# Patient Record
Sex: MALE | Race: WHITE | NOT HISPANIC OR LATINO | URBAN - METROPOLITAN AREA
[De-identification: names, ages, dates, MRNs, and addresses within clinical notes are randomized per-mention and may not be internally consistent; named-entity substitution may affect disease eponyms.]

---

## 2018-07-17 ENCOUNTER — INPATIENT (INPATIENT)
Facility: HOSPITAL | Age: 58
LOS: 7 days | Discharge: NURSING, REHAB OR LONG-TERM CARE | End: 2018-07-25
Attending: HOSPITALIST | Admitting: HOSPITALIST
Payer: MEDICARE

## 2018-07-17 VITALS — HEART RATE: 101 BPM | OXYGEN SATURATION: 98 %

## 2018-07-17 LAB
BASE EXCESS BLDV CALC-SCNC: 0.4 MMOL/L — SIGNIFICANT CHANGE UP (ref -2–2)
CA-I SERPL-SCNC: 1.19 MMOL/L — SIGNIFICANT CHANGE UP (ref 1.12–1.3)
GAS PNL BLDV: 134 MMOL/L — LOW (ref 136–145)
GAS PNL BLDV: SIGNIFICANT CHANGE UP
HCO3 BLDV-SCNC: 28 MMOL/L — SIGNIFICANT CHANGE UP (ref 22–29)
HCT VFR BLDA CALC: 48.2 % — HIGH (ref 34–44)
HGB BLD CALC-MCNC: 15.7 G/DL — SIGNIFICANT CHANGE UP (ref 14–18)
LACTATE BLDV-MCNC: 0.9 MMOL/L — SIGNIFICANT CHANGE UP (ref 0.5–1.6)
PCO2 BLDV: 58 MMHG — HIGH (ref 41–51)
PH BLDV: 7.3 — SIGNIFICANT CHANGE UP (ref 7.26–7.43)
PO2 BLDV: 56 MMHG — HIGH (ref 20–40)
POTASSIUM BLDV-SCNC: 3.8 MMOL/L — SIGNIFICANT CHANGE UP (ref 3.3–5.6)
SAO2 % BLDV: 85 % — SIGNIFICANT CHANGE UP

## 2018-07-17 NOTE — ED PROVIDER NOTE - PHYSICAL EXAMINATION
PHYSICAL EXAM:    GENERAL: Alert, appears older than stated age. on cpap. speaking in 1-2 word sentences. +improving with bipap.   SKIN: Warm, pink and dry.   EYE: Normal lids/conjunctiva,  ENT: Normal hearing, patent oropharynx   NECK: +supple. No meningismus, or JVD, +Trachea midline.  Pulm: Bilateral BS, increased respiratory effort. +wheezing on R, on L decreased BS. bedside sono done by me reveals b-lines throughout with pleural effusion on L. +good heart squeeze.   CV: RRR, no M/R/G, 2+ pulses  Abd: soft, non-tender, non-distended  Mskel: no erythema, cyanosis. + b/l 2+ LE edema.   Neuro: AAOx3.

## 2018-07-17 NOTE — ED PROVIDER NOTE - PROGRESS NOTE DETAILS
57 y/o M with PMH of CHF, HTN, DLD, GI disorders and has had respiratory failure in the past, BIB EMS from nursing home facility for evaluation of acute respiratory failure. Pt was found to be hypoxic, hypertensive and tachycardic. Pt improved with EMS with BiPAP.  On exam: Pt BIB EMS, on CPAP, (+) acute respiratory distress, speaking in 1 to 2 word sentences. NCAT. PERRLA 3mm b/l, no nystagmus, EOMI. HEENT normal, no pooling of secretions. +Full passive ROM in neck. S1S2, no MRG. Lungs: (+) wheezing and rales.  Abdomen soft, NT/ND, no rebound or guarding, no CVAT. (+) 2+ pitting edema, +symmetric pulses b/l. CN2-12 grossly intact. No rash. Bedside US displays B lines in all lung fields. Pt’s status is improving with BiPAP. pt states on eliquis for unknown reason. cannot perform thoracentesis for 24 hrs on eliquis. disucssed with Dr. childers ICU fellow, agrees that despite DNR/DNI, will admit to ICU, hold eliquis for 24 hrs, and then perform thoracentesis. stable on bipap.

## 2018-07-17 NOTE — ED PROVIDER NOTE - MEDICAL DECISION MAKING DETAILS
I personally evaluated the patient. I reviewed the Resident’s or Physician Assistant’s note (as assigned above), and agree with the findings and plan except as documented in my note. Patient endorsed to the ICU, unable to perform tap because patient states that he is on elliquis.

## 2018-07-17 NOTE — ED PROVIDER NOTE - OBJECTIVE STATEMENT
59 y/o M with PMH with bipolar d/o, suicide attempts, CHF, HLD, HTN, DNR/DNI on eliquis from [facility] for acute respiratory failure x 1 hr PTA. placed on CPAP en route with improvement in dyspnea. +hypoxia off CPAP. no CP. no fever.

## 2018-07-17 NOTE — ED PROVIDER NOTE - CARE PLAN
Principal Discharge DX:	Pleural effusion  Secondary Diagnosis:	CHF exacerbation  Secondary Diagnosis:	Shortness of breath

## 2018-07-18 ENCOUNTER — RESULT REVIEW (OUTPATIENT)
Age: 58
End: 2018-07-18

## 2018-07-18 LAB
ALBUMIN SERPL ELPH-MCNC: 4 G/DL — SIGNIFICANT CHANGE UP (ref 3.5–5.2)
ALBUMIN SERPL ELPH-MCNC: 4.3 G/DL — SIGNIFICANT CHANGE UP (ref 3.5–5.2)
ALP SERPL-CCNC: 186 U/L — HIGH (ref 30–115)
ALP SERPL-CCNC: 195 U/L — HIGH (ref 30–115)
ALT FLD-CCNC: 31 U/L — SIGNIFICANT CHANGE UP (ref 0–41)
ALT FLD-CCNC: 31 U/L — SIGNIFICANT CHANGE UP (ref 0–41)
ANION GAP SERPL CALC-SCNC: 15 MMOL/L — HIGH (ref 7–14)
ANION GAP SERPL CALC-SCNC: 19 MMOL/L — HIGH (ref 7–14)
APTT BLD: 35.1 SEC — SIGNIFICANT CHANGE UP (ref 27–39.2)
APTT BLD: 36.7 SEC — SIGNIFICANT CHANGE UP (ref 27–39.2)
AST SERPL-CCNC: 21 U/L — SIGNIFICANT CHANGE UP (ref 0–41)
AST SERPL-CCNC: 21 U/L — SIGNIFICANT CHANGE UP (ref 0–41)
B PERT IGG+IGM PNL SER: ABNORMAL
BASOPHILS # BLD AUTO: 0.02 K/UL — SIGNIFICANT CHANGE UP (ref 0–0.2)
BASOPHILS NFR BLD AUTO: 0.2 % — SIGNIFICANT CHANGE UP (ref 0–1)
BILIRUB DIRECT SERPL-MCNC: <0.2 MG/DL — SIGNIFICANT CHANGE UP (ref 0–0.2)
BILIRUB INDIRECT FLD-MCNC: >0.4 MG/DL — SIGNIFICANT CHANGE UP (ref 0.2–1.2)
BILIRUB SERPL-MCNC: 0.4 MG/DL — SIGNIFICANT CHANGE UP (ref 0.2–1.2)
BILIRUB SERPL-MCNC: 0.6 MG/DL — SIGNIFICANT CHANGE UP (ref 0.2–1.2)
BUN SERPL-MCNC: 14 MG/DL — SIGNIFICANT CHANGE UP (ref 10–20)
BUN SERPL-MCNC: 16 MG/DL — SIGNIFICANT CHANGE UP (ref 10–20)
CALCIUM SERPL-MCNC: 8.9 MG/DL — SIGNIFICANT CHANGE UP (ref 8.5–10.1)
CALCIUM SERPL-MCNC: 8.9 MG/DL — SIGNIFICANT CHANGE UP (ref 8.5–10.1)
CHLORIDE SERPL-SCNC: 97 MMOL/L — LOW (ref 98–110)
CHLORIDE SERPL-SCNC: 99 MMOL/L — SIGNIFICANT CHANGE UP (ref 98–110)
CK MB CFR SERPL CALC: 2.6 NG/ML — SIGNIFICANT CHANGE UP (ref 0.6–6.3)
CK SERPL-CCNC: 112 U/L — SIGNIFICANT CHANGE UP (ref 0–225)
CK SERPL-CCNC: 98 U/L — SIGNIFICANT CHANGE UP (ref 0–225)
CO2 SERPL-SCNC: 22 MMOL/L — SIGNIFICANT CHANGE UP (ref 17–32)
CO2 SERPL-SCNC: 24 MMOL/L — SIGNIFICANT CHANGE UP (ref 17–32)
COLOR FLD: SIGNIFICANT CHANGE UP
CREAT SERPL-MCNC: 0.9 MG/DL — SIGNIFICANT CHANGE UP (ref 0.7–1.5)
CREAT SERPL-MCNC: 1 MG/DL — SIGNIFICANT CHANGE UP (ref 0.7–1.5)
EOSINOPHIL # BLD AUTO: 0.02 K/UL — SIGNIFICANT CHANGE UP (ref 0–0.7)
EOSINOPHIL # FLD: 2 % — SIGNIFICANT CHANGE UP
EOSINOPHIL NFR BLD AUTO: 0.2 % — SIGNIFICANT CHANGE UP (ref 0–8)
FLUID INTAKE SUBSTANCE CLASS: SIGNIFICANT CHANGE UP
FLUID SEGMENTED GRANULOCYTES: 45 % — SIGNIFICANT CHANGE UP
GLUCOSE SERPL-MCNC: 139 MG/DL — HIGH (ref 70–99)
GLUCOSE SERPL-MCNC: 141 MG/DL — HIGH (ref 70–99)
HCT VFR BLD CALC: 43.1 % — SIGNIFICANT CHANGE UP (ref 42–52)
HCT VFR BLD CALC: 43.9 % — SIGNIFICANT CHANGE UP (ref 42–52)
HGB BLD-MCNC: 14.4 G/DL — SIGNIFICANT CHANGE UP (ref 14–18)
HGB BLD-MCNC: 14.5 G/DL — SIGNIFICANT CHANGE UP (ref 14–18)
IMM GRANULOCYTES NFR BLD AUTO: 0.7 % — HIGH (ref 0.1–0.3)
INR BLD: 1.26 RATIO — SIGNIFICANT CHANGE UP (ref 0.65–1.3)
INR BLD: 1.38 RATIO — HIGH (ref 0.65–1.3)
LACTATE SERPL-SCNC: 1 MMOL/L — SIGNIFICANT CHANGE UP (ref 0.5–2.2)
LDH SERPL L TO P-CCNC: 235 U/L — SIGNIFICANT CHANGE UP (ref 50–242)
LYMPHOCYTES # BLD AUTO: 0.7 K/UL — LOW (ref 1.2–3.4)
LYMPHOCYTES # BLD AUTO: 6.6 % — LOW (ref 20.5–51.1)
LYMPHOCYTES # FLD: 48 — SIGNIFICANT CHANGE UP
MAGNESIUM SERPL-MCNC: 2.1 MG/DL — SIGNIFICANT CHANGE UP (ref 1.8–2.4)
MCHC RBC-ENTMCNC: 26.7 PG — LOW (ref 27–31)
MCHC RBC-ENTMCNC: 27.1 PG — SIGNIFICANT CHANGE UP (ref 27–31)
MCHC RBC-ENTMCNC: 33 G/DL — SIGNIFICANT CHANGE UP (ref 32–37)
MCHC RBC-ENTMCNC: 33.4 G/DL — SIGNIFICANT CHANGE UP (ref 32–37)
MCV RBC AUTO: 80.8 FL — SIGNIFICANT CHANGE UP (ref 80–94)
MCV RBC AUTO: 81 FL — SIGNIFICANT CHANGE UP (ref 80–94)
MONOCYTES # BLD AUTO: 0.15 K/UL — SIGNIFICANT CHANGE UP (ref 0.1–0.6)
MONOCYTES NFR BLD AUTO: 1.4 % — LOW (ref 1.7–9.3)
MONOS+MACROS # FLD: 5 % — SIGNIFICANT CHANGE UP
NEUTROPHILS # BLD AUTO: 9.68 K/UL — HIGH (ref 1.4–6.5)
NEUTROPHILS NFR BLD AUTO: 90.9 % — HIGH (ref 42.2–75.2)
NRBC # BLD: 0 /100 WBCS — SIGNIFICANT CHANGE UP (ref 0–0)
NT-PROBNP SERPL-SCNC: 127 PG/ML — SIGNIFICANT CHANGE UP (ref 0–300)
PLATELET # BLD AUTO: 320 K/UL — SIGNIFICANT CHANGE UP (ref 130–400)
PLATELET # BLD AUTO: 418 K/UL — HIGH (ref 130–400)
POTASSIUM SERPL-MCNC: 4.3 MMOL/L — SIGNIFICANT CHANGE UP (ref 3.5–5)
POTASSIUM SERPL-MCNC: 4.4 MMOL/L — SIGNIFICANT CHANGE UP (ref 3.5–5)
POTASSIUM SERPL-SCNC: 4.3 MMOL/L — SIGNIFICANT CHANGE UP (ref 3.5–5)
POTASSIUM SERPL-SCNC: 4.4 MMOL/L — SIGNIFICANT CHANGE UP (ref 3.5–5)
PROT SERPL-MCNC: 6.6 G/DL — SIGNIFICANT CHANGE UP (ref 6–8)
PROT SERPL-MCNC: 7 G/DL — SIGNIFICANT CHANGE UP (ref 6–8)
PROTHROM AB SERPL-ACNC: 13.6 SEC — HIGH (ref 9.95–12.87)
PROTHROM AB SERPL-ACNC: 14.9 SEC — HIGH (ref 9.95–12.87)
RBC # BLD: 5.32 M/UL — SIGNIFICANT CHANGE UP (ref 4.7–6.1)
RBC # BLD: 5.43 M/UL — SIGNIFICANT CHANGE UP (ref 4.7–6.1)
RBC # FLD: 14.6 % — HIGH (ref 11.5–14.5)
RBC # FLD: 14.7 % — HIGH (ref 11.5–14.5)
RCV VOL RI: HIGH /UL (ref 0–5)
SODIUM SERPL-SCNC: 138 MMOL/L — SIGNIFICANT CHANGE UP (ref 135–146)
SODIUM SERPL-SCNC: 138 MMOL/L — SIGNIFICANT CHANGE UP (ref 135–146)
TOTAL NUCLEATED CELL COUNT, BODY FLUID: 1030 /UL — HIGH (ref 0–5)
TROPONIN T SERPL-MCNC: <0.01 NG/ML — SIGNIFICANT CHANGE UP
TROPONIN T SERPL-MCNC: <0.01 NG/ML — SIGNIFICANT CHANGE UP
TUBE TYPE: SIGNIFICANT CHANGE UP
WBC # BLD: 10.64 K/UL — SIGNIFICANT CHANGE UP (ref 4.8–10.8)
WBC # BLD: 15.41 K/UL — HIGH (ref 4.8–10.8)
WBC # FLD AUTO: 10.64 K/UL — SIGNIFICANT CHANGE UP (ref 4.8–10.8)
WBC # FLD AUTO: 15.41 K/UL — HIGH (ref 4.8–10.8)

## 2018-07-18 RX ORDER — VANCOMYCIN HCL 1 G
1500 VIAL (EA) INTRAVENOUS EVERY 12 HOURS
Qty: 0 | Refills: 0 | Status: DISCONTINUED | OUTPATIENT
Start: 2018-07-19 | End: 2018-07-19

## 2018-07-18 RX ORDER — MEROPENEM 1 G/30ML
1000 INJECTION INTRAVENOUS ONCE
Qty: 0 | Refills: 0 | Status: COMPLETED | OUTPATIENT
Start: 2018-07-18 | End: 2018-07-18

## 2018-07-18 RX ORDER — PROPRANOLOL HCL 160 MG
20 CAPSULE, EXTENDED RELEASE 24HR ORAL
Qty: 0 | Refills: 0 | Status: DISCONTINUED | OUTPATIENT
Start: 2018-07-18 | End: 2018-07-25

## 2018-07-18 RX ORDER — MEROPENEM 1 G/30ML
1000 INJECTION INTRAVENOUS EVERY 8 HOURS
Qty: 0 | Refills: 0 | Status: DISCONTINUED | OUTPATIENT
Start: 2018-07-18 | End: 2018-07-24

## 2018-07-18 RX ORDER — ACETAMINOPHEN 500 MG
650 TABLET ORAL ONCE
Qty: 0 | Refills: 0 | Status: DISCONTINUED | OUTPATIENT
Start: 2018-07-18 | End: 2018-07-18

## 2018-07-18 RX ORDER — ISOSORBIDE MONONITRATE 60 MG/1
20 TABLET, EXTENDED RELEASE ORAL DAILY
Qty: 0 | Refills: 0 | Status: DISCONTINUED | OUTPATIENT
Start: 2018-07-18 | End: 2018-07-18

## 2018-07-18 RX ORDER — SENNA PLUS 8.6 MG/1
1 TABLET ORAL EVERY 12 HOURS
Qty: 0 | Refills: 0 | Status: DISCONTINUED | OUTPATIENT
Start: 2018-07-18 | End: 2018-07-25

## 2018-07-18 RX ORDER — DULOXETINE HYDROCHLORIDE 30 MG/1
60 CAPSULE, DELAYED RELEASE ORAL DAILY
Qty: 0 | Refills: 0 | Status: DISCONTINUED | OUTPATIENT
Start: 2018-07-18 | End: 2018-07-25

## 2018-07-18 RX ORDER — LATANOPROST 0.05 MG/ML
1 SOLUTION/ DROPS OPHTHALMIC; TOPICAL AT BEDTIME
Qty: 0 | Refills: 0 | Status: DISCONTINUED | OUTPATIENT
Start: 2018-07-18 | End: 2018-07-25

## 2018-07-18 RX ORDER — TAMSULOSIN HYDROCHLORIDE 0.4 MG/1
0.4 CAPSULE ORAL DAILY
Qty: 0 | Refills: 0 | Status: DISCONTINUED | OUTPATIENT
Start: 2018-07-18 | End: 2018-07-25

## 2018-07-18 RX ORDER — TRAZODONE HCL 50 MG
50 TABLET ORAL AT BEDTIME
Qty: 0 | Refills: 0 | Status: DISCONTINUED | OUTPATIENT
Start: 2018-07-18 | End: 2018-07-25

## 2018-07-18 RX ORDER — MORPHINE SULFATE 50 MG/1
2 CAPSULE, EXTENDED RELEASE ORAL ONCE
Qty: 0 | Refills: 0 | Status: DISCONTINUED | OUTPATIENT
Start: 2018-07-18 | End: 2018-07-18

## 2018-07-18 RX ORDER — VANCOMYCIN HCL 1 G
1500 VIAL (EA) INTRAVENOUS ONCE
Qty: 0 | Refills: 0 | Status: COMPLETED | OUTPATIENT
Start: 2018-07-18 | End: 2018-07-18

## 2018-07-18 RX ORDER — VANCOMYCIN HCL 1 G
VIAL (EA) INTRAVENOUS
Qty: 0 | Refills: 0 | Status: DISCONTINUED | OUTPATIENT
Start: 2018-07-18 | End: 2018-07-19

## 2018-07-18 RX ORDER — MEROPENEM 1 G/30ML
INJECTION INTRAVENOUS
Qty: 0 | Refills: 0 | Status: DISCONTINUED | OUTPATIENT
Start: 2018-07-18 | End: 2018-07-24

## 2018-07-18 RX ORDER — ZOLPIDEM TARTRATE 10 MG/1
5 TABLET ORAL AT BEDTIME
Qty: 0 | Refills: 0 | Status: DISCONTINUED | OUTPATIENT
Start: 2018-07-18 | End: 2018-07-22

## 2018-07-18 RX ORDER — FUROSEMIDE 40 MG
40 TABLET ORAL DAILY
Qty: 0 | Refills: 0 | Status: DISCONTINUED | OUTPATIENT
Start: 2018-07-18 | End: 2018-07-25

## 2018-07-18 RX ORDER — ZOLPIDEM TARTRATE 10 MG/1
5 TABLET ORAL AT BEDTIME
Qty: 0 | Refills: 0 | Status: DISCONTINUED | OUTPATIENT
Start: 2018-07-18 | End: 2018-07-24

## 2018-07-18 RX ORDER — ISOSORBIDE MONONITRATE 60 MG/1
30 TABLET, EXTENDED RELEASE ORAL DAILY
Qty: 0 | Refills: 0 | Status: DISCONTINUED | OUTPATIENT
Start: 2018-07-18 | End: 2018-07-25

## 2018-07-18 RX ADMIN — MEROPENEM 200 MILLIGRAM(S): 1 INJECTION INTRAVENOUS at 22:03

## 2018-07-18 RX ADMIN — MORPHINE SULFATE 2 MILLIGRAM(S): 50 CAPSULE, EXTENDED RELEASE ORAL at 14:30

## 2018-07-18 RX ADMIN — Medication 50 MILLIGRAM(S): at 22:03

## 2018-07-18 RX ADMIN — TAMSULOSIN HYDROCHLORIDE 0.4 MILLIGRAM(S): 0.4 CAPSULE ORAL at 11:30

## 2018-07-18 RX ADMIN — MEROPENEM 200 MILLIGRAM(S): 1 INJECTION INTRAVENOUS at 16:56

## 2018-07-18 RX ADMIN — Medication 1 APPLICATION(S): at 18:26

## 2018-07-18 RX ADMIN — MORPHINE SULFATE 2 MILLIGRAM(S): 50 CAPSULE, EXTENDED RELEASE ORAL at 14:43

## 2018-07-18 RX ADMIN — Medication 20 MILLIGRAM(S): at 17:01

## 2018-07-18 RX ADMIN — ISOSORBIDE MONONITRATE 30 MILLIGRAM(S): 60 TABLET, EXTENDED RELEASE ORAL at 12:14

## 2018-07-18 RX ADMIN — DULOXETINE HYDROCHLORIDE 60 MILLIGRAM(S): 30 CAPSULE, DELAYED RELEASE ORAL at 11:30

## 2018-07-18 RX ADMIN — LATANOPROST 1 DROP(S): 0.05 SOLUTION/ DROPS OPHTHALMIC; TOPICAL at 22:03

## 2018-07-18 RX ADMIN — Medication 300 MILLIGRAM(S): at 16:56

## 2018-07-18 RX ADMIN — Medication 40 MILLIGRAM(S): at 03:28

## 2018-07-18 RX ADMIN — ZOLPIDEM TARTRATE 5 MILLIGRAM(S): 10 TABLET ORAL at 22:03

## 2018-07-18 RX ADMIN — SENNA PLUS 1 TABLET(S): 8.6 TABLET ORAL at 11:50

## 2018-07-18 NOTE — CONSULT NOTE ADULT - SUBJECTIVE AND OBJECTIVE BOX
Patient is a 58y old  Male who presents with a chief complaint of sob (18 Jul 2018 02:25)      HPI:  : 57 y/o M with PMH of ?? CHF, HTN, DLD,  and has had respiratory failure in the past, BIB EMS from nursing home facility for evaluation of sob. AS per patient, he has been feeling weak and having difficulty breathing for few days along with cough but no sputum. he also c/o chest pressure occasionally with sob but now denies any pain. he denies any fever, chills, n/v, headache. he also c/o abd pain which is diffuse and chronic along with distension and also mentions that he had last bm 2days ago and is passing gas with difficulty. In ed, he was found to have lt pleural effusion but was not able to tap as patient is on eliquis. he is poor historian. (18 Jul 2018 02:25)      PAST MEDICAL & SURGICAL HISTORY:  CHF (congestive heart failure)  Insomnia  BPH (benign prostatic hyperplasia)  Depression  Bipolar 1 disorder  HTN (hypertension)      SOCIAL HX:   Smoking                         ETOH                            Other    FAMILY HISTORY:  No pertinent family history in first degree relatives      Review of system: noncontributory, otherwise stated in my note    Allergies    No Known Allergies    Intolerances          PHYSICAL EXAM    ICU Vital Signs Last 24 Hrs  T(C): 35.9 (18 Jul 2018 03:45), Max: 37 (17 Jul 2018 23:42)  T(F): 96.6 (18 Jul 2018 03:45), Max: 98.6 (17 Jul 2018 23:42)  HR: 102 (18 Jul 2018 06:00) (100 - 104)  BP: 132/84 (18 Jul 2018 06:00) (132/84 - 150/97)  BP(mean): 103 (18 Jul 2018 06:00) (103 - 103)  RR: 18 (18 Jul 2018 06:00) (18 - 24)  SpO2: 97% (18 Jul 2018 06:00) (95% - 100%)            General:  HEENT:  PERRLA            Lymph Nodes: no cervical LN  Lungs: decreased breath sounds and crackles   Cardiovascular: Regular  Abdomen: Soft, Positive BS  Extremities: No clubbing  Skin: intact  Neurological: moves all ext      07-17-18 @ 07:01  -  07-18-18 @ 07:00  --------------------------------------------------------  IN:  Total IN: 0 mL    OUT:    Voided: 1450 mL  Total OUT: 1450 mL    Total NET: -1450 mL          LABS:                          14.5   10.64 )-----------( 320      ( 18 Jul 2018 04:30 )             43.9                                               07-18    138  |  97<L>  |  16  ----------------------------<  139<H>  4.3   |  22  |  0.9    Ca    8.9      18 Jul 2018 04:30  Mg     2.1     07-18    TPro  7.0  /  Alb  4.3  /  TBili  0.6  /  DBili  <0.2  /  AST  21  /  ALT  31  /  AlkPhos  195<H>  07-18      PT/INR - ( 18 Jul 2018 04:30 )   PT: 13.60 sec;   INR: 1.26 ratio         PTT - ( 18 Jul 2018 04:30 )  PTT:36.7 sec                                           CARDIAC MARKERS ( 18 Jul 2018 04:30 )  x     / <0.01 ng/mL / 98 U/L / x     / x      CARDIAC MARKERS ( 17 Jul 2018 23:51 )  x     / <0.01 ng/mL / 112 U/L / x     / 2.6 ng/mL                                            LIVER FUNCTIONS - ( 18 Jul 2018 04:30 )  Alb: 4.3 g/dL / Pro: 7.0 g/dL / ALK PHOS: 195 U/L / ALT: 31 U/L / AST: 21 U/L / GGT: x                                                                                                                                   Blood Gas Venous - Lactate: 0.9 mmoL/L (07-17-18 @ 23:54)  Lactate, Blood: 1.0 mmol/L (07-17-18 @ 23:51)      Serum Pro-Brain Natriuretic Peptide: 127 pg/mL (07-17-18 @ 23:51)    X-Rays                                                                                     ECHO      MEDICATIONS  (STANDING):  clotrimazole 1% Cream 1 Application(s) Topical <User Schedule>  diltiazem    Tablet 60 milliGRAM(s) Oral every 8 hours  DULoxetine 60 milliGRAM(s) Oral daily  furosemide   Injectable 40 milliGRAM(s) IV Push daily  isosorbide    mononitrate Tablet (ISMO) 20 milliGRAM(s) Oral daily  latanoprost 0.005% Ophthalmic Solution 1 Drop(s) Both EYES at bedtime  propranolol 20 milliGRAM(s) Oral two times a day  tamsulosin 0.4 milliGRAM(s) Oral daily  traZODone 50 milliGRAM(s) Oral at bedtime    MEDICATIONS  (PRN):  senna 1 Tablet(s) Oral every 12 hours PRN Constipation  zolpidem 5 milliGRAM(s) Oral at bedtime PRN Insomnia  zolpidem 5 milliGRAM(s) Oral at bedtime PRN Insomnia Patient is a 58y old  Male who presents with a chief complaint of sob (18 Jul 2018 02:25)      HPI:  57 y/o M with PMH of ?? CHF, HTN, DLD,  and has had respiratory failure in the past, BIB EMS from nursing home facility for evaluation of sob. AS per patient, he has been feeling weak and having difficulty breathing for few days along with cough but no sputum. he also c/o chest pressure occasionally with sob but now denies any pain. he denies any fever, chills, n/v, headache. he also c/o abd pain which is diffuse and chronic along with distension and also mentions that he had last bm 2days ago and is passing gas with difficulty. In ed, he was found to have lt pleural effusion but was not able to tap as patient is on eliquis. he is poor historian. (18 Jul 2018 02:25), called to evaluate for large pleural effusion, patient on BIPAP      PAST MEDICAL & SURGICAL HISTORY:  CHF (congestive heart failure)  Insomnia  BPH (benign prostatic hyperplasia)  Depression  Bipolar 1 disorder  HTN (hypertension)      SOCIAL HX:   Smoking denies    FAMILY HISTORY:  No pertinent family history in first degree relatives      Review of system: noncontributory, otherwise stated in my note    Allergies    No Known Allergies    Intolerances          PHYSICAL EXAM    ICU Vital Signs Last 24 Hrs  T(C): 35.9 (18 Jul 2018 03:45), Max: 37 (17 Jul 2018 23:42)  T(F): 96.6 (18 Jul 2018 03:45), Max: 98.6 (17 Jul 2018 23:42)  HR: 102 (18 Jul 2018 06:00) (100 - 104)  BP: 132/84 (18 Jul 2018 06:00) (132/84 - 150/97)  BP(mean): 103 (18 Jul 2018 06:00) (103 - 103)  RR: 18 (18 Jul 2018 06:00) (18 - 24)  SpO2: 97% (18 Jul 2018 06:00) (95% - 100%)            General:  HEENT:  PERRLA            Lymph Nodes: no cervical LN  Lungs: decreased breath sounds and crackles   Cardiovascular: Regular  Abdomen: Soft, Positive BS  Extremities: No clubbing  Skin: intact  Neurological: moves all ext      07-17-18 @ 07:01  -  07-18-18 @ 07:00  --------------------------------------------------------  IN:  Total IN: 0 mL    OUT:    Voided: 1450 mL  Total OUT: 1450 mL    Total NET: -1450 mL          LABS:                          14.5   10.64 )-----------( 320      ( 18 Jul 2018 04:30 )             43.9                                               07-18    138  |  97<L>  |  16  ----------------------------<  139<H>  4.3   |  22  |  0.9    Ca    8.9      18 Jul 2018 04:30  Mg     2.1     07-18    TPro  7.0  /  Alb  4.3  /  TBili  0.6  /  DBili  <0.2  /  AST  21  /  ALT  31  /  AlkPhos  195<H>  07-18      PT/INR - ( 18 Jul 2018 04:30 )   PT: 13.60 sec;   INR: 1.26 ratio         PTT - ( 18 Jul 2018 04:30 )  PTT:36.7 sec                                           CARDIAC MARKERS ( 18 Jul 2018 04:30 )  x     / <0.01 ng/mL / 98 U/L / x     / x      CARDIAC MARKERS ( 17 Jul 2018 23:51 )  x     / <0.01 ng/mL / 112 U/L / x     / 2.6 ng/mL                                            LIVER FUNCTIONS - ( 18 Jul 2018 04:30 )  Alb: 4.3 g/dL / Pro: 7.0 g/dL / ALK PHOS: 195 U/L / ALT: 31 U/L / AST: 21 U/L / GGT: x                                                                                                                                   Blood Gas Venous - Lactate: 0.9 mmoL/L (07-17-18 @ 23:54)  Lactate, Blood: 1.0 mmol/L (07-17-18 @ 23:51)      Serum Pro-Brain Natriuretic Peptide: 127 pg/mL (07-17-18 @ 23:51)    X-Rays reviewed      MEDICATIONS  (STANDING):  clotrimazole 1% Cream 1 Application(s) Topical <User Schedule>  diltiazem    Tablet 60 milliGRAM(s) Oral every 8 hours  DULoxetine 60 milliGRAM(s) Oral daily  furosemide   Injectable 40 milliGRAM(s) IV Push daily  isosorbide    mononitrate Tablet (ISMO) 20 milliGRAM(s) Oral daily  latanoprost 0.005% Ophthalmic Solution 1 Drop(s) Both EYES at bedtime  propranolol 20 milliGRAM(s) Oral two times a day  tamsulosin 0.4 milliGRAM(s) Oral daily  traZODone 50 milliGRAM(s) Oral at bedtime    MEDICATIONS  (PRN):  senna 1 Tablet(s) Oral every 12 hours PRN Constipation  zolpidem 5 milliGRAM(s) Oral at bedtime PRN Insomnia  zolpidem 5 milliGRAM(s) Oral at bedtime PRN Insomnia

## 2018-07-18 NOTE — CONSULT NOTE ADULT - ASSESSMENT
IMPRESSION: AHRF   CHF Exacerbation   large left pleural effusion   afib on eliquis       PLAN:    CNS: no depressatnts    HEENT:  Oral care    PULMONARY:  HOB @ 45 degrees, BIPAP 4 Hours on/off and at night, will place a pig tail catheter today    CARDIOVASCULAR: I<O iv lasix twice a day, cardiac enzymes, 2decho    GI: GI prophylaxis                                          Feeding     RENAL:  F/u  lytes.  Correct as needed. accurate I/O    INFECTIOUS DISEASE: Pan cultures,     HEMATOLOGICAL:  DVT prophylaxis.    ENDOCRINE:  Follow up FS.  Insulin protocol if needed.    MUSCULOSKELETAL:    CODE STATUS: DNR/DNI     DISPOSITION: Pt requires continued monitoring in the MICU IMPRESSION:      SOB/ large left pleural effusion no old CXR r/o malignancy, multiple co morbidities  afib on eliquis       PLAN:    CNS: no depressants    HEENT:  Oral care    PULMONARY:  HOB @ 45 degrees, BIPAP 4 Hours on/off and at night, will place a pig tail catheter today    CARDIOVASCULAR: I<O iv lasix twice a day, cardiac enzymes, 2decho    GI: GI prophylaxis                                          Feeding IF OFF BIPAP    RENAL:  F/u  lytes.  Correct as needed. accurate I/O    INFECTIOUS DISEASE: Pan cultures,     HEMATOLOGICAL:  DVT prophylaxis.    ENDOCRINE:  Follow up FS.  Insulin protocol if needed.    MUSCULOSKELETAL:    CODE STATUS: DNR/DNI     PALLIATIVE CARE EVAL

## 2018-07-18 NOTE — H&P ADULT - PMH
Bipolar 1 disorder    BPH (benign prostatic hyperplasia)    CHF (congestive heart failure)    Depression    HTN (hypertension)    Insomnia

## 2018-07-18 NOTE — PROGRESS NOTE ADULT - SUBJECTIVE AND OBJECTIVE BOX
AMRIK MADRID 58y Male  MRN#: [id number]   CODE STATUS: DNI DNR      SUBJECTIVE  Patient is a 58y old Male who presents with a chief complaint of sob (18 Jul 2018 02:25)  Currently admitted to medicine with the primary diagnosis of Pleural effusion.  Patient had pleural tap today with insertion of pigtail catheter. Reports his dyspnea and chest pain improved following procedure.        OBJECTIVE  PAST MEDICAL & SURGICAL HISTORY  Unprovoked DVT  BPH (benign prostatic hyperplasia)  Bipolar disorder with previous suicidal attempt ( liyhium overdose)  HTN    ALLERGIES:  No Known Allergies    MEDICATIONS:  STANDING MEDICATIONS  clotrimazole 1% Cream 1 Application(s) Topical <User Schedule>  diltiazem    Tablet 60 milliGRAM(s) Oral every 8 hours  DULoxetine 60 milliGRAM(s) Oral daily  furosemide   Injectable 40 milliGRAM(s) IV Push daily  isosorbide   mononitrate ER Tablet (IMDUR) 30 milliGRAM(s) Oral daily  latanoprost 0.005% Ophthalmic Solution 1 Drop(s) Both EYES at bedtime  meropenem  IVPB      meropenem  IVPB 1000 milliGRAM(s) IV Intermittent once  meropenem  IVPB 1000 milliGRAM(s) IV Intermittent every 8 hours  propranolol 20 milliGRAM(s) Oral two times a day  tamsulosin 0.4 milliGRAM(s) Oral daily  traZODone 50 milliGRAM(s) Oral at bedtime  vancomycin  IVPB      vancomycin  IVPB 1500 milliGRAM(s) IV Intermittent once    PRN MEDICATIONS  senna 1 Tablet(s) Oral every 12 hours PRN  zolpidem 5 milliGRAM(s) Oral at bedtime PRN  zolpidem 5 milliGRAM(s) Oral at bedtime PRN      VITAL SIGNS: Last 24 Hours  T(C): 36.7 (18 Jul 2018 12:00), Max: 37 (17 Jul 2018 23:42)  T(F): 98 (18 Jul 2018 12:00), Max: 98.6 (17 Jul 2018 23:42)  HR: 128 (18 Jul 2018 15:00) (100 - 130)  BP: 140/95 (18 Jul 2018 15:00) (132/84 - 168/99)  BP(mean): 118 (18 Jul 2018 15:00) (103 - 139)  RR: 27 (18 Jul 2018 15:00) (18 - 29)  SpO2: 96% (18 Jul 2018 15:00) (93% - 100%)    LABS:                        14.5   10.64 )-----------( 320      ( 18 Jul 2018 04:30 )             43.9     07-18    138  |  97<L>  |  16  ----------------------------<  139<H>  4.3   |  22  |  0.9    Ca    8.9      18 Jul 2018 04:30  Mg     2.1     07-18    TPro  7.0  /  Alb  4.3  /  TBili  0.6  /  DBili  <0.2  /  AST  21  /  ALT  31  /  AlkPhos  195<H>  07-18    PT/INR - ( 18 Jul 2018 04:30 )   PT: 13.60 sec;   INR: 1.26 ratio         PTT - ( 18 Jul 2018 04:30 )  PTT:36.7 sec      Creatine Kinase, Serum: 98 U/L (07-18-18 @ 04:30)  Troponin T, Serum: <0.01 ng/mL (07-18-18 @ 04:30)  Lactate, Blood: 1.0 mmol/L (07-17-18 @ 23:51)  Creatine Kinase, Serum: 112 U/L (07-17-18 @ 23:51)  Troponin T, Serum: <0.01 ng/mL (07-17-18 @ 23:51)      CARDIAC MARKERS ( 18 Jul 2018 04:30 )  x     / <0.01 ng/mL / 98 U/L / x     / x      CARDIAC MARKERS ( 17 Jul 2018 23:51 )  x     / <0.01 ng/mL / 112 U/L / x     / 2.6 ng/mL      RADIOLOGY:  CXR (prior to tap) : significant left pleural effusion with left lung collapse  TTE: Probably normal global left ventricular systolic function; normal diastolic filling.    PHYSICAL EXAM: prior to tap    GENERAL: NAD, well-developed, AAOx3  HEENT:  Atraumatic, Normocephalic. EOMI, PERRLA, conjunctiva and sclera clear, No JVD  PULMONARY: No air entry in left lung  CARDIOVASCULAR: Regular rate and rhythm; No murmurs, rubs, or gallops  GASTROINTESTINAL: Soft, Mild diffuse tender, Distended; Bowel sounds present  MUSCULOSKELETAL:  2+ Peripheral Pulses, No clubbing, cyanosis, or edema  NEUROLOGY: non-focal        ADMISSION SUMMARY  Patient is a 58y old Male with history of DVT ( on eliquis) and bipolar disorder living in a nursing facility who presents with a chief complaint of Dyspnea (18 Jul 2018 02:25)  Currently admitted to medicine with the primary diagnosis of Pleural effusion. Patient had diagnostic tap done today followed by insertion of pigtail catheter.   Patient became acutely tachypneic with normal spO2 preceding procedure.      ASSESSMENT & PLAN    1. PLEURAL EFFUSION  Left unilateral  Hematic appearance  pH 7.24  Drained with pigtail ( 600cc)  No sign of CHF/ need to r/o infectious vs malignant  Follow up pleural studies  Started broad spectrum antibiotics ( vancomycin and meropenem)    2. Abdominal discomfort  US bd no ascites  Kub shows no obstruction  r/o contipation induced  trial of stool softeners    3. History of DVT  Hold Eliquis    4. HTN, bipolar  Continue home meds

## 2018-07-18 NOTE — PROCEDURE NOTE - NSPROCDETAILS_GEN_ALL_CORE
3 litres/dressing applied/secured in place/sterile dressing applied/ultrasound assessment of fluid (location)/supine position/ultrasound assessment of fluid (size)/Seldinger technique

## 2018-07-18 NOTE — H&P ADULT - NSHPLABSRESULTS_GEN_ALL_CORE
14.4   15.41 )-----------( 418      ( 17 Jul 2018 23:51 )             43.1       07-17    138  |  99  |  14  ----------------------------<  141<H>  4.4   |  24  |  1.0    Ca    8.9      17 Jul 2018 23:51    TPro  6.6  /  Alb  4.0  /  TBili  0.4  /  DBili  x   /  AST  21  /  ALT  31  /  AlkPhos  186<H>  07-17                  PT/INR - ( 17 Jul 2018 23:51 )   PT: 14.90 sec;   INR: 1.38 ratio         PTT - ( 17 Jul 2018 23:51 )  PTT:35.1 sec    Lactate Trend  07-17 @ 23:51 Lactate:1.0       CARDIAC MARKERS ( 17 Jul 2018 23:51 )  x     / <0.01 ng/mL / 112 U/L / x     / 2.6 ng/mL        CAPILLARY BLOOD GLUCOSE      cxr- lt pleural effusion    ekg- nsr

## 2018-07-18 NOTE — H&P ADULT - NSHPPHYSICALEXAM_GEN_ALL_CORE
T(C): 37 (07-17-18 @ 23:49), Max: 37 (07-17-18 @ 23:42)  HR: 104 (07-17-18 @ 23:49) (100 - 104)  BP: 148/84 (07-17-18 @ 23:49) (145/84 - 150/97)  RR: 24 (07-17-18 @ 23:49) (24 - 24)  SpO2: 95% (07-17-18 @ 23:49) (95% - 98%)    PHYSICAL EXAM:  GENERAL: NAD, on bipap  HEAD:  Atraumatic, Normocephalic  EYES:  conjunctiva and sclera clear  NECK: Supple,  CHEST/LUNG: dec bs on lt side with exp wheezes  HEART: Regular rate and rhythm;   ABDOMEN: Soft, Nontender, distended; Bowel sounds present  EXTREMITIES:  2+ Peripheral Pulses, 2+ edema  PSYCH: AAOx3  NEUROLOGY: non-focal  SKIN: No rashes or lesions

## 2018-07-18 NOTE — H&P ADULT - HISTORY OF PRESENT ILLNESS
: 59 y/o M with PMH of ?? CHF, HTN, DLD,  and has had respiratory failure in the past, BIB EMS from nursing home facility for evaluation of sob. AS per patient, he has been feeling weak and having difficulty breathing for few days along with cough but no sputum. he also c/o chest pressure occasionally with sob but now denies any pain. he denies any fever, chills, n/v, headache. he also c/o abd pain which is diffuse and chronic along with distension and also mentions that he had last bm 2days ago and is passing gas with difficulty. In ed, he was found to have lt pleural effusion but was not able to tap as patient is on eliquis. he is poor historian.

## 2018-07-18 NOTE — H&P ADULT - ASSESSMENT
58 y m with pmh of htn ,cad, bph, insomnia, bipolar disorder, depression, ?? chf and afib on eliquis  p/w sob    1) acute respiratory failure likely 2/2 lt pleural effusion could be 2/2 chf exacerbation but bnp is nl and has b lines on bed side echo by er and b/l pitting edema  admit to icu  c/w bipap for now  will need thoracentesis in morning , hold eliquis for procedure  check bcx, ucx - no abx for now   check echo   monitor strict i&o- maintain neg balance  start iv lasix 40mg q24  repeat ce x1  repeat cxr in am    2) abd distension and pain - check us abd to r/o ascites  pt is passing gas and kub shows no obstruction  start stool softeners    3) htn- c/w home meds    4) depression -c/w duloxetine    5) ??afib- hold eliquis for now for thoracentesis    6) bph- c/w flomax    7) insomnia- c/w trazodone and ambien    8) dvtppx on eliquis  diet- npo for now  dnr/dni

## 2018-07-19 LAB
ALBUMIN FLD-MCNC: 3.3 G/DL — SIGNIFICANT CHANGE UP
ANION GAP SERPL CALC-SCNC: 13 MMOL/L — SIGNIFICANT CHANGE UP (ref 7–14)
BASOPHILS # BLD AUTO: 0.04 K/UL — SIGNIFICANT CHANGE UP (ref 0–0.2)
BASOPHILS NFR BLD AUTO: 0.3 % — SIGNIFICANT CHANGE UP (ref 0–1)
BUN SERPL-MCNC: 18 MG/DL — SIGNIFICANT CHANGE UP (ref 10–20)
CALCIUM SERPL-MCNC: 8.3 MG/DL — LOW (ref 8.5–10.1)
CHLORIDE SERPL-SCNC: 100 MMOL/L — SIGNIFICANT CHANGE UP (ref 98–110)
CO2 SERPL-SCNC: 25 MMOL/L — SIGNIFICANT CHANGE UP (ref 17–32)
CREAT SERPL-MCNC: 0.9 MG/DL — SIGNIFICANT CHANGE UP (ref 0.7–1.5)
EOSINOPHIL # BLD AUTO: 0.04 K/UL — SIGNIFICANT CHANGE UP (ref 0–0.7)
EOSINOPHIL NFR BLD AUTO: 0.3 % — SIGNIFICANT CHANGE UP (ref 0–8)
GLUCOSE FLD-MCNC: 108 MG/DL — SIGNIFICANT CHANGE UP
GLUCOSE SERPL-MCNC: 126 MG/DL — HIGH (ref 70–99)
GRAM STN FLD: SIGNIFICANT CHANGE UP
HCT VFR BLD CALC: 39.9 % — LOW (ref 42–52)
HGB BLD-MCNC: 13 G/DL — LOW (ref 14–18)
IMM GRANULOCYTES NFR BLD AUTO: 1 % — HIGH (ref 0.1–0.3)
LDH SERPL L TO P-CCNC: 444 U/L — SIGNIFICANT CHANGE UP
LYMPHOCYTES # BLD AUTO: 1.1 K/UL — LOW (ref 1.2–3.4)
LYMPHOCYTES # BLD AUTO: 8.9 % — LOW (ref 20.5–51.1)
MCHC RBC-ENTMCNC: 26.6 PG — LOW (ref 27–31)
MCHC RBC-ENTMCNC: 32.6 G/DL — SIGNIFICANT CHANGE UP (ref 32–37)
MCV RBC AUTO: 81.8 FL — SIGNIFICANT CHANGE UP (ref 80–94)
MONOCYTES # BLD AUTO: 0.94 K/UL — HIGH (ref 0.1–0.6)
MONOCYTES NFR BLD AUTO: 7.6 % — SIGNIFICANT CHANGE UP (ref 1.7–9.3)
NEUTROPHILS # BLD AUTO: 10.1 K/UL — HIGH (ref 1.4–6.5)
NEUTROPHILS NFR BLD AUTO: 81.9 % — HIGH (ref 42.2–75.2)
NRBC # BLD: 0 /100 WBCS — SIGNIFICANT CHANGE UP (ref 0–0)
PLATELET # BLD AUTO: 318 K/UL — SIGNIFICANT CHANGE UP (ref 130–400)
POTASSIUM SERPL-MCNC: 4 MMOL/L — SIGNIFICANT CHANGE UP (ref 3.5–5)
POTASSIUM SERPL-SCNC: 4 MMOL/L — SIGNIFICANT CHANGE UP (ref 3.5–5)
PROT FLD-MCNC: 4.8 G/DL — SIGNIFICANT CHANGE UP
RBC # BLD: 4.88 M/UL — SIGNIFICANT CHANGE UP (ref 4.7–6.1)
RBC # FLD: 14.6 % — HIGH (ref 11.5–14.5)
SODIUM SERPL-SCNC: 138 MMOL/L — SIGNIFICANT CHANGE UP (ref 135–146)
SPECIMEN SOURCE: SIGNIFICANT CHANGE UP
WBC # BLD: 12.34 K/UL — HIGH (ref 4.8–10.8)
WBC # FLD AUTO: 12.34 K/UL — HIGH (ref 4.8–10.8)

## 2018-07-19 PROCEDURE — 93970 EXTREMITY STUDY: CPT | Mod: 26

## 2018-07-19 RX ORDER — HEPARIN SODIUM 5000 [USP'U]/ML
5000 INJECTION INTRAVENOUS; SUBCUTANEOUS EVERY 8 HOURS
Qty: 0 | Refills: 0 | Status: DISCONTINUED | OUTPATIENT
Start: 2018-07-19 | End: 2018-07-19

## 2018-07-19 RX ORDER — LACTULOSE 10 G/15ML
20 SOLUTION ORAL ONCE
Qty: 0 | Refills: 0 | Status: COMPLETED | OUTPATIENT
Start: 2018-07-19 | End: 2018-07-19

## 2018-07-19 RX ORDER — APIXABAN 2.5 MG/1
5 TABLET, FILM COATED ORAL EVERY 12 HOURS
Qty: 0 | Refills: 0 | Status: DISCONTINUED | OUTPATIENT
Start: 2018-07-19 | End: 2018-07-20

## 2018-07-19 RX ORDER — DOCUSATE SODIUM 100 MG
100 CAPSULE ORAL
Qty: 0 | Refills: 0 | Status: DISCONTINUED | OUTPATIENT
Start: 2018-07-19 | End: 2018-07-25

## 2018-07-19 RX ADMIN — TAMSULOSIN HYDROCHLORIDE 0.4 MILLIGRAM(S): 0.4 CAPSULE ORAL at 12:15

## 2018-07-19 RX ADMIN — SENNA PLUS 1 TABLET(S): 8.6 TABLET ORAL at 12:14

## 2018-07-19 RX ADMIN — MEROPENEM 200 MILLIGRAM(S): 1 INJECTION INTRAVENOUS at 14:50

## 2018-07-19 RX ADMIN — Medication 300 MILLIGRAM(S): at 05:33

## 2018-07-19 RX ADMIN — ISOSORBIDE MONONITRATE 30 MILLIGRAM(S): 60 TABLET, EXTENDED RELEASE ORAL at 12:15

## 2018-07-19 RX ADMIN — LATANOPROST 1 DROP(S): 0.05 SOLUTION/ DROPS OPHTHALMIC; TOPICAL at 21:04

## 2018-07-19 RX ADMIN — MEROPENEM 200 MILLIGRAM(S): 1 INJECTION INTRAVENOUS at 05:33

## 2018-07-19 RX ADMIN — LACTULOSE 20 GRAM(S): 10 SOLUTION ORAL at 12:14

## 2018-07-19 RX ADMIN — Medication 20 MILLIGRAM(S): at 05:35

## 2018-07-19 RX ADMIN — DULOXETINE HYDROCHLORIDE 60 MILLIGRAM(S): 30 CAPSULE, DELAYED RELEASE ORAL at 12:15

## 2018-07-19 RX ADMIN — Medication 40 MILLIGRAM(S): at 05:35

## 2018-07-19 RX ADMIN — Medication 1 APPLICATION(S): at 18:02

## 2018-07-19 RX ADMIN — Medication 50 MILLIGRAM(S): at 21:03

## 2018-07-19 RX ADMIN — HEPARIN SODIUM 5000 UNIT(S): 5000 INJECTION INTRAVENOUS; SUBCUTANEOUS at 21:04

## 2018-07-19 RX ADMIN — MEROPENEM 200 MILLIGRAM(S): 1 INJECTION INTRAVENOUS at 21:05

## 2018-07-19 RX ADMIN — Medication 20 MILLIGRAM(S): at 18:02

## 2018-07-19 RX ADMIN — HEPARIN SODIUM 5000 UNIT(S): 5000 INJECTION INTRAVENOUS; SUBCUTANEOUS at 14:50

## 2018-07-19 RX ADMIN — ZOLPIDEM TARTRATE 5 MILLIGRAM(S): 10 TABLET ORAL at 22:14

## 2018-07-19 NOTE — PROGRESS NOTE ADULT - SUBJECTIVE AND OBJECTIVE BOX
AMRIK MADRID 58y Male  MRN#: [id number]   CODE STATUS: DNI DNR      SUBJECTIVE  Patient is a 58y old Male who presents with a chief complaint of sob (18 Jul 2018 02:25)  Currently admitted to medicine with the primary diagnosis of Pleural effusion.  Patient had pleural tap b yesterday with insertion of pigtail catheter. No complications.Reports his dyspnea and chest pain improved following procedure.        OBJECTIVE  PAST MEDICAL & SURGICAL HISTORY  Unprovoked DVT  BPH (benign prostatic hyperplasia)  Bipolar disorder with previous suicidal attempt ( liyhium overdose)  HTN  ALLERGIES:  No Known Allergies    MEDICATIONS:  STANDING MEDICATIONS  clotrimazole 1% Cream 1 Application(s) Topical <User Schedule>  diltiazem    Tablet 60 milliGRAM(s) Oral every 8 hours  DULoxetine 60 milliGRAM(s) Oral daily  furosemide   Injectable 40 milliGRAM(s) IV Push daily  heparin  Injectable 5000 Unit(s) SubCutaneous every 8 hours  isosorbide   mononitrate ER Tablet (IMDUR) 30 milliGRAM(s) Oral daily  latanoprost 0.005% Ophthalmic Solution 1 Drop(s) Both EYES at bedtime  meropenem  IVPB      meropenem  IVPB 1000 milliGRAM(s) IV Intermittent every 8 hours  propranolol 20 milliGRAM(s) Oral two times a day  tamsulosin 0.4 milliGRAM(s) Oral daily  traZODone 50 milliGRAM(s) Oral at bedtime  vancomycin  IVPB      vancomycin  IVPB 1500 milliGRAM(s) IV Intermittent every 12 hours    PRN MEDICATIONS  senna 1 Tablet(s) Oral every 12 hours PRN  zolpidem 5 milliGRAM(s) Oral at bedtime PRN  zolpidem 5 milliGRAM(s) Oral at bedtime PRN      VITAL SIGNS: Last 24 Hours  T(C): 36.1 (19 Jul 2018 08:06), Max: 36.7 (18 Jul 2018 12:00)  T(F): 97 (19 Jul 2018 08:06), Max: 98 (18 Jul 2018 12:00)  HR: 84 (19 Jul 2018 10:01) (76 - 130)  BP: 110/66 (19 Jul 2018 10:01) (96/59 - 168/99)  BP(mean): 76 (19 Jul 2018 10:01) (71 - 147)  RR: 19 (19 Jul 2018 10:01) (14 - 29)  SpO2: 92% (19 Jul 2018 10:01) (90% - 98%)    LABS:                        13.0   12.34 )-----------( 318      ( 19 Jul 2018 04:08 )             39.9     07-19    138  |  100  |  18  ----------------------------<  126<H>  4.0   |  25  |  0.9    Ca    8.3<L>      19 Jul 2018 04:08  Mg     2.1     07-18    TPro  7.0  /  Alb  4.3  /  TBili  0.6  /  DBili  <0.2  /  AST  21  /  ALT  31  /  AlkPhos  195<H>  07-18    PT/INR - ( 18 Jul 2018 04:30 )   PT: 13.60 sec;   INR: 1.26 ratio         PTT - ( 18 Jul 2018 04:30 )  PTT:36.7 sec          Culture - Body Fluid with Gram Stain (collected 18 Jul 2018 13:58)  Source: .Body Fluid Pleural Fluid  Gram Stain (19 Jul 2018 03:04):    polymorphonuclear leukocytes seen per low power field    No organisms seen per oil power field    by cytocentrifuge      CARDIAC MARKERS ( 18 Jul 2018 04:30 )  x     / <0.01 ng/mL / 98 U/L / x     / x      CARDIAC MARKERS ( 17 Jul 2018 23:51 )  x     / <0.01 ng/mL / 112 U/L / x     / 2.6 ng/mL      RADIOLOGY:  Today worsening right pleural opacity    PHYSICAL EXAM:    GENERAL: NAD, well-developed, AAOx3  HEENT:  Atraumatic, conjunctiva and sclera clear, No JVD  PULMONARY: Clear to auscultation bilaterally; No wheeze.  CARDIOVASCULAR: Regular rate and rhythm; No murmurs, rubs, or gallops  GASTROINTESTINAL: Soft, Nontender, Nondistended; Bowel sounds present  MUSCULOSKELETAL:  2+ Peripheral Pulses, No clubbing, cyanosis, or edema  NEUROLOGY: non-focal  SKIN: No rashes or lesions      ADMISSION SUMMARY  Patient is a 58y old Male with history of DVT (on eliquis) and bipolar disorder who presents from nursing facility with a chief complaint of sob (18 Jul 2018 02:25)  Currently admitted to medicine with the primary diagnosis of Pleural effusion (L>R).  Patient had insertion of pigtail catheter yesterday to left side with improvement of symptoms and no complication. Drained 3 L of serosanguinous fluid since insertion.    ASSESSMENT & PLAN    1. PLEURAL EFFUSION  Left effusion drained with pigtail (3L)  serosanguinous  pH 7.24  No sign of CHF on TTE/ need to r/o infectious vs malignant  Pleural studies: lymphocytic predominence/ follow up rest of studies  Started broad spectrum antibiotics ( vancomycin and meropenem-D2): follow up cultures and check vancomycin trough level before third dose today  Right pleural effusion worsening: follow up CXR tomorrow      2. History of DVT  Eliquis held  Started on prophylactic heparin  Follow up venous US of lower extremities    4. HTN, bipolar  Continue home meds AMRIK MADRID 58y Male  MRN#: [id number]   CODE STATUS: DNI DNR      SUBJECTIVE  Patient is a 58y old Male who presents with a chief complaint of sob (18 Jul 2018 02:25)  Currently admitted to medicine with the primary diagnosis of Pleural effusion.  Patient had pleural tap b yesterday with insertion of pigtail catheter. No complications.Reports his dyspnea and chest pain improved following procedure.        OBJECTIVE  PAST MEDICAL & SURGICAL HISTORY  Unprovoked DVT  BPH (benign prostatic hyperplasia)  Bipolar disorder with previous suicidal attempt ( liyhium overdose)  HTN  ALLERGIES:  No Known Allergies    MEDICATIONS:  STANDING MEDICATIONS  clotrimazole 1% Cream 1 Application(s) Topical <User Schedule>  diltiazem    Tablet 60 milliGRAM(s) Oral every 8 hours  DULoxetine 60 milliGRAM(s) Oral daily  furosemide   Injectable 40 milliGRAM(s) IV Push daily  heparin  Injectable 5000 Unit(s) SubCutaneous every 8 hours  isosorbide   mononitrate ER Tablet (IMDUR) 30 milliGRAM(s) Oral daily  latanoprost 0.005% Ophthalmic Solution 1 Drop(s) Both EYES at bedtime  meropenem  IVPB      meropenem  IVPB 1000 milliGRAM(s) IV Intermittent every 8 hours  propranolol 20 milliGRAM(s) Oral two times a day  tamsulosin 0.4 milliGRAM(s) Oral daily  traZODone 50 milliGRAM(s) Oral at bedtime  vancomycin  IVPB      vancomycin  IVPB 1500 milliGRAM(s) IV Intermittent every 12 hours    PRN MEDICATIONS  senna 1 Tablet(s) Oral every 12 hours PRN  zolpidem 5 milliGRAM(s) Oral at bedtime PRN  zolpidem 5 milliGRAM(s) Oral at bedtime PRN      VITAL SIGNS: Last 24 Hours  T(C): 36.1 (19 Jul 2018 08:06), Max: 36.7 (18 Jul 2018 12:00)  T(F): 97 (19 Jul 2018 08:06), Max: 98 (18 Jul 2018 12:00)  HR: 84 (19 Jul 2018 10:01) (76 - 130)  BP: 110/66 (19 Jul 2018 10:01) (96/59 - 168/99)  BP(mean): 76 (19 Jul 2018 10:01) (71 - 147)  RR: 19 (19 Jul 2018 10:01) (14 - 29)  SpO2: 92% (19 Jul 2018 10:01) (90% - 98%)    LABS:                        13.0   12.34 )-----------( 318      ( 19 Jul 2018 04:08 )             39.9     07-19    138  |  100  |  18  ----------------------------<  126<H>  4.0   |  25  |  0.9    Ca    8.3<L>      19 Jul 2018 04:08  Mg     2.1     07-18    TPro  7.0  /  Alb  4.3  /  TBili  0.6  /  DBili  <0.2  /  AST  21  /  ALT  31  /  AlkPhos  195<H>  07-18    PT/INR - ( 18 Jul 2018 04:30 )   PT: 13.60 sec;   INR: 1.26 ratio         PTT - ( 18 Jul 2018 04:30 )  PTT:36.7 sec          Culture - Body Fluid with Gram Stain (collected 18 Jul 2018 13:58)  Source: .Body Fluid Pleural Fluid  Gram Stain (19 Jul 2018 03:04):    polymorphonuclear leukocytes seen per low power field    No organisms seen per oil power field    by cytocentrifuge      CARDIAC MARKERS ( 18 Jul 2018 04:30 )  x     / <0.01 ng/mL / 98 U/L / x     / x      CARDIAC MARKERS ( 17 Jul 2018 23:51 )  x     / <0.01 ng/mL / 112 U/L / x     / 2.6 ng/mL      RADIOLOGY:  Today worsening right pleural opacity    PHYSICAL EXAM:    GENERAL: NAD, well-developed, AAOx3  HEENT:  Atraumatic, conjunctiva and sclera clear, No JVD  PULMONARY: Clear to auscultation bilaterally; No wheeze.  CARDIOVASCULAR: Regular rate and rhythm; No murmurs, rubs, or gallops  GASTROINTESTINAL: Soft, Nontender, Nondistended; Bowel sounds present  MUSCULOSKELETAL:  2+ Peripheral Pulses, No clubbing, cyanosis, or edema  NEUROLOGY: non-focal  SKIN: No rashes or lesions      ADMISSION SUMMARY  Patient is a 58y old Male with history of DVT (on eliquis) and bipolar disorder who presents from nursing facility with a chief complaint of sob (18 Jul 2018 02:25)  Currently admitted to medicine with the primary diagnosis of Pleural effusion (L>R).  Patient had insertion of pigtail catheter yesterday to left side with improvement of symptoms and no complication. Drained 3 L of serosanguinous fluid since insertion.    ASSESSMENT & PLAN    1. PLEURAL EFFUSION  Left effusion drained with pigtail (3L)  serosanguinous  pH 7.24  No sign of CHF on TTE/ need to r/o infectious vs malignant  Pleural studies: lymphocytic predominence/ follow up rest of studies  Started broad spectrum antibiotics: dc vancomycin and continue meropenem-D2  Right pleural effusion worsening: follow up CXR tomorrow      2. History of DVT  Eliquis held  Started on prophylactic heparin  Follow up venous US of lower extremities    4. HTN, bipolar  Continue home meds

## 2018-07-19 NOTE — PROGRESS NOTE ADULT - ASSESSMENT
IMPRESSION:      SOB/ large left pleural effusion no old CXR r/o malignancy, multiple co morbidities  afib on eliquis       PLAN:    CNS: no depressants    HEENT:  Oral care    PULMONARY:  HOB @ 45 degrees, BIPAP 4 Hours on/off and at night,     CARDIOVASCULAR: I<O iv lasix twice a day, cardiac enzymes, 2decho    GI: GI prophylaxis                                          Feeding IF OFF BIPAP    RENAL:  F/u  lytes.  Correct as needed. accurate I/O    INFECTIOUS DISEASE: Pan cultures, vanco ezekiel vanco trough , cultures     HEMATOLOGICAL:  DVT prophylaxis. sq heparin, repeat duplex     ENDOCRINE:  Follow up FS.  Insulin protocol if needed.    MUSCULOSKELETAL:    CODE STATUS: DNR/DNI     PALLIATIVE CARE EVAL    d/g to floor IMPRESSION:     SOB/ large left pleural effusion no old CXR r/o malignancy, multiple co morbidities  afib on eliquis       PLAN:    CNS: no depressants    HEENT:  Oral care    PULMONARY:  HOB @ 45 degrees, BIPAP 4 Hours on/off and at night,     CARDIOVASCULAR: I<O iv lasix twice a day, cardiac enzymes, 2decho    GI: GI prophylaxis                                          Feeding IF OFF BIPAP    RENAL:  F/u  lytes.  Correct as needed. accurate I/O    INFECTIOUS DISEASE: Pan cultures, vanco ezekiel vanco trough , cultures     HEMATOLOGICAL:  DVT prophylaxis. sq heparin, repeat duplex     ENDOCRINE:  Follow up FS.  Insulin protocol if needed.    MUSCULOSKELETAL:    CODE STATUS: DNR/DNI     PALLIATIVE CARE EVAL    d/g to floor

## 2018-07-19 NOTE — CHART NOTE - NSCHARTNOTEFT_GEN_A_CORE
ADMISSION SUMMARY  Patient is a 58y old Male with history of DVT (on eliquis) and bipolar disorder (previous suicidal attempt with lithium overdose) (DNI DNR) who presents from nursing facility with a chief complaint of sob (18 Jul 2018 02:25)  Currently admitted to medicine with the primary diagnosis of Pleural effusion (L>R).  Patient had insertion of pigtail catheter yesterday to left side with improvement of symptoms and no complication. Drained 3 L of serosanguinous fluid since insertion.      1. PLEURAL EFFUSION  Left effusion drained with pigtail (3L)  serosanguinous  pH 7.24  No sign of CHF on TTE/ need to r/o infectious vs malignant  Pleural studies: lymphocytic predominance/ follow up rest of studies  Started broad spectrum antibiotics ( vancomycin and meropenem-D2): follow up cultures and check vancomycin trough level before third dose today  Right pleural effusion worsening: follow up CXR tomorrow      2. History of DVT  Eliquis held  Started on prophylactic heparin  Follow up venous US of lower extremities    4. HTN, bipolar  Continue home meds ADMISSION SUMMARY  Patient is a 58y old Male with history of DVT (on eliquis) and bipolar disorder (previous suicidal attempt with lithium overdose) (DNI DNR) who presents from nursing facility with a chief complaint of sob (18 Jul 2018 02:25)  Currently admitted to medicine with the primary diagnosis of Pleural effusion (L>R).  Patient had insertion of pigtail catheter yesterday to left side with improvement of symptoms and no complication. Drained 3 L of serosanguinous fluid since insertion.      1. PLEURAL EFFUSION  Left effusion drained with pigtail (3L)  serosanguinous  pH 7.24  No sign of CHF on TTE/ need to r/o infectious vs malignant  Pleural studies: lymphocytic predominance,gram stain is negative/ follow up rest of studies  Started broad spectrum antibiotics ( d/c vancomycin and continue meropenem-D2)  Right pleural effusion worsening: follow up CXR tomorrow      2. History of DVT  Eliquis held  Started on prophylactic heparin  Follow up venous US of lower extremities    4. HTN, bipolar  Continue home meds ADMISSION SUMMARY  Patient is a 58y old Male with history of DVT (on eliquis) and bipolar disorder (previous suicidal attempt with lithium overdose) (DNI DNR) who presents from nursing facility with a chief complaint of sob (18 Jul 2018 02:25)  Currently admitted to medicine with the primary diagnosis of Pleural effusion (L>R).  Patient had insertion of pigtail catheter yesterday to left side with improvement of symptoms and no complication. Drained 3 L of serosanguinous fluid since insertion.      1. PLEURAL EFFUSION  Left effusion drained with pigtail (3L)  serosanguinous  pH 7.24  No sign of CHF on TTE/ need to r/o infectious vs malignant  Pleural studies: lymphocytic predominance,gram stain is negative/ follow up rest of studies  Started broad spectrum antibiotics ( d/c vancomycin and continue meropenem-D2)  Right pleural effusion worsening: follow up CXR tomorrow      2. History of DVT  venous US of lower extremities from 7/19 reveals b/l DVT  restarted his home dose eliquis (5mg BID)  may consider increasing dose considering new dvt findings    4. HTN, bipolar  Continue home meds ADMISSION SUMMARY  Patient is a 58y old Male with history of DVT (on eliquis) and bipolar disorder (previous suicidal attempt with lithium overdose) (DNI DNR) who presents from nursing facility with a chief complaint of sob (18 Jul 2018 02:25)  Currently admitted to medicine with the primary diagnosis of Pleural effusion (L>R).  Patient had insertion of pigtail catheter yesterday to left side with improvement of symptoms and no complication. Drained 3 L of serosanguinous fluid since insertion.      1. PLEURAL EFFUSION  Left effusion drained with pigtail (3L)  serosanguinous  pH 7.24  No sign of CHF on TTE/ need to r/o infectious vs malignant  Pleural studies: lymphocytic predominance,gram stain is negative/ follow up rest of studies  Started broad spectrum antibiotics ( d/c vancomycin and continue meropenem-D2)  Right pleural effusion worsening: follow up CXR tomorrow      2. B/L DVT  venous US of lower extremities from 7/19 reveals b/l DVT  restarted his home dose eliquis (5mg BID)  may consider increasing dose considering new dvt findings    4. HTN, bipolar  Continue home meds

## 2018-07-19 NOTE — PROGRESS NOTE ADULT - SUBJECTIVE AND OBJECTIVE BOX
Over Night Events: s/p pigtail   serosanguinos drainage   3 litres         ROS:  See HPI    PHYSICAL EXAM    ICU Vital Signs Last 24 Hrs  T(C): 36.1 (19 Jul 2018 08:06), Max: 36.7 (18 Jul 2018 12:00)  T(F): 97 (19 Jul 2018 08:06), Max: 98 (18 Jul 2018 12:00)  HR: 76 (19 Jul 2018 08:06) (76 - 130)  BP: 112/69 (19 Jul 2018 08:06) (96/59 - 168/99)  BP(mean): 84 (19 Jul 2018 08:06) (71 - 147)  RR: 14 (19 Jul 2018 08:06) (14 - 29)  SpO2: 98% (19 Jul 2018 08:06) (90% - 98%)        07-18-18 @ 07:01  -  07-19-18 @ 07:00  --------------------------------------------------------  IN:    IV PiggyBack: 550 mL    Oral Fluid: 850 mL  Total IN: 1400 mL    OUT:    Chest Tube: 3000 mL    Voided: 2050 mL  Total OUT: 5050 mL    Total NET: -3650 mL      07-19-18 @ 07:01  -  07-19-18 @ 10:00  --------------------------------------------------------  IN:  Total IN: 0 mL    OUT:    Voided: 1250 mL  Total OUT: 1250 mL    Total NET: -1250 mL          General:  HEENT:  PERRLA         Lymph Nodes: NO cervical LN, supple, no JVD   Lungs: Bilateral BS, no wheeze or rhonci  Cardiovascular: Regular   Abdomen: Soft, Positive BS, non tender   Extremities: No clubbing, no edema   Skin: INTACT, warm, no edema   Neurological: MOVES ALL EXT, alert oriented       LABS:                          13.0   12.34 )-----------( 318      ( 19 Jul 2018 04:08 )             39.9                                               07-19    138  |  100  |  18  ----------------------------<  126<H>  4.0   |  25  |  0.9    Ca    8.3<L>      19 Jul 2018 04:08  Mg     2.1     07-18    TPro  7.0  /  Alb  4.3  /  TBili  0.6  /  DBili  <0.2  /  AST  21  /  ALT  31  /  AlkPhos  195<H>  07-18      PT/INR - ( 18 Jul 2018 04:30 )   PT: 13.60 sec;   INR: 1.26 ratio         PTT - ( 18 Jul 2018 04:30 )  PTT:36.7 sec                                           CARDIAC MARKERS ( 18 Jul 2018 04:30 )  x     / <0.01 ng/mL / 98 U/L / x     / x      CARDIAC MARKERS ( 17 Jul 2018 23:51 )  x     / <0.01 ng/mL / 112 U/L / x     / 2.6 ng/mL                                            LIVER FUNCTIONS - ( 18 Jul 2018 04:30 )  Alb: 4.3 g/dL / Pro: 7.0 g/dL / ALK PHOS: 195 U/L / ALT: 31 U/L / AST: 21 U/L / GGT: x                                                  Culture - Body Fluid with Gram Stain (collected 18 Jul 2018 13:58)  Source: .Body Fluid Pleural Fluid  Gram Stain (19 Jul 2018 03:04):    polymorphonuclear leukocytes seen per low power field    No organisms seen per oil power field    by cytocentrifuge                                                                                             MEDICATIONS  (STANDING):  clotrimazole 1% Cream 1 Application(s) Topical <User Schedule>  diltiazem    Tablet 60 milliGRAM(s) Oral every 8 hours  DULoxetine 60 milliGRAM(s) Oral daily  furosemide   Injectable 40 milliGRAM(s) IV Push daily  isosorbide   mononitrate ER Tablet (IMDUR) 30 milliGRAM(s) Oral daily  latanoprost 0.005% Ophthalmic Solution 1 Drop(s) Both EYES at bedtime  meropenem  IVPB      meropenem  IVPB 1000 milliGRAM(s) IV Intermittent every 8 hours  propranolol 20 milliGRAM(s) Oral two times a day  tamsulosin 0.4 milliGRAM(s) Oral daily  traZODone 50 milliGRAM(s) Oral at bedtime  vancomycin  IVPB      vancomycin  IVPB 1500 milliGRAM(s) IV Intermittent every 12 hours    MEDICATIONS  (PRN):  senna 1 Tablet(s) Oral every 12 hours PRN Constipation  zolpidem 5 milliGRAM(s) Oral at bedtime PRN Insomnia  zolpidem 5 milliGRAM(s) Oral at bedtime PRN Insomnia      Xrays:   pigtail in place, rt side effusion                                                                                   ECHO Over Night Events: s/p pigtail     serosanguinous drainage   3 l out feels better        ROS:  See HPI    PHYSICAL EXAM    ICU Vital Signs Last 24 Hrs  T(C): 36.1 (19 Jul 2018 08:06), Max: 36.7 (18 Jul 2018 12:00)  T(F): 97 (19 Jul 2018 08:06), Max: 98 (18 Jul 2018 12:00)  HR: 76 (19 Jul 2018 08:06) (76 - 130)  BP: 112/69 (19 Jul 2018 08:06) (96/59 - 168/99)  BP(mean): 84 (19 Jul 2018 08:06) (71 - 147)  RR: 14 (19 Jul 2018 08:06) (14 - 29)  SpO2: 98% (19 Jul 2018 08:06) (90% - 98%)        07-18-18 @ 07:01  -  07-19-18 @ 07:00  --------------------------------------------------------  IN:    IV PiggyBack: 550 mL    Oral Fluid: 850 mL  Total IN: 1400 mL    OUT:    Chest Tube: 3000 mL    Voided: 2050 mL  Total OUT: 5050 mL    Total NET: -3650 mL      07-19-18 @ 07:01  -  07-19-18 @ 10:00  --------------------------------------------------------  IN:  Total IN: 0 mL    OUT:    Voided: 1250 mL  Total OUT: 1250 mL    Total NET: -1250 mL          General:  HEENT:  PERRLA         Lymph Nodes: NO cervical LN, supple, no JVD   Lungs: Bilateral BS, no wheeze or rhonci  Cardiovascular: Regular   Abdomen: Soft, Positive BS, non tender   Extremities: No clubbing, no edema   Skin: INTACT, warm, no edema   Neurological: MOVES ALL EXT, alert oriented       LABS:                          13.0   12.34 )-----------( 318      ( 19 Jul 2018 04:08 )             39.9                                               07-19    138  |  100  |  18  ----------------------------<  126<H>  4.0   |  25  |  0.9    Ca    8.3<L>      19 Jul 2018 04:08  Mg     2.1     07-18    TPro  7.0  /  Alb  4.3  /  TBili  0.6  /  DBili  <0.2  /  AST  21  /  ALT  31  /  AlkPhos  195<H>  07-18      PT/INR - ( 18 Jul 2018 04:30 )   PT: 13.60 sec;   INR: 1.26 ratio         PTT - ( 18 Jul 2018 04:30 )  PTT:36.7 sec                                           CARDIAC MARKERS ( 18 Jul 2018 04:30 )  x     / <0.01 ng/mL / 98 U/L / x     / x      CARDIAC MARKERS ( 17 Jul 2018 23:51 )  x     / <0.01 ng/mL / 112 U/L / x     / 2.6 ng/mL                                            LIVER FUNCTIONS - ( 18 Jul 2018 04:30 )  Alb: 4.3 g/dL / Pro: 7.0 g/dL / ALK PHOS: 195 U/L / ALT: 31 U/L / AST: 21 U/L / GGT: x                                                  Culture - Body Fluid with Gram Stain (collected 18 Jul 2018 13:58)  Source: .Body Fluid Pleural Fluid  Gram Stain (19 Jul 2018 03:04):    polymorphonuclear leukocytes seen per low power field    No organisms seen per oil power field    by cytocentrifuge                                                                                             MEDICATIONS  (STANDING):  clotrimazole 1% Cream 1 Application(s) Topical <User Schedule>  diltiazem    Tablet 60 milliGRAM(s) Oral every 8 hours  DULoxetine 60 milliGRAM(s) Oral daily  furosemide   Injectable 40 milliGRAM(s) IV Push daily  isosorbide   mononitrate ER Tablet (IMDUR) 30 milliGRAM(s) Oral daily  latanoprost 0.005% Ophthalmic Solution 1 Drop(s) Both EYES at bedtime  meropenem  IVPB      meropenem  IVPB 1000 milliGRAM(s) IV Intermittent every 8 hours  propranolol 20 milliGRAM(s) Oral two times a day  tamsulosin 0.4 milliGRAM(s) Oral daily  traZODone 50 milliGRAM(s) Oral at bedtime  vancomycin  IVPB      vancomycin  IVPB 1500 milliGRAM(s) IV Intermittent every 12 hours    MEDICATIONS  (PRN):  senna 1 Tablet(s) Oral every 12 hours PRN Constipation  zolpidem 5 milliGRAM(s) Oral at bedtime PRN Insomnia  zolpidem 5 milliGRAM(s) Oral at bedtime PRN Insomnia      Xrays:   pigtail in place, rt side effusion

## 2018-07-20 LAB
-  AMIKACIN: SIGNIFICANT CHANGE UP
-  AMOXICILLIN/CLAVULANIC ACID: SIGNIFICANT CHANGE UP
-  AMPICILLIN/SULBACTAM: SIGNIFICANT CHANGE UP
-  AMPICILLIN: SIGNIFICANT CHANGE UP
-  AZTREONAM: SIGNIFICANT CHANGE UP
-  CEFAZOLIN: SIGNIFICANT CHANGE UP
-  CEFEPIME: SIGNIFICANT CHANGE UP
-  CEFOXITIN: SIGNIFICANT CHANGE UP
-  CEFTRIAXONE: SIGNIFICANT CHANGE UP
-  CIPROFLOXACIN: SIGNIFICANT CHANGE UP
-  ERTAPENEM: SIGNIFICANT CHANGE UP
-  GENTAMICIN: SIGNIFICANT CHANGE UP
-  IMIPENEM: SIGNIFICANT CHANGE UP
-  LEVOFLOXACIN: SIGNIFICANT CHANGE UP
-  MEROPENEM: SIGNIFICANT CHANGE UP
-  NITROFURANTOIN: SIGNIFICANT CHANGE UP
-  PIPERACILLIN/TAZOBACTAM: SIGNIFICANT CHANGE UP
-  TIGECYCLINE: SIGNIFICANT CHANGE UP
-  TOBRAMYCIN: SIGNIFICANT CHANGE UP
-  TRIMETHOPRIM/SULFAMETHOXAZOLE: SIGNIFICANT CHANGE UP
BASOPHILS # BLD AUTO: 0.08 K/UL — SIGNIFICANT CHANGE UP (ref 0–0.2)
BASOPHILS NFR BLD AUTO: 0.9 % — SIGNIFICANT CHANGE UP (ref 0–1)
CULTURE RESULTS: SIGNIFICANT CHANGE UP
EOSINOPHIL # BLD AUTO: 0.44 K/UL — SIGNIFICANT CHANGE UP (ref 0–0.7)
EOSINOPHIL NFR BLD AUTO: 4.8 % — SIGNIFICANT CHANGE UP (ref 0–8)
HCT VFR BLD CALC: 43.2 % — SIGNIFICANT CHANGE UP (ref 42–52)
HGB BLD-MCNC: 13.9 G/DL — LOW (ref 14–18)
IMM GRANULOCYTES NFR BLD AUTO: 0.9 % — HIGH (ref 0.1–0.3)
LYMPHOCYTES # BLD AUTO: 1.23 K/UL — SIGNIFICANT CHANGE UP (ref 1.2–3.4)
LYMPHOCYTES # BLD AUTO: 13.3 % — LOW (ref 20.5–51.1)
MCHC RBC-ENTMCNC: 26.6 PG — LOW (ref 27–31)
MCHC RBC-ENTMCNC: 32.2 G/DL — SIGNIFICANT CHANGE UP (ref 32–37)
MCV RBC AUTO: 82.6 FL — SIGNIFICANT CHANGE UP (ref 80–94)
METHOD TYPE: SIGNIFICANT CHANGE UP
MONOCYTES # BLD AUTO: 0.9 K/UL — HIGH (ref 0.1–0.6)
MONOCYTES NFR BLD AUTO: 9.7 % — HIGH (ref 1.7–9.3)
NEUTROPHILS # BLD AUTO: 6.53 K/UL — HIGH (ref 1.4–6.5)
NEUTROPHILS NFR BLD AUTO: 70.4 % — SIGNIFICANT CHANGE UP (ref 42.2–75.2)
NON-GYNECOLOGICAL CYTOLOGY STUDY: SIGNIFICANT CHANGE UP
NRBC # BLD: 0 /100 WBCS — SIGNIFICANT CHANGE UP (ref 0–0)
ORGANISM # SPEC MICROSCOPIC CNT: SIGNIFICANT CHANGE UP
ORGANISM # SPEC MICROSCOPIC CNT: SIGNIFICANT CHANGE UP
PLATELET # BLD AUTO: 342 K/UL — SIGNIFICANT CHANGE UP (ref 130–400)
RBC # BLD: 5.23 M/UL — SIGNIFICANT CHANGE UP (ref 4.7–6.1)
RBC # FLD: 14.8 % — HIGH (ref 11.5–14.5)
SPECIMEN SOURCE: SIGNIFICANT CHANGE UP
WBC # BLD: 9.26 K/UL — SIGNIFICANT CHANGE UP (ref 4.8–10.8)
WBC # FLD AUTO: 9.26 K/UL — SIGNIFICANT CHANGE UP (ref 4.8–10.8)

## 2018-07-20 RX ORDER — ENOXAPARIN SODIUM 100 MG/ML
100 INJECTION SUBCUTANEOUS EVERY 12 HOURS
Qty: 0 | Refills: 0 | Status: DISCONTINUED | OUTPATIENT
Start: 2018-07-20 | End: 2018-07-24

## 2018-07-20 RX ORDER — SODIUM CHLORIDE 9 MG/ML
1000 INJECTION INTRAMUSCULAR; INTRAVENOUS; SUBCUTANEOUS
Qty: 0 | Refills: 0 | Status: DISCONTINUED | OUTPATIENT
Start: 2018-07-20 | End: 2018-07-22

## 2018-07-20 RX ADMIN — Medication 1 APPLICATION(S): at 18:06

## 2018-07-20 RX ADMIN — Medication 100 MILLIGRAM(S): at 05:41

## 2018-07-20 RX ADMIN — APIXABAN 5 MILLIGRAM(S): 2.5 TABLET, FILM COATED ORAL at 05:41

## 2018-07-20 RX ADMIN — SODIUM CHLORIDE 60 MILLILITER(S): 9 INJECTION INTRAMUSCULAR; INTRAVENOUS; SUBCUTANEOUS at 22:27

## 2018-07-20 RX ADMIN — ISOSORBIDE MONONITRATE 30 MILLIGRAM(S): 60 TABLET, EXTENDED RELEASE ORAL at 11:33

## 2018-07-20 RX ADMIN — ENOXAPARIN SODIUM 100 MILLIGRAM(S): 100 INJECTION SUBCUTANEOUS at 18:09

## 2018-07-20 RX ADMIN — MEROPENEM 200 MILLIGRAM(S): 1 INJECTION INTRAVENOUS at 05:40

## 2018-07-20 RX ADMIN — ZOLPIDEM TARTRATE 5 MILLIGRAM(S): 10 TABLET ORAL at 22:32

## 2018-07-20 RX ADMIN — MEROPENEM 200 MILLIGRAM(S): 1 INJECTION INTRAVENOUS at 22:27

## 2018-07-20 RX ADMIN — Medication 50 MILLIGRAM(S): at 22:27

## 2018-07-20 RX ADMIN — TAMSULOSIN HYDROCHLORIDE 0.4 MILLIGRAM(S): 0.4 CAPSULE ORAL at 11:33

## 2018-07-20 RX ADMIN — LATANOPROST 1 DROP(S): 0.05 SOLUTION/ DROPS OPHTHALMIC; TOPICAL at 22:27

## 2018-07-20 RX ADMIN — DULOXETINE HYDROCHLORIDE 60 MILLIGRAM(S): 30 CAPSULE, DELAYED RELEASE ORAL at 11:33

## 2018-07-20 RX ADMIN — Medication 20 MILLIGRAM(S): at 18:06

## 2018-07-20 RX ADMIN — Medication 40 MILLIGRAM(S): at 05:41

## 2018-07-20 RX ADMIN — Medication 20 MILLIGRAM(S): at 05:41

## 2018-07-20 RX ADMIN — Medication 100 MILLIGRAM(S): at 18:06

## 2018-07-20 RX ADMIN — MEROPENEM 200 MILLIGRAM(S): 1 INJECTION INTRAVENOUS at 13:42

## 2018-07-20 NOTE — PROGRESS NOTE ADULT - ASSESSMENT
57 y/o M with PMH of heart failure of unclear type, unknown type of liver disease with possible varices?, HTN, DLD, chronic DVT's on AC, here with L sided bloody pleural effusion s/p CTube placement    daily cxr, monitor output  f/u fluid cytology  empiric abx, although I doubt pneumonia  obtain outpatient endoscopy record from [facility] about 5 years ago  CMP to screen liver  daily coags while on therapeutic AC  change NOAC to lovenox in case DVT's are cancer related  check D dimer  monitor hgb, maintain active T+S and large bore IV access  pan CT scan with IV contrast- no B symptoms, has had age appropriate Ca screening, <10 pack year former smoker  check hep b/c  high risk 59 y/o M with PMH of heart failure of unclear type, unknown type of liver disease with possible varices? (no history of hemodynamically significant bleeds), HTN, DLD, chronic DVT's on AC (tolerating this at home), here with L sided bloody pleural effusion s/p CTube placement    daily cxr, monitor output  f/u fluid cytology  empiric abx, although I doubt pneumonia  obtain outpatient endoscopy record from [facility] about 5 years ago  CMP to screen liver  daily coags while on therapeutic AC  change NOAC to lovenox in case DVT's are cancer related  check D dimer  monitor hgb, maintain active T+S and large bore IV access  pan CT scan with IV contrast- no B symptoms, has had age appropriate Ca screening, <10 pack year former smoker  check hep b/c  high risk

## 2018-07-20 NOTE — DIETITIAN INITIAL EVALUATION ADULT. - NS FNS WEIGHT CHANGE REASON
pt. states he has gained some weight during his stay in rehab d/t less physical activity and carbohydrate heavy meals, unsure of quantity/unintentional

## 2018-07-20 NOTE — DIETITIAN INITIAL EVALUATION ADULT. - FACTORS AFF FOOD INTAKE
reports ongoing indigestion for many years, states it flares up periodically with cramps, abd pain, diarrhea, but doesn't affect his appetite too much, last BM 7/19/other (specify)

## 2018-07-20 NOTE — PROGRESS NOTE ADULT - ASSESSMENT
IMPRESSION:     SOB/ large left pleural effusion no old CXR r/o malignancy, multiple co morbidities  afib on eliquis   b/l DVT   UTI       PLAN:    CNS: no depressants    HEENT:  Oral care    PULMONARY:  HOB @ 45 degrees, BIPAP 4 Hours on/off and at night, pig tail to water seal    CARDIOVASCULAR: I<O iv lasix twice a day, cardiac enzymes, 2decho    GI: GI prophylaxis                                          Feeding IF OFF BIPAP    RENAL:  F/u  lytes.  Correct as needed. accurate I/O    INFECTIOUS DISEASE: Pan cultures, c/w iv abx f/u sensitivity of ecoli and deescalate accordingly     HEMATOLOGICAL:  DVT prophylaxis. on eliquis, still positive for DVT, IR EVAL FOR GF FILTER     ENDOCRINE:  Follow up FS.  Insulin protocol if needed.    MUSCULOSKELETAL:    CODE STATUS: DNR/DNI     PALLIATIVE CARE EVAL IMPRESSION:     SOB/ large left pleural effusion no old CXR r/o malignancy, multiple co morbidities  afib on eliquis   b/l DVT   UTI         PLAN:    CNS: no depressants    HEENT:  Oral care    PULMONARY:  HOB @ 45 degrees, BIPAP 4 Hours on/off and at night, pig tail to water seal, f/u cytology results     CARDIOVASCULAR: I<O iv lasix twice a day, cardiac enzymes, 2decho    GI: GI prophylaxis                                          Feeding IF OFF BIPAP    RENAL:  F/u  lytes.  Correct as needed. accurate I/O    INFECTIOUS DISEASE: Pan cultures, c/w iv abx f/u sensitivity of ecoli and deescalate accordingly     HEMATOLOGICAL:  DVT prophylaxis. on eliquis, still positive for DVT, IR/vascular EVAL FOR GF FILTER , switch to heparin and coumadin for now and monitor ptt    ENDOCRINE:  Follow up FS.  Insulin protocol if needed.    MUSCULOSKELETAL:    CODE STATUS: DNR/DNI     PALLIATIVE CARE EVAL IMPRESSION:     SOB/ large left pleural effusion no old CXR r/o malignancy, multiple co morbidities  afib/dvt on eliquis   b/l DVT   UTI         PLAN:    CNS: no depressants    HEENT:  Oral care    PULMONARY:  HOB @ 45 degrees, BIPAP 4 Hours on/off and at night, pig tail to water seal, f/u cytology results     CARDIOVASCULAR: I<O iv lasix twice a day, cardiac enzymes, 2decho    GI: GI prophylaxis                                          Feeding IF OFF BIPAP    RENAL:  F/u  lytes.  Correct as needed. accurate I/O    INFECTIOUS DISEASE: Pan cultures, c/w iv abx f/u sensitivity of ecoli and deescalate accordingly     HEMATOLOGICAL:  DVT prophylaxis. on eliquis, still positive for DVT, IR/vascular EVAL FOR GF FILTER , switch to heparin and coumadin for now and monitor ptt      ENDOCRINE:  Follow up FS.  Insulin protocol if needed.    MUSCULOSKELETAL:    CODE STATUS: DNR/DNI     PALLIATIVE CARE EVAL

## 2018-07-20 NOTE — DIETITIAN INITIAL EVALUATION ADULT. - OTHER INFO
Initial assessment for consult for BMI <18 (inappropriate as pt. is BMI 30.9). P/w: SOB. Pleural effusion drained. On broad spectrum abx. DVT: venous US of lower extremities. Bipolar: on home meds.

## 2018-07-20 NOTE — PROGRESS NOTE ADULT - SUBJECTIVE AND OBJECTIVE BOX
AMRIK MADRID  58y  Male      Patient is a 58y old  Male who presents with a chief complaint of sob (18 Jul 2018 02:25)      INTERVAL HPI/OVERNIGHT EVENTS: much less sob after CT placement/ draining      REVIEW OF SYSTEMS:  as above  All other review of systems negative    T(C): 36.1 (07-20-18 @ 12:13), Max: 36.5 (07-19-18 @ 20:00)  HR: 81 (07-20-18 @ 12:13) (74 - 83)  BP: 104/72 (07-20-18 @ 12:13) (103/58 - 117/68)  RR: 18 (07-20-18 @ 12:13) (16 - 22)  SpO2: 94% (07-20-18 @ 08:00) (93% - 97%)  Wt(kg): --Vital Signs Last 24 Hrs  T(C): 36.1 (20 Jul 2018 12:13), Max: 36.5 (19 Jul 2018 20:00)  T(F): 96.9 (20 Jul 2018 12:13), Max: 97.7 (19 Jul 2018 20:00)  HR: 81 (20 Jul 2018 12:13) (74 - 83)  BP: 104/72 (20 Jul 2018 12:13) (103/58 - 117/68)  BP(mean): 78 (20 Jul 2018 03:30) (78 - 81)  RR: 18 (20 Jul 2018 12:13) (16 - 22)  SpO2: 94% (20 Jul 2018 08:00) (93% - 97%)      07-19-18 @ 07:01  -  07-20-18 @ 07:00  --------------------------------------------------------  IN: 670 mL / OUT: 1650 mL / NET: -980 mL        PHYSICAL EXAM:  GENERAL: NAD  PSYCH: no agitation, baseline mentation  HEENT: lazy eye  NERVOUS SYSTEM:  Alert & Oriented X3, no new focal deficits  PULMONARY: L chest tube, straw color output, L sided basilar crackles, otherwise cta, comfortable appearing  CARDIOVASCULAR: Regular rate and rhythm; No murmurs, rubs, or gallops  GI: Soft, Nontender, Nondistended; Bowel sounds present, rotund ? fluid shift? no hepatomegaly  EXTREMITIES:  2+ Peripheral Pulses, No clubbing, cyanosis, or edema    Consultant(s) Notes Reviewed:  [x ] YES  [ ] NO    Discussed with Consultants/Other Providers [ x] YES     LABS                          13.9   9.26  )-----------( 342      ( 20 Jul 2018 07:42 )             43.2     07-19    138  |  100  |  18  ----------------------------<  126<H>  4.0   |  25  |  0.9    Ca    8.3<L>      19 Jul 2018 04:08            Lactate Trend  07-17 @ 23:51 Lactate:1.0         CAPILLARY BLOOD GLUCOSE            RADIOLOGY & ADDITIONAL TESTS:    Imaging Personally Reviewed:  [ ] YES  [ ] NO    HEALTH ISSUES - PROBLEM Dx:

## 2018-07-20 NOTE — PROGRESS NOTE ADULT - ASSESSMENT
1. PLEURAL EFFUSION  Left effusion drained with pigtail (3L)  serosanguinous  pH 7.24  No sign of CHF on TTE/ need to r/o infectious vs malignant  Pleural studies: lymphocytic predominence/ follow up rest of studies  Started broad spectrum antibiotics: dc vancomycin and continue meropenem-D2  Right pleural effusion worsening  Will order CT Chest, A/P with IV contrast  D-dimer to possibly r/o new DVTs  f/u MRSA nasal swab    2. AFib with unverifiable hx of DVT  Duplex: Multiple B/L LE DVTs  Eliquis held  Started on Lovenox    3. HTN, bipolar  Continue home meds

## 2018-07-20 NOTE — PROGRESS NOTE ADULT - SUBJECTIVE AND OBJECTIVE BOX
SUBJECTIVE:    Patient is a 58y old Male who presents with a chief complaint of sob (18 Jul 2018 02:25)    Currently admitted to medicine with the primary diagnosis of Pleural effusion     Today is hospital day 2d. This morning he is resting comfortably in bed and reports no new issues or overnight events.   He reports improvement to his symptoms. Constipation improved. Sleeping well. CT pig tail draining serosanguinous fluid    PAST MEDICAL & SURGICAL HISTORY  CHF (congestive heart failure)  Insomnia  BPH (benign prostatic hyperplasia)  Depression  Bipolar 1 disorder  HTN (hypertension)    SOCIAL HISTORY:  Negative for smoking/alcohol/drug use.     ALLERGIES:  No Known Allergies    MEDICATIONS:  STANDING MEDICATIONS  apixaban 5 milliGRAM(s) Oral every 12 hours  clotrimazole 1% Cream 1 Application(s) Topical <User Schedule>  diltiazem    Tablet 60 milliGRAM(s) Oral every 8 hours  docusate sodium 100 milliGRAM(s) Oral two times a day  DULoxetine 60 milliGRAM(s) Oral daily  furosemide   Injectable 40 milliGRAM(s) IV Push daily  isosorbide   mononitrate ER Tablet (IMDUR) 30 milliGRAM(s) Oral daily  latanoprost 0.005% Ophthalmic Solution 1 Drop(s) Both EYES at bedtime  meropenem  IVPB      meropenem  IVPB 1000 milliGRAM(s) IV Intermittent every 8 hours  propranolol 20 milliGRAM(s) Oral two times a day  tamsulosin 0.4 milliGRAM(s) Oral daily  traZODone 50 milliGRAM(s) Oral at bedtime    PRN MEDICATIONS  senna 1 Tablet(s) Oral every 12 hours PRN  zolpidem 5 milliGRAM(s) Oral at bedtime PRN  zolpidem 5 milliGRAM(s) Oral at bedtime PRN    VITALS:   T(F): 96.9  HR: 81  BP: 104/72  RR: 18  SpO2: 94%    LABS:                        13.9   9.26  )-----------( 342      ( 20 Jul 2018 07:42 )             43.2     07-19    138  |  100  |  18  ----------------------------<  126<H>  4.0   |  25  |  0.9    Ca    8.3<L>      19 Jul 2018 04:08      Culture - Body Fluid with Gram Stain (collected 18 Jul 2018 13:58)  Source: .Body Fluid Pleural Fluid  Gram Stain (19 Jul 2018 03:04):    polymorphonuclear leukocytes seen per low power field    No organisms seen per oil power field    by cytocentrifuge  Preliminary Report (19 Jul 2018 21:23):    No growth to date.    Culture - Urine (collected 18 Jul 2018 11:00)  Source: .Urine Clean Catch (Midstream)  Preliminary Report (19 Jul 2018 17:27):    >100,000 CFU/ml Escherichia coli    Culture - Blood (collected 18 Jul 2018 04:30)  Source: .Blood Blood  Preliminary Report (19 Jul 2018 11:01):    No growth to date.    RADIOLOGY:    < from: Xray Chest 1 View- PORTABLE-Routine (07.20.18 @ 08:09) >  Impression:      Prominent pulmonary vasculature, increased from prior. Left lung   opacities, increased from prior.  Right lung opacities and small right pleural effusion, unchanged from   prior.    < end of copied text >    PHYSICAL EXAM:  GEN: No acute distress  LUNGS: Decreased lung sounds on L, pigtail draining serosanguinous fluid   HEART: Regular  ABD: Soft, non-tender, non-distended.  EXT: NC/NC/NE/2+PP  NEURO: AAOx3

## 2018-07-20 NOTE — DIETITIAN INITIAL EVALUATION ADULT. - ENERGY NEEDS
calorie 1618-2025kcal (20-25kcal/kg IBW) for obesity  protein 81-97g (1-1.2g/kg IBW) for obesity  fluid 1ml/kcal or per LIP

## 2018-07-20 NOTE — PROGRESS NOTE ADULT - SUBJECTIVE AND OBJECTIVE BOX
Over Night Events:  feels better than before   pig tail catheter still draining   s/p venous duplex b/l DVT     ROS:  See HPI    PHYSICAL EXAM    ICU Vital Signs Last 24 Hrs  T(C): 35.2 (20 Jul 2018 06:12), Max: 36.5 (19 Jul 2018 20:00)  T(F): 95.3 (20 Jul 2018 06:12), Max: 97.7 (19 Jul 2018 20:00)  HR: 80 (20 Jul 2018 06:12) (74 - 96)  BP: 103/58 (20 Jul 2018 06:12) (99/68 - 117/68)  BP(mean): 78 (20 Jul 2018 03:30) (77 - 91)  RR: 17 (20 Jul 2018 06:12) (16 - 22)  SpO2: 94% (20 Jul 2018 08:00) (93% - 97%)        07-19-18 @ 07:01  -  07-20-18 @ 07:00  --------------------------------------------------------  IN:    IV PiggyBack: 550 mL    Oral Fluid: 120 mL  Total IN: 670 mL    OUT:    Chest Tube: 400 mL    Voided: 1250 mL  Total OUT: 1650 mL    Total NET: -980 mL          General:  HEENT:  PERRLA         Lymph Nodes: NO cervical LN, supple, no JVD   Lungs:dcreased bresath sounds at bases   Cardiovascular: Regular   Abdomen: Soft, Positive BS, non tender   Extremities: No clubbing, no edema   Skin: INTACT, warm, no edema   Neurological: MOVES ALL EXT, alert oriented       LABS:                          13.9   9.26  )-----------( 342      ( 20 Jul 2018 07:42 )             43.2                                               07-19    138  |  100  |  18  ----------------------------<  126<H>  4.0   |  25  |  0.9    Ca    8.3<L>      19 Jul 2018 04:08                                                                                                                                      Culture - Body Fluid with Gram Stain (collected 18 Jul 2018 13:58)  Source: .Body Fluid Pleural Fluid  Gram Stain (19 Jul 2018 03:04):    polymorphonuclear leukocytes seen per low power field    No organisms seen per oil power field    by cytocentrifuge  Preliminary Report (19 Jul 2018 21:23):    No growth to date.    Culture - Urine (collected 18 Jul 2018 11:00)  Source: .Urine Clean Catch (Midstream)  Preliminary Report (19 Jul 2018 17:27):    >100,000 CFU/ml Escherichia coli    Culture - Blood (collected 18 Jul 2018 04:30)  Source: .Blood Blood  Preliminary Report (19 Jul 2018 11:01):    No growth to date.            < from: [facility] Duplex Lower Ext Vein Scan, Bilat (07.19.18 @ 14:25) >  Deep venous thrombosis left common femoral, femoral, popliteal and   gastrocnemius veins.    Deep venous thrombosis right femoral vein.      < end of copied text >                                                                                       MEDICATIONS  (STANDING):  apixaban 5 milliGRAM(s) Oral every 12 hours  clotrimazole 1% Cream 1 Application(s) Topical <User Schedule>  diltiazem    Tablet 60 milliGRAM(s) Oral every 8 hours  docusate sodium 100 milliGRAM(s) Oral two times a day  DULoxetine 60 milliGRAM(s) Oral daily  furosemide   Injectable 40 milliGRAM(s) IV Push daily  isosorbide   mononitrate ER Tablet (IMDUR) 30 milliGRAM(s) Oral daily  latanoprost 0.005% Ophthalmic Solution 1 Drop(s) Both EYES at bedtime  meropenem  IVPB      meropenem  IVPB 1000 milliGRAM(s) IV Intermittent every 8 hours  propranolol 20 milliGRAM(s) Oral two times a day  tamsulosin 0.4 milliGRAM(s) Oral daily  traZODone 50 milliGRAM(s) Oral at bedtime    MEDICATIONS  (PRN):  senna 1 Tablet(s) Oral every 12 hours PRN Constipation  zolpidem 5 milliGRAM(s) Oral at bedtime PRN Insomnia  zolpidem 5 milliGRAM(s) Oral at bedtime PRN Insomnia      Xrays:           < from: Xray Chest 1 View- PORTABLE-Routine (07.20.18 @ 08:09) >  rominent pulmonary vasculature, increased from prior. Left lung   opacities, increased from prior.  Right lung opacities and small right pleural effusion, unchanged from   prio    < end of copied text >                                                                         ECHO Over Night Events:  feels better than before   pig tail catheter still draining   s/p venous duplex b/l DVT   pt on eliquis for AFIB   ROS:  See HPI    PHYSICAL EXAM    ICU Vital Signs Last 24 Hrs  T(C): 35.2 (20 Jul 2018 06:12), Max: 36.5 (19 Jul 2018 20:00)  T(F): 95.3 (20 Jul 2018 06:12), Max: 97.7 (19 Jul 2018 20:00)  HR: 80 (20 Jul 2018 06:12) (74 - 96)  BP: 103/58 (20 Jul 2018 06:12) (99/68 - 117/68)  BP(mean): 78 (20 Jul 2018 03:30) (77 - 91)  RR: 17 (20 Jul 2018 06:12) (16 - 22)  SpO2: 94% (20 Jul 2018 08:00) (93% - 97%)        07-19-18 @ 07:01  -  07-20-18 @ 07:00  --------------------------------------------------------  IN:    IV PiggyBack: 550 mL    Oral Fluid: 120 mL  Total IN: 670 mL    OUT:    Chest Tube: 400 mL    Voided: 1250 mL  Total OUT: 1650 mL    Total NET: -980 mL          General:  HEENT:  PERRLA         Lymph Nodes: NO cervical LN, supple, no JVD   Lungs:dcreased bresath sounds at bases   Cardiovascular: Regular   Abdomen: Soft, Positive BS, non tender   Extremities: No clubbing, no edema   Skin: INTACT, warm, no edema   Neurological: MOVES ALL EXT, alert oriented       LABS:                          13.9   9.26  )-----------( 342      ( 20 Jul 2018 07:42 )             43.2                                               07-19    138  |  100  |  18  ----------------------------<  126<H>  4.0   |  25  |  0.9    Ca    8.3<L>      19 Jul 2018 04:08                                                                                                                                      Culture - Body Fluid with Gram Stain (collected 18 Jul 2018 13:58)  Source: .Body Fluid Pleural Fluid  Gram Stain (19 Jul 2018 03:04):    polymorphonuclear leukocytes seen per low power field    No organisms seen per oil power field    by cytocentrifuge  Preliminary Report (19 Jul 2018 21:23):    No growth to date.    Culture - Urine (collected 18 Jul 2018 11:00)  Source: .Urine Clean Catch (Midstream)  Preliminary Report (19 Jul 2018 17:27):    >100,000 CFU/ml Escherichia coli    Culture - Blood (collected 18 Jul 2018 04:30)  Source: .Blood Blood  Preliminary Report (19 Jul 2018 11:01):    No growth to date.            < from: [facility] Duplex Lower Ext Vein Scan, Bilat (07.19.18 @ 14:25) >  Deep venous thrombosis left common femoral, femoral, popliteal and   gastrocnemius veins.    Deep venous thrombosis right femoral vein.      < end of copied text >                                                                                       MEDICATIONS  (STANDING):  apixaban 5 milliGRAM(s) Oral every 12 hours  clotrimazole 1% Cream 1 Application(s) Topical <User Schedule>  diltiazem    Tablet 60 milliGRAM(s) Oral every 8 hours  docusate sodium 100 milliGRAM(s) Oral two times a day  DULoxetine 60 milliGRAM(s) Oral daily  furosemide   Injectable 40 milliGRAM(s) IV Push daily  isosorbide   mononitrate ER Tablet (IMDUR) 30 milliGRAM(s) Oral daily  latanoprost 0.005% Ophthalmic Solution 1 Drop(s) Both EYES at bedtime  meropenem  IVPB      meropenem  IVPB 1000 milliGRAM(s) IV Intermittent every 8 hours  propranolol 20 milliGRAM(s) Oral two times a day  tamsulosin 0.4 milliGRAM(s) Oral daily  traZODone 50 milliGRAM(s) Oral at bedtime    MEDICATIONS  (PRN):  senna 1 Tablet(s) Oral every 12 hours PRN Constipation  zolpidem 5 milliGRAM(s) Oral at bedtime PRN Insomnia  zolpidem 5 milliGRAM(s) Oral at bedtime PRN Insomnia      Xrays:           < from: Xray Chest 1 View- PORTABLE-Routine (07.20.18 @ 08:09) >  rominent pulmonary vasculature, increased from prior. Left lung   opacities, increased from prior.  Right lung opacities and small right pleural effusion, unchanged from   prio    < end of copied text >                                                                         ECHO Over Night Events:  feels better than before   pig tail catheter still draining   s/p venous duplex b/l DVT   pt on eliquis for AFIB and h/o DVT   ROS:  See HPI    PHYSICAL EXAM    ICU Vital Signs Last 24 Hrs  T(C): 35.2 (20 Jul 2018 06:12), Max: 36.5 (19 Jul 2018 20:00)  T(F): 95.3 (20 Jul 2018 06:12), Max: 97.7 (19 Jul 2018 20:00)  HR: 80 (20 Jul 2018 06:12) (74 - 96)  BP: 103/58 (20 Jul 2018 06:12) (99/68 - 117/68)  BP(mean): 78 (20 Jul 2018 03:30) (77 - 91)  RR: 17 (20 Jul 2018 06:12) (16 - 22)  SpO2: 94% (20 Jul 2018 08:00) (93% - 97%)        07-19-18 @ 07:01  -  07-20-18 @ 07:00  --------------------------------------------------------  IN:    IV PiggyBack: 550 mL    Oral Fluid: 120 mL  Total IN: 670 mL    OUT:    Chest Tube: 400 mL    Voided: 1250 mL  Total OUT: 1650 mL    Total NET: -980 mL          General:  HEENT:  PERRLA         Lymph Nodes: NO cervical LN, supple, no JVD   Lungs:dcreased bresath sounds at bases   Cardiovascular: Regular   Abdomen: Soft, Positive BS, non tender   Extremities: No clubbing, no edema   Skin: INTACT, warm, no edema   Neurological: MOVES ALL EXT, alert oriented       LABS:                          13.9   9.26  )-----------( 342      ( 20 Jul 2018 07:42 )             43.2                                               07-19    138  |  100  |  18  ----------------------------<  126<H>  4.0   |  25  |  0.9    Ca    8.3<L>      19 Jul 2018 04:08                                                                                                                                      Culture - Body Fluid with Gram Stain (collected 18 Jul 2018 13:58)  Source: .Body Fluid Pleural Fluid  Gram Stain (19 Jul 2018 03:04):    polymorphonuclear leukocytes seen per low power field    No organisms seen per oil power field    by cytocentrifuge  Preliminary Report (19 Jul 2018 21:23):    No growth to date.    Culture - Urine (collected 18 Jul 2018 11:00)  Source: .Urine Clean Catch (Midstream)  Preliminary Report (19 Jul 2018 17:27):    >100,000 CFU/ml Escherichia coli    Culture - Blood (collected 18 Jul 2018 04:30)  Source: .Blood Blood  Preliminary Report (19 Jul 2018 11:01):    No growth to date.            < from: [facility] Duplex Lower Ext Vein Scan, Bilat (07.19.18 @ 14:25) >  Deep venous thrombosis left common femoral, femoral, popliteal and   gastrocnemius veins.    Deep venous thrombosis right femoral vein.      < end of copied text >                                                                                       MEDICATIONS  (STANDING):  apixaban 5 milliGRAM(s) Oral every 12 hours  clotrimazole 1% Cream 1 Application(s) Topical <User Schedule>  diltiazem    Tablet 60 milliGRAM(s) Oral every 8 hours  docusate sodium 100 milliGRAM(s) Oral two times a day  DULoxetine 60 milliGRAM(s) Oral daily  furosemide   Injectable 40 milliGRAM(s) IV Push daily  isosorbide   mononitrate ER Tablet (IMDUR) 30 milliGRAM(s) Oral daily  latanoprost 0.005% Ophthalmic Solution 1 Drop(s) Both EYES at bedtime  meropenem  IVPB      meropenem  IVPB 1000 milliGRAM(s) IV Intermittent every 8 hours  propranolol 20 milliGRAM(s) Oral two times a day  tamsulosin 0.4 milliGRAM(s) Oral daily  traZODone 50 milliGRAM(s) Oral at bedtime    MEDICATIONS  (PRN):  senna 1 Tablet(s) Oral every 12 hours PRN Constipation  zolpidem 5 milliGRAM(s) Oral at bedtime PRN Insomnia  zolpidem 5 milliGRAM(s) Oral at bedtime PRN Insomnia      Xrays:           < from: Xray Chest 1 View- PORTABLE-Routine (07.20.18 @ 08:09) >  rominent pulmonary vasculature, increased from prior. Left lung   opacities, increased from prior.  Right lung opacities and small right pleural effusion, unchanged from   prio    < end of copied text >                                                                         ECHO Over Night Events:  feels better than before   pig tail catheter still draining   s/p venous duplex b/l DVT   pt on eliquis for AFIB and h/o DVT   ROS:  See HPI    PHYSICAL EXAM    ICU Vital Signs Last 24 Hrs  T(C): 35.2 (20 Jul 2018 06:12), Max: 36.5 (19 Jul 2018 20:00)  T(F): 95.3 (20 Jul 2018 06:12), Max: 97.7 (19 Jul 2018 20:00)  HR: 80 (20 Jul 2018 06:12) (74 - 96)  BP: 103/58 (20 Jul 2018 06:12) (99/68 - 117/68)  BP(mean): 78 (20 Jul 2018 03:30) (77 - 91)  RR: 17 (20 Jul 2018 06:12) (16 - 22)  SpO2: 94% (20 Jul 2018 08:00) (93% - 97%)        07-19-18 @ 07:01  -  07-20-18 @ 07:00  --------------------------------------------------------  IN:    IV PiggyBack: 550 mL    Oral Fluid: 120 mL  Total IN: 670 mL    OUT:    Chest Tube: 400 mL    Voided: 1250 mL  Total OUT: 1650 mL    Total NET: -980 mL          General:  HEENT:  PERRLA         Lymph Nodes: NO cervical LN, supple, no JVD   Lungs:dcreased bs sounds at bases   Cardiovascular: Regular   Abdomen: Soft, Positive BS, non tender   Extremities: No clubbing, no edema   Skin: INTACT, warm, no edema   Neurological: MOVES ALL EXT, alert oriented       LABS:                          13.9   9.26  )-----------( 342      ( 20 Jul 2018 07:42 )             43.2                                               07-19    138  |  100  |  18  ----------------------------<  126<H>  4.0   |  25  |  0.9    Ca    8.3<L>      19 Jul 2018 04:08                                                                                                                                      Culture - Body Fluid with Gram Stain (collected 18 Jul 2018 13:58)  Source: .Body Fluid Pleural Fluid  Gram Stain (19 Jul 2018 03:04):    polymorphonuclear leukocytes seen per low power field    No organisms seen per oil power field    by cytocentrifuge  Preliminary Report (19 Jul 2018 21:23):    No growth to date.    Culture - Urine (collected 18 Jul 2018 11:00)  Source: .Urine Clean Catch (Midstream)  Preliminary Report (19 Jul 2018 17:27):    >100,000 CFU/ml Escherichia coli    Culture - Blood (collected 18 Jul 2018 04:30)  Source: .Blood Blood  Preliminary Report (19 Jul 2018 11:01):    No growth to date.            < from: [facility] Duplex Lower Ext Vein Scan, Bilat (07.19.18 @ 14:25) >  Deep venous thrombosis left common femoral, femoral, popliteal and   gastrocnemius veins.    Deep venous thrombosis right femoral vein.      < end of copied text >                                                                                       MEDICATIONS  (STANDING):  apixaban 5 milliGRAM(s) Oral every 12 hours  clotrimazole 1% Cream 1 Application(s) Topical <User Schedule>  diltiazem    Tablet 60 milliGRAM(s) Oral every 8 hours  docusate sodium 100 milliGRAM(s) Oral two times a day  DULoxetine 60 milliGRAM(s) Oral daily  furosemide   Injectable 40 milliGRAM(s) IV Push daily  isosorbide   mononitrate ER Tablet (IMDUR) 30 milliGRAM(s) Oral daily  latanoprost 0.005% Ophthalmic Solution 1 Drop(s) Both EYES at bedtime  meropenem  IVPB      meropenem  IVPB 1000 milliGRAM(s) IV Intermittent every 8 hours  propranolol 20 milliGRAM(s) Oral two times a day  tamsulosin 0.4 milliGRAM(s) Oral daily  traZODone 50 milliGRAM(s) Oral at bedtime    MEDICATIONS  (PRN):  senna 1 Tablet(s) Oral every 12 hours PRN Constipation  zolpidem 5 milliGRAM(s) Oral at bedtime PRN Insomnia  zolpidem 5 milliGRAM(s) Oral at bedtime PRN Insomnia      Xrays:           < from: Xray Chest 1 View- PORTABLE-Routine (07.20.18 @ 08:09) >  rominent pulmonary vasculature, increased from prior. Left lung   opacities, increased from prior.  Right lung opacities and small right pleural effusion, unchanged from   prio    < end of copied text >                                                                         ECHO

## 2018-07-21 LAB
ALBUMIN SERPL ELPH-MCNC: 3.4 G/DL — LOW (ref 3.5–5.2)
ALP SERPL-CCNC: 174 U/L — HIGH (ref 30–115)
ALT FLD-CCNC: 48 U/L — HIGH (ref 0–41)
ANION GAP SERPL CALC-SCNC: 16 MMOL/L — HIGH (ref 7–14)
APTT BLD: 44.4 SEC — HIGH (ref 27–39.2)
AST SERPL-CCNC: 25 U/L — SIGNIFICANT CHANGE UP (ref 0–41)
BASOPHILS # BLD AUTO: 0.04 K/UL — SIGNIFICANT CHANGE UP (ref 0–0.2)
BASOPHILS NFR BLD AUTO: 0.5 % — SIGNIFICANT CHANGE UP (ref 0–1)
BILIRUB SERPL-MCNC: 0.5 MG/DL — SIGNIFICANT CHANGE UP (ref 0.2–1.2)
BLD GP AB SCN SERPL QL: SIGNIFICANT CHANGE UP
BUN SERPL-MCNC: 15 MG/DL — SIGNIFICANT CHANGE UP (ref 10–20)
CALCIUM SERPL-MCNC: 8.4 MG/DL — LOW (ref 8.5–10.1)
CHLORIDE SERPL-SCNC: 95 MMOL/L — LOW (ref 98–110)
CO2 SERPL-SCNC: 29 MMOL/L — SIGNIFICANT CHANGE UP (ref 17–32)
CREAT SERPL-MCNC: 0.9 MG/DL — SIGNIFICANT CHANGE UP (ref 0.7–1.5)
D DIMER BLD IA.RAPID-MCNC: 187 NG/ML DDU — SIGNIFICANT CHANGE UP (ref 0–230)
EOSINOPHIL # BLD AUTO: 0.33 K/UL — SIGNIFICANT CHANGE UP (ref 0–0.7)
EOSINOPHIL NFR BLD AUTO: 4.2 % — SIGNIFICANT CHANGE UP (ref 0–8)
GLUCOSE SERPL-MCNC: 117 MG/DL — HIGH (ref 70–99)
HAV IGM SER-ACNC: SIGNIFICANT CHANGE UP
HBV CORE IGM SER-ACNC: SIGNIFICANT CHANGE UP
HBV SURFACE AG SER-ACNC: SIGNIFICANT CHANGE UP
HCT VFR BLD CALC: 45.3 % — SIGNIFICANT CHANGE UP (ref 42–52)
HCV AB S/CO SERPL IA: 0.11 S/CO — SIGNIFICANT CHANGE UP
HCV AB SERPL-IMP: SIGNIFICANT CHANGE UP
HGB BLD-MCNC: 14.6 G/DL — SIGNIFICANT CHANGE UP (ref 14–18)
IMM GRANULOCYTES NFR BLD AUTO: 0.8 % — HIGH (ref 0.1–0.3)
INR BLD: 1.28 RATIO — SIGNIFICANT CHANGE UP (ref 0.65–1.3)
LYMPHOCYTES # BLD AUTO: 0.95 K/UL — LOW (ref 1.2–3.4)
LYMPHOCYTES # BLD AUTO: 12.1 % — LOW (ref 20.5–51.1)
MCHC RBC-ENTMCNC: 26.6 PG — LOW (ref 27–31)
MCHC RBC-ENTMCNC: 32.2 G/DL — SIGNIFICANT CHANGE UP (ref 32–37)
MCV RBC AUTO: 82.5 FL — SIGNIFICANT CHANGE UP (ref 80–94)
MONOCYTES # BLD AUTO: 0.61 K/UL — HIGH (ref 0.1–0.6)
MONOCYTES NFR BLD AUTO: 7.8 % — SIGNIFICANT CHANGE UP (ref 1.7–9.3)
NEUTROPHILS # BLD AUTO: 5.88 K/UL — SIGNIFICANT CHANGE UP (ref 1.4–6.5)
NEUTROPHILS NFR BLD AUTO: 74.6 % — SIGNIFICANT CHANGE UP (ref 42.2–75.2)
NRBC # BLD: 0 /100 WBCS — SIGNIFICANT CHANGE UP (ref 0–0)
PLATELET # BLD AUTO: 345 K/UL — SIGNIFICANT CHANGE UP (ref 130–400)
POTASSIUM SERPL-MCNC: 3.7 MMOL/L — SIGNIFICANT CHANGE UP (ref 3.5–5)
POTASSIUM SERPL-SCNC: 3.7 MMOL/L — SIGNIFICANT CHANGE UP (ref 3.5–5)
PROT SERPL-MCNC: 6 G/DL — SIGNIFICANT CHANGE UP (ref 6–8)
PROTHROM AB SERPL-ACNC: 13.8 SEC — HIGH (ref 9.95–12.87)
RBC # BLD: 5.49 M/UL — SIGNIFICANT CHANGE UP (ref 4.7–6.1)
RBC # FLD: 14.7 % — HIGH (ref 11.5–14.5)
SODIUM SERPL-SCNC: 140 MMOL/L — SIGNIFICANT CHANGE UP (ref 135–146)
TYPE + AB SCN PNL BLD: SIGNIFICANT CHANGE UP
WBC # BLD: 7.87 K/UL — SIGNIFICANT CHANGE UP (ref 4.8–10.8)
WBC # FLD AUTO: 7.87 K/UL — SIGNIFICANT CHANGE UP (ref 4.8–10.8)

## 2018-07-21 RX ORDER — IOHEXOL 300 MG/ML
30 INJECTION, SOLUTION INTRAVENOUS ONCE
Qty: 0 | Refills: 0 | Status: COMPLETED | OUTPATIENT
Start: 2018-07-21 | End: 2018-07-21

## 2018-07-21 RX ADMIN — Medication 100 MILLIGRAM(S): at 17:07

## 2018-07-21 RX ADMIN — MEROPENEM 200 MILLIGRAM(S): 1 INJECTION INTRAVENOUS at 05:27

## 2018-07-21 RX ADMIN — ISOSORBIDE MONONITRATE 30 MILLIGRAM(S): 60 TABLET, EXTENDED RELEASE ORAL at 15:11

## 2018-07-21 RX ADMIN — TAMSULOSIN HYDROCHLORIDE 0.4 MILLIGRAM(S): 0.4 CAPSULE ORAL at 15:10

## 2018-07-21 RX ADMIN — Medication 50 MILLIGRAM(S): at 21:09

## 2018-07-21 RX ADMIN — ZOLPIDEM TARTRATE 5 MILLIGRAM(S): 10 TABLET ORAL at 22:32

## 2018-07-21 RX ADMIN — ENOXAPARIN SODIUM 100 MILLIGRAM(S): 100 INJECTION SUBCUTANEOUS at 05:28

## 2018-07-21 RX ADMIN — Medication 20 MILLIGRAM(S): at 17:08

## 2018-07-21 RX ADMIN — IOHEXOL 30 MILLILITER(S): 300 INJECTION, SOLUTION INTRAVENOUS at 10:06

## 2018-07-21 RX ADMIN — MEROPENEM 200 MILLIGRAM(S): 1 INJECTION INTRAVENOUS at 21:10

## 2018-07-21 RX ADMIN — Medication 20 MILLIGRAM(S): at 05:27

## 2018-07-21 RX ADMIN — Medication 40 MILLIGRAM(S): at 05:29

## 2018-07-21 RX ADMIN — ENOXAPARIN SODIUM 100 MILLIGRAM(S): 100 INJECTION SUBCUTANEOUS at 18:43

## 2018-07-21 RX ADMIN — SODIUM CHLORIDE 60 MILLILITER(S): 9 INJECTION INTRAMUSCULAR; INTRAVENOUS; SUBCUTANEOUS at 22:35

## 2018-07-21 RX ADMIN — LATANOPROST 1 DROP(S): 0.05 SOLUTION/ DROPS OPHTHALMIC; TOPICAL at 21:11

## 2018-07-21 RX ADMIN — DULOXETINE HYDROCHLORIDE 60 MILLIGRAM(S): 30 CAPSULE, DELAYED RELEASE ORAL at 15:10

## 2018-07-21 RX ADMIN — Medication 100 MILLIGRAM(S): at 05:29

## 2018-07-21 RX ADMIN — MEROPENEM 200 MILLIGRAM(S): 1 INJECTION INTRAVENOUS at 15:14

## 2018-07-21 RX ADMIN — Medication 1 APPLICATION(S): at 18:02

## 2018-07-21 NOTE — PROGRESS NOTE ADULT - ASSESSMENT
59 y/o M with PMH of heart failure of unclear type, unknown type of liver disease with possible varices? (no history of hemodynamically significant bleeds), HTN, DLD, chronic DVT's on AC (tolerating this at home), here with L sided bloody pleural effusion s/p CTube placement, cystitis, R basilar infiltrate    daily cxr, monitor output  f/u fluid cytology  empiric abx- ucx showing mdr ecoli; reasonable to c/w merrem for now as this will penetrate lung as well as urine  obtain outpatient endoscopy record from [facility] about 5 years ago  CMP to screen liver  daily coags while on therapeutic AC  low d dimer suggestive that dvt's identified are completely chronic in nature and there has not been a treatment failure- therefore will resume home noac (depending on whether or not patient will need further invasive diagnostic testing- will first need to discuss with pulm- see below)  monitor hgb, maintain active T+S and large bore IV access  pan CT scan with IV contrast- no B symptoms, has had age appropriate Ca screening, <10 pack year former smoker  scans did not demonstrate worrisome lymphadenopathy however RLL consolidation is of concern as a potential origin for this bloody effusion/ possible cancer  will d/w pulm regarding potentially obtaining diagnostic bronchoscopy  check hep b/c  high risk

## 2018-07-22 RX ADMIN — Medication 50 MILLIGRAM(S): at 21:29

## 2018-07-22 RX ADMIN — ENOXAPARIN SODIUM 100 MILLIGRAM(S): 100 INJECTION SUBCUTANEOUS at 17:11

## 2018-07-22 RX ADMIN — DULOXETINE HYDROCHLORIDE 60 MILLIGRAM(S): 30 CAPSULE, DELAYED RELEASE ORAL at 11:12

## 2018-07-22 RX ADMIN — Medication 20 MILLIGRAM(S): at 06:34

## 2018-07-22 RX ADMIN — Medication 100 MILLIGRAM(S): at 17:11

## 2018-07-22 RX ADMIN — ISOSORBIDE MONONITRATE 30 MILLIGRAM(S): 60 TABLET, EXTENDED RELEASE ORAL at 11:12

## 2018-07-22 RX ADMIN — Medication 1 APPLICATION(S): at 17:12

## 2018-07-22 RX ADMIN — MEROPENEM 200 MILLIGRAM(S): 1 INJECTION INTRAVENOUS at 13:12

## 2018-07-22 RX ADMIN — MEROPENEM 200 MILLIGRAM(S): 1 INJECTION INTRAVENOUS at 21:29

## 2018-07-22 RX ADMIN — Medication 20 MILLIGRAM(S): at 17:11

## 2018-07-22 RX ADMIN — LATANOPROST 1 DROP(S): 0.05 SOLUTION/ DROPS OPHTHALMIC; TOPICAL at 21:30

## 2018-07-22 RX ADMIN — MEROPENEM 200 MILLIGRAM(S): 1 INJECTION INTRAVENOUS at 05:16

## 2018-07-22 RX ADMIN — Medication 40 MILLIGRAM(S): at 05:16

## 2018-07-22 RX ADMIN — ZOLPIDEM TARTRATE 5 MILLIGRAM(S): 10 TABLET ORAL at 22:39

## 2018-07-22 RX ADMIN — ENOXAPARIN SODIUM 100 MILLIGRAM(S): 100 INJECTION SUBCUTANEOUS at 05:16

## 2018-07-22 RX ADMIN — Medication 100 MILLIGRAM(S): at 06:34

## 2018-07-22 RX ADMIN — TAMSULOSIN HYDROCHLORIDE 0.4 MILLIGRAM(S): 0.4 CAPSULE ORAL at 11:12

## 2018-07-22 NOTE — PROGRESS NOTE ADULT - SUBJECTIVE AND OBJECTIVE BOX
AMRIK MADRID  58y  Male      Patient is a 58y old  Male who presents with a chief complaint of sob (18 Jul 2018 02:25)      INTERVAL HPI/OVERNIGHT EVENTS: breathing comfortably, no complaints      REVIEW OF SYSTEMS:  as above  All other review of systems negative    T(C): 36.9 (07-22-18 @ 13:04), Max: 36.9 (07-21-18 @ 22:12)  HR: 85 (07-22-18 @ 13:04) (76 - 85)  BP: 115/65 (07-22-18 @ 13:04) (109/58 - 129/70)  RR: 18 (07-22-18 @ 13:04) (18 - 18)  SpO2: --  Wt(kg): --Vital Signs Last 24 Hrs  T(C): 36.9 (22 Jul 2018 13:04), Max: 36.9 (21 Jul 2018 22:12)  T(F): 98.5 (22 Jul 2018 13:04), Max: 98.5 (21 Jul 2018 22:12)  HR: 85 (22 Jul 2018 13:04) (76 - 85)  BP: 115/65 (22 Jul 2018 13:04) (109/58 - 129/70)  BP(mean): --  RR: 18 (22 Jul 2018 13:04) (18 - 18)  SpO2: --      07-21-18 @ 07:01  -  07-22-18 @ 07:00  --------------------------------------------------------  IN: 1240 mL / OUT: 2020 mL / NET: -780 mL    07-22-18 @ 07:01  -  07-22-18 @ 17:47  --------------------------------------------------------  IN: 240 mL / OUT: 500 mL / NET: -260 mL        PHYSICAL EXAM:  GENERAL: NAD  PSYCH: no agitation, baseline mentation  HEENT lazy eye  NERVOUS SYSTEM:  Alert & Oriented X3, no new focal deficits  PULMONARY: L chest tube with serosanguinous drainage, not on suction currently, L basilar crackles,   CARDIOVASCULAR: Regular rate and rhythm; No murmurs, rubs, or gallops  GI: Soft, Nontender, Nondistended; Bowel sounds present, rotund  EXTREMITIES:  2+ Peripheral Pulses, No clubbing, cyanosis, or edema    Consultant(s) Notes Reviewed:  [x ] YES  [ ] NO    Discussed with Consultants/Other Providers [ x] YES     LABS                          14.6   7.87  )-----------( 345      ( 21 Jul 2018 08:52 )             45.3     07-21    140  |  95<L>  |  15  ----------------------------<  117<H>  3.7   |  29  |  0.9    Ca    8.4<L>      21 Jul 2018 08:52    TPro  6.0  /  Alb  3.4<L>  /  TBili  0.5  /  DBili  x   /  AST  25  /  ALT  48<H>  /  AlkPhos  174<H>  07-21        PT/INR - ( 21 Jul 2018 08:52 )   PT: 13.80 sec;   INR: 1.28 ratio         PTT - ( 21 Jul 2018 08:52 )  PTT:44.4 sec  Lactate Trend  07-17 @ 23:51 Lactate:1.0         CAPILLARY BLOOD GLUCOSE            RADIOLOGY & ADDITIONAL TESTS:    Imaging Personally Reviewed:  [ ] YES  [ ] NO    HEALTH ISSUES - PROBLEM Dx:

## 2018-07-22 NOTE — PROGRESS NOTE ADULT - ASSESSMENT
57 y/o M with PMH of heart failure of unclear type, unknown type of liver disease with possible varices? (no history of hemodynamically significant bleeds), HTN, DLD, chronic DVT's on AC (tolerating this at home), here with L sided bloody pleural effusion s/p CTube placement, cystitis, R basilar infiltrate    daily cxr, monitor output  f/u fluid cytology  empiric abx- ucx showing mdr ecoli; reasonable to c/w merrem for now as this will penetrate lung as well as urine  obtain outpatient endoscopy record from [facility] about 5 years ago  c/w therapeutic AC (changed to lovenox previously for worry of acute component of dvt)  low d dimer suggestive that dvt's identified are completely chronic in nature and there has not been a treatment failure- therefore will resume home noac (depending on whether or not patient will need further invasive diagnostic testing- will first need to discuss with pulm- see below)  monitor hgb, maintain active T+S and large bore IV access  pan CT scan with IV contrast- no B symptoms, has had age appropriate Ca screening, <10 pack year former smoker  scans did not demonstrate worrisome lymphadenopathy however RLL consolidation is of concern as a potential origin for this bloody effusion/ possible cancer  will d/w pulm regarding potentially obtaining diagnostic bronchoscopy  check hep b/c  high risk

## 2018-07-22 NOTE — PROGRESS NOTE ADULT - ASSESSMENT
59 y/o M with PMH of heart failure of unclear type, unknown type of liver disease with possible varices? (no history of hemodynamically significant bleeds), HTN, DLD, chronic DVT's on AC (tolerating this at home), here with L sided bloody pleural effusion s/p CTube placement, cystitis, R basilar infiltrate    1. PLEURAL EFFUSION  Left effusion drained with pigtail (3L)  serosanguinous  No sign of CHF on TTE/ need to r/o infectious vs malignant  Pleural studies: pH 7.24, lymphocytic predominence - f/u cytology  On meropenem   CT Chest & A/P - negative for LAD or masses suspicious for malignancy  D-Dimer was low suggesting that DVT is old and was not AC failure  f/u MRSA nasal swab  Obtain endoscopy records from [facility] about 5 years ago  Hepatitis panel - negative  Monitor CBC, keep active T&S    2. AFib with unverifiable hx of DVT - Low D-dimer suggests chronic nature  Duplex: Multiple B/L LE DVTs  Eliquis held  Started on Lovenox    3. HTN, bipolar  Continue home meds

## 2018-07-22 NOTE — PROGRESS NOTE ADULT - SUBJECTIVE AND OBJECTIVE BOX
SUBJECTIVE:    Patient is a 58y old Male who presents with a chief complaint of sob (18 Jul 2018 02:25)    Currently admitted to medicine with the primary diagnosis of Pleural effusion     Today is hospital day 4d. This morning he is resting comfortably in bed and reports no new issues or overnight events.   Reports improved symptoms with CT in place.    PAST MEDICAL & SURGICAL HISTORY  CHF (congestive heart failure)  Insomnia  BPH (benign prostatic hyperplasia)  Depression  Bipolar 1 disorder  HTN (hypertension)    SOCIAL HISTORY:  Negative for smoking/alcohol/drug use.     ALLERGIES:  No Known Allergies    MEDICATIONS:  STANDING MEDICATIONS  clotrimazole 1% Cream 1 Application(s) Topical <User Schedule>  diltiazem    Tablet 60 milliGRAM(s) Oral every 8 hours  docusate sodium 100 milliGRAM(s) Oral two times a day  DULoxetine 60 milliGRAM(s) Oral daily  enoxaparin Injectable 100 milliGRAM(s) SubCutaneous every 12 hours  furosemide   Injectable 40 milliGRAM(s) IV Push daily  isosorbide   mononitrate ER Tablet (IMDUR) 30 milliGRAM(s) Oral daily  latanoprost 0.005% Ophthalmic Solution 1 Drop(s) Both EYES at bedtime  meropenem  IVPB      meropenem  IVPB 1000 milliGRAM(s) IV Intermittent every 8 hours  propranolol 20 milliGRAM(s) Oral two times a day  sodium chloride 0.9%. 1000 milliLiter(s) IV Continuous <Continuous>  tamsulosin 0.4 milliGRAM(s) Oral daily  traZODone 50 milliGRAM(s) Oral at bedtime    PRN MEDICATIONS  senna 1 Tablet(s) Oral every 12 hours PRN  zolpidem 5 milliGRAM(s) Oral at bedtime PRN  zolpidem 5 milliGRAM(s) Oral at bedtime PRN    VITALS:   T(F): 97.7  HR: 76  BP: 129/70  RR: 18  SpO2: --    LABS:                        14.6   7.87  )-----------( 345      ( 21 Jul 2018 08:52 )             45.3     07-21    140  |  95<L>  |  15  ----------------------------<  117<H>  3.7   |  29  |  0.9    Ca    8.4<L>      21 Jul 2018 08:52    TPro  6.0  /  Alb  3.4<L>  /  TBili  0.5  /  DBili  x   /  AST  25  /  ALT  48<H>  /  AlkPhos  174<H>  07-21    PT/INR - ( 21 Jul 2018 08:52 )   PT: 13.80 sec;   INR: 1.28 ratio         PTT - ( 21 Jul 2018 08:52 )  PTT:44.4 sec      RADIOLOGY:    < from: CT Chest w/ IV Cont (07.21.18 @ 13:29) >  IMPRESSION:     Chest:    Moderate right and small left pleural effusions, with a pigtail catheter   in the left lung base. Correlate with cytology of the pleural effusion.    Consolidation in the right lower lobe. Differential includes atelectasis   and pneumonia.     Thickening of interlobular septa and peribronchial edema/thickening at   bilateral marimar, findings suggestive of fluid overload/pulmonary venous   congestion.    No mediastinal or hilar lymphadenopathy.    Abdomen and pelvis:  No acute intra-abdominal pathology. No suspicion for malignancy in the   abdomen/pelvis.    Prostatomegaly.    < end of copied text >      PHYSICAL EXAM:  GEN: No acute distress  LUNGS: Decreased lung sounds on L, pigtail draining serosanguinous fluid   HEART: Regular  ABD: Soft, non-tender, non-distended.  EXT: NC/NC/NE/2+PP  NEURO: AAOX3

## 2018-07-23 LAB
ALBUMIN SERPL ELPH-MCNC: 3.6 G/DL — SIGNIFICANT CHANGE UP (ref 3.5–5.2)
ALP SERPL-CCNC: 174 U/L — HIGH (ref 30–115)
ALT FLD-CCNC: 43 U/L — HIGH (ref 0–41)
ANION GAP SERPL CALC-SCNC: 11 MMOL/L — SIGNIFICANT CHANGE UP (ref 7–14)
AST SERPL-CCNC: 18 U/L — SIGNIFICANT CHANGE UP (ref 0–41)
BILIRUB SERPL-MCNC: 0.6 MG/DL — SIGNIFICANT CHANGE UP (ref 0.2–1.2)
BUN SERPL-MCNC: 13 MG/DL — SIGNIFICANT CHANGE UP (ref 10–20)
CALCIUM SERPL-MCNC: 9 MG/DL — SIGNIFICANT CHANGE UP (ref 8.5–10.1)
CHLORIDE SERPL-SCNC: 95 MMOL/L — LOW (ref 98–110)
CO2 SERPL-SCNC: 30 MMOL/L — SIGNIFICANT CHANGE UP (ref 17–32)
CREAT SERPL-MCNC: 0.9 MG/DL — SIGNIFICANT CHANGE UP (ref 0.7–1.5)
CULTURE RESULTS: SIGNIFICANT CHANGE UP
CULTURE RESULTS: SIGNIFICANT CHANGE UP
GLUCOSE SERPL-MCNC: 111 MG/DL — HIGH (ref 70–99)
HCT VFR BLD CALC: 44.5 % — SIGNIFICANT CHANGE UP (ref 42–52)
HGB BLD-MCNC: 14.6 G/DL — SIGNIFICANT CHANGE UP (ref 14–18)
MAGNESIUM SERPL-MCNC: 2.1 MG/DL — SIGNIFICANT CHANGE UP (ref 1.8–2.4)
MCHC RBC-ENTMCNC: 26.9 PG — LOW (ref 27–31)
MCHC RBC-ENTMCNC: 32.8 G/DL — SIGNIFICANT CHANGE UP (ref 32–37)
MCV RBC AUTO: 82.1 FL — SIGNIFICANT CHANGE UP (ref 80–94)
NRBC # BLD: 0 /100 WBCS — SIGNIFICANT CHANGE UP (ref 0–0)
PLATELET # BLD AUTO: 341 K/UL — SIGNIFICANT CHANGE UP (ref 130–400)
POTASSIUM SERPL-MCNC: 3.8 MMOL/L — SIGNIFICANT CHANGE UP (ref 3.5–5)
POTASSIUM SERPL-SCNC: 3.8 MMOL/L — SIGNIFICANT CHANGE UP (ref 3.5–5)
PROT SERPL-MCNC: 5.9 G/DL — LOW (ref 6–8)
RBC # BLD: 5.42 M/UL — SIGNIFICANT CHANGE UP (ref 4.7–6.1)
RBC # FLD: 14.6 % — HIGH (ref 11.5–14.5)
SODIUM SERPL-SCNC: 136 MMOL/L — SIGNIFICANT CHANGE UP (ref 135–146)
SPECIMEN SOURCE: SIGNIFICANT CHANGE UP
SPECIMEN SOURCE: SIGNIFICANT CHANGE UP
WBC # BLD: 7.28 K/UL — SIGNIFICANT CHANGE UP (ref 4.8–10.8)
WBC # FLD AUTO: 7.28 K/UL — SIGNIFICANT CHANGE UP (ref 4.8–10.8)

## 2018-07-23 RX ADMIN — Medication 100 MILLIGRAM(S): at 05:30

## 2018-07-23 RX ADMIN — Medication 100 MILLIGRAM(S): at 17:53

## 2018-07-23 RX ADMIN — TAMSULOSIN HYDROCHLORIDE 0.4 MILLIGRAM(S): 0.4 CAPSULE ORAL at 13:22

## 2018-07-23 RX ADMIN — LATANOPROST 1 DROP(S): 0.05 SOLUTION/ DROPS OPHTHALMIC; TOPICAL at 21:43

## 2018-07-23 RX ADMIN — ISOSORBIDE MONONITRATE 30 MILLIGRAM(S): 60 TABLET, EXTENDED RELEASE ORAL at 13:22

## 2018-07-23 RX ADMIN — MEROPENEM 200 MILLIGRAM(S): 1 INJECTION INTRAVENOUS at 21:42

## 2018-07-23 RX ADMIN — Medication 50 MILLIGRAM(S): at 21:42

## 2018-07-23 RX ADMIN — ENOXAPARIN SODIUM 100 MILLIGRAM(S): 100 INJECTION SUBCUTANEOUS at 05:30

## 2018-07-23 RX ADMIN — Medication 40 MILLIGRAM(S): at 05:30

## 2018-07-23 RX ADMIN — ZOLPIDEM TARTRATE 5 MILLIGRAM(S): 10 TABLET ORAL at 22:50

## 2018-07-23 RX ADMIN — ENOXAPARIN SODIUM 100 MILLIGRAM(S): 100 INJECTION SUBCUTANEOUS at 17:53

## 2018-07-23 RX ADMIN — Medication 20 MILLIGRAM(S): at 05:30

## 2018-07-23 RX ADMIN — DULOXETINE HYDROCHLORIDE 60 MILLIGRAM(S): 30 CAPSULE, DELAYED RELEASE ORAL at 13:22

## 2018-07-23 RX ADMIN — Medication 1 APPLICATION(S): at 17:54

## 2018-07-23 RX ADMIN — MEROPENEM 200 MILLIGRAM(S): 1 INJECTION INTRAVENOUS at 13:22

## 2018-07-23 RX ADMIN — Medication 20 MILLIGRAM(S): at 17:53

## 2018-07-23 RX ADMIN — MEROPENEM 200 MILLIGRAM(S): 1 INJECTION INTRAVENOUS at 05:30

## 2018-07-23 NOTE — PROGRESS NOTE ADULT - SUBJECTIVE AND OBJECTIVE BOX
OVERNIGHT EVENTS: doing well on RA, no drainage from pigtail, on RA    Vital Signs Last 24 Hrs  T(C): 36.9 (23 Jul 2018 12:27), Max: 36.9 (23 Jul 2018 12:27)  T(F): 98.4 (23 Jul 2018 12:27), Max: 98.4 (23 Jul 2018 12:27)  HR: 77 (23 Jul 2018 12:27) (77 - 86)  BP: 132/82 (23 Jul 2018 12:27) (132/82 - 135/62)  BP(mean): --  RR: 18 (23 Jul 2018 12:27) (18 - 18)  SpO2: 94% (23 Jul 2018 08:22) (93% - 94%)    PHYSICAL EXAMINATION:    GENERAL: The patient is awake and alert in no apparent distress.     HEENT: Head is normocephalic and atraumatic. Extraocular muscles are intact. Mucous membranes are moist.    NECK: Supple.    LUNGS: DEC BS R SIDE    HEART: Regular rate and rhythm without murmur.    ABDOMEN: Soft, nontender, and nondistended.      EXTREMITIES: Without any cyanosis, clubbing, rash, lesions or edema.    NEUROLOGIC: Grossly intact.    SKIN: No ulceration or induration present.      LABS:                        14.6   7.28  )-----------( 341      ( 23 Jul 2018 09:39 )             44.5     07-23    136  |  95<L>  |  13  ----------------------------<  111<H>  3.8   |  30  |  0.9    Ca    9.0      23 Jul 2018 09:39  Mg     2.1     07-23    TPro  5.9<L>  /  Alb  3.6  /  TBili  0.6  /  DBili  x   /  AST  18  /  ALT  43<H>  /  AlkPhos  174<H>  07-23 07-22-18 @ 07:01  -  07-23-18 @ 07:00  --------------------------------------------------------  IN: 240 mL / OUT: 1400 mL / NET: -1160 mL    07-23-18 @ 07:01  -  07-23-18 @ 16:36  --------------------------------------------------------  IN: 8 mL / OUT: 1000 mL / NET: -992 mL        MICROBIOLOGY:      MEDICATIONS  (STANDING):  clotrimazole 1% Cream 1 Application(s) Topical <User Schedule>  diltiazem    Tablet 60 milliGRAM(s) Oral every 8 hours  docusate sodium 100 milliGRAM(s) Oral two times a day  DULoxetine 60 milliGRAM(s) Oral daily  enoxaparin Injectable 100 milliGRAM(s) SubCutaneous every 12 hours  furosemide   Injectable 40 milliGRAM(s) IV Push daily  isosorbide   mononitrate ER Tablet (IMDUR) 30 milliGRAM(s) Oral daily  latanoprost 0.005% Ophthalmic Solution 1 Drop(s) Both EYES at bedtime  meropenem  IVPB      meropenem  IVPB 1000 milliGRAM(s) IV Intermittent every 8 hours  propranolol 20 milliGRAM(s) Oral two times a day  tamsulosin 0.4 milliGRAM(s) Oral daily  traZODone 50 milliGRAM(s) Oral at bedtime    MEDICATIONS  (PRN):  senna 1 Tablet(s) Oral every 12 hours PRN Constipation  zolpidem 5 milliGRAM(s) Oral at bedtime PRN Insomnia  zolpidem 5 milliGRAM(s) Oral at bedtime PRN Insomnia      RADIOLOGY & ADDITIONAL STUDIES:

## 2018-07-23 NOTE — PROGRESS NOTE ADULT - SUBJECTIVE AND OBJECTIVE BOX
24H events:    Today is hospital day 5d. No acute events over night. Minimal CT drainage over night. Asked pulm to remove pigtail.     PAST MEDICAL & SURGICAL HISTORY  CHF (congestive heart failure)  Insomnia  BPH (benign prostatic hyperplasia)  Depression  Bipolar 1 disorder  HTN (hypertension)    SOCIAL HISTORY:  Negative for smoking/alcohol/drug use.     ALLERGIES:  No Known Allergies    MEDICATIONS:  STANDING MEDICATIONS  clotrimazole 1% Cream 1 Application(s) Topical <User Schedule>  diltiazem    Tablet 60 milliGRAM(s) Oral every 8 hours  docusate sodium 100 milliGRAM(s) Oral two times a day  DULoxetine 60 milliGRAM(s) Oral daily  enoxaparin Injectable 100 milliGRAM(s) SubCutaneous every 12 hours  furosemide   Injectable 40 milliGRAM(s) IV Push daily  isosorbide   mononitrate ER Tablet (IMDUR) 30 milliGRAM(s) Oral daily  latanoprost 0.005% Ophthalmic Solution 1 Drop(s) Both EYES at bedtime  meropenem  IVPB      meropenem  IVPB 1000 milliGRAM(s) IV Intermittent every 8 hours  propranolol 20 milliGRAM(s) Oral two times a day  tamsulosin 0.4 milliGRAM(s) Oral daily  traZODone 50 milliGRAM(s) Oral at bedtime    PRN MEDICATIONS  senna 1 Tablet(s) Oral every 12 hours PRN  zolpidem 5 milliGRAM(s) Oral at bedtime PRN  zolpidem 5 milliGRAM(s) Oral at bedtime PRN    VITALS:   T(F): 98.4  HR: 77  BP: 132/82  RR: 18  SpO2: 94%    LABS:                        14.6   7.28  )-----------( 341      ( 23 Jul 2018 09:39 )             44.5     07-23    136  |  95<L>  |  13  ----------------------------<  111<H>  3.8   |  30  |  0.9    Ca    9.0      23 Jul 2018 09:39  Mg     2.1     07-23    TPro  5.9<L>  /  Alb  3.6  /  TBili  0.6  /  DBili  x   /  AST  18  /  ALT  43<H>  /  AlkPhos  174<H>  07-23                  RADIOLOGY:    PHYSICAL EXAM:  GENERAL: NAD, resting comfortably in bed  PSYCH: no agitation, baseline mentation  HEENT lazy eye  NERVOUS SYSTEM:  Alert & Oriented X3, no new focal deficits  PULMONARY: L chest tube with serosanguinous drainage, not on suction currently, L basilar crackles,   CARDIOVASCULAR: Regular rate and rhythm; No murmurs, rubs, or gallops  GI: Soft, Nontender, Nondistended; Bowel sounds present, rotund  EXTREMITIES:  2+ Peripheral Pulses, No clubbing, cyanosis, or edema    Assessment and Plan:   · Assessment		  59 y/o M with PMH of heart failure of unclear type, unknown type of liver disease with possible varices? (no history of hemodynamically significant bleeds), HTN, DLD, chronic DVT's on AC (tolerating this at home), here with L sided bloody pleural effusion s/p CTube placement, cystitis, R basilar infiltrate      f/u fluid cytology  empiric abx- ucx showing mdr ecoli; reasonable to c/w merrem for now as this will penetrate lung as well as urine  obtain outpatient endoscopy record from [facility] about 5 years ago  - Cytology showed few atypical cells, inconclusive  - Will discuss with pulm regarding brochoscopy   c/w therapeutic AC (changed to lovenox previously for worry of acute component of dvt)  low d dimer suggestive that dvt's identified are completely chronic in nature and there has not been a treatment failure- therefore will resume home noac (depending on whether or not patient will need further invasive diagnostic testing- will first need to discuss with pulm- see below)  monitor hgb, maintain active T+S and large bore IV access  pan CT scan with IV contrast- no B symptoms, has had age appropriate Ca screening, <10 pack year former smoker  scans did not demonstrate worrisome lymphadenopathy however RLL consolidation is of concern as a potential origin for this bloody effusion/ possible cancer  D[place] CT was negative  Pigtail had minimal output overnight  Will ask pulm to pull it. Post CT xray today and tomorrow.

## 2018-07-23 NOTE — PROGRESS NOTE ADULT - ASSESSMENT
IMPRESSION:     SOB/ large left pleural effusion no old CXR, exudative, atypical celled  afib/dvt on eliquis   b/l DVT   UTI         PLAN:    DC PIGTAIL  PUL STANDPOINT OP F/UP  IF RECURRENCE PLEURX CATHETER

## 2018-07-23 NOTE — PROGRESS NOTE ADULT - SUBJECTIVE AND OBJECTIVE BOX
REDDYAMRIK LOYA  58y  Male      Patient is a 58y old  Male who presents with a chief complaint of sob (18 Jul 2018 02:25)      INTERVAL HPI/OVERNIGHT EVENTS: s/p ctube removal. breathing well. has not been at all physically active yet      REVIEW OF SYSTEMS:  as above  All other review of systems negative    T(C): 36.9 (07-23-18 @ 12:27), Max: 36.9 (07-23-18 @ 12:27)  HR: 77 (07-23-18 @ 12:27) (77 - 86)  BP: 132/82 (07-23-18 @ 12:27) (132/82 - 135/62)  RR: 18 (07-23-18 @ 12:27) (18 - 18)  SpO2: 94% (07-23-18 @ 08:22) (93% - 94%)  Wt(kg): --Vital Signs Last 24 Hrs  T(C): 36.9 (23 Jul 2018 12:27), Max: 36.9 (23 Jul 2018 12:27)  T(F): 98.4 (23 Jul 2018 12:27), Max: 98.4 (23 Jul 2018 12:27)  HR: 77 (23 Jul 2018 12:27) (77 - 86)  BP: 132/82 (23 Jul 2018 12:27) (132/82 - 135/62)  BP(mean): --  RR: 18 (23 Jul 2018 12:27) (18 - 18)  SpO2: 94% (23 Jul 2018 08:22) (93% - 94%)      07-22-18 @ 07:01  -  07-23-18 @ 07:00  --------------------------------------------------------  IN: 240 mL / OUT: 1400 mL / NET: -1160 mL    07-23-18 @ 07:01  -  07-23-18 @ 19:14  --------------------------------------------------------  IN: 8 mL / OUT: 1000 mL / NET: -992 mL        PHYSICAL EXAM:  GENERAL: NAD  PSYCH: no agitation, baseline mentation  HEENT lazy eye  NERVOUS SYSTEM:  Alert & Oriented X3, no new focal deficits  PULMONARY: subtle left basilar crackles, no chest tube  CARDIOVASCULAR: Regular rate and rhythm; No murmurs, rubs, or gallops  GI: Soft, Nontender, Nondistended; Bowel sounds present, rotund  EXTREMITIES:  2+ Peripheral Pulses, No clubbing, cyanosis, or edema    Consultant(s) Notes Reviewed:  [x ] YES  [ ] NO    Discussed with Consultants/Other Providers [ x] YES     LABS                          14.6   7.28  )-----------( 341      ( 23 Jul 2018 09:39 )             44.5     07-23    136  |  95<L>  |  13  ----------------------------<  111<H>  3.8   |  30  |  0.9    Ca    9.0      23 Jul 2018 09:39  Mg     2.1     07-23    TPro  5.9<L>  /  Alb  3.6  /  TBili  0.6  /  DBili  x   /  AST  18  /  ALT  43<H>  /  AlkPhos  174<H>  07-23          Lactate Trend        CAPILLARY BLOOD GLUCOSE            RADIOLOGY & ADDITIONAL TESTS:    Imaging Personally Reviewed:  [ ] YES  [ ] NO    HEALTH ISSUES - PROBLEM Dx:

## 2018-07-23 NOTE — PROGRESS NOTE ADULT - ASSESSMENT
57 y/o M with PMH of heart failure of unclear type, unknown type of liver disease with possible varices? (no history of hemodynamically significant bleeds), HTN, DLD, chronic DVT's on AC (tolerating this at home), here with L sided bloody pleural effusion s/p CTube placement, cystitis, R basilar infiltrate    daily cxr, monitor output  f/u fluid cytology- atypical cells, will need outpatient workup/follow up  empiric abx- ucx showing mdr ecoli; transition to PO abx tomorrow and continue for 4 days  obtain outpatient endoscopy record from [facility] about 5 years ago  can change back to NOAC starting tomorrow  low d dimer suggestive that dvt's identified are completely chronic in nature   pan CT scan with IV contrast- no B symptoms, has had age appropriate Ca screening, <10 pack year former smoker  scans did not demonstrate worrisome lymphadenopathy however RLL consolidation is of concern as a potential origin for this bloody effusion/ possible cancer  will d/w pulm regarding potentially obtaining diagnostic bronchoscopy at some point, reevaluate RLL  PT for gait assessment  Dispo planning

## 2018-07-24 ENCOUNTER — TRANSCRIPTION ENCOUNTER (OUTPATIENT)
Age: 58
End: 2018-07-24

## 2018-07-24 RX ORDER — APIXABAN 2.5 MG/1
1 TABLET, FILM COATED ORAL
Qty: 0 | Refills: 0 | COMMUNITY
Start: 2018-07-24

## 2018-07-24 RX ORDER — APIXABAN 2.5 MG/1
1 TABLET, FILM COATED ORAL
Qty: 0 | Refills: 0 | COMMUNITY

## 2018-07-24 RX ORDER — CEFPODOXIME PROXETIL 100 MG
200 TABLET ORAL EVERY 12 HOURS
Qty: 0 | Refills: 0 | Status: DISCONTINUED | OUTPATIENT
Start: 2018-07-24 | End: 2018-07-25

## 2018-07-24 RX ORDER — APIXABAN 2.5 MG/1
5 TABLET, FILM COATED ORAL EVERY 12 HOURS
Qty: 0 | Refills: 0 | Status: DISCONTINUED | OUTPATIENT
Start: 2018-07-24 | End: 2018-07-25

## 2018-07-24 RX ADMIN — MEROPENEM 200 MILLIGRAM(S): 1 INJECTION INTRAVENOUS at 13:56

## 2018-07-24 RX ADMIN — Medication 50 MILLIGRAM(S): at 22:03

## 2018-07-24 RX ADMIN — TAMSULOSIN HYDROCHLORIDE 0.4 MILLIGRAM(S): 0.4 CAPSULE ORAL at 13:55

## 2018-07-24 RX ADMIN — ZOLPIDEM TARTRATE 5 MILLIGRAM(S): 10 TABLET ORAL at 22:08

## 2018-07-24 RX ADMIN — DULOXETINE HYDROCHLORIDE 60 MILLIGRAM(S): 30 CAPSULE, DELAYED RELEASE ORAL at 13:55

## 2018-07-24 RX ADMIN — ISOSORBIDE MONONITRATE 30 MILLIGRAM(S): 60 TABLET, EXTENDED RELEASE ORAL at 13:56

## 2018-07-24 RX ADMIN — APIXABAN 5 MILLIGRAM(S): 2.5 TABLET, FILM COATED ORAL at 18:09

## 2018-07-24 RX ADMIN — Medication 1 APPLICATION(S): at 18:10

## 2018-07-24 RX ADMIN — Medication 20 MILLIGRAM(S): at 18:09

## 2018-07-24 RX ADMIN — Medication 100 MILLIGRAM(S): at 18:09

## 2018-07-24 RX ADMIN — MEROPENEM 200 MILLIGRAM(S): 1 INJECTION INTRAVENOUS at 05:38

## 2018-07-24 RX ADMIN — ENOXAPARIN SODIUM 100 MILLIGRAM(S): 100 INJECTION SUBCUTANEOUS at 05:39

## 2018-07-24 RX ADMIN — Medication 20 MILLIGRAM(S): at 05:39

## 2018-07-24 RX ADMIN — Medication 40 MILLIGRAM(S): at 05:39

## 2018-07-24 RX ADMIN — Medication 100 MILLIGRAM(S): at 05:39

## 2018-07-24 RX ADMIN — LATANOPROST 1 DROP(S): 0.05 SOLUTION/ DROPS OPHTHALMIC; TOPICAL at 22:03

## 2018-07-24 RX ADMIN — Medication 200 MILLIGRAM(S): at 18:14

## 2018-07-24 NOTE — DISCHARGE NOTE ADULT - OTHER SIGNIFICANT FINDINGS
< from: CT Chest w/ IV Cont (07.21.18 @ 13:29) >  Chest:    Moderate right and small left pleural effusions, with a pigtail catheter   in the left lung base. Correlate with cytology of the pleural effusion.    Consolidation in the right lower lobe. Differential includes atelectasis   and pneumonia.     Thickening of interlobular septa and peribronchial edema/thickening at   bilateral marimar, findings suggestive of fluid overload/pulmonary venous   congestion.    No mediastinal or hilar lymphadenopathy.    Abdomen and pelvis:    No acute intra-abdominal pathology. No suspicion for malignancy in the   abdomen/pelvis.    Prostatomegaly.    < end of copied text >

## 2018-07-24 NOTE — DISCHARGE NOTE ADULT - HOSPITAL COURSE
57 y/o M with PMH of HTN, dyslipidemia, bipolar disease, history of respiratory failure in the past, brought in to the hospital by EMS from nursing home facility for evaluation of shortness of breath. AS per patient, he has been feeling weak and having difficulty breathing for few days along with cough but no sputum. He denied any fever, chills, nausea or vomiting in admission. A chest xray was done which showed plural effusion and diffuse bilateral infiltrates. Cardiac echo showed no signs of CHF. A pigtail was placed which drained 3 litters of serosanguinous fluid. The analysis of the fluid showed pH=7.24, lymphocytic infiltrates, and few atypical cells but no definitive cytological finding of malignancy. The pigtail was removed after 3 days and post removal chest xray showed no reaccumulation. During this hospitalization the patient was covered with vancomycin and subsequently with meropenem. The CT scan of abdomen and chest did not show any evidence of malignancy. Pulmonology will follow you as an outpatient to further work up the lung infiltrates and pleural effusion.

## 2018-07-24 NOTE — PROGRESS NOTE ADULT - SUBJECTIVE AND OBJECTIVE BOX
REDDYAMRIK LOYA  58y  Male      Patient is a 58y old  Male who presents with a chief complaint of Shortness of Breath (24 Jul 2018 13:54)      INTERVAL HPI/OVERNIGHT EVENTS: no sob after ctube has been out      REVIEW OF SYSTEMS:  as above  All other review of systems negative    T(C): 36.1 (07-24-18 @ 05:38), Max: 36.6 (07-23-18 @ 20:07)  HR: 87 (07-24-18 @ 05:38) (80 - 87)  BP: 139/84 (07-24-18 @ 05:38) (119/78 - 139/84)  RR: 18 (07-24-18 @ 05:38) (18 - 18)  SpO2: 95% (07-24-18 @ 08:38) (95% - 95%)  Wt(kg): --Vital Signs Last 24 Hrs  T(C): 36.1 (24 Jul 2018 05:38), Max: 36.6 (23 Jul 2018 20:07)  T(F): 97 (24 Jul 2018 05:38), Max: 97.8 (23 Jul 2018 20:07)  HR: 87 (24 Jul 2018 05:38) (80 - 87)  BP: 139/84 (24 Jul 2018 05:38) (119/78 - 139/84)  BP(mean): --  RR: 18 (24 Jul 2018 05:38) (18 - 18)  SpO2: 95% (24 Jul 2018 08:38) (95% - 95%)      07-23-18 @ 07:01  -  07-24-18 @ 07:00  --------------------------------------------------------  IN: 8 mL / OUT: 1200 mL / NET: -1192 mL    07-24-18 @ 07:01  -  07-24-18 @ 18:19  --------------------------------------------------------  IN: 0 mL / OUT: 700 mL / NET: -700 mL        PHYSICAL EXAM:  GENERAL: NAD, weak  PSYCH: no agitation, baseline mentation  HEENT: lazy eye  NERVOUS SYSTEM:  Alert & Oriented X3, no new focal deficits  PULMONARY: subtle LLL crackles/ not substantial, bibasilar atelectatic changes  CARDIOVASCULAR: Regular rate and rhythm; No murmurs, rubs, or gallops  GI: Soft, Nontender, Nondistended; Bowel sounds present  EXTREMITIES:  2+ Peripheral Pulses, No clubbing, cyanosis, or edema    Consultant(s) Notes Reviewed:  [x ] YES  [ ] NO    Discussed with Consultants/Other Providers [ x] YES     LABS                          14.6   7.28  )-----------( 341      ( 23 Jul 2018 09:39 )             44.5     07-23    136  |  95<L>  |  13  ----------------------------<  111<H>  3.8   |  30  |  0.9    Ca    9.0      23 Jul 2018 09:39  Mg     2.1     07-23    TPro  5.9<L>  /  Alb  3.6  /  TBili  0.6  /  DBili  x   /  AST  18  /  ALT  43<H>  /  AlkPhos  174<H>  07-23          Lactate Trend        CAPILLARY BLOOD GLUCOSE            RADIOLOGY & ADDITIONAL TESTS:    Imaging Personally Reviewed:  [ ] YES  [ ] NO    HEALTH ISSUES - PROBLEM Dx:

## 2018-07-24 NOTE — DISCHARGE NOTE ADULT - CARE PROVIDER_API CALL
Jesus Manuel Mendenhall), Critical Care Medicine; Internal Medicine; Pulmonary Disease; Sleep Medicine  [address] [place] [address]  Phone: [phone]  Fax: [phone]

## 2018-07-24 NOTE — PROGRESS NOTE ADULT - SUBJECTIVE AND OBJECTIVE BOX
24H events:    Patient is a 58y old Male who presents with a chief complaint of Shortness of Breath (24 Jul 2018 13:54)    Primary diagnosis of Pleural effusion.     Today is hospital day 6d.     Chest tube was removed today. CXR showed no reaccumulation of fluid.     PAST MEDICAL & SURGICAL HISTORY  CHF (congestive heart failure)  Insomnia  BPH (benign prostatic hyperplasia)  Depression  Bipolar 1 disorder  HTN (hypertension)    SOCIAL HISTORY:  Negative for smoking/alcohol/drug use.     ALLERGIES:  No Known Allergies    MEDICATIONS:  STANDING MEDICATIONS  apixaban 5 milliGRAM(s) Oral every 12 hours  clotrimazole 1% Cream 1 Application(s) Topical <User Schedule>  diltiazem    Tablet 60 milliGRAM(s) Oral every 8 hours  docusate sodium 100 milliGRAM(s) Oral two times a day  DULoxetine 60 milliGRAM(s) Oral daily  furosemide   Injectable 40 milliGRAM(s) IV Push daily  isosorbide   mononitrate ER Tablet (IMDUR) 30 milliGRAM(s) Oral daily  latanoprost 0.005% Ophthalmic Solution 1 Drop(s) Both EYES at bedtime  meropenem  IVPB      meropenem  IVPB 1000 milliGRAM(s) IV Intermittent every 8 hours  propranolol 20 milliGRAM(s) Oral two times a day  tamsulosin 0.4 milliGRAM(s) Oral daily  traZODone 50 milliGRAM(s) Oral at bedtime    PRN MEDICATIONS  senna 1 Tablet(s) Oral every 12 hours PRN  zolpidem 5 milliGRAM(s) Oral at bedtime PRN  zolpidem 5 milliGRAM(s) Oral at bedtime PRN    VITALS:   T(F): 97  HR: 87  BP: 139/84  RR: 18  SpO2: 95%    LABS:                        14.6   7.28  )-----------( 341      ( 23 Jul 2018 09:39 )             44.5     07-23    136  |  95<L>  |  13  ----------------------------<  111<H>  3.8   |  30  |  0.9    Ca    9.0      23 Jul 2018 09:39  Mg     2.1     07-23    TPro  5.9<L>  /  Alb  3.6  /  TBili  0.6  /  DBili  x   /  AST  18  /  ALT  43<H>  /  AlkPhos  174<H>  07-23                  RADIOLOGY:    PHYSICAL EXAM:  GENERAL: NAD, resting comfortably in bed  PSYCH: no agitation, baseline mentation  HEENT lazy eye  NERVOUS SYSTEM:  Alert & Oriented X3, no new focal deficits  PULMONARY: L chest tube with serosanguinous drainage, not on suction currently, L basilar crackles,   CARDIOVASCULAR: Regular rate and rhythm; No murmurs, rubs, or gallops  GI: Soft, Nontender, Nondistended; Bowel sounds present, rotund  EXTREMITIES:  2+ Peripheral Pulses, No clubbing, cyanosis, or edema    Assessment and Plan:   · Assessment		  59 y/o M with PMH of heart failure of unclear type, unknown type of liver disease with possible varices? (no history of hemodynamically significant bleeds), HTN, DLD, chronic DVT's on AC (tolerating this at home), here with L sided bloody     # pleural effusion s/p CTube placement, cystitis, R basilar infiltrate  -CT remved. Post CXR was OK.   - Cytology showed few atypical cells, inconclusive  - Pulm recommended outpatient follow up    # UTI   - Meropenem transitioned to cefpodoxime      # History of DVT  - Apixaban restarted     Disp  Going home tomorrow

## 2018-07-24 NOTE — PROGRESS NOTE ADULT - SUBJECTIVE AND OBJECTIVE BOX
OVERNIGHT EVENTS: feels well, s/p dc pigtail    Vital Signs Last 24 Hrs  T(C): 36.1 (24 Jul 2018 05:38), Max: 36.9 (23 Jul 2018 12:27)  T(F): 97 (24 Jul 2018 05:38), Max: 98.4 (23 Jul 2018 12:27)  HR: 87 (24 Jul 2018 05:38) (77 - 87)  BP: 139/84 (24 Jul 2018 05:38) (119/78 - 139/84)  BP(mean): --  RR: 18 (24 Jul 2018 05:38) (18 - 18)  SpO2: 94% RA    PHYSICAL EXAMINATION:    GENERAL: The patient is awake and alert in no apparent distress.     HEENT: Head is normocephalic and atraumatic. Extraocular muscles are intact. Mucous membranes are moist.    NECK: Supple.    LUNGS: dec bs both bases    HEART: Regular rate and rhythm without murmur.    ABDOMEN: Soft, nontender, and nondistended.      EXTREMITIES: Without any cyanosis, clubbing, rash, lesions or edema.    NEUROLOGIC: Grossly intact.    SKIN: No ulceration or induration present.      LABS:                        14.6   7.28  )-----------( 341      ( 23 Jul 2018 09:39 )             44.5     07-23    136  |  95<L>  |  13  ----------------------------<  111<H>  3.8   |  30  |  0.9    Ca    9.0      23 Jul 2018 09:39  Mg     2.1     07-23    TPro  5.9<L>  /  Alb  3.6  /  TBili  0.6  /  DBili  x   /  AST  18  /  ALT  43<H>  /  AlkPhos  174<H>  07-23 07-23-18 @ 07:01  -  07-24-18 @ 07:00  --------------------------------------------------------  IN: 8 mL / OUT: 1200 mL / NET: -1192 mL        MICROBIOLOGY:      MEDICATIONS  (STANDING):  clotrimazole 1% Cream 1 Application(s) Topical <User Schedule>  diltiazem    Tablet 60 milliGRAM(s) Oral every 8 hours  docusate sodium 100 milliGRAM(s) Oral two times a day  DULoxetine 60 milliGRAM(s) Oral daily  enoxaparin Injectable 100 milliGRAM(s) SubCutaneous every 12 hours  furosemide   Injectable 40 milliGRAM(s) IV Push daily  isosorbide   mononitrate ER Tablet (IMDUR) 30 milliGRAM(s) Oral daily  latanoprost 0.005% Ophthalmic Solution 1 Drop(s) Both EYES at bedtime  meropenem  IVPB      meropenem  IVPB 1000 milliGRAM(s) IV Intermittent every 8 hours  propranolol 20 milliGRAM(s) Oral two times a day  tamsulosin 0.4 milliGRAM(s) Oral daily  traZODone 50 milliGRAM(s) Oral at bedtime    MEDICATIONS  (PRN):  senna 1 Tablet(s) Oral every 12 hours PRN Constipation  zolpidem 5 milliGRAM(s) Oral at bedtime PRN Insomnia  zolpidem 5 milliGRAM(s) Oral at bedtime PRN Insomnia      RADIOLOGY & ADDITIONAL STUDIES:

## 2018-07-24 NOTE — PROGRESS NOTE ADULT - ASSESSMENT
59 y/o M with PMH of heart failure of unclear type, unknown type of liver disease with possible varices? (no history of hemodynamically significant bleeds), HTN, DLD, chronic DVT's on AC (tolerating this at home), here with L sided bloody pleural effusion s/p CTube placement, cystitis, R basilar infiltrate    f/u fluid cytology- atypical cells, will need outpatient workup/follow up  repeat CXR in 1-2 weeks, if pleural effusion recurs will need another thoracentesis with cytology sent  f/u with pulmonary as outpatient within 2-4 weeks  pan CT scan with IV contrast- no B symptoms, has had age appropriate Ca screening, <10 pack year former smoker  scans did not demonstrate worrisome lymphadenopathy however RLL consolidation is of concern as a potential origin for this bloody effusion/ possible cancer. if subsequent thoracenteses do not yield answer, next step would be diagnostic bronchoscopy    complete abx as PO for cystitis at this point  change back to NOAC   low d dimer suggestive that dvt's identified are completely chronic in nature   PT for gait assessment  Expect patient will be stable for discharge to snf for tomorrow

## 2018-07-24 NOTE — DISCHARGE NOTE ADULT - CARE PLAN
Principal Discharge DX:	Pleural effusion  Goal:	Outpatient management, diagnosis, symptomatic management and disease progression  Assessment and plan of treatment:	Please follow up with you pulmonologist outpatient regarding the lung mass and the fluid around the lungs. Please come back to the ED if the SOB recurs or worsens.

## 2018-07-24 NOTE — PROGRESS NOTE ADULT - ASSESSMENT
IMPRESSION:     SOB/ large left pleural effusion no old CXR, exudative, atypical cells  afib/dvt on eliquis   b/l DVT   UTI         PLAN:    repeat CXR  PUL STANDPOINT OP F/UP  IF RECURRENCE PLEURX CATHETER  SPOKE WITH TEAM

## 2018-07-24 NOTE — DISCHARGE NOTE ADULT - MEDICATION SUMMARY - MEDICATIONS TO TAKE
I will START or STAY ON the medications listed below when I get home from the hospital:    ibuprofen 400 mg oral tablet  -- 1 tab(s) by mouth every 8 hours, As Needed  -- Indication: For Pain    tamsulosin 0.4 mg oral capsule  -- 1 cap(s) by mouth once a day  -- Indication: For Urinary retention    isosorbide mononitrate 20 mg oral tablet  -- 1 tab(s) by mouth once a day  -- Indication: For Heart medication    Cardizem 60 mg oral tablet  -- 1 tab(s) by mouth every 8 hours  -- Indication: For Heart medication    propranolol 20 mg oral tablet  -- 1 tab(s) by mouth 2 times a day  -- Indication: For Heart medication    Eliquis 5 mg oral tablet  -- 1 tab(s) by mouth 2 times a day  -- Indication: For DVT    DULoxetine 60 mg oral delayed release capsule  -- 1 cap(s) by mouth once a day  -- Indication: For Mood    traZODone 50 mg oral tablet  -- 1 tab(s) by mouth once a day (at bedtime)  -- Indication: For Mood    Ambien 10 mg oral tablet  -- 1 tab(s) by mouth once a day (at bedtime)  -- Indication: For Sleeping aid    clotrimazole 1% topical cream  -- Apply on skin to affected area once a day  -- Indication: For Rash    latanoprost 0.005% ophthalmic solution  -- 1 drop(s) to each affected eye once a day (in the evening)  -- Indication: For BPH (benign prostatic hyperplasia)    Multiple Vitamins oral capsule  -- 1 cap(s) by mouth once a day  -- Indication: For Suppliment

## 2018-07-24 NOTE — DISCHARGE NOTE ADULT - PLAN OF CARE
Outpatient management, diagnosis, symptomatic management and disease progression Please follow up with you pulmonologist outpatient regarding the lung mass and the fluid around the lungs. Please come back to the ED if the SOB recurs or worsens.

## 2018-07-24 NOTE — DISCHARGE NOTE ADULT - PATIENT PORTAL LINK FT
You can access the [facility] Patient Portal, offered by [facility], by registering with the following website: [email or web address]

## 2018-07-25 VITALS
RESPIRATION RATE: 18 BRPM | DIASTOLIC BLOOD PRESSURE: 73 MMHG | SYSTOLIC BLOOD PRESSURE: 118 MMHG | TEMPERATURE: 97 F | HEART RATE: 88 BPM

## 2018-07-25 RX ORDER — TRAZODONE HCL 50 MG
1 TABLET ORAL
Qty: 0 | Refills: 0 | COMMUNITY
Start: 2018-07-25

## 2018-07-25 RX ORDER — APIXABAN 2.5 MG/1
1 TABLET, FILM COATED ORAL
Qty: 0 | Refills: 0 | COMMUNITY
Start: 2018-07-25

## 2018-07-25 RX ORDER — CEFPODOXIME PROXETIL 100 MG
1 TABLET ORAL
Qty: 0 | Refills: 0 | COMMUNITY
Start: 2018-07-25

## 2018-07-25 RX ORDER — IBUPROFEN 200 MG
1 TABLET ORAL
Qty: 0 | Refills: 0 | COMMUNITY

## 2018-07-25 RX ORDER — TRAZODONE HCL 50 MG
1 TABLET ORAL
Qty: 0 | Refills: 0 | COMMUNITY

## 2018-07-25 RX ADMIN — APIXABAN 5 MILLIGRAM(S): 2.5 TABLET, FILM COATED ORAL at 17:47

## 2018-07-25 RX ADMIN — Medication 20 MILLIGRAM(S): at 17:47

## 2018-07-25 RX ADMIN — APIXABAN 5 MILLIGRAM(S): 2.5 TABLET, FILM COATED ORAL at 05:58

## 2018-07-25 RX ADMIN — Medication 200 MILLIGRAM(S): at 17:47

## 2018-07-25 RX ADMIN — TAMSULOSIN HYDROCHLORIDE 0.4 MILLIGRAM(S): 0.4 CAPSULE ORAL at 12:40

## 2018-07-25 RX ADMIN — Medication 100 MILLIGRAM(S): at 05:58

## 2018-07-25 RX ADMIN — Medication 200 MILLIGRAM(S): at 05:58

## 2018-07-25 RX ADMIN — Medication 40 MILLIGRAM(S): at 05:59

## 2018-07-25 RX ADMIN — ISOSORBIDE MONONITRATE 30 MILLIGRAM(S): 60 TABLET, EXTENDED RELEASE ORAL at 12:40

## 2018-07-25 RX ADMIN — Medication 20 MILLIGRAM(S): at 05:58

## 2018-07-25 RX ADMIN — Medication 100 MILLIGRAM(S): at 17:47

## 2018-07-25 RX ADMIN — DULOXETINE HYDROCHLORIDE 60 MILLIGRAM(S): 30 CAPSULE, DELAYED RELEASE ORAL at 12:40

## 2018-07-25 NOTE — PROGRESS NOTE ADULT - ASSESSMENT
59 y/o M with PMH of heart failure of unclear type, unknown type of liver disease with possible varices? (no history of hemodynamically significant bleeds), HTN, DLD, chronic DVT's on AC (tolerating this at home), here with L sided bloody pleural effusion s/p CTube placement, cystitis, R basilar infiltrate    f/u fluid cytology- atypical cells, will need outpatient workup/follow up  repeat CXR in 1-2 weeks, if pleural effusion recurs will need another thoracentesis with cytology sent  f/u with pulmonary as outpatient within 2-4 weeks  pan CT scan with IV contrast- no B symptoms, has had age appropriate Ca screening, <10 pack year former smoker  scans did not demonstrate worrisome lymphadenopathy however RLL consolidation is of concern as a potential origin for this bloody effusion/ possible cancer. if subsequent thoracenteses do not yield answer, next step would be diagnostic bronchoscopy    complete abx as PO for cystitis at this point  change back to NOAC   low d dimer suggestive that dvt's identified are completely chronic in nature   PT for gait assessment    D/C Planning.

## 2018-07-25 NOTE — PROGRESS NOTE ADULT - SUBJECTIVE AND OBJECTIVE BOX
OVERNIGHT EVENTS: feels better, s/p repeat cxr    Vital Signs Last 24 Hrs  T(C): 36.2 (25 Jul 2018 06:05), Max: 37.1 (24 Jul 2018 20:36)  T(F): 97.1 (25 Jul 2018 06:05), Max: 98.8 (24 Jul 2018 20:36)  HR: 100 (25 Jul 2018 06:05) (83 - 100)  BP: 125/71 (25 Jul 2018 06:05) (110/57 - 129/79)  BP(mean): --  RR: 18 (25 Jul 2018 06:05) (18 - 18)  SpO2: 94%    PHYSICAL EXAMINATION:    GENERAL: The patient is awake and alert in no apparent distress.     HEENT: Head is normocephalic and atraumatic. Extraocular muscles are intact. Mucous membranes are moist.    NECK: Supple.    LUNGS: dec bs both bases    HEART: Regular rate and rhythm without murmur.    ABDOMEN: Soft, nontender, and nondistended.      EXTREMITIES: Without any cyanosis, clubbing, rash, lesions or edema.    NEUROLOGIC: Grossly intact.    SKIN: No ulceration or induration present.      LABS:                                07-24-18 @ 07:01  -  07-25-18 @ 07:00  --------------------------------------------------------  IN: 0 mL / OUT: 1400 mL / NET: -1400 mL        MICROBIOLOGY:      MEDICATIONS  (STANDING):  apixaban 5 milliGRAM(s) Oral every 12 hours  cefpodoxime 200 milliGRAM(s) Oral every 12 hours  clotrimazole 1% Cream 1 Application(s) Topical <User Schedule>  diltiazem    Tablet 60 milliGRAM(s) Oral every 8 hours  docusate sodium 100 milliGRAM(s) Oral two times a day  DULoxetine 60 milliGRAM(s) Oral daily  furosemide   Injectable 40 milliGRAM(s) IV Push daily  isosorbide   mononitrate ER Tablet (IMDUR) 30 milliGRAM(s) Oral daily  latanoprost 0.005% Ophthalmic Solution 1 Drop(s) Both EYES at bedtime  propranolol 20 milliGRAM(s) Oral two times a day  tamsulosin 0.4 milliGRAM(s) Oral daily  traZODone 50 milliGRAM(s) Oral at bedtime    MEDICATIONS  (PRN):  senna 1 Tablet(s) Oral every 12 hours PRN Constipation  zolpidem 5 milliGRAM(s) Oral at bedtime PRN Insomnia  zolpidem 5 milliGRAM(s) Oral at bedtime PRN Insomnia      RADIOLOGY & ADDITIONAL STUDIES:

## 2018-07-25 NOTE — PROGRESS NOTE ADULT - SUBJECTIVE AND OBJECTIVE BOX
REDDYAMRIK LOYA  58y  Male      Patient is a 58y old  Male who presents with a chief complaint of Shortness of Breath (24 Jul 2018 13:54)      INTERVAL HPI/OVERNIGHT EVENTS:      ******************************* REVIEW OF SYSTEMS:**********************************************      All other review of systems negative    *********************** VITALS ******************************************    T(F): 97.4 (07-25-18 @ 15:13)  HR: 88 (07-25-18 @ 15:13) (83 - 100)  BP: 118/73 (07-25-18 @ 15:13) (110/57 - 125/71)  RR: 18 (07-25-18 @ 15:13) (18 - 18)  SpO2: --    07-24-18 @ 07:01  -  07-25-18 @ 07:00  --------------------------------------------------------  IN: 0 mL / OUT: 1400 mL / NET: -1400 mL            07-24-18 @ 07:01  -  07-25-18 @ 07:00  --------------------------------------------------------  IN: 0 mL / OUT: 1400 mL / NET: -1400 mL        ******************************** PHYSICAL EXAM:**************************************************  GENERAL: NAD    PSYCH: no agitation, baseline mentation  HEENT:     NERVOUS SYSTEM:  Alert & Oriented X3, MS  5/5 B/L  UE and LE ; Sensory intact    PULMONARY: Decreased GABRIELA, Crackles left basilar area.    CARDIOVASCULAR: S1S2 RRR    GI: Soft, NT, ND; BS present.    EXTREMITIES:  2+ Peripheral Pulses, No clubbing, cyanosis, or edema    LYMPH: No lymphadenopathy noted    SKIN: No rashes or lesions    ******************************************************************************************    Consultant(s) Notes Reviewed:  [x ] YES  [ ] NO    Discussed with Consultants/Other Providers [ x] YES     **************************** LABS *******************************************************                  Lactate Trend        CAPILLARY BLOOD GLUCOSE              **************************Active Medications *******************************************  No Known Allergies      apixaban 5 milliGRAM(s) Oral every 12 hours  cefpodoxime 200 milliGRAM(s) Oral every 12 hours  clotrimazole 1% Cream 1 Application(s) Topical <User Schedule>  diltiazem    Tablet 60 milliGRAM(s) Oral every 8 hours  docusate sodium 100 milliGRAM(s) Oral two times a day  DULoxetine 60 milliGRAM(s) Oral daily  furosemide   Injectable 40 milliGRAM(s) IV Push daily  isosorbide   mononitrate ER Tablet (IMDUR) 30 milliGRAM(s) Oral daily  latanoprost 0.005% Ophthalmic Solution 1 Drop(s) Both EYES at bedtime  propranolol 20 milliGRAM(s) Oral two times a day  senna 1 Tablet(s) Oral every 12 hours PRN  tamsulosin 0.4 milliGRAM(s) Oral daily  traZODone 50 milliGRAM(s) Oral at bedtime  zolpidem 5 milliGRAM(s) Oral at bedtime PRN  zolpidem 5 milliGRAM(s) Oral at bedtime PRN      ***************************************************  RADIOLOGY & ADDITIONAL TESTS:    Imaging Personally Reviewed:  [ ] YES  [ ] NO    HEALTH ISSUES - PROBLEM Dx:

## 2018-07-25 NOTE — PROGRESS NOTE ADULT - ATTENDING COMMENTS
Patient seen and examined independently of resident. Case discussed with housestaff, nursing and patient

## 2018-07-25 NOTE — PROGRESS NOTE ADULT - PROVIDER SPECIALTY LIST ADULT
Critical Care
Hospitalist
Internal Medicine
Pulmonology
Hospitalist
Hospitalist

## 2018-07-25 NOTE — PROGRESS NOTE ADULT - ASSESSMENT
IMPRESSION:     SOB/ large left pleural effusion no old CXR, exudative, atypical cells  afib/dvt on eliquis   b/l DVT   UTI         PLAN:    - LASIX PO  - DC PLANNING, IF RECURRENCE PLEURX CATHETER

## 2018-08-02 DIAGNOSIS — J90 PLEURAL EFFUSION, NOT ELSEWHERE CLASSIFIED: ICD-10-CM

## 2018-08-02 DIAGNOSIS — N40.0 BENIGN PROSTATIC HYPERPLASIA WITHOUT LOWER URINARY TRACT SYMPTOMS: ICD-10-CM

## 2018-08-02 DIAGNOSIS — I82.532 CHRONIC EMBOLISM AND THROMBOSIS OF LEFT POPLITEAL VEIN: ICD-10-CM

## 2018-08-02 DIAGNOSIS — F31.9 BIPOLAR DISORDER, UNSPECIFIED: ICD-10-CM

## 2018-08-02 DIAGNOSIS — I50.9 HEART FAILURE, UNSPECIFIED: ICD-10-CM

## 2018-08-02 DIAGNOSIS — N39.0 URINARY TRACT INFECTION, SITE NOT SPECIFIED: ICD-10-CM

## 2018-08-02 DIAGNOSIS — Z79.01 LONG TERM (CURRENT) USE OF ANTICOAGULANTS: ICD-10-CM

## 2018-08-02 DIAGNOSIS — B96.20 UNSPECIFIED ESCHERICHIA COLI [E. COLI] AS THE CAUSE OF DISEASES CLASSIFIED ELSEWHERE: ICD-10-CM

## 2018-08-02 DIAGNOSIS — K59.00 CONSTIPATION, UNSPECIFIED: ICD-10-CM

## 2018-08-02 DIAGNOSIS — Z87.891 PERSONAL HISTORY OF NICOTINE DEPENDENCE: ICD-10-CM

## 2018-08-02 DIAGNOSIS — Z16.24 RESISTANCE TO MULTIPLE ANTIBIOTICS: ICD-10-CM

## 2018-08-02 DIAGNOSIS — G47.00 INSOMNIA, UNSPECIFIED: ICD-10-CM

## 2018-08-02 DIAGNOSIS — J96.01 ACUTE RESPIRATORY FAILURE WITH HYPOXIA: ICD-10-CM

## 2018-08-02 DIAGNOSIS — F32.9 MAJOR DEPRESSIVE DISORDER, SINGLE EPISODE, UNSPECIFIED: ICD-10-CM

## 2018-08-02 DIAGNOSIS — I48.91 UNSPECIFIED ATRIAL FIBRILLATION: ICD-10-CM

## 2018-08-02 DIAGNOSIS — E78.5 HYPERLIPIDEMIA, UNSPECIFIED: ICD-10-CM

## 2018-08-02 DIAGNOSIS — K76.9 LIVER DISEASE, UNSPECIFIED: ICD-10-CM

## 2018-08-02 DIAGNOSIS — R91.8 OTHER NONSPECIFIC ABNORMAL FINDING OF LUNG FIELD: ICD-10-CM

## 2018-08-02 DIAGNOSIS — I11.0 HYPERTENSIVE HEART DISEASE WITH HEART FAILURE: ICD-10-CM

## 2018-08-02 DIAGNOSIS — Z66 DO NOT RESUSCITATE: ICD-10-CM

## 2018-08-02 DIAGNOSIS — I82.513 CHRONIC EMBOLISM AND THROMBOSIS OF FEMORAL VEIN, BILATERAL: ICD-10-CM

## 2018-09-21 ENCOUNTER — INPATIENT (INPATIENT)
Facility: HOSPITAL | Age: 58
LOS: 37 days | Discharge: NURSING, REHAB OR LONG-TERM CARE | End: 2018-10-29
Attending: THORACIC SURGERY (CARDIOTHORACIC VASCULAR SURGERY) | Admitting: THORACIC SURGERY (CARDIOTHORACIC VASCULAR SURGERY)
Payer: MEDICARE

## 2018-09-21 VITALS
WEIGHT: 229.94 LBS | DIASTOLIC BLOOD PRESSURE: 79 MMHG | OXYGEN SATURATION: 100 % | HEART RATE: 114 BPM | SYSTOLIC BLOOD PRESSURE: 122 MMHG | RESPIRATION RATE: 22 BRPM

## 2018-09-21 DIAGNOSIS — Z98.890 OTHER SPECIFIED POSTPROCEDURAL STATES: Chronic | ICD-10-CM

## 2018-09-21 LAB
ALBUMIN SERPL ELPH-MCNC: 3.8 G/DL — SIGNIFICANT CHANGE UP (ref 3.5–5.2)
ALP SERPL-CCNC: 287 U/L — HIGH (ref 30–115)
ALT FLD-CCNC: 34 U/L — SIGNIFICANT CHANGE UP (ref 0–41)
ANION GAP SERPL CALC-SCNC: 17 MMOL/L — HIGH (ref 7–14)
APPEARANCE UR: ABNORMAL
APTT BLD: 36.3 SEC — SIGNIFICANT CHANGE UP (ref 27–39.2)
AST SERPL-CCNC: 16 U/L — SIGNIFICANT CHANGE UP (ref 0–41)
BACTERIA # UR AUTO: ABNORMAL /HPF
BASE EXCESS BLDV CALC-SCNC: 7.9 MMOL/L — HIGH (ref -2–2)
BASOPHILS # BLD AUTO: 0.05 K/UL — SIGNIFICANT CHANGE UP (ref 0–0.2)
BASOPHILS NFR BLD AUTO: 0.4 % — SIGNIFICANT CHANGE UP (ref 0–1)
BILIRUB SERPL-MCNC: 0.3 MG/DL — SIGNIFICANT CHANGE UP (ref 0.2–1.2)
BILIRUB UR-MCNC: NEGATIVE — SIGNIFICANT CHANGE UP
BUN SERPL-MCNC: 15 MG/DL — SIGNIFICANT CHANGE UP (ref 10–20)
CA-I SERPL-SCNC: 1.18 MMOL/L — SIGNIFICANT CHANGE UP (ref 1.12–1.3)
CALCIUM SERPL-MCNC: 8.9 MG/DL — SIGNIFICANT CHANGE UP (ref 8.5–10.1)
CHLORIDE SERPL-SCNC: 91 MMOL/L — LOW (ref 98–110)
CO2 SERPL-SCNC: 28 MMOL/L — SIGNIFICANT CHANGE UP (ref 17–32)
COLOR SPEC: YELLOW — SIGNIFICANT CHANGE UP
CREAT SERPL-MCNC: 1 MG/DL — SIGNIFICANT CHANGE UP (ref 0.7–1.5)
DIFF PNL FLD: ABNORMAL
EOSINOPHIL # BLD AUTO: 0.21 K/UL — SIGNIFICANT CHANGE UP (ref 0–0.7)
EOSINOPHIL NFR BLD AUTO: 1.7 % — SIGNIFICANT CHANGE UP (ref 0–8)
GAS PNL BLDA: SIGNIFICANT CHANGE UP
GAS PNL BLDV: 135 MMOL/L — LOW (ref 136–145)
GAS PNL BLDV: SIGNIFICANT CHANGE UP
GLUCOSE SERPL-MCNC: 113 MG/DL — HIGH (ref 70–99)
GLUCOSE UR QL: NEGATIVE MG/DL — SIGNIFICANT CHANGE UP
HCO3 BLDV-SCNC: 35 MMOL/L — HIGH (ref 22–29)
HCT VFR BLD CALC: 37.8 % — LOW (ref 42–52)
HCT VFR BLDA CALC: 38.4 % — SIGNIFICANT CHANGE UP (ref 34–44)
HGB BLD CALC-MCNC: 12.5 G/DL — LOW (ref 14–18)
HGB BLD-MCNC: 12.1 G/DL — LOW (ref 14–18)
IMM GRANULOCYTES NFR BLD AUTO: 1 % — HIGH (ref 0.1–0.3)
INR BLD: 1.43 RATIO — HIGH (ref 0.65–1.3)
KETONES UR-MCNC: NEGATIVE — SIGNIFICANT CHANGE UP
LACTATE BLDV-MCNC: 0.7 MMOL/L — SIGNIFICANT CHANGE UP (ref 0.5–1.6)
LEUKOCYTE ESTERASE UR-ACNC: NEGATIVE — SIGNIFICANT CHANGE UP
LYMPHOCYTES # BLD AUTO: 0.94 K/UL — LOW (ref 1.2–3.4)
LYMPHOCYTES # BLD AUTO: 7.7 % — LOW (ref 20.5–51.1)
MAGNESIUM SERPL-MCNC: 2 MG/DL — SIGNIFICANT CHANGE UP (ref 1.8–2.4)
MCHC RBC-ENTMCNC: 26.5 PG — LOW (ref 27–31)
MCHC RBC-ENTMCNC: 32 G/DL — SIGNIFICANT CHANGE UP (ref 32–37)
MCV RBC AUTO: 82.7 FL — SIGNIFICANT CHANGE UP (ref 80–94)
MONOCYTES # BLD AUTO: 0.99 K/UL — HIGH (ref 0.1–0.6)
MONOCYTES NFR BLD AUTO: 8.1 % — SIGNIFICANT CHANGE UP (ref 1.7–9.3)
NEUTROPHILS # BLD AUTO: 9.96 K/UL — HIGH (ref 1.4–6.5)
NEUTROPHILS NFR BLD AUTO: 81.1 % — HIGH (ref 42.2–75.2)
NITRITE UR-MCNC: NEGATIVE — SIGNIFICANT CHANGE UP
NRBC # BLD: 0 /100 WBCS — SIGNIFICANT CHANGE UP (ref 0–0)
PCO2 BLDV: 63 MMHG — HIGH (ref 41–51)
PH BLDV: 7.36 — SIGNIFICANT CHANGE UP (ref 7.26–7.43)
PH UR: 6.5 — SIGNIFICANT CHANGE UP (ref 5–8)
PLATELET # BLD AUTO: 454 K/UL — HIGH (ref 130–400)
PO2 BLDV: 30 MMHG — SIGNIFICANT CHANGE UP (ref 20–40)
POTASSIUM BLDV-SCNC: 4.2 MMOL/L — SIGNIFICANT CHANGE UP (ref 3.3–5.6)
POTASSIUM SERPL-MCNC: 4.6 MMOL/L — SIGNIFICANT CHANGE UP (ref 3.5–5)
POTASSIUM SERPL-SCNC: 4.6 MMOL/L — SIGNIFICANT CHANGE UP (ref 3.5–5)
PROT SERPL-MCNC: 6.2 G/DL — SIGNIFICANT CHANGE UP (ref 6–8)
PROT UR-MCNC: 100 MG/DL
PROTHROM AB SERPL-ACNC: 15.4 SEC — HIGH (ref 9.95–12.87)
RBC # BLD: 4.57 M/UL — LOW (ref 4.7–6.1)
RBC # FLD: 15 % — HIGH (ref 11.5–14.5)
RBC CASTS # UR COMP ASSIST: ABNORMAL /HPF
SAO2 % BLDV: 50 % — SIGNIFICANT CHANGE UP
SODIUM SERPL-SCNC: 136 MMOL/L — SIGNIFICANT CHANGE UP (ref 135–146)
SP GR SPEC: 1.02 — SIGNIFICANT CHANGE UP (ref 1.01–1.03)
TROPONIN T SERPL-MCNC: <0.01 NG/ML — SIGNIFICANT CHANGE UP
UROBILINOGEN FLD QL: 0.2 MG/DL — SIGNIFICANT CHANGE UP (ref 0.2–0.2)
WBC # BLD: 12.27 K/UL — HIGH (ref 4.8–10.8)
WBC # FLD AUTO: 12.27 K/UL — HIGH (ref 4.8–10.8)

## 2018-09-21 RX ORDER — PROPRANOLOL HCL 160 MG
1 CAPSULE, EXTENDED RELEASE 24HR ORAL
Qty: 0 | Refills: 0 | COMMUNITY

## 2018-09-21 RX ORDER — ZOLPIDEM TARTRATE 10 MG/1
1 TABLET ORAL
Qty: 0 | Refills: 0 | COMMUNITY

## 2018-09-21 RX ORDER — ACETAMINOPHEN 500 MG
650 TABLET ORAL EVERY 6 HOURS
Qty: 0 | Refills: 0 | Status: DISCONTINUED | OUTPATIENT
Start: 2018-09-21 | End: 2018-10-10

## 2018-09-21 RX ORDER — LATANOPROST 0.05 MG/ML
1 SOLUTION/ DROPS OPHTHALMIC; TOPICAL
Qty: 0 | Refills: 0 | COMMUNITY

## 2018-09-21 RX ORDER — TAMSULOSIN HYDROCHLORIDE 0.4 MG/1
1 CAPSULE ORAL
Qty: 0 | Refills: 0 | COMMUNITY

## 2018-09-21 RX ORDER — DULOXETINE HYDROCHLORIDE 30 MG/1
1 CAPSULE, DELAYED RELEASE ORAL
Qty: 0 | Refills: 0 | COMMUNITY

## 2018-09-21 RX ORDER — DULOXETINE HYDROCHLORIDE 30 MG/1
60 CAPSULE, DELAYED RELEASE ORAL DAILY
Qty: 0 | Refills: 0 | Status: DISCONTINUED | OUTPATIENT
Start: 2018-09-21 | End: 2018-10-10

## 2018-09-21 RX ORDER — LATANOPROST 0.05 MG/ML
1 SOLUTION/ DROPS OPHTHALMIC; TOPICAL AT BEDTIME
Qty: 0 | Refills: 0 | Status: DISCONTINUED | OUTPATIENT
Start: 2018-09-21 | End: 2018-10-10

## 2018-09-21 RX ORDER — FUROSEMIDE 40 MG
40 TABLET ORAL DAILY
Qty: 0 | Refills: 0 | Status: DISCONTINUED | OUTPATIENT
Start: 2018-09-21 | End: 2018-10-10

## 2018-09-21 RX ORDER — TAMSULOSIN HYDROCHLORIDE 0.4 MG/1
0.4 CAPSULE ORAL AT BEDTIME
Qty: 0 | Refills: 0 | Status: DISCONTINUED | OUTPATIENT
Start: 2018-09-21 | End: 2018-10-10

## 2018-09-21 RX ORDER — ZOLPIDEM TARTRATE 10 MG/1
5 TABLET ORAL AT BEDTIME
Qty: 0 | Refills: 0 | Status: DISCONTINUED | OUTPATIENT
Start: 2018-09-21 | End: 2018-09-26

## 2018-09-21 RX ORDER — TRAZODONE HCL 50 MG
50 TABLET ORAL AT BEDTIME
Qty: 0 | Refills: 0 | Status: DISCONTINUED | OUTPATIENT
Start: 2018-09-21 | End: 2018-10-10

## 2018-09-21 RX ORDER — ISOSORBIDE MONONITRATE 60 MG/1
1 TABLET, EXTENDED RELEASE ORAL
Qty: 0 | Refills: 0 | COMMUNITY

## 2018-09-21 RX ORDER — DILTIAZEM HCL 120 MG
1 CAPSULE, EXT RELEASE 24 HR ORAL
Qty: 0 | Refills: 0 | COMMUNITY

## 2018-09-21 RX ORDER — ZOLPIDEM TARTRATE 10 MG/1
5 TABLET ORAL AT BEDTIME
Qty: 0 | Refills: 0 | Status: DISCONTINUED | OUTPATIENT
Start: 2018-09-21 | End: 2018-09-28

## 2018-09-21 RX ADMIN — Medication 40 MILLIGRAM(S): at 20:25

## 2018-09-21 RX ADMIN — LATANOPROST 1 DROP(S): 0.05 SOLUTION/ DROPS OPHTHALMIC; TOPICAL at 23:02

## 2018-09-21 NOTE — H&P ADULT - RS GEN PE MLT RESP DETAILS PC
airway patent/diminished breath sounds, L/good air movement/clear to auscultation bilaterally/diminished breath sounds, R

## 2018-09-21 NOTE — ED PROVIDER NOTE - ATTENDING CONTRIBUTION TO CARE
57yo M BIBEMS from [facility]/rehab with PMH of HTN, BPH, DVT, diverticulitis, recurrent pleural effusions s/p thoracentesis, patient sent in from facility for sob, no n/v/d, no loc, no fever. Patient states it feels like usual buildup. Started a few days ago and worsened gradually    CONSTITUTIONAL: Well-developed; well-nourished; + on ORB by ems, + respiratory distress with RR at 40.  Sitting up and providing appropriate history and physical examination  SKIN: skin exam is warm and dry, no acute rash.  HEAD: Normocephalic; atraumatic.  EYES: PERRL, 3 mm bilateral, no nystagmus, EOM intact; conjunctiva and sclera clear.  ENT: No nasal discharge; airway clear.  NECK: Supple; non tender. + full passive ROM in all directions. No JVD  CARD: S1, S2 normal; no murmurs, gallops, or rubs. Regular rate and rhythm. + Symmetric Strong Pulses  RESP: No wheezes, + right greater than left bibasilar rales + bilateral expiratory rhonchi, + diminished air movement   ABD: soft; non-distended; non-tender. No Rebound, No Guarding, No signs of peritonitis, No CVA tenderness. No pulsatile abdominal mass. + Strong and Symmetric Pulses  EXT: Normal ROM. No clubbing, cyanosis, + 2 edema bilateral LE. Dp and Pt Pulses intact. Cap refill less than 3 seconds  NEURO:  Alert, oriented, grossly unremarkable. No Focal deficits. GCS 15. NIH 0  PSYCH: Cooperative, appropriate.       Patient immediately started on bipap upon arrival. Patient symptoms improved, noted to have a large effusion, given lasix.

## 2018-09-21 NOTE — H&P ADULT - ASSESSMENT
59 yo M with HTN, DLD, depression, DVT on eliquis, and recurrent pleural effusions s/p pigtail insertion back in july, s/p thoracentesis 3 days PTP in [facility] presenting from [facility] for worsening SOB x3 days     - Bilateral Pleural Effusions  recurrent, exudative in nature  s/p thoracentesis x2   ru underlying malignancy vs infection vs volume overload  no clinical evidence of PNA. will hold on abxs for now  last 2d echo: Normal EF  will need denver catheter insertion given recurrence  lasix 40 iv daily  hold eliquis  c/w Bipap for now. pt looks comfortable and pulling good volumes       - Borderline BP:  Hold BB meds for now  keep Is=Os     - H/o DVt  fu venous duplex  hold eliquis for procedure     - BPH  c/w flomax     - depression  c/w home meds     - DNR/ DNI 59 yo M with HTN, DLD, depression, DVT on eliquis, and recurrent pleural effusions s/p pigtail insertion back in july, s/p thoracentesis 3 days PTP in [facility] presenting from [facility] for worsening SOB x3 days     - AHRF 2/2 Bilateral Pleural Effusions  recurrent, exudative in nature  s/p thoracentesis x2   ru underlying malignancy vs infection vs volume overload  no clinical evidence of PNA. will hold on abxs for now  last 2d echo: Normal EF  will need denver catheter insertion given recurrence  lasix 40 iv daily  hold eliquis  c/w Bipap for now. pt looks comfortable and pulling good volumes       - Borderline BP:  Hold BB meds for now  keep Is=Os     - H/o DVt  fu venous duplex  hold eliquis for procedure     - BPH  c/w flomax     - depression  c/w home meds     - DNR/ DNI 57 yo M with HTN, DLD, depression, DVT on eliquis, and recurrent pleural effusions s/p pigtail insertion back in july, s/p thoracentesis 3 days PTP in [facility] presenting from [facility] for worsening SOB x3 days     - AHRF 2/2 Bilateral Pleural Effusions  recurrent, exudative in nature  s/p thoracentesis x2   ru underlying malignancy vs infection vs volume overload  no clinical evidence of PNA. will hold on abxs for now  last 2d echo: Normal EF  will need denver catheter insertion given recurrence  lasix 40 iv daily  hold eliquis  c/w Bipap for now. pt looks comfortable and pulling good volumes       - Borderline BP:  Hold BB meds for now  keep Is=Os     - Abdominal distention/ constipation  persistent  colace/ senna  CT a/p on prior admission negative for any pathology     - H/o DVt  fu venous duplex  hold eliquis for procedure     - BPH  c/w flomax     - depression  c/w home meds     - DNR/ DNI

## 2018-09-21 NOTE — ED PROVIDER NOTE - NS ED ROS FT
Constitutional:  No fevers or chills.  Eyes:  No visual changes, eye pain, or discharge.  ENT:  No sore throat.  Neck:  No neck pain.  Cardiac:  No CP or edema.  Resp:  +SOB. No hemoptysis.   GI:  No nausea, vomiting, diarrhea. Mild abdominal pain.  :  No dysuria, frequency, or hematuria.  MSK:  No myalgias or joint pain/swelling.  Neuro:  No headache, dizziness, or weakness.  Skin:  No skin rash. Constitutional:  No fevers or chills.  Eyes:  No visual changes, eye pain, or discharge.  ENT:  No sore throat.  Neck:  No neck pain.  Cardiac:  No CP or edema.  Resp:  +SOB. No hemoptysis.   GI:  No nausea, vomiting, diarrhea. Mild diffuse abdominal pain.  :  No dysuria, frequency, or hematuria.  MSK:  No myalgias or joint pain/swelling.  Neuro:  No headache, dizziness, or weakness.  Skin:  No skin rash.

## 2018-09-21 NOTE — ED PROVIDER NOTE - PHYSICAL EXAMINATION
PHYSICAL EXAM: I have reviewed current vital signs.  GENERAL: NAD.  HEAD:  Normocephalic, atraumatic.  EYES: Conjunctiva and sclera clear, left lazy eye.  ENT: MMM, no erythema/exudates.  NECK: Supple, no JVD.  CHEST/LUNG: Mild decreased lung sounds in bilateral bases. No wheezes, rales, or rhonchi.  HEART: Regular rate and rhythm, normal S1 and S2; no murmurs, rubs, or gallops.  ABDOMEN: Distended tense abdomen, no peritoneal signs, no TTP; bowel sounds present.  EXTREMITIES:  2+ peripheral pulses; trace edema bilaterally.  NEUROLOGY: AAO x 3. Motor 5/5. Sensory intact. No focal neurological deficits.   SKIN: No rashes or lesions. PHYSICAL EXAM: I have reviewed current vital signs.  GENERAL: +Resp distress with tachypnea 30s-40s, unable to lay flat.  HEAD:  Normocephalic, atraumatic.  EYES: Conjunctiva and sclera clear, left amblyopia.  ENT: MMM, no erythema/exudates.  NECK: Supple, no JVD.  CHEST/LUNG: Mild decreased lung sounds in bilateral bases with rales, expiratory rhonchi bilaterally. No wheezes. Able to speak in full sentences.  HEART: Tachycardic, regular rhythm, normal S1 and S2; no murmurs, rubs, or gallops.  ABDOMEN: Distended abdomen, no peritoneal signs, no TTP; bowel sounds present.  EXTREMITIES:  2+ peripheral pulses; trace edema bilaterally.  NEUROLOGY: AAO x 3. Motor 5/5. No focal neurological deficits.   SKIN: No rashes or lesions. PHYSICAL EXAM: I have reviewed current vital signs.  GENERAL: +Resp distress with tachypnea 30s-40s, unable to ly flat.  HEAD:  Normocephalic, atraumatic.  EYES: Conjunctiva and sclera clear, left amblyopia.  ENT: MMM, no erythema/exudates.  NECK: Supple, no JVD.  CHEST/LUNG: Mild decreased lung sounds in bilateral bases with rales, expiratory rhonchi bilaterally. No wheezes. Able to speak in full sentences.  HEART: Tachycardic, regular rhythm, normal S1 and S2; no murmurs, rubs, or gallops.  ABDOMEN: Distended abdomen, no peritoneal signs, no TTP; bowel sounds present.  EXTREMITIES:  2+ peripheral pulses; trace edema bilaterally.  NEUROLOGY: AAO x 3. Motor 5/5. No focal neurological deficits.   SKIN: No rashes or lesions.

## 2018-09-21 NOTE — ED PROVIDER NOTE - PROGRESS NOTE DETAILS
Patient was placed on BIPAP shortly after his arrival in the ED. Feeling much better, O2 sat %. Airway cart placed at bedside for precautions. approved for icu by vish

## 2018-09-21 NOTE — H&P ADULT - HISTORY OF PRESENT ILLNESS
59 yo M with HTN, DLD, depression, DVT on eliquis, and recurrent pleural effusions s/p multiple thoracentesis presenting from [facility] for worsening SOB since discharge from [facility]. Pt denies any fever/ chills. He reports intermittent non productive cough. No chest pain.  Pt also c/o persistent diffuse abdominal pain along with distension, constipation and flatus.   Pt denies any other symptoms  To note, pt is very poor historian and can't confirm his medications    Pt was admitted for same complaint back in july and was found to have large L pleural effusion s/p pigtail insertion for 3 days with resolution of effusion. pleural studies at that time were consistent with exudative fluid, + atypical cell but no malignant cells.  Pt was admitted last week to [facility] for SOB and had a L thoracentesis 3 days PTP

## 2018-09-21 NOTE — H&P ADULT - NSHPLABSRESULTS_GEN_ALL_CORE
12.1   12.27 )-----------( 454      ( 21 Sep 2018 19:16 )             37.8       09-21    136  |  91<L>  |  15  ----------------------------<  113<H>  4.6   |  28  |  1.0    Ca    8.9      21 Sep 2018 19:16  Mg     2.0     09-21    TPro  6.2  /  Alb  3.8  /  TBili  0.3  /  DBili  x   /  AST  16  /  ALT  34  /  AlkPhos  287<H>  09-21      PT/INR - ( 21 Sep 2018 19:16 )   PT: 15.40 sec;   INR: 1.43 ratio         PTT - ( 21 Sep 2018 19:16 )  PTT:36.3 sec      CARDIAC MARKERS ( 21 Sep 2018 19:16 )  x     / <0.01 ng/mL / x     / x     / x          CXR: bilateral pleural effusions         fu official report      Transthoracic Echocardiogram (07.18.18  normal global left ventricular systolic function. 12.1   12.27 )-----------( 454      ( 21 Sep 2018 19:16 )             37.8       09-21    136  |  91<L>  |  15  ----------------------------<  113<H>  4.6   |  28  |  1.0    Ca    8.9      21 Sep 2018 19:16  Mg     2.0     09-21    TPro  6.2  /  Alb  3.8  /  TBili  0.3  /  DBili  x   /  AST  16  /  ALT  34  /  AlkPhos  287<H>  09-21      PT/INR - ( 21 Sep 2018 19:16 )   PT: 15.40 sec;   INR: 1.43 ratio         PTT - ( 21 Sep 2018 19:16 )  PTT:36.3 sec      CARDIAC MARKERS ( 21 Sep 2018 19:16 )  x     / <0.01 ng/mL / x     / x     / x          Blood Gas Profile - Arterial (09.21.18 @ 20:58)    pH, Arterial: 7.38    pCO2, Arterial: 59 mmHg    pO2, Arterial: 25 mmHg    HCO3, Arterial: 35 mmoL/L    Base Excess, Arterial: 7.1 mmoL/L        CXR: bilateral pleural effusions         fu official report    CT Chest w/ IV Cont (07.21.18 @ 13:29) >  -Moderate right and small left pleural effusions, with a pigtail catheter   in the left lung base. Correlate with cytology of the pleural effusion.  -Consolidation in the right lower lobe. Differential includes atelectasis   and pneumonia.   -Thickening of interlobular septa and peribronchial edema/thickening at   bilateral marimar, findings suggestive of fluid overload/pulmonary venous   congestion.  -No mediastinal or hilar lymphadenopathy.        Transthoracic Echocardiogram (07.18.18  normal global left ventricular systolic function.

## 2018-09-21 NOTE — ED PROVIDER NOTE - OBJECTIVE STATEMENT
57yo M BIBEMS from [facility]/rehab with PMH of HTN, BPH, DVT, diverticulitis, recurrent pleural effusions s/p thoracentesis, and MDD p/w increased SOB that started today. Patient states he is normally on a couple L of O2 at home. Prior hx of DVT in April 2018, currently on Eliquis. His last thoracentesis was on Tuesday this week. Denies any f/c, CP, back pain, injuries/trauma, or bloody stool. Endorses intermittent swelling in legs, orthopnea, and mild epigastric abdominal pain. Denies history of intubations in the past. 57[facility] with PMH of HTN, BPH, DVT, diverticulitis, recurrent pleural effusions s/p thoracentesis, and MDD p/w increased SOB that started today. Patient states he is normally on a couple L of O2 at home. Prior hx of DVT in April 2018, currently on Eliquis. His last thoracentesis was on Tuesday this week. Denies any f/c, CP, back pain, injuries/trauma, or bloody stool. Endorses intermittent swelling in legs, orthopnea, and abdominal distention for quite some time. No prior hx of paracentesis. Denies history of intubations in the past. 59yo M BIBEMS from [facility]/rehab with PMH of HTN, BPH, DVT, diverticulitis, recurrent pleural effusions s/p thoracentesis, and MDD p/w increased SOB that gradually worsened over the last few days. Patient states he is normally on a couple L of O2 at home. Prior hx of DVT in April 2018, currently on Eliquis. His last thoracentesis was on Tuesday this week. Denies any f/c, CP, back pain, injuries/trauma, or bloody stool. Endorses intermittent swelling in legs, orthopnea, and abdominal distention for quite some time. No prior hx of paracentesis. Denies history of intubations in the past. 59yo M BIBEMS from [facility]/rehab with PMH of HTN, BPH, DVT, diverticulitis, recurrent pleural effusions (unknown exact reason why) s/p thoracentesis, and MDD p/w increased SOB that gradually worsened over the last few days. Patient states he is normally on a couple L of O2 at home. Prior hx of DVT in April 2018, currently on Eliquis. His last thoracentesis was on Tuesday this week. Denies any f/c, CP, back pain, injuries/trauma, or bloody stool. Endorses intermittent swelling in legs, orthopnea, and abdominal distention for quite some time. No prior hx of paracentesis. Denies history of intubations in the past.

## 2018-09-21 NOTE — H&P ADULT - PMH
Bipolar 1 disorder    BPH (benign prostatic hyperplasia)    CHF (congestive heart failure)    Depression    HTN (hypertension)    Insomnia    Pleural effusion

## 2018-09-21 NOTE — ED ADULT NURSE NOTE - NSIMPLEMENTINTERV_GEN_ALL_ED
Implemented All Fall with Harm Risk Interventions:  [place] to call system. Call bell, personal items and telephone within reach. Instruct patient to call for assistance. Room bathroom lighting operational. Non-slip footwear when patient is off stretcher. Physically safe environment: no spills, clutter or unnecessary equipment. Stretcher in lowest position, wheels locked, appropriate side rails in place. Provide visual cue, wrist band, yellow gown, etc. Monitor gait and stability. Monitor for mental status changes and reorient to person, place, and time. Review medications for side effects contributing to fall risk. Reinforce activity limits and safety measures with patient and family. Provide visual clues: red socks.

## 2018-09-21 NOTE — ED PROVIDER NOTE - MEDICAL DECISION MAKING DETAILS
I personally evaluated the patient. I reviewed the Resident’s or Physician Assistant’s note (as assigned above), and agree with the findings and plan except as documented in my note. Patient admitted to the icu, not a good candidate for thoracentesis at the moment due to anticoagulation therapy ( last dose earlier today). Patient feels much better post bipap

## 2018-09-21 NOTE — H&P ADULT - NSHPSOCIALHISTORY_GEN_ALL_CORE
former heavy smoker  no alcohol or drug abuse    pt poor historian, couldn't obtain detailed family and social history

## 2018-09-22 PROBLEM — F32.9 MAJOR DEPRESSIVE DISORDER, SINGLE EPISODE, UNSPECIFIED: Chronic | Status: ACTIVE | Noted: 2018-07-18

## 2018-09-22 PROBLEM — N40.0 BENIGN PROSTATIC HYPERPLASIA WITHOUT LOWER URINARY TRACT SYMPTOMS: Chronic | Status: ACTIVE | Noted: 2018-07-18

## 2018-09-22 PROBLEM — I10 ESSENTIAL (PRIMARY) HYPERTENSION: Chronic | Status: ACTIVE | Noted: 2018-07-18

## 2018-09-22 PROBLEM — I50.9 HEART FAILURE, UNSPECIFIED: Chronic | Status: ACTIVE | Noted: 2018-07-18

## 2018-09-22 PROBLEM — G47.00 INSOMNIA, UNSPECIFIED: Chronic | Status: ACTIVE | Noted: 2018-07-18

## 2018-09-22 PROBLEM — F31.9 BIPOLAR DISORDER, UNSPECIFIED: Chronic | Status: ACTIVE | Noted: 2018-07-18

## 2018-09-22 LAB
ANION GAP SERPL CALC-SCNC: 13 MMOL/L — SIGNIFICANT CHANGE UP (ref 7–14)
APTT BLD: 37.2 SEC — SIGNIFICANT CHANGE UP (ref 27–39.2)
BASOPHILS # BLD AUTO: 0.05 K/UL — SIGNIFICANT CHANGE UP (ref 0–0.2)
BASOPHILS NFR BLD AUTO: 0.5 % — SIGNIFICANT CHANGE UP (ref 0–1)
BUN SERPL-MCNC: 15 MG/DL — SIGNIFICANT CHANGE UP (ref 10–20)
CALCIUM SERPL-MCNC: 8.8 MG/DL — SIGNIFICANT CHANGE UP (ref 8.5–10.1)
CHLORIDE SERPL-SCNC: 95 MMOL/L — LOW (ref 98–110)
CO2 SERPL-SCNC: 29 MMOL/L — SIGNIFICANT CHANGE UP (ref 17–32)
CREAT SERPL-MCNC: 0.8 MG/DL — SIGNIFICANT CHANGE UP (ref 0.7–1.5)
EOSINOPHIL # BLD AUTO: 0.24 K/UL — SIGNIFICANT CHANGE UP (ref 0–0.7)
EOSINOPHIL NFR BLD AUTO: 2.5 % — SIGNIFICANT CHANGE UP (ref 0–8)
GLUCOSE SERPL-MCNC: 111 MG/DL — HIGH (ref 70–99)
HCT VFR BLD CALC: 36.4 % — LOW (ref 42–52)
HGB BLD-MCNC: 11.6 G/DL — LOW (ref 14–18)
IMM GRANULOCYTES NFR BLD AUTO: 0.7 % — HIGH (ref 0.1–0.3)
INR BLD: 1.42 RATIO — HIGH (ref 0.65–1.3)
LYMPHOCYTES # BLD AUTO: 1.08 K/UL — LOW (ref 1.2–3.4)
LYMPHOCYTES # BLD AUTO: 11.4 % — LOW (ref 20.5–51.1)
MCHC RBC-ENTMCNC: 26.6 PG — LOW (ref 27–31)
MCHC RBC-ENTMCNC: 31.9 G/DL — LOW (ref 32–37)
MCV RBC AUTO: 83.5 FL — SIGNIFICANT CHANGE UP (ref 80–94)
MONOCYTES # BLD AUTO: 0.76 K/UL — HIGH (ref 0.1–0.6)
MONOCYTES NFR BLD AUTO: 8 % — SIGNIFICANT CHANGE UP (ref 1.7–9.3)
NEUTROPHILS # BLD AUTO: 7.29 K/UL — HIGH (ref 1.4–6.5)
NEUTROPHILS NFR BLD AUTO: 76.9 % — HIGH (ref 42.2–75.2)
NT-PROBNP SERPL-SCNC: 189 PG/ML — SIGNIFICANT CHANGE UP (ref 0–300)
PLATELET # BLD AUTO: 398 K/UL — SIGNIFICANT CHANGE UP (ref 130–400)
POTASSIUM SERPL-MCNC: 4.9 MMOL/L — SIGNIFICANT CHANGE UP (ref 3.5–5)
POTASSIUM SERPL-SCNC: 4.9 MMOL/L — SIGNIFICANT CHANGE UP (ref 3.5–5)
PROTHROM AB SERPL-ACNC: 15.3 SEC — HIGH (ref 9.95–12.87)
RBC # BLD: 4.36 M/UL — LOW (ref 4.7–6.1)
RBC # FLD: 15.1 % — HIGH (ref 11.5–14.5)
SODIUM SERPL-SCNC: 137 MMOL/L — SIGNIFICANT CHANGE UP (ref 135–146)
WBC # BLD: 9.49 K/UL — SIGNIFICANT CHANGE UP (ref 4.8–10.8)
WBC # FLD AUTO: 9.49 K/UL — SIGNIFICANT CHANGE UP (ref 4.8–10.8)

## 2018-09-22 PROCEDURE — 93970 EXTREMITY STUDY: CPT | Mod: 26

## 2018-09-22 RX ORDER — MORPHINE SULFATE 50 MG/1
2 CAPSULE, EXTENDED RELEASE ORAL ONCE
Qty: 0 | Refills: 0 | Status: DISCONTINUED | OUTPATIENT
Start: 2018-09-22 | End: 2018-09-22

## 2018-09-22 RX ORDER — SENNA PLUS 8.6 MG/1
2 TABLET ORAL AT BEDTIME
Qty: 0 | Refills: 0 | Status: DISCONTINUED | OUTPATIENT
Start: 2018-09-22 | End: 2018-10-10

## 2018-09-22 RX ORDER — MORPHINE SULFATE 50 MG/1
2 CAPSULE, EXTENDED RELEASE ORAL EVERY 4 HOURS
Qty: 0 | Refills: 0 | Status: DISCONTINUED | OUTPATIENT
Start: 2018-09-22 | End: 2018-09-29

## 2018-09-22 RX ORDER — POTASSIUM CHLORIDE 20 MEQ
20 PACKET (EA) ORAL ONCE
Qty: 0 | Refills: 0 | Status: DISCONTINUED | OUTPATIENT
Start: 2018-09-22 | End: 2018-09-22

## 2018-09-22 RX ORDER — DOCUSATE SODIUM 100 MG
100 CAPSULE ORAL THREE TIMES A DAY
Qty: 0 | Refills: 0 | Status: DISCONTINUED | OUTPATIENT
Start: 2018-09-22 | End: 2018-10-10

## 2018-09-22 RX ORDER — MORPHINE SULFATE 50 MG/1
2 CAPSULE, EXTENDED RELEASE ORAL EVERY 6 HOURS
Qty: 0 | Refills: 0 | Status: DISCONTINUED | OUTPATIENT
Start: 2018-09-22 | End: 2018-09-22

## 2018-09-22 RX ADMIN — Medication 100 MILLIGRAM(S): at 14:42

## 2018-09-22 RX ADMIN — MORPHINE SULFATE 2 MILLIGRAM(S): 50 CAPSULE, EXTENDED RELEASE ORAL at 16:25

## 2018-09-22 RX ADMIN — MORPHINE SULFATE 2 MILLIGRAM(S): 50 CAPSULE, EXTENDED RELEASE ORAL at 09:01

## 2018-09-22 RX ADMIN — Medication 650 MILLIGRAM(S): at 10:20

## 2018-09-22 RX ADMIN — MORPHINE SULFATE 2 MILLIGRAM(S): 50 CAPSULE, EXTENDED RELEASE ORAL at 05:28

## 2018-09-22 RX ADMIN — LATANOPROST 1 DROP(S): 0.05 SOLUTION/ DROPS OPHTHALMIC; TOPICAL at 20:48

## 2018-09-22 RX ADMIN — DULOXETINE HYDROCHLORIDE 60 MILLIGRAM(S): 30 CAPSULE, DELAYED RELEASE ORAL at 12:26

## 2018-09-22 RX ADMIN — MORPHINE SULFATE 2 MILLIGRAM(S): 50 CAPSULE, EXTENDED RELEASE ORAL at 14:20

## 2018-09-22 RX ADMIN — Medication 50 MILLIGRAM(S): at 20:47

## 2018-09-22 RX ADMIN — ZOLPIDEM TARTRATE 5 MILLIGRAM(S): 10 TABLET ORAL at 20:46

## 2018-09-22 RX ADMIN — TAMSULOSIN HYDROCHLORIDE 0.4 MILLIGRAM(S): 0.4 CAPSULE ORAL at 20:47

## 2018-09-22 RX ADMIN — MORPHINE SULFATE 2 MILLIGRAM(S): 50 CAPSULE, EXTENDED RELEASE ORAL at 20:21

## 2018-09-22 RX ADMIN — Medication 100 MILLIGRAM(S): at 20:46

## 2018-09-22 RX ADMIN — MORPHINE SULFATE 2 MILLIGRAM(S): 50 CAPSULE, EXTENDED RELEASE ORAL at 13:03

## 2018-09-22 RX ADMIN — MORPHINE SULFATE 2 MILLIGRAM(S): 50 CAPSULE, EXTENDED RELEASE ORAL at 09:16

## 2018-09-22 RX ADMIN — MORPHINE SULFATE 2 MILLIGRAM(S): 50 CAPSULE, EXTENDED RELEASE ORAL at 14:00

## 2018-09-22 RX ADMIN — MORPHINE SULFATE 2 MILLIGRAM(S): 50 CAPSULE, EXTENDED RELEASE ORAL at 01:27

## 2018-09-22 RX ADMIN — ZOLPIDEM TARTRATE 5 MILLIGRAM(S): 10 TABLET ORAL at 14:20

## 2018-09-22 RX ADMIN — MORPHINE SULFATE 2 MILLIGRAM(S): 50 CAPSULE, EXTENDED RELEASE ORAL at 20:49

## 2018-09-22 RX ADMIN — Medication 650 MILLIGRAM(S): at 09:43

## 2018-09-22 RX ADMIN — SENNA PLUS 2 TABLET(S): 8.6 TABLET ORAL at 20:46

## 2018-09-22 RX ADMIN — MORPHINE SULFATE 2 MILLIGRAM(S): 50 CAPSULE, EXTENDED RELEASE ORAL at 17:00

## 2018-09-22 RX ADMIN — MORPHINE SULFATE 2 MILLIGRAM(S): 50 CAPSULE, EXTENDED RELEASE ORAL at 06:30

## 2018-09-22 NOTE — CONSULT NOTE ADULT - ASSESSMENT
57 yo M with HTN, DLD, depression, DVT on eliquis, and recurrent pleural effusions s/p pigtail insertion back in july, s/p thoracentesis 3 days PTP in [facility] presenting from [facility] for worsening SOB x3 days    # AHRF 2/2 Bilateral Pleural Effusions  recurrent, exudative in nature  rule out underlying malignancy vs infection vs volume overload  no clinical evidence of PNA. will hold on abxs for now  last 2d echo: Normal EF  lasix 40 iv daily  hold eliquis  -Plan for thoracentesis today.     # Borderline BP:  Hold BB meds for now  keep Is=Os    # Abdominal distention/ constipation  persistent  colace/ senna  CT a/p on prior admission negative for any pathology    # H/o DVt  fu venous duplex  hold eliquis for procedure    # BPH  c/w flomax    # depression  c/w home meds    # DNR/ DNI 59 yo M with HTN, DLD, depression, DVT on eliquis, and recurrent pleural effusions s/p pigtail insertion back in july, s/p thoracentesis 3 days PTP in [facility] presenting from [facility] for worsening SOB x3 days    # AHRF 2/2 Bilateral Pleural Effusions  recurrent, exudative in nature  rule out underlying malignancy vs infection vs volume overload  no clinical evidence of PNA. will hold on abxs for now  last 2d echo: Normal EF  lasix 40 iv daily  hold eliquis  -Plan for thoracentesis tomorrow as patient was on Eliquis.  Pt not acutely in respiratory distress.     # Borderline BP:  Hold BB meds for now  keep Is=Os    # Abdominal distention/ constipation  persistent  colace/ senna  CT a/p on prior admission negative for any pathology    # H/o DVt  fu venous duplex  hold eliquis for procedure    # BPH  c/w flomax    # depression  c/w home meds    # DNR/ DNI

## 2018-09-22 NOTE — PROGRESS NOTE ADULT - SUBJECTIVE AND OBJECTIVE BOX
SUBJECTIVE:     Reason for Admission: worsening SOB	  History of Present Illness: 	  59 yo M with HTN, DLD, depression, DVT on eliquis, and recurrent pleural effusions s/p multiple thoracentesis presenting from [facility] for worsening SOB since discharge from [facility]. Pt denies any fever/ chills. He reports intermittent non productive cough. No chest pain.  Pt also c/o persistent diffuse abdominal pain along with distension, constipation and flatus.   Pt denies any other symptoms  To note, pt is very poor historian and can't confirm his medications    Pt was admitted for same complaint back in july and was found to have large L pleural effusion s/p pigtail insertion for 3 days with resolution of effusion. pleural studies at that time were consistent with exudative fluid, + atypical cell but no malignant cells.  Pt was admitted last week to [facility] for SOB and had a L thoracentesis 3 days PTP    Today is hospital day 1d. Overnight      PAST MEDICAL & SURGICAL HISTORY  Pleural effusion  CHF (congestive heart failure)  Insomnia  BPH (benign prostatic hyperplasia)  Depression  Bipolar 1 disorder  HTN (hypertension)  History of thoracentesis    SOCIAL HISTORY:  Negative for smoking/alcohol/drug use.     ALLERGIES:  No Known Allergies    MEDICATIONS:  STANDING MEDICATIONS  DULoxetine 60 milliGRAM(s) Oral daily  furosemide   Injectable 40 milliGRAM(s) IV Push daily  latanoprost 0.005% Ophthalmic Solution 1 Drop(s) Both EYES at bedtime  tamsulosin 0.4 milliGRAM(s) Oral at bedtime  traZODone 50 milliGRAM(s) Oral at bedtime    PRN MEDICATIONS  acetaminophen   Tablet .. 650 milliGRAM(s) Oral every 6 hours PRN  morphine  - Injectable 2 milliGRAM(s) IV Push every 6 hours PRN  zolpidem 5 milliGRAM(s) Oral at bedtime PRN  zolpidem 5 milliGRAM(s) Oral at bedtime PRN    VITALS:   T(F): 96.1  HR: 100  BP: 106/78  RR: 26  SpO2: 98%    LABS:                        12.1   12.27 )-----------( 454      ( 21 Sep 2018 19:16 )             37.8     09-21    136  |  91<L>  |  15  ----------------------------<  113<H>  4.6   |  28  |  1.0    Ca    8.9      21 Sep 2018 19:16  Mg     2.0         TPro  6.2  /  Alb  3.8  /  TBili  0.3  /  DBili  x   /  AST  16  /  ALT  34  /  AlkPhos  287<H>      PT/INR - ( 21 Sep 2018 19:16 )   PT: 15.40 sec;   INR: 1.43 ratio         PTT - ( 21 Sep 2018 19:16 )  PTT:36.3 sec  Urinalysis Basic - ( 21 Sep 2018 19:15 )    Color: Yellow / Appearance: Cloudy / S.020 / pH: x  Gluc: x / Ketone: Negative  / Bili: Negative / Urobili: 0.2 mg/dL   Blood: x / Protein: 100 mg/dL / Nitrite: Negative   Leuk Esterase: Negative / RBC: 10-25 /HPF / WBC x   Sq Epi: x / Non Sq Epi: x / Bacteria: Moderate /HPF      ABG - ( 21 Sep 2018 20:58 )  pH, Arterial: 7.38  pH, Blood: x     /  pCO2: 59    /  pO2: 25    / HCO3: 35    / Base Excess: 7.1   /  SaO2: 41                Troponin T, Serum: <0.01 ng/mL (18 @ 19:16)      CARDIAC MARKERS ( 21 Sep 2018 19:16 )  x     / <0.01 ng/mL / x     / x     / x          RADIOLOGY:    PHYSICAL EXAM:  GENERAL: NAD, speaks in full sentences, no signs of respiratory distress  HEAD:  Atraumatic, Normocephalic  EYES: EOMI, PERRLA, conjunctiva and sclera clear  NECK: Supple, No JVD  CHEST/LUNG: diminished breath sounds, L; diminished breath sounds, R. No accessory muscles used  HEART: Regular rate and rhythm; No murmurs;   ABDOMEN: Soft, Nontender, Nondistended; Bowel sounds present; No guarding  EXTREMITIES:  2+ Peripheral Pulses, No cyanosis or edema  PSYCH: AAOx3  NEUROLOGY: non-focal  SKIN: No rashes or lesions    Intravenous access:   NG tube:   Velásquez Catheter: SUBJECTIVE:     Reason for Admission: worsening SOB	  History of Present Illness: 	  57 yo M with HTN, DLD, depression, DVT on eliquis, and recurrent pleural effusions s/p multiple thoracentesis presenting from [facility] for worsening SOB since discharge from [facility]. Pt denies any fever/ chills. He reports intermittent non productive cough. No chest pain.  Pt also c/o persistent diffuse abdominal pain along with distension, constipation and flatus.   Pt denies any other symptoms  To note, pt is very poor historian and can't confirm his medications    Pt was admitted for same complaint back in july and was found to have large L pleural effusion s/p pigtail insertion for 3 days with resolution of effusion. pleural studies at that time were consistent with exudative fluid, + atypical cell but no malignant cells.  Pt was admitted last week to [facility] for SOB and had a L thoracentesis 3 days PTP    Today is hospital day 1d. Report SOB, abd distension. Denies CP. Came with [facility] from rehab.      PAST MEDICAL & SURGICAL HISTORY  Pleural effusion  CHF (congestive heart failure)  Insomnia  BPH (benign prostatic hyperplasia)  Depression  Bipolar 1 disorder  HTN (hypertension)  History of thoracentesis    SOCIAL HISTORY:  Negative for smoking/alcohol/drug use.     ALLERGIES:  No Known Allergies    MEDICATIONS:  STANDING MEDICATIONS  DULoxetine 60 milliGRAM(s) Oral daily  furosemide   Injectable 40 milliGRAM(s) IV Push daily  latanoprost 0.005% Ophthalmic Solution 1 Drop(s) Both EYES at bedtime  tamsulosin 0.4 milliGRAM(s) Oral at bedtime  traZODone 50 milliGRAM(s) Oral at bedtime    PRN MEDICATIONS  acetaminophen   Tablet .. 650 milliGRAM(s) Oral every 6 hours PRN  morphine  - Injectable 2 milliGRAM(s) IV Push every 6 hours PRN  zolpidem 5 milliGRAM(s) Oral at bedtime PRN  zolpidem 5 milliGRAM(s) Oral at bedtime PRN    VITALS:   T(F): 96.1  HR: 100  BP: 106/78  RR: 26  SpO2: 98%    LABS:                        12.1   12.27 )-----------( 454      ( 21 Sep 2018 19:16 )             37.8     09-21    136  |  91<L>  |  15  ----------------------------<  113<H>  4.6   |  28  |  1.0    Ca    8.9      21 Sep 2018 19:16  Mg     2.0         TPro  6.2  /  Alb  3.8  /  TBili  0.3  /  DBili  x   /  AST  16  /  ALT  34  /  AlkPhos  287<H>      PT/INR - ( 21 Sep 2018 19:16 )   PT: 15.40 sec;   INR: 1.43 ratio         PTT - ( 21 Sep 2018 19:16 )  PTT:36.3 sec  Urinalysis Basic - ( 21 Sep 2018 19:15 )    Color: Yellow / Appearance: Cloudy / S.020 / pH: x  Gluc: x / Ketone: Negative  / Bili: Negative / Urobili: 0.2 mg/dL   Blood: x / Protein: 100 mg/dL / Nitrite: Negative   Leuk Esterase: Negative / RBC: 10-25 /HPF / WBC x   Sq Epi: x / Non Sq Epi: x / Bacteria: Moderate /HPF      ABG - ( 21 Sep 2018 20:58 )  pH, Arterial: 7.38  pH, Blood: x     /  pCO2: 59    /  pO2: 25    / HCO3: 35    / Base Excess: 7.1   /  SaO2: 41                Troponin T, Serum: <0.01 ng/mL (18 @ 19:16)      CARDIAC MARKERS ( 21 Sep 2018 19:16 )  x     / <0.01 ng/mL / x     / x     / x          RADIOLOGY:    PHYSICAL EXAM:  GENERAL: NAD, speaks in full sentences, no signs of respiratory distress  HEAD:  Atraumatic, Normocephalic  EYES: EOMI, PERRLA, conjunctiva and sclera clear  NECK: Supple, No JVD  CHEST/LUNG: diminished breath sounds, L; diminished breath sounds, R. No accessory muscles used  HEART: Regular rate and rhythm; No murmurs;   ABDOMEN: Soft, Nontender, Nondistended; Bowel sounds present; No guarding  EXTREMITIES:  2+ Peripheral Pulses, No cyanosis or edema  PSYCH: AAOx3  NEUROLOGY: non-focal  SKIN: No rashes or lesions    Intravenous access:   NG tube:   Hamilton Catheter:

## 2018-09-22 NOTE — PROGRESS NOTE ADULT - ASSESSMENT
59 yo M with HTN, DLD, depression, DVT on eliquis, and recurrent pleural effusions s/p pigtail insertion back in july, s/p thoracentesis 3 days PTP in [facility] presenting from [facility] for worsening SOB x3 days     - AHRF 2/2 Bilateral Pleural Effusions  recurrent, exudative in nature  s/p thoracentesis x2   ru underlying malignancy vs infection vs volume overload  no clinical evidence of PNA. will hold on abxs for now  last 2d echo: Normal EF  will need denver catheter insertion given recurrence  lasix 40 iv daily  hold eliquis  c/w Bipap for now. pt looks comfortable and pulling good volumes       - Borderline BP:  Hold BB meds for now  keep Is=Os     - Abdominal distention/ constipation  persistent  colace/ senna  CT a/p on prior admission negative for any pathology     - H/o DVt  fu venous duplex  hold eliquis for procedure     - BPH  c/w flomax     - depression  c/w home meds     - DNR/ DNI 57 yo M with HTN, DLD, depression, DVT on eliquis, and recurrent pleural effusions s/p pigtail insertion back in july, s/p thoracentesis 3 days PTP in [facility] presenting from [facility] for worsening SOB x3 days    # AHRF 2/2 Bilateral Pleural Effusions  recurrent, exudative in nature  rule out underlying malignancy vs infection vs volume overload  no clinical evidence of PNA. will hold on abxs for now  last 2d echo: Normal EF  lasix 40 iv daily  hold eliquis  -Plan for thoracentesis tomorrow as patient was on Eliquis.  Pt not acutely in respiratory distress.     # Borderline BP:  Hold BB meds for now  keep Is=Os    # Abdominal distention/ constipation  persistent  colace/ senna  CT a/p on prior admission negative for any pathology    # H/o DVt  fu venous duplex  hold eliquis for procedure    # BPH  c/w flomax    # depression  c/w home meds    # DNR/ DNI

## 2018-09-22 NOTE — CONSULT NOTE ADULT - SUBJECTIVE AND OBJECTIVE BOX
Patient is a 58y old  Male who presents with a chief complaint of worsening SOB (22 Sep 2018 03:22)      HPI:  59 yo M with HTN, DLD, depression, DVT on eliquis, and recurrent pleural effusions s/p multiple thoracentesis presenting from [facility] for worsening SOB since discharge from [facility]. Pt denies any fever/ chills. He reports intermittent non productive cough. No chest pain.  Pt also c/o persistent diffuse abdominal pain along with distension, constipation and flatus.   Pt denies any other symptoms  To note, pt is very poor historian and can't confirm his medications    Pt was admitted for same complaint back in july and was found to have large L pleural effusion s/p pigtail insertion for 3 days with resolution of effusion. pleural studies at that time were consistent with exudative fluid, + atypical cell but no malignant cells.  Pt was admitted last week to [facility] for SOB and had a L thoracentesis 3 days PTP (21 Sep 2018 21:27)      PAST MEDICAL & SURGICAL HISTORY:  Pleural effusion  CHF (congestive heart failure)  Insomnia  BPH (benign prostatic hyperplasia)  Depression  Bipolar 1 disorder  HTN (hypertension)  History of thoracentesis      SOCIAL HX:   Smoking                         ETOH                            Other    FAMILY HISTORY:  No pertinent family history in first degree relatives      REVIEW OF SYSTEMS    · General	negative  · Skin/Breast	negative  · Ophthalmologic	negative  · ENMT	negative  · Negative Respiratory and Thorax Symptoms	no wheezing; no pleuritic chest pain  · Respiratory and Thorax Symptoms	dyspnea  cough  · Cardiovascular Symptoms	peripheral edema  · Gastrointestinal Symptoms	constipation; flatulence; abdominal pain  · Genitourinary	negative  · Musculoskeletal	negative  · Neurological	negative  · Psychiatric	negative  · Hematology/Lymphatics	negative  · Endocrine	negative  · Allergic/Immunologic	negative      Allergies    No Known Allergies    Intolerances          PHYSICAL EXAM    ICU Vital Signs Last 24 Hrs  T(C): 35.6 (22 Sep 2018 03:13), Max: 36.6 (21 Sep 2018 19:15)  T(F): 96.1 (22 Sep 2018 03:13), Max: 97.8 (21 Sep 2018 19:15)  HR: 94 (22 Sep 2018 06:00) (91 - 114)  BP: 111/62 (22 Sep 2018 06:00) (104/56 - 122/79)  BP(mean): 97 (22 Sep 2018 03:13) (97 - 97)  ABP: --  ABP(mean): --  RR: 20 (22 Sep 2018 06:00) (20 - 26)  SpO2: 99% (22 Sep 2018 06:00) (98% - 100%)      Physical Exam:  · Constitutional	detailed exam  · Constitutional Details	no distress; obese  · Eyes	detailed exam  · Eyes Comments	L eye lag  · ENMT	No oral lesions; no gross abnormalities  · Neck	No bruits; no thyromegaly or nodules  · Breasts	not examined  · Back	not examined  · Respiratory	detailed exam  · Respiratory Details	airway patent; good air movement; clear to auscultation bilaterally; diminished breath sounds on L; diminished breath sounds on R  · Cardiovascular	detailed exam  · Cardiovascular Details	regular rate and rhythm  · Gastrointestinal	detailed exam  · GI Normal	no rebound tenderness; no guarding  · GI Abnormal	distended  · Genitourinary	not examined  · Rectal	not examined  · Extremities	detailed exam  · Extremities Details	pedal edema; trace  · Vascular	Equal and normal pulses (carotid, femoral, dorsalis pedis)  · Neurological	Alert & oriented; no sensory, motor or coordination deficits, normal reflexes  · Skin	No lesions; no rash  · Musculoskeletal	No joint pain, swelling or deformity; no limitation of movement      LABS:                          11.6   9.49  )-----------( 398      ( 22 Sep 2018 05:04 )             36.4                                                   137  |  95<L>  |  15  ----------------------------<  111<H>  4.9   |  29  |  0.8    Ca    8.8      22 Sep 2018 05:04  Mg     2.0         TPro  6.2  /  Alb  3.8  /  TBili  0.3  /  DBili  x   /  AST  16  /  ALT  34  /  AlkPhos  287<H>        PT/INR - ( 22 Sep 2018 05:04 )   PT: 15.30 sec;   INR: 1.42 ratio         PTT - ( 22 Sep 2018 05:04 )  PTT:37.2 sec                                       Urinalysis Basic - ( 21 Sep 2018 19:15 )    Color: Yellow / Appearance: Cloudy / S.020 / pH: x  Gluc: x / Ketone: Negative  / Bili: Negative / Urobili: 0.2 mg/dL   Blood: x / Protein: 100 mg/dL / Nitrite: Negative   Leuk Esterase: Negative / RBC: 10-25 /HPF / WBC x   Sq Epi: x / Non Sq Epi: x / Bacteria: Moderate /HPF        CARDIAC MARKERS ( 21 Sep 2018 19:16 )  x     / <0.01 ng/mL / x     / x     / x                                                    LIVER FUNCTIONS - ( 21 Sep 2018 19:16 )  Alb: 3.8 g/dL / Pro: 6.2 g/dL / ALK PHOS: 287 U/L / ALT: 34 U/L / AST: 16 U/L / GGT: x                                                                                                                                   ABG - ( 21 Sep 2018 20:58 )  pH, Arterial: 7.38  pH, Blood: x     /  pCO2: 59    /  pO2: 25    / HCO3: 35    / Base Excess: 7.1   /  SaO2: 41                  MEDICATIONS  (STANDING):  DULoxetine 60 milliGRAM(s) Oral daily  furosemide   Injectable 40 milliGRAM(s) IV Push daily  latanoprost 0.005% Ophthalmic Solution 1 Drop(s) Both EYES at bedtime  tamsulosin 0.4 milliGRAM(s) Oral at bedtime  traZODone 50 milliGRAM(s) Oral at bedtime    MEDICATIONS  (PRN):  acetaminophen   Tablet .. 650 milliGRAM(s) Oral every 6 hours PRN Moderate Pain (4 - 6)  morphine  - Injectable 2 milliGRAM(s) IV Push every 6 hours PRN Severe Pain (7 - 10)  zolpidem 5 milliGRAM(s) Oral at bedtime PRN Insomnia  zolpidem 5 milliGRAM(s) Oral at bedtime PRN Insomnia

## 2018-09-23 LAB
-  AMIKACIN: SIGNIFICANT CHANGE UP
-  AZTREONAM: SIGNIFICANT CHANGE UP
-  CEFEPIME: SIGNIFICANT CHANGE UP
-  CEFTAZIDIME: SIGNIFICANT CHANGE UP
-  CIPROFLOXACIN: SIGNIFICANT CHANGE UP
-  GENTAMICIN: SIGNIFICANT CHANGE UP
-  IMIPENEM: SIGNIFICANT CHANGE UP
-  LEVOFLOXACIN: SIGNIFICANT CHANGE UP
-  MEROPENEM: SIGNIFICANT CHANGE UP
-  PIPERACILLIN/TAZOBACTAM: SIGNIFICANT CHANGE UP
-  TOBRAMYCIN: SIGNIFICANT CHANGE UP
ALBUMIN SERPL ELPH-MCNC: 3.7 G/DL — SIGNIFICANT CHANGE UP (ref 3.5–5.2)
ALP SERPL-CCNC: 293 U/L — HIGH (ref 30–115)
ALT FLD-CCNC: 32 U/L — SIGNIFICANT CHANGE UP (ref 0–41)
ANION GAP SERPL CALC-SCNC: 13 MMOL/L — SIGNIFICANT CHANGE UP (ref 7–14)
APTT BLD: 36 SEC — SIGNIFICANT CHANGE UP (ref 27–39.2)
AST SERPL-CCNC: 15 U/L — SIGNIFICANT CHANGE UP (ref 0–41)
BASOPHILS # BLD AUTO: 0.04 K/UL — SIGNIFICANT CHANGE UP (ref 0–0.2)
BASOPHILS NFR BLD AUTO: 0.4 % — SIGNIFICANT CHANGE UP (ref 0–1)
BILIRUB SERPL-MCNC: 0.5 MG/DL — SIGNIFICANT CHANGE UP (ref 0.2–1.2)
BUN SERPL-MCNC: 14 MG/DL — SIGNIFICANT CHANGE UP (ref 10–20)
CALCIUM SERPL-MCNC: 8.8 MG/DL — SIGNIFICANT CHANGE UP (ref 8.5–10.1)
CHLORIDE SERPL-SCNC: 91 MMOL/L — LOW (ref 98–110)
CO2 SERPL-SCNC: 26 MMOL/L — SIGNIFICANT CHANGE UP (ref 17–32)
CREAT SERPL-MCNC: 0.7 MG/DL — SIGNIFICANT CHANGE UP (ref 0.7–1.5)
EOSINOPHIL # BLD AUTO: 0.24 K/UL — SIGNIFICANT CHANGE UP (ref 0–0.7)
EOSINOPHIL NFR BLD AUTO: 2.7 % — SIGNIFICANT CHANGE UP (ref 0–8)
GLUCOSE SERPL-MCNC: 104 MG/DL — HIGH (ref 70–99)
GRAM STN FLD: SIGNIFICANT CHANGE UP
HCT VFR BLD CALC: 37.4 % — LOW (ref 42–52)
HGB BLD-MCNC: 12.1 G/DL — LOW (ref 14–18)
IMM GRANULOCYTES NFR BLD AUTO: 0.4 % — HIGH (ref 0.1–0.3)
INR BLD: 1.35 RATIO — HIGH (ref 0.65–1.3)
LYMPHOCYTES # BLD AUTO: 0.87 K/UL — LOW (ref 1.2–3.4)
LYMPHOCYTES # BLD AUTO: 9.7 % — LOW (ref 20.5–51.1)
MAGNESIUM SERPL-MCNC: 2.1 MG/DL — SIGNIFICANT CHANGE UP (ref 1.8–2.4)
MCHC RBC-ENTMCNC: 26.8 PG — LOW (ref 27–31)
MCHC RBC-ENTMCNC: 32.4 G/DL — SIGNIFICANT CHANGE UP (ref 32–37)
MCV RBC AUTO: 82.9 FL — SIGNIFICANT CHANGE UP (ref 80–94)
METHOD TYPE: SIGNIFICANT CHANGE UP
MONOCYTES # BLD AUTO: 0.66 K/UL — HIGH (ref 0.1–0.6)
MONOCYTES NFR BLD AUTO: 7.3 % — SIGNIFICANT CHANGE UP (ref 1.7–9.3)
NEUTROPHILS # BLD AUTO: 7.13 K/UL — HIGH (ref 1.4–6.5)
NEUTROPHILS NFR BLD AUTO: 79.5 % — HIGH (ref 42.2–75.2)
NRBC # BLD: 0 /100 WBCS — SIGNIFICANT CHANGE UP (ref 0–0)
PLATELET # BLD AUTO: 357 K/UL — SIGNIFICANT CHANGE UP (ref 130–400)
POTASSIUM SERPL-MCNC: 4.7 MMOL/L — SIGNIFICANT CHANGE UP (ref 3.5–5)
POTASSIUM SERPL-SCNC: 4.7 MMOL/L — SIGNIFICANT CHANGE UP (ref 3.5–5)
PROT SERPL-MCNC: 6 G/DL — SIGNIFICANT CHANGE UP (ref 6–8)
PROTHROM AB SERPL-ACNC: 14.5 SEC — HIGH (ref 9.95–12.87)
RBC # BLD: 4.51 M/UL — LOW (ref 4.7–6.1)
RBC # FLD: 15.1 % — HIGH (ref 11.5–14.5)
SODIUM SERPL-SCNC: 130 MMOL/L — LOW (ref 135–146)
SPECIMEN SOURCE: SIGNIFICANT CHANGE UP
WBC # BLD: 8.98 K/UL — SIGNIFICANT CHANGE UP (ref 4.8–10.8)
WBC # FLD AUTO: 8.98 K/UL — SIGNIFICANT CHANGE UP (ref 4.8–10.8)

## 2018-09-23 RX ORDER — APIXABAN 2.5 MG/1
5 TABLET, FILM COATED ORAL EVERY 12 HOURS
Qty: 0 | Refills: 0 | Status: DISCONTINUED | OUTPATIENT
Start: 2018-09-23 | End: 2018-09-28

## 2018-09-23 RX ORDER — MORPHINE SULFATE 50 MG/1
2 CAPSULE, EXTENDED RELEASE ORAL ONCE
Qty: 0 | Refills: 0 | Status: DISCONTINUED | OUTPATIENT
Start: 2018-09-23 | End: 2018-09-23

## 2018-09-23 RX ADMIN — DULOXETINE HYDROCHLORIDE 60 MILLIGRAM(S): 30 CAPSULE, DELAYED RELEASE ORAL at 11:27

## 2018-09-23 RX ADMIN — MORPHINE SULFATE 2 MILLIGRAM(S): 50 CAPSULE, EXTENDED RELEASE ORAL at 05:01

## 2018-09-23 RX ADMIN — MORPHINE SULFATE 2 MILLIGRAM(S): 50 CAPSULE, EXTENDED RELEASE ORAL at 09:40

## 2018-09-23 RX ADMIN — MORPHINE SULFATE 2 MILLIGRAM(S): 50 CAPSULE, EXTENDED RELEASE ORAL at 05:30

## 2018-09-23 RX ADMIN — MORPHINE SULFATE 2 MILLIGRAM(S): 50 CAPSULE, EXTENDED RELEASE ORAL at 01:00

## 2018-09-23 RX ADMIN — Medication 40 MILLIGRAM(S): at 05:00

## 2018-09-23 RX ADMIN — TAMSULOSIN HYDROCHLORIDE 0.4 MILLIGRAM(S): 0.4 CAPSULE ORAL at 21:59

## 2018-09-23 RX ADMIN — Medication 50 MILLIGRAM(S): at 22:00

## 2018-09-23 RX ADMIN — SENNA PLUS 2 TABLET(S): 8.6 TABLET ORAL at 22:00

## 2018-09-23 RX ADMIN — Medication 100 MILLIGRAM(S): at 05:01

## 2018-09-23 RX ADMIN — ZOLPIDEM TARTRATE 5 MILLIGRAM(S): 10 TABLET ORAL at 21:59

## 2018-09-23 RX ADMIN — MORPHINE SULFATE 2 MILLIGRAM(S): 50 CAPSULE, EXTENDED RELEASE ORAL at 08:40

## 2018-09-23 RX ADMIN — MORPHINE SULFATE 2 MILLIGRAM(S): 50 CAPSULE, EXTENDED RELEASE ORAL at 08:13

## 2018-09-23 RX ADMIN — APIXABAN 5 MILLIGRAM(S): 2.5 TABLET, FILM COATED ORAL at 21:59

## 2018-09-23 RX ADMIN — MORPHINE SULFATE 2 MILLIGRAM(S): 50 CAPSULE, EXTENDED RELEASE ORAL at 21:59

## 2018-09-23 RX ADMIN — MORPHINE SULFATE 2 MILLIGRAM(S): 50 CAPSULE, EXTENDED RELEASE ORAL at 13:12

## 2018-09-23 RX ADMIN — Medication 100 MILLIGRAM(S): at 22:00

## 2018-09-23 RX ADMIN — MORPHINE SULFATE 2 MILLIGRAM(S): 50 CAPSULE, EXTENDED RELEASE ORAL at 13:30

## 2018-09-23 RX ADMIN — MORPHINE SULFATE 2 MILLIGRAM(S): 50 CAPSULE, EXTENDED RELEASE ORAL at 00:33

## 2018-09-23 RX ADMIN — MORPHINE SULFATE 2 MILLIGRAM(S): 50 CAPSULE, EXTENDED RELEASE ORAL at 09:20

## 2018-09-23 RX ADMIN — MORPHINE SULFATE 2 MILLIGRAM(S): 50 CAPSULE, EXTENDED RELEASE ORAL at 21:18

## 2018-09-23 RX ADMIN — LATANOPROST 1 DROP(S): 0.05 SOLUTION/ DROPS OPHTHALMIC; TOPICAL at 22:00

## 2018-09-23 RX ADMIN — Medication 100 MILLIGRAM(S): at 13:53

## 2018-09-23 NOTE — PROGRESS NOTE ADULT - SUBJECTIVE AND OBJECTIVE BOX
Patient is a 58y old Male who presents with a chief complaint of worsening SOB (22 Sep 2018 10:25)    Currently admitted to medicine with the primary diagnosis of Pleural effusion    Today is hospital day 2d.    BRIEF HOSPITAL COURSE:   HPI:  57 yo M with HTN, DLD, depression, DVT on eliquis, and recurrent pleural effusions s/p multiple thoracentesis presenting from [facility] for worsening SOB since discharge from [facility]. Pt denies any fever/ chills. He reports intermittent non productive cough. No chest pain.  Pt also c/o persistent diffuse abdominal pain along with distension, constipation and flatus.   Pt denies any other symptoms  To note, pt is very poor historian and can't confirm his medications    Pt was admitted for same complaint back in july and was found to have large L pleural effusion s/p pigtail insertion for 3 days with resolution of effusion. pleural studies at that time were consistent with exudative fluid, + atypical cell but no malignant cells.  Pt was admitted last week to [facility] for SOB and had a L thoracentesis 3 days PTP (21 Sep 2018 21:27)      EVENTS LAST 24HRS:   None    PAST MEDICAL & SURGICAL HISTORY  Pleural effusion  CHF (congestive heart failure)  Insomnia  BPH (benign prostatic hyperplasia)  Depression  Bipolar 1 disorder  HTN (hypertension)  History of thoracentesis      SOCIAL HISTORY:      REVIEW OF SYSTEMS:  See HPI    ALLERGIES:    MEDICATIONS:  STANDING MEDICATIONS  docusate sodium 100 milliGRAM(s) Oral three times a day  DULoxetine 60 milliGRAM(s) Oral daily  furosemide   Injectable 40 milliGRAM(s) IV Push daily  latanoprost 0.005% Ophthalmic Solution 1 Drop(s) Both EYES at bedtime  senna 2 Tablet(s) Oral at bedtime  tamsulosin 0.4 milliGRAM(s) Oral at bedtime  traZODone 50 milliGRAM(s) Oral at bedtime    PRN MEDICATIONS  acetaminophen   Tablet .. 650 milliGRAM(s) Oral every 6 hours PRN  morphine  - Injectable 2 milliGRAM(s) IV Push every 4 hours PRN  zolpidem 5 milliGRAM(s) Oral at bedtime PRN  zolpidem 5 milliGRAM(s) Oral at bedtime PRN    VITALS:         ICU Vital Signs Last 24 Hrs:    T(C): 36.5 (23 Sep 2018 04:00), Max: 36.5 (23 Sep 2018 04:00)  T(F): 97.7 (23 Sep 2018 04:00), Max: 97.7 (23 Sep 2018 04:00)  HR: 100 (23 Sep 2018 06:00) (96 - 112)  BP: 116/78 (23 Sep 2018 06:00) (92/65 - 140/86)  BP(mean): 96 (23 Sep 2018 06:00) (82 - 112)  ABP: --  ABP(mean): --  RR: 19 (23 Sep 2018 06:00) (17 - 32)  SpO2: 97% (23 Sep 2018 06:00) (95% - 99%)      ABG - ( 21 Sep 2018 20:58 )  pH, Arterial: 7.38  pH, Blood: x     /  pCO2: 59    /  pO2: 25    / HCO3: 35    / Base Excess: 7.1   /  SaO2: 41                  I&O's Detail:      21 Sep 2018 07:01  -  22 Sep 2018 07:00  --------------------------------------------------------  IN:  Total IN: 0 mL    OUT:    Indwelling Catheter - Urethral: 1400 mL  Total OUT: 1400 mL    Total NET: -1400 mL      22 Sep 2018 07:01  -  23 Sep 2018 06:45  --------------------------------------------------------  IN:    Oral Fluid: 720 mL  Total IN: 720 mL    OUT:    Indwelling Catheter - Urethral: 400 mL    Voided: 1125 mL  Total OUT: 1525 mL    Total NET: -805 mL          LABS:                        12.1   8.98  )-----------( 357      ( 23 Sep 2018 04:44 )             37.4     09-    130<L>  |  91<L>  |  14  ----------------------------<  104<H>  4.7   |  26  |  0.7    Ca    8.8      23 Sep 2018 04:44  Mg     2.1         TPro  6.0  /  Alb  3.7  /  TBili  0.5  /  DBili  x   /  AST  15  /  ALT  32  /  AlkPhos  293<H>        CARDIAC MARKERS ( 21 Sep 2018 19:16 )  x     / <0.01 ng/mL / x     / x     / x          CAPILLARY BLOOD GLUCOSE        PT/INR - ( 23 Sep 2018 04:44 )   PT: 14.50 sec;   INR: 1.35 ratio         PTT - ( 23 Sep 2018 04:44 )  PTT:36.0 sec  Urinalysis Basic - ( 21 Sep 2018 19:15 )    Color: Yellow / Appearance: Cloudy / S.020 / pH: x  Gluc: x / Ketone: Negative  / Bili: Negative / Urobili: 0.2 mg/dL   Blood: x / Protein: 100 mg/dL / Nitrite: Negative   Leuk Esterase: Negative / RBC: 10-25 /HPF / WBC x   Sq Epi: x / Non Sq Epi: x / Bacteria: Moderate /HPF      CULTURES:  Culture Results:   >100,000 CFU/ml Pseudomonas aeruginosa  >100,000 CFU/ml Enterococcus faecalis ( @ 19:15)      PHYSICAL EXAM:    General: No acute distress.  Alert, oriented, interactive, nonfocal.    HEENT: Pupils equal, reactive to light symmetrically. L. eye fixed in medial gaze.     PULM: diminished breath sounds. No wheezing, rales, or ronchi.     CVS: Regular rate and rhythm, no murmurs, rubs, or gallops.    ABD: Soft, moderately distended, nontender, no masses.    EXT: No edema, nontender.    SKIN: Warm and well perfused, no rashes noted.    RADIOLOGY:

## 2018-09-23 NOTE — PROGRESS NOTE ADULT - ASSESSMENT
57 yo M with HTN, DLD, depression, DVT on eliquis, and recurrent pleural effusions s/p pigtail insertion back in july, s/p thoracentesis 3 days PTP in [facility] presenting from [facility] for worsening SOB x3 days    # AHRF 2/2 Bilateral Pleural Effusions  recurrent, exudative in nature  rule out underlying malignancy vs infection vs volume overload  no clinical evidence of PNA. will hold on abxs for now  last 2d echo: Normal EF  lasix 40 iv daily  hold eliquis  Plan for thoracentesis today.  Pt not acutely in respiratory distress.     # Borderline BP:  Hold BB meds for now  keep Is=Os    # Abdominal distention/ constipation  persistent  colace/ senna  CT a/p on prior admission negative for any pathology    # H/o DVt  venous duplex: b/l DVTs  hold eliquis for procedurethen resume 12 hours after done    # BPH  c/w flomax    # depression  c/w home meds    # DNR/ DNI

## 2018-09-23 NOTE — PROGRESS NOTE ADULT - SUBJECTIVE AND OBJECTIVE BOX
Patient is a 58y old  Male who presents with a chief complaint of worsening SOB (22 Sep 2018 03:22)      HPI:  57 yo M with HTN, DLD, depression, DVT on eliquis, and recurrent pleural effusions s/p multiple thoracentesis presenting from [facility] for worsening SOB since discharge from [facility]. Pt denies any fever/ chills. He reports intermittent non productive cough. No chest pain.  Pt also c/o persistent diffuse abdominal pain along with distension, constipation and flatus.   Pt denies any other symptoms  To note, pt is very poor historian and can't confirm his medications    Pt was admitted for same complaint back in july and was found to have large L pleural effusion s/p pigtail insertion for 3 days with resolution of effusion. pleural studies at that time were consistent with exudative fluid, + atypical cell but no malignant cells.  Pt was admitted last week to [facility] for SOB and had a L thoracentesis 3 days PTP (21 Sep 2018 21:27)      PAST MEDICAL & SURGICAL HISTORY:  Pleural effusion  CHF (congestive heart failure)  Insomnia  BPH (benign prostatic hyperplasia)  Depression  Bipolar 1 disorder  HTN (hypertension)  History of thoracentesis      SOCIAL HX:   Smoking                         ETOH                            Other    FAMILY HISTORY:  No pertinent family history in first degree relatives      REVIEW OF SYSTEMS    · General	negative  · Skin/Breast	negative  · Ophthalmologic	negative  · ENMT	negative  · Negative Respiratory and Thorax Symptoms	no wheezing; no pleuritic chest pain  · Respiratory and Thorax Symptoms	dyspnea  cough  · Cardiovascular Symptoms	peripheral edema  · Gastrointestinal Symptoms	constipation; flatulence; abdominal pain  · Genitourinary	negative  · Musculoskeletal	negative  · Neurological	negative  · Psychiatric	negative  · Hematology/Lymphatics	negative  · Endocrine	negative  · Allergic/Immunologic	negative      Allergies    No Known Allergies    Intolerances          PHYSICAL EXAM    ICU Vital Signs Last 24 Hrs  T(C): 35.6 (22 Sep 2018 03:13), Max: 36.6 (21 Sep 2018 19:15)  T(F): 96.1 (22 Sep 2018 03:13), Max: 97.8 (21 Sep 2018 19:15)  HR: 94 (22 Sep 2018 06:00) (91 - 114)  BP: 111/62 (22 Sep 2018 06:00) (104/56 - 122/79)  BP(mean): 97 (22 Sep 2018 03:13) (97 - 97)  ABP: --  ABP(mean): --  RR: 20 (22 Sep 2018 06:00) (20 - 26)  SpO2: 99% (22 Sep 2018 06:00) (98% - 100%)      Physical Exam:  · Constitutional	detailed exam  · Constitutional Details	no distress; obese  · Eyes	detailed exam  · Eyes Comments	L eye lag  · ENMT	No oral lesions; no gross abnormalities  · Neck	No bruits; no thyromegaly or nodules  · Breasts	not examined  · Back	not examined  · Respiratory	detailed exam  · Respiratory Details	airway patent; good air movement; clear to auscultation bilaterally; diminished breath sounds on L; diminished breath sounds on R  · Cardiovascular	detailed exam  · Cardiovascular Details	regular rate and rhythm  · Gastrointestinal	detailed exam  · GI Normal	no rebound tenderness; no guarding  · GI Abnormal	distended  · Genitourinary	not examined  · Rectal	not examined  · Extremities	detailed exam  · Extremities Details	pedal edema; trace  · Vascular	Equal and normal pulses (carotid, femoral, dorsalis pedis)  · Neurological	Alert & oriented; no sensory, motor or coordination deficits, normal reflexes  · Skin	No lesions; no rash  · Musculoskeletal	No joint pain, swelling or deformity; no limitation of movement      LABS:                          11.6   9.49  )-----------( 398      ( 22 Sep 2018 05:04 )             36.4                                                   137  |  95<L>  |  15  ----------------------------<  111<H>  4.9   |  29  |  0.8    Ca    8.8      22 Sep 2018 05:04  Mg     2.0         TPro  6.2  /  Alb  3.8  /  TBili  0.3  /  DBili  x   /  AST  16  /  ALT  34  /  AlkPhos  287<H>        PT/INR - ( 22 Sep 2018 05:04 )   PT: 15.30 sec;   INR: 1.42 ratio         PTT - ( 22 Sep 2018 05:04 )  PTT:37.2 sec                                       Urinalysis Basic - ( 21 Sep 2018 19:15 )    Color: Yellow / Appearance: Cloudy / S.020 / pH: x  Gluc: x / Ketone: Negative  / Bili: Negative / Urobili: 0.2 mg/dL   Blood: x / Protein: 100 mg/dL / Nitrite: Negative   Leuk Esterase: Negative / RBC: 10-25 /HPF / WBC x   Sq Epi: x / Non Sq Epi: x / Bacteria: Moderate /HPF        CARDIAC MARKERS ( 21 Sep 2018 19:16 )  x     / <0.01 ng/mL / x     / x     / x                                                    LIVER FUNCTIONS - ( 21 Sep 2018 19:16 )  Alb: 3.8 g/dL / Pro: 6.2 g/dL / ALK PHOS: 287 U/L / ALT: 34 U/L / AST: 16 U/L / GGT: x                                                                                                                                   ABG - ( 21 Sep 2018 20:58 )  pH, Arterial: 7.38  pH, Blood: x     /  pCO2: 59    /  pO2: 25    / HCO3: 35    / Base Excess: 7.1   /  SaO2: 41                  MEDICATIONS  (STANDING):  DULoxetine 60 milliGRAM(s) Oral daily  furosemide   Injectable 40 milliGRAM(s) IV Push daily  latanoprost 0.005% Ophthalmic Solution 1 Drop(s) Both EYES at bedtime  tamsulosin 0.4 milliGRAM(s) Oral at bedtime  traZODone 50 milliGRAM(s) Oral at bedtime    MEDICATIONS  (PRN):  acetaminophen   Tablet .. 650 milliGRAM(s) Oral every 6 hours PRN Moderate Pain (4 - 6)  morphine  - Injectable 2 milliGRAM(s) IV Push every 6 hours PRN Severe Pain (7 - 10)  zolpidem 5 milliGRAM(s) Oral at bedtime PRN Insomnia  zolpidem 5 milliGRAM(s) Oral at bedtime PRN Insomnia

## 2018-09-23 NOTE — PROGRESS NOTE ADULT - ASSESSMENT
59 yo M with HTN, DLD, depression, DVT on eliquis, and recurrent pleural effusions s/p pigtail insertion back in july, s/p thoracentesis 3 days PTP in [facility] presenting from [facility] for worsening SOB x3 days    # AHRF 2/2 Bilateral Pleural Effusions  - recurrent, exudative in nature  - rule out underlying malignancy vs infection vs volume overload  - no clinical evidence of PNA. will hold on abxs for now  - last 2d echo: Normal EF  - lasix 40 iv daily  - hold eliquis  - Plan for thoracentesis today     # Borderline BP:  Hold BB meds for now  keep Is=Os    # Abdominal distention/ constipation  persistent  colace/ senna  CT a/p on prior admission negative for any pathology    # DVT  - Has h/o DVT  - Duplex (9/22): R. femoral DVT, L .  hold eliquis for procedure    # BPH  c/w flomax    # depression  c/w home meds    # DNR/ DNI 59 yo M with HTN, DLD, depression, DVT on eliquis, and recurrent pleural effusions s/p pigtail insertion back in july, s/p thoracentesis 3 days PTP in [facility] presenting from [facility] for worsening SOB x3 days    # AHRF 2/2 Bilateral Pleural Effusions  - recurrent, exudative in nature  - rule out underlying malignancy vs infection vs volume overload  - no clinical evidence of PNA. will hold on abxs for now  - last 2d echo: Normal EF  - lasix 40 iv daily  - hold eliquis  - Plan for thoracentesis today     # DVT  - Has h/o DVT  - Duplex (9/22): R. femoral DVT, L .  - hold eliquis for procedure      # Abdominal distention/ constipation  - persistent  - colace/ senna  - CT a/p on prior admission negative for any pathology      # BPH  c/w flomax    # depression  c/w home meds    # DNR/ DNI 57 yo M with HTN, DLD, depression, DVT on eliquis, and recurrent pleural effusions s/p pigtail insertion back in july, s/p thoracentesis 3 days PTP in [facility] presenting from [facility] for worsening SOB x3 days    # AHRF 2/2 Bilateral Pleural Effusions  - recurrent, exudative in nature  - rule out underlying malignancy vs infection vs volume overload  - no clinical evidence of PNA. will hold on abxs for now  - last 2d echo: Normal EF  - lasix 40 iv daily  - hold eliquis  - Plan for thoracentesis today     # DVT  - Has h/o DVT  - Duplex (9/22): R. femoral DVT, L . femoral/popliteal/gastroc veins  - hold eliquis for procedure    # Abdominal distention/ constipation  - persistent  - colace/ senna  - CT a/p on prior admission negative for any pathology    # Code: DNR/ DNI

## 2018-09-24 LAB
-  AMPICILLIN: SIGNIFICANT CHANGE UP
-  CIPROFLOXACIN: SIGNIFICANT CHANGE UP
-  LEVOFLOXACIN: SIGNIFICANT CHANGE UP
-  NITROFURANTOIN: SIGNIFICANT CHANGE UP
-  TETRACYCLINE: SIGNIFICANT CHANGE UP
-  VANCOMYCIN: SIGNIFICANT CHANGE UP
ALBUMIN FLD-MCNC: 3 G/DL — SIGNIFICANT CHANGE UP
ALBUMIN SERPL ELPH-MCNC: 3.3 G/DL — LOW (ref 3.5–5.2)
ALP SERPL-CCNC: 269 U/L — HIGH (ref 30–115)
ALT FLD-CCNC: 28 U/L — SIGNIFICANT CHANGE UP (ref 0–41)
ANION GAP SERPL CALC-SCNC: 8 MMOL/L — SIGNIFICANT CHANGE UP (ref 7–14)
APTT BLD: 37.1 SEC — SIGNIFICANT CHANGE UP (ref 27–39.2)
AST SERPL-CCNC: 15 U/L — SIGNIFICANT CHANGE UP (ref 0–41)
BASOPHILS # BLD AUTO: 0.04 K/UL — SIGNIFICANT CHANGE UP (ref 0–0.2)
BASOPHILS NFR BLD AUTO: 0.5 % — SIGNIFICANT CHANGE UP (ref 0–1)
BILIRUB SERPL-MCNC: 0.5 MG/DL — SIGNIFICANT CHANGE UP (ref 0.2–1.2)
BUN SERPL-MCNC: 14 MG/DL — SIGNIFICANT CHANGE UP (ref 10–20)
CALCIUM SERPL-MCNC: 8.5 MG/DL — SIGNIFICANT CHANGE UP (ref 8.5–10.1)
CHLORIDE SERPL-SCNC: 91 MMOL/L — LOW (ref 98–110)
CO2 SERPL-SCNC: 32 MMOL/L — SIGNIFICANT CHANGE UP (ref 17–32)
CREAT SERPL-MCNC: 0.9 MG/DL — SIGNIFICANT CHANGE UP (ref 0.7–1.5)
CULTURE RESULTS: SIGNIFICANT CHANGE UP
EOSINOPHIL # BLD AUTO: 0.22 K/UL — SIGNIFICANT CHANGE UP (ref 0–0.7)
EOSINOPHIL NFR BLD AUTO: 2.9 % — SIGNIFICANT CHANGE UP (ref 0–8)
GLUCOSE FLD-MCNC: 75 MG/DL — SIGNIFICANT CHANGE UP
GLUCOSE SERPL-MCNC: 91 MG/DL — SIGNIFICANT CHANGE UP (ref 70–99)
HCT VFR BLD CALC: 34.5 % — LOW (ref 42–52)
HGB BLD-MCNC: 11.1 G/DL — LOW (ref 14–18)
IMM GRANULOCYTES NFR BLD AUTO: 0.7 % — HIGH (ref 0.1–0.3)
INR BLD: 1.5 RATIO — HIGH (ref 0.65–1.3)
LDH SERPL L TO P-CCNC: 746 U/L — SIGNIFICANT CHANGE UP
LYMPHOCYTES # BLD AUTO: 0.9 K/UL — LOW (ref 1.2–3.4)
LYMPHOCYTES # BLD AUTO: 11.9 % — LOW (ref 20.5–51.1)
MAGNESIUM SERPL-MCNC: 2 MG/DL — SIGNIFICANT CHANGE UP (ref 1.8–2.4)
MCHC RBC-ENTMCNC: 26.7 PG — LOW (ref 27–31)
MCHC RBC-ENTMCNC: 32.2 G/DL — SIGNIFICANT CHANGE UP (ref 32–37)
MCV RBC AUTO: 82.9 FL — SIGNIFICANT CHANGE UP (ref 80–94)
METHOD TYPE: SIGNIFICANT CHANGE UP
MONOCYTES # BLD AUTO: 0.68 K/UL — HIGH (ref 0.1–0.6)
MONOCYTES NFR BLD AUTO: 9 % — SIGNIFICANT CHANGE UP (ref 1.7–9.3)
NEUTROPHILS # BLD AUTO: 5.68 K/UL — SIGNIFICANT CHANGE UP (ref 1.4–6.5)
NEUTROPHILS NFR BLD AUTO: 75 % — SIGNIFICANT CHANGE UP (ref 42.2–75.2)
NRBC # BLD: 0 /100 WBCS — SIGNIFICANT CHANGE UP (ref 0–0)
ORGANISM # SPEC MICROSCOPIC CNT: SIGNIFICANT CHANGE UP
PLATELET # BLD AUTO: 296 K/UL — SIGNIFICANT CHANGE UP (ref 130–400)
POTASSIUM SERPL-MCNC: 4.2 MMOL/L — SIGNIFICANT CHANGE UP (ref 3.5–5)
POTASSIUM SERPL-SCNC: 4.2 MMOL/L — SIGNIFICANT CHANGE UP (ref 3.5–5)
PROT FLD-MCNC: 4.5 G/DL — SIGNIFICANT CHANGE UP
PROT SERPL-MCNC: 5.1 G/DL — LOW (ref 6–8)
PROTHROM AB SERPL-ACNC: 16.1 SEC — HIGH (ref 9.95–12.87)
RBC # BLD: 4.16 M/UL — LOW (ref 4.7–6.1)
RBC # FLD: 14.8 % — HIGH (ref 11.5–14.5)
SODIUM SERPL-SCNC: 131 MMOL/L — LOW (ref 135–146)
SPECIMEN SOURCE FLD: SIGNIFICANT CHANGE UP
SPECIMEN SOURCE: SIGNIFICANT CHANGE UP
WBC # BLD: 7.57 K/UL — SIGNIFICANT CHANGE UP (ref 4.8–10.8)
WBC # FLD AUTO: 7.57 K/UL — SIGNIFICANT CHANGE UP (ref 4.8–10.8)

## 2018-09-24 RX ORDER — CEFEPIME 1 G/1
INJECTION, POWDER, FOR SOLUTION INTRAMUSCULAR; INTRAVENOUS
Qty: 0 | Refills: 0 | Status: DISCONTINUED | OUTPATIENT
Start: 2018-09-24 | End: 2018-09-25

## 2018-09-24 RX ORDER — CHLORHEXIDINE GLUCONATE 213 G/1000ML
1 SOLUTION TOPICAL
Qty: 0 | Refills: 0 | Status: DISCONTINUED | OUTPATIENT
Start: 2018-09-24 | End: 2018-10-10

## 2018-09-24 RX ORDER — CEFEPIME 1 G/1
2000 INJECTION, POWDER, FOR SOLUTION INTRAMUSCULAR; INTRAVENOUS ONCE
Qty: 0 | Refills: 0 | Status: COMPLETED | OUTPATIENT
Start: 2018-09-24 | End: 2018-09-24

## 2018-09-24 RX ORDER — CEFEPIME 1 G/1
2000 INJECTION, POWDER, FOR SOLUTION INTRAMUSCULAR; INTRAVENOUS EVERY 8 HOURS
Qty: 0 | Refills: 0 | Status: DISCONTINUED | OUTPATIENT
Start: 2018-09-24 | End: 2018-09-25

## 2018-09-24 RX ADMIN — LATANOPROST 1 DROP(S): 0.05 SOLUTION/ DROPS OPHTHALMIC; TOPICAL at 22:43

## 2018-09-24 RX ADMIN — Medication 100 MILLIGRAM(S): at 13:30

## 2018-09-24 RX ADMIN — SENNA PLUS 2 TABLET(S): 8.6 TABLET ORAL at 22:43

## 2018-09-24 RX ADMIN — Medication 100 MILLIGRAM(S): at 06:26

## 2018-09-24 RX ADMIN — MORPHINE SULFATE 2 MILLIGRAM(S): 50 CAPSULE, EXTENDED RELEASE ORAL at 20:35

## 2018-09-24 RX ADMIN — APIXABAN 5 MILLIGRAM(S): 2.5 TABLET, FILM COATED ORAL at 22:41

## 2018-09-24 RX ADMIN — Medication 100 MILLIGRAM(S): at 22:41

## 2018-09-24 RX ADMIN — CEFEPIME 100 MILLIGRAM(S): 1 INJECTION, POWDER, FOR SOLUTION INTRAMUSCULAR; INTRAVENOUS at 23:31

## 2018-09-24 RX ADMIN — TAMSULOSIN HYDROCHLORIDE 0.4 MILLIGRAM(S): 0.4 CAPSULE ORAL at 22:43

## 2018-09-24 RX ADMIN — APIXABAN 5 MILLIGRAM(S): 2.5 TABLET, FILM COATED ORAL at 10:59

## 2018-09-24 RX ADMIN — MORPHINE SULFATE 2 MILLIGRAM(S): 50 CAPSULE, EXTENDED RELEASE ORAL at 12:59

## 2018-09-24 RX ADMIN — Medication 50 MILLIGRAM(S): at 23:31

## 2018-09-24 RX ADMIN — DULOXETINE HYDROCHLORIDE 60 MILLIGRAM(S): 30 CAPSULE, DELAYED RELEASE ORAL at 12:48

## 2018-09-24 RX ADMIN — Medication 40 MILLIGRAM(S): at 06:26

## 2018-09-24 RX ADMIN — CEFEPIME 100 MILLIGRAM(S): 1 INJECTION, POWDER, FOR SOLUTION INTRAMUSCULAR; INTRAVENOUS at 12:48

## 2018-09-24 RX ADMIN — MORPHINE SULFATE 2 MILLIGRAM(S): 50 CAPSULE, EXTENDED RELEASE ORAL at 12:49

## 2018-09-24 RX ADMIN — ZOLPIDEM TARTRATE 5 MILLIGRAM(S): 10 TABLET ORAL at 22:43

## 2018-09-24 NOTE — PROGRESS NOTE ADULT - SUBJECTIVE AND OBJECTIVE BOX
Patient is a 58y old  Male who presents with a chief complaint of worsening SOB (23 Sep 2018 08:09)    Over Night Events:    none   feels good   Off drips     ROS:  See HPI    PHYSICAL EXAM    ICU Vital Signs Last 24 Hrs  T(C): 36.4 (24 Sep 2018 08:00), Max: 36.4 (24 Sep 2018 08:00)  T(F): 97.6 (24 Sep 2018 08:00), Max: 97.6 (24 Sep 2018 08:00)  HR: 110 (24 Sep 2018 08:00) (92 - 114)  BP: 115/66 (24 Sep 2018 08:00) (96/58 - 125/71)  BP(mean): 85 (24 Sep 2018 08:00) (70 - 90)  RR: 14 (24 Sep 2018 08:00) (14 - 30)  SpO2: 95% (24 Sep 2018 08:00) (95% - 100%)      General: AAO x 3   HEENT: JOHN             Lymphatic system: No cervical LN   Lungs: Bilateral BS  Cardiovascular: Regular   Gastrointestinal: Soft, Positive BS  Extremities: No clubbing.  Moves extremities.  Full Range of motion   Skin: Warm, intact  Neurological: No motor or sensory deficit       09-23-18 @ 07:01  -  09-24-18 @ 07:00  --------------------------------------------------------  IN:    Oral Fluid: 1320 mL  Total IN: 1320 mL    OUT:    Drain: 2800 mL    Voided: 1600 mL  Total OUT: 4400 mL    Total NET: -3080 mL      09-24-18 @ 07:01  -  09-24-18 @ 10:06  --------------------------------------------------------  IN:    Oral Fluid: 330 mL  Total IN: 330 mL    OUT:    Voided: 250 mL  Total OUT: 250 mL    Total NET: 80 mL          LABS:                            11.1   7.57  )-----------( 296      ( 24 Sep 2018 05:50 )             34.5                                               09-24    131<L>  |  91<L>  |  14  ----------------------------<  91  4.2   |  32  |  0.9    Ca    8.5      24 Sep 2018 05:50  Mg     2.0     09-24    TPro  5.1<L>  /  Alb  3.3<L>  /  TBili  0.5  /  DBili  x   /  AST  15  /  ALT  28  /  AlkPhos  269<H>  09-24      PT/INR - ( 24 Sep 2018 05:50 )   PT: 16.10 sec;   INR: 1.50 ratio         PTT - ( 24 Sep 2018 05:50 )  PTT:37.1 sec                                                                                     LIVER FUNCTIONS - ( 24 Sep 2018 05:50 )  Alb: 3.3 g/dL / Pro: 5.1 g/dL / ALK PHOS: 269 U/L / ALT: 28 U/L / AST: 15 U/L / GGT: x                                                  Culture - Body Fluid with Gram Stain (collected 23 Sep 2018 09:03)  Source: .Body Fluid Pleural Fluid  Gram Stain (23 Sep 2018 23:18):    polymorphonuclear leukocytes seen per low power field    No organisms seen per oil power field    by cytocentrifuge    Culture - Blood (collected 22 Sep 2018 05:04)  Source: .Blood None  Preliminary Report (23 Sep 2018 11:00):    No growth to date.    Culture - Urine (collected 21 Sep 2018 19:15)  Source: .Urine Catheterized  Preliminary Report (22 Sep 2018 20:34):    >100,000 CFU/ml Pseudomonas aeruginosa    >100,000 CFU/ml Enterococcus faecalis  Organism: Pseudomonas aeruginosa (23 Sep 2018 17:02)  Organism: Pseudomonas aeruginosa (23 Sep 2018 17:02)            MEDICATIONS  (STANDING):  apixaban 5 milliGRAM(s) Oral every 12 hours  docusate sodium 100 milliGRAM(s) Oral three times a day  DULoxetine 60 milliGRAM(s) Oral daily  furosemide   Injectable 40 milliGRAM(s) IV Push daily  latanoprost 0.005% Ophthalmic Solution 1 Drop(s) Both EYES at bedtime  senna 2 Tablet(s) Oral at bedtime  tamsulosin 0.4 milliGRAM(s) Oral at bedtime  traZODone 50 milliGRAM(s) Oral at bedtime    MEDICATIONS  (PRN):  acetaminophen   Tablet .. 650 milliGRAM(s) Oral every 6 hours PRN Moderate Pain (4 - 6)  morphine  - Injectable 2 milliGRAM(s) IV Push every 4 hours PRN Severe Pain (7 - 10)  zolpidem 5 milliGRAM(s) Oral at bedtime PRN Insomnia  zolpidem 5 milliGRAM(s) Oral at bedtime PRN Insomnia      Xrays:           Vascular congestion,       Left > Right bilateral pleural effusions                                                                     ECHO Patient is a 58y old  Male who presents with a chief complaint of worsening SOB (23 Sep 2018 08:09)    Over Night Events:    none   feels good s/p r thora  Off drips     ROS:  See HPI    PHYSICAL EXAM    ICU Vital Signs Last 24 Hrs  T(C): 36.4 (24 Sep 2018 08:00), Max: 36.4 (24 Sep 2018 08:00)  T(F): 97.6 (24 Sep 2018 08:00), Max: 97.6 (24 Sep 2018 08:00)  HR: 110 (24 Sep 2018 08:00) (92 - 114)  BP: 115/66 (24 Sep 2018 08:00) (96/58 - 125/71)  BP(mean): 85 (24 Sep 2018 08:00) (70 - 90)  RR: 14 (24 Sep 2018 08:00) (14 - 30)  SpO2: 95% (24 Sep 2018 08:00) (95% - 100%)      General: AAO x 3   HEENT: JOHN             Lymphatic system: No cervical LN   Lungs: Bilateral BS, dec bs r side  Cardiovascular: Regular   Gastrointestinal: Soft, Positive BS  Extremities: No clubbing.  Moves extremities.  Full Range of motion   Skin: Warm, intact  Neurological: No motor or sensory deficit       09-23-18 @ 07:01  -  09-24-18 @ 07:00  --------------------------------------------------------  IN:    Oral Fluid: 1320 mL  Total IN: 1320 mL    OUT:    Drain: 2800 mL    Voided: 1600 mL  Total OUT: 4400 mL    Total NET: -3080 mL      09-24-18 @ 07:01  -  09-24-18 @ 10:06  --------------------------------------------------------  IN:    Oral Fluid: 330 mL  Total IN: 330 mL    OUT:    Voided: 250 mL  Total OUT: 250 mL    Total NET: 80 mL          LABS:                            11.1   7.57  )-----------( 296      ( 24 Sep 2018 05:50 )             34.5                                               09-24    131<L>  |  91<L>  |  14  ----------------------------<  91  4.2   |  32  |  0.9    Ca    8.5      24 Sep 2018 05:50  Mg     2.0     09-24    TPro  5.1<L>  /  Alb  3.3<L>  /  TBili  0.5  /  DBili  x   /  AST  15  /  ALT  28  /  AlkPhos  269<H>  09-24      PT/INR - ( 24 Sep 2018 05:50 )   PT: 16.10 sec;   INR: 1.50 ratio         PTT - ( 24 Sep 2018 05:50 )  PTT:37.1 sec                                                                                     LIVER FUNCTIONS - ( 24 Sep 2018 05:50 )  Alb: 3.3 g/dL / Pro: 5.1 g/dL / ALK PHOS: 269 U/L / ALT: 28 U/L / AST: 15 U/L / GGT: x                                                  Culture - Body Fluid with Gram Stain (collected 23 Sep 2018 09:03)  Source: .Body Fluid Pleural Fluid  Gram Stain (23 Sep 2018 23:18):    polymorphonuclear leukocytes seen per low power field    No organisms seen per oil power field    by cytocentrifuge    Culture - Blood (collected 22 Sep 2018 05:04)  Source: .Blood None  Preliminary Report (23 Sep 2018 11:00):    No growth to date.    Culture - Urine (collected 21 Sep 2018 19:15)  Source: .Urine Catheterized  Preliminary Report (22 Sep 2018 20:34):    >100,000 CFU/ml Pseudomonas aeruginosa    >100,000 CFU/ml Enterococcus faecalis  Organism: Pseudomonas aeruginosa (23 Sep 2018 17:02)  Organism: Pseudomonas aeruginosa (23 Sep 2018 17:02)            MEDICATIONS  (STANDING):  apixaban 5 milliGRAM(s) Oral every 12 hours  docusate sodium 100 milliGRAM(s) Oral three times a day  DULoxetine 60 milliGRAM(s) Oral daily  furosemide   Injectable 40 milliGRAM(s) IV Push daily  latanoprost 0.005% Ophthalmic Solution 1 Drop(s) Both EYES at bedtime  senna 2 Tablet(s) Oral at bedtime  tamsulosin 0.4 milliGRAM(s) Oral at bedtime  traZODone 50 milliGRAM(s) Oral at bedtime    MEDICATIONS  (PRN):  acetaminophen   Tablet .. 650 milliGRAM(s) Oral every 6 hours PRN Moderate Pain (4 - 6)  morphine  - Injectable 2 milliGRAM(s) IV Push every 4 hours PRN Severe Pain (7 - 10)  zolpidem 5 milliGRAM(s) Oral at bedtime PRN Insomnia  zolpidem 5 milliGRAM(s) Oral at bedtime PRN Insomnia      Xrays:           Vascular congestion,       Left > Right bilateral pleural effusions                                                                     ECHO

## 2018-09-24 NOTE — CHART NOTE - NSCHARTNOTEFT_GEN_A_CORE
AMRIK MADRID 58y Male  MRN#: [id number]   CODE STATUS: Do Not Resuscitate      SUBJECTIVE  Patient is a 58y old Male who presents with a chief complaint of worsening SOB (24 Sep 2018 10:06)    Currently admitted to medicine with the primary diagnosis of Pleural effusion. PT has a PMH of HTN, DLD, depression, DVT on eliquis, and recurrent pleural effusions s/p multiple thoracentesis presenting from [facility] for worsening SOB since discharge from [facility]. Pt denies any fever/ chills. He reports intermittent non productive cough, but no chest pain.  Pt also had complaints of persistent diffuse abdominal pain along with distension, constipation and flatus.     He was admitted for the same complaint back in July and was found to have large L pleural effusion s/p pigtail insertion for 3 days with resolution of effusion. pleural studies at that time were consistent with exudative fluid, + atypical cell but no malignant cells.  Pt was also  admitted last week to [facility] for SOB and had a L thoracentesis 3 days PTP.    He was admitted to CCU AHRF 2/2 the pulmonary effusions, w/ SOB unresonsive to BIPAP that has now resolved. His hospital course has been complicated by Asx Bacteruria (Pseudomonas)       Hospital course has been complicated by Catheter-associated UTI.  Today is hospital day 3d, and this morning he is sitting comfortably in chair. Reports no events overnight.    Present Today:           Velásquez Catheter (x)No/ ()Yes?             Central Line (x)No/ ()Yes?             IV Fluids (x)No/ ()Yes?         OBJECTIVE  PAST MEDICAL & SURGICAL HISTORY  Pleural effusion  CHF (congestive heart failure)  Insomnia  BPH (benign prostatic hyperplasia)  Depression  Bipolar 1 disorder  HTN (hypertension)  History of thoracentesis    ALLERGIES:  No Known Allergies    MEDICATIONS:  STANDING MEDICATIONS  apixaban 5 milliGRAM(s) Oral every 12 hours  cefepime   IVPB      cefepime   IVPB 2000 milliGRAM(s) IV Intermittent once  cefepime   IVPB 2000 milliGRAM(s) IV Intermittent every 8 hours  chlorhexidine 4% Liquid 1 Application(s) Topical <User Schedule>  docusate sodium 100 milliGRAM(s) Oral three times a day  DULoxetine 60 milliGRAM(s) Oral daily  furosemide   Injectable 40 milliGRAM(s) IV Push daily  latanoprost 0.005% Ophthalmic Solution 1 Drop(s) Both EYES at bedtime  senna 2 Tablet(s) Oral at bedtime  tamsulosin 0.4 milliGRAM(s) Oral at bedtime  traZODone 50 milliGRAM(s) Oral at bedtime    PRN MEDICATIONS  acetaminophen   Tablet .. 650 milliGRAM(s) Oral every 6 hours PRN  morphine  - Injectable 2 milliGRAM(s) IV Push every 4 hours PRN  zolpidem 5 milliGRAM(s) Oral at bedtime PRN  zolpidem 5 milliGRAM(s) Oral at bedtime PRN      VITAL SIGNS: Last 24 Hours  T(C): 36.4 (24 Sep 2018 08:00), Max: 36.4 (24 Sep 2018 08:00)  T(F): 97.6 (24 Sep 2018 08:00), Max: 97.6 (24 Sep 2018 08:00)  HR: 110 (24 Sep 2018 08:00) (92 - 114)  BP: 115/66 (24 Sep 2018 08:00) (96/58 - 125/71)  BP(mean): 85 (24 Sep 2018 08:00) (70 - 90)  RR: 14 (24 Sep 2018 08:00) (14 - 30)  SpO2: 95% (24 Sep 2018 08:00) (95% - 100%)    LABS:                        11.1   7.57  )-----------( 296      ( 24 Sep 2018 05:50 )             34.5     09-24    131<L>  |  91<L>  |  14  ----------------------------<  91  4.2   |  32  |  0.9    Ca    8.5      24 Sep 2018 05:50  Mg     2.0     09-24    TPro  5.1<L>  /  Alb  3.3<L>  /  TBili  0.5  /  DBili  x   /  AST  15  /  ALT  28  /  AlkPhos  269<H>  09-24    PT/INR - ( 24 Sep 2018 05:50 )   PT: 16.10 sec;   INR: 1.50 ratio         PTT - ( 24 Sep 2018 05:50 )  PTT:37.1 sec          Culture - Body Fluid with Gram Stain (collected 23 Sep 2018 09:03)  Source: .Body Fluid Pleural Fluid  Gram Stain (23 Sep 2018 23:18):    polymorphonuclear leukocytes seen per low power field    No organisms seen per oil power field    by cytocentrifuge    Culture - Blood (collected 22 Sep 2018 05:04)  Source: .Blood None  Preliminary Report (23 Sep 2018 11:00):    No growth to date.    Culture - Urine (collected 21 Sep 2018 19:15)  Source: .Urine Catheterized  Preliminary Report (22 Sep 2018 20:34):    >100,000 CFU/ml Pseudomonas aeruginosa    >100,000 CFU/ml Enterococcus faecalis  Organism: Pseudomonas aeruginosa (23 Sep 2018 17:02)  Organism: Pseudomonas aeruginosa (23 Sep 2018 17:02)          VENT/ECHO  RADIOLOGY:  < from: Xray Chest 1 View- PORTABLE-Routine (09.24.18 @ 04:45) >  Impression:      CHF with bilateral pleural effusions left greater than right unchanged.    < end of copied text >        < from: [facility] Duplex Lower Ext Vein Scan, Bilat (09.22.18 @ 16:49) >  Impression:    DVT noted in the right femoral vein.    DVT noted in the left femoral, popliteal, gastrocnemius veins.     < end of copied text >      PHYSICAL EXAM:    GENERAL: NAD, well-developed  HEENT:  Atraumatic, Normocephalic.   PULMONARY: Clear to auscultation bilaterally;   CARDIOVASCULAR: Regular rate and rhythm;   GASTROINTESTINAL: Soft, Nontender, Nondistended;   MUSCULOSKELETAL:   No clubbing, cyanosis, or edema  NEUROLOGY: non-focal        ASSESSMENT & PLAN    57 yo M with HTN, DLD, depression, DVT on eliquis, and recurrent pleural effusions s/p pigtail insertion back in july, s/p thoracentesis 3 days PTP in [facility] presenting from [facility] for worsening SOB x3 days    # AHRF 2/2 Bilateral Pleural Effusions  -s/p thoracocentesis yesterday w/ removal of 2.8 L.  -F/U Fluid analysis  - no clinical evidence of PNA  - last 2d echo: Normal EF  - lasix 40 iv daily. Stop after today      # DVT  - Venous Duplex (09/22): DVT noted in the right femoral vein.DVT noted in the left femoral, popliteal, gastrocnemius veins.   -ELiquis restarted 8 hours after the thoracocentesis    #Catheter-associated UTI  - U/A +ve  -Ucx: Pseudomonas Aeuroginosa and Enterococcus Faecalis  Blood CX: -ve  -F/U ID   -Start IV Cefepime after cultures are collected    # Abdominal distention/ constipation  - persistent  - colace/ senna  - CT a/p on prior admission negative for any pathology    # Code: DNR/ DNI

## 2018-09-24 NOTE — PROGRESS NOTE ADULT - ASSESSMENT
IMPRESSION  Bilateral exudative plural effusions s/p right pleural effusions   Catheter associated UTi (from OSH dc) - Pseudomonas   >DVT on eliquis     PLAN     1. CNS  Monitor off sedation     2. CVS  Cont Eliquis     3. PULMONARY   - Hob > 45  - Oral care  - IV LASIX   - F/u studies from the thoracentisis fluid     4. INFECTIOUS DISEASE  cefepime   Repeat urine culx     5. GI  Feeding     6. RENAL AND ACID BASE   Monitor lytes and replete as needed     9. DVT AND GI PROPHYLAXIS   DVT px     10. CODE STATUS  DNR/DNI    11. DISPO   Downgrade to floor IMPRESSION    Bilateral exudative plural effusions s/p right pleural effusions f/up cytology  Catheter associated UTi (from OSH dc) - Pseudomonas   >DVT on eliquis     PLAN     1. CNS  Monitor off sedation     2. CVS  Cont Eliquis     3. PULMONARY   - Hob > 45  - Oral care  - IV LASIX   - F/u studies from the thoracentesis fluid     4. INFECTIOUS DISEASE  cefepime   Repeat urine culx     5. GI  Feeding     6. RENAL AND ACID BASE   Monitor lytes and replete as needed     9. DVT AND GI PROPHYLAXIS   DVT px     10. CODE STATUS  DNR/DNI    11. DISPO   Downgrade to floor

## 2018-09-25 LAB
ALBUMIN SERPL ELPH-MCNC: 3.4 G/DL — LOW (ref 3.5–5.2)
ALP SERPL-CCNC: 293 U/L — HIGH (ref 30–115)
ALT FLD-CCNC: 29 U/L — SIGNIFICANT CHANGE UP (ref 0–41)
ANION GAP SERPL CALC-SCNC: 13 MMOL/L — SIGNIFICANT CHANGE UP (ref 7–14)
APTT BLD: 37.7 SEC — SIGNIFICANT CHANGE UP (ref 27–39.2)
AST SERPL-CCNC: 15 U/L — SIGNIFICANT CHANGE UP (ref 0–41)
BASOPHILS # BLD AUTO: 0.05 K/UL — SIGNIFICANT CHANGE UP (ref 0–0.2)
BASOPHILS NFR BLD AUTO: 0.7 % — SIGNIFICANT CHANGE UP (ref 0–1)
BILIRUB SERPL-MCNC: 0.4 MG/DL — SIGNIFICANT CHANGE UP (ref 0.2–1.2)
BUN SERPL-MCNC: 12 MG/DL — SIGNIFICANT CHANGE UP (ref 10–20)
CALCIUM SERPL-MCNC: 9.1 MG/DL — SIGNIFICANT CHANGE UP (ref 8.5–10.1)
CHLORIDE SERPL-SCNC: 92 MMOL/L — LOW (ref 98–110)
CO2 SERPL-SCNC: 30 MMOL/L — SIGNIFICANT CHANGE UP (ref 17–32)
CREAT SERPL-MCNC: 0.8 MG/DL — SIGNIFICANT CHANGE UP (ref 0.7–1.5)
CULTURE RESULTS: SIGNIFICANT CHANGE UP
EOSINOPHIL # BLD AUTO: 0.22 K/UL — SIGNIFICANT CHANGE UP (ref 0–0.7)
EOSINOPHIL NFR BLD AUTO: 2.9 % — SIGNIFICANT CHANGE UP (ref 0–8)
GLUCOSE SERPL-MCNC: 90 MG/DL — SIGNIFICANT CHANGE UP (ref 70–99)
HCT VFR BLD CALC: 38.1 % — LOW (ref 42–52)
HGB BLD-MCNC: 12.1 G/DL — LOW (ref 14–18)
IMM GRANULOCYTES NFR BLD AUTO: 0.9 % — HIGH (ref 0.1–0.3)
INR BLD: 1.48 RATIO — HIGH (ref 0.65–1.3)
LYMPHOCYTES # BLD AUTO: 0.93 K/UL — LOW (ref 1.2–3.4)
LYMPHOCYTES # BLD AUTO: 12.4 % — LOW (ref 20.5–51.1)
MAGNESIUM SERPL-MCNC: 2.1 MG/DL — SIGNIFICANT CHANGE UP (ref 1.8–2.4)
MCHC RBC-ENTMCNC: 26.5 PG — LOW (ref 27–31)
MCHC RBC-ENTMCNC: 31.8 G/DL — LOW (ref 32–37)
MCV RBC AUTO: 83.6 FL — SIGNIFICANT CHANGE UP (ref 80–94)
MONOCYTES # BLD AUTO: 0.7 K/UL — HIGH (ref 0.1–0.6)
MONOCYTES NFR BLD AUTO: 9.3 % — SIGNIFICANT CHANGE UP (ref 1.7–9.3)
NEUTROPHILS # BLD AUTO: 5.52 K/UL — SIGNIFICANT CHANGE UP (ref 1.4–6.5)
NEUTROPHILS NFR BLD AUTO: 73.8 % — SIGNIFICANT CHANGE UP (ref 42.2–75.2)
NRBC # BLD: 0 /100 WBCS — SIGNIFICANT CHANGE UP (ref 0–0)
PH FLD: 7.6 — SIGNIFICANT CHANGE UP
PLATELET # BLD AUTO: 341 K/UL — SIGNIFICANT CHANGE UP (ref 130–400)
POTASSIUM SERPL-MCNC: 4.6 MMOL/L — SIGNIFICANT CHANGE UP (ref 3.5–5)
POTASSIUM SERPL-SCNC: 4.6 MMOL/L — SIGNIFICANT CHANGE UP (ref 3.5–5)
PROT SERPL-MCNC: 5.6 G/DL — LOW (ref 6–8)
PROTHROM AB SERPL-ACNC: 15.9 SEC — HIGH (ref 9.95–12.87)
RBC # BLD: 4.56 M/UL — LOW (ref 4.7–6.1)
RBC # FLD: 15 % — HIGH (ref 11.5–14.5)
SODIUM SERPL-SCNC: 135 MMOL/L — SIGNIFICANT CHANGE UP (ref 135–146)
SPECIMEN SOURCE: SIGNIFICANT CHANGE UP
WBC # BLD: 7.49 K/UL — SIGNIFICANT CHANGE UP (ref 4.8–10.8)
WBC # FLD AUTO: 7.49 K/UL — SIGNIFICANT CHANGE UP (ref 4.8–10.8)

## 2018-09-25 RX ADMIN — Medication 50 MILLIGRAM(S): at 21:09

## 2018-09-25 RX ADMIN — LATANOPROST 1 DROP(S): 0.05 SOLUTION/ DROPS OPHTHALMIC; TOPICAL at 21:09

## 2018-09-25 RX ADMIN — CEFEPIME 100 MILLIGRAM(S): 1 INJECTION, POWDER, FOR SOLUTION INTRAMUSCULAR; INTRAVENOUS at 05:26

## 2018-09-25 RX ADMIN — Medication 40 MILLIGRAM(S): at 05:26

## 2018-09-25 RX ADMIN — DULOXETINE HYDROCHLORIDE 60 MILLIGRAM(S): 30 CAPSULE, DELAYED RELEASE ORAL at 11:51

## 2018-09-25 RX ADMIN — MORPHINE SULFATE 2 MILLIGRAM(S): 50 CAPSULE, EXTENDED RELEASE ORAL at 14:01

## 2018-09-25 RX ADMIN — SENNA PLUS 2 TABLET(S): 8.6 TABLET ORAL at 21:09

## 2018-09-25 RX ADMIN — MORPHINE SULFATE 2 MILLIGRAM(S): 50 CAPSULE, EXTENDED RELEASE ORAL at 13:45

## 2018-09-25 RX ADMIN — TAMSULOSIN HYDROCHLORIDE 0.4 MILLIGRAM(S): 0.4 CAPSULE ORAL at 21:09

## 2018-09-25 RX ADMIN — ZOLPIDEM TARTRATE 5 MILLIGRAM(S): 10 TABLET ORAL at 21:51

## 2018-09-25 RX ADMIN — CHLORHEXIDINE GLUCONATE 1 APPLICATION(S): 213 SOLUTION TOPICAL at 05:25

## 2018-09-25 RX ADMIN — Medication 100 MILLIGRAM(S): at 05:26

## 2018-09-25 RX ADMIN — APIXABAN 5 MILLIGRAM(S): 2.5 TABLET, FILM COATED ORAL at 11:51

## 2018-09-25 RX ADMIN — Medication 100 MILLIGRAM(S): at 21:09

## 2018-09-25 RX ADMIN — APIXABAN 5 MILLIGRAM(S): 2.5 TABLET, FILM COATED ORAL at 21:09

## 2018-09-25 NOTE — PROGRESS NOTE ADULT - SUBJECTIVE AND OBJECTIVE BOX
AMRIK MADRID  58y  Male    Patient is a 58y old  Male who presents with a chief complaint of worsening SOB (25 Sep 2018 14:07)    INTERVAL HPI/OVERNIGHT EVENTS: today is the 1st day in 4C. Transferred from CCU.     PAST MEDICAL & SURGICAL HISTORY:  Pleural effusion  CHF (congestive heart failure)  Insomnia  BPH (benign prostatic hyperplasia)  Depression  Bipolar 1 disorder  HTN (hypertension)  History of thoracentesis    VS:   T(F): 96.9 (09-25-18 @ 12:03), Max: 98.6 (09-24-18 @ 20:14)  HR: 119 (09-25-18 @ 13:39) (91 - 119)  BP: 129/70 (09-25-18 @ 13:39) (103/69 - 133/68)  RR: 20 (09-25-18 @ 12:03) (18 - 20)  SpO2: 96% (09-25-18 @ 13:39) (96% - 96%)    PHYSICAL EXAM:  GENERAL: NAD, well-developed, sitting in bed with NC Oxygen, Comfortable  HEAD:  Atraumatic, Normocephalic  EYES: EOMI, PERRLA, conjunctiva and sclera clear  NECK: Supple, No JVD  CHEST/LUNG: Decreased BS bilaterally L>R, No wheeze  HEART: Regular rate and rhythm; No murmurs, rubs, or gallops  ABDOMEN: Soft, Nontender, Nondistended; Bowel sounds present  EXTREMITIES:  2+ Peripheral Pulses, No clubbing, cyanosis, or edema  PSYCH: AAOx3  NEUROLOGY: non-focal  SKIN: No rashes or lesions    LABS:                        12.1   7.49  )-----------( 341      ( 25 Sep 2018 05:36 )             38.1       09-25    135  |  92<L>  |  12  ----------------------------<  90  4.6   |  30  |  0.8    Ca    9.1      25 Sep 2018 05:36  Mg     2.1     09-25    TPro  5.6<L>  /  Alb  3.4<L>  /  TBili  0.4  /  DBili  x   /  AST  15  /  ALT  29  /  AlkPhos  293<H>  09-25    PT/INR - ( 25 Sep 2018 05:36 )   PT: 15.90 sec;   INR: 1.48 ratio      PTT - ( 25 Sep 2018 05:36 )  PTT:37.7 sec    MICROBIOLOGY: none     RADIOLOGY & ADDITIONAL TESTS:   Impression:  Bilateral effusions and opacities as above, not significantly changed.    MEDICATIONS  (STANDING):  apixaban 5 milliGRAM(s) Oral every 12 hours  chlorhexidine 4% Liquid 1 Application(s) Topical <User Schedule>  docusate sodium 100 milliGRAM(s) Oral three times a day  DULoxetine 60 milliGRAM(s) Oral daily  furosemide   Injectable 40 milliGRAM(s) IV Push daily  latanoprost 0.005% Ophthalmic Solution 1 Drop(s) Both EYES at bedtime  senna 2 Tablet(s) Oral at bedtime  tamsulosin 0.4 milliGRAM(s) Oral at bedtime  traZODone 50 milliGRAM(s) Oral at bedtime    MEDICATIONS  (PRN):  acetaminophen   Tablet .. 650 milliGRAM(s) Oral every 6 hours PRN Moderate Pain (4 - 6)  morphine  - Injectable 2 milliGRAM(s) IV Push every 4 hours PRN Severe Pain (7 - 10)  zolpidem 5 milliGRAM(s) Oral at bedtime PRN Insomnia  zolpidem 5 milliGRAM(s) Oral at bedtime PRN Insomnia    TTE - NL EF no acute abnormalities in July 2018

## 2018-09-25 NOTE — PROGRESS NOTE ADULT - ASSESSMENT
recurrent effusion s/p thora 2 times l side, 1 thora r side, very poor f/up r/o malignancy    - F/UP CYTO/ FLOW. WILL DISCUSS REGARDING PLEURX CATHTER ALL OVER POOR PROGNOSIS

## 2018-09-25 NOTE — PROGRESS NOTE ADULT - ASSESSMENT
58M with above h/x s/p Acute Respiratory Failure due to Bilateral Recurrent Exudative Pleural Effusion s/p Thorasynthesis and admission to CCU now on 4C. He is improving and afebrile.     1. Acute Respiratory Failure - improved     2. Pleural effusion - Exudative Effusion ?Etiology and recurrent. CT Surgery Consult for possible pleural bx and TalcRx. c/w IV Lasix 40mg for now.     3. CHF (congestive heart failure) - diastolic dysfunction ?compensated     4. DVT - c/w Eliquis     5. Fluid Retention = Rule out liver dz - Sonogram Liver r/o Cirrhosis  - Doubt nephrotic syndrome as no DONALD Alb 3.5    HTN/Bipolar I - c/w home meds     Dispo: Active

## 2018-09-25 NOTE — PROGRESS NOTE ADULT - ASSESSMENT
57 yo M with HTN, DLD, depression, DVT on eliquis, and recurrent pleural effusions s/p pigtail insertion back in july, s/p thoracentesis 3 days PTP in [facility] presenting from [facility] for worsening SOB x3 days.    #Recurrent bilateral pleural effusions s/p thoracentesis   -recurrent, exudative in nature  -rule out underlying malignancy vs infection vs volume overload  -last 2d echo: Normal EF  -no clinical evidence of PNA  -f/u fluid analysis   -c/w lasix 40mg IV daily  -s/p thoracentesis showing serosanguineous fluid  -spoke with CTS: no area of suspicion for pleural bx at this time, will f/u with Hospitalist     #Bacteruria growing pseudomonas and enterococcus faecalis  -asympatomatic bacteruia  -blood culture NGTD  -d/c abx as per ID    #Tachycardia - stable  -EKG shows sinus tachycardia   -as per pt, baseline tachycardiac    #Borderline BP:  -Hold BB meds for now  -keep Is=Os    #Abdominal distention/ constipation - persistent  -colace/ senna  -CT A/P on prior admission negative for any pathology    #H/O DVt  -Venous duplex shows: rt femoral vein DVT and lt femoral, popliteal and gastrocnemius veins DVT  -c/w eliquis    #BPH - c/w flomax  #Depression - c/w home meds  #Code status: DNR/ DNI

## 2018-09-25 NOTE — PROGRESS NOTE ADULT - SUBJECTIVE AND OBJECTIVE BOX
AMRIK MADRID 58y Male  MRN#: [id number]     COURSE SUMMARY        SUBJECTIVE  Patient is a 58y old Male who presents with a chief complaint of worsening SOB (25 Sep 2018 06:53)    he is currently admitted to medicine with the primary diagnosis of Pleural effusion    Today is hospital day 4d, and this morning he is _    No acute overnight events.     OBJECTIVE  PAST MEDICAL & SURGICAL HISTORY  Pleural effusion  CHF (congestive heart failure)  Insomnia  BPH (benign prostatic hyperplasia)  Depression  Bipolar 1 disorder  HTN (hypertension)  History of thoracentesis    ALLERGIES:  No Known Allergies    MEDICATIONS:  STANDING MEDICATIONS  apixaban 5 milliGRAM(s) Oral every 12 hours  chlorhexidine 4% Liquid 1 Application(s) Topical <User Schedule>  docusate sodium 100 milliGRAM(s) Oral three times a day  DULoxetine 60 milliGRAM(s) Oral daily  furosemide   Injectable 40 milliGRAM(s) IV Push daily  latanoprost 0.005% Ophthalmic Solution 1 Drop(s) Both EYES at bedtime  senna 2 Tablet(s) Oral at bedtime  tamsulosin 0.4 milliGRAM(s) Oral at bedtime  traZODone 50 milliGRAM(s) Oral at bedtime    PRN MEDICATIONS  acetaminophen   Tablet .. 650 milliGRAM(s) Oral every 6 hours PRN  morphine  - Injectable 2 milliGRAM(s) IV Push every 4 hours PRN  zolpidem 5 milliGRAM(s) Oral at bedtime PRN  zolpidem 5 milliGRAM(s) Oral at bedtime PRN    HOME MEDICATIONS  Home Medications:  Ambien 10 mg oral tablet: 1 tab(s) orally once a day (at bedtime) (21 Sep 2018 21:50)  apixaban 5 mg oral tablet: 1 tab(s) orally every 12 hours (21 Sep 2018 21:50)  Cardizem 60 mg oral tablet: 1 tab(s) orally every 8 hours (21 Sep 2018 21:50)  clotrimazole 1% topical cream: 1 application topically  (21 Sep 2018 21:50)  DULoxetine 60 mg oral delayed release capsule: 1 cap(s) orally once a day (21 Sep 2018 21:50)  isosorbide mononitrate 20 mg oral tablet: 1 tab(s) orally once a day (21 Sep 2018 21:50)  latanoprost 0.005% ophthalmic solution: 1 drop(s) to each affected eye once a day (in the evening) (21 Sep 2018 21:50)  Multiple Vitamins oral capsule: 1 cap(s) orally once a day (21 Sep 2018 21:50)  propranolol 20 mg oral tablet: 1 tab(s) orally 2 times a day (21 Sep 2018 21:50)  tamsulosin 0.4 mg oral capsule: 1 cap(s) orally once a day (21 Sep 2018 21:50)  traZODone 50 mg oral tablet: 1 tab(s) orally once a day (at bedtime) (21 Sep 2018 21:50)      VITAL SIGNS: Last 24 Hours  T(C): 36 (25 Sep 2018 05:21), Max: 37 (24 Sep 2018 20:14)  T(F): 96.8 (25 Sep 2018 05:21), Max: 98.6 (24 Sep 2018 20:14)  HR: 91 (25 Sep 2018 05:21) (91 - 116)  BP: 121/75 (25 Sep 2018 05:21) (113/65 - 133/68)  BP(mean): 89 (24 Sep 2018 12:00) (89 - 89)  RR: 20 (25 Sep 2018 05:21) (18 - 21)  SpO2: 98% (24 Sep 2018 12:00) (98% - 98%)    LABS:                        12.1   7.49  )-----------( 341      ( 25 Sep 2018 05:36 )             38.1     09-25    135  |  92<L>  |  12  ----------------------------<  90  4.6   |  30  |  0.8    Ca    9.1      25 Sep 2018 05:36  Mg     2.1     09-25    TPro  5.6<L>  /  Alb  3.4<L>  /  TBili  0.4  /  DBili  x   /  AST  15  /  ALT  29  /  AlkPhos  293<H>  09-25    LIVER FUNCTIONS - ( 25 Sep 2018 05:36 )  Alb: 3.4 g/dL / Pro: 5.6 g/dL / ALK PHOS: 293 U/L / ALT: 29 U/L / AST: 15 U/L / GGT: x           PT/INR - ( 25 Sep 2018 05:36 )   PT: 15.90 sec;   INR: 1.48 ratio         PTT - ( 25 Sep 2018 05:36 )  PTT:37.7 sec          Culture - Body Fluid with Gram Stain (collected 23 Sep 2018 09:03)  Source: .Body Fluid Pleural Fluid  Gram Stain (23 Sep 2018 23:18):    polymorphonuclear leukocytes seen per low power field    No organisms seen per oil power field    by cytocentrifuge  Preliminary Report (24 Sep 2018 16:24):    No growth      CAPILLARY BLOOD GLUCOSE          RADIOLOGY:      PHYSICAL EXAM:    GENERAL: NAD, well-developed, AAOx3  HEENT:  Atraumatic, Normocephalic. EOMI, PERRLA, conjunctiva and sclera clear, No JVD  PULMONARY: Clear to auscultation bilaterally; No wheeze  CARDIOVASCULAR: Regular rate and rhythm; No murmurs, rubs, or gallops  GASTROINTESTINAL: Soft, Nontender, Nondistended; Bowel sounds present  MUSCULOSKELETAL:  2+ Peripheral Pulses, No clubbing, cyanosis, or edema  NEUROLOGY: non-focal  SKIN: No rashes or lesions      ADMISSION SUMMARY  Patient is a 58y old Male who presents with a chief complaint of worsening SOB (25 Sep 2018 06:53)     he currently admitted to medicine with the primary diagnosis of Pleural effusion AMRIK MADRID 58y Male  MRN#: [id number]     COURSE SUMMARY        SUBJECTIVE  Patient is a 58y old Male who presents with a chief complaint of worsening SOB (25 Sep 2018 06:53)    he is currently admitted to medicine with the primary diagnosis of Pleural effusion    Today is hospital day 4d, and this morning he is sitting on chair without complaints. Pt states he is feeling much better than he felt yesterday s/p thoracentesis. Pt still     No acute overnight events.     OBJECTIVE  PAST MEDICAL & SURGICAL HISTORY  Pleural effusion  CHF (congestive heart failure)  Insomnia  BPH (benign prostatic hyperplasia)  Depression  Bipolar 1 disorder  HTN (hypertension)  History of thoracentesis    ALLERGIES:  No Known Allergies    MEDICATIONS:  STANDING MEDICATIONS  apixaban 5 milliGRAM(s) Oral every 12 hours  chlorhexidine 4% Liquid 1 Application(s) Topical <User Schedule>  docusate sodium 100 milliGRAM(s) Oral three times a day  DULoxetine 60 milliGRAM(s) Oral daily  furosemide   Injectable 40 milliGRAM(s) IV Push daily  latanoprost 0.005% Ophthalmic Solution 1 Drop(s) Both EYES at bedtime  senna 2 Tablet(s) Oral at bedtime  tamsulosin 0.4 milliGRAM(s) Oral at bedtime  traZODone 50 milliGRAM(s) Oral at bedtime    PRN MEDICATIONS  acetaminophen   Tablet .. 650 milliGRAM(s) Oral every 6 hours PRN  morphine  - Injectable 2 milliGRAM(s) IV Push every 4 hours PRN  zolpidem 5 milliGRAM(s) Oral at bedtime PRN  zolpidem 5 milliGRAM(s) Oral at bedtime PRN    HOME MEDICATIONS  Home Medications:  Ambien 10 mg oral tablet: 1 tab(s) orally once a day (at bedtime) (21 Sep 2018 21:50)  apixaban 5 mg oral tablet: 1 tab(s) orally every 12 hours (21 Sep 2018 21:50)  Cardizem 60 mg oral tablet: 1 tab(s) orally every 8 hours (21 Sep 2018 21:50)  clotrimazole 1% topical cream: 1 application topically  (21 Sep 2018 21:50)  DULoxetine 60 mg oral delayed release capsule: 1 cap(s) orally once a day (21 Sep 2018 21:50)  isosorbide mononitrate 20 mg oral tablet: 1 tab(s) orally once a day (21 Sep 2018 21:50)  latanoprost 0.005% ophthalmic solution: 1 drop(s) to each affected eye once a day (in the evening) (21 Sep 2018 21:50)  Multiple Vitamins oral capsule: 1 cap(s) orally once a day (21 Sep 2018 21:50)  propranolol 20 mg oral tablet: 1 tab(s) orally 2 times a day (21 Sep 2018 21:50)  tamsulosin 0.4 mg oral capsule: 1 cap(s) orally once a day (21 Sep 2018 21:50)  traZODone 50 mg oral tablet: 1 tab(s) orally once a day (at bedtime) (21 Sep 2018 21:50)      VITAL SIGNS: Last 24 Hours  T(C): 36 (25 Sep 2018 05:21), Max: 37 (24 Sep 2018 20:14)  T(F): 96.8 (25 Sep 2018 05:21), Max: 98.6 (24 Sep 2018 20:14)  HR: 91 (25 Sep 2018 05:21) (91 - 116)  BP: 121/75 (25 Sep 2018 05:21) (113/65 - 133/68)  BP(mean): 89 (24 Sep 2018 12:00) (89 - 89)  RR: 20 (25 Sep 2018 05:21) (18 - 21)  SpO2: 98% (24 Sep 2018 12:00) (98% - 98%)    LABS:                        12.1   7.49  )-----------( 341      ( 25 Sep 2018 05:36 )             38.1     09-25    135  |  92<L>  |  12  ----------------------------<  90  4.6   |  30  |  0.8    Ca    9.1      25 Sep 2018 05:36  Mg     2.1     09-25    TPro  5.6<L>  /  Alb  3.4<L>  /  TBili  0.4  /  DBili  x   /  AST  15  /  ALT  29  /  AlkPhos  293<H>  09-25    LIVER FUNCTIONS - ( 25 Sep 2018 05:36 )  Alb: 3.4 g/dL / Pro: 5.6 g/dL / ALK PHOS: 293 U/L / ALT: 29 U/L / AST: 15 U/L / GGT: x           PT/INR - ( 25 Sep 2018 05:36 )   PT: 15.90 sec;   INR: 1.48 ratio         PTT - ( 25 Sep 2018 05:36 )  PTT:37.7 sec          Culture - Body Fluid with Gram Stain (collected 23 Sep 2018 09:03)  Source: .Body Fluid Pleural Fluid  Gram Stain (23 Sep 2018 23:18):    polymorphonuclear leukocytes seen per low power field    No organisms seen per oil power field    by cytocentrifuge  Preliminary Report (24 Sep 2018 16:24):    No growth      CAPILLARY BLOOD GLUCOSE          RADIOLOGY:      PHYSICAL EXAM:    GENERAL: NAD, well-developed, AAOx3  HEENT:  Atraumatic, Normocephalic. EOMI, PERRLA, conjunctiva and sclera clear, No JVD  PULMONARY: Clear to auscultation bilaterally; No wheeze  CARDIOVASCULAR: Regular rate and rhythm; No murmurs, rubs, or gallops  GASTROINTESTINAL: Soft, Nontender, Nondistended; Bowel sounds present  MUSCULOSKELETAL:  2+ Peripheral Pulses, No clubbing, cyanosis, or edema  NEUROLOGY: non-focal  SKIN: No rashes or lesions      ADMISSION SUMMARY  Patient is a 58y old Male who presents with a chief complaint of worsening SOB (25 Sep 2018 06:53)     he currently admitted to medicine with the primary diagnosis of Pleural effusion AMRIK MADRID 58y Male  MRN#: [id number]     COURSE SUMMARY    57 yo M with HTN, DLD, depression, DVT on eliquis, and recurrent pleural effusions s/p multiple thoracentesis presenting from [facility] for worsening SOB since discharge from [facility]. Pt denies any fever/ chills. He reports intermittent non productive cough. No chest pain. Pt also c/o persistent diffuse abdominal pain along with distension, constipation and flatus. Pt denies any other symptoms. (To note, pt is very poor historian and can't confirm his medications)    Pt was admitted for same complaint back in July and was found to have large Lt pleural effusion s/p pigtail insertion for 3 days with resolution of effusion. Pleural studies at that time were consistent with exudative fluid and atypical cells, but no malignant cells. Pt was admitted last week to [facility] for SOB and had a Lt thoracentesis 3 days PTP. Came with alfonso from rehab.    Pt was in CCU and had thoracentesis performed. Now, the patient was transferred to  for further monitoring.     SUBJECTIVE  Patient is a 58y old Male who presents with a chief complaint of worsening SOB (25 Sep 2018 06:53)    he is currently admitted to medicine with the primary diagnosis of Pleural effusion    Today is hospital day 4d, and this morning he is sitting on chair without complaints. Pt states he is feeling much better than he felt yesterday s/p thoracentesis. Pt still complains of fluid in his abdomen and SOB.     No acute overnight events.     OBJECTIVE  PAST MEDICAL & SURGICAL HISTORY  Pleural effusion  CHF (congestive heart failure)  Insomnia  BPH (benign prostatic hyperplasia)  Depression  Bipolar 1 disorder  HTN (hypertension)  History of thoracentesis    ALLERGIES:  No Known Allergies    MEDICATIONS:  STANDING MEDICATIONS  apixaban 5 milliGRAM(s) Oral every 12 hours  chlorhexidine 4% Liquid 1 Application(s) Topical <User Schedule>  docusate sodium 100 milliGRAM(s) Oral three times a day  DULoxetine 60 milliGRAM(s) Oral daily  furosemide   Injectable 40 milliGRAM(s) IV Push daily  latanoprost 0.005% Ophthalmic Solution 1 Drop(s) Both EYES at bedtime  senna 2 Tablet(s) Oral at bedtime  tamsulosin 0.4 milliGRAM(s) Oral at bedtime  traZODone 50 milliGRAM(s) Oral at bedtime    PRN MEDICATIONS  acetaminophen   Tablet .. 650 milliGRAM(s) Oral every 6 hours PRN  morphine  - Injectable 2 milliGRAM(s) IV Push every 4 hours PRN  zolpidem 5 milliGRAM(s) Oral at bedtime PRN  zolpidem 5 milliGRAM(s) Oral at bedtime PRN    HOME MEDICATIONS  Home Medications:  Ambien 10 mg oral tablet: 1 tab(s) orally once a day (at bedtime) (21 Sep 2018 21:50)  apixaban 5 mg oral tablet: 1 tab(s) orally every 12 hours (21 Sep 2018 21:50)  Cardizem 60 mg oral tablet: 1 tab(s) orally every 8 hours (21 Sep 2018 21:50)  clotrimazole 1% topical cream: 1 application topically  (21 Sep 2018 21:50)  DULoxetine 60 mg oral delayed release capsule: 1 cap(s) orally once a day (21 Sep 2018 21:50)  isosorbide mononitrate 20 mg oral tablet: 1 tab(s) orally once a day (21 Sep 2018 21:50)  latanoprost 0.005% ophthalmic solution: 1 drop(s) to each affected eye once a day (in the evening) (21 Sep 2018 21:50)  Multiple Vitamins oral capsule: 1 cap(s) orally once a day (21 Sep 2018 21:50)  propranolol 20 mg oral tablet: 1 tab(s) orally 2 times a day (21 Sep 2018 21:50)  tamsulosin 0.4 mg oral capsule: 1 cap(s) orally once a day (21 Sep 2018 21:50)  traZODone 50 mg oral tablet: 1 tab(s) orally once a day (at bedtime) (21 Sep 2018 21:50)      VITAL SIGNS: Last 24 Hours  T(C): 36 (25 Sep 2018 05:21), Max: 37 (24 Sep 2018 20:14)  T(F): 96.8 (25 Sep 2018 05:21), Max: 98.6 (24 Sep 2018 20:14)  HR: 91 (25 Sep 2018 05:21) (91 - 116)  BP: 121/75 (25 Sep 2018 05:21) (113/65 - 133/68)  BP(mean): 89 (24 Sep 2018 12:00) (89 - 89)  RR: 20 (25 Sep 2018 05:21) (18 - 21)  SpO2: 98% (24 Sep 2018 12:00) (98% - 98%)    LABS:                        12.1   7.49  )-----------( 341      ( 25 Sep 2018 05:36 )             38.1     09-25    135  |  92<L>  |  12  ----------------------------<  90  4.6   |  30  |  0.8    Ca    9.1      25 Sep 2018 05:36  Mg     2.1     09-25    TPro  5.6<L>  /  Alb  3.4<L>  /  TBili  0.4  /  DBili  x   /  AST  15  /  ALT  29  /  AlkPhos  293<H>  09-25    LIVER FUNCTIONS - ( 25 Sep 2018 05:36 )  Alb: 3.4 g/dL / Pro: 5.6 g/dL / ALK PHOS: 293 U/L / ALT: 29 U/L / AST: 15 U/L / GGT: x           PT/INR - ( 25 Sep 2018 05:36 )   PT: 15.90 sec;   INR: 1.48 ratio         PTT - ( 25 Sep 2018 05:36 )  PTT:37.7 sec          Culture - Body Fluid with Gram Stain (collected 23 Sep 2018 09:03)  Source: .Body Fluid Pleural Fluid  Gram Stain (23 Sep 2018 23:18):    polymorphonuclear leukocytes seen per low power field    No organisms seen per oil power field    by cytocentrifuge  Preliminary Report (24 Sep 2018 16:24):    No growth      CAPILLARY BLOOD GLUCOSE          RADIOLOGY:      PHYSICAL EXAM:    GENERAL: NAD, well-developed, AAOx3  HEENT:  Atraumatic, Normocephalic. EOMI, PERRLA, conjunctiva and sclera clear, No JVD  PULMONARY: Clear to auscultation bilaterally; No wheeze  CARDIOVASCULAR: Regular rate and rhythm; No murmurs, rubs, or gallops  GASTROINTESTINAL: Soft, Nontender, Nondistended; Bowel sounds present  MUSCULOSKELETAL:  2+ Peripheral Pulses, No clubbing, cyanosis, or edema  NEUROLOGY: non-focal  SKIN: No rashes or lesions      ADMISSION SUMMARY  Patient is a 58y old Male who presents with a chief complaint of worsening SOB (25 Sep 2018 06:53)     he currently admitted to medicine with the primary diagnosis of Pleural effusion MARIK MADRID 58y Male  MRN#: [id number]     COURSE SUMMARY    57 yo M with HTN, DLD, depression, DVT on eliquis, and recurrent pleural effusions s/p multiple thor[facility]. Pt denies any fever/ chills. He reports intermittent non productive cough. No chest pain. Pt also c/o persistent diffuse abdominal pain along with distension, constipation and flatus. Pt denies any other symptoms. (To note, pt is very poor historian and can't confirm his medications)    Pt was admitted for same complaint back in July and was found to have large Lt pleural effusion s/p pigtail insertion for 3 days with resolution of effusion. Pleural studies at that time were consistent with exudative fluid and atypical cells, but no malignant cells. Pt was admitted last week to [facility] for SOB and had a Lt thoracentesis 3 days PTP. Came with alfonso from rehab.    Pt was in CCU and had thoracentesis performed. Now, the patient was transferred to  for further monitoring.     SUBJECTIVE  Patient is a 58y old Male who presents with a chief complaint of worsening SOB (25 Sep 2018 06:53)    he is currently admitted to medicine with the primary diagnosis of Pleural effusion    Today is hospital day 4d, and this morning he is sitting on chair without complaints. Pt states he is feeling much better than he felt yesterday s/p thoracentesis. Pt still complains of fluid in his abdomen and SOB.     No acute overnight events.     OBJECTIVE  PAST MEDICAL & SURGICAL HISTORY  Pleural effusion  CHF (congestive heart failure)  Insomnia  BPH (benign prostatic hyperplasia)  Depression  Bipolar 1 disorder  HTN (hypertension)  History of thoracentesis    ALLERGIES:  No Known Allergies    MEDICATIONS:  STANDING MEDICATIONS  apixaban 5 milliGRAM(s) Oral every 12 hours  chlorhexidine 4% Liquid 1 Application(s) Topical <User Schedule>  docusate sodium 100 milliGRAM(s) Oral three times a day  DULoxetine 60 milliGRAM(s) Oral daily  furosemide   Injectable 40 milliGRAM(s) IV Push daily  latanoprost 0.005% Ophthalmic Solution 1 Drop(s) Both EYES at bedtime  senna 2 Tablet(s) Oral at bedtime  tamsulosin 0.4 milliGRAM(s) Oral at bedtime  traZODone 50 milliGRAM(s) Oral at bedtime    PRN MEDICATIONS  acetaminophen   Tablet .. 650 milliGRAM(s) Oral every 6 hours PRN  morphine  - Injectable 2 milliGRAM(s) IV Push every 4 hours PRN  zolpidem 5 milliGRAM(s) Oral at bedtime PRN  zolpidem 5 milliGRAM(s) Oral at bedtime PRN    HOME MEDICATIONS  Home Medications:  Ambien 10 mg oral tablet: 1 tab(s) orally once a day (at bedtime) (21 Sep 2018 21:50)  apixaban 5 mg oral tablet: 1 tab(s) orally every 12 hours (21 Sep 2018 21:50)  Cardizem 60 mg oral tablet: 1 tab(s) orally every 8 hours (21 Sep 2018 21:50)  clotrimazole 1% topical cream: 1 application topically  (21 Sep 2018 21:50)  DULoxetine 60 mg oral delayed release capsule: 1 cap(s) orally once a day (21 Sep 2018 21:50)  isosorbide mononitrate 20 mg oral tablet: 1 tab(s) orally once a day (21 Sep 2018 21:50)  latanoprost 0.005% ophthalmic solution: 1 drop(s) to each affected eye once a day (in the evening) (21 Sep 2018 21:50)  Multiple Vitamins oral capsule: 1 cap(s) orally once a day (21 Sep 2018 21:50)  propranolol 20 mg oral tablet: 1 tab(s) orally 2 times a day (21 Sep 2018 21:50)  tamsulosin 0.4 mg oral capsule: 1 cap(s) orally once a day (21 Sep 2018 21:50)  traZODone 50 mg oral tablet: 1 tab(s) orally once a day (at bedtime) (21 Sep 2018 21:50)      VITAL SIGNS: Last 24 Hours  T(C): 36 (25 Sep 2018 05:21), Max: 37 (24 Sep 2018 20:14)  T(F): 96.8 (25 Sep 2018 05:21), Max: 98.6 (24 Sep 2018 20:14)  HR: 91 (25 Sep 2018 05:21) (91 - 116)  BP: 121/75 (25 Sep 2018 05:21) (113/65 - 133/68)  BP(mean): 89 (24 Sep 2018 12:00) (89 - 89)  RR: 20 (25 Sep 2018 05:21) (18 - 21)  SpO2: 98% (24 Sep 2018 12:00) (98% - 98%)    LABS:                        12.1   7.49  )-----------( 341      ( 25 Sep 2018 05:36 )             38.1     09-25    135  |  92<L>  |  12  ----------------------------<  90  4.6   |  30  |  0.8    Ca    9.1      25 Sep 2018 05:36  Mg     2.1     09-25    TPro  5.6<L>  /  Alb  3.4<L>  /  TBili  0.4  /  DBili  x   /  AST  15  /  ALT  29  /  AlkPhos  293<H>  09-25    LIVER FUNCTIONS - ( 25 Sep 2018 05:36 )  Alb: 3.4 g/dL / Pro: 5.6 g/dL / ALK PHOS: 293 U/L / ALT: 29 U/L / AST: 15 U/L / GGT: x           PT/INR - ( 25 Sep 2018 05:36 )   PT: 15.90 sec;   INR: 1.48 ratio         PTT - ( 25 Sep 2018 05:36 )  PTT:37.7 sec          Culture - Body Fluid with Gram Stain (collected 23 Sep 2018 09:03)  Source: .Body Fluid Pleural Fluid  Gram Stain (23 Sep 2018 23:18):    polymorphonuclear leukocytes seen per low power field    No organisms seen per oil power field    by cytocentrifuge  Preliminary Report (24 Sep 2018 16:24):    No growth      CAPILLARY BLOOD GLUCOSE          RADIOLOGY:      PHYSICAL EXAM:    GENERAL: NAD, well-developed, AAOx3  HEENT:  Atraumatic, Normocephalic. Strabismus noted.  PULMONARY: Crackles noted. Decreased B/L lung sounds  CARDIOVASCULAR: Tachycardiac, but normal rhythm; No murmurs.  GASTROINTESTINAL: Dullness on percussion; Bowel sound noted, Non-tender.  MUSCULOSKELETAL:  2+ Peripheral Pulses, No clubbing, cyanosis, or edema      ADMISSION SUMMARY  Patient is a 58y old Male who presents with a chief complaint of worsening SOB (25 Sep 2018 06:53)     he currently admitted to medicine with the primary diagnosis of Pleural effusion AMRIK MADRID 58y Male  MRN#: [id number]     COURSE SUMMARY    59 yo M with HTN, DLD, depr	ession, DVT on eliquis, and recurrent pleural effusions s/p multiple thoracentesis presenting from [facility] for worsening SOB since discharge from [facility]. Pt denies any fever/ chills. He reports intermittent non productive cough. No chest pain. Pt also c/o persistent diffuse abdominal pain along with distension, constipation and flatus. Pt denies any other symptoms. (To note, pt is very poor historian and can't confirm his medications)    Pt was admitted for same complaint back in July and was found to have large Lt pleural effusion s/p pigtail insertion for 3 days with resolution of effusion. Pleural studies at that time were consistent with exudative fluid and atypical cells, but no malignant cells. Pt was admitted last week to [facility] for SOB and had a Lt thoracentesis 3 days PTP. Came with hamilton from rehab.    Pt was in CCU and had thoracentesis performed. Now, the patient was transferred to  for further monitoring.     SUBJECTIVE  Patient is a 58y old Male who presents with a chief complaint of worsening SOB (25 Sep 2018 06:53)    he is currently admitted to medicine with the primary diagnosis of Pleural effusion    Today is hospital day 4d, and this morning he is sitting on chair without complaints. Pt states he is feeling much better than he felt yesterday s/p thoracentesis. Pt still complains of fluid in his abdomen and SOB.     No acute overnight events.     OBJECTIVE  PAST MEDICAL & SURGICAL HISTORY  Pleural effusion  CHF (congestive heart failure)  Insomnia  BPH (benign prostatic hyperplasia)  Depression  Bipolar 1 disorder  HTN (hypertension)  History of thoracentesis    ALLERGIES:  No Known Allergies    MEDICATIONS:  STANDING MEDICATIONS  apixaban 5 milliGRAM(s) Oral every 12 hours  chlorhexidine 4% Liquid 1 Application(s) Topical <User Schedule>  docusate sodium 100 milliGRAM(s) Oral three times a day  DULoxetine 60 milliGRAM(s) Oral daily  furosemide   Injectable 40 milliGRAM(s) IV Push daily  latanoprost 0.005% Ophthalmic Solution 1 Drop(s) Both EYES at bedtime  senna 2 Tablet(s) Oral at bedtime  tamsulosin 0.4 milliGRAM(s) Oral at bedtime  traZODone 50 milliGRAM(s) Oral at bedtime    PRN MEDICATIONS  acetaminophen   Tablet .. 650 milliGRAM(s) Oral every 6 hours PRN  morphine  - Injectable 2 milliGRAM(s) IV Push every 4 hours PRN  zolpidem 5 milliGRAM(s) Oral at bedtime PRN  zolpidem 5 milliGRAM(s) Oral at bedtime PRN    HOME MEDICATIONS  Home Medications:  Ambien 10 mg oral tablet: 1 tab(s) orally once a day (at bedtime) (21 Sep 2018 21:50)  apixaban 5 mg oral tablet: 1 tab(s) orally every 12 hours (21 Sep 2018 21:50)  Cardizem 60 mg oral tablet: 1 tab(s) orally every 8 hours (21 Sep 2018 21:50)  clotrimazole 1% topical cream: 1 application topically  (21 Sep 2018 21:50)  DULoxetine 60 mg oral delayed release capsule: 1 cap(s) orally once a day (21 Sep 2018 21:50)  isosorbide mononitrate 20 mg oral tablet: 1 tab(s) orally once a day (21 Sep 2018 21:50)  latanoprost 0.005% ophthalmic solution: 1 drop(s) to each affected eye once a day (in the evening) (21 Sep 2018 21:50)  Multiple Vitamins oral capsule: 1 cap(s) orally once a day (21 Sep 2018 21:50)  propranolol 20 mg oral tablet: 1 tab(s) orally 2 times a day (21 Sep 2018 21:50)  tamsulosin 0.4 mg oral capsule: 1 cap(s) orally once a day (21 Sep 2018 21:50)  traZODone 50 mg oral tablet: 1 tab(s) orally once a day (at bedtime) (21 Sep 2018 21:50)      VITAL SIGNS: Last 24 Hours  T(C): 36 (25 Sep 2018 05:21), Max: 37 (24 Sep 2018 20:14)  T(F): 96.8 (25 Sep 2018 05:21), Max: 98.6 (24 Sep 2018 20:14)  HR: 91 (25 Sep 2018 05:21) (91 - 116)  BP: 121/75 (25 Sep 2018 05:21) (113/65 - 133/68)  BP(mean): 89 (24 Sep 2018 12:00) (89 - 89)  RR: 20 (25 Sep 2018 05:21) (18 - 21)  SpO2: 98% (24 Sep 2018 12:00) (98% - 98%)    LABS:                        12.1   7.49  )-----------( 341      ( 25 Sep 2018 05:36 )             38.1     09-25    135  |  92<L>  |  12  ----------------------------<  90  4.6   |  30  |  0.8    Ca    9.1      25 Sep 2018 05:36  Mg     2.1     09-25    TPro  5.6<L>  /  Alb  3.4<L>  /  TBili  0.4  /  DBili  x   /  AST  15  /  ALT  29  /  AlkPhos  293<H>  09-25    LIVER FUNCTIONS - ( 25 Sep 2018 05:36 )  Alb: 3.4 g/dL / Pro: 5.6 g/dL / ALK PHOS: 293 U/L / ALT: 29 U/L / AST: 15 U/L / GGT: x           PT/INR - ( 25 Sep 2018 05:36 )   PT: 15.90 sec;   INR: 1.48 ratio         PTT - ( 25 Sep 2018 05:36 )  PTT:37.7 sec          Culture - Body Fluid with Gram Stain (collected 23 Sep 2018 09:03)  Source: .Body Fluid Pleural Fluid  Gram Stain (23 Sep 2018 23:18):    polymorphonuclear leukocytes seen per low power field    No organisms seen per oil power field    by cytocentrifuge  Preliminary Report (24 Sep 2018 16:24):    No growth      CAPILLARY BLOOD GLUCOSE          RADIOLOGY:  < from: Xray Chest 1 View-PORTABLE IMMEDIATE (09.25.18 @ 09:42) >  Impression:  Bilateral effusions and opacities as above, not significantly changed.  < end of copied text >    < from: [facility] Duplex Lower Ext Vein Scan, Bilat (09.22.18 @ 16:49) >  Impression:  DVT noted in the right femoral vein.  DVT noted in the left femoral, popliteal, gastrocnemius veins.   < end of copied text >      PHYSICAL EXAM:    GENERAL: NAD, well-developed, AAOx3  HEENT:  Atraumatic, Normocephalic. Strabismus noted.  PULMONARY: Crackles noted. Decreased B/L lung sounds  CARDIOVASCULAR: Tachycardiac, but normal rhythm; No murmurs.  GASTROINTESTINAL: Dullness on percussion; Bowel sound noted, Non-tender.  MUSCULOSKELETAL:  2+ Peripheral Pulses, No clubbing, cyanosis, or edema      ADMISSION SUMMARY  Patient is a 58y old Male who presents with a chief complaint of worsening SOB (25 Sep 2018 06:53)     he currently admitted to medicine with the primary diagnosis of Pleural effusion

## 2018-09-25 NOTE — CONSULT NOTE ADULT - ASSESSMENT
Patient is a 58y old Male who presents with a chief complaint of worsening SOB    IMPRESSION:  CHF  No PNA  No pyelonephritis cystitis as no pyuria   Asymptomatic bacteruria with pseudomonas aeruginosa.    RECOMMENDATIONS:  No need  for antibiotics

## 2018-09-25 NOTE — PROGRESS NOTE ADULT - SUBJECTIVE AND OBJECTIVE BOX
OVERNIGHT EVENTS: sitting on a chair, doing better    Vital Signs Last 24 Hrs  T(C): 36.1 (25 Sep 2018 12:03), Max: 37 (24 Sep 2018 20:14)  T(F): 96.9 (25 Sep 2018 12:03), Max: 98.6 (24 Sep 2018 20:14)  HR: 119 (25 Sep 2018 13:39) (91 - 119)  BP: 129/70 (25 Sep 2018 13:39) (103/69 - 133/68)  RR: 20 (25 Sep 2018 12:03) (18 - 20)  SpO2: 96% (25 Sep 2018 13:39) (96% - 96%)    PHYSICAL EXAMINATION:    GENERAL: The patient is awake and alert in no apparent distress.     HEENT: Head is normocephalic and atraumatic. Extraocular muscles are intact. Mucous membranes are moist.    NECK: Supple.    LUNGS: dec bs both bases    HEART: Regular rate and rhythm without murmur.    ABDOMEN: Soft, nontender, and nondistended.      EXTREMITIES: Without any cyanosis, clubbing, rash, lesions or edema.    NEUROLOGIC: Grossly intact.    SKIN: No ulceration or induration present.      LABS:                        12.1   7.49  )-----------( 341      ( 25 Sep 2018 05:36 )             38.1     09-25    135  |  92<L>  |  12  ----------------------------<  90  4.6   |  30  |  0.8    Ca    9.1      25 Sep 2018 05:36  Mg     2.1     09-25    TPro  5.6<L>  /  Alb  3.4<L>  /  TBili  0.4  /  DBili  x   /  AST  15  /  ALT  29  /  AlkPhos  293<H>  09-25    PT/INR - ( 25 Sep 2018 05:36 )   PT: 15.90 sec;   INR: 1.48 ratio         PTT - ( 25 Sep 2018 05:36 )  PTT:37.7 sec                      09-24-18 @ 07:01  -  09-25-18 @ 07:00  --------------------------------------------------------  IN: 1100 mL / OUT: 1800 mL / NET: -700 mL    09-25-18 @ 07:01  -  09-25-18 @ 14:07  --------------------------------------------------------  IN: 0 mL / OUT: 1250 mL / NET: -1250 mL        MICROBIOLOGY:  Culture Results:   No growth (09-23 @ 09:03)  Culture Results:   No growth to date. (09-22 @ 05:04)  Culture Results:   >100,000 CFU/ml Pseudomonas aeruginosa  >100,000 CFU/ml Enterococcus faecalis (09-21 @ 19:15)      MEDICATIONS  (STANDING):  apixaban 5 milliGRAM(s) Oral every 12 hours  chlorhexidine 4% Liquid 1 Application(s) Topical <User Schedule>  docusate sodium 100 milliGRAM(s) Oral three times a day  DULoxetine 60 milliGRAM(s) Oral daily  furosemide   Injectable 40 milliGRAM(s) IV Push daily  latanoprost 0.005% Ophthalmic Solution 1 Drop(s) Both EYES at bedtime  senna 2 Tablet(s) Oral at bedtime  tamsulosin 0.4 milliGRAM(s) Oral at bedtime  traZODone 50 milliGRAM(s) Oral at bedtime    MEDICATIONS  (PRN):  acetaminophen   Tablet .. 650 milliGRAM(s) Oral every 6 hours PRN Moderate Pain (4 - 6)  morphine  - Injectable 2 milliGRAM(s) IV Push every 4 hours PRN Severe Pain (7 - 10)  zolpidem 5 milliGRAM(s) Oral at bedtime PRN Insomnia  zolpidem 5 milliGRAM(s) Oral at bedtime PRN Insomnia      RADIOLOGY & ADDITIONAL STUDIES:

## 2018-09-25 NOTE — CONSULT NOTE ADULT - SUBJECTIVE AND OBJECTIVE BOX
AMRIK MADRID  58y, Male  Allergy: No Known Allergies      HPI:  59 yo M with HTN, DLD, depression, DVT on eliquis, and recurrent pleural effusions s/p multiple thoracentesis presenting from [facility] for worsening SOB since discharge from [facility]. Pt denies any fever/ chills. He reports intermittent non productive cough. No chest pain.  Pt also c/o persistent diffuse abdominal pain along with distension, constipation and flatus.   Pt denies any other symptoms  To note, pt is very poor historian and can't confirm his medications    Pt was admitted for same complaint back in july and was found to have large L pleural effusion s/p pigtail insertion for 3 days with resolution of effusion. pleural studies at that time were consistent with exudative fluid, + atypical cell but no malignant cells.  Pt was admitted last week to [facility] for SOB and had a L thoracentesis 3 days PTP (21 Sep 2018 21:27)    FAMILY HISTORY:  No pertinent family history in first degree relatives    PAST MEDICAL & SURGICAL HISTORY:  Pleural effusion  CHF (congestive heart failure)  Insomnia  BPH (benign prostatic hyperplasia)  Depression  Bipolar 1 disorder  HTN (hypertension)  History of thoracentesis        VITALS:  T(F): 96.8, Max: 98.6 (09-24-18 @ 20:14)  HR: 91  BP: 121/75  RR: 20Vital Signs Last 24 Hrs  T(C): 36 (25 Sep 2018 05:21), Max: 37 (24 Sep 2018 20:14)  T(F): 96.8 (25 Sep 2018 05:21), Max: 98.6 (24 Sep 2018 20:14)  HR: 91 (25 Sep 2018 05:21) (91 - 116)  BP: 121/75 (25 Sep 2018 05:21) (113/65 - 133/68)  BP(mean): 89 (24 Sep 2018 12:00) (85 - 89)  RR: 20 (25 Sep 2018 05:21) (14 - 21)  SpO2: 98% (24 Sep 2018 12:00) (95% - 99%)    TESTS & MEASUREMENTS:                        11.1   7.57  )-----------( 296      ( 24 Sep 2018 05:50 )             34.5     09-24    131<L>  |  91<L>  |  14  ----------------------------<  91  4.2   |  32  |  0.9    Ca    8.5      24 Sep 2018 05:50  Mg     2.0     09-24    TPro  5.1<L>  /  Alb  3.3<L>  /  TBili  0.5  /  DBili  x   /  AST  15  /  ALT  28  /  AlkPhos  269<H>  09-24    LIVER FUNCTIONS - ( 24 Sep 2018 05:50 )  Alb: 3.3 g/dL / Pro: 5.1 g/dL / ALK PHOS: 269 U/L / ALT: 28 U/L / AST: 15 U/L / GGT: x             Culture - Body Fluid with Gram Stain (collected 09-23-18 @ 09:03)  Source: .Body Fluid Pleural Fluid  Gram Stain (09-23-18 @ 23:18):    polymorphonuclear leukocytes seen per low power field    No organisms seen per oil power field    by cytocentrifuge  Preliminary Report (09-24-18 @ 16:24):    No growth    Culture - Blood (collected 09-22-18 @ 05:04)  Source: .Blood None  Preliminary Report (09-23-18 @ 11:00):    No growth to date.    Culture - Urine (collected 09-21-18 @ 19:15)  Source: .Urine Catheterized  Final Report (09-24-18 @ 23:41):    >100,000 CFU/ml Pseudomonas aeruginosa    >100,000 CFU/ml Enterococcus faecalis  Organism: Pseudomonas aeruginosa  Enterococcus faecalis (09-24-18 @ 23:41)  Organism: Enterococcus faecalis (09-24-18 @ 23:41)      -  Ampicillin: S <=2 Predicts results to ampicillin/sulbactam, amoxacillin-clavulanate and  piperacillin-tazobactam.      -  Ciprofloxacin: S <=1      -  Levofloxacin: S 1      -  Nitrofurantoin: S <=32 Should not be used to treat pyelonephritis.      -  Tetra/Doxy: R >8      -  Vancomycin: S 2      Method Type: ASIYA  Organism: Pseudomonas aeruginosa (09-24-18 @ 23:41)      -  Amikacin: S <=8      -  Aztreonam: S <=4      -  Cefepime: S 4      -  Ceftazidime: S 4      -  Ciprofloxacin: S <=0.5      -  Gentamicin: S 2      -  Imipenem: S <=1      -  Levofloxacin: S <=1      -  Meropenem: S <=1      -  Piperacillin/Tazobactam: S 16      -  Tobramycin: S <=2      Method Type: ASIYA            RADIOLOGY & ADDITIONAL TESTS:    ANTIBIOTICS:

## 2018-09-26 ENCOUNTER — RESULT REVIEW (OUTPATIENT)
Age: 58
End: 2018-09-26

## 2018-09-26 LAB
ALBUMIN SERPL ELPH-MCNC: 3.4 G/DL — LOW (ref 3.5–5.2)
ALP SERPL-CCNC: 320 U/L — HIGH (ref 30–115)
ALT FLD-CCNC: 26 U/L — SIGNIFICANT CHANGE UP (ref 0–41)
ANION GAP SERPL CALC-SCNC: 11 MMOL/L — SIGNIFICANT CHANGE UP (ref 7–14)
APTT BLD: 37.9 SEC — SIGNIFICANT CHANGE UP (ref 27–39.2)
AST SERPL-CCNC: 14 U/L — SIGNIFICANT CHANGE UP (ref 0–41)
BASOPHILS # BLD AUTO: 0.03 K/UL — SIGNIFICANT CHANGE UP (ref 0–0.2)
BASOPHILS NFR BLD AUTO: 0.4 % — SIGNIFICANT CHANGE UP (ref 0–1)
BILIRUB SERPL-MCNC: 0.3 MG/DL — SIGNIFICANT CHANGE UP (ref 0.2–1.2)
BUN SERPL-MCNC: 11 MG/DL — SIGNIFICANT CHANGE UP (ref 10–20)
CALCIUM SERPL-MCNC: 8.7 MG/DL — SIGNIFICANT CHANGE UP (ref 8.5–10.1)
CHLORIDE SERPL-SCNC: 94 MMOL/L — LOW (ref 98–110)
CO2 SERPL-SCNC: 34 MMOL/L — HIGH (ref 17–32)
CREAT SERPL-MCNC: 0.9 MG/DL — SIGNIFICANT CHANGE UP (ref 0.7–1.5)
EOSINOPHIL # BLD AUTO: 0.13 K/UL — SIGNIFICANT CHANGE UP (ref 0–0.7)
EOSINOPHIL NFR BLD AUTO: 1.9 % — SIGNIFICANT CHANGE UP (ref 0–8)
GLUCOSE SERPL-MCNC: 115 MG/DL — HIGH (ref 70–99)
HCT VFR BLD CALC: 38.6 % — LOW (ref 42–52)
HGB BLD-MCNC: 12.4 G/DL — LOW (ref 14–18)
IMM GRANULOCYTES NFR BLD AUTO: 0.7 % — HIGH (ref 0.1–0.3)
INR BLD: 1.41 RATIO — HIGH (ref 0.65–1.3)
LIDOCAIN IGE QN: 21 U/L — SIGNIFICANT CHANGE UP (ref 7–60)
LYMPHOCYTES # BLD AUTO: 0.59 K/UL — LOW (ref 1.2–3.4)
LYMPHOCYTES # BLD AUTO: 8.5 % — LOW (ref 20.5–51.1)
MAGNESIUM SERPL-MCNC: 2.1 MG/DL — SIGNIFICANT CHANGE UP (ref 1.8–2.4)
MCHC RBC-ENTMCNC: 26.7 PG — LOW (ref 27–31)
MCHC RBC-ENTMCNC: 32.1 G/DL — SIGNIFICANT CHANGE UP (ref 32–37)
MCV RBC AUTO: 83.2 FL — SIGNIFICANT CHANGE UP (ref 80–94)
MONOCYTES # BLD AUTO: 0.44 K/UL — SIGNIFICANT CHANGE UP (ref 0.1–0.6)
MONOCYTES NFR BLD AUTO: 6.4 % — SIGNIFICANT CHANGE UP (ref 1.7–9.3)
NEUTROPHILS # BLD AUTO: 5.67 K/UL — SIGNIFICANT CHANGE UP (ref 1.4–6.5)
NEUTROPHILS NFR BLD AUTO: 82.1 % — HIGH (ref 42.2–75.2)
NRBC # BLD: 0 /100 WBCS — SIGNIFICANT CHANGE UP (ref 0–0)
PLATELET # BLD AUTO: 343 K/UL — SIGNIFICANT CHANGE UP (ref 130–400)
POTASSIUM SERPL-MCNC: 4.5 MMOL/L — SIGNIFICANT CHANGE UP (ref 3.5–5)
POTASSIUM SERPL-SCNC: 4.5 MMOL/L — SIGNIFICANT CHANGE UP (ref 3.5–5)
PROT SERPL-MCNC: 5.9 G/DL — LOW (ref 6–8)
PROTHROM AB SERPL-ACNC: 15.2 SEC — HIGH (ref 9.95–12.87)
RBC # BLD: 4.64 M/UL — LOW (ref 4.7–6.1)
RBC # FLD: 14.8 % — HIGH (ref 11.5–14.5)
SODIUM SERPL-SCNC: 139 MMOL/L — SIGNIFICANT CHANGE UP (ref 135–146)
WBC # BLD: 6.91 K/UL — SIGNIFICANT CHANGE UP (ref 4.8–10.8)
WBC # FLD AUTO: 6.91 K/UL — SIGNIFICANT CHANGE UP (ref 4.8–10.8)

## 2018-09-26 RX ORDER — METOPROLOL TARTRATE 50 MG
25 TABLET ORAL DAILY
Qty: 0 | Refills: 0 | Status: DISCONTINUED | OUTPATIENT
Start: 2018-09-26 | End: 2018-09-27

## 2018-09-26 RX ADMIN — ZOLPIDEM TARTRATE 5 MILLIGRAM(S): 10 TABLET ORAL at 22:37

## 2018-09-26 RX ADMIN — CHLORHEXIDINE GLUCONATE 1 APPLICATION(S): 213 SOLUTION TOPICAL at 06:20

## 2018-09-26 RX ADMIN — MORPHINE SULFATE 2 MILLIGRAM(S): 50 CAPSULE, EXTENDED RELEASE ORAL at 12:12

## 2018-09-26 RX ADMIN — MORPHINE SULFATE 2 MILLIGRAM(S): 50 CAPSULE, EXTENDED RELEASE ORAL at 11:53

## 2018-09-26 RX ADMIN — Medication 50 MILLIGRAM(S): at 22:35

## 2018-09-26 RX ADMIN — APIXABAN 5 MILLIGRAM(S): 2.5 TABLET, FILM COATED ORAL at 22:35

## 2018-09-26 RX ADMIN — DULOXETINE HYDROCHLORIDE 60 MILLIGRAM(S): 30 CAPSULE, DELAYED RELEASE ORAL at 11:53

## 2018-09-26 RX ADMIN — LATANOPROST 1 DROP(S): 0.05 SOLUTION/ DROPS OPHTHALMIC; TOPICAL at 22:35

## 2018-09-26 RX ADMIN — ZOLPIDEM TARTRATE 5 MILLIGRAM(S): 10 TABLET ORAL at 22:38

## 2018-09-26 RX ADMIN — APIXABAN 5 MILLIGRAM(S): 2.5 TABLET, FILM COATED ORAL at 08:49

## 2018-09-26 RX ADMIN — TAMSULOSIN HYDROCHLORIDE 0.4 MILLIGRAM(S): 0.4 CAPSULE ORAL at 22:35

## 2018-09-26 RX ADMIN — Medication 40 MILLIGRAM(S): at 06:21

## 2018-09-26 NOTE — PROGRESS NOTE ADULT - SUBJECTIVE AND OBJECTIVE BOX
AMRIK MADRID  58y, Male      OVERNIGHT EVENTS:    On bipap. No new complaints    VITALS:  T(F): 96.6, Max: 98.9 (09-25-18 @ 21:00)  HR: 99  BP: 127/76  RR: 20Vital Signs Last 24 Hrs  T(C): 35.9 (26 Sep 2018 05:19), Max: 37.2 (25 Sep 2018 21:00)  T(F): 96.6 (26 Sep 2018 05:19), Max: 98.9 (25 Sep 2018 21:00)  HR: 99 (26 Sep 2018 05:19) (99 - 119)  BP: 127/76 (26 Sep 2018 05:19) (103/69 - 129/70)  BP(mean): --  RR: 20 (26 Sep 2018 05:19) (18 - 20)  SpO2: 98% (25 Sep 2018 22:14) (96% - 99%)    TESTS & MEASUREMENTS:                        12.1   7.49  )-----------( 341      ( 25 Sep 2018 05:36 )             38.1     09-25    135  |  92<L>  |  12  ----------------------------<  90  4.6   |  30  |  0.8    Ca    9.1      25 Sep 2018 05:36  Mg     2.1     09-25    TPro  5.6<L>  /  Alb  3.4<L>  /  TBili  0.4  /  DBili  x   /  AST  15  /  ALT  29  /  AlkPhos  293<H>  09-25    LIVER FUNCTIONS - ( 25 Sep 2018 05:36 )  Alb: 3.4 g/dL / Pro: 5.6 g/dL / ALK PHOS: 293 U/L / ALT: 29 U/L / AST: 15 U/L / GGT: x             Culture - Urine (collected 09-24-18 @ 13:40)  Source: .Urine Clean Catch (Midstream)  Final Report (09-25-18 @ 22:00):    <10,000 CFU/ml Normal Urogenital molly present    Culture - Body Fluid with Gram Stain (collected 09-23-18 @ 09:03)  Source: .Body Fluid Pleural Fluid  Gram Stain (09-23-18 @ 23:18):    polymorphonuclear leukocytes seen per low power field    No organisms seen per oil power field    by cytocentrifuge  Preliminary Report (09-24-18 @ 16:24):    No growth    Culture - Blood (collected 09-22-18 @ 05:04)  Source: .Blood None  Preliminary Report (09-23-18 @ 11:00):    No growth to date.    Culture - Urine (collected 09-21-18 @ 19:15)  Source: .Urine Catheterized  Final Report (09-24-18 @ 23:41):    >100,000 CFU/ml Pseudomonas aeruginosa    >100,000 CFU/ml Enterococcus faecalis  Organism: Pseudomonas aeruginosa  Enterococcus faecalis (09-24-18 @ 23:41)  Organism: Enterococcus faecalis (09-24-18 @ 23:41)      -  Ampicillin: S <=2 Predicts results to ampicillin/sulbactam, amoxacillin-clavulanate and  piperacillin-tazobactam.      -  Ciprofloxacin: S <=1      -  Levofloxacin: S 1      -  Nitrofurantoin: S <=32 Should not be used to treat pyelonephritis.      -  Tetra/Doxy: R >8      -  Vancomycin: S 2      Method Type: ASIYA  Organism: Pseudomonas aeruginosa (09-24-18 @ 23:41)      -  Amikacin: S <=8      -  Aztreonam: S <=4      -  Cefepime: S 4      -  Ceftazidime: S 4      -  Ciprofloxacin: S <=0.5      -  Gentamicin: S 2      -  Imipenem: S <=1      -  Levofloxacin: S <=1      -  Meropenem: S <=1      -  Piperacillin/Tazobactam: S 16      -  Tobramycin: S <=2      Method Type: ASIYA            RADIOLOGY & ADDITIONAL TESTS:    ANTIBIOTICS:

## 2018-09-26 NOTE — PROGRESS NOTE ADULT - ASSESSMENT
recurrent effusion s/p thora 2 times l side, 1 thora r side, f/up r/o malignancy    - want to think about pleurx catheter  - pox ra  - will take diagnostic tap for flow and cyto  - poor prognosis

## 2018-09-26 NOTE — PROGRESS NOTE ADULT - SUBJECTIVE AND OBJECTIVE BOX
AMRIK MADRID 58y Male  MRN#: [id number]       SUBJECTIVE  Patient is a 58y old Male who presents with a chief complaint of worsening SOB (26 Sep 2018 08:20)    he is currently admitted to medicine with the primary diagnosis of Pleural effusion    Today is hospital day 5d, and this morning he is _    No acute overnight events.     OBJECTIVE  PAST MEDICAL & SURGICAL HISTORY  Pleural effusion  CHF (congestive heart failure)  Insomnia  BPH (benign prostatic hyperplasia)  Depression  Bipolar 1 disorder  HTN (hypertension)  History of thoracentesis    ALLERGIES:  No Known Allergies    MEDICATIONS:  STANDING MEDICATIONS  apixaban 5 milliGRAM(s) Oral every 12 hours  chlorhexidine 4% Liquid 1 Application(s) Topical <User Schedule>  docusate sodium 100 milliGRAM(s) Oral three times a day  DULoxetine 60 milliGRAM(s) Oral daily  furosemide   Injectable 40 milliGRAM(s) IV Push daily  latanoprost 0.005% Ophthalmic Solution 1 Drop(s) Both EYES at bedtime  senna 2 Tablet(s) Oral at bedtime  tamsulosin 0.4 milliGRAM(s) Oral at bedtime  traZODone 50 milliGRAM(s) Oral at bedtime    PRN MEDICATIONS  acetaminophen   Tablet .. 650 milliGRAM(s) Oral every 6 hours PRN  morphine  - Injectable 2 milliGRAM(s) IV Push every 4 hours PRN  zolpidem 5 milliGRAM(s) Oral at bedtime PRN  zolpidem 5 milliGRAM(s) Oral at bedtime PRN    HOME MEDICATIONS  Home Medications:  Ambien 10 mg oral tablet: 1 tab(s) orally once a day (at bedtime) (21 Sep 2018 21:50)  apixaban 5 mg oral tablet: 1 tab(s) orally every 12 hours (21 Sep 2018 21:50)  Cardizem 60 mg oral tablet: 1 tab(s) orally every 8 hours (21 Sep 2018 21:50)  clotrimazole 1% topical cream: 1 application topically  (21 Sep 2018 21:50)  DULoxetine 60 mg oral delayed release capsule: 1 cap(s) orally once a day (21 Sep 2018 21:50)  isosorbide mononitrate 20 mg oral tablet: 1 tab(s) orally once a day (21 Sep 2018 21:50)  latanoprost 0.005% ophthalmic solution: 1 drop(s) to each affected eye once a day (in the evening) (21 Sep 2018 21:50)  Multiple Vitamins oral capsule: 1 cap(s) orally once a day (21 Sep 2018 21:50)  propranolol 20 mg oral tablet: 1 tab(s) orally 2 times a day (21 Sep 2018 21:50)  tamsulosin 0.4 mg oral capsule: 1 cap(s) orally once a day (21 Sep 2018 21:50)  traZODone 50 mg oral tablet: 1 tab(s) orally once a day (at bedtime) (21 Sep 2018 21:50)      VITAL SIGNS: Last 24 Hours  T(C): 35.9 (26 Sep 2018 05:19), Max: 37.2 (25 Sep 2018 21:00)  T(F): 96.6 (26 Sep 2018 05:19), Max: 98.9 (25 Sep 2018 21:00)  HR: 117 (26 Sep 2018 08:30) (99 - 119)  BP: 127/76 (26 Sep 2018 05:19) (103/69 - 129/70)  BP(mean): --  RR: 20 (26 Sep 2018 05:19) (18 - 20)  SpO2: 98% (26 Sep 2018 08:30) (96% - 98%)    LABS:                        12.1   7.49  )-----------( 341      ( 25 Sep 2018 05:36 )             38.1     09-25    135  |  92<L>  |  12  ----------------------------<  90  4.6   |  30  |  0.8    Ca    9.1      25 Sep 2018 05:36  Mg     2.1     09-25    TPro  5.6<L>  /  Alb  3.4<L>  /  TBili  0.4  /  DBili  x   /  AST  15  /  ALT  29  /  AlkPhos  293<H>  09-25    LIVER FUNCTIONS - ( 25 Sep 2018 05:36 )  Alb: 3.4 g/dL / Pro: 5.6 g/dL / ALK PHOS: 293 U/L / ALT: 29 U/L / AST: 15 U/L / GGT: x           PT/INR - ( 25 Sep 2018 05:36 )   PT: 15.90 sec;   INR: 1.48 ratio         PTT - ( 25 Sep 2018 05:36 )  PTT:37.7 sec          Culture - Urine (collected 24 Sep 2018 13:40)  Source: .Urine Clean Catch (Midstream)  Final Report (25 Sep 2018 22:00):    <10,000 CFU/ml Normal Urogenital molly present      CAPILLARY BLOOD GLUCOSE          RADIOLOGY:      PHYSICAL EXAM:          ADMISSION SUMMARY  Patient is a 58y old Male who presents with a chief complaint of worsening SOB (26 Sep 2018 08:20)     he currently admitted to medicine with the primary diagnosis of Pleural effusion AMRIK MADRID 58y Male  MRN#: [id number]       SUBJECTIVE  Patient is a 58y old Male who presents with a chief complaint of worsening SOB (26 Sep 2018 08:20)    he is currently admitted to medicine with the primary diagnosis of Pleural effusion    Today is hospital day 5d, and this morning he is sitting in chair without distress. Patient states his breathing improved but it is not completely resolved.     No acute overnight events.     OBJECTIVE  PAST MEDICAL & SURGICAL HISTORY  Pleural effusion  CHF (congestive heart failure)  Insomnia  BPH (benign prostatic hyperplasia)  Depression  Bipolar 1 disorder  HTN (hypertension)  History of thoracentesis    ALLERGIES:  No Known Allergies    MEDICATIONS:  STANDING MEDICATIONS  apixaban 5 milliGRAM(s) Oral every 12 hours  chlorhexidine 4% Liquid 1 Application(s) Topical <User Schedule>  docusate sodium 100 milliGRAM(s) Oral three times a day  DULoxetine 60 milliGRAM(s) Oral daily  furosemide   Injectable 40 milliGRAM(s) IV Push daily  latanoprost 0.005% Ophthalmic Solution 1 Drop(s) Both EYES at bedtime  senna 2 Tablet(s) Oral at bedtime  tamsulosin 0.4 milliGRAM(s) Oral at bedtime  traZODone 50 milliGRAM(s) Oral at bedtime    PRN MEDICATIONS  acetaminophen   Tablet .. 650 milliGRAM(s) Oral every 6 hours PRN  morphine  - Injectable 2 milliGRAM(s) IV Push every 4 hours PRN  zolpidem 5 milliGRAM(s) Oral at bedtime PRN  zolpidem 5 milliGRAM(s) Oral at bedtime PRN    HOME MEDICATIONS  Home Medications:  Ambien 10 mg oral tablet: 1 tab(s) orally once a day (at bedtime) (21 Sep 2018 21:50)  apixaban 5 mg oral tablet: 1 tab(s) orally every 12 hours (21 Sep 2018 21:50)  Cardizem 60 mg oral tablet: 1 tab(s) orally every 8 hours (21 Sep 2018 21:50)  clotrimazole 1% topical cream: 1 application topically  (21 Sep 2018 21:50)  DULoxetine 60 mg oral delayed release capsule: 1 cap(s) orally once a day (21 Sep 2018 21:50)  isosorbide mononitrate 20 mg oral tablet: 1 tab(s) orally once a day (21 Sep 2018 21:50)  latanoprost 0.005% ophthalmic solution: 1 drop(s) to each affected eye once a day (in the evening) (21 Sep 2018 21:50)  Multiple Vitamins oral capsule: 1 cap(s) orally once a day (21 Sep 2018 21:50)  propranolol 20 mg oral tablet: 1 tab(s) orally 2 times a day (21 Sep 2018 21:50)  tamsulosin 0.4 mg oral capsule: 1 cap(s) orally once a day (21 Sep 2018 21:50)  traZODone 50 mg oral tablet: 1 tab(s) orally once a day (at bedtime) (21 Sep 2018 21:50)      VITAL SIGNS: Last 24 Hours  T(C): 35.9 (26 Sep 2018 05:19), Max: 37.2 (25 Sep 2018 21:00)  T(F): 96.6 (26 Sep 2018 05:19), Max: 98.9 (25 Sep 2018 21:00)  HR: 117 (26 Sep 2018 08:30) (99 - 119)  BP: 127/76 (26 Sep 2018 05:19) (103/69 - 129/70)  BP(mean): --  RR: 20 (26 Sep 2018 05:19) (18 - 20)  SpO2: 98% (26 Sep 2018 08:30) (96% - 98%)    LABS:                        12.1   7.49  )-----------( 341      ( 25 Sep 2018 05:36 )             38.1     09-25    135  |  92<L>  |  12  ----------------------------<  90  4.6   |  30  |  0.8    Ca    9.1      25 Sep 2018 05:36  Mg     2.1     09-25    TPro  5.6<L>  /  Alb  3.4<L>  /  TBili  0.4  /  DBili  x   /  AST  15  /  ALT  29  /  AlkPhos  293<H>  09-25    LIVER FUNCTIONS - ( 25 Sep 2018 05:36 )  Alb: 3.4 g/dL / Pro: 5.6 g/dL / ALK PHOS: 293 U/L / ALT: 29 U/L / AST: 15 U/L / GGT: x           PT/INR - ( 25 Sep 2018 05:36 )   PT: 15.90 sec;   INR: 1.48 ratio         PTT - ( 25 Sep 2018 05:36 )  PTT:37.7 sec          Culture - Urine (collected 24 Sep 2018 13:40)  Source: .Urine Clean Catch (Midstream)  Final Report (25 Sep 2018 22:00):    <10,000 CFU/ml Normal Urogenital molly present      CAPILLARY BLOOD GLUCOSE          RADIOLOGY:      PHYSICAL EXAM:          ADMISSION SUMMARY  Patient is a 58y old Male who presents with a chief complaint of worsening SOB (26 Sep 2018 08:20)     he currently admitted to medicine with the primary diagnosis of Pleural effusion AMRIK MADRID 58y Male  MRN#: [id number]       SUBJECTIVE  Patient is a 58y old Male who presents with a chief complaint of worsening SOB (26 Sep 2018 08:20)    he is currently admitted to medicine with the primary diagnosis of Pleural effusion    Today is hospital day 5d, and this morning he is sitting in chair without distress. Patient states his breathing improved but it is not completely resolved.     No acute overnight events.     OBJECTIVE  PAST MEDICAL & SURGICAL HISTORY  Pleural effusion  CHF (congestive heart failure)  Insomnia  BPH (benign prostatic hyperplasia)  Depression  Bipolar 1 disorder  HTN (hypertension)  History of thoracentesis    ALLERGIES:  No Known Allergies    MEDICATIONS:  STANDING MEDICATIONS  apixaban 5 milliGRAM(s) Oral every 12 hours  chlorhexidine 4% Liquid 1 Application(s) Topical <User Schedule>  docusate sodium 100 milliGRAM(s) Oral three times a day  DULoxetine 60 milliGRAM(s) Oral daily  furosemide   Injectable 40 milliGRAM(s) IV Push daily  latanoprost 0.005% Ophthalmic Solution 1 Drop(s) Both EYES at bedtime  senna 2 Tablet(s) Oral at bedtime  tamsulosin 0.4 milliGRAM(s) Oral at bedtime  traZODone 50 milliGRAM(s) Oral at bedtime    PRN MEDICATIONS  acetaminophen   Tablet .. 650 milliGRAM(s) Oral every 6 hours PRN  morphine  - Injectable 2 milliGRAM(s) IV Push every 4 hours PRN  zolpidem 5 milliGRAM(s) Oral at bedtime PRN  zolpidem 5 milliGRAM(s) Oral at bedtime PRN    HOME MEDICATIONS  Home Medications:  Ambien 10 mg oral tablet: 1 tab(s) orally once a day (at bedtime) (21 Sep 2018 21:50)  apixaban 5 mg oral tablet: 1 tab(s) orally every 12 hours (21 Sep 2018 21:50)  Cardizem 60 mg oral tablet: 1 tab(s) orally every 8 hours (21 Sep 2018 21:50)  clotrimazole 1% topical cream: 1 application topically  (21 Sep 2018 21:50)  DULoxetine 60 mg oral delayed release capsule: 1 cap(s) orally once a day (21 Sep 2018 21:50)  isosorbide mononitrate 20 mg oral tablet: 1 tab(s) orally once a day (21 Sep 2018 21:50)  latanoprost 0.005% ophthalmic solution: 1 drop(s) to each affected eye once a day (in the evening) (21 Sep 2018 21:50)  Multiple Vitamins oral capsule: 1 cap(s) orally once a day (21 Sep 2018 21:50)  propranolol 20 mg oral tablet: 1 tab(s) orally 2 times a day (21 Sep 2018 21:50)  tamsulosin 0.4 mg oral capsule: 1 cap(s) orally once a day (21 Sep 2018 21:50)  traZODone 50 mg oral tablet: 1 tab(s) orally once a day (at bedtime) (21 Sep 2018 21:50)      VITAL SIGNS: Last 24 Hours  T(C): 35.9 (26 Sep 2018 05:19), Max: 37.2 (25 Sep 2018 21:00)  T(F): 96.6 (26 Sep 2018 05:19), Max: 98.9 (25 Sep 2018 21:00)  HR: 117 (26 Sep 2018 08:30) (99 - 119)  BP: 127/76 (26 Sep 2018 05:19) (103/69 - 129/70)  BP(mean): --  RR: 20 (26 Sep 2018 05:19) (18 - 20)  SpO2: 98% (26 Sep 2018 08:30) (96% - 98%)    LABS:                        12.1   7.49  )-----------( 341      ( 25 Sep 2018 05:36 )             38.1     09-25    135  |  92<L>  |  12  ----------------------------<  90  4.6   |  30  |  0.8    Ca    9.1      25 Sep 2018 05:36  Mg     2.1     09-25    TPro  5.6<L>  /  Alb  3.4<L>  /  TBili  0.4  /  DBili  x   /  AST  15  /  ALT  29  /  AlkPhos  293<H>  09-25    LIVER FUNCTIONS - ( 25 Sep 2018 05:36 )  Alb: 3.4 g/dL / Pro: 5.6 g/dL / ALK PHOS: 293 U/L / ALT: 29 U/L / AST: 15 U/L / GGT: x           PT/INR - ( 25 Sep 2018 05:36 )   PT: 15.90 sec;   INR: 1.48 ratio         PTT - ( 25 Sep 2018 05:36 )  PTT:37.7 sec          Culture - Urine (collected 24 Sep 2018 13:40)  Source: .Urine Clean Catch (Midstream)  Final Report (25 Sep 2018 22:00):    <10,000 CFU/ml Normal Urogenital molly present      CAPILLARY BLOOD GLUCOSE          RADIOLOGY:      PHYSICAL EXAM:  GENERAL: NAD, well-developed, AAOx3  HEENT:  Atraumatic, Normocephalic. Strabismus noted.  PULMONARY: Crackles improved. Decreased B/L lung sounds  CARDIOVASCULAR: Tachycardiac, but normal rhythm; No murmurs.  GASTROINTESTINAL: Dullness on percussion; Bowel sound noted, Non-tender.  MUSCULOSKELETAL:  Mild pitting edema noted B/L LE      ADMISSION SUMMARY  Patient is a 58y old Male who presents with a chief complaint of worsening SOB (26 Sep 2018 08:20)     he currently admitted to medicine with the primary diagnosis of Pleural effusion AMRIK MADRID 58y Male  MRN#: [id number]       SUBJECTIVE  Patient is a 58y old Male who presents with a chief complaint of worsening SOB (26 Sep 2018 08:20)    he is currently admitted to medicine with the primary diagnosis of Pleural effusion    Today is hospital day 5d, and this morning he is sitting in chair without distress. Patient states his breathing improved but it is not completely resolved.     No acute overnight events.     OBJECTIVE  PAST MEDICAL & SURGICAL HISTORY  Pleural effusion  CHF (congestive heart failure)  Insomnia  BPH (benign prostatic hyperplasia)  Depression  Bipolar 1 disorder  HTN (hypertension)  History of thoracentesis    ALLERGIES:  No Known Allergies    MEDICATIONS:  STANDING MEDICATIONS  apixaban 5 milliGRAM(s) Oral every 12 hours  chlorhexidine 4% Liquid 1 Application(s) Topical <User Schedule>  docusate sodium 100 milliGRAM(s) Oral three times a day  DULoxetine 60 milliGRAM(s) Oral daily  furosemide   Injectable 40 milliGRAM(s) IV Push daily  latanoprost 0.005% Ophthalmic Solution 1 Drop(s) Both EYES at bedtime  senna 2 Tablet(s) Oral at bedtime  tamsulosin 0.4 milliGRAM(s) Oral at bedtime  traZODone 50 milliGRAM(s) Oral at bedtime    PRN MEDICATIONS  acetaminophen   Tablet .. 650 milliGRAM(s) Oral every 6 hours PRN  morphine  - Injectable 2 milliGRAM(s) IV Push every 4 hours PRN  zolpidem 5 milliGRAM(s) Oral at bedtime PRN  zolpidem 5 milliGRAM(s) Oral at bedtime PRN    HOME MEDICATIONS  Home Medications:  Ambien 10 mg oral tablet: 1 tab(s) orally once a day (at bedtime) (21 Sep 2018 21:50)  apixaban 5 mg oral tablet: 1 tab(s) orally every 12 hours (21 Sep 2018 21:50)  Cardizem 60 mg oral tablet: 1 tab(s) orally every 8 hours (21 Sep 2018 21:50)  clotrimazole 1% topical cream: 1 application topically  (21 Sep 2018 21:50)  DULoxetine 60 mg oral delayed release capsule: 1 cap(s) orally once a day (21 Sep 2018 21:50)  isosorbide mononitrate 20 mg oral tablet: 1 tab(s) orally once a day (21 Sep 2018 21:50)  latanoprost 0.005% ophthalmic solution: 1 drop(s) to each affected eye once a day (in the evening) (21 Sep 2018 21:50)  Multiple Vitamins oral capsule: 1 cap(s) orally once a day (21 Sep 2018 21:50)  propranolol 20 mg oral tablet: 1 tab(s) orally 2 times a day (21 Sep 2018 21:50)  tamsulosin 0.4 mg oral capsule: 1 cap(s) orally once a day (21 Sep 2018 21:50)  traZODone 50 mg oral tablet: 1 tab(s) orally once a day (at bedtime) (21 Sep 2018 21:50)      VITAL SIGNS: Last 24 Hours  T(C): 35.9 (26 Sep 2018 05:19), Max: 37.2 (25 Sep 2018 21:00)  T(F): 96.6 (26 Sep 2018 05:19), Max: 98.9 (25 Sep 2018 21:00)  HR: 117 (26 Sep 2018 08:30) (99 - 119)  BP: 127/76 (26 Sep 2018 05:19) (103/69 - 129/70)  BP(mean): --  RR: 20 (26 Sep 2018 05:19) (18 - 20)  SpO2: 98% (26 Sep 2018 08:30) (96% - 98%)    LABS:                        12.1   7.49  )-----------( 341      ( 25 Sep 2018 05:36 )             38.1     09-25    135  |  92<L>  |  12  ----------------------------<  90  4.6   |  30  |  0.8    Ca    9.1      25 Sep 2018 05:36  Mg     2.1     09-25    TPro  5.6<L>  /  Alb  3.4<L>  /  TBili  0.4  /  DBili  x   /  AST  15  /  ALT  29  /  AlkPhos  293<H>  09-25    LIVER FUNCTIONS - ( 25 Sep 2018 05:36 )  Alb: 3.4 g/dL / Pro: 5.6 g/dL / ALK PHOS: 293 U/L / ALT: 29 U/L / AST: 15 U/L / GGT: x           PT/INR - ( 25 Sep 2018 05:36 )   PT: 15.90 sec;   INR: 1.48 ratio         PTT - ( 25 Sep 2018 05:36 )  PTT:37.7 sec          Culture - Urine (collected 24 Sep 2018 13:40)  Source: .Urine Clean Catch (Midstream)  Final Report (25 Sep 2018 22:00):    <10,000 CFU/ml Normal Urogenital molly present      CAPILLARY BLOOD GLUCOSE      PHYSICAL EXAM:  GENERAL: NAD, well-developed, AAOx3  HEENT:  Atraumatic, Normocephalic. Strabismus noted.  PULMONARY: Crackles improved. Decreased B/L lung sounds  CARDIOVASCULAR: Tachycardiac, but normal rhythm; No murmurs.  GASTROINTESTINAL: Dullness on percussion; Bowel sound noted, Non-tender.  MUSCULOSKELETAL:  Mild pitting edema noted B/L LE      ADMISSION SUMMARY  Patient is a 58y old Male who presents with a chief complaint of worsening SOB (26 Sep 2018 08:20)     he currently admitted to medicine with the primary diagnosis of Pleural effusion

## 2018-09-26 NOTE — PROGRESS NOTE ADULT - ASSESSMENT
Patient is a 58y old Male who presents with a chief complaint of worsening SOB    IMPRESSION:  CHF  No PNA  No pyelonephritis cystitis as no pyuria   Asymptomatic bacteruria with pseudomonas aeruginosa with repeat urine cultures being negative.    RECOMMENDATIONS:  No need  for antibiotics

## 2018-09-26 NOTE — PROGRESS NOTE ADULT - ASSESSMENT
59 yo M with HTN, DLD, depression, DVT on eliquis, and recurrent pleural effusions s/p pigtail insertion back in july, s/p thoracentesis 3 days PTP in [facility] presenting from [facility] for worsening SOB x3 days.    #Recurrent bilateral pleural effusions s/p thoracentesis showing serosanguineous fluid  -recurrent, exudative in nature  -rule out underlying malignancy vs infection vs volume overload vs autoimmune  -order GREGG, DS DNA, RF, lipase   -last 2d echo (July): Normal EF; reorder ECHO  -no clinical evidence of PNA  -f/u fluid analysis from thoracentesis on this admission  -c/w lasix 40mg IV daily  -spoke with CTS: no area of suspicion for pleural bx at this time, may need MRI for a more sensitive exam     #Bacteruria growing pseudomonas and enterococcus faecalis   -asympatomatic bacteruia  -blood culture NGTD  -d/c abx as per ID    #Tachycardia - stable  -EKG shows sinus tachycardia   -as per pt, baseline tachycardiac    #Borderline BP:  -Hold BB meds for now  -keep Is=Os    #Abdominal distention/ constipation - persistent  -colace/ senna  -CT A/P on prior admission negative for any pathology    #H/O DVt  -Venous duplex shows: rt femoral vein DVT and lt femoral, popliteal and gastrocnemius veins DVT  -c/w eliquis    #BPH - c/w flomax  #Depression - c/w home meds  #Code status: DNR/ DNI 59 yo M with HTN, DLD, depression, DVT on eliquis, and recurrent pleural effusions s/p pigtail insertion back in july, s/p thoracentesis 3 days PTP in [facility] presenting from [facility] for worsening SOB x3 days.    #Recurrent bilateral pleural effusions s/p thoracentesis showing serosanguineous fluid  -recurrent, exudative in nature  -rule out underlying malignancy vs infection vs volume overload vs autoimmune  -order GREGG, DS DNA, RF, lipase   -last 2d echo (July): Normal EF; reorder ECHO  -no clinical evidence of PNA  -f/u fluid analysis from thoracentesis on this admission  -c/w lasix 40mg IV daily  -spoke with CTS: no area of suspicion for pleural bx at this time, may need MRI for a more sensitive exam     #Bacteruria growing pseudomonas and enterococcus faecalis   -asympatomatic bacteruia  -blood culture NGTD  -d/c abx as per ID    #Tachycardia - stable  -EKG shows sinus tachycardia   -as per pt, baseline tachycardiac  -add metoprolol succinate     #Borderline BP:  -Hold BB meds for now  -keep Is=Os    #Abdominal distention/ constipation - persistent  -colace/ senna  -CT A/P on prior admission negative for any pathology    #H/O DVt  -Venous duplex shows: rt femoral vein DVT and lt femoral, popliteal and gastrocnemius veins DVT  -c/w eliquis    #BPH - c/w flomax  #Depression - c/w home meds  #Code status: DNR/ DNI

## 2018-09-26 NOTE — PROGRESS NOTE ADULT - SUBJECTIVE AND OBJECTIVE BOX
OVERNIGHT EVENTS: doing better, cxr reviewed. s[michael at length regarding fluid, exudative malignancy possibility, no flow sent no cuto sent?    Vital Signs Last 24 Hrs  T(C): 35.9 (26 Sep 2018 05:19), Max: 37.2 (25 Sep 2018 21:00)  T(F): 96.6 (26 Sep 2018 05:19), Max: 98.9 (25 Sep 2018 21:00)  HR: 99 (26 Sep 2018 05:19) (99 - 119)  BP: 127/76 (26 Sep 2018 05:19) (103/69 - 129/70)  BP(mean): --  RR: 20 (26 Sep 2018 05:19) (18 - 20)  SpO2: 98% (25 Sep 2018 22:14) (96% - 99%)    PHYSICAL EXAMINATION:    GENERAL: The patient is awake and alert in no apparent distress.     HEENT: Head is normocephalic and atraumatic. Extraocular muscles are intact. Mucous membranes are moist.    NECK: Supple.    LUNGS: dec bs both bases    HEART: Regular rate and rhythm without murmur.    ABDOMEN: Soft, nontender, and nondistended.      EXTREMITIES: Without any cyanosis, clubbing, rash, lesions or edema.    NEUROLOGIC: Grossly intact.    SKIN: No ulceration or induration present.      LABS:                        12.1   7.49  )-----------( 341      ( 25 Sep 2018 05:36 )             38.1     09-25    135  |  92<L>  |  12  ----------------------------<  90  4.6   |  30  |  0.8    Ca    9.1      25 Sep 2018 05:36  Mg     2.1     09-25    TPro  5.6<L>  /  Alb  3.4<L>  /  TBili  0.4  /  DBili  x   /  AST  15  /  ALT  29  /  AlkPhos  293<H>  09-25    PT/INR - ( 25 Sep 2018 05:36 )   PT: 15.90 sec;   INR: 1.48 ratio         PTT - ( 25 Sep 2018 05:36 )  PTT:37.7 sec                      09-25-18 @ 07:01  -  09-26-18 @ 07:00  --------------------------------------------------------  IN: 420 mL / OUT: 2300 mL / NET: -1880 mL    09-26-18 @ 07:01  -  09-26-18 @ 08:20  --------------------------------------------------------  IN: 0 mL / OUT: 400 mL / NET: -400 mL        MICROBIOLOGY:  Culture Results:   <10,000 CFU/ml Normal Urogenital molly present (09-24 @ 13:40)  Culture Results:   No growth (09-23 @ 09:03)      MEDICATIONS  (STANDING):  apixaban 5 milliGRAM(s) Oral every 12 hours  chlorhexidine 4% Liquid 1 Application(s) Topical <User Schedule>  docusate sodium 100 milliGRAM(s) Oral three times a day  DULoxetine 60 milliGRAM(s) Oral daily  furosemide   Injectable 40 milliGRAM(s) IV Push daily  latanoprost 0.005% Ophthalmic Solution 1 Drop(s) Both EYES at bedtime  senna 2 Tablet(s) Oral at bedtime  tamsulosin 0.4 milliGRAM(s) Oral at bedtime  traZODone 50 milliGRAM(s) Oral at bedtime    MEDICATIONS  (PRN):  acetaminophen   Tablet .. 650 milliGRAM(s) Oral every 6 hours PRN Moderate Pain (4 - 6)  morphine  - Injectable 2 milliGRAM(s) IV Push every 4 hours PRN Severe Pain (7 - 10)  zolpidem 5 milliGRAM(s) Oral at bedtime PRN Insomnia  zolpidem 5 milliGRAM(s) Oral at bedtime PRN Insomnia      RADIOLOGY & ADDITIONAL STUDIES:

## 2018-09-27 LAB
ALBUMIN SERPL ELPH-MCNC: 3.6 G/DL — SIGNIFICANT CHANGE UP (ref 3.5–5.2)
ALP SERPL-CCNC: 336 U/L — HIGH (ref 30–115)
ALT FLD-CCNC: 27 U/L — SIGNIFICANT CHANGE UP (ref 0–41)
ANION GAP SERPL CALC-SCNC: 13 MMOL/L — SIGNIFICANT CHANGE UP (ref 7–14)
APTT BLD: 35.8 SEC — SIGNIFICANT CHANGE UP (ref 27–39.2)
AST SERPL-CCNC: 15 U/L — SIGNIFICANT CHANGE UP (ref 0–41)
BASOPHILS # BLD AUTO: 0.04 K/UL — SIGNIFICANT CHANGE UP (ref 0–0.2)
BASOPHILS NFR BLD AUTO: 0.6 % — SIGNIFICANT CHANGE UP (ref 0–1)
BILIRUB SERPL-MCNC: 0.3 MG/DL — SIGNIFICANT CHANGE UP (ref 0.2–1.2)
BUN SERPL-MCNC: 11 MG/DL — SIGNIFICANT CHANGE UP (ref 10–20)
CALCIUM SERPL-MCNC: 8.7 MG/DL — SIGNIFICANT CHANGE UP (ref 8.5–10.1)
CHLORIDE SERPL-SCNC: 94 MMOL/L — LOW (ref 98–110)
CO2 SERPL-SCNC: 30 MMOL/L — SIGNIFICANT CHANGE UP (ref 17–32)
CREAT SERPL-MCNC: 0.9 MG/DL — SIGNIFICANT CHANGE UP (ref 0.7–1.5)
CULTURE RESULTS: SIGNIFICANT CHANGE UP
DSDNA AB SER-ACNC: <12 IU/ML — SIGNIFICANT CHANGE UP
EOSINOPHIL # BLD AUTO: 0.15 K/UL — SIGNIFICANT CHANGE UP (ref 0–0.7)
EOSINOPHIL NFR BLD AUTO: 2.2 % — SIGNIFICANT CHANGE UP (ref 0–8)
GGT SERPL-CCNC: 15 U/L — SIGNIFICANT CHANGE UP (ref 1–40)
GLUCOSE SERPL-MCNC: 156 MG/DL — HIGH (ref 70–99)
HCT VFR BLD CALC: 37.5 % — LOW (ref 42–52)
HGB BLD-MCNC: 12.2 G/DL — LOW (ref 14–18)
IMM GRANULOCYTES NFR BLD AUTO: 0.7 % — HIGH (ref 0.1–0.3)
INR BLD: 1.42 RATIO — HIGH (ref 0.65–1.3)
LYMPHOCYTES # BLD AUTO: 0.73 K/UL — LOW (ref 1.2–3.4)
LYMPHOCYTES # BLD AUTO: 10.7 % — LOW (ref 20.5–51.1)
MAGNESIUM SERPL-MCNC: 1.8 MG/DL — SIGNIFICANT CHANGE UP (ref 1.8–2.4)
MCHC RBC-ENTMCNC: 26.8 PG — LOW (ref 27–31)
MCHC RBC-ENTMCNC: 32.5 G/DL — SIGNIFICANT CHANGE UP (ref 32–37)
MCV RBC AUTO: 82.4 FL — SIGNIFICANT CHANGE UP (ref 80–94)
MONOCYTES # BLD AUTO: 0.44 K/UL — SIGNIFICANT CHANGE UP (ref 0.1–0.6)
MONOCYTES NFR BLD AUTO: 6.4 % — SIGNIFICANT CHANGE UP (ref 1.7–9.3)
NEUTROPHILS # BLD AUTO: 5.42 K/UL — SIGNIFICANT CHANGE UP (ref 1.4–6.5)
NEUTROPHILS NFR BLD AUTO: 79.4 % — HIGH (ref 42.2–75.2)
NRBC # BLD: 0 /100 WBCS — SIGNIFICANT CHANGE UP (ref 0–0)
PLATELET # BLD AUTO: 336 K/UL — SIGNIFICANT CHANGE UP (ref 130–400)
POTASSIUM SERPL-MCNC: 3.7 MMOL/L — SIGNIFICANT CHANGE UP (ref 3.5–5)
POTASSIUM SERPL-SCNC: 3.7 MMOL/L — SIGNIFICANT CHANGE UP (ref 3.5–5)
PROT SERPL-MCNC: 5.9 G/DL — LOW (ref 6–8)
PROTHROM AB SERPL-ACNC: 15.3 SEC — HIGH (ref 9.95–12.87)
RBC # BLD: 4.55 M/UL — LOW (ref 4.7–6.1)
RBC # FLD: 14.9 % — HIGH (ref 11.5–14.5)
RHEUMATOID FACT SERPL-ACNC: <10 IU/ML — SIGNIFICANT CHANGE UP (ref 0–13)
SODIUM SERPL-SCNC: 137 MMOL/L — SIGNIFICANT CHANGE UP (ref 135–146)
SPECIMEN SOURCE: SIGNIFICANT CHANGE UP
WBC # BLD: 6.83 K/UL — SIGNIFICANT CHANGE UP (ref 4.8–10.8)
WBC # FLD AUTO: 6.83 K/UL — SIGNIFICANT CHANGE UP (ref 4.8–10.8)

## 2018-09-27 RX ORDER — METOPROLOL TARTRATE 50 MG
25 TABLET ORAL
Qty: 0 | Refills: 0 | Status: DISCONTINUED | OUTPATIENT
Start: 2018-09-27 | End: 2018-09-27

## 2018-09-27 RX ADMIN — APIXABAN 5 MILLIGRAM(S): 2.5 TABLET, FILM COATED ORAL at 11:57

## 2018-09-27 RX ADMIN — ZOLPIDEM TARTRATE 5 MILLIGRAM(S): 10 TABLET ORAL at 21:40

## 2018-09-27 RX ADMIN — APIXABAN 5 MILLIGRAM(S): 2.5 TABLET, FILM COATED ORAL at 21:41

## 2018-09-27 RX ADMIN — MORPHINE SULFATE 2 MILLIGRAM(S): 50 CAPSULE, EXTENDED RELEASE ORAL at 18:01

## 2018-09-27 RX ADMIN — MORPHINE SULFATE 2 MILLIGRAM(S): 50 CAPSULE, EXTENDED RELEASE ORAL at 10:15

## 2018-09-27 RX ADMIN — SENNA PLUS 2 TABLET(S): 8.6 TABLET ORAL at 21:41

## 2018-09-27 RX ADMIN — Medication 100 MILLIGRAM(S): at 21:42

## 2018-09-27 RX ADMIN — Medication 25 MILLIGRAM(S): at 10:15

## 2018-09-27 RX ADMIN — TAMSULOSIN HYDROCHLORIDE 0.4 MILLIGRAM(S): 0.4 CAPSULE ORAL at 21:42

## 2018-09-27 RX ADMIN — Medication 50 MILLIGRAM(S): at 21:42

## 2018-09-27 RX ADMIN — LATANOPROST 1 DROP(S): 0.05 SOLUTION/ DROPS OPHTHALMIC; TOPICAL at 21:43

## 2018-09-27 RX ADMIN — Medication 40 MILLIGRAM(S): at 06:30

## 2018-09-27 RX ADMIN — DULOXETINE HYDROCHLORIDE 60 MILLIGRAM(S): 30 CAPSULE, DELAYED RELEASE ORAL at 11:58

## 2018-09-27 NOTE — PROGRESS NOTE ADULT - ASSESSMENT
recurrent effusion s/p thora 2 times l side, 1 thora r side, f/up r/o malignancy    - want to think about pleurx catheter  - pox ra/ f/up cyto/ flow  - do not want invasive procedure  - repeat cxr  - poor prognosis

## 2018-09-27 NOTE — DIETITIAN INITIAL EVALUATION ADULT. - FEEDING SKILL
independent/Pt reports consuming most of his meal trays, ~% with no issues. Refuses oral supplements at this time.

## 2018-09-27 NOTE — DIETITIAN INITIAL EVALUATION ADULT. - OTHER INFO
Pt p/w worsening SOB x3 days. Recurrent bilateral pleural effusions s/p thoracentesis showing serosanguineous fluid--f/u cytopathology for pleural fluid; pending ECHO. Reason for Assessment: LOS

## 2018-09-27 NOTE — PROGRESS NOTE ADULT - ASSESSMENT
Patient is a 58y old Male who presents with a chief complaint of worsening SOB    IMPRESSION:  CHF  No PNA  No pyelonephritis cystitis as no pyuria   Asymptomatic bacteruria with pseudomonas aeruginosa with repeat urine cultures being negative.    RECOMMENDATIONS:  No need  for antibiotics  Recall prn please.

## 2018-09-27 NOTE — PROGRESS NOTE ADULT - SUBJECTIVE AND OBJECTIVE BOX
AMRIK MADRID 58y Male  MRN#: [id number]       SUBJECTIVE  Patient is a 58y old Male who presents with a chief complaint of worsening SOB (27 Sep 2018 07:42)    he is currently admitted to medicine with the primary diagnosis of Pleural effusion    Today is hospital day 6d, and this morning he is sitting in chair without distress. Pt states he is feeling better but still experiencing SOB when lying down and on exertion.     No acute overnight events.     OBJECTIVE  PAST MEDICAL & SURGICAL HISTORY  Pleural effusion  CHF (congestive heart failure)  Insomnia  BPH (benign prostatic hyperplasia)  Depression  Bipolar 1 disorder  HTN (hypertension)  History of thoracentesis    ALLERGIES:  No Known Allergies    MEDICATIONS:  STANDING MEDICATIONS  apixaban 5 milliGRAM(s) Oral every 12 hours  chlorhexidine 4% Liquid 1 Application(s) Topical <User Schedule>  docusate sodium 100 milliGRAM(s) Oral three times a day  DULoxetine 60 milliGRAM(s) Oral daily  furosemide   Injectable 40 milliGRAM(s) IV Push daily  latanoprost 0.005% Ophthalmic Solution 1 Drop(s) Both EYES at bedtime  metoprolol tartrate 25 milliGRAM(s) Oral <User Schedule>  senna 2 Tablet(s) Oral at bedtime  tamsulosin 0.4 milliGRAM(s) Oral at bedtime  traZODone 50 milliGRAM(s) Oral at bedtime    PRN MEDICATIONS  acetaminophen   Tablet .. 650 milliGRAM(s) Oral every 6 hours PRN  morphine  - Injectable 2 milliGRAM(s) IV Push every 4 hours PRN  zolpidem 5 milliGRAM(s) Oral at bedtime PRN  zolpidem 5 milliGRAM(s) Oral at bedtime PRN    HOME MEDICATIONS  Home Medications:  Ambien 10 mg oral tablet: 1 tab(s) orally once a day (at bedtime) (21 Sep 2018 21:50)  apixaban 5 mg oral tablet: 1 tab(s) orally every 12 hours (21 Sep 2018 21:50)  Cardizem 60 mg oral tablet: 1 tab(s) orally every 8 hours (21 Sep 2018 21:50)  clotrimazole 1% topical cream: 1 application topically  (21 Sep 2018 21:50)  DULoxetine 60 mg oral delayed release capsule: 1 cap(s) orally once a day (21 Sep 2018 21:50)  isosorbide mononitrate 20 mg oral tablet: 1 tab(s) orally once a day (21 Sep 2018 21:50)  latanoprost 0.005% ophthalmic solution: 1 drop(s) to each affected eye once a day (in the evening) (21 Sep 2018 21:50)  Multiple Vitamins oral capsule: 1 cap(s) orally once a day (21 Sep 2018 21:50)  propranolol 20 mg oral tablet: 1 tab(s) orally 2 times a day (21 Sep 2018 21:50)  tamsulosin 0.4 mg oral capsule: 1 cap(s) orally once a day (21 Sep 2018 21:50)  traZODone 50 mg oral tablet: 1 tab(s) orally once a day (at bedtime) (21 Sep 2018 21:50)      VITAL SIGNS: Last 24 Hours  T(C): 35.8 (27 Sep 2018 05:00), Max: 36.8 (26 Sep 2018 21:00)  T(F): 96.5 (27 Sep 2018 05:00), Max: 98.2 (26 Sep 2018 21:00)  HR: 101 (27 Sep 2018 05:00) (101 - 109)  BP: 132/77 (27 Sep 2018 05:00) (114/67 - 132/77)  BP(mean): --  RR: 18 (27 Sep 2018 05:00) (18 - 20)  SpO2: 94% (27 Sep 2018 08:01) (94% - 96%)    LABS:                        12.2   6.83  )-----------( 336      ( 27 Sep 2018 08:12 )             37.5     09-27    137  |  94<L>  |  11  ----------------------------<  156<H>  3.7   |  30  |  0.9    Ca    8.7      27 Sep 2018 08:12  Mg     1.8     09-27    TPro  5.9<L>  /  Alb  3.6  /  TBili  0.3  /  DBili  x   /  AST  15  /  ALT  27  /  AlkPhos  336<H>  09-27    LIVER FUNCTIONS - ( 27 Sep 2018 08:12 )  Alb: 3.6 g/dL / Pro: 5.9 g/dL / ALK PHOS: 336 U/L / ALT: 27 U/L / AST: 15 U/L / GGT: x           PT/INR - ( 27 Sep 2018 08:12 )   PT: 15.30 sec;   INR: 1.42 ratio         PTT - ( 27 Sep 2018 08:12 )  PTT:35.8 sec          Culture - Urine (collected 24 Sep 2018 13:40)  Source: .Urine Clean Catch (Midstream)  Final Report (25 Sep 2018 22:00):    <10,000 CFU/ml Normal Urogenital molly present          CAPILLARY BLOOD GLUCOSE          RADIOLOGY:  < from: Transthoracic Echocardiogram (07.18.18 @ 07:01) >  Summary:   1. Probably normal global left ventricular systolic function.  < end of copied text >      PHYSICAL EXAM:  GENERAL: NAD, well-developed, AAOx3  HEENT:  Atraumatic, Normocephalic. Strabismus noted.  PULMONARY:  Decreased B/L lung sounds  CARDIOVASCULAR: Tachycardiac, but normal rhythm; No murmurs.  GASTROINTESTINAL: Dullness on percussion; Bowel sound noted, Non-tender.  MUSCULOSKELETAL:  Mild pitting edema noted B/L LE    ADMISSION SUMMARY  Patient is a 58y old Male who presents with a chief complaint of worsening SOB (27 Sep 2018 07:42)     he currently admitted to medicine with the primary diagnosis of Pleural effusion

## 2018-09-27 NOTE — DIETITIAN INITIAL EVALUATION ADULT. - ENERGY NEEDS
Estimated Calorie Needs: 7798-5356 kcal/day (MSJ x 1-1.1 AF)--d/t borderline obese BMI  Estimated Protein Needs: 78-94 gm/day (1-1.2 gm/kg IBW)  Estimated Fluid Needs: per LIP

## 2018-09-27 NOTE — PROGRESS NOTE ADULT - ASSESSMENT
59 yo M with HTN, DLD, depression, DVT on eliquis, and recurrent pleural effusions s/p pigtail insertion back in july, s/p thoracentesis 3 days PTP in [facility] presenting from [facility] for worsening SOB x3 days.    #Recurrent bilateral pleural effusions s/p thoracentesis showing serosanguineous fluid  -recurrent, exudative in nature  -rule out underlying malignancy vs infection vs volume overload vs autoimmune  -f/u GREGG, DS DNA   -f/u cytopathology for pleural fluid  -consulted rheumatology to r/o rheumatological cause  -last 2d echo (July): Normal EF; pending ECHO  -no clinical evidence of PNA  -c/w lasix 40mg IV daily  -spoke with CTS: no area of suspicion for pleural bx at this time, may need MRI for a more sensitive exam     #Bacteruria growing pseudomonas and enterococcus faecalis   -asympatomatic bacteruia  -blood culture NGTD  -d/c abx as per ID    #Tachycardia - stable  -EKG shows sinus tachycardia   -as per pt, baseline tachycardiac  -add metoprolol tartrate 25mg q12h   -    #Borderline BP:  -Hold BB meds for now  -keep Is=Os    #Abdominal distention/ constipation - persistent  -colace/ senna  -CT A/P on prior admission negative for any pathology    #H/O DVt  -Venous duplex shows: rt femoral vein DVT and lt femoral, popliteal and gastrocnemius veins DVT  -c/w eliquis    #BPH - c/w flomax  #Depression - c/w home meds  #Code status: DNR/ DNI 59 yo M with HTN, DLD, depression, DVT on eliquis, and recurrent pleural effusions s/p pigtail insertion back in july, s/p thoracentesis 3 days PTP in [facility] presenting from [facility] for worsening SOB x3 days.    #Recurrent bilateral pleural effusions s/p thoracentesis showing serosanguineous fluid  -recurrent, exudative in nature  -rule out underlying malignancy vs infection vs volume overload vs autoimmune  -f/u GREGG, DS DNA   -f/u cytopathology for pleural fluid  -consulted rheumatology to r/o rheumatological cause  -last 2d echo (July): Normal EF; pending ECHO  -no clinical evidence of PNA  -c/w lasix 40mg IV daily  -spoke with CTS: no area of suspicion for pleural bx at this time, may need MRI for a more sensitive exam     #Bacteruria growing pseudomonas and enterococcus faecalis   -asympatomatic bacteruia  -blood culture NGTD  -d/c abx as per ID    #Tachycardia - stable  -EKG shows sinus tachycardia   -as per pt, baseline tachycardiac  -resume patient back on Cardizem 60mg q8h  -workup for secondary causes: Thyroid panel ordered    #Borderline BP - stable  -resume home medication: cardizem 60mg q8h  -holding other home BP meds for now    #Abdominal distention/ constipation - persistent  -colace/ senna  -CT A/P on prior admission negative for any pathology    #H/O DVt  -Venous duplex shows: rt femoral vein DVT and lt femoral, popliteal and gastrocnemius veins DVT  -c/w eliquis    #BPH - c/w flomax  #Depression - c/w home meds  #Code status: DNR/ DNI

## 2018-09-27 NOTE — PROGRESS NOTE ADULT - SUBJECTIVE AND OBJECTIVE BOX
OVERNIGHT EVENTS: sitting on chair, fluid was sent for cyto/ flow cyto    Vital Signs Last 24 Hrs  T(C): 35.8 (27 Sep 2018 05:00), Max: 36.8 (26 Sep 2018 21:00)  T(F): 96.5 (27 Sep 2018 05:00), Max: 98.2 (26 Sep 2018 21:00)  HR: 101 (27 Sep 2018 05:00) (101 - 109)  BP: 132/77 (27 Sep 2018 05:00) (114/67 - 132/77)  BP(mean): --  RR: 18 (27 Sep 2018 05:00) (18 - 20)  SpO2: 94% (27 Sep 2018 08:01) (94% - 96%)    PHYSICAL EXAMINATION:    GENERAL: The patient is awake and alert in no apparent distress.     HEENT: Head is normocephalic and atraumatic. Extraocular muscles are intact. Mucous membranes are moist.    NECK: Supple.    LUNGS: dec bs both bases    HEART: Regular rate and rhythm without murmur.    ABDOMEN: Soft, nontender, and nondistended.      EXTREMITIES: Without any cyanosis, clubbing, rash, lesions or edema.    NEUROLOGIC: Grossly intact.    SKIN: No ulceration or induration present.      LABS:                        12.2   6.83  )-----------( 336      ( 27 Sep 2018 08:12 )             37.5     09-27    137  |  94<L>  |  11  ----------------------------<  156<H>  3.7   |  30  |  0.9    Ca    8.7      27 Sep 2018 08:12  Mg     1.8     09-27    TPro  5.9<L>  /  Alb  3.6  /  TBili  0.3  /  DBili  x   /  AST  15  /  ALT  27  /  AlkPhos  336<H>  09-27    PT/INR - ( 27 Sep 2018 08:12 )   PT: 15.30 sec;   INR: 1.42 ratio         PTT - ( 27 Sep 2018 08:12 )  PTT:35.8 sec                      09-26-18 @ 07:01  -  09-27-18 @ 07:00  --------------------------------------------------------  IN: 420 mL / OUT: 1450 mL / NET: -1030 mL    09-27-18 @ 07:01  -  09-27-18 @ 11:06  --------------------------------------------------------  IN: 0 mL / OUT: 1400 mL / NET: -1400 mL        MICROBIOLOGY:  Culture Results:   <10,000 CFU/ml Normal Urogenital molly present (09-24 @ 13:40)      MEDICATIONS  (STANDING):  apixaban 5 milliGRAM(s) Oral every 12 hours  chlorhexidine 4% Liquid 1 Application(s) Topical <User Schedule>  diltiazem    Tablet 60 milliGRAM(s) Oral every 8 hours  docusate sodium 100 milliGRAM(s) Oral three times a day  DULoxetine 60 milliGRAM(s) Oral daily  furosemide   Injectable 40 milliGRAM(s) IV Push daily  latanoprost 0.005% Ophthalmic Solution 1 Drop(s) Both EYES at bedtime  senna 2 Tablet(s) Oral at bedtime  tamsulosin 0.4 milliGRAM(s) Oral at bedtime  traZODone 50 milliGRAM(s) Oral at bedtime    MEDICATIONS  (PRN):  acetaminophen   Tablet .. 650 milliGRAM(s) Oral every 6 hours PRN Moderate Pain (4 - 6)  morphine  - Injectable 2 milliGRAM(s) IV Push every 4 hours PRN Severe Pain (7 - 10)  zolpidem 5 milliGRAM(s) Oral at bedtime PRN Insomnia  zolpidem 5 milliGRAM(s) Oral at bedtime PRN Insomnia      RADIOLOGY & ADDITIONAL STUDIES:

## 2018-09-27 NOTE — DIETITIAN INITIAL EVALUATION ADULT. - ORAL INTAKE PTA
good/Pt with excellent appetite/po intake and admits to not following a heart healthy diet PTA. Denies use of oral nutrition supplements, but takes a MVI daily. NKFA.

## 2018-09-27 NOTE — PROGRESS NOTE ADULT - SUBJECTIVE AND OBJECTIVE BOX
AMRIK MADRID  58y, Male      OVERNIGHT EVENTS:    none    VITALS:  T(F): 96.5, Max: 98.2 (09-26-18 @ 21:00)  HR: 101  BP: 132/77  RR: 18Vital Signs Last 24 Hrs  T(C): 35.8 (27 Sep 2018 05:00), Max: 36.8 (26 Sep 2018 21:00)  T(F): 96.5 (27 Sep 2018 05:00), Max: 98.2 (26 Sep 2018 21:00)  HR: 101 (27 Sep 2018 05:00) (101 - 117)  BP: 132/77 (27 Sep 2018 05:00) (114/67 - 132/77)  BP(mean): --  RR: 18 (27 Sep 2018 05:00) (18 - 20)  SpO2: 96% (26 Sep 2018 21:00) (96% - 98%)    TESTS & MEASUREMENTS:                        12.4   6.91  )-----------( 343      ( 26 Sep 2018 09:43 )             38.6     09-26    139  |  94<L>  |  11  ----------------------------<  115<H>  4.5   |  34<H>  |  0.9    Ca    8.7      26 Sep 2018 09:43  Mg     2.1     09-26    TPro  5.9<L>  /  Alb  3.4<L>  /  TBili  0.3  /  DBili  x   /  AST  14  /  ALT  26  /  AlkPhos  320<H>  09-26    LIVER FUNCTIONS - ( 26 Sep 2018 09:43 )  Alb: 3.4 g/dL / Pro: 5.9 g/dL / ALK PHOS: 320 U/L / ALT: 26 U/L / AST: 14 U/L / GGT: x             Culture - Urine (collected 09-24-18 @ 13:40)  Source: .Urine Clean Catch (Midstream)  Final Report (09-25-18 @ 22:00):    <10,000 CFU/ml Normal Urogenital molly present    Culture - Body Fluid with Gram Stain (collected 09-23-18 @ 09:03)  Source: .Body Fluid Pleural Fluid  Gram Stain (09-23-18 @ 23:18):    polymorphonuclear leukocytes seen per low power field    No organisms seen per oil power field    by cytocentrifuge  Preliminary Report (09-24-18 @ 16:24):    No growth    Culture - Blood (collected 09-22-18 @ 05:04)  Source: .Blood None  Preliminary Report (09-23-18 @ 11:00):    No growth to date.    Culture - Urine (collected 09-21-18 @ 19:15)  Source: .Urine Catheterized  Final Report (09-24-18 @ 23:41):    >100,000 CFU/ml Pseudomonas aeruginosa    >100,000 CFU/ml Enterococcus faecalis  Organism: Pseudomonas aeruginosa  Enterococcus faecalis (09-24-18 @ 23:41)  Organism: Enterococcus faecalis (09-24-18 @ 23:41)      -  Ampicillin: S <=2 Predicts results to ampicillin/sulbactam, amoxacillin-clavulanate and  piperacillin-tazobactam.      -  Ciprofloxacin: S <=1      -  Levofloxacin: S 1      -  Nitrofurantoin: S <=32 Should not be used to treat pyelonephritis.      -  Tetra/Doxy: R >8      -  Vancomycin: S 2      Method Type: ASIYA  Organism: Pseudomonas aeruginosa (09-24-18 @ 23:41)      -  Amikacin: S <=8      -  Aztreonam: S <=4      -  Cefepime: S 4      -  Ceftazidime: S 4      -  Ciprofloxacin: S <=0.5      -  Gentamicin: S 2      -  Imipenem: S <=1      -  Levofloxacin: S <=1      -  Meropenem: S <=1      -  Piperacillin/Tazobactam: S 16      -  Tobramycin: S <=2      Method Type: ASIYA            RADIOLOGY & ADDITIONAL TESTS:    ANTIBIOTICS:

## 2018-09-28 LAB
ALBUMIN SERPL ELPH-MCNC: 3.4 G/DL — LOW (ref 3.5–5.2)
ALP SERPL-CCNC: 325 U/L — HIGH (ref 30–115)
ALT FLD-CCNC: 24 U/L — SIGNIFICANT CHANGE UP (ref 0–41)
ANA TITR SER: NEGATIVE — SIGNIFICANT CHANGE UP
ANION GAP SERPL CALC-SCNC: 12 MMOL/L — SIGNIFICANT CHANGE UP (ref 7–14)
APTT BLD: 37.7 SEC — SIGNIFICANT CHANGE UP (ref 27–39.2)
AST SERPL-CCNC: 14 U/L — SIGNIFICANT CHANGE UP (ref 0–41)
BASOPHILS # BLD AUTO: 0.03 K/UL — SIGNIFICANT CHANGE UP (ref 0–0.2)
BASOPHILS NFR BLD AUTO: 0.4 % — SIGNIFICANT CHANGE UP (ref 0–1)
BILIRUB SERPL-MCNC: 0.3 MG/DL — SIGNIFICANT CHANGE UP (ref 0.2–1.2)
BUN SERPL-MCNC: 10 MG/DL — SIGNIFICANT CHANGE UP (ref 10–20)
CALCIUM SERPL-MCNC: 8.9 MG/DL — SIGNIFICANT CHANGE UP (ref 8.5–10.1)
CHLORIDE SERPL-SCNC: 95 MMOL/L — LOW (ref 98–110)
CO2 SERPL-SCNC: 32 MMOL/L — SIGNIFICANT CHANGE UP (ref 17–32)
CREAT SERPL-MCNC: 0.9 MG/DL — SIGNIFICANT CHANGE UP (ref 0.7–1.5)
CRP SERPL-MCNC: 1.09 MG/DL — HIGH (ref 0–0.4)
CULTURE RESULTS: SIGNIFICANT CHANGE UP
DSDNA AB FLD-ACNC: <0.2 AI — SIGNIFICANT CHANGE UP
ENA SS-A AB FLD IA-ACNC: <0.2 AI — SIGNIFICANT CHANGE UP
EOSINOPHIL # BLD AUTO: 0.13 K/UL — SIGNIFICANT CHANGE UP (ref 0–0.7)
EOSINOPHIL NFR BLD AUTO: 1.9 % — SIGNIFICANT CHANGE UP (ref 0–8)
ERYTHROCYTE [SEDIMENTATION RATE] IN BLOOD: 59 MM/HR — HIGH (ref 0–10)
GLUCOSE SERPL-MCNC: 156 MG/DL — HIGH (ref 70–99)
HCT VFR BLD CALC: 37.6 % — LOW (ref 42–52)
HGB BLD-MCNC: 11.9 G/DL — LOW (ref 14–18)
IMM GRANULOCYTES NFR BLD AUTO: 1 % — HIGH (ref 0.1–0.3)
INR BLD: 1.37 RATIO — HIGH (ref 0.65–1.3)
LYMPHOCYTES # BLD AUTO: 0.7 K/UL — LOW (ref 1.2–3.4)
LYMPHOCYTES # BLD AUTO: 10.2 % — LOW (ref 20.5–51.1)
MAGNESIUM SERPL-MCNC: 1.9 MG/DL — SIGNIFICANT CHANGE UP (ref 1.8–2.4)
MCHC RBC-ENTMCNC: 26.3 PG — LOW (ref 27–31)
MCHC RBC-ENTMCNC: 31.6 G/DL — LOW (ref 32–37)
MCV RBC AUTO: 83.2 FL — SIGNIFICANT CHANGE UP (ref 80–94)
MONOCYTES # BLD AUTO: 0.29 K/UL — SIGNIFICANT CHANGE UP (ref 0.1–0.6)
MONOCYTES NFR BLD AUTO: 4.2 % — SIGNIFICANT CHANGE UP (ref 1.7–9.3)
NEUTROPHILS # BLD AUTO: 5.61 K/UL — SIGNIFICANT CHANGE UP (ref 1.4–6.5)
NEUTROPHILS NFR BLD AUTO: 82.3 % — HIGH (ref 42.2–75.2)
NON-GYNECOLOGICAL CYTOLOGY STUDY: SIGNIFICANT CHANGE UP
NRBC # BLD: 0 /100 WBCS — SIGNIFICANT CHANGE UP (ref 0–0)
PLATELET # BLD AUTO: 325 K/UL — SIGNIFICANT CHANGE UP (ref 130–400)
POTASSIUM SERPL-MCNC: 3.9 MMOL/L — SIGNIFICANT CHANGE UP (ref 3.5–5)
POTASSIUM SERPL-SCNC: 3.9 MMOL/L — SIGNIFICANT CHANGE UP (ref 3.5–5)
PROT SERPL-MCNC: 5.6 G/DL — LOW (ref 6–8)
PROTHROM AB SERPL-ACNC: 14.8 SEC — HIGH (ref 9.95–12.87)
RBC # BLD: 4.52 M/UL — LOW (ref 4.7–6.1)
RBC # FLD: 14.8 % — HIGH (ref 11.5–14.5)
SODIUM SERPL-SCNC: 139 MMOL/L — SIGNIFICANT CHANGE UP (ref 135–146)
SPECIMEN SOURCE: SIGNIFICANT CHANGE UP
T3 SERPL-MCNC: 98 NG/DL — SIGNIFICANT CHANGE UP (ref 80–200)
T4 AB SER-ACNC: 5.6 UG/DL — SIGNIFICANT CHANGE UP (ref 4.6–12)
TSH SERPL-MCNC: 4.61 UIU/ML — HIGH (ref 0.27–4.2)
TSH SERPL-MCNC: 4.63 UIU/ML — HIGH (ref 0.27–4.2)
WBC # BLD: 6.83 K/UL — SIGNIFICANT CHANGE UP (ref 4.8–10.8)
WBC # FLD AUTO: 6.83 K/UL — SIGNIFICANT CHANGE UP (ref 4.8–10.8)

## 2018-09-28 RX ORDER — PROPRANOLOL HCL 160 MG
20 CAPSULE, EXTENDED RELEASE 24HR ORAL EVERY 12 HOURS
Qty: 0 | Refills: 0 | Status: DISCONTINUED | OUTPATIENT
Start: 2018-09-28 | End: 2018-10-10

## 2018-09-28 RX ORDER — ENOXAPARIN SODIUM 100 MG/ML
50 INJECTION SUBCUTANEOUS EVERY 12 HOURS
Qty: 0 | Refills: 0 | Status: DISCONTINUED | OUTPATIENT
Start: 2018-09-28 | End: 2018-10-03

## 2018-09-28 RX ADMIN — Medication 20 MILLIGRAM(S): at 13:42

## 2018-09-28 RX ADMIN — LATANOPROST 1 DROP(S): 0.05 SOLUTION/ DROPS OPHTHALMIC; TOPICAL at 21:20

## 2018-09-28 RX ADMIN — ENOXAPARIN SODIUM 50 MILLIGRAM(S): 100 INJECTION SUBCUTANEOUS at 21:21

## 2018-09-28 RX ADMIN — TAMSULOSIN HYDROCHLORIDE 0.4 MILLIGRAM(S): 0.4 CAPSULE ORAL at 21:22

## 2018-09-28 RX ADMIN — Medication 100 MILLIGRAM(S): at 06:07

## 2018-09-28 RX ADMIN — Medication 50 MILLIGRAM(S): at 21:22

## 2018-09-28 RX ADMIN — ZOLPIDEM TARTRATE 5 MILLIGRAM(S): 10 TABLET ORAL at 22:23

## 2018-09-28 RX ADMIN — MORPHINE SULFATE 2 MILLIGRAM(S): 50 CAPSULE, EXTENDED RELEASE ORAL at 06:43

## 2018-09-28 RX ADMIN — ENOXAPARIN SODIUM 50 MILLIGRAM(S): 100 INJECTION SUBCUTANEOUS at 16:04

## 2018-09-28 RX ADMIN — MORPHINE SULFATE 2 MILLIGRAM(S): 50 CAPSULE, EXTENDED RELEASE ORAL at 13:51

## 2018-09-28 RX ADMIN — DULOXETINE HYDROCHLORIDE 60 MILLIGRAM(S): 30 CAPSULE, DELAYED RELEASE ORAL at 13:41

## 2018-09-28 RX ADMIN — Medication 100 MILLIGRAM(S): at 12:57

## 2018-09-28 RX ADMIN — Medication 40 MILLIGRAM(S): at 06:07

## 2018-09-28 NOTE — PROGRESS NOTE ADULT - SUBJECTIVE AND OBJECTIVE BOX
AMRIK MADRID 58y Male  MRN#: [id number]       SUBJECTIVE  Patient is a 58y old Male who presents with a chief complaint of worsening SOB (28 Sep 2018 10:00)    he is currently admitted to medicine with the primary diagnosis of Pleural effusion    Today is hospital day 7d, and this morning he is sitting in chair eating his breakfast. Pt states his breathing is the same and has improved compared to admission. Otherwise, pt has no other complaints.     No acute overnight events.     OBJECTIVE  PAST MEDICAL & SURGICAL HISTORY  Pleural effusion  CHF (congestive heart failure)  Insomnia  BPH (benign prostatic hyperplasia)  Depression  Bipolar 1 disorder  HTN (hypertension)  History of thoracentesis    ALLERGIES:  No Known Allergies    MEDICATIONS:  STANDING MEDICATIONS  chlorhexidine 4% Liquid 1 Application(s) Topical <User Schedule>  diltiazem    Tablet 60 milliGRAM(s) Oral every 8 hours  docusate sodium 100 milliGRAM(s) Oral three times a day  DULoxetine 60 milliGRAM(s) Oral daily  enoxaparin Injectable 50 milliGRAM(s) SubCutaneous every 12 hours  furosemide   Injectable 40 milliGRAM(s) IV Push daily  latanoprost 0.005% Ophthalmic Solution 1 Drop(s) Both EYES at bedtime  senna 2 Tablet(s) Oral at bedtime  tamsulosin 0.4 milliGRAM(s) Oral at bedtime  traZODone 50 milliGRAM(s) Oral at bedtime    PRN MEDICATIONS  acetaminophen   Tablet .. 650 milliGRAM(s) Oral every 6 hours PRN  morphine  - Injectable 2 milliGRAM(s) IV Push every 4 hours PRN  zolpidem 5 milliGRAM(s) Oral at bedtime PRN  zolpidem 5 milliGRAM(s) Oral at bedtime PRN    HOME MEDICATIONS  Home Medications:  Ambien 10 mg oral tablet: 1 tab(s) orally once a day (at bedtime) (21 Sep 2018 21:50)  apixaban 5 mg oral tablet: 1 tab(s) orally every 12 hours (21 Sep 2018 21:50)  Cardizem 60 mg oral tablet: 1 tab(s) orally every 8 hours (21 Sep 2018 21:50)  clotrimazole 1% topical cream: 1 application topically  (21 Sep 2018 21:50)  DULoxetine 60 mg oral delayed release capsule: 1 cap(s) orally once a day (21 Sep 2018 21:50)  isosorbide mononitrate 20 mg oral tablet: 1 tab(s) orally once a day (21 Sep 2018 21:50)  latanoprost 0.005% ophthalmic solution: 1 drop(s) to each affected eye once a day (in the evening) (21 Sep 2018 21:50)  Multiple Vitamins oral capsule: 1 cap(s) orally once a day (21 Sep 2018 21:50)  propranolol 20 mg oral tablet: 1 tab(s) orally 2 times a day (21 Sep 2018 21:50)  tamsulosin 0.4 mg oral capsule: 1 cap(s) orally once a day (21 Sep 2018 21:50)  traZODone 50 mg oral tablet: 1 tab(s) orally once a day (at bedtime) (21 Sep 2018 21:50)      VITAL SIGNS: Last 24 Hours  T(C): 36.3 (28 Sep 2018 05:24), Max: 36.3 (28 Sep 2018 05:24)  T(F): 97.3 (28 Sep 2018 05:24), Max: 97.3 (28 Sep 2018 05:24)  HR: 101 (28 Sep 2018 05:24) (78 - 101)  BP: 115/58 (28 Sep 2018 05:24) (101/55 - 116/66)  BP(mean): --  RR: 20 (28 Sep 2018 05:24) (20 - 20)  SpO2: 96% (28 Sep 2018 06:55) (95% - 100%)    LABS:                        11.9   6.83  )-----------( 325      ( 28 Sep 2018 07:58 )             37.6     09-28    139  |  95<L>  |  10  ----------------------------<  156<H>  3.9   |  32  |  0.9    Ca    8.9      28 Sep 2018 07:58  Mg     1.9     09-28    TPro  5.6<L>  /  Alb  3.4<L>  /  TBili  0.3  /  DBili  x   /  AST  14  /  ALT  24  /  AlkPhos  325<H>  09-28    LIVER FUNCTIONS - ( 28 Sep 2018 07:58 )  Alb: 3.4 g/dL / Pro: 5.6 g/dL / ALK PHOS: 325 U/L / ALT: 24 U/L / AST: 14 U/L / GGT: x           PT/INR - ( 28 Sep 2018 07:58 )   PT: 14.80 sec;   INR: 1.37 ratio         PTT - ( 28 Sep 2018 07:58 )  PTT:37.7 sec      Sedimentation Rate, Erythrocyte: 59 mm/Hr <H> (09-28-18 @ 07:58)          CAPILLARY BLOOD GLUCOSE      RADIOLOGY:    < from: Xray Chest 2 Views PA/Lat (09.27.18 @ 11:32) >  Impression:    Left lower lobe opacity and bilateral pleural effusions unchanged. No air   leak.  < end of copied text >    < from: Transthoracic Echocardiogram (09.27.18 @ 15:13) >  Summary:   1. Left ventricular ejection fraction, by visual estimation, is 50 to   55%.   2. Normal left ventricular size and wall thicknesses, with normal   systolic function.   3. Spectral Doppler shows impaired relaxation pattern of left   ventricular myocardial filling (Grade I diastolic dysfunction).   4. Moderate pleural effusion in both left and right lateral regions.   5. Mild tricuspid regurgitation.   6. There is mild aortic root calcification.  < end of copied text >      PHYSICAL EXAM:      GENERAL: NAD, well-developed, AAOx3  HEENT:  Atraumatic, Normocephalic. Strabismus noted.  PULMONARY:  Decreased B/L lung sounds - unchanged  CARDIOVASCULAR: Tachycardiac, but normal rhythm; No murmurs.  GASTROINTESTINAL: Dullness on percussion; Bowel sound noted, Non-tender.  MUSCULOSKELETAL:  Mild pitting edema noted B/L LE      ADMISSION SUMMARY  Patient is a 58y old Male who presents with a chief complaint of worsening SOB (28 Sep 2018 10:00)     He currently admitted to medicine with the primary diagnosis of Pleural effusion.

## 2018-09-28 NOTE — PROGRESS NOTE ADULT - ASSESSMENT
57 yo M with HTN, DLD, depression, DVT on eliquis, and recurrent pleural effusions s/p pigtail insertion back in july, s/p thoracentesis 3 days PTP in [facility] presenting from [facility] for worsening SOB x3 days. Pt currently wants to think about Pleurx catheter. Eliquis is switched to Lovenox for possible Pleurx catheter, possibly planned for next week.      #Recurrent bilateral pleural effusions s/p thoracentesis showing serosanguineous fluid  -recurrent, exudative in nature  -rule out underlying malignancy vs infection vs volume overload vs autoimmune  -f/u GREGG, SS-A and SS-B, C-ANCA, P-ANCA (rheumatology team requires lab results prior to consult)  -f/u cytopathology/flow for pleural fluid  -Sedimentation Rate, Erythrocyte: 59 mm/Hr (09.28.18 @ 07:58)  -ECHO: Normal EF  -c/w lasix 40mg IV daily  -d/c Eliquis and switched to Lovenox for possible Pleurx catheter next week    #Bacteruria growing pseudomonas and enterococcus faecalis - stable  -asympatomatic bacteruia  -blood culture NGTD  -d/c abx as per ID    #Tachycardia - likely secondary to chronic pleural effusion   -EKG shows sinus tachycardia   -as per pt, baseline tachycardiac  -c/w Cardizem 60mg q8h and resume propanolol 20mg q12h (home medication)  -f/u Thyroid panel ordered    #Borderline BP - stable  -resume home medication: cardizem 60mg q8h  -holding other home BP meds for now    #Abdominal distention/ constipation - persistent  -colace/ senna  -CT A/P on prior admission negative for any pathology    #H/O DVt  -Venous duplex shows: rt femoral vein DVT and lt femoral, popliteal and gastrocnemius veins DVT  -c/w eliquis    #BPH - c/w flomax  #Depression - c/w home meds  #Code status: DNR/ DNI 59 yo M with HTN, DLD, depression, DVT on eliquis, and recurrent pleural effusions s/p pigtail insertion back in july, s/p thoracentesis 3 days PTP in [facility] presenting from [facility] for worsening SOB x3 days. Pt currently wants to think about Pleurx catheter. Eliquis is switched to Lovenox for possible Pleurx catheter, possibly planned for next week.      #Recurrent bilateral pleural effusions s/p thoracentesis showing serosanguineous fluid  -recurrent, exudative in nature  -rule out underlying malignancy vs infection vs volume overload vs autoimmune  -f/u GREGG, SS-A and SS-B, C-ANCA, P-ANCA (rheumatology team requires lab results prior to consult)  -f/u cytopathology/flow for pleural fluid  -Sedimentation Rate, Erythrocyte: 59 mm/Hr (09.28.18 @ 07:58)  -ECHO: Normal EF  -c/w lasix 40mg IV daily  -d/c Eliquis and switched to Lovenox for possible Pleurx catheter next week    #Bacteruria growing pseudomonas and enterococcus faecalis - stable  -asympatomatic bacteruia  -blood culture NGTD  -d/c abx as per ID    #Tachycardia - likely secondary to chronic pleural effusion   -EKG shows sinus tachycardia   -as per pt, baseline tachycardiac  -c/w Cardizem 60mg q8h and resume propanolol 20mg q12h (home medication)  -f/u Thyroid panel ordered    #Borderline BP - stable  -resume home medication: cardizem 60mg q8h and propanolol   -holding other home BP meds for now    #Abdominal distention/ constipation - persistent  -colace/ senna  -CT A/P on prior admission negative for any pathology    #H/O DVt  -Venous duplex shows: rt femoral vein DVT and lt femoral, popliteal and gastrocnemius veins DVT  -c/w eliquis    #BPH - c/w flomax  #Depression - c/w home meds  #Code status: DNR/ DNI

## 2018-09-28 NOTE — PROGRESS NOTE ADULT - ASSESSMENT
recurrent effusion s/p thora 2 times l side, 1 thora r side, f/up r/o malignancy b/l exudative repeat cyto/ flow p (had ct/c/a/p -), rheumato -, declined VATS BIOPSY , POOR OP F/UP    CHANGE ELIQUIS TO LOVENOX  F/UP CYTO/ FLOW  NEED PLEURX CATHETER  POOR PROGNOSIS

## 2018-09-28 NOTE — PROGRESS NOTE ADULT - SUBJECTIVE AND OBJECTIVE BOX
OVERNIGHT EVENTS: still c/o sob, better than presentation, repeat cxr reviewe    Vital Signs Last 24 Hrs  T(C): 36.3 (28 Sep 2018 05:24), Max: 36.3 (28 Sep 2018 05:24)  T(F): 97.3 (28 Sep 2018 05:24), Max: 97.3 (28 Sep 2018 05:24)  HR: 101 (28 Sep 2018 05:24) (78 - 101)  BP: 115/58 (28 Sep 2018 05:24) (101/55 - 116/66)  RR: 20 (28 Sep 2018 05:24) (20 - 20)  SpO2: 96% (28 Sep 2018 06:55) (95% - 100%)    PHYSICAL EXAMINATION:    GENERAL: The patient is awake and alert in no apparent distress.     HEENT: Head is normocephalic and atraumatic. Extraocular muscles are intact. Mucous membranes are moist.    NECK: Supple.    LUNGS: dec bs both bases    HEART: Regular rate and rhythm without murmur.    ABDOMEN: Soft, nontender, and nondistended.      EXTREMITIES: Without any cyanosis, clubbing, rash, lesions or edema.    NEUROLOGIC: Grossly intact.    SKIN: No ulceration or induration present.      LABS:                        11.9   6.83  )-----------( 325      ( 28 Sep 2018 07:58 )             37.6     09-28    139  |  95<L>  |  10  ----------------------------<  156<H>  3.9   |  32  |  0.9    Ca    8.9      28 Sep 2018 07:58  Mg     1.9     09-28    TPro  5.6<L>  /  Alb  3.4<L>  /  TBili  0.3  /  DBili  x   /  AST  14  /  ALT  24  /  AlkPhos  325<H>  09-28    PT/INR - ( 28 Sep 2018 07:58 )   PT: 14.80 sec;   INR: 1.37 ratio         PTT - ( 28 Sep 2018 07:58 )  PTT:37.7 sec                      09-27-18 @ 07:01  -  09-28-18 @ 07:00  --------------------------------------------------------  IN: 420 mL / OUT: 3100 mL / NET: -2680 mL        MICROBIOLOGY:  Culture Results:   <10,000 CFU/ml Normal Urogenital molly present (09-24 @ 13:40)      MEDICATIONS  (STANDING):  chlorhexidine 4% Liquid 1 Application(s) Topical <User Schedule>  diltiazem    Tablet 60 milliGRAM(s) Oral every 8 hours  docusate sodium 100 milliGRAM(s) Oral three times a day  DULoxetine 60 milliGRAM(s) Oral daily  enoxaparin Injectable 50 milliGRAM(s) SubCutaneous every 12 hours  furosemide   Injectable 40 milliGRAM(s) IV Push daily  latanoprost 0.005% Ophthalmic Solution 1 Drop(s) Both EYES at bedtime  senna 2 Tablet(s) Oral at bedtime  tamsulosin 0.4 milliGRAM(s) Oral at bedtime  traZODone 50 milliGRAM(s) Oral at bedtime    MEDICATIONS  (PRN):  acetaminophen   Tablet .. 650 milliGRAM(s) Oral every 6 hours PRN Moderate Pain (4 - 6)  morphine  - Injectable 2 milliGRAM(s) IV Push every 4 hours PRN Severe Pain (7 - 10)  zolpidem 5 milliGRAM(s) Oral at bedtime PRN Insomnia  zolpidem 5 milliGRAM(s) Oral at bedtime PRN Insomnia      RADIOLOGY & ADDITIONAL STUDIES:

## 2018-09-29 LAB
ALBUMIN SERPL ELPH-MCNC: 3.6 G/DL — SIGNIFICANT CHANGE UP (ref 3.5–5.2)
ALP SERPL-CCNC: 339 U/L — HIGH (ref 30–115)
ALT FLD-CCNC: 25 U/L — SIGNIFICANT CHANGE UP (ref 0–41)
ANION GAP SERPL CALC-SCNC: 12 MMOL/L — SIGNIFICANT CHANGE UP (ref 7–14)
APTT BLD: 42.4 SEC — HIGH (ref 27–39.2)
AST SERPL-CCNC: 14 U/L — SIGNIFICANT CHANGE UP (ref 0–41)
BASOPHILS # BLD AUTO: 0.03 K/UL — SIGNIFICANT CHANGE UP (ref 0–0.2)
BASOPHILS NFR BLD AUTO: 0.4 % — SIGNIFICANT CHANGE UP (ref 0–1)
BILIRUB SERPL-MCNC: 0.2 MG/DL — SIGNIFICANT CHANGE UP (ref 0.2–1.2)
BUN SERPL-MCNC: 12 MG/DL — SIGNIFICANT CHANGE UP (ref 10–20)
CALCIUM SERPL-MCNC: 8.9 MG/DL — SIGNIFICANT CHANGE UP (ref 8.5–10.1)
CHLORIDE SERPL-SCNC: 93 MMOL/L — LOW (ref 98–110)
CO2 SERPL-SCNC: 34 MMOL/L — HIGH (ref 17–32)
CREAT SERPL-MCNC: 0.8 MG/DL — SIGNIFICANT CHANGE UP (ref 0.7–1.5)
EOSINOPHIL # BLD AUTO: 0.17 K/UL — SIGNIFICANT CHANGE UP (ref 0–0.7)
EOSINOPHIL NFR BLD AUTO: 2.4 % — SIGNIFICANT CHANGE UP (ref 0–8)
GLUCOSE SERPL-MCNC: 120 MG/DL — HIGH (ref 70–99)
HCT VFR BLD CALC: 37.2 % — LOW (ref 42–52)
HGB BLD-MCNC: 11.9 G/DL — LOW (ref 14–18)
IMM GRANULOCYTES NFR BLD AUTO: 1.4 % — HIGH (ref 0.1–0.3)
INR BLD: 1.26 RATIO — SIGNIFICANT CHANGE UP (ref 0.65–1.3)
LYMPHOCYTES # BLD AUTO: 0.91 K/UL — LOW (ref 1.2–3.4)
LYMPHOCYTES # BLD AUTO: 12.9 % — LOW (ref 20.5–51.1)
MAGNESIUM SERPL-MCNC: 2.1 MG/DL — SIGNIFICANT CHANGE UP (ref 1.8–2.4)
MCHC RBC-ENTMCNC: 26.7 PG — LOW (ref 27–31)
MCHC RBC-ENTMCNC: 32 G/DL — SIGNIFICANT CHANGE UP (ref 32–37)
MCV RBC AUTO: 83.6 FL — SIGNIFICANT CHANGE UP (ref 80–94)
MONOCYTES # BLD AUTO: 0.39 K/UL — SIGNIFICANT CHANGE UP (ref 0.1–0.6)
MONOCYTES NFR BLD AUTO: 5.5 % — SIGNIFICANT CHANGE UP (ref 1.7–9.3)
NEUTROPHILS # BLD AUTO: 5.44 K/UL — SIGNIFICANT CHANGE UP (ref 1.4–6.5)
NEUTROPHILS NFR BLD AUTO: 77.4 % — HIGH (ref 42.2–75.2)
NRBC # BLD: 0 /100 WBCS — SIGNIFICANT CHANGE UP (ref 0–0)
PLATELET # BLD AUTO: 354 K/UL — SIGNIFICANT CHANGE UP (ref 130–400)
POTASSIUM SERPL-MCNC: 4.2 MMOL/L — SIGNIFICANT CHANGE UP (ref 3.5–5)
POTASSIUM SERPL-SCNC: 4.2 MMOL/L — SIGNIFICANT CHANGE UP (ref 3.5–5)
PROT SERPL-MCNC: 5.8 G/DL — LOW (ref 6–8)
PROTHROM AB SERPL-ACNC: 13.6 SEC — HIGH (ref 9.95–12.87)
RBC # BLD: 4.45 M/UL — LOW (ref 4.7–6.1)
RBC # FLD: 14.8 % — HIGH (ref 11.5–14.5)
SODIUM SERPL-SCNC: 139 MMOL/L — SIGNIFICANT CHANGE UP (ref 135–146)
WBC # BLD: 7.04 K/UL — SIGNIFICANT CHANGE UP (ref 4.8–10.8)
WBC # FLD AUTO: 7.04 K/UL — SIGNIFICANT CHANGE UP (ref 4.8–10.8)

## 2018-09-29 RX ORDER — ZOLPIDEM TARTRATE 10 MG/1
5 TABLET ORAL AT BEDTIME
Qty: 0 | Refills: 0 | Status: DISCONTINUED | OUTPATIENT
Start: 2018-09-29 | End: 2018-10-03

## 2018-09-29 RX ADMIN — TAMSULOSIN HYDROCHLORIDE 0.4 MILLIGRAM(S): 0.4 CAPSULE ORAL at 21:40

## 2018-09-29 RX ADMIN — Medication 20 MILLIGRAM(S): at 05:51

## 2018-09-29 RX ADMIN — MORPHINE SULFATE 2 MILLIGRAM(S): 50 CAPSULE, EXTENDED RELEASE ORAL at 10:11

## 2018-09-29 RX ADMIN — MORPHINE SULFATE 2 MILLIGRAM(S): 50 CAPSULE, EXTENDED RELEASE ORAL at 10:30

## 2018-09-29 RX ADMIN — ZOLPIDEM TARTRATE 5 MILLIGRAM(S): 10 TABLET ORAL at 23:38

## 2018-09-29 RX ADMIN — LATANOPROST 1 DROP(S): 0.05 SOLUTION/ DROPS OPHTHALMIC; TOPICAL at 21:40

## 2018-09-29 RX ADMIN — ENOXAPARIN SODIUM 50 MILLIGRAM(S): 100 INJECTION SUBCUTANEOUS at 21:42

## 2018-09-29 RX ADMIN — ENOXAPARIN SODIUM 50 MILLIGRAM(S): 100 INJECTION SUBCUTANEOUS at 08:21

## 2018-09-29 RX ADMIN — Medication 20 MILLIGRAM(S): at 17:35

## 2018-09-29 RX ADMIN — Medication 50 MILLIGRAM(S): at 21:40

## 2018-09-29 RX ADMIN — MORPHINE SULFATE 2 MILLIGRAM(S): 50 CAPSULE, EXTENDED RELEASE ORAL at 17:45

## 2018-09-29 RX ADMIN — Medication 40 MILLIGRAM(S): at 05:51

## 2018-09-29 RX ADMIN — MORPHINE SULFATE 2 MILLIGRAM(S): 50 CAPSULE, EXTENDED RELEASE ORAL at 17:34

## 2018-09-29 RX ADMIN — DULOXETINE HYDROCHLORIDE 60 MILLIGRAM(S): 30 CAPSULE, DELAYED RELEASE ORAL at 11:57

## 2018-09-29 RX ADMIN — MORPHINE SULFATE 2 MILLIGRAM(S): 50 CAPSULE, EXTENDED RELEASE ORAL at 23:38

## 2018-09-29 NOTE — PROGRESS NOTE ADULT - SUBJECTIVE AND OBJECTIVE BOX
AMRIK MADRID 58y Male  MRN#: [id number]       SUBJECTIVE  Patient is a 58y old Male who presents with a chief complaint of worsening SOB (28 Sep 2018 10:08)    he is currently admitted to medicine with the primary diagnosis of Pleural effusion    Today is hospital day 8d, and this morning he is _    No acute overnight events.     OBJECTIVE  PAST MEDICAL & SURGICAL HISTORY  Pleural effusion  CHF (congestive heart failure)  Insomnia  BPH (benign prostatic hyperplasia)  Depression  Bipolar 1 disorder  HTN (hypertension)  History of thoracentesis    ALLERGIES:  No Known Allergies    MEDICATIONS:  STANDING MEDICATIONS  chlorhexidine 4% Liquid 1 Application(s) Topical <User Schedule>  diltiazem    Tablet 60 milliGRAM(s) Oral every 8 hours  docusate sodium 100 milliGRAM(s) Oral three times a day  DULoxetine 60 milliGRAM(s) Oral daily  enoxaparin Injectable 50 milliGRAM(s) SubCutaneous every 12 hours  furosemide   Injectable 40 milliGRAM(s) IV Push daily  latanoprost 0.005% Ophthalmic Solution 1 Drop(s) Both EYES at bedtime  propranolol 20 milliGRAM(s) Oral every 12 hours  senna 2 Tablet(s) Oral at bedtime  tamsulosin 0.4 milliGRAM(s) Oral at bedtime  traZODone 50 milliGRAM(s) Oral at bedtime    PRN MEDICATIONS  acetaminophen   Tablet .. 650 milliGRAM(s) Oral every 6 hours PRN  morphine  - Injectable 2 milliGRAM(s) IV Push every 4 hours PRN    HOME MEDICATIONS  Home Medications:  Ambien 10 mg oral tablet: 1 tab(s) orally once a day (at bedtime) (21 Sep 2018 21:50)  apixaban 5 mg oral tablet: 1 tab(s) orally every 12 hours (21 Sep 2018 21:50)  Cardizem 60 mg oral tablet: 1 tab(s) orally every 8 hours (21 Sep 2018 21:50)  clotrimazole 1% topical cream: 1 application topically  (21 Sep 2018 21:50)  DULoxetine 60 mg oral delayed release capsule: 1 cap(s) orally once a day (21 Sep 2018 21:50)  isosorbide mononitrate 20 mg oral tablet: 1 tab(s) orally once a day (21 Sep 2018 21:50)  latanoprost 0.005% ophthalmic solution: 1 drop(s) to each affected eye once a day (in the evening) (21 Sep 2018 21:50)  Multiple Vitamins oral capsule: 1 cap(s) orally once a day (21 Sep 2018 21:50)  propranolol 20 mg oral tablet: 1 tab(s) orally 2 times a day (21 Sep 2018 21:50)  tamsulosin 0.4 mg oral capsule: 1 cap(s) orally once a day (21 Sep 2018 21:50)  traZODone 50 mg oral tablet: 1 tab(s) orally once a day (at bedtime) (21 Sep 2018 21:50)      VITAL SIGNS: Last 24 Hours  T(C): 36.3 (29 Sep 2018 05:00), Max: 37 (28 Sep 2018 20:16)  T(F): 97.3 (29 Sep 2018 05:00), Max: 98.6 (28 Sep 2018 20:16)  HR: 95 (29 Sep 2018 05:00) (85 - 101)  BP: 133/68 (29 Sep 2018 05:00) (107/59 - 133/68)  BP(mean): --  RR: 19 (29 Sep 2018 05:00) (19 - 20)  SpO2: 93% (29 Sep 2018 06:05) (92% - 93%)    LABS:                        11.9   6.83  )-----------( 325      ( 28 Sep 2018 07:58 )             37.6     09-28    139  |  95<L>  |  10  ----------------------------<  156<H>  3.9   |  32  |  0.9    Ca    8.9      28 Sep 2018 07:58  Mg     1.9     09-28    TPro  5.6<L>  /  Alb  3.4<L>  /  TBili  0.3  /  DBili  x   /  AST  14  /  ALT  24  /  AlkPhos  325<H>  09-28    LIVER FUNCTIONS - ( 28 Sep 2018 07:58 )  Alb: 3.4 g/dL / Pro: 5.6 g/dL / ALK PHOS: 325 U/L / ALT: 24 U/L / AST: 14 U/L / GGT: x           PT/INR - ( 28 Sep 2018 07:58 )   PT: 14.80 sec;   INR: 1.37 ratio         PTT - ( 28 Sep 2018 07:58 )  PTT:37.7 sec    CAPILLARY BLOOD GLUCOSE          RADIOLOGY:      PHYSICAL EXAM:    GENERAL: NAD, well-developed, AAOx3  HEENT:  Atraumatic, Normocephalic. Strabismus noted.  PULMONARY:  Decreased B/L lung sounds - unchanged  CARDIOVASCULAR: Tachycardiac, but normal rhythm; No murmurs.  GASTROINTESTINAL: Dullness on percussion; Bowel sound noted, Non-tender.  MUSCULOSKELETAL:  Mild pitting edema noted B/L LE      ADMISSION SUMMARY  Patient is a 58y old Male who presents with a chief complaint of worsening SOB (28 Sep 2018 10:08)     he currently admitted to medicine with the primary diagnosis of Pleural effusion AMRIK MADRID 58y Male  MRN#: [id number]       SUBJECTIVE  Patient is a 58y old Male who presents with a chief complaint of worsening SOB (28 Sep 2018 10:08)    he is currently admitted to medicine with the primary diagnosis of Pleural effusion    Today is hospital day 8d, and this morning he is sitting on chair without distress.    No acute overnight events.     OBJECTIVE  PAST MEDICAL & SURGICAL HISTORY  Pleural effusion  CHF (congestive heart failure)  Insomnia  BPH (benign prostatic hyperplasia)  Depression  Bipolar 1 disorder  HTN (hypertension)  History of thoracentesis    ALLERGIES:  No Known Allergies    MEDICATIONS:  STANDING MEDICATIONS  chlorhexidine 4% Liquid 1 Application(s) Topical <User Schedule>  diltiazem    Tablet 60 milliGRAM(s) Oral every 8 hours  docusate sodium 100 milliGRAM(s) Oral three times a day  DULoxetine 60 milliGRAM(s) Oral daily  enoxaparin Injectable 50 milliGRAM(s) SubCutaneous every 12 hours  furosemide   Injectable 40 milliGRAM(s) IV Push daily  latanoprost 0.005% Ophthalmic Solution 1 Drop(s) Both EYES at bedtime  propranolol 20 milliGRAM(s) Oral every 12 hours  senna 2 Tablet(s) Oral at bedtime  tamsulosin 0.4 milliGRAM(s) Oral at bedtime  traZODone 50 milliGRAM(s) Oral at bedtime    PRN MEDICATIONS  acetaminophen   Tablet .. 650 milliGRAM(s) Oral every 6 hours PRN  morphine  - Injectable 2 milliGRAM(s) IV Push every 4 hours PRN    HOME MEDICATIONS  Home Medications:  Ambien 10 mg oral tablet: 1 tab(s) orally once a day (at bedtime) (21 Sep 2018 21:50)  apixaban 5 mg oral tablet: 1 tab(s) orally every 12 hours (21 Sep 2018 21:50)  Cardizem 60 mg oral tablet: 1 tab(s) orally every 8 hours (21 Sep 2018 21:50)  clotrimazole 1% topical cream: 1 application topically  (21 Sep 2018 21:50)  DULoxetine 60 mg oral delayed release capsule: 1 cap(s) orally once a day (21 Sep 2018 21:50)  isosorbide mononitrate 20 mg oral tablet: 1 tab(s) orally once a day (21 Sep 2018 21:50)  latanoprost 0.005% ophthalmic solution: 1 drop(s) to each affected eye once a day (in the evening) (21 Sep 2018 21:50)  Multiple Vitamins oral capsule: 1 cap(s) orally once a day (21 Sep 2018 21:50)  propranolol 20 mg oral tablet: 1 tab(s) orally 2 times a day (21 Sep 2018 21:50)  tamsulosin 0.4 mg oral capsule: 1 cap(s) orally once a day (21 Sep 2018 21:50)  traZODone 50 mg oral tablet: 1 tab(s) orally once a day (at bedtime) (21 Sep 2018 21:50)      VITAL SIGNS: Last 24 Hours  T(C): 36.3 (29 Sep 2018 05:00), Max: 37 (28 Sep 2018 20:16)  T(F): 97.3 (29 Sep 2018 05:00), Max: 98.6 (28 Sep 2018 20:16)  HR: 95 (29 Sep 2018 05:00) (85 - 101)  BP: 133/68 (29 Sep 2018 05:00) (107/59 - 133/68)  BP(mean): --  RR: 19 (29 Sep 2018 05:00) (19 - 20)  SpO2: 93% (29 Sep 2018 06:05) (92% - 93%)    LABS:                        11.9   6.83  )-----------( 325      ( 28 Sep 2018 07:58 )             37.6     09-28    139  |  95<L>  |  10  ----------------------------<  156<H>  3.9   |  32  |  0.9    Ca    8.9      28 Sep 2018 07:58  Mg     1.9     09-28    TPro  5.6<L>  /  Alb  3.4<L>  /  TBili  0.3  /  DBili  x   /  AST  14  /  ALT  24  /  AlkPhos  325<H>  09-28    LIVER FUNCTIONS - ( 28 Sep 2018 07:58 )  Alb: 3.4 g/dL / Pro: 5.6 g/dL / ALK PHOS: 325 U/L / ALT: 24 U/L / AST: 14 U/L / GGT: x           PT/INR - ( 28 Sep 2018 07:58 )   PT: 14.80 sec;   INR: 1.37 ratio         PTT - ( 28 Sep 2018 07:58 )  PTT:37.7 sec    CAPILLARY BLOOD GLUCOSE          RADIOLOGY:      PHYSICAL EXAM:    GENERAL: NAD, well-developed, AAOx3  HEENT:  Atraumatic, Normocephalic. Strabismus noted.  PULMONARY:  Decreased B/L lung sounds - unchanged  CARDIOVASCULAR: Tachycardiac, but normal rhythm; No murmurs.  GASTROINTESTINAL: Dullness on percussion; Bowel sound noted, Non-tender.  MUSCULOSKELETAL:  Mild pitting edema noted B/L LE      ADMISSION SUMMARY  Patient is a 58y old Male who presents with a chief complaint of worsening SOB (28 Sep 2018 10:08)     he currently admitted to medicine with the primary diagnosis of Pleural effusion AMRIK MADRID 58y Male  MRN#: [id number]       SUBJECTIVE  Patient is a 58y old Male who presents with a chief complaint of worsening SOB (28 Sep 2018 10:08)    he is currently admitted to medicine with the primary diagnosis of Pleural effusion    Today is hospital day 8d, and this morning he is sitting on chair without distress.    No acute overnight events.     OBJECTIVE  PAST MEDICAL & SURGICAL HISTORY  Pleural effusion  CHF (congestive heart failure)  Insomnia  BPH (benign prostatic hyperplasia)  Depression  Bipolar 1 disorder  HTN (hypertension)  History of thoracentesis    ALLERGIES:  No Known Allergies    MEDICATIONS:  STANDING MEDICATIONS  chlorhexidine 4% Liquid 1 Application(s) Topical <User Schedule>  diltiazem    Tablet 60 milliGRAM(s) Oral every 8 hours  docusate sodium 100 milliGRAM(s) Oral three times a day  DULoxetine 60 milliGRAM(s) Oral daily  enoxaparin Injectable 50 milliGRAM(s) SubCutaneous every 12 hours  furosemide   Injectable 40 milliGRAM(s) IV Push daily  latanoprost 0.005% Ophthalmic Solution 1 Drop(s) Both EYES at bedtime  propranolol 20 milliGRAM(s) Oral every 12 hours  senna 2 Tablet(s) Oral at bedtime  tamsulosin 0.4 milliGRAM(s) Oral at bedtime  traZODone 50 milliGRAM(s) Oral at bedtime    PRN MEDICATIONS  acetaminophen   Tablet .. 650 milliGRAM(s) Oral every 6 hours PRN  morphine  - Injectable 2 milliGRAM(s) IV Push every 4 hours PRN    HOME MEDICATIONS  Home Medications:  Ambien 10 mg oral tablet: 1 tab(s) orally once a day (at bedtime) (21 Sep 2018 21:50)  apixaban 5 mg oral tablet: 1 tab(s) orally every 12 hours (21 Sep 2018 21:50)  Cardizem 60 mg oral tablet: 1 tab(s) orally every 8 hours (21 Sep 2018 21:50)  clotrimazole 1% topical cream: 1 application topically  (21 Sep 2018 21:50)  DULoxetine 60 mg oral delayed release capsule: 1 cap(s) orally once a day (21 Sep 2018 21:50)  isosorbide mononitrate 20 mg oral tablet: 1 tab(s) orally once a day (21 Sep 2018 21:50)  latanoprost 0.005% ophthalmic solution: 1 drop(s) to each affected eye once a day (in the evening) (21 Sep 2018 21:50)  Multiple Vitamins oral capsule: 1 cap(s) orally once a day (21 Sep 2018 21:50)  propranolol 20 mg oral tablet: 1 tab(s) orally 2 times a day (21 Sep 2018 21:50)  tamsulosin 0.4 mg oral capsule: 1 cap(s) orally once a day (21 Sep 2018 21:50)  traZODone 50 mg oral tablet: 1 tab(s) orally once a day (at bedtime) (21 Sep 2018 21:50)      VITAL SIGNS: Last 24 Hours  T(C): 36.3 (29 Sep 2018 05:00), Max: 37 (28 Sep 2018 20:16)  T(F): 97.3 (29 Sep 2018 05:00), Max: 98.6 (28 Sep 2018 20:16)  HR: 95 (29 Sep 2018 05:00) (85 - 101)  BP: 133/68 (29 Sep 2018 05:00) (107/59 - 133/68)  BP(mean): --  RR: 19 (29 Sep 2018 05:00) (19 - 20)  SpO2: 93% (29 Sep 2018 06:05) (92% - 93%)    LABS:                        11.9   6.83  )-----------( 325      ( 28 Sep 2018 07:58 )             37.6     09-28    139  |  95<L>  |  10  ----------------------------<  156<H>  3.9   |  32  |  0.9    Ca    8.9      28 Sep 2018 07:58  Mg     1.9     09-28    TPro  5.6<L>  /  Alb  3.4<L>  /  TBili  0.3  /  DBili  x   /  AST  14  /  ALT  24  /  AlkPhos  325<H>  09-28    LIVER FUNCTIONS - ( 28 Sep 2018 07:58 )  Alb: 3.4 g/dL / Pro: 5.6 g/dL / ALK PHOS: 325 U/L / ALT: 24 U/L / AST: 14 U/L / GGT: x           PT/INR - ( 28 Sep 2018 07:58 )   PT: 14.80 sec;   INR: 1.37 ratio         PTT - ( 28 Sep 2018 07:58 )  PTT:37.7 sec    CAPILLARY BLOOD GLUCOSE      PHYSICAL EXAM:    GENERAL: NAD, well-developed, AAOx3  HEENT:  Atraumatic, Normocephalic. Strabismus noted.  PULMONARY:  Decreased B/L lung sounds - unchanged  CARDIOVASCULAR: Tachycardiac, but normal rhythm; No murmurs.  GASTROINTESTINAL: Dullness on percussion; Bowel sound noted, Non-tender.  MUSCULOSKELETAL:  Mild pitting edema noted B/L LE      ADMISSION SUMMARY  Patient is a 58y old Male who presents with a chief complaint of worsening SOB (28 Sep 2018 10:08)     he currently admitted to medicine with the primary diagnosis of Pleural effusion

## 2018-09-29 NOTE — PROGRESS NOTE ADULT - ASSESSMENT
57 yo M with HTN, DLD, depression, DVT on eliquis, and recurrent pleural effusions s/p pigtail insertion back in july, s/p thoracentesis 3 days PTP in [facility] presenting from [facility] for worsening SOB x3 days. Pt currently wants to think about Pleurx catheter. Eliquis is switched to Lovenox for possible Pleurx catheter, possibly planned for next week.      #Recurrent bilateral pleural effusions s/p thoracentesis showing serosanguineous fluid  -recurrent, exudative in nature  -rule out underlying malignancy vs infection vs volume overload vs autoimmune  -f/u GREGG, SS-A and SS-B, C-ANCA, P-ANCA (rheumatology team requires lab results prior to consult)  -f/u cytopathology/flow for pleural fluid  -Sedimentation Rate, Erythrocyte: 59 mm/Hr (09.28.18 @ 07:58)  -ECHO: Normal EF  -c/w lasix 40mg IV daily  -d/c Eliquis and switched to Lovenox for possible Pleurx catheter next week    #Bacteruria growing pseudomonas and enterococcus faecalis - stable  -asympatomatic bacteruia  -blood culture NGTD  -d/c abx as per ID    #Tachycardia - likely secondary to chronic pleural effusion   -EKG shows sinus tachycardia   -as per pt, baseline tachycardiac  -c/w Cardizem 60mg q8h and resume propanolol 20mg q12h (home medication)  -f/u Thyroid panel ordered    #Borderline BP - stable  -resume home medication: cardizem 60mg q8h and propanolol   -holding other home BP meds for now    #Abdominal distention/ constipation - persistent  -colace/ senna  -CT A/P on prior admission negative for any pathology    #H/O DVt  -Venous duplex shows: rt femoral vein DVT and lt femoral, popliteal and gastrocnemius veins DVT  -c/w eliquis    #BPH - c/w flomax  #Depression - c/w home meds  #Code status: DNR/ DNI 57 yo M with HTN, DLD, depression, DVT on eliquis, and recurrent pleural effusions s/p pigtail insertion back in july, s/p thoracentesis 3 days PTP in [facility] presenting from [facility] for worsening SOB x3 days. Pt currently wants to think about Pleurx catheter. Eliquis is switched to Lovenox for possible Pleurx catheter, possibly planned for next week.      #Recurrent bilateral pleural effusions s/p thoracentesis showing serosanguineous fluid  -recurrent, exudative in nature  -rule out underlying malignancy vs infection vs volume overload vs autoimmune  -f/u C-ANCA, P-ANCA (rheumatology team requires lab results prior to consult)  -f/u cytopathology/flow for pleural fluid  -Sedimentation Rate, Erythrocyte: 59 mm/Hr (09.28.18 @ 07:58)  -ECHO: Normal EF  -c/w lasix 40mg IV daily  -d/c Eliquis and switched to Lovenox for possible Pleurx catheter next week    #Bacteruria growing pseudomonas and enterococcus faecalis - stable  -asympatomatic bacteruia  -blood culture NGTD  -d/c abx as per ID    #Tachycardia - likely secondary to chronic pleural effusion   -EKG shows sinus tachycardia   -as per pt, baseline tachycardiac  -c/w Cardizem 60mg q8h and resume propanolol 20mg q12h (home medication)  -Thyroid Stimulating Hormone, Serum: 4.63 uIU/mL (09.28.18 @ 07:58)  -order T4    #Borderline BP - stable  -resume home medication: cardizem 60mg q8h and propanolol   -holding other home BP meds for now    #Abdominal distention/ constipation - persistent  -colace/ senna  -CT A/P on prior admission negative for any pathology    #H/O DVt  -Venous duplex shows: rt femoral vein DVT and lt femoral, popliteal and gastrocnemius veins DVT  -c/w eliquis    #BPH - c/w flomax  #Depression - c/w home meds  #Code status: DNR/ DNI

## 2018-09-30 LAB
ALBUMIN SERPL ELPH-MCNC: 3.8 G/DL — SIGNIFICANT CHANGE UP (ref 3.5–5.2)
ALP SERPL-CCNC: 390 U/L — HIGH (ref 30–115)
ALT FLD-CCNC: 27 U/L — SIGNIFICANT CHANGE UP (ref 0–41)
ANION GAP SERPL CALC-SCNC: 13 MMOL/L — SIGNIFICANT CHANGE UP (ref 7–14)
APTT BLD: 40.6 SEC — HIGH (ref 27–39.2)
AST SERPL-CCNC: 17 U/L — SIGNIFICANT CHANGE UP (ref 0–41)
AUTO DIFF PNL BLD: NEGATIVE — SIGNIFICANT CHANGE UP
BASOPHILS # BLD AUTO: 0.05 K/UL — SIGNIFICANT CHANGE UP (ref 0–0.2)
BASOPHILS NFR BLD AUTO: 0.7 % — SIGNIFICANT CHANGE UP (ref 0–1)
BILIRUB SERPL-MCNC: 0.4 MG/DL — SIGNIFICANT CHANGE UP (ref 0.2–1.2)
BUN SERPL-MCNC: 13 MG/DL — SIGNIFICANT CHANGE UP (ref 10–20)
C-ANCA SER-ACNC: NEGATIVE — SIGNIFICANT CHANGE UP
CALCIUM SERPL-MCNC: 9.3 MG/DL — SIGNIFICANT CHANGE UP (ref 8.5–10.1)
CHLORIDE SERPL-SCNC: 92 MMOL/L — LOW (ref 98–110)
CO2 SERPL-SCNC: 34 MMOL/L — HIGH (ref 17–32)
CREAT SERPL-MCNC: 0.8 MG/DL — SIGNIFICANT CHANGE UP (ref 0.7–1.5)
EOSINOPHIL # BLD AUTO: 0.21 K/UL — SIGNIFICANT CHANGE UP (ref 0–0.7)
EOSINOPHIL NFR BLD AUTO: 2.9 % — SIGNIFICANT CHANGE UP (ref 0–8)
GLUCOSE SERPL-MCNC: 100 MG/DL — HIGH (ref 70–99)
HCT VFR BLD CALC: 39.7 % — LOW (ref 42–52)
HGB BLD-MCNC: 12.5 G/DL — LOW (ref 14–18)
IMM GRANULOCYTES NFR BLD AUTO: 1.2 % — HIGH (ref 0.1–0.3)
INR BLD: 1.14 RATIO — SIGNIFICANT CHANGE UP (ref 0.65–1.3)
LYMPHOCYTES # BLD AUTO: 0.81 K/UL — LOW (ref 1.2–3.4)
LYMPHOCYTES # BLD AUTO: 11 % — LOW (ref 20.5–51.1)
MAGNESIUM SERPL-MCNC: 2 MG/DL — SIGNIFICANT CHANGE UP (ref 1.8–2.4)
MCHC RBC-ENTMCNC: 26.2 PG — LOW (ref 27–31)
MCHC RBC-ENTMCNC: 31.5 G/DL — LOW (ref 32–37)
MCV RBC AUTO: 83.2 FL — SIGNIFICANT CHANGE UP (ref 80–94)
MONOCYTES # BLD AUTO: 0.54 K/UL — SIGNIFICANT CHANGE UP (ref 0.1–0.6)
MONOCYTES NFR BLD AUTO: 7.3 % — SIGNIFICANT CHANGE UP (ref 1.7–9.3)
NEUTROPHILS # BLD AUTO: 5.65 K/UL — SIGNIFICANT CHANGE UP (ref 1.4–6.5)
NEUTROPHILS NFR BLD AUTO: 76.9 % — HIGH (ref 42.2–75.2)
NRBC # BLD: 0 /100 WBCS — SIGNIFICANT CHANGE UP (ref 0–0)
P-ANCA SER-ACNC: NEGATIVE — SIGNIFICANT CHANGE UP
PLATELET # BLD AUTO: 377 K/UL — SIGNIFICANT CHANGE UP (ref 130–400)
POTASSIUM SERPL-MCNC: 4.3 MMOL/L — SIGNIFICANT CHANGE UP (ref 3.5–5)
POTASSIUM SERPL-SCNC: 4.3 MMOL/L — SIGNIFICANT CHANGE UP (ref 3.5–5)
PROT SERPL-MCNC: 6.1 G/DL — SIGNIFICANT CHANGE UP (ref 6–8)
PROTHROM AB SERPL-ACNC: 12.3 SEC — SIGNIFICANT CHANGE UP (ref 9.95–12.87)
RBC # BLD: 4.77 M/UL — SIGNIFICANT CHANGE UP (ref 4.7–6.1)
RBC # FLD: 15 % — HIGH (ref 11.5–14.5)
SODIUM SERPL-SCNC: 139 MMOL/L — SIGNIFICANT CHANGE UP (ref 135–146)
WBC # BLD: 7.35 K/UL — SIGNIFICANT CHANGE UP (ref 4.8–10.8)
WBC # FLD AUTO: 7.35 K/UL — SIGNIFICANT CHANGE UP (ref 4.8–10.8)

## 2018-09-30 RX ORDER — MORPHINE SULFATE 50 MG/1
2 CAPSULE, EXTENDED RELEASE ORAL EVERY 6 HOURS
Qty: 0 | Refills: 0 | Status: DISCONTINUED | OUTPATIENT
Start: 2018-09-30 | End: 2018-10-07

## 2018-09-30 RX ADMIN — TAMSULOSIN HYDROCHLORIDE 0.4 MILLIGRAM(S): 0.4 CAPSULE ORAL at 22:41

## 2018-09-30 RX ADMIN — MORPHINE SULFATE 2 MILLIGRAM(S): 50 CAPSULE, EXTENDED RELEASE ORAL at 17:30

## 2018-09-30 RX ADMIN — SENNA PLUS 2 TABLET(S): 8.6 TABLET ORAL at 22:41

## 2018-09-30 RX ADMIN — DULOXETINE HYDROCHLORIDE 60 MILLIGRAM(S): 30 CAPSULE, DELAYED RELEASE ORAL at 11:19

## 2018-09-30 RX ADMIN — Medication 100 MILLIGRAM(S): at 22:41

## 2018-09-30 RX ADMIN — Medication 20 MILLIGRAM(S): at 06:46

## 2018-09-30 RX ADMIN — LATANOPROST 1 DROP(S): 0.05 SOLUTION/ DROPS OPHTHALMIC; TOPICAL at 22:41

## 2018-09-30 RX ADMIN — Medication 20 MILLIGRAM(S): at 17:15

## 2018-09-30 RX ADMIN — ENOXAPARIN SODIUM 50 MILLIGRAM(S): 100 INJECTION SUBCUTANEOUS at 22:42

## 2018-09-30 RX ADMIN — ZOLPIDEM TARTRATE 5 MILLIGRAM(S): 10 TABLET ORAL at 22:43

## 2018-09-30 RX ADMIN — ENOXAPARIN SODIUM 50 MILLIGRAM(S): 100 INJECTION SUBCUTANEOUS at 08:01

## 2018-09-30 RX ADMIN — MORPHINE SULFATE 2 MILLIGRAM(S): 50 CAPSULE, EXTENDED RELEASE ORAL at 01:27

## 2018-09-30 RX ADMIN — Medication 40 MILLIGRAM(S): at 06:46

## 2018-09-30 RX ADMIN — Medication 50 MILLIGRAM(S): at 22:41

## 2018-09-30 RX ADMIN — MORPHINE SULFATE 2 MILLIGRAM(S): 50 CAPSULE, EXTENDED RELEASE ORAL at 17:14

## 2018-09-30 NOTE — PROGRESS NOTE ADULT - ASSESSMENT
57 yo M with HTN, DLD, depression, DVT on eliquis, and recurrent pleural effusions s/p pigtail insertion back in july, s/p thoracentesis 3 days PTP in [facility] presenting from [facility] for worsening SOB x3 days. Pt currently wants to think about Pleurx catheter. Eliquis is switched to Lovenox for possible Pleurx catheter, possibly planned for next week.      #Recurrent bilateral pleural effusions s/p thoracentesis showing serosanguineous fluid  -recurrent, exudative in nature  -rule out underlying malignancy vs infection vs volume overload vs autoimmune  -f/u C-ANCA, P-ANCA (rheumatology team requires lab results prior to consult)  -f/u cytopathology/flow for pleural fluid  -Sedimentation Rate, Erythrocyte: 59 mm/Hr (09.28.18 @ 07:58)  -ECHO: Normal EF  -c/w lasix 40mg IV daily  -d/c Eliquis and switched to Lovenox for possible Pleurx catheter next week    #Bacteruria growing pseudomonas and enterococcus faecalis - stable  -asympatomatic bacteruia  -blood culture NGTD  -d/c abx as per ID    #Tachycardia - likely secondary to chronic pleural effusion . stable now   -EKG shows sinus tachycardia   -as per pt, baseline tachycardiac  -c/w Cardizem 60mg q8h and resume propanolol 20mg q12h (home medication)  -Thyroid Stimulating Hormone, Serum: 4.63 uIU/mL (09.28.18 @ 07:58)  -order T4    #Borderline BP - stable  -resume home medication: cardizem 60mg q8h and propanolol   -holding other home BP meds for now    #Abdominal distention/ constipation - persistent  -colace/ senna  -CT A/P on prior admission negative for any pathology    #H/O DVt  -Venous duplex shows: rt femoral vein DVT and lt femoral, popliteal and gastrocnemius veins DVT  -c/w eliquis    #BPH - c/w flomax  #Depression - c/w home meds  #Code status: DNR/ DNI

## 2018-09-30 NOTE — PROGRESS NOTE ADULT - SUBJECTIVE AND OBJECTIVE BOX
AMRIK MADRID 58y Male  MRN#: [id number]       SUBJECTIVE  Patient is a 58y old Male who presents with a chief complaint of worsening SOB (28 Sep 2018 10:08)    he is currently admitted to medicine with the primary diagnosis of Pleural effusion    Today is hospital day 8d, and this morning he is sitting on chair without distress.    No acute overnight events.     OBJECTIVE  PAST MEDICAL & SURGICAL HISTORY  Pleural effusion  CHF (congestive heart failure)  Insomnia  BPH (benign prostatic hyperplasia)  Depression  Bipolar 1 disorder  HTN (hypertension)  History of thoracentesis    ALLERGIES:  No Known Allergies    MEDICATIONS:  STANDING MEDICATIONS  chlorhexidine 4% Liquid 1 Application(s) Topical <User Schedule>  diltiazem    Tablet 60 milliGRAM(s) Oral every 8 hours  docusate sodium 100 milliGRAM(s) Oral three times a day  DULoxetine 60 milliGRAM(s) Oral daily  enoxaparin Injectable 50 milliGRAM(s) SubCutaneous every 12 hours  furosemide   Injectable 40 milliGRAM(s) IV Push daily  latanoprost 0.005% Ophthalmic Solution 1 Drop(s) Both EYES at bedtime  propranolol 20 milliGRAM(s) Oral every 12 hours  senna 2 Tablet(s) Oral at bedtime  tamsulosin 0.4 milliGRAM(s) Oral at bedtime  traZODone 50 milliGRAM(s) Oral at bedtime    PRN MEDICATIONS  acetaminophen   Tablet .. 650 milliGRAM(s) Oral every 6 hours PRN  morphine  - Injectable 2 milliGRAM(s) IV Push every 4 hours PRN    HOME MEDICATIONS  Home Medications:  Ambien 10 mg oral tablet: 1 tab(s) orally once a day (at bedtime) (21 Sep 2018 21:50)  apixaban 5 mg oral tablet: 1 tab(s) orally every 12 hours (21 Sep 2018 21:50)  Cardizem 60 mg oral tablet: 1 tab(s) orally every 8 hours (21 Sep 2018 21:50)  clotrimazole 1% topical cream: 1 application topically  (21 Sep 2018 21:50)  DULoxetine 60 mg oral delayed release capsule: 1 cap(s) orally once a day (21 Sep 2018 21:50)  isosorbide mononitrate 20 mg oral tablet: 1 tab(s) orally once a day (21 Sep 2018 21:50)  latanoprost 0.005% ophthalmic solution: 1 drop(s) to each affected eye once a day (in the evening) (21 Sep 2018 21:50)  Multiple Vitamins oral capsule: 1 cap(s) orally once a day (21 Sep 2018 21:50)  propranolol 20 mg oral tablet: 1 tab(s) orally 2 times a day (21 Sep 2018 21:50)  tamsulosin 0.4 mg oral capsule: 1 cap(s) orally once a day (21 Sep 2018 21:50)  traZODone 50 mg oral tablet: 1 tab(s) orally once a day (at bedtime) (21 Sep 2018 21:50)      VITAL SIGNS: Last 24 Hours  Vital Signs Last 24 Hrs  T(C): 35.8 (30 Sep 2018 05:39), Max: 37.1 (29 Sep 2018 20:50)  T(F): 96.4 (30 Sep 2018 05:39), Max: 98.7 (29 Sep 2018 20:50)  HR: 87 (30 Sep 2018 05:39) (83 - 87)  BP: 131/77 (30 Sep 2018 05:39) (104/60 - 131/77)  RR: 18 (30 Sep 2018 05:39) (18 - 18)  SpO2: 95% (30 Sep 2018 00:00) (95% - 95%)      LABS:                        11.9   6.83  )-----------( 325      ( 28 Sep 2018 07:58 )             37.6     09-28    139  |  95<L>  |  10  ----------------------------<  156<H>  3.9   |  32  |  0.9    Ca    8.9      28 Sep 2018 07:58  Mg     1.9     09-28    TPro  5.6<L>  /  Alb  3.4<L>  /  TBili  0.3  /  DBili  x   /  AST  14  /  ALT  24  /  AlkPhos  325<H>  09-28    LIVER FUNCTIONS - ( 28 Sep 2018 07:58 )  Alb: 3.4 g/dL / Pro: 5.6 g/dL / ALK PHOS: 325 U/L / ALT: 24 U/L / AST: 14 U/L / GGT: x           PT/INR - ( 28 Sep 2018 07:58 )   PT: 14.80 sec;   INR: 1.37 ratio         PTT - ( 28 Sep 2018 07:58 )  PTT:37.7 sec    CAPILLARY BLOOD GLUCOSE      PHYSICAL EXAM:    GENERAL: NAD, well-developed, AAOx3  HEENT:  Atraumatic, Normocephalic. Strabismus noted.  PULMONARY:  Decreased B/L lung sounds - unchanged  CARDIOVASCULAR: Tachycardiac, but normal rhythm; No murmurs.  GASTROINTESTINAL: Dullness on percussion; Bowel sound noted, Non-tender.  MUSCULOSKELETAL:  Mild pitting edema noted B/L LE      ADMISSION SUMMARY  Patient is a 58y old Male who presents with a chief complaint of worsening SOB (28 Sep 2018 10:08)     he currently admitted to medicine with the primary diagnosis of Pleural effusion

## 2018-10-01 LAB
ALBUMIN SERPL ELPH-MCNC: 3.5 G/DL — SIGNIFICANT CHANGE UP (ref 3.5–5.2)
ALP SERPL-CCNC: 373 U/L — HIGH (ref 30–115)
ALT FLD-CCNC: 25 U/L — SIGNIFICANT CHANGE UP (ref 0–41)
ANION GAP SERPL CALC-SCNC: 12 MMOL/L — SIGNIFICANT CHANGE UP (ref 7–14)
APTT BLD: 39.6 SEC — HIGH (ref 27–39.2)
AST SERPL-CCNC: 15 U/L — SIGNIFICANT CHANGE UP (ref 0–41)
BASOPHILS # BLD AUTO: 0.04 K/UL — SIGNIFICANT CHANGE UP (ref 0–0.2)
BASOPHILS NFR BLD AUTO: 0.6 % — SIGNIFICANT CHANGE UP (ref 0–1)
BILIRUB SERPL-MCNC: 0.3 MG/DL — SIGNIFICANT CHANGE UP (ref 0.2–1.2)
BUN SERPL-MCNC: 15 MG/DL — SIGNIFICANT CHANGE UP (ref 10–20)
CALCIUM SERPL-MCNC: 8.9 MG/DL — SIGNIFICANT CHANGE UP (ref 8.5–10.1)
CHLORIDE SERPL-SCNC: 93 MMOL/L — LOW (ref 98–110)
CO2 SERPL-SCNC: 33 MMOL/L — HIGH (ref 17–32)
CREAT SERPL-MCNC: 0.9 MG/DL — SIGNIFICANT CHANGE UP (ref 0.7–1.5)
EOSINOPHIL # BLD AUTO: 0.19 K/UL — SIGNIFICANT CHANGE UP (ref 0–0.7)
EOSINOPHIL NFR BLD AUTO: 2.7 % — SIGNIFICANT CHANGE UP (ref 0–8)
GLUCOSE SERPL-MCNC: 126 MG/DL — HIGH (ref 70–99)
HCT VFR BLD CALC: 38.3 % — LOW (ref 42–52)
HGB BLD-MCNC: 12.1 G/DL — LOW (ref 14–18)
IMM GRANULOCYTES NFR BLD AUTO: 1.6 % — HIGH (ref 0.1–0.3)
INR BLD: 1.22 RATIO — SIGNIFICANT CHANGE UP (ref 0.65–1.3)
LYMPHOCYTES # BLD AUTO: 0.92 K/UL — LOW (ref 1.2–3.4)
LYMPHOCYTES # BLD AUTO: 13.1 % — LOW (ref 20.5–51.1)
MAGNESIUM SERPL-MCNC: 2 MG/DL — SIGNIFICANT CHANGE UP (ref 1.8–2.4)
MCHC RBC-ENTMCNC: 26.5 PG — LOW (ref 27–31)
MCHC RBC-ENTMCNC: 31.6 G/DL — LOW (ref 32–37)
MCV RBC AUTO: 83.8 FL — SIGNIFICANT CHANGE UP (ref 80–94)
MONOCYTES # BLD AUTO: 0.62 K/UL — HIGH (ref 0.1–0.6)
MONOCYTES NFR BLD AUTO: 8.8 % — SIGNIFICANT CHANGE UP (ref 1.7–9.3)
NEUTROPHILS # BLD AUTO: 5.13 K/UL — SIGNIFICANT CHANGE UP (ref 1.4–6.5)
NEUTROPHILS NFR BLD AUTO: 73.2 % — SIGNIFICANT CHANGE UP (ref 42.2–75.2)
NRBC # BLD: 0 /100 WBCS — SIGNIFICANT CHANGE UP (ref 0–0)
PLATELET # BLD AUTO: 402 K/UL — HIGH (ref 130–400)
POTASSIUM SERPL-MCNC: 3.8 MMOL/L — SIGNIFICANT CHANGE UP (ref 3.5–5)
POTASSIUM SERPL-SCNC: 3.8 MMOL/L — SIGNIFICANT CHANGE UP (ref 3.5–5)
PROT SERPL-MCNC: 5.9 G/DL — LOW (ref 6–8)
PROTHROM AB SERPL-ACNC: 13.1 SEC — HIGH (ref 9.95–12.87)
RBC # BLD: 4.57 M/UL — LOW (ref 4.7–6.1)
RBC # FLD: 14.9 % — HIGH (ref 11.5–14.5)
SODIUM SERPL-SCNC: 138 MMOL/L — SIGNIFICANT CHANGE UP (ref 135–146)
T3 SERPL-MCNC: 104 NG/DL — SIGNIFICANT CHANGE UP (ref 80–200)
T4 AB SER-ACNC: 5.5 UG/DL — SIGNIFICANT CHANGE UP (ref 4.6–12)
TSH SERPL-MCNC: 6.18 UIU/ML — HIGH (ref 0.27–4.2)
WBC # BLD: 7.01 K/UL — SIGNIFICANT CHANGE UP (ref 4.8–10.8)
WBC # FLD AUTO: 7.01 K/UL — SIGNIFICANT CHANGE UP (ref 4.8–10.8)

## 2018-10-01 RX ADMIN — SENNA PLUS 2 TABLET(S): 8.6 TABLET ORAL at 22:02

## 2018-10-01 RX ADMIN — MORPHINE SULFATE 2 MILLIGRAM(S): 50 CAPSULE, EXTENDED RELEASE ORAL at 20:40

## 2018-10-01 RX ADMIN — MORPHINE SULFATE 2 MILLIGRAM(S): 50 CAPSULE, EXTENDED RELEASE ORAL at 20:39

## 2018-10-01 RX ADMIN — Medication 20 MILLIGRAM(S): at 06:01

## 2018-10-01 RX ADMIN — MORPHINE SULFATE 2 MILLIGRAM(S): 50 CAPSULE, EXTENDED RELEASE ORAL at 15:08

## 2018-10-01 RX ADMIN — MORPHINE SULFATE 2 MILLIGRAM(S): 50 CAPSULE, EXTENDED RELEASE ORAL at 21:31

## 2018-10-01 RX ADMIN — TAMSULOSIN HYDROCHLORIDE 0.4 MILLIGRAM(S): 0.4 CAPSULE ORAL at 22:02

## 2018-10-01 RX ADMIN — Medication 100 MILLIGRAM(S): at 13:16

## 2018-10-01 RX ADMIN — Medication 40 MILLIGRAM(S): at 06:01

## 2018-10-01 RX ADMIN — Medication 100 MILLIGRAM(S): at 06:01

## 2018-10-01 RX ADMIN — DULOXETINE HYDROCHLORIDE 60 MILLIGRAM(S): 30 CAPSULE, DELAYED RELEASE ORAL at 11:27

## 2018-10-01 RX ADMIN — ZOLPIDEM TARTRATE 5 MILLIGRAM(S): 10 TABLET ORAL at 22:05

## 2018-10-01 RX ADMIN — Medication 20 MILLIGRAM(S): at 17:19

## 2018-10-01 RX ADMIN — MORPHINE SULFATE 2 MILLIGRAM(S): 50 CAPSULE, EXTENDED RELEASE ORAL at 06:47

## 2018-10-01 RX ADMIN — ENOXAPARIN SODIUM 50 MILLIGRAM(S): 100 INJECTION SUBCUTANEOUS at 09:55

## 2018-10-01 RX ADMIN — ENOXAPARIN SODIUM 50 MILLIGRAM(S): 100 INJECTION SUBCUTANEOUS at 22:00

## 2018-10-01 RX ADMIN — Medication 50 MILLIGRAM(S): at 22:02

## 2018-10-01 RX ADMIN — LATANOPROST 1 DROP(S): 0.05 SOLUTION/ DROPS OPHTHALMIC; TOPICAL at 22:01

## 2018-10-01 RX ADMIN — Medication 100 MILLIGRAM(S): at 22:01

## 2018-10-01 NOTE — PROGRESS NOTE ADULT - SUBJECTIVE AND OBJECTIVE BOX
AMRIK MADRID 58y Male  MRN#: [id number]   SUBJECTIVE  Patient is a 58y old Male who presents with a chief complaint of worsening SOB (01 Oct 2018 10:47)  Currently admitted to medicine with the primary diagnosis of Pleural effusion    OBJECTIVE  PAST MEDICAL & SURGICAL HISTORY  Pleural effusion  CHF (congestive heart failure)  Insomnia  BPH (benign prostatic hyperplasia)  Depression  Bipolar 1 disorder  HTN (hypertension)  History of thoracentesis    ALLERGIES:  No Known Allergies    MEDICATIONS:  STANDING MEDICATIONS  chlorhexidine 4% Liquid 1 Application(s) Topical <User Schedule>  diltiazem    Tablet 60 milliGRAM(s) Oral every 8 hours  docusate sodium 100 milliGRAM(s) Oral three times a day  DULoxetine 60 milliGRAM(s) Oral daily  enoxaparin Injectable 50 milliGRAM(s) SubCutaneous every 12 hours  furosemide   Injectable 40 milliGRAM(s) IV Push daily  latanoprost 0.005% Ophthalmic Solution 1 Drop(s) Both EYES at bedtime  propranolol 20 milliGRAM(s) Oral every 12 hours  senna 2 Tablet(s) Oral at bedtime  tamsulosin 0.4 milliGRAM(s) Oral at bedtime  traZODone 50 milliGRAM(s) Oral at bedtime    PRN MEDICATIONS  acetaminophen   Tablet .. 650 milliGRAM(s) Oral every 6 hours PRN  morphine  - Injectable 2 milliGRAM(s) IV Push every 6 hours PRN  zolpidem 5 milliGRAM(s) Oral at bedtime PRN      VITAL SIGNS: Last 24 Hours  T(C): 37 (01 Oct 2018 06:00), Max: 37 (01 Oct 2018 06:00)  T(F): 98.6 (01 Oct 2018 06:00), Max: 98.6 (01 Oct 2018 06:00)  HR: 85 (01 Oct 2018 06:00) (80 - 85)  BP: 108/68 (01 Oct 2018 06:00) (102/68 - 116/67)  BP(mean): --  RR: 18 (01 Oct 2018 06:00) (18 - 18)  SpO2: 96% (01 Oct 2018 08:00) (96% - 96%)    LABS:                        12.5   7.35  )-----------( 377      ( 30 Sep 2018 07:44 )             39.7     10-01    138  |  93<L>  |  15  ----------------------------<  126<H>  3.8   |  33<H>  |  0.9    Ca    8.9      01 Oct 2018 09:28  Mg     2.0     10-01    TPro  5.9<L>  /  Alb  3.5  /  TBili  0.3  /  DBili  x   /  AST  15  /  ALT  25  /  AlkPhos  373<H>  10-01    PT/INR - ( 01 Oct 2018 09:28 )   PT: 13.10 sec;   INR: 1.22 ratio         PTT - ( 01 Oct 2018 09:28 )  PTT:39.6 sec    PHYSICAL EXAM:  GENERAL: NAD, well-developed, AAOx3  HEENT:  Atraumatic, Normocephalic. Strabismus noted.  PULMONARY:  Decreased B/L lung sounds - unchanged  CARDIOVASCULAR: Tachycardiac, but normal rhythm; No murmurs.  GASTROINTESTINAL: Dullness on percussion; Bowel sound noted, Non-tender.  MUSCULOSKELETAL:  Mild pitting edema noted B/L LE    ASSESSMENT & PLAN  57 yo M with HTN, DLD, depression, DVT on eliquis, and recurrent pleural effusions s/p pigtail insertion back in july, s/p thoracentesis 3 days PTP in [facility] presenting from [facility] for worsening SOB x3 days. Pt agrees about Pleurx catheter. Eliquis is switched to Lovenox for possible Pleurx catheter    #Recurrent bilateral pleural effusions s/p thoracentesis showing serosanguineous fluid  -recurrent, exudative in nature  -rule out underlying malignancy vs infection vs volume overload vs autoimmune  -c ANCA, p ANCA negative.  -GREGG, RA factor, Anti-dsdna negative  -anti ro, anti la negative  -Pleural fluid cytology not showing any malignant cells.  -Just shows few mesothelial cells.  -CXR is ordered according to the pulm recommendation.  -Will follow up with the results.    -Patient also complaining of left sided hearing loss and balance issues.  -Tried to get CT scan of the head, but patient simply cannot lie down, gets Severe SOB.  -CT head cancelled.    -ECHO: Normal EF  -c/w lasix 40mg IV daily    #Bacteruria growing pseudomonas and enterococcus faecalis - stable  -asympatomatic bacteruia  -blood culture NGTD  -d/c abx as per ID    #Tachycardia - likely secondary to chronic pleural effusion . stable now   -EKG shows sinus tachycardia   -as per pt, baseline tachycardiac  -c/w Cardizem 60mg q8h and resume propanolol 20mg q12h (home medication)    #Borderline BP - stable  -resume home medication: cardizem 60mg q8h and propanolol   -holding other home BP meds for now    #Abdominal distention/ constipation - persistent  -colace/ senna  -CT A/P on prior admission negative for any pathology    #H/O DVt  -Venous duplex shows: rt femoral vein DVT and lt femoral, popliteal and gastrocnemius veins DVT  -c/w lovenox    #BPH - c/w flomax  #Depression - c/w home meds  #Code status: DNR/ DNI

## 2018-10-01 NOTE — PROGRESS NOTE ADULT - ASSESSMENT
recurrent effusion s/p thora 2 times l side, 1 thora r side, f/up r/o malignancy b/l exudative repeat cyto/ flow p (had ct/c/a/p -), rheumato -, declined VATS BIOPSY , POOR OP F/UP    CHANGE ELIQUIS TO LOVENOX  F/UP CYTO/ FLOW NEG  NEED PLEURX CATHETER NOW PATIENT AGREEABLE, DECLINED VATS BIOPSY  REPEAT CXR  POOR PROGNOSIS

## 2018-10-01 NOTE — PROGRESS NOTE ADULT - SUBJECTIVE AND OBJECTIVE BOX
OVERNIGHT EVENTS: events noted, cyto neg, flow neg    Vital Signs Last 24 Hrs  T(C): 37 (01 Oct 2018 06:00), Max: 37 (01 Oct 2018 06:00)  T(F): 98.6 (01 Oct 2018 06:00), Max: 98.6 (01 Oct 2018 06:00)  HR: 85 (01 Oct 2018 06:00) (80 - 85)  BP: 108/68 (01 Oct 2018 06:00) (102/68 - 116/67)  BP(mean): --  RR: 18 (01 Oct 2018 06:00) (18 - 18)  SpO2: 96% (01 Oct 2018 08:00) (96% - 96%)    PHYSICAL EXAMINATION:    GENERAL: The patient is awake and alert in no apparent distress.     HEENT: Head is normocephalic and atraumatic. Extraocular muscles are intact. Mucous membranes are moist.    NECK: Supple.    LUNGS: dec bs both bases    HEART: Regular rate and rhythm without murmur.    ABDOMEN: Soft, nontender, and nondistended.      EXTREMITIES: Without any cyanosis, clubbing, rash, lesions or edema.    NEUROLOGIC: Grossly intact.    SKIN: No ulceration or induration present.      LABS:                        12.5   7.35  )-----------( 377      ( 30 Sep 2018 07:44 )             39.7     10-01    138  |  93<L>  |  15  ----------------------------<  126<H>  3.8   |  33<H>  |  0.9    Ca    8.9      01 Oct 2018 09:28  Mg     2.0     10-01    TPro  5.9<L>  /  Alb  3.5  /  TBili  0.3  /  DBili  x   /  AST  15  /  ALT  25  /  AlkPhos  373<H>  10-01    PT/INR - ( 01 Oct 2018 09:28 )   PT: 13.10 sec;   INR: 1.22 ratio         PTT - ( 01 Oct 2018 09:28 )  PTT:39.6 sec                      09-30-18 @ 07:01  -  10-01-18 @ 07:00  --------------------------------------------------------  IN: 240 mL / OUT: 1510 mL / NET: -1270 mL        MICROBIOLOGY:      MEDICATIONS  (STANDING):  chlorhexidine 4% Liquid 1 Application(s) Topical <User Schedule>  diltiazem    Tablet 60 milliGRAM(s) Oral every 8 hours  docusate sodium 100 milliGRAM(s) Oral three times a day  DULoxetine 60 milliGRAM(s) Oral daily  enoxaparin Injectable 50 milliGRAM(s) SubCutaneous every 12 hours  furosemide   Injectable 40 milliGRAM(s) IV Push daily  latanoprost 0.005% Ophthalmic Solution 1 Drop(s) Both EYES at bedtime  propranolol 20 milliGRAM(s) Oral every 12 hours  senna 2 Tablet(s) Oral at bedtime  tamsulosin 0.4 milliGRAM(s) Oral at bedtime  traZODone 50 milliGRAM(s) Oral at bedtime    MEDICATIONS  (PRN):  acetaminophen   Tablet .. 650 milliGRAM(s) Oral every 6 hours PRN Moderate Pain (4 - 6)  morphine  - Injectable 2 milliGRAM(s) IV Push every 6 hours PRN Severe Pain (7 - 10)  zolpidem 5 milliGRAM(s) Oral at bedtime PRN Insomnia      RADIOLOGY & ADDITIONAL STUDIES:

## 2018-10-02 LAB
ANION GAP SERPL CALC-SCNC: 13 MMOL/L — SIGNIFICANT CHANGE UP (ref 7–14)
BUN SERPL-MCNC: 13 MG/DL — SIGNIFICANT CHANGE UP (ref 10–20)
CALCIUM SERPL-MCNC: 8.5 MG/DL — SIGNIFICANT CHANGE UP (ref 8.5–10.1)
CHLORIDE SERPL-SCNC: 95 MMOL/L — LOW (ref 98–110)
CO2 SERPL-SCNC: 29 MMOL/L — SIGNIFICANT CHANGE UP (ref 17–32)
CREAT SERPL-MCNC: 0.7 MG/DL — SIGNIFICANT CHANGE UP (ref 0.7–1.5)
GLUCOSE SERPL-MCNC: 99 MG/DL — SIGNIFICANT CHANGE UP (ref 70–99)
HCT VFR BLD CALC: 35.2 % — LOW (ref 42–52)
HGB BLD-MCNC: 11.1 G/DL — LOW (ref 14–18)
MCHC RBC-ENTMCNC: 26.2 PG — LOW (ref 27–31)
MCHC RBC-ENTMCNC: 31.5 G/DL — LOW (ref 32–37)
MCV RBC AUTO: 83 FL — SIGNIFICANT CHANGE UP (ref 80–94)
NRBC # BLD: 0 /100 WBCS — SIGNIFICANT CHANGE UP (ref 0–0)
PLATELET # BLD AUTO: 304 K/UL — SIGNIFICANT CHANGE UP (ref 130–400)
POTASSIUM SERPL-MCNC: 4.3 MMOL/L — SIGNIFICANT CHANGE UP (ref 3.5–5)
POTASSIUM SERPL-SCNC: 4.3 MMOL/L — SIGNIFICANT CHANGE UP (ref 3.5–5)
RBC # BLD: 4.24 M/UL — LOW (ref 4.7–6.1)
RBC # FLD: 14.9 % — HIGH (ref 11.5–14.5)
SODIUM SERPL-SCNC: 137 MMOL/L — SIGNIFICANT CHANGE UP (ref 135–146)
WBC # BLD: 7.26 K/UL — SIGNIFICANT CHANGE UP (ref 4.8–10.8)
WBC # FLD AUTO: 7.26 K/UL — SIGNIFICANT CHANGE UP (ref 4.8–10.8)

## 2018-10-02 PROCEDURE — 99223 1ST HOSP IP/OBS HIGH 75: CPT

## 2018-10-02 RX ORDER — OXYCODONE AND ACETAMINOPHEN 5; 325 MG/1; MG/1
1 TABLET ORAL ONCE
Qty: 0 | Refills: 0 | Status: DISCONTINUED | OUTPATIENT
Start: 2018-10-02 | End: 2018-10-02

## 2018-10-02 RX ORDER — CARBAMIDE PEROXIDE 81.86 MG/ML
5 SOLUTION/ DROPS AURICULAR (OTIC) EVERY 12 HOURS
Qty: 0 | Refills: 0 | Status: DISCONTINUED | OUTPATIENT
Start: 2018-10-02 | End: 2018-10-05

## 2018-10-02 RX ADMIN — ENOXAPARIN SODIUM 50 MILLIGRAM(S): 100 INJECTION SUBCUTANEOUS at 08:56

## 2018-10-02 RX ADMIN — MORPHINE SULFATE 2 MILLIGRAM(S): 50 CAPSULE, EXTENDED RELEASE ORAL at 19:04

## 2018-10-02 RX ADMIN — DULOXETINE HYDROCHLORIDE 60 MILLIGRAM(S): 30 CAPSULE, DELAYED RELEASE ORAL at 12:42

## 2018-10-02 RX ADMIN — Medication 40 MILLIGRAM(S): at 05:42

## 2018-10-02 RX ADMIN — SENNA PLUS 2 TABLET(S): 8.6 TABLET ORAL at 21:07

## 2018-10-02 RX ADMIN — OXYCODONE AND ACETAMINOPHEN 1 TABLET(S): 5; 325 TABLET ORAL at 13:13

## 2018-10-02 RX ADMIN — Medication 100 MILLIGRAM(S): at 21:07

## 2018-10-02 RX ADMIN — MORPHINE SULFATE 2 MILLIGRAM(S): 50 CAPSULE, EXTENDED RELEASE ORAL at 19:01

## 2018-10-02 RX ADMIN — Medication 20 MILLIGRAM(S): at 17:54

## 2018-10-02 RX ADMIN — CARBAMIDE PEROXIDE 5 DROP(S): 81.86 SOLUTION/ DROPS AURICULAR (OTIC) at 18:56

## 2018-10-02 RX ADMIN — MORPHINE SULFATE 2 MILLIGRAM(S): 50 CAPSULE, EXTENDED RELEASE ORAL at 13:10

## 2018-10-02 RX ADMIN — Medication 50 MILLIGRAM(S): at 21:08

## 2018-10-02 RX ADMIN — Medication 20 MILLIGRAM(S): at 05:43

## 2018-10-02 RX ADMIN — ZOLPIDEM TARTRATE 5 MILLIGRAM(S): 10 TABLET ORAL at 21:05

## 2018-10-02 RX ADMIN — MORPHINE SULFATE 2 MILLIGRAM(S): 50 CAPSULE, EXTENDED RELEASE ORAL at 05:39

## 2018-10-02 RX ADMIN — LATANOPROST 1 DROP(S): 0.05 SOLUTION/ DROPS OPHTHALMIC; TOPICAL at 21:07

## 2018-10-02 RX ADMIN — OXYCODONE AND ACETAMINOPHEN 1 TABLET(S): 5; 325 TABLET ORAL at 10:12

## 2018-10-02 RX ADMIN — Medication 100 MILLIGRAM(S): at 13:10

## 2018-10-02 RX ADMIN — ENOXAPARIN SODIUM 50 MILLIGRAM(S): 100 INJECTION SUBCUTANEOUS at 21:06

## 2018-10-02 RX ADMIN — TAMSULOSIN HYDROCHLORIDE 0.4 MILLIGRAM(S): 0.4 CAPSULE ORAL at 21:08

## 2018-10-02 RX ADMIN — Medication 100 MILLIGRAM(S): at 05:43

## 2018-10-02 RX ADMIN — MORPHINE SULFATE 2 MILLIGRAM(S): 50 CAPSULE, EXTENDED RELEASE ORAL at 06:24

## 2018-10-02 NOTE — CONSULT NOTE ADULT - SUBJECTIVE AND OBJECTIVE BOX
Pt is a 58 y.o male admitted with SOB due to pleural effusion, called to evaluate for hearing loss on L side.    PAST MEDICAL HISTORY: Pleural effusion, CHF, Insomnia, BPH, Depression, Bipolar 1 disorder, HTN   SURGICAL HISTORY: History of thoracentesis  MEDICATIONS  (STANDING):  chlorhexidine 4% Liquid 1 Application(s) Topical <User Schedule>  diltiazem Tablet 60 milliGRAM(s) Oral every 8 hours  docusate sodium 100 milliGRAM(s) Oral three times a day  DULoxetine 60 milliGRAM(s) Oral daily  enoxaparin Injectable 50 milliGRAM(s) SubCutaneous every 12 hours  furosemide Injectable 40 milliGRAM(s) IV Push daily  latanoprost 0.005% Ophthalmic Solution 1 Drop(s) Both EYES at bedtime  propranolol 20 milliGRAM(s) Oral every 12 hours  senna 2 Tablet(s) Oral at bedtime  tamsulosin 0.4 milliGRAM(s) Oral at bedtime  traZODone 50 milliGRAM(s) Oral at bedtime  ALLERGIES: NKDA    Vital Signs: T(F): 96.8 (02 Oct 2018 05:22), Max: 98.3 (01 Oct 2018 12:00), HR: 92, BP: 131/76, RR: 20, SpO2: 100%  GEN:   HEENT:    LABS:                      11.1   7.26  )-----------( 304                  35.2     137  |  95<L>  |  13  ----------------------------<  99  4.3   |  29  |  0.7    Ca    8.5      02 Oct 2018 06:39  Mg     2.0     10-01    TPro  5.9<L>  /  Alb  3.5  /  TBili  0.3  /  DBili  x   /  AST  15  /  ALT  25  /  AlkPhos  373<H>  10-01 Pt is a 58 y.o male admitted with SOB due to pleural effusion, called to evaluate for hearing loss on L side.  Pt states for two weeks he has noted decreased hearing in the left side, continuing to worsen. Pt states he has history of ear wax in the past, but has never had such severe hearing loss. PT also complaining of feeling off balance when he walks, no room spinning, no nausea/vomiting. PT denies any other ear symptoms - no pain, discharge, ringing or fullness. Pt has h/o L eye blindness, so he was concerned about the hearing loss on that side.    PAST MEDICAL HISTORY: Pleural effusion, CHF, Insomnia, BPH, Depression, Bipolar 1 disorder, HTN   SURGICAL HISTORY: History of thoracentesis  MEDICATIONS  (STANDING):  chlorhexidine 4% Liquid 1 Application(s) Topical <User Schedule>  diltiazem Tablet 60 milliGRAM(s) Oral every 8 hours  docusate sodium 100 milliGRAM(s) Oral three times a day  DULoxetine 60 milliGRAM(s) Oral daily  enoxaparin Injectable 50 milliGRAM(s) SubCutaneous every 12 hours  furosemide Injectable 40 milliGRAM(s) IV Push daily  latanoprost 0.005% Ophthalmic Solution 1 Drop(s) Both EYES at bedtime  propranolol 20 milliGRAM(s) Oral every 12 hours  senna 2 Tablet(s) Oral at bedtime  tamsulosin 0.4 milliGRAM(s) Oral at bedtime  traZODone 50 milliGRAM(s) Oral at bedtime  ALLERGIES: NKDA    Vital Signs: T(F): 96.8 (02 Oct 2018 05:22), Max: 98.3 (01 Oct 2018 12:00), HR: 92, BP: 131/76, RR: 20, SpO2: 100%  GEN: NAD, awake and alert  HEENT: NC/AT; oral mucosa pink, no erythema/edema, uvula midline. R ear: no pre/post auricular TTP, EAC with soft cerumen impaction, cannot visualize TM. L ear: no pre/post auricular TTP, EAC with hard impacted cerumen, cannot visualize TM    LABS:                      11.1   7.26  )-----------( 304                  35.2     137  |  95<L>  |  13  ----------------------------<  99  4.3   |  29  |  0.7    Ca    8.5      02 Oct 2018 06:39  Mg     2.0     10-01    TPro  5.9<L>  /  Alb  3.5  /  TBili  0.3  /  DBili  x   /  AST  15  /  ALT  25  /  AlkPhos  373<H>  10-01

## 2018-10-02 NOTE — CONSULT NOTE ADULT - ASSESSMENT
58 y.o male with L HL >2 weeks, cerumen impaction B/L.    ·	start Debrox drops 5 drops q12h (ordered)  ·	will clean ears if still inpatient in 2-3 days  ·	audiogram after ears cleaned if symptoms persist  ·	no acute ENT intervention  ·	w/d with attng, will follow

## 2018-10-02 NOTE — PROGRESS NOTE ADULT - SUBJECTIVE AND OBJECTIVE BOX
AMRIK MADRID 58y Male  MRN#: [id number]   SUBJECTIVE  Patient is a 58y old Male who presents with a chief complaint of worsening SOB (02 Oct 2018 10:43)  Currently admitted to medicine with the primary diagnosis of Pleural effusion      OBJECTIVE  PAST MEDICAL & SURGICAL HISTORY  Pleural effusion  CHF (congestive heart failure)  Insomnia  BPH (benign prostatic hyperplasia)  Depression  Bipolar 1 disorder  HTN (hypertension)  History of thoracentesis    ALLERGIES:  No Known Allergies    MEDICATIONS:  STANDING MEDICATIONS  carbamide peroxide Otic Solution 5 Drop(s) Both Ears every 12 hours  chlorhexidine 4% Liquid 1 Application(s) Topical <User Schedule>  diltiazem    Tablet 60 milliGRAM(s) Oral every 8 hours  docusate sodium 100 milliGRAM(s) Oral three times a day  DULoxetine 60 milliGRAM(s) Oral daily  enoxaparin Injectable 50 milliGRAM(s) SubCutaneous every 12 hours  furosemide   Injectable 40 milliGRAM(s) IV Push daily  latanoprost 0.005% Ophthalmic Solution 1 Drop(s) Both EYES at bedtime  propranolol 20 milliGRAM(s) Oral every 12 hours  senna 2 Tablet(s) Oral at bedtime  tamsulosin 0.4 milliGRAM(s) Oral at bedtime  traZODone 50 milliGRAM(s) Oral at bedtime    PRN MEDICATIONS  acetaminophen   Tablet .. 650 milliGRAM(s) Oral every 6 hours PRN  morphine  - Injectable 2 milliGRAM(s) IV Push every 6 hours PRN  zolpidem 5 milliGRAM(s) Oral at bedtime PRN      VITAL SIGNS: Last 24 Hours  T(C): 36 (02 Oct 2018 05:22), Max: 36.8 (01 Oct 2018 12:00)  T(F): 96.8 (02 Oct 2018 05:22), Max: 98.3 (01 Oct 2018 12:00)  HR: 92 (02 Oct 2018 05:22) (82 - 92)  BP: 131/76 (02 Oct 2018 05:22) (108/64 - 131/76)  BP(mean): --  RR: 20 (02 Oct 2018 05:22) (18 - 20)  SpO2: 100% (01 Oct 2018 22:45) (100% - 100%)    LABS:                        11.1   7.26  )-----------( 304      ( 02 Oct 2018 06:39 )             35.2     10-02    137  |  95<L>  |  13  ----------------------------<  99  4.3   |  29  |  0.7    Ca    8.5      02 Oct 2018 06:39  Mg     2.0     10-01    TPro  5.9<L>  /  Alb  3.5  /  TBili  0.3  /  DBili  x   /  AST  15  /  ALT  25  /  AlkPhos  373<H>  10-01    PT/INR - ( 01 Oct 2018 09:28 )   PT: 13.10 sec;   INR: 1.22 ratio         PTT - ( 01 Oct 2018 09:28 )  PTT:39.6 sec    PHYSICAL EXAM:  GENERAL: NAD, well-developed, AAOx3  HEENT:  Atraumatic, Normocephalic. Strabismus noted.  PULMONARY:  Decreased B/L lung sounds - unchanged  CARDIOVASCULAR: Tachycardiac, but normal rhythm; No murmurs.  GASTROINTESTINAL: Dullness on percussion; Bowel sound noted, Non-tender.  MUSCULOSKELETAL:  Mild pitting edema noted B/L LE    ASSESSMENT & PLAN  59 yo M with HTN, DLD, depression, DVT on eliquis, and recurrent pleural effusions s/p pigtail insertion back in july, s/p thoracentesis 3 days PTP in [facility] presenting from [facility] for worsening SOB x3 days. Pt agrees about Pleurx catheter. Eliquis is switched to Lovenox for possible Pleurx catheter    #Recurrent bilateral pleural effusions s/p thoracentesis showing serosanguineous fluid  -recurrent, exudative in nature  -rule out underlying malignancy vs infection vs volume overload vs autoimmune  -c ANCA, p ANCA negative.  -GREGG, RA factor, Anti-dsdna negative  -anti ro, anti la negative    -Talked with rheum, They said no rheum intervention if the tests are negative.    -Pleural fluid cytology not showing any malignant cells.  -Just shows few mesothelial cells.  -CXR yesterday shows stable bilateral pleural effusions with left sided loculation.    -Patient also complaining of left sided hearing loss and balance issues.  -Tried to get CT scan of the head, but patient simply cannot lie down, gets Severe SOB.  -CT head cancelled.  -ENT consult was put, They recommend debrox ear drops every 12 hours.  -Will do audiogram if patient is inpatient after 3-5 days.    -ECHO: Normal EF  -c/w lasix 40mg IV daily    #Bacteruria growing pseudomonas and enterococcus faecalis - stable  -asympatomatic bacteruia  -blood culture NGTD  -d/c abx as per ID    #Tachycardia - likely secondary to chronic pleural effusion . stable now   -EKG shows sinus tachycardia   -as per pt, baseline tachycardiac  -c/w Cardizem 60mg q8h and resume propanolol 20mg q12h (home medication)    #Borderline BP - stable  -resume home medication: cardizem 60mg q8h and propanolol   -holding other home BP meds for now    #Abdominal distention/ constipation - persistent  -colace/ senna  -CT A/P on prior admission negative for any pathology    #H/O DVt  -Venous duplex shows: rt femoral vein DVT and lt femoral, popliteal and gastrocnemius veins DVT  -c/w lovenox    #BPH - c/w flomax  #Depression - c/w home meds  #Code status: DNR/ DNI

## 2018-10-03 LAB
ANION GAP SERPL CALC-SCNC: 15 MMOL/L — HIGH (ref 7–14)
BUN SERPL-MCNC: 13 MG/DL — SIGNIFICANT CHANGE UP (ref 10–20)
CALCIUM SERPL-MCNC: 9 MG/DL — SIGNIFICANT CHANGE UP (ref 8.5–10.1)
CHLORIDE SERPL-SCNC: 93 MMOL/L — LOW (ref 98–110)
CO2 SERPL-SCNC: 32 MMOL/L — SIGNIFICANT CHANGE UP (ref 17–32)
CREAT SERPL-MCNC: 0.8 MG/DL — SIGNIFICANT CHANGE UP (ref 0.7–1.5)
GLUCOSE SERPL-MCNC: 115 MG/DL — HIGH (ref 70–99)
HCT VFR BLD CALC: 39.1 % — LOW (ref 42–52)
HGB BLD-MCNC: 12.4 G/DL — LOW (ref 14–18)
MCHC RBC-ENTMCNC: 26.3 PG — LOW (ref 27–31)
MCHC RBC-ENTMCNC: 31.7 G/DL — LOW (ref 32–37)
MCV RBC AUTO: 82.8 FL — SIGNIFICANT CHANGE UP (ref 80–94)
NRBC # BLD: 0 /100 WBCS — SIGNIFICANT CHANGE UP (ref 0–0)
PLATELET # BLD AUTO: 368 K/UL — SIGNIFICANT CHANGE UP (ref 130–400)
POTASSIUM SERPL-MCNC: 4.1 MMOL/L — SIGNIFICANT CHANGE UP (ref 3.5–5)
POTASSIUM SERPL-SCNC: 4.1 MMOL/L — SIGNIFICANT CHANGE UP (ref 3.5–5)
RBC # BLD: 4.72 M/UL — SIGNIFICANT CHANGE UP (ref 4.7–6.1)
RBC # FLD: 15 % — HIGH (ref 11.5–14.5)
SODIUM SERPL-SCNC: 140 MMOL/L — SIGNIFICANT CHANGE UP (ref 135–146)
WBC # BLD: 13.44 K/UL — HIGH (ref 4.8–10.8)
WBC # FLD AUTO: 13.44 K/UL — HIGH (ref 4.8–10.8)

## 2018-10-03 RX ORDER — ZOLPIDEM TARTRATE 10 MG/1
5 TABLET ORAL AT BEDTIME
Qty: 0 | Refills: 0 | Status: DISCONTINUED | OUTPATIENT
Start: 2018-10-03 | End: 2018-10-10

## 2018-10-03 RX ADMIN — MORPHINE SULFATE 2 MILLIGRAM(S): 50 CAPSULE, EXTENDED RELEASE ORAL at 08:35

## 2018-10-03 RX ADMIN — Medication 20 MILLIGRAM(S): at 17:39

## 2018-10-03 RX ADMIN — ZOLPIDEM TARTRATE 5 MILLIGRAM(S): 10 TABLET ORAL at 21:13

## 2018-10-03 RX ADMIN — MORPHINE SULFATE 2 MILLIGRAM(S): 50 CAPSULE, EXTENDED RELEASE ORAL at 20:08

## 2018-10-03 RX ADMIN — MORPHINE SULFATE 2 MILLIGRAM(S): 50 CAPSULE, EXTENDED RELEASE ORAL at 14:08

## 2018-10-03 RX ADMIN — CARBAMIDE PEROXIDE 5 DROP(S): 81.86 SOLUTION/ DROPS AURICULAR (OTIC) at 06:44

## 2018-10-03 RX ADMIN — ENOXAPARIN SODIUM 50 MILLIGRAM(S): 100 INJECTION SUBCUTANEOUS at 08:20

## 2018-10-03 RX ADMIN — MORPHINE SULFATE 2 MILLIGRAM(S): 50 CAPSULE, EXTENDED RELEASE ORAL at 02:19

## 2018-10-03 RX ADMIN — Medication 20 MILLIGRAM(S): at 06:45

## 2018-10-03 RX ADMIN — Medication 50 MILLIGRAM(S): at 21:13

## 2018-10-03 RX ADMIN — MORPHINE SULFATE 2 MILLIGRAM(S): 50 CAPSULE, EXTENDED RELEASE ORAL at 02:04

## 2018-10-03 RX ADMIN — SENNA PLUS 2 TABLET(S): 8.6 TABLET ORAL at 21:13

## 2018-10-03 RX ADMIN — Medication 100 MILLIGRAM(S): at 06:45

## 2018-10-03 RX ADMIN — LATANOPROST 1 DROP(S): 0.05 SOLUTION/ DROPS OPHTHALMIC; TOPICAL at 21:14

## 2018-10-03 RX ADMIN — CHLORHEXIDINE GLUCONATE 1 APPLICATION(S): 213 SOLUTION TOPICAL at 06:50

## 2018-10-03 RX ADMIN — DULOXETINE HYDROCHLORIDE 60 MILLIGRAM(S): 30 CAPSULE, DELAYED RELEASE ORAL at 11:21

## 2018-10-03 RX ADMIN — MORPHINE SULFATE 2 MILLIGRAM(S): 50 CAPSULE, EXTENDED RELEASE ORAL at 08:19

## 2018-10-03 RX ADMIN — Medication 40 MILLIGRAM(S): at 06:46

## 2018-10-03 RX ADMIN — TAMSULOSIN HYDROCHLORIDE 0.4 MILLIGRAM(S): 0.4 CAPSULE ORAL at 21:13

## 2018-10-03 RX ADMIN — MORPHINE SULFATE 2 MILLIGRAM(S): 50 CAPSULE, EXTENDED RELEASE ORAL at 14:40

## 2018-10-03 RX ADMIN — Medication 100 MILLIGRAM(S): at 21:14

## 2018-10-03 RX ADMIN — CARBAMIDE PEROXIDE 5 DROP(S): 81.86 SOLUTION/ DROPS AURICULAR (OTIC) at 17:40

## 2018-10-03 NOTE — PROGRESS NOTE ADULT - SUBJECTIVE AND OBJECTIVE BOX
OVERNIGHT EVENTS: C/O SOB, SP EXTENSIVE WORK UP FOR B/L EXUDATIVE EFFUSION, DECLINED SURGERY VATS, HOWEVER RAPID REACCUMALATION, PLAN WAS FOR PLEURX CATHETER AWAITING APPROVAL???    Vital Signs Last 24 Hrs  T(C): 35.9 (03 Oct 2018 06:42), Max: 37.1 (02 Oct 2018 20:55)  T(F): 96.6 (03 Oct 2018 06:42), Max: 98.7 (02 Oct 2018 20:55)  HR: 91 (03 Oct 2018 05:00) (87 - 94)  BP: 148/82 (03 Oct 2018 05:00) (109/63 - 148/82)  RR: 18 (03 Oct 2018 05:00) (18 - 18)  SpO2: 92 % 3 L NC    PHYSICAL EXAMINATION:    GENERAL: The patient is awake and alert in no apparent distress.     HEENT: Head is normocephalic and atraumatic. Extraocular muscles are intact. Mucous membranes are moist.    NECK: Supple.    LUNGS: DEC BS BOTH BASES  HEART: Regular rate and rhythm without murmur.    ABDOMEN: Soft, nontender, and nondistended.      EXTREMITIES: Without any cyanosis, clubbing, rash, lesions or edema.    NEUROLOGIC: Grossly intact.    SKIN: No ulceration or induration present.      LABS:                        12.4   13.44 )-----------( 368      ( 03 Oct 2018 08:37 )             39.1     10-03    140  |  93<L>  |  13  ----------------------------<  115<H>  4.1   |  32  |  0.8    Ca    9.0      03 Oct 2018 08:37                            10-02-18 @ 07:01  -  10-03-18 @ 07:00  --------------------------------------------------------  IN: 0 mL / OUT: 1150 mL / NET: -1150 mL        MICROBIOLOGY:      MEDICATIONS  (STANDING):  carbamide peroxide Otic Solution 5 Drop(s) Both Ears every 12 hours  chlorhexidine 4% Liquid 1 Application(s) Topical <User Schedule>  diltiazem    Tablet 60 milliGRAM(s) Oral every 8 hours  docusate sodium 100 milliGRAM(s) Oral three times a day  DULoxetine 60 milliGRAM(s) Oral daily  enoxaparin Injectable 50 milliGRAM(s) SubCutaneous every 12 hours  furosemide   Injectable 40 milliGRAM(s) IV Push daily  latanoprost 0.005% Ophthalmic Solution 1 Drop(s) Both EYES at bedtime  propranolol 20 milliGRAM(s) Oral every 12 hours  senna 2 Tablet(s) Oral at bedtime  tamsulosin 0.4 milliGRAM(s) Oral at bedtime  traZODone 50 milliGRAM(s) Oral at bedtime    MEDICATIONS  (PRN):  acetaminophen   Tablet .. 650 milliGRAM(s) Oral every 6 hours PRN Moderate Pain (4 - 6)  morphine  - Injectable 2 milliGRAM(s) IV Push every 6 hours PRN Severe Pain (7 - 10)  zolpidem 5 milliGRAM(s) Oral at bedtime PRN Insomnia      RADIOLOGY & ADDITIONAL STUDIES:

## 2018-10-03 NOTE — CHART NOTE - NSCHARTNOTEFT_GEN_A_CORE
I spoke with Dr Mendenhall. Hold Lovenox tonight and tomorrow morning for planned thoracentesis tomorrow.

## 2018-10-03 NOTE — PROGRESS NOTE ADULT - ASSESSMENT
recurrent effusion s/p thora 2 times l side, 1 thora r side, f/up r/o malignancy b/l exudative repeat cyto/ flow p (had ct/c/a/p -), rheumato -, declined VATS BIOPSY , POOR OP F/UP    KEEP LOVENOX  F/UP CYTO/ FLOW NEG  NEED PLEURX CATHETER NOW PATIENT AGREEABLE, DECLINED VATS BIOPSY, WILL DO IT IN AM IF APPROVED, OTHERWISE THORA  REPEAT CXR  VERY POOR PROGNOSIS

## 2018-10-03 NOTE — PROGRESS NOTE ADULT - REASON FOR ADMISSION
worsening SOB worsening SOB due to recurrent large exudative pleural effusions need to rule out malignancy

## 2018-10-03 NOTE — PROGRESS NOTE ADULT - SUBJECTIVE AND OBJECTIVE BOX
AMRIK MADRID 58y Male  MRN#: [id number]       SUBJECTIVE  Patient is a 58y old Male who presents with a chief complaint of worsening SOB (03 Oct 2018 10:33)  Currently admitted to medicine with the primary diagnosis of Pleural effusion    OBJECTIVE  PAST MEDICAL & SURGICAL HISTORY  Pleural effusion  CHF (congestive heart failure)  Insomnia  BPH (benign prostatic hyperplasia)  Depression  Bipolar 1 disorder  HTN (hypertension)  History of thoracentesis    ALLERGIES:  No Known Allergies    MEDICATIONS:  STANDING MEDICATIONS  carbamide peroxide Otic Solution 5 Drop(s) Both Ears every 12 hours  chlorhexidine 4% Liquid 1 Application(s) Topical <User Schedule>  diltiazem    Tablet 60 milliGRAM(s) Oral every 8 hours  docusate sodium 100 milliGRAM(s) Oral three times a day  DULoxetine 60 milliGRAM(s) Oral daily  furosemide   Injectable 40 milliGRAM(s) IV Push daily  latanoprost 0.005% Ophthalmic Solution 1 Drop(s) Both EYES at bedtime  propranolol 20 milliGRAM(s) Oral every 12 hours  senna 2 Tablet(s) Oral at bedtime  tamsulosin 0.4 milliGRAM(s) Oral at bedtime  traZODone 50 milliGRAM(s) Oral at bedtime    PRN MEDICATIONS  acetaminophen   Tablet .. 650 milliGRAM(s) Oral every 6 hours PRN  morphine  - Injectable 2 milliGRAM(s) IV Push every 6 hours PRN  zolpidem 5 milliGRAM(s) Oral at bedtime PRN      VITAL SIGNS: Last 24 Hours  T(C): 36.9 (03 Oct 2018 12:05), Max: 37.1 (02 Oct 2018 20:55)  T(F): 98.4 (03 Oct 2018 12:05), Max: 98.7 (02 Oct 2018 20:55)  HR: 95 (03 Oct 2018 12:05) (87 - 95)  BP: 119/70 (03 Oct 2018 12:05) (109/63 - 148/82)  BP(mean): --  RR: 18 (03 Oct 2018 12:05) (18 - 18)  SpO2: --    LABS:                        12.4   13.44 )-----------( 368      ( 03 Oct 2018 08:37 )             39.1     10-03    140  |  93<L>  |  13  ----------------------------<  115<H>  4.1   |  32  |  0.8    Ca    9.0      03 Oct 2018 08:37      PHYSICAL EXAM:  GENERAL: NAD, well-developed, AAOx3  HEENT:  Atraumatic, Normocephalic. Strabismus noted.  PULMONARY:  Decreased B/L lung sounds - unchanged  CARDIOVASCULAR: Tachycardiac, but normal rhythm; No murmurs.  GASTROINTESTINAL: Dullness on percussion; Bowel sound noted, Non-tender.  MUSCULOSKELETAL:  Mild pitting edema noted B/L LE    ASSESSMENT & PLAN    57 yo M with HTN, DLD, depression, DVT on eliquis, and recurrent pleural effusions s/p pigtail insertion back in july, s/p thoracentesis 3 days PTP in [place] presenting from [facility] for worsening SOB x3 days. Pt agrees about Pleurx catheter. Eliquis is switched to Lovenox for possible Pleurx catheter    #Recurrent bilateral pleural effusions s/p thoracentesis showing serosanguineous fluid  -recurrent, exudative in nature  -rule out underlying malignancy vs infection vs volume overload vs autoimmune  -c ANCA, p ANCA negative.  -GREGG, RA factor, Anti-dsdna negative  -anti ro, anti la negative    - has mentioned that there is no way patient can get the denver catheter,  Called the pulmonary fellow for the need of emergent thoracocentesis.   -Please hold the next dose of lovenox in the lieu of possible thoracocentesis in the near future.    -Talked with rheum, They said no rheum intervention if the tests are negative.    -Pleural fluid cytology not showing any malignant cells.  -Just shows few mesothelial cells.  -CXR yesterday shows stable bilateral pleural effusions with left sided loculation.    -Patient also complaining of left sided hearing loss and balance issues.  -Tried to get CT scan of the head, but patient simply cannot lie down, gets Severe SOB.  -CT head cancelled.  -ENT consult was put, They recommend debrox ear drops every 12 hours.  -Will do audiogram if patient is inpatient after 3-5 days.    -ECHO: Normal EF  -c/w lasix 40mg IV daily    #Bacteruria growing pseudomonas and enterococcus faecalis - stable  -asympatomatic bacteruia  -blood culture NGTD  -d/c abx as per ID    #Tachycardia - likely secondary to chronic pleural effusion . stable now   -EKG shows sinus tachycardia   -as per pt, baseline tachycardiac  -c/w Cardizem 60mg q8h and resume propanolol 20mg q12h (home medication)    #Borderline BP - stable  -resume home medication: cardizem 60mg q8h and propanolol   -holding other home BP meds for now    #Abdominal distention/ constipation - persistent  -colace/ senna  -CT A/P on prior admission negative for any pathology    #H/O DVt  -Venous duplex shows: rt femoral vein DVT and lt femoral, popliteal and gastrocnemius veins DVT  -c/w lovenox    #BPH - c/w flomax  #Depression - c/w home meds  #Code status: DNR/ DNI AMRIK MADRID 58y Male  MRN#: [id number]       SUBJECTIVE  Patient is a 58y old Male who presents with a chief complaint of worsening SOB (03 Oct 2018 10:33)  Currently admitted to medicine with the primary diagnosis of Pleural effusion    OBJECTIVE  PAST MEDICAL & SURGICAL HISTORY  Pleural effusion  CHF (congestive heart failure)  Insomnia  BPH (benign prostatic hyperplasia)  Depression  Bipolar 1 disorder  HTN (hypertension)  History of thoracentesis    ALLERGIES:  No Known Allergies    MEDICATIONS:  STANDING MEDICATIONS  carbamide peroxide Otic Solution 5 Drop(s) Both Ears every 12 hours  chlorhexidine 4% Liquid 1 Application(s) Topical <User Schedule>  diltiazem    Tablet 60 milliGRAM(s) Oral every 8 hours  docusate sodium 100 milliGRAM(s) Oral three times a day  DULoxetine 60 milliGRAM(s) Oral daily  furosemide   Injectable 40 milliGRAM(s) IV Push daily  latanoprost 0.005% Ophthalmic Solution 1 Drop(s) Both EYES at bedtime  propranolol 20 milliGRAM(s) Oral every 12 hours  senna 2 Tablet(s) Oral at bedtime  tamsulosin 0.4 milliGRAM(s) Oral at bedtime  traZODone 50 milliGRAM(s) Oral at bedtime    PRN MEDICATIONS  acetaminophen   Tablet .. 650 milliGRAM(s) Oral every 6 hours PRN  morphine  - Injectable 2 milliGRAM(s) IV Push every 6 hours PRN  zolpidem 5 milliGRAM(s) Oral at bedtime PRN      VITAL SIGNS: Last 24 Hours  T(C): 36.9 (03 Oct 2018 12:05), Max: 37.1 (02 Oct 2018 20:55)  T(F): 98.4 (03 Oct 2018 12:05), Max: 98.7 (02 Oct 2018 20:55)  HR: 95 (03 Oct 2018 12:05) (87 - 95)  BP: 119/70 (03 Oct 2018 12:05) (109/63 - 148/82)  BP(mean): --  RR: 18 (03 Oct 2018 12:05) (18 - 18)  SpO2: --    LABS:                        12.4   13.44 )-----------( 368      ( 03 Oct 2018 08:37 )             39.1     10-03    140  |  93<L>  |  13  ----------------------------<  115<H>  4.1   |  32  |  0.8    Ca    9.0      03 Oct 2018 08:37      PHYSICAL EXAM:  GENERAL: NAD, well-developed, AAOx3  HEENT:  Atraumatic, Normocephalic. Strabismus noted.  PULMONARY:  Decreased B/L lung sounds - unchanged  CARDIOVASCULAR: Tachycardiac, but normal rhythm; No murmurs.  GASTROINTESTINAL: Dullness on percussion; Bowel sound noted, Non-tender.  MUSCULOSKELETAL:  Mild pitting edema noted B/L LE    ASSESSMENT & PLAN    57 yo M with HTN, DLD, depression, DVT on eliquis, and recurrent pleural effusions s/p pigtail insertion back in july, s/p thoracentesis 3 days PTP in [place] presenting from [facility] for worsening SOB x3 days. Pt agrees about Pleurx catheter. Eliquis is switched to Lovenox for possible Pleurx catheter    #Recurrent bilateral pleural effusions s/p thoracentesis showing serosanguineous fluid  -recurrent, exudative in nature  -rule out underlying malignancy vs infection vs volume overload vs autoimmune  -c ANCA, p ANCA negative.  -GREGG, RA factor, Anti-dsdna negative  -anti ro, anti la negative    - has mentioned that there is no way patient can get the denver catheter,  Called the pulmonary fellow for the need of emergent thoracocentesis.   -Please hold the next dose of lovenox in the lieu of possible thoracocentesis in the near future.    -Talked with rheum, They said no rheum intervention if the tests are negative.    -Pleural fluid cytology not showing any malignant cells.  -Just shows few mesothelial cells.  -CXR yesterday shows stable bilateral pleural effusions with left sided loculation.    -Patient also complaining of left sided hearing loss and balance issues.  -Tried to get CT scan of the head, but patient simply cannot lie down, gets Severe SOB.  -CT head cancelled.  -ENT consult was put, They recommend debrox ear drops every 12 hours.  -Will do audiogram if patient is inpatient after 3-5 days.    -ECHO: Normal EF  -c/w lasix 40mg IV daily    #Bacteruria growing pseudomonas and enterococcus faecalis - stable  -asympatomatic bacteruia  -blood culture NGTD  -d/c abx as per ID    #Tachycardia - likely secondary to chronic pleural effusion . stable now   -EKG shows sinus tachycardia   -as per pt, baseline tachycardiac  -c/w Cardizem 60mg q8h and resume propanolol 20mg q12h (home medication)    #Borderline BP - stable  -resume home medication: cardizem 60mg q8h and propanolol   -holding other home BP meds for now    #Abdominal distention/ constipation - persistent  -colace/ senna  -CT A/P on prior admission negative for any pathology    #H/O DVt  -Venous duplex shows: rt femoral vein DVT and lt femoral, popliteal and gastrocnemius veins DVT  -c/w lovenox    #BPH - c/w flomax  #Depression - c/w home meds  #Code status: DNR/ DNI    Attending Attestation:  Pt seen and examined. Case discussed at rounds. Agree with above note as corrected. Pt is currently with dyspnea at rest cxr showing increased pleural effusions. Pulmonary notified - plan is to do a repeat thorasynthesis (send cytology again if possible however low yield to show malignancy). Get CT-Surgery Consult for Thorascopic Surgery evaluation and lung biopsies to r/o Malignancy. In the interim continue with all care and biPAP as needed. Will follow. POOR PROGNOSIS. Need to Clarify code status. AMRIK MADRID 58y Male  MRN#: [id number]       SUBJECTIVE  Patient is a 58y old Male who presents with a chief complaint of worsening SOB (03 Oct 2018 10:33)  Currently admitted to medicine with the primary diagnosis of Pleural effusion    OBJECTIVE  PAST MEDICAL & SURGICAL HISTORY  Pleural effusion  CHF (congestive heart failure)  Insomnia  BPH (benign prostatic hyperplasia)  Depression  Bipolar 1 disorder  HTN (hypertension)  History of thoracentesis    ALLERGIES:  No Known Allergies    MEDICATIONS:  STANDING MEDICATIONS  carbamide peroxide Otic Solution 5 Drop(s) Both Ears every 12 hours  chlorhexidine 4% Liquid 1 Application(s) Topical <User Schedule>  diltiazem    Tablet 60 milliGRAM(s) Oral every 8 hours  docusate sodium 100 milliGRAM(s) Oral three times a day  DULoxetine 60 milliGRAM(s) Oral daily  furosemide   Injectable 40 milliGRAM(s) IV Push daily  latanoprost 0.005% Ophthalmic Solution 1 Drop(s) Both EYES at bedtime  propranolol 20 milliGRAM(s) Oral every 12 hours  senna 2 Tablet(s) Oral at bedtime  tamsulosin 0.4 milliGRAM(s) Oral at bedtime  traZODone 50 milliGRAM(s) Oral at bedtime    PRN MEDICATIONS  acetaminophen   Tablet .. 650 milliGRAM(s) Oral every 6 hours PRN  morphine  - Injectable 2 milliGRAM(s) IV Push every 6 hours PRN  zolpidem 5 milliGRAM(s) Oral at bedtime PRN      VITAL SIGNS: Last 24 Hours  T(C): 36.9 (03 Oct 2018 12:05), Max: 37.1 (02 Oct 2018 20:55)  T(F): 98.4 (03 Oct 2018 12:05), Max: 98.7 (02 Oct 2018 20:55)  HR: 95 (03 Oct 2018 12:05) (87 - 95)  BP: 119/70 (03 Oct 2018 12:05) (109/63 - 148/82)  BP(mean): --  RR: 18 (03 Oct 2018 12:05) (18 - 18)  SpO2: --    LABS:                        12.4   13.44 )-----------( 368      ( 03 Oct 2018 08:37 )             39.1     10-03    140  |  93<L>  |  13  ----------------------------<  115<H>  4.1   |  32  |  0.8    Ca    9.0      03 Oct 2018 08:37      PHYSICAL EXAM:  GENERAL: NAD, well-developed, AAOx3  HEENT:  Atraumatic, Normocephalic. Strabismus noted.  PULMONARY:  Decreased B/L lung sounds - unchanged  CARDIOVASCULAR: Tachycardiac, but normal rhythm; No murmurs.  GASTROINTESTINAL: Dullness on percussion; Bowel sound noted, Non-tender.  MUSCULOSKELETAL:  Mild pitting edema noted B/L LE    ASSESSMENT & PLAN    57 yo M with HTN, DLD, depression, DVT on eliquis, and recurrent pleural effusions s/p pigtail insertion back in july, s/p thoracentesis 3 days PTP in [facility] presenting from [facility] for worsening SOB x3 days. Pt agrees about Pleurx catheter. Eliquis is switched to Lovenox for possible Pleurx catheter    #Recurrent bilateral pleural effusions s/p thoracentesis showing serosanguineous fluid  -recurrent, exudative in nature  -rule out underlying malignancy vs infection vs volume overload vs autoimmune  -c ANCA, p ANCA negative.  -GREGG, RA factor, Anti-dsdna negative  -anti ro, anti la negative    - has mentioned that there is no way patient can get the denver catheter,  Called the pulmonary fellow for the need of emergent thoracocentesis.   -Please hold the next dose of lovenox in the lieu of possible thoracocentesis in the near future.    -Talked with rheum, They said no rheum intervention if the tests are negative.    -Pleural fluid cytology not showing any malignant cells.  -Just shows few mesothelial cells.  -CXR yesterday shows stable bilateral pleural effusions with left sided loculation.    -Patient also complaining of left sided hearing loss and balance issues.  -Tried to get CT scan of the head, but patient simply cannot lie down, gets Severe SOB.  -CT head cancelled.  -ENT consult was put, They recommend debrox ear drops every 12 hours.  -Will do audiogram if patient is inpatient after 3-5 days.    -ECHO: Normal EF  -c/w lasix 40mg IV daily    #Bacteruria growing pseudomonas and enterococcus faecalis - stable  -asympatomatic bacteruia  -blood culture NGTD  -d/c abx as per ID    #Tachycardia - likely secondary to chronic pleural effusion . stable now   -EKG shows sinus tachycardia   -as per pt, baseline tachycardiac  -c/w Cardizem 60mg q8h and resume propanolol 20mg q12h (home medication)    #Borderline BP - stable  -resume home medication: cardizem 60mg q8h and propanolol   -holding other home BP meds for now    #Abdominal distention/ constipation - persistent  -colace/ senna  -CT A/P on prior admission negative for any pathology    #H/O DVt  -Venous duplex shows: rt femoral vein DVT and lt femoral, popliteal and gastrocnemius veins DVT  -c/w lovenox    #BPH - c/w flomax  #Depression - c/w home meds  #Code status: DNR/ DNI    Attending Attestation:  Pt seen and examined. Case discussed at rounds. Agree with above note as corrected. Pt is currently with dyspnea at rest cxr showing increased pleural effusions. Pulmonary notified - plan is to do a repeat thorasynthesis (send cytology again if possible however low yield to show malignancy). Get CT-Surgery Consult for Thorascopic Surgery evaluation and lung biopsies to r/o Malignancy. In the interim continue with all care and biPAP as needed. Will follow. POOR PROGNOSIS. Need to Clarify code status.

## 2018-10-04 LAB
ANION GAP SERPL CALC-SCNC: 11 MMOL/L — SIGNIFICANT CHANGE UP (ref 7–14)
BUN SERPL-MCNC: 14 MG/DL — SIGNIFICANT CHANGE UP (ref 10–20)
CALCIUM SERPL-MCNC: 8.7 MG/DL — SIGNIFICANT CHANGE UP (ref 8.5–10.1)
CHLORIDE SERPL-SCNC: 97 MMOL/L — LOW (ref 98–110)
CO2 SERPL-SCNC: 32 MMOL/L — SIGNIFICANT CHANGE UP (ref 17–32)
CREAT SERPL-MCNC: 0.8 MG/DL — SIGNIFICANT CHANGE UP (ref 0.7–1.5)
GLUCOSE SERPL-MCNC: 106 MG/DL — HIGH (ref 70–99)
HCT VFR BLD CALC: 35.3 % — LOW (ref 42–52)
HGB BLD-MCNC: 11.3 G/DL — LOW (ref 14–18)
MCHC RBC-ENTMCNC: 26.7 PG — LOW (ref 27–31)
MCHC RBC-ENTMCNC: 32 G/DL — SIGNIFICANT CHANGE UP (ref 32–37)
MCV RBC AUTO: 83.3 FL — SIGNIFICANT CHANGE UP (ref 80–94)
NRBC # BLD: 0 /100 WBCS — SIGNIFICANT CHANGE UP (ref 0–0)
PLATELET # BLD AUTO: 335 K/UL — SIGNIFICANT CHANGE UP (ref 130–400)
POTASSIUM SERPL-MCNC: 4.3 MMOL/L — SIGNIFICANT CHANGE UP (ref 3.5–5)
POTASSIUM SERPL-SCNC: 4.3 MMOL/L — SIGNIFICANT CHANGE UP (ref 3.5–5)
RBC # BLD: 4.24 M/UL — LOW (ref 4.7–6.1)
RBC # FLD: 15.4 % — HIGH (ref 11.5–14.5)
SODIUM SERPL-SCNC: 140 MMOL/L — SIGNIFICANT CHANGE UP (ref 135–146)
WBC # BLD: 8.59 K/UL — SIGNIFICANT CHANGE UP (ref 4.8–10.8)
WBC # FLD AUTO: 8.59 K/UL — SIGNIFICANT CHANGE UP (ref 4.8–10.8)

## 2018-10-04 RX ORDER — ENOXAPARIN SODIUM 100 MG/ML
80 INJECTION SUBCUTANEOUS EVERY 12 HOURS
Qty: 0 | Refills: 0 | Status: DISCONTINUED | OUTPATIENT
Start: 2018-10-04 | End: 2018-10-07

## 2018-10-04 RX ADMIN — MORPHINE SULFATE 2 MILLIGRAM(S): 50 CAPSULE, EXTENDED RELEASE ORAL at 21:08

## 2018-10-04 RX ADMIN — Medication 20 MILLIGRAM(S): at 06:14

## 2018-10-04 RX ADMIN — DULOXETINE HYDROCHLORIDE 60 MILLIGRAM(S): 30 CAPSULE, DELAYED RELEASE ORAL at 12:18

## 2018-10-04 RX ADMIN — MORPHINE SULFATE 2 MILLIGRAM(S): 50 CAPSULE, EXTENDED RELEASE ORAL at 08:55

## 2018-10-04 RX ADMIN — SENNA PLUS 2 TABLET(S): 8.6 TABLET ORAL at 21:08

## 2018-10-04 RX ADMIN — CARBAMIDE PEROXIDE 5 DROP(S): 81.86 SOLUTION/ DROPS AURICULAR (OTIC) at 18:14

## 2018-10-04 RX ADMIN — MORPHINE SULFATE 2 MILLIGRAM(S): 50 CAPSULE, EXTENDED RELEASE ORAL at 15:08

## 2018-10-04 RX ADMIN — MORPHINE SULFATE 2 MILLIGRAM(S): 50 CAPSULE, EXTENDED RELEASE ORAL at 02:25

## 2018-10-04 RX ADMIN — CHLORHEXIDINE GLUCONATE 1 APPLICATION(S): 213 SOLUTION TOPICAL at 06:14

## 2018-10-04 RX ADMIN — ZOLPIDEM TARTRATE 5 MILLIGRAM(S): 10 TABLET ORAL at 21:52

## 2018-10-04 RX ADMIN — Medication 100 MILLIGRAM(S): at 21:08

## 2018-10-04 RX ADMIN — MORPHINE SULFATE 2 MILLIGRAM(S): 50 CAPSULE, EXTENDED RELEASE ORAL at 02:08

## 2018-10-04 RX ADMIN — Medication 50 MILLIGRAM(S): at 21:08

## 2018-10-04 RX ADMIN — LATANOPROST 1 DROP(S): 0.05 SOLUTION/ DROPS OPHTHALMIC; TOPICAL at 21:15

## 2018-10-04 RX ADMIN — MORPHINE SULFATE 2 MILLIGRAM(S): 50 CAPSULE, EXTENDED RELEASE ORAL at 08:41

## 2018-10-04 RX ADMIN — MORPHINE SULFATE 2 MILLIGRAM(S): 50 CAPSULE, EXTENDED RELEASE ORAL at 14:42

## 2018-10-04 RX ADMIN — Medication 40 MILLIGRAM(S): at 06:14

## 2018-10-04 RX ADMIN — ENOXAPARIN SODIUM 80 MILLIGRAM(S): 100 INJECTION SUBCUTANEOUS at 18:13

## 2018-10-04 RX ADMIN — Medication 20 MILLIGRAM(S): at 18:13

## 2018-10-04 RX ADMIN — Medication 100 MILLIGRAM(S): at 06:14

## 2018-10-04 RX ADMIN — TAMSULOSIN HYDROCHLORIDE 0.4 MILLIGRAM(S): 0.4 CAPSULE ORAL at 21:08

## 2018-10-04 RX ADMIN — CARBAMIDE PEROXIDE 5 DROP(S): 81.86 SOLUTION/ DROPS AURICULAR (OTIC) at 06:13

## 2018-10-04 RX ADMIN — Medication 100 MILLIGRAM(S): at 14:12

## 2018-10-04 NOTE — PROGRESS NOTE ADULT - SUBJECTIVE AND OBJECTIVE BOX
AMRIK MADRID 58y Male  MRN#: [id number]     SUBJECTIVE  Patient is a 58y old Male who presents with a chief complaint of worsening SOB due to recurrent pleural effusion r/o malignancy (04 Oct 2018 13:02)  Currently admitted to medicine with the primary diagnosis of Pleural effusion  OBJECTIVE  PAST MEDICAL & SURGICAL HISTORY  Pleural effusion  CHF (congestive heart failure)  Insomnia  BPH (benign prostatic hyperplasia)  Depression  Bipolar 1 disorder  HTN (hypertension)  History of thoracentesis    ALLERGIES:  No Known Allergies    MEDICATIONS:  STANDING MEDICATIONS  carbamide peroxide Otic Solution 5 Drop(s) Both Ears every 12 hours  chlorhexidine 4% Liquid 1 Application(s) Topical <User Schedule>  diltiazem    Tablet 60 milliGRAM(s) Oral every 8 hours  docusate sodium 100 milliGRAM(s) Oral three times a day  DULoxetine 60 milliGRAM(s) Oral daily  enoxaparin Injectable 80 milliGRAM(s) SubCutaneous every 12 hours  furosemide   Injectable 40 milliGRAM(s) IV Push daily  latanoprost 0.005% Ophthalmic Solution 1 Drop(s) Both EYES at bedtime  propranolol 20 milliGRAM(s) Oral every 12 hours  senna 2 Tablet(s) Oral at bedtime  tamsulosin 0.4 milliGRAM(s) Oral at bedtime  traZODone 50 milliGRAM(s) Oral at bedtime    PRN MEDICATIONS  acetaminophen   Tablet .. 650 milliGRAM(s) Oral every 6 hours PRN  morphine  - Injectable 2 milliGRAM(s) IV Push every 6 hours PRN  zolpidem 5 milliGRAM(s) Oral at bedtime PRN      VITAL SIGNS: Last 24 Hours  T(C): 36.4 (04 Oct 2018 12:02), Max: 36.6 (03 Oct 2018 21:27)  T(F): 97.5 (04 Oct 2018 12:02), Max: 97.9 (03 Oct 2018 21:27)  HR: 87 (04 Oct 2018 12:02) (73 - 96)  BP: 101/67 (04 Oct 2018 12:02) (101/67 - 135/85)  BP(mean): --  RR: 18 (04 Oct 2018 12:02) (18 - 18)  SpO2: --    LABS:                        11.3   8.59  )-----------( 335      ( 04 Oct 2018 06:25 )             35.3     10-04    140  |  97<L>  |  14  ----------------------------<  106<H>  4.3   |  32  |  0.8    Ca    8.7      04 Oct 2018 06:25      PHYSICAL EXAM:    PHYSICAL EXAM:  GENERAL: NAD, well-developed, AAOx3  HEENT:  Atraumatic, Normocephalic. Strabismus noted.  PULMONARY:  Decreased B/L lung sounds - unchanged  CARDIOVASCULAR: Tachycardiac, but normal rhythm; No murmurs.  GASTROINTESTINAL: Dullness on percussion; Bowel sound noted, Non-tender.  MUSCULOSKELETAL:  Mild pitting edema noted B/L LE    ASSESSMENT & PLAN    57 yo M with HTN, DLD, depression, DVT , and recurrent pleural effusions s/p pigtail insertion back in july, s/p thoracentesis 3 days PTP in [facility] presenting from [facility] for worsening SOB x3 days. Pt agrees about Pleurx catheter. Eliquis is switched to Lovenox.    #Recurrent bilateral pleural effusions s/p thoracentesis showing serosanguineous fluid  -recurrent, exudative in nature  -rule out underlying malignancy vs infection vs volume overload vs autoimmune  -c ANCA, p ANCA negative.  -GREGG, RA factor, Anti-dsdna negative  -anti ro, anti la negative    - has mentioned that there is no way patient can get the denver catheter,  -Thoracocentesis from the right side done today and 1.5 L of fluid was removed.  -Post-procedure xray does not show any major change in the effusion. But, Patient significantly doing better.    -Talked with rheum, They said no rheum intervention if the tests are negative.    -Pleural fluid cytology not showing any malignant cells.  -Just shows few mesothelial cells.  -CXR yesterday shows stable bilateral pleural effusions with left sided loculation.    -Patient also complaining of left sided hearing loss and balance issues.  -Tried to get CT scan of the head, but patient simply cannot lie down, gets Severe SOB.  -CT head cancelled.  -ENT consult was put, They recommend debrox ear drops every 12 hours.  -Will do audiogram if patient is inpatient after 3-5 days.    -ECHO: Normal EF  -c/w lasix 40mg IV daily    #Bacteruria growing pseudomonas and enterococcus faecalis - stable  -asympatomatic bacteruia  -blood culture NGTD  -d/c abx as per ID    #Tachycardia - likely secondary to chronic pleural effusion . stable now   -EKG shows sinus tachycardia   -as per pt, baseline tachycardiac  -c/w Cardizem 60mg q8h and resume propanolol 20mg q12h (home medication)    #Borderline BP - stable  -resume home medication: cardizem 60mg q8h and propanolol   -holding other home BP meds for now    #Abdominal distention/ constipation - persistent  -colace/ senna  -CT A/P on prior admission negative for any pathology    #H/O DVt  -Venous duplex shows: rt femoral vein DVT and lt femoral, popliteal and gastrocnemius veins DVT  -c/w lovenox    #BPH - c/w flomax  #Depression - c/w home meds  #Code status: DNR/ DNI

## 2018-10-04 NOTE — PROGRESS NOTE ADULT - SUBJECTIVE AND OBJECTIVE BOX
AMRIK MADRID 58y Male  MRN#: [id number]   SUBJECTIVE  Patient is a 58y old Male who presents with a chief complaint of worsening SOB (02 Oct 2018 10:43)  Currently admitted to medicine with the primary diagnosis of acute resp failure due to Hypoxia, Recurrent Pleural effusion      OBJECTIVE  PAST MEDICAL & SURGICAL HISTORY  Pleural effusion  CHF (congestive heart failure)  Insomnia  BPH (benign prostatic hyperplasia)  Depression  Bipolar 1 disorder   HTN (hypertension)  History of thoracentesis    ALLERGIES:  No Known Allergies    MEDICATIONS:  carbamide peroxide Otic Solution 5 Drop(s) Both Ears every 12 hours  chlorhexidine 4% Liquid 1 Application(s) Topical <User Schedule>  diltiazem    Tablet 60 milliGRAM(s) Oral every 8 hours  docusate sodium 100 milliGRAM(s) Oral three times a day  DULoxetine 60 milliGRAM(s) Oral daily  furosemide   Injectable 40 milliGRAM(s) IV Push daily  latanoprost 0.005% Ophthalmic Solution 1 Drop(s) Both EYES at bedtime  propranolol 20 milliGRAM(s) Oral every 12 hours  senna 2 Tablet(s) Oral at bedtime  tamsulosin 0.4 milliGRAM(s) Oral at bedtime  traZODone 50 milliGRAM(s) Oral at bedtime    MEDICATIONS  (PRN):  acetaminophen   Tablet .. 650 milliGRAM(s) Oral every 6 hours PRN Moderate Pain (4 - 6)  morphine  - Injectable 2 milliGRAM(s) IV Push every 6 hours PRN Severe Pain (7 - 10)  zolpidem 5 milliGRAM(s) Oral at bedtime PRN Insomnia    VITAL SIGNS: Last 24 Hours  T(C): 36.4 (04 Oct 2018 12:02), Max: 36.6 (03 Oct 2018 21:27)  T(F): 97.5 (04 Oct 2018 12:02), Max: 97.9 (03 Oct 2018 21:27)  HR: 87 (04 Oct 2018 12:02) (73 - 96)  BP: 101/67 (04 Oct 2018 12:02) (101/67 - 135/85)  RR: 18 (04 Oct 2018 12:02) (18 - 18)    LABS:                        11.1   7.26  )-----------( 304      ( 02 Oct 2018 06:39 )             35.2     10-02    137  |  95<L>  |  13  ----------------------------<  99  4.3   |  29  |  0.7    Ca    8.5      02 Oct 2018 06:39  Mg     2.0     10-01    TPro  5.9<L>  /  Alb  3.5  /  TBili  0.3  /  DBili  x   /  AST  15  /  ALT  25  /  AlkPhos  373<H>  10-01    PT/INR - ( 01 Oct 2018 09:28 )   PT: 13.10 sec;   INR: 1.22 ratio      PTT - ( 01 Oct 2018 09:28 )  PTT:39.6 sec    CXR: no new improvement.     PHYSICAL EXAM:  GENERAL: NAD, well-developed, AAOx3  HEENT:  Atraumatic, Normocephalic. Strabismus noted.  PULMONARY:  Decreased B/L lung sounds - unchanged  CARDIOVASCULAR: Tachycardiac, but normal rhythm; No murmurs.  GASTROINTESTINAL: Dullness on percussion; Bowel sound noted, Non-tender.  MUSCULOSKELETAL:  Mild pitting edema noted B/L LE    ASSESSMENT & PLAN  57 yo M with HTN, DLD, depression, DVT on eliquis, and recurrent pleural effusions s/p pigtail insertion back in july, s/p thoracentesis 3 days PTP in [facility] presenting from [facility] for worsening SOB x3 days. Eliquis is switched to Lovenox for possible Pleurx catheter however so far insurance doesn't cover it.     #Recurrent bilateral pleural effusions s/p thoracentesis showing serosanguineous fluid  -recurrent, exudative in nature  -rule out underlying malignancy vs infection vs volume overload vs autoimmune  -c ANCA, p ANCA negative.  -GREGG, RA factor, Anti-dsdna negative  -anti ro, anti la negative    -Rheumatology w/up negative so far.  -Pleural fluid cytology not showing any malignant cells.  -Just shows few mesothelial cells.  -Patient also complaining of left sided hearing loss and balance issues.  -Tried to get CT scan of the head, but patient simply cannot lie down, gets Severe SOB.  -ENT consult was put, They recommend debrox ear drops every 12 hours.  -Will do audiogram if patient is inpatient after 3-5 days.    -ECHO: Normal EF  -c/w lasix 40mg IV daily    #Bacteruria growing pseudomonas and enterococcus faecalis - stable  -asympatomatic bacteruia  -blood culture NGTD  -d/c abx as per ID    #Tachycardia - likely secondary to chronic pleural effusion . stable now   -EKG shows sinus tachycardia   -as per pt, baseline tachycardiac  -c/w Cardizem 60mg q8h and resume propanolol 20mg q12h (home medication)    #Borderline BP - stable  -resume home medication: cardizem 60mg q8h and propanolol   -holding other home BP meds for now    #Abdominal distention/ constipation - persistent  -colace/ senna  -CT A/P on prior admission negative for any pathology    #H/O DVt  -Venous duplex shows: rt femoral vein DVT and lt femoral, popliteal and gastrocnemius veins DVT  -c/w lovenox    #BPH - c/w flomax  #Depression - c/w home meds  #Code status: DNR/ DNI    Dispo: Planned for Thorascopic Surgery for Friday with CT Surgery. Pt is absolutely agreeable and wants to get better. He is s/p Thorasynthesis to relieve dyspnea today by Pulmonary Team. Pls f/u CXR and monitor clinically for respiratory failure. Resume Lovenox at correct dose 1mg/kg sq q12h. Hold Lovenox 24hrs before CT Surgery Procedure.

## 2018-10-04 NOTE — CONSULT NOTE ADULT - SUBJECTIVE AND OBJECTIVE BOX
Surgery Consultation Note  =====================================================  HPI: 58y Male  HPI:  59 yo M with HTN, DLD, depression, DVT on eliquis, and recurrent pleural effusions s/p multiple thoracentesis presenting from [facility] for worsening SOB since discharge from [facility]. Pt denies any fever/ chills. He reports intermittent non productive cough. No chest pain. Pt also c/o persistent diffuse abdominal pain along with distension, constipation and flatus. Pt denies any other symptoms. To note, pt is very poor historian and can't confirm his medications    Pt was admitted for same complaint back in July and was found to have large L pleural effusion s/p pigtail insertion for 3 days with resolution of effusion. Pleural studies at that time were consistent with exudative fluid, + atypical cell but no malignant cells. Pt was admitted last week to [facility] for SOB and had a L thoracentesis 3 days PTP (21 Sep 2018 21:27)    PAST MEDICAL & SURGICAL HISTORY:  Pleural effusion  CHF (congestive heart failure)  Insomnia  BPH (benign prostatic hyperplasia)  Depression  Bipolar 1 disorder  HTN (hypertension)  History of thoracentesis    Home Meds: Home Medications:  Ambien 10 mg oral tablet: 1 tab(s) orally once a day (at bedtime) (21 Sep 2018 21:50)  apixaban 5 mg oral tablet: 1 tab(s) orally every 12 hours (21 Sep 2018 21:50)  Cardizem 60 mg oral tablet: 1 tab(s) orally every 8 hours (21 Sep 2018 21:50)  clotrimazole 1% topical cream: 1 application topically  (21 Sep 2018 21:50)  DULoxetine 60 mg oral delayed release capsule: 1 cap(s) orally once a day (21 Sep 2018 21:50)  isosorbide mononitrate 20 mg oral tablet: 1 tab(s) orally once a day (21 Sep 2018 21:50)  latanoprost 0.005% ophthalmic solution: 1 drop(s) to each affected eye once a day (in the evening) (21 Sep 2018 21:50)  Multiple Vitamins oral capsule: 1 cap(s) orally once a day (21 Sep 2018 21:50)  propranolol 20 mg oral tablet: 1 tab(s) orally 2 times a day (21 Sep 2018 21:50)  tamsulosin 0.4 mg oral capsule: 1 cap(s) orally once a day (21 Sep 2018 21:50)  traZODone 50 mg oral tablet: 1 tab(s) orally once a day (at bedtime) (21 Sep 2018 21:50)    Allergies: Allergies    No Known Allergies    Intolerances      Soc:   Advanced Directives: Presumed Full Code     ROS:    REVIEW OF SYSTEMS    [x] A ten-point review of systems was otherwise negative except as noted.  [ ] Due to altered mental status/intubation, subjective information were not able to be obtained from the patient. History was obtained, to the extent possible, from review of the chart and collateral sources of information.      CURRENT MEDICATIONS:   --------------------------------------------------------------------------------------  Neurologic Medications  acetaminophen   Tablet .. 650 milliGRAM(s) Oral every 6 hours PRN Moderate Pain (4 - 6)  DULoxetine 60 milliGRAM(s) Oral daily  morphine  - Injectable 2 milliGRAM(s) IV Push every 6 hours PRN Severe Pain (7 - 10)  traZODone 50 milliGRAM(s) Oral at bedtime  zolpidem 5 milliGRAM(s) Oral at bedtime PRN Insomnia    Respiratory Medications    Cardiovascular Medications  diltiazem    Tablet 60 milliGRAM(s) Oral every 8 hours  furosemide   Injectable 40 milliGRAM(s) IV Push daily  propranolol 20 milliGRAM(s) Oral every 12 hours  tamsulosin 0.4 milliGRAM(s) Oral at bedtime    Gastrointestinal Medications  docusate sodium 100 milliGRAM(s) Oral three times a day  senna 2 Tablet(s) Oral at bedtime    Genitourinary Medications    Hematologic/Oncologic Medications    Antimicrobial/Immunologic Medications    Endocrine/Metabolic Medications    Topical/Other Medications  carbamide peroxide Otic Solution 5 Drop(s) Both Ears every 12 hours  chlorhexidine 4% Liquid 1 Application(s) Topical <User Schedule>  latanoprost 0.005% Ophthalmic Solution 1 Drop(s) Both EYES at bedtime    --------------------------------------------------------------------------------------    VITAL SIGNS, INS/OUTS (last 24 hours):  --------------------------------------------------------------------------------------  ICU Vital Signs Last 24 Hrs  T(C): 36.4 (04 Oct 2018 12:02), Max: 36.6 (03 Oct 2018 21:27)  T(F): 97.5 (04 Oct 2018 12:02), Max: 97.9 (03 Oct 2018 21:27)  HR: 87 (04 Oct 2018 12:02) (73 - 96)  BP: 101/67 (04 Oct 2018 12:02) (101/67 - 135/85)  BP(mean): --  ABP: --  ABP(mean): --  RR: 18 (04 Oct 2018 12:02) (18 - 18)  SpO2: --    I&O's Summary    03 Oct 2018 07:01  -  04 Oct 2018 07:00  --------------------------------------------------------  IN: 480 mL / OUT: 1900 mL / NET: -1420 mL    04 Oct 2018 07:01  -  04 Oct 2018 12:24  --------------------------------------------------------  IN: 360 mL / OUT: 400 mL / NET: -40 mL      --------------------------------------------------------------------------------------    EXAM:  General/Neuro  RASS: 0  GCS: 15  Exam: Normal, NAD, alert, oriented x 3, no focal deficits. PERRLA      Respiratory  Exam: Diminished breath sounds bilaterally, Normal expansion/effort.    [] Tracheostomy   [] Intubated  Mechanical Ventilation:     Cardiovascular  Exam: S1, S2.  Regular rate and rhythm.  Peripheral edema    Cardiac Rhythm: Normal Sinus Rhythm  ECHO:     GI  Exam: Abdomen soft, Non-tender, Non-distended.   Current Diet:  NPO      Tubes/Lines/Drains    [x] Peripheral IV  [] Central Venous Line     	[] R	[] L	[] IJ	[] Fem	[] SC        Type:	    Date Placed:   [] Arterial Line		[] R	[] L	[] Fem	[] Rad	[] Ax	Date Placed:   [] PICC:         	[] Midline		[] Mediport           [] Urinary Catheter		Date Placed:     Extremities  Exam: Extremities warm, pink, well-perfused.      Derm:  Exam: Good skin turgor, no skin breakdown.      LABS  --------------------------------------------------------------------------------------  Labs:  CAPILLARY BLOOD GLUCOSE                              11.3   8.59  )-----------( 335      ( 04 Oct 2018 06:25 )             35.3         10-04    140  |  97<L>  |  14  ----------------------------<  106<H>  4.3   |  32  |  0.8      Calcium, Total Serum: 8.7 mg/dL (10-04-18 @ 06:25)      LFTs:         Coags:              --------------------------------------------------------------------------------------    OTHER LABS    IMAGING RESULTS      ASSESSMENT:  58y Male pt with bilateral pleural effusions.     PLAN:   -VATS with [facility] pleurodesis offered. Patient is deciding if he will go through with the procedure.

## 2018-10-04 NOTE — CHART NOTE - NSCHARTNOTEFT_GEN_A_CORE
Registered Dietitian Follow-Up (limited)    Pt. continues to eat with good appetite. No acute GI distress, Last BM 10/2. Labs/meds reviewed. No RD intervention at this time. Will continue to monitor pt.

## 2018-10-04 NOTE — PROCEDURE NOTE - PROCEDURE
<<-----Click on this checkbox to enter Procedure Thoracentesis  10/04/2018    Active  MARTA  Thoracentesis  09/23/2018    Active  Jean Draper

## 2018-10-05 LAB
ANION GAP SERPL CALC-SCNC: 11 MMOL/L — SIGNIFICANT CHANGE UP (ref 7–14)
BUN SERPL-MCNC: 15 MG/DL — SIGNIFICANT CHANGE UP (ref 10–20)
CALCIUM SERPL-MCNC: 9 MG/DL — SIGNIFICANT CHANGE UP (ref 8.5–10.1)
CHLORIDE SERPL-SCNC: 95 MMOL/L — LOW (ref 98–110)
CO2 SERPL-SCNC: 34 MMOL/L — HIGH (ref 17–32)
CREAT SERPL-MCNC: 1 MG/DL — SIGNIFICANT CHANGE UP (ref 0.7–1.5)
GLUCOSE SERPL-MCNC: 102 MG/DL — HIGH (ref 70–99)
HCT VFR BLD CALC: 34.8 % — LOW (ref 42–52)
HGB BLD-MCNC: 11.1 G/DL — LOW (ref 14–18)
MCHC RBC-ENTMCNC: 26.9 PG — LOW (ref 27–31)
MCHC RBC-ENTMCNC: 31.9 G/DL — LOW (ref 32–37)
MCV RBC AUTO: 84.3 FL — SIGNIFICANT CHANGE UP (ref 80–94)
NRBC # BLD: 0 /100 WBCS — SIGNIFICANT CHANGE UP (ref 0–0)
PLATELET # BLD AUTO: 317 K/UL — SIGNIFICANT CHANGE UP (ref 130–400)
POTASSIUM SERPL-MCNC: 4.5 MMOL/L — SIGNIFICANT CHANGE UP (ref 3.5–5)
POTASSIUM SERPL-SCNC: 4.5 MMOL/L — SIGNIFICANT CHANGE UP (ref 3.5–5)
RBC # BLD: 4.13 M/UL — LOW (ref 4.7–6.1)
RBC # FLD: 15.3 % — HIGH (ref 11.5–14.5)
SODIUM SERPL-SCNC: 140 MMOL/L — SIGNIFICANT CHANGE UP (ref 135–146)
WBC # BLD: 6.88 K/UL — SIGNIFICANT CHANGE UP (ref 4.8–10.8)
WBC # FLD AUTO: 6.88 K/UL — SIGNIFICANT CHANGE UP (ref 4.8–10.8)

## 2018-10-05 RX ORDER — CARBAMIDE PEROXIDE 81.86 MG/ML
5 SOLUTION/ DROPS AURICULAR (OTIC)
Qty: 0 | Refills: 0 | Status: DISCONTINUED | OUTPATIENT
Start: 2018-10-05 | End: 2018-10-10

## 2018-10-05 RX ADMIN — CHLORHEXIDINE GLUCONATE 1 APPLICATION(S): 213 SOLUTION TOPICAL at 06:17

## 2018-10-05 RX ADMIN — Medication 20 MILLIGRAM(S): at 06:14

## 2018-10-05 RX ADMIN — MORPHINE SULFATE 2 MILLIGRAM(S): 50 CAPSULE, EXTENDED RELEASE ORAL at 06:29

## 2018-10-05 RX ADMIN — TAMSULOSIN HYDROCHLORIDE 0.4 MILLIGRAM(S): 0.4 CAPSULE ORAL at 21:43

## 2018-10-05 RX ADMIN — LATANOPROST 1 DROP(S): 0.05 SOLUTION/ DROPS OPHTHALMIC; TOPICAL at 21:42

## 2018-10-05 RX ADMIN — DULOXETINE HYDROCHLORIDE 60 MILLIGRAM(S): 30 CAPSULE, DELAYED RELEASE ORAL at 12:55

## 2018-10-05 RX ADMIN — Medication 100 MILLIGRAM(S): at 21:42

## 2018-10-05 RX ADMIN — ENOXAPARIN SODIUM 80 MILLIGRAM(S): 100 INJECTION SUBCUTANEOUS at 17:44

## 2018-10-05 RX ADMIN — Medication 100 MILLIGRAM(S): at 14:07

## 2018-10-05 RX ADMIN — Medication 100 MILLIGRAM(S): at 06:14

## 2018-10-05 RX ADMIN — SENNA PLUS 2 TABLET(S): 8.6 TABLET ORAL at 21:42

## 2018-10-05 RX ADMIN — CARBAMIDE PEROXIDE 5 DROP(S): 81.86 SOLUTION/ DROPS AURICULAR (OTIC) at 17:44

## 2018-10-05 RX ADMIN — MORPHINE SULFATE 2 MILLIGRAM(S): 50 CAPSULE, EXTENDED RELEASE ORAL at 12:55

## 2018-10-05 RX ADMIN — Medication 40 MILLIGRAM(S): at 06:17

## 2018-10-05 RX ADMIN — ENOXAPARIN SODIUM 80 MILLIGRAM(S): 100 INJECTION SUBCUTANEOUS at 06:16

## 2018-10-05 RX ADMIN — ZOLPIDEM TARTRATE 5 MILLIGRAM(S): 10 TABLET ORAL at 21:41

## 2018-10-05 RX ADMIN — Medication 50 MILLIGRAM(S): at 21:43

## 2018-10-05 RX ADMIN — MORPHINE SULFATE 2 MILLIGRAM(S): 50 CAPSULE, EXTENDED RELEASE ORAL at 19:15

## 2018-10-05 RX ADMIN — MORPHINE SULFATE 2 MILLIGRAM(S): 50 CAPSULE, EXTENDED RELEASE ORAL at 13:10

## 2018-10-05 RX ADMIN — Medication 20 MILLIGRAM(S): at 17:43

## 2018-10-05 RX ADMIN — MORPHINE SULFATE 2 MILLIGRAM(S): 50 CAPSULE, EXTENDED RELEASE ORAL at 18:58

## 2018-10-05 RX ADMIN — CARBAMIDE PEROXIDE 5 DROP(S): 81.86 SOLUTION/ DROPS AURICULAR (OTIC) at 06:16

## 2018-10-05 NOTE — PROGRESS NOTE ADULT - SUBJECTIVE AND OBJECTIVE BOX
AMRIK MADRID 58y Male  MRN#: [id number]     SUBJECTIVE  Patient is a 58y old Male who presents with a chief complaint of worsening SOB (05 Oct 2018 12:55)  Currently admitted to medicine with the primary diagnosis of Pleural effusion      OBJECTIVE  PAST MEDICAL & SURGICAL HISTORY  Pleural effusion  CHF (congestive heart failure)  Insomnia  BPH (benign prostatic hyperplasia)  Depression  Bipolar 1 disorder  HTN (hypertension)  History of thoracentesis    ALLERGIES:  No Known Allergies    MEDICATIONS:  STANDING MEDICATIONS  carbamide peroxide Otic Solution 5 Drop(s) Left Ear two times a day  chlorhexidine 4% Liquid 1 Application(s) Topical <User Schedule>  diltiazem    Tablet 60 milliGRAM(s) Oral every 8 hours  docusate sodium 100 milliGRAM(s) Oral three times a day  DULoxetine 60 milliGRAM(s) Oral daily  enoxaparin Injectable 80 milliGRAM(s) SubCutaneous every 12 hours  furosemide   Injectable 40 milliGRAM(s) IV Push daily  latanoprost 0.005% Ophthalmic Solution 1 Drop(s) Both EYES at bedtime  propranolol 20 milliGRAM(s) Oral every 12 hours  senna 2 Tablet(s) Oral at bedtime  tamsulosin 0.4 milliGRAM(s) Oral at bedtime  traZODone 50 milliGRAM(s) Oral at bedtime    PRN MEDICATIONS  acetaminophen   Tablet .. 650 milliGRAM(s) Oral every 6 hours PRN  morphine  - Injectable 2 milliGRAM(s) IV Push every 6 hours PRN  zolpidem 5 milliGRAM(s) Oral at bedtime PRN      VITAL SIGNS: Last 24 Hours  T(C): 36.9 (05 Oct 2018 14:15), Max: 36.9 (05 Oct 2018 14:15)  T(F): 98.4 (05 Oct 2018 14:15), Max: 98.4 (05 Oct 2018 14:15)  HR: 92 (05 Oct 2018 14:15) (92 - 99)  BP: 99/61 (05 Oct 2018 14:15) (99/61 - 123/69)  BP(mean): --  RR: 18 (05 Oct 2018 14:15) (18 - 20)  SpO2: 96% (05 Oct 2018 08:05) (96% - 100%)    LABS:                        11.1   6.88  )-----------( 317      ( 05 Oct 2018 05:58 )             34.8     10-05    140  |  95<L>  |  15  ----------------------------<  102<H>  4.5   |  34<H>  |  1.0    Ca    9.0      05 Oct 2018 05:58      PHYSICAL EXAM:  GENERAL: NAD, well-developed, AAOx3  HEENT:  Atraumatic, Normocephalic. Strabismus noted.  PULMONARY:  Decreased B/L lung sounds - unchanged  CARDIOVASCULAR: Tachycardiac, but normal rhythm; No murmurs.  GASTROINTESTINAL: Dullness on percussion; Bowel sound noted, Non-tender.  MUSCULOSKELETAL:  Mild pitting edema noted B/L LE    ASSESSMENT & PLAN  57 yo M with HTN, DLD, depression, DVT , and recurrent pleural effusions s/p pigtail insertion back in july, s/p thoracentesis 3 days PTP in [facility] presenting from [facility] for worsening SOB x3 days. Pt agrees about Pleurx catheter. Eliquis is switched to Lovenox.    #Recurrent bilateral pleural effusions s/p thoracentesis showing serosanguineous fluid  -recurrent, exudative in nature  -rule out underlying malignancy vs infection vs volume overload vs autoimmune  -c ANCA, p ANCA negative.  -GREGG, RA factor, Anti-dsdna negative  -anti ro, anti la negative    - has mentioned that there is no way patient can get the denver catheter,  -Thoracocentesis from the right side done yesterday and 1.5 L of fluid was removed.  -Post-procedure xray does not show any major change in the effusion. But, Patient significantly doing better.  -Talked with patient today and patient is now agreeable to undergo VATS procedure with possible pleural biopsy.  -Talked with the CT surgery today, They will take patient for OR on tuesday.    -Talked with rheum, They said no rheum intervention if the tests are negative.    -Pleural fluid cytology not showing any malignant cells.  -Just shows few mesothelial cells.  -CXR yesterday shows stable bilateral pleural effusions with left sided loculation.    -Patient also complaining of left sided hearing loss and balance issues.  -Tried to get CT scan of the head, but patient simply cannot lie down, gets Severe SOB.  -CT head cancelled.  -ENT consult was put, They recommend debrox ear drops every 12 hours.  -ENT removed cerumen from both the ears, still some left on the left side, continue debrox only on the left side.  -Will do audiogram if patient is inpatient after 3-5 days.    -ECHO: Normal EF  -c/w lasix 40mg IV daily    #Bacteruria growing pseudomonas and enterococcus faecalis - stable  -asympatomatic bacteruia  -blood culture NGTD  -d/c abx as per ID    #Tachycardia - likely secondary to chronic pleural effusion . stable now   -EKG shows sinus tachycardia   -as per pt, baseline tachycardiac  -c/w Cardizem 60mg q8h and resume propanolol 20mg q12h (home medication)    #Borderline BP - stable  -resume home medication: cardizem 60mg q8h and propanolol   -holding other home BP meds for now    #Abdominal distention/ constipation - persistent  -colace/ senna  -CT A/P on prior admission negative for any pathology    #H/O DVt  -Venous duplex shows: rt femoral vein DVT and lt femoral, popliteal and gastrocnemius veins DVT  -c/w lovenox    #BPH - c/w flomax  #Depression - c/w home meds  #Code status: DNR/ DNI AMRIK MADRID 58y Male  MRN#: [id number]     SUBJECTIVE  Patient is a 58y old Male who presents with a chief complaint of worsening SOB (05 Oct 2018 12:55)  Currently admitted to medicine with the primary diagnosis of Pleural effusion      OBJECTIVE  PAST MEDICAL & SURGICAL HISTORY  Pleural effusion  CHF (congestive heart failure)  Insomnia  BPH (benign prostatic hyperplasia)  Depression  Bipolar 1 disorder  HTN (hypertension)  History of thoracentesis    ALLERGIES:  No Known Allergies    MEDICATIONS:  STANDING MEDICATIONS  carbamide peroxide Otic Solution 5 Drop(s) Left Ear two times a day  chlorhexidine 4% Liquid 1 Application(s) Topical <User Schedule>  diltiazem    Tablet 60 milliGRAM(s) Oral every 8 hours  docusate sodium 100 milliGRAM(s) Oral three times a day  DULoxetine 60 milliGRAM(s) Oral daily  enoxaparin Injectable 80 milliGRAM(s) SubCutaneous every 12 hours  furosemide   Injectable 40 milliGRAM(s) IV Push daily  latanoprost 0.005% Ophthalmic Solution 1 Drop(s) Both EYES at bedtime  propranolol 20 milliGRAM(s) Oral every 12 hours  senna 2 Tablet(s) Oral at bedtime  tamsulosin 0.4 milliGRAM(s) Oral at bedtime  traZODone 50 milliGRAM(s) Oral at bedtime    PRN MEDICATIONS  acetaminophen   Tablet .. 650 milliGRAM(s) Oral every 6 hours PRN  morphine  - Injectable 2 milliGRAM(s) IV Push every 6 hours PRN  zolpidem 5 milliGRAM(s) Oral at bedtime PRN      VITAL SIGNS: Last 24 Hours  T(C): 36.9 (05 Oct 2018 14:15), Max: 36.9 (05 Oct 2018 14:15)  T(F): 98.4 (05 Oct 2018 14:15), Max: 98.4 (05 Oct 2018 14:15)  HR: 92 (05 Oct 2018 14:15) (92 - 99)  BP: 99/61 (05 Oct 2018 14:15) (99/61 - 123/69)  BP(mean): --  RR: 18 (05 Oct 2018 14:15) (18 - 20)  SpO2: 96% (05 Oct 2018 08:05) (96% - 100%)    LABS:                        11.1   6.88  )-----------( 317      ( 05 Oct 2018 05:58 )             34.8     10-05    140  |  95<L>  |  15  ----------------------------<  102<H>  4.5   |  34<H>  |  1.0    Ca    9.0      05 Oct 2018 05:58      PHYSICAL EXAM:  GENERAL: NAD, well-developed, AAOx3  HEENT:  Atraumatic, Normocephalic. Strabismus noted.  PULMONARY:  Decreased B/L lung sounds - unchanged  CARDIOVASCULAR: Tachycardiac, but normal rhythm; No murmurs.  GASTROINTESTINAL: Dullness on percussion; Bowel sound noted, Non-tender.  MUSCULOSKELETAL:  Mild pitting edema noted B/L LE    ASSESSMENT & PLAN  57 yo M with HTN, DLD, depression, DVT , and recurrent pleural effusions s/p pigtail insertion back in july, s/p thoracentesis 3 days PTP in [facility] presenting from [facility] for worsening SOB x3 days. Pt agrees about Pleurx catheter. Eliquis is switched to Lovenox.    #Recurrent bilateral pleural effusions s/p thoracentesis showing serosanguineous fluid  -recurrent, exudative in nature  -rule out underlying malignancy vs infection vs volume overload vs autoimmune  -c ANCA, p ANCA negative.  -GREGG, RA factor, Anti-dsdna negative  -anti ro, anti la negative    - has mentioned that there is no way patient can get the denver catheter,  -Thoracocentesis from the right side done yesterday and 1.5 L of fluid was removed.  -Post-procedure xray does not show any major change in the effusion. But, Patient significantly doing better.  -Talked with patient today and patient is now agreeable to undergo VATS procedure with possible pleural biopsy.  -Talked with the CT surgery today, They will take patient for OR on tuesday.    -Talked with rheum, They said no rheum intervention if the tests are negative.    -Pleural fluid cytology not showing any malignant cells.  -Just shows few mesothelial cells.  -CXR yesterday shows stable bilateral pleural effusions with left sided loculation.    -Patient also complaining of left sided hearing loss and balance issues.  -Tried to get CT scan of the head, but patient simply cannot lie down, gets Severe SOB.  -CT head cancelled.  -ENT consult was put, They recommend debrox ear drops every 12 hours.  -ENT removed cerumen from both the ears, still some left on the left side, continue debrox only on the left side.  -Will do audiogram if patient is inpatient after 3-5 days.    -ECHO: Normal EF  -c/w lasix 40mg IV daily    #Bacteruria growing pseudomonas and enterococcus faecalis - stable  -asympatomatic bacteruia  -blood culture NGTD  -d/c abx as per ID    #Tachycardia - likely secondary to chronic pleural effusion . stable now   -EKG shows sinus tachycardia   -as per pt, baseline tachycardiac  -c/w Cardizem 60mg q8h and resume propanolol 20mg q12h (home medication)    #Borderline BP - stable  -resume home medication: cardizem 60mg q8h and propanolol   -holding other home BP meds for now    #Abdominal distention/ constipation - persistent  -colace/ senna  -CT A/P on prior admission negative for any pathology    #H/O DVt  -Venous duplex shows: rt femoral vein DVT and lt femoral, popliteal and gastrocnemius veins DVT  -c/w lovenox    #BPH - c/w flomax  #Depression - c/w home meds  #Code status: DNR/ DNI    Attending Attestation  Pt has been seen and examined. Case and Plan discussed at rounds. Agree with above note as corrected. Pt AGREES TO VATS and it is scheduled for TUESDAY next week. Pt will stay in the hospital for procedure. Continue bipap prn/qhs and follow clinically. He is DNR/DNI hence use biPAP if Dyspnea. Lovenox for DVT Rx. Recurrent Exudative Pleural Effusion with Ascities/DVT all of which suggestive of Malignant process and must be ruled out.   Dispo: VATS Tuesday with CT Surgery.

## 2018-10-05 NOTE — CHART NOTE - NSCHARTNOTEFT_GEN_A_CORE
PT is a 58 y.o male w/ HL and B/L impacted cerumen - has been getting debrox drops since earlier this week.     Ears cleaned B/L with curette, able to now visualize TM on R. Still unable to visualize TM on L, could not remove all wax as it was hard and close to the TM, also causing pt discomfort.     Continue debrox drops to L ear only. PT did note improvement of his hearing on both sides after removal.    PT should continue drops and be seen as OP for remained of cerumen to be cleared under the microscope.

## 2018-10-05 NOTE — PROGRESS NOTE ADULT - SUBJECTIVE AND OBJECTIVE BOX
GENERAL SURGERY PROGRESS NOTE     AMRIK MADRID  58y  Male  Hospital day :14d  POD:  Procedure: Thoracentesis  Thoracentesis    OVERNIGHT EVENTS:  No acute overnight events. Patient s/p thoracentesis yesterday. Denies SOB/C/P. Does have abdominal pain. Very hard of hearing.   T(F): 97.4 (10-05-18 @ 05:00), Max: 98.1 (10-04-18 @ 20:00)  HR: 95 (10-05-18 @ 05:00) (73 - 99)  BP: 123/69 (10-05-18 @ 05:00) (101/67 - 135/85)  ABP: --  ABP(mean): --  RR: 20 (10-05-18 @ 05:00) (18 - 20)  SpO2: 100% (10-05-18 @ 05:00) (95% - 100%)    DIET/FLUIDS:   NG:                                                                                DRAINS:     BM:     EMESIS:     URINE:      GI proph:    AC/ proph:   ABx:     PHYSICAL EXAM:  GENERAL: NAD, well-appearing  CHEST/LUNG: No obvious increased WOB.         LABS  Labs:  CAPILLARY BLOOD GLUCOSE                              11.3   8.59  )-----------( 335      ( 04 Oct 2018 06:25 )             35.3         10-04    140  |  97<L>  |  14  ----------------------------<  106<H>  4.3   |  32  |  0.8      Calcium, Total Serum: 8.7 mg/dL (10-04-18 @ 06:25)      LFTs:         Coags:                RADIOLOGY & ADDITIONAL TESTS:  < from: Xray Chest 1 View-PORTABLE IMMEDIATE (10.04.18 @ 10:32) >  Impression:      Cardiomegaly. Basilar opacifications and effusions.    Follow-up as needed.    < end of copied text >      A/P  59 yo M with recurrent pleural effusion s/p thoracentesis x2    PLAN:    - continue CXR daily    - Possible VATS/pleurodesis. to be decided with family    - IS   - Care per primary

## 2018-10-05 NOTE — PROGRESS NOTE ADULT - SUBJECTIVE AND OBJECTIVE BOX
OVERNIGHT EVENTS: s/p r thora, feels better    Vital Signs Last 24 Hrs  T(C): 36.3 (05 Oct 2018 05:00), Max: 36.7 (04 Oct 2018 20:00)  T(F): 97.4 (05 Oct 2018 05:00), Max: 98.1 (04 Oct 2018 20:00)  HR: 95 (05 Oct 2018 05:00) (95 - 99)  BP: 123/69 (05 Oct 2018 05:00) (105/59 - 123/69)  BP(mean): --  RR: 20 (05 Oct 2018 05:00) (20 - 20)  SpO2: 96% (05 Oct 2018 08:05) (96% - 100%)    PHYSICAL EXAMINATION:    GENERAL: The patient is awake and alert in no apparent distress.     HEENT: Head is normocephalic and atraumatic. Extraocular muscles are intact. Mucous membranes are moist.    NECK: Supple.    LUNGS: dec bs both bases l>r    HEART: Regular rate and rhythm without murmur.    ABDOMEN: Soft, nontender, and nondistended.      EXTREMITIES: Without any cyanosis, clubbing, rash, lesions or edema.    NEUROLOGIC: Grossly intact.    SKIN: No ulceration or induration present.      LABS:                        11.1   6.88  )-----------( 317      ( 05 Oct 2018 05:58 )             34.8     10-05    140  |  95<L>  |  15  ----------------------------<  102<H>  4.5   |  34<H>  |  1.0    Ca    9.0      05 Oct 2018 05:58                            10-04-18 @ 07:01  -  10-05-18 @ 07:00  --------------------------------------------------------  IN: 600 mL / OUT: 1275 mL / NET: -675 mL    10-05-18 @ 07:01  -  10-05-18 @ 12:57  --------------------------------------------------------  IN: 0 mL / OUT: 400 mL / NET: -400 mL        MICROBIOLOGY:      MEDICATIONS  (STANDING):  carbamide peroxide Otic Solution 5 Drop(s) Left Ear two times a day  chlorhexidine 4% Liquid 1 Application(s) Topical <User Schedule>  diltiazem    Tablet 60 milliGRAM(s) Oral every 8 hours  docusate sodium 100 milliGRAM(s) Oral three times a day  DULoxetine 60 milliGRAM(s) Oral daily  enoxaparin Injectable 80 milliGRAM(s) SubCutaneous every 12 hours  furosemide   Injectable 40 milliGRAM(s) IV Push daily  latanoprost 0.005% Ophthalmic Solution 1 Drop(s) Both EYES at bedtime  propranolol 20 milliGRAM(s) Oral every 12 hours  senna 2 Tablet(s) Oral at bedtime  tamsulosin 0.4 milliGRAM(s) Oral at bedtime  traZODone 50 milliGRAM(s) Oral at bedtime    MEDICATIONS  (PRN):  acetaminophen   Tablet .. 650 milliGRAM(s) Oral every 6 hours PRN Moderate Pain (4 - 6)  morphine  - Injectable 2 milliGRAM(s) IV Push every 6 hours PRN Severe Pain (7 - 10)  zolpidem 5 milliGRAM(s) Oral at bedtime PRN Insomnia      RADIOLOGY & ADDITIONAL STUDIES:

## 2018-10-05 NOTE — PROGRESS NOTE ADULT - ASSESSMENT
recurrent effusion s/p thora 2 times l side, 1 thora r side, f/up r/o malignancy b/l exudative repeat cyto/ flow p (had ct/c/a/p -), rheumato -, declined VATS BIOPSY , POOR OP F/UP    spoke with ctsx, need chest tube pleurodesis, pleurx not approved??

## 2018-10-06 RX ADMIN — TAMSULOSIN HYDROCHLORIDE 0.4 MILLIGRAM(S): 0.4 CAPSULE ORAL at 21:33

## 2018-10-06 RX ADMIN — LATANOPROST 1 DROP(S): 0.05 SOLUTION/ DROPS OPHTHALMIC; TOPICAL at 21:34

## 2018-10-06 RX ADMIN — ENOXAPARIN SODIUM 80 MILLIGRAM(S): 100 INJECTION SUBCUTANEOUS at 05:12

## 2018-10-06 RX ADMIN — Medication 100 MILLIGRAM(S): at 13:46

## 2018-10-06 RX ADMIN — Medication 50 MILLIGRAM(S): at 21:33

## 2018-10-06 RX ADMIN — Medication 40 MILLIGRAM(S): at 05:12

## 2018-10-06 RX ADMIN — ZOLPIDEM TARTRATE 5 MILLIGRAM(S): 10 TABLET ORAL at 21:36

## 2018-10-06 RX ADMIN — ENOXAPARIN SODIUM 80 MILLIGRAM(S): 100 INJECTION SUBCUTANEOUS at 18:29

## 2018-10-06 RX ADMIN — Medication 20 MILLIGRAM(S): at 05:13

## 2018-10-06 RX ADMIN — CHLORHEXIDINE GLUCONATE 1 APPLICATION(S): 213 SOLUTION TOPICAL at 05:14

## 2018-10-06 RX ADMIN — Medication 100 MILLIGRAM(S): at 05:13

## 2018-10-06 RX ADMIN — Medication 20 MILLIGRAM(S): at 18:30

## 2018-10-06 RX ADMIN — MORPHINE SULFATE 2 MILLIGRAM(S): 50 CAPSULE, EXTENDED RELEASE ORAL at 11:21

## 2018-10-06 RX ADMIN — DULOXETINE HYDROCHLORIDE 60 MILLIGRAM(S): 30 CAPSULE, DELAYED RELEASE ORAL at 11:22

## 2018-10-06 RX ADMIN — MORPHINE SULFATE 2 MILLIGRAM(S): 50 CAPSULE, EXTENDED RELEASE ORAL at 05:11

## 2018-10-06 RX ADMIN — CARBAMIDE PEROXIDE 5 DROP(S): 81.86 SOLUTION/ DROPS AURICULAR (OTIC) at 05:14

## 2018-10-06 RX ADMIN — MORPHINE SULFATE 2 MILLIGRAM(S): 50 CAPSULE, EXTENDED RELEASE ORAL at 18:27

## 2018-10-06 RX ADMIN — CARBAMIDE PEROXIDE 5 DROP(S): 81.86 SOLUTION/ DROPS AURICULAR (OTIC) at 18:29

## 2018-10-06 NOTE — DOWNTIME INTERRUPTION NOTE - WHICH MANUAL FORMS INITIATED?
Documentation on hold for POC and A&I flowsheets and Adult Patient Profile, see paper record for additional documentation recorded during downtime.

## 2018-10-06 NOTE — PROGRESS NOTE ADULT - SUBJECTIVE AND OBJECTIVE BOX
AMRIK MADRID  58y Male   [id number]    Hospital Day:   Post Operative Day:  Procedure:  Patient is a 58y old  Male who presents with a chief complaint of worsening SOB (05 Oct 2018 15:19)    PAST MEDICAL & SURGICAL HISTORY:  Pleural effusion  CHF (congestive heart failure)  Insomnia  BPH (benign prostatic hyperplasia)  Depression  Bipolar 1 disorder  HTN (hypertension)  History of thoracentesis      Events of the Last 24h:  No acute events overnight.     Vital Signs Last 24 Hrs  T(C): 36.1 (05 Oct 2018 20:50), Max: 36.9 (05 Oct 2018 14:15)  T(F): 96.9 (05 Oct 2018 20:50), Max: 98.4 (05 Oct 2018 14:15)  HR: 88 (05 Oct 2018 20:50) (88 - 95)  BP: 117/67 (05 Oct 2018 20:50) (99/61 - 123/69)  BP(mean): --  RR: 18 (05 Oct 2018 20:50) (18 - 20)  SpO2: 100% (05 Oct 2018 21:55) (96% - 100%)        Diet, DASH/TLC:   Sodium & Cholesterol Restricted (09-21-18 @ 22:31)      I&O's Summary    04 Oct 2018 07:01  -  05 Oct 2018 07:00  --------------------------------------------------------  IN: 600 mL / OUT: 1275 mL / NET: -675 mL    05 Oct 2018 07:01  -  06 Oct 2018 01:32  --------------------------------------------------------  IN: 0 mL / OUT: 1450 mL / NET: -1450 mL     I&O's Detail    04 Oct 2018 07:01  -  05 Oct 2018 07:00  --------------------------------------------------------  IN:    Oral Fluid: 600 mL  Total IN: 600 mL    OUT:    Voided: 1275 mL  Total OUT: 1275 mL    Total NET: -675 mL      05 Oct 2018 07:01  -  06 Oct 2018 01:32  --------------------------------------------------------  IN:  Total IN: 0 mL    OUT:    Voided: 1450 mL  Total OUT: 1450 mL    Total NET: -1450 mL          MEDICATIONS  (STANDING):  carbamide peroxide Otic Solution 5 Drop(s) Left Ear two times a day  chlorhexidine 4% Liquid 1 Application(s) Topical <User Schedule>  diltiazem    Tablet 60 milliGRAM(s) Oral every 8 hours  docusate sodium 100 milliGRAM(s) Oral three times a day  DULoxetine 60 milliGRAM(s) Oral daily  enoxaparin Injectable 80 milliGRAM(s) SubCutaneous every 12 hours  furosemide   Injectable 40 milliGRAM(s) IV Push daily  latanoprost 0.005% Ophthalmic Solution 1 Drop(s) Both EYES at bedtime  propranolol 20 milliGRAM(s) Oral every 12 hours  senna 2 Tablet(s) Oral at bedtime  tamsulosin 0.4 milliGRAM(s) Oral at bedtime  traZODone 50 milliGRAM(s) Oral at bedtime    MEDICATIONS  (PRN):  acetaminophen   Tablet .. 650 milliGRAM(s) Oral every 6 hours PRN Moderate Pain (4 - 6)  morphine  - Injectable 2 milliGRAM(s) IV Push every 6 hours PRN Severe Pain (7 - 10)  zolpidem 5 milliGRAM(s) Oral at bedtime PRN Insomnia      PHYSICAL EXAM:  GENERAL: NAD, well-appearing  CHEST/LUNG: No obvious increased WOB.                           11.1   6.88  )-----------( 317      ( 05 Oct 2018 05:58 )             34.8        CBC Full  -  ( 05 Oct 2018 05:58 )  WBC Count : 6.88 K/uL  Hemoglobin : 11.1 g/dL  Hematocrit : 34.8 %  Platelet Count - Automated : 317 K/uL  Mean Cell Volume : 84.3 fL  Mean Cell Hemoglobin : 26.9 pg  Mean Cell Hemoglobin Concentration : 31.9 g/dL  Auto Neutrophil # : x  Auto Lymphocyte # : x  Auto Monocyte # : x  Auto Eosinophil # : x  Auto Basophil # : x  Auto Neutrophil % : x  Auto Lymphocyte % : x  Auto Monocyte % : x  Auto Eosinophil % : x  Auto Basophil % : x               140   |  95    |  15                 Ca: 9.0    BMP:   ----------------------------< 102    Mg: x     (10-05-18 @ 05:58)             4.5    |  34    | 1.0                Ph: x        LFT:     TPro: 5.9 / Alb: 3.5 / TBili: 0.3 / DBili: x / AST: 15 / ALT: 25 / AlkPhos: 373   (10-01-18 @ 09:28)        < from: Xray Chest 1 View-PORTABLE IMMEDIATE (10.05.18 @ 10:29) >  Impression:      Cardiomegaly and bilateral effusions with interstitial edema, unchanged.    < end of copied text >

## 2018-10-06 NOTE — PROGRESS NOTE ADULT - SUBJECTIVE AND OBJECTIVE BOX
AMRIK MADRID 58y Male  MRN#: [id number]   SUBJECTIVE  Patient is a 58y old Male who presents with a chief complaint of worsening SOB (02 Oct 2018 10:43)  Currently admitted to medicine with the primary diagnosis of acute resp failure due to Hypoxia, Recurrent Pleural effusion    OBJECTIVE  PAST MEDICAL & SURGICAL HISTORY  Pleural effusion  CHF (congestive heart failure)  Insomnia  BPH (benign prostatic hyperplasia)  Depression  Bipolar 1 disorder   HTN (hypertension)  History of thoracentesis    ALLERGIES:  No Known Allergies    MEDICATIONS:  carbamide peroxide Otic Solution 5 Drop(s) Both Ears every 12 hours  chlorhexidine 4% Liquid 1 Application(s) Topical <User Schedule>  diltiazem    Tablet 60 milliGRAM(s) Oral every 8 hours  docusate sodium 100 milliGRAM(s) Oral three times a day  DULoxetine 60 milliGRAM(s) Oral daily  furosemide   Injectable 40 milliGRAM(s) IV Push daily  latanoprost 0.005% Ophthalmic Solution 1 Drop(s) Both EYES at bedtime  propranolol 20 milliGRAM(s) Oral every 12 hours  senna 2 Tablet(s) Oral at bedtime  tamsulosin 0.4 milliGRAM(s) Oral at bedtime  traZODone 50 milliGRAM(s) Oral at bedtime    MEDICATIONS  (PRN):  acetaminophen   Tablet .. 650 milliGRAM(s) Oral every 6 hours PRN Moderate Pain (4 - 6)  morphine  - Injectable 2 milliGRAM(s) IV Push every 6 hours PRN Severe Pain (7 - 10)  zolpidem 5 milliGRAM(s) Oral at bedtime PRN Insomnia    VITAL SIGNS: Last 24 Hours  T(C): 35.7 (06 Oct 2018 06:00), Max: 36.9 (05 Oct 2018 14:15)  T(F): 96.3 (06 Oct 2018 06:00), Max: 98.4 (05 Oct 2018 14:15)  HR: 93 (06 Oct 2018 06:00) (88 - 93)  BP: 119/73 (06 Oct 2018 06:00) (99/61 - 119/73)  RR: 18 (06 Oct 2018 06:00) (18 - 18)  SpO2: 100% (05 Oct 2018 21:55) (100% - 100%)    LABS:                        11.1   6.88  )-----------( 317      ( 05 Oct 2018 05:58 )             34.8     10-05    140  |  95<L>  |  15  ----------------------------<  102<H>  4.5   |  34<H>  |  1.0    Ca    9.0      05 Oct 2018 05:58      TPro  5.9<L>  /  Alb  3.5  /  TBili  0.3  /  DBili  x   /  AST  15  /  ALT  25  /  AlkPhos  373<H>  10-01    PT/INR - ( 01 Oct 2018 09:28 )   PT: 13.10 sec;   INR: 1.22 ratio      PTT - ( 01 Oct 2018 09:28 )  PTT:39.6 sec    CXR: no new improvement.     PHYSICAL EXAM:  GENERAL: NAD, well-developed, AAOx3  HEENT:  Atraumatic, Normocephalic. Strabismus noted.  PULMONARY:  Decreased B/L lung sounds - unchanged  CARDIOVASCULAR: Tachycardiac, but normal rhythm; No murmurs.  GASTROINTESTINAL: Dullness on percussion; Bowel sound noted, Non-tender, + distension   MUSCULOSKELETAL:  Moderate pitting edema noted B/L LE    ASSESSMENT & PLAN  57 yo M with HTN, DLD, depression, DVT on eliquis, and recurrent pleural effusions s/p pigtail insertion back in july, s/p thoracentesis 3 days PTP in [facility] presenting from [facility] for worsening SOB x3 days. Eliquis is switched to Lovenox for possible Pleurx catheter however so far insurance doesn't cover it.     #Recurrent bilateral pleural effusions s/p thoracentesis showing serosanguineous fluid  -recurrent, exudative in nature  -rule out underlying malignancy vs infection vs volume overload vs autoimmune  -c ANCA, p ANCA negative.  -GREGG, RA factor, Anti-dsdna negative  -anti ro, anti la negative    -Rheumatology w/up negative so far.  -Pleural fluid cytology not showing any malignant cells.  -Just shows few mesothelial cells.  -Patient also complaining of left sided hearing loss and balance issues.  -Tried to get CT scan of the head, but patient simply cannot lie down, gets Severe SOB.  -ENT consult was put, They recommend debrox ear drops every 12 hours.  -Will do audiogram if patient is inpatient after 3-5 days.    -ECHO: Normal EF  -c/w lasix 40mg IV daily    #Bacteruria growing pseudomonas and enterococcus faecalis - stable  -asympatomatic bacteruia  -blood culture NGTD  -d/c abx as per ID    #Tachycardia - likely secondary to chronic pleural effusion . stable now   -EKG shows sinus tachycardia   -as per pt, baseline tachycardiac  -c/w Cardizem 60mg q8h and resume propanolol 20mg q12h (home medication)    #Borderline BP - stable  -resume home medication: cardizem 60mg q8h and propanolol   -holding other home BP meds for now    #Abdominal distention / Worsening Ascities   -CT A/P on prior admission negative except for ++ Ascities  - Check Abdominal Sonogram to evaluate for ascities and del ascities for parasynthesis. Hold Lovenox 12 hrs before procedure.   - Parasynthesis will help decrease work of breathing as well and relieve stretch abdominal discomfort/pain.   - Sent parasynthesis fluid for cytology, cell count, cultures, protein and LDH    #Constipation - persistent  -colace/ senna    #H/O DVt  -Venous duplex shows: rt femoral vein DVT and lt femoral, popliteal and gastrocnemius veins DVT  -hold lovenox for 12hrs before parasynthesis     #BPH - c/w flomax  #Depression - c/w home meds  #Code status: DNR/ DNI    Dispo: VATS with CT Surgery on Tuesday scheduled. c/w bipap prn. large volume parasynthesis, give albumin 25% x 2 vials. Lasix 40mg IV x 1

## 2018-10-07 LAB
ANION GAP SERPL CALC-SCNC: 14 MMOL/L — SIGNIFICANT CHANGE UP (ref 7–14)
APTT BLD: 38.8 SEC — SIGNIFICANT CHANGE UP (ref 27–39.2)
BASOPHILS # BLD AUTO: 0.04 K/UL — SIGNIFICANT CHANGE UP (ref 0–0.2)
BASOPHILS NFR BLD AUTO: 0.6 % — SIGNIFICANT CHANGE UP (ref 0–1)
BLD GP AB SCN SERPL QL: SIGNIFICANT CHANGE UP
BUN SERPL-MCNC: 15 MG/DL — SIGNIFICANT CHANGE UP (ref 10–20)
CALCIUM SERPL-MCNC: 9.1 MG/DL — SIGNIFICANT CHANGE UP (ref 8.5–10.1)
CHLORIDE SERPL-SCNC: 92 MMOL/L — LOW (ref 98–110)
CO2 SERPL-SCNC: 32 MMOL/L — SIGNIFICANT CHANGE UP (ref 17–32)
CREAT SERPL-MCNC: 0.8 MG/DL — SIGNIFICANT CHANGE UP (ref 0.7–1.5)
EOSINOPHIL # BLD AUTO: 0.22 K/UL — SIGNIFICANT CHANGE UP (ref 0–0.7)
EOSINOPHIL NFR BLD AUTO: 3.2 % — SIGNIFICANT CHANGE UP (ref 0–8)
GLUCOSE SERPL-MCNC: 135 MG/DL — HIGH (ref 70–99)
HCT VFR BLD CALC: 36 % — LOW (ref 42–52)
HGB BLD-MCNC: 11.5 G/DL — LOW (ref 14–18)
IMM GRANULOCYTES NFR BLD AUTO: 1 % — HIGH (ref 0.1–0.3)
INR BLD: 1.19 RATIO — SIGNIFICANT CHANGE UP (ref 0.65–1.3)
INR BLD: 1.2 RATIO — SIGNIFICANT CHANGE UP (ref 0.65–1.3)
LYMPHOCYTES # BLD AUTO: 0.92 K/UL — LOW (ref 1.2–3.4)
LYMPHOCYTES # BLD AUTO: 13.3 % — LOW (ref 20.5–51.1)
MAGNESIUM SERPL-MCNC: 1.9 MG/DL — SIGNIFICANT CHANGE UP (ref 1.8–2.4)
MCHC RBC-ENTMCNC: 26.6 PG — LOW (ref 27–31)
MCHC RBC-ENTMCNC: 31.9 G/DL — LOW (ref 32–37)
MCV RBC AUTO: 83.3 FL — SIGNIFICANT CHANGE UP (ref 80–94)
MONOCYTES # BLD AUTO: 0.47 K/UL — SIGNIFICANT CHANGE UP (ref 0.1–0.6)
MONOCYTES NFR BLD AUTO: 6.8 % — SIGNIFICANT CHANGE UP (ref 1.7–9.3)
NEUTROPHILS # BLD AUTO: 5.22 K/UL — SIGNIFICANT CHANGE UP (ref 1.4–6.5)
NEUTROPHILS NFR BLD AUTO: 75.1 % — SIGNIFICANT CHANGE UP (ref 42.2–75.2)
NRBC # BLD: 0 /100 WBCS — SIGNIFICANT CHANGE UP (ref 0–0)
PHOSPHATE SERPL-MCNC: 3.7 MG/DL — SIGNIFICANT CHANGE UP (ref 2.1–4.9)
PLATELET # BLD AUTO: 348 K/UL — SIGNIFICANT CHANGE UP (ref 130–400)
POTASSIUM SERPL-MCNC: 4.1 MMOL/L — SIGNIFICANT CHANGE UP (ref 3.5–5)
POTASSIUM SERPL-SCNC: 4.1 MMOL/L — SIGNIFICANT CHANGE UP (ref 3.5–5)
PROTHROM AB SERPL-ACNC: 12.8 SEC — SIGNIFICANT CHANGE UP (ref 9.95–12.87)
PROTHROM AB SERPL-ACNC: 12.9 SEC — HIGH (ref 9.95–12.87)
RBC # BLD: 4.32 M/UL — LOW (ref 4.7–6.1)
RBC # FLD: 15.1 % — HIGH (ref 11.5–14.5)
SODIUM SERPL-SCNC: 138 MMOL/L — SIGNIFICANT CHANGE UP (ref 135–146)
TYPE + AB SCN PNL BLD: SIGNIFICANT CHANGE UP
WBC # BLD: 6.94 K/UL — SIGNIFICANT CHANGE UP (ref 4.8–10.8)
WBC # FLD AUTO: 6.94 K/UL — SIGNIFICANT CHANGE UP (ref 4.8–10.8)

## 2018-10-07 RX ORDER — HEPARIN SODIUM 5000 [USP'U]/ML
1100 INJECTION INTRAVENOUS; SUBCUTANEOUS
Qty: 25000 | Refills: 0 | Status: DISCONTINUED | OUTPATIENT
Start: 2018-10-07 | End: 2018-10-08

## 2018-10-07 RX ADMIN — DULOXETINE HYDROCHLORIDE 60 MILLIGRAM(S): 30 CAPSULE, DELAYED RELEASE ORAL at 11:53

## 2018-10-07 RX ADMIN — LATANOPROST 1 DROP(S): 0.05 SOLUTION/ DROPS OPHTHALMIC; TOPICAL at 21:36

## 2018-10-07 RX ADMIN — Medication 20 MILLIGRAM(S): at 05:28

## 2018-10-07 RX ADMIN — HEPARIN SODIUM 11 UNIT(S)/HR: 5000 INJECTION INTRAVENOUS; SUBCUTANEOUS at 19:10

## 2018-10-07 RX ADMIN — MORPHINE SULFATE 2 MILLIGRAM(S): 50 CAPSULE, EXTENDED RELEASE ORAL at 03:10

## 2018-10-07 RX ADMIN — Medication 50 MILLIGRAM(S): at 21:39

## 2018-10-07 RX ADMIN — ENOXAPARIN SODIUM 80 MILLIGRAM(S): 100 INJECTION SUBCUTANEOUS at 17:25

## 2018-10-07 RX ADMIN — Medication 100 MILLIGRAM(S): at 13:14

## 2018-10-07 RX ADMIN — ENOXAPARIN SODIUM 80 MILLIGRAM(S): 100 INJECTION SUBCUTANEOUS at 05:27

## 2018-10-07 RX ADMIN — CARBAMIDE PEROXIDE 5 DROP(S): 81.86 SOLUTION/ DROPS AURICULAR (OTIC) at 05:28

## 2018-10-07 RX ADMIN — Medication 100 MILLIGRAM(S): at 21:36

## 2018-10-07 RX ADMIN — TAMSULOSIN HYDROCHLORIDE 0.4 MILLIGRAM(S): 0.4 CAPSULE ORAL at 21:38

## 2018-10-07 RX ADMIN — SENNA PLUS 2 TABLET(S): 8.6 TABLET ORAL at 21:38

## 2018-10-07 RX ADMIN — MORPHINE SULFATE 2 MILLIGRAM(S): 50 CAPSULE, EXTENDED RELEASE ORAL at 16:03

## 2018-10-07 RX ADMIN — MORPHINE SULFATE 2 MILLIGRAM(S): 50 CAPSULE, EXTENDED RELEASE ORAL at 09:36

## 2018-10-07 RX ADMIN — Medication 20 MILLIGRAM(S): at 17:26

## 2018-10-07 RX ADMIN — Medication 40 MILLIGRAM(S): at 05:28

## 2018-10-07 RX ADMIN — CARBAMIDE PEROXIDE 5 DROP(S): 81.86 SOLUTION/ DROPS AURICULAR (OTIC) at 17:26

## 2018-10-07 RX ADMIN — ZOLPIDEM TARTRATE 5 MILLIGRAM(S): 10 TABLET ORAL at 21:36

## 2018-10-07 RX ADMIN — MORPHINE SULFATE 2 MILLIGRAM(S): 50 CAPSULE, EXTENDED RELEASE ORAL at 21:40

## 2018-10-07 RX ADMIN — Medication 100 MILLIGRAM(S): at 05:27

## 2018-10-07 NOTE — PROGRESS NOTE ADULT - SUBJECTIVE AND OBJECTIVE BOX
AMRIK MADRID 58y Male  MRN#: [id number]   SUBJECTIVE  Patient is a 58y old Male who presents with a chief complaint of worsening SOB (02 Oct 2018 10:43)  Currently admitted to medicine with the primary diagnosis of acute resp failure due to Hypoxia, Recurrent Pleural effusion    OBJECTIVE  PAST MEDICAL & SURGICAL HISTORY  Pleural effusion  CHF (congestive heart failure)  Insomnia  BPH (benign prostatic hyperplasia)  Depression  Bipolar 1 disorder   HTN (hypertension)  History of thoracentesis    ALLERGIES:  No Known Allergies    MEDICATIONS:  carbamide peroxide Otic Solution 5 Drop(s) Left Ear two times a day  chlorhexidine 4% Liquid 1 Application(s) Topical <User Schedule>  diltiazem    Tablet 60 milliGRAM(s) Oral every 8 hours  docusate sodium 100 milliGRAM(s) Oral three times a day  DULoxetine 60 milliGRAM(s) Oral daily  enoxaparin Injectable 80 milliGRAM(s) SubCutaneous every 12 hours  furosemide   Injectable 40 milliGRAM(s) IV Push daily  latanoprost 0.005% Ophthalmic Solution 1 Drop(s) Both EYES at bedtime  propranolol 20 milliGRAM(s) Oral every 12 hours  senna 2 Tablet(s) Oral at bedtime  tamsulosin 0.4 milliGRAM(s) Oral at bedtime  traZODone 50 milliGRAM(s) Oral at bedtime    MEDICATIONS  (PRN):  acetaminophen   Tablet .. 650 milliGRAM(s) Oral every 6 hours PRN Moderate Pain (4 - 6)  morphine  - Injectable 2 milliGRAM(s) IV Push every 6 hours PRN Severe Pain (7 - 10)  zolpidem 5 milliGRAM(s) Oral at bedtime PRN Insomnia      VITAL SIGNS: Last 24 Hours  T(C): 36.5 (07 Oct 2018 12:37), Max: 37.2 (06 Oct 2018 21:00)  T(F): 97.7 (07 Oct 2018 12:37), Max: 98.9 (06 Oct 2018 21:00)  HR: 91 (07 Oct 2018 12:37) (91 - 99)  BP: 120/58 (07 Oct 2018 12:37) (120/58 - 133/68)  RR: 18 (07 Oct 2018 12:37) (18 - 18)    LABS:                        11.1   6.88  )-----------( 317      ( 05 Oct 2018 05:58 )             34.8     10-05    140  |  95<L>  |  15  ----------------------------<  102<H>  4.5   |  34<H>  |  1.0    Ca    9.0      05 Oct 2018 05:58      TPro  5.9<L>  /  Alb  3.5  /  TBili  0.3  /  DBili  x   /  AST  15  /  ALT  25  /  AlkPhos  373<H>  10-01    PT/INR - ( 01 Oct 2018 09:28 )   PT: 13.10 sec;   INR: 1.22 ratio      PTT - ( 01 Oct 2018 09:28 )  PTT:39.6 sec    CXR: no new improvement.     PHYSICAL EXAM:  GENERAL: NAD, well-developed, AAOx3  HEENT:  Atraumatic, Normocephalic. Strabismus noted.  PULMONARY:  Decreased B/L lung sounds - unchanged  CARDIOVASCULAR: Tachycardiac, but normal rhythm; No murmurs.  GASTROINTESTINAL: Dullness on percussion; Bowel sound noted, Non-tender, + distension   MUSCULOSKELETAL:  Moderate pitting edema noted B/L LE    ASSESSMENT & PLAN  57 yo M with HTN, DLD, depression, DVT on eliquis, and recurrent pleural effusions s/p pigtail insertion back in july, s/p thoracentesis 3 days PTP in [facility] presenting from [facility] for worsening SOB x3 days. Eliquis is switched to Lovenox for possible Pleurx catheter however so far insurance doesn't cover it.     #Recurrent bilateral pleural effusions s/p thoracentesis showing serosanguineous fluid  -recurrent, exudative in nature  -rule out underlying malignancy vs infection vs volume overload vs autoimmune  -c ANCA, p ANCA negative.  -GREGG, RA factor, Anti-dsdna negative  -anti ro, anti la negative    -Rheumatology w/up negative so far.  -Pleural fluid cytology not showing any malignant cells.  -Just shows few mesothelial cells.  -Patient also complaining of left sided hearing loss and balance issues.  -Tried to get CT scan of the head, but patient simply cannot lie down, gets Severe SOB.  -ENT consult was put, They recommend debrox ear drops every 12 hours.  -Will do audiogram if patient is inpatient after 3-5 days.    -ECHO: Normal EF  -c/w lasix 40mg IV daily    #Bacteruria growing pseudomonas and enterococcus faecalis - stable  -asympatomatic bacteruia  -blood culture NGTD  -d/c abx as per ID    #Tachycardia - likely secondary to chronic pleural effusion . stable now   -EKG shows sinus tachycardia   -as per pt, baseline tachycardiac  -c/w Cardizem 60mg q8h and resume propanolol 20mg q12h (home medication)    #Borderline BP - stable  -resume home medication: cardizem 60mg q8h and propanolol   -holding other home BP meds for now    #Abdominal distention / Worsening Ascities   -CT A/P on prior admission negative except for ++ Ascities  - Check Abdominal Sonogram to evaluate for ascities and del ascities for parasynthesis. Hold Lovenox 12 hrs before procedure.   - Parasynthesis will help decrease work of breathing as well and relieve stretch abdominal discomfort/pain.   - Sent parasynthesis fluid for cytology, cell count, cultures, protein and LDH    #Constipation - persistent  -colace/ senna    #Venous duplex shows: rt femoral vein DVT and lt femoral, popliteal and gastrocnemius veins DVT  - heparin drip till 1-2 hrs to OR time tomorrow. PTT q6h    #BPH - c/w flomax  #Depression - c/w home meds  #Code status: DNR/ DNI    Dispo: VATS with Pleuraldesis tomorrow. Heparin drip till 1-2 hrs before OR time tomorrow. Notify sx pls.

## 2018-10-07 NOTE — PROGRESS NOTE ADULT - SUBJECTIVE AND OBJECTIVE BOX
AMRIK MADRID 58y Male  MRN#: [id number]     SUBJECTIVE  Patient is a 58y old Male who presents with a chief complaint of Acute Resp Failure due to recurrent exudative bilateral pleural effusions ? etiology r/o Malignancy (07 Oct 2018 14:49)  Currently admitted to medicine with the primary diagnosis of Pleural effusion    OBJECTIVE  PAST MEDICAL & SURGICAL HISTORY  Pleural effusion  CHF (congestive heart failure)  Insomnia  BPH (benign prostatic hyperplasia)  Depression  Bipolar 1 disorder  HTN (hypertension)  History of thoracentesis    ALLERGIES:  No Known Allergies    MEDICATIONS:  STANDING MEDICATIONS  carbamide peroxide Otic Solution 5 Drop(s) Left Ear two times a day  chlorhexidine 4% Liquid 1 Application(s) Topical <User Schedule>  diltiazem    Tablet 60 milliGRAM(s) Oral every 8 hours  docusate sodium 100 milliGRAM(s) Oral three times a day  DULoxetine 60 milliGRAM(s) Oral daily  enoxaparin Injectable 80 milliGRAM(s) SubCutaneous every 12 hours  furosemide   Injectable 40 milliGRAM(s) IV Push daily  latanoprost 0.005% Ophthalmic Solution 1 Drop(s) Both EYES at bedtime  propranolol 20 milliGRAM(s) Oral every 12 hours  senna 2 Tablet(s) Oral at bedtime  tamsulosin 0.4 milliGRAM(s) Oral at bedtime  traZODone 50 milliGRAM(s) Oral at bedtime    PRN MEDICATIONS  acetaminophen   Tablet .. 650 milliGRAM(s) Oral every 6 hours PRN  morphine  - Injectable 2 milliGRAM(s) IV Push every 6 hours PRN  zolpidem 5 milliGRAM(s) Oral at bedtime PRN      VITAL SIGNS: Last 24 Hours  T(C): 36.5 (07 Oct 2018 12:37), Max: 37.2 (06 Oct 2018 21:00)  T(F): 97.7 (07 Oct 2018 12:37), Max: 98.9 (06 Oct 2018 21:00)  HR: 91 (07 Oct 2018 12:37) (91 - 99)  BP: 120/58 (07 Oct 2018 12:37) (120/58 - 133/68)  BP(mean): --  RR: 18 (07 Oct 2018 12:37) (18 - 18)  SpO2: --    LABS:                        11.5   6.94  )-----------( 348      ( 07 Oct 2018 17:43 )             36.0           PT/INR - ( 07 Oct 2018 17:43 )   PT: 12.90 sec;   INR: 1.20 ratio         PTT - ( 07 Oct 2018 17:43 )  PTT:38.8 sec      PHYSICAL EXAM:  GENERAL: NAD, well-developed, AAOx3  HEENT:  Atraumatic, Normocephalic. Strabismus noted.  PULMONARY:  Decreased B/L lung sounds - unchanged  CARDIOVASCULAR: Tachycardiac, but normal rhythm; No murmurs.  GASTROINTESTINAL: Dullness on percussion; Bowel sound noted, Non-tender, + distension   MUSCULOSKELETAL:  Moderate pitting edema noted B/L LE    ASSESSMENT & PLAN  57 yo M with HTN, DLD, depression, DVT on eliquis, and recurrent pleural effusions s/p pigtail insertion back in july, s/p thoracentesis 3 days PTP in [facility] presenting from [facility] for worsening SOB x3 days.  #Recurrent bilateral pleural effusions s/p thoracentesis showing serosanguineous fluid  -recurrent, exudative in nature  -rule out underlying malignancy vs infection vs volume overload vs autoimmune  -c ANCA, p ANCA negative.  -GREGG, RA factor, Anti-dsdna negative  -anti ro, anti la negative    -Rheumatology w/up negative so far.  -Pleural fluid cytology not showing any malignant cells.  -Just shows few mesothelial cells.  -Patient also complaining of left sided hearing loss and balance issues.  -Tried to get CT scan of the head, but patient simply cannot lie down, gets Severe SOB.  -ENT consult was put, They recommend debrox ear drops every 12 hours.  -Will do audiogram if patient is inpatient after 3-5 days.    -ECHO: Normal EF  -c/w lasix 40mg IV daily    #Bacteruria growing pseudomonas and enterococcus faecalis - stable  -asympatomatic bacteruia  -blood culture NGTD  -d/c abx as per ID    #Tachycardia - likely secondary to chronic pleural effusion . stable now   -EKG shows sinus tachycardia   -as per pt, baseline tachycardiac  -c/w Cardizem 60mg q8h and resume propanolol 20mg q12h (home medication)    #Borderline BP - stable  -resume home medication: cardizem 60mg q8h and propanolol   -holding other home BP meds for now    #Abdominal distention / Worsening Ascities   -CT A/P on prior admission negative except for ++ Ascities  - Check Abdominal Sonogram to evaluate for ascities and del ascities for parasynthesis. Hold Lovenox 12 hrs before procedure.   - Parasynthesis will help decrease work of breathing as well and relieve stretch abdominal discomfort/pain.   - Sent parasynthesis fluid for cytology, cell count, cultures, protein and LDH    #Constipation - persistent  -colace/ senna    #Venous duplex shows: rt femoral vein DVT and lt femoral, popliteal and gastrocnemius veins DVT  - heparin drip till 1-2 hrs to OR time tomorrow. PTT q6h    #BPH - c/w flomax  #Depression - c/w home meds  #Code status: DNR/ DNI    Dispo: VATS with Pleuraldesis tomorrow. Heparin drip till 1-2 hrs before OR time tomorrow. Notify sx pls.

## 2018-10-07 NOTE — PROGRESS NOTE ADULT - SUBJECTIVE AND OBJECTIVE BOX
Progress Note: General Surgery  Patient: AMRIK MADRID , 58y (1960)Male   MRN: [id number]  Location: [address]  Visit: 09-21-18 Inpatient  Date: 10-07-18 @ 02:32    Procedure/Diagnosis: pleur eff S/P thorascentesis    Events/ 24h: No acute events overnight. Pain controlled.    Vitals: T(F): 98.9 (10-06-18 @ 21:00), Max: 98.9 (10-06-18 @ 12:52)  HR: 99 (10-06-18 @ 21:00)  BP: 127/74 (10-06-18 @ 21:00) (114/61 - 127/74)  RR: 18 (10-06-18 @ 21:00)  SpO2: --    In:   10-05-18 @ 07:01  -  10-06-18 @ 07:00  --------------------------------------------------------  IN: 0 mL    10-06-18 @ 07:01  -  10-07-18 @ 02:32  --------------------------------------------------------  IN: 240 mL      Out:   10-05-18 @ 07:01  -  10-06-18 @ 07:00  --------------------------------------------------------  OUT:    Voided: 1450 mL  Total OUT: 1450 mL      10-06-18 @ 07:01  -  10-07-18 @ 02:32  --------------------------------------------------------  OUT:    Voided: 600 mL  Total OUT: 600 mL        Net:   10-05-18 @ 07:01  -  10-06-18 @ 07:00  --------------------------------------------------------  NET: -1450 mL    10-06-18 @ 07:01  -  10-07-18 @ 02:32  --------------------------------------------------------  NET: -360 mL        Diet: Diet, DASH/TLC:   Sodium & Cholesterol Restricted (09-21-18 @ 22:31)    IV Fluids:     Physical Examination:  General Appearance: NAD  HEENT: EOMI, sclera non-icteric.  Heart: RRR   Lungs: CTABL.   Abdomen:  Soft, nontender, nondistended. No rigidity, guarding, or rebound tenderness.   MSK/Extremities: Warm & well-perfused. Peripheral pulses intact.  Skin: Warm, dry. No jaundice.       Medications: [Standing]  carbamide peroxide Otic Solution 5 Drop(s) Left Ear two times a day  chlorhexidine 4% Liquid 1 Application(s) Topical <User Schedule>  diltiazem    Tablet 60 milliGRAM(s) Oral every 8 hours  docusate sodium 100 milliGRAM(s) Oral three times a day  DULoxetine 60 milliGRAM(s) Oral daily  enoxaparin Injectable 80 milliGRAM(s) SubCutaneous every 12 hours  furosemide   Injectable 40 milliGRAM(s) IV Push daily  latanoprost 0.005% Ophthalmic Solution 1 Drop(s) Both EYES at bedtime  propranolol 20 milliGRAM(s) Oral every 12 hours  senna 2 Tablet(s) Oral at bedtime  tamsulosin 0.4 milliGRAM(s) Oral at bedtime  traZODone 50 milliGRAM(s) Oral at bedtime    DVT Prophylaxis: enoxaparin Injectable 80 milliGRAM(s) SubCutaneous every 12 hours    GI Prophylaxis:   Antibiotics:   Anticoagulation:   Medications:[PRN]  acetaminophen   Tablet .. 650 milliGRAM(s) Oral every 6 hours PRN  morphine  - Injectable 2 milliGRAM(s) IV Push every 6 hours PRN  zolpidem 5 milliGRAM(s) Oral at bedtime PRN      Labs:                        11.1   6.88  )-----------( 317      ( 05 Oct 2018 05:58 )             34.8     10-05    140  |  95<L>  |  15  ----------------------------<  102<H>  4.5   |  34<H>  |  1.0    Ca    9.0      05 Oct 2018 05:58      Imaging:     < from: Xray Chest 1 View-PORTABLE IMMEDIATE (10.05.18 @ 10:29) >  Cardiomegaly and bilateral effusions with interstitial edema, unchanged.    < end of copied text >      < from: CT Abdomen and Pelvis w/ Oral Cont and w/ IV Cont (07.21.18 @ 13:29) >  Chest:    Moderate right and small left pleural effusions, with a pigtail catheter   in the left lung base. Correlate with cytology of the pleural effusion.    Consolidation in the right lower lobe. Differential includes atelectasis   and pneumonia.     Thickening of interlobular septa and peribronchial edema/thickening at   bilateral marimar, findings suggestive of fluid overload/pulmonary venous   congestion.    No mediastinal or hilar lymphadenopathy.    Abdomen and pelvis:    No acute intra-abdominal pathology. No suspicion for malignancy in the   abdomen/pelvis.    Prostatomegaly.    < end of copied text >      Assessment:  58y Male patient admitted for pleur eff S/P thorascentesis    Plan:    F/u cxr  Pt remains ammenable to pleurodesis at this time  Preop 10/8 for VATS pleurodesis 10/9      Date/Time: 10-07-18 @ 02:32

## 2018-10-08 LAB
APTT BLD: 41.9 SEC — HIGH (ref 27–39.2)
APTT BLD: 67.8 SEC — HIGH (ref 27–39.2)
GRAM STN FLD: SIGNIFICANT CHANGE UP
SPECIMEN SOURCE: SIGNIFICANT CHANGE UP

## 2018-10-08 RX ORDER — IOHEXOL 300 MG/ML
30 INJECTION, SOLUTION INTRAVENOUS ONCE
Qty: 0 | Refills: 0 | Status: COMPLETED | OUTPATIENT
Start: 2018-10-08 | End: 2018-10-08

## 2018-10-08 RX ORDER — MORPHINE SULFATE 50 MG/1
2 CAPSULE, EXTENDED RELEASE ORAL ONCE
Qty: 0 | Refills: 0 | Status: DISCONTINUED | OUTPATIENT
Start: 2018-10-08 | End: 2018-10-08

## 2018-10-08 RX ORDER — MORPHINE SULFATE 50 MG/1
1 CAPSULE, EXTENDED RELEASE ORAL ONCE
Qty: 0 | Refills: 0 | Status: DISCONTINUED | OUTPATIENT
Start: 2018-10-08 | End: 2018-10-08

## 2018-10-08 RX ORDER — HEPARIN SODIUM 5000 [USP'U]/ML
1100 INJECTION INTRAVENOUS; SUBCUTANEOUS
Qty: 25000 | Refills: 0 | Status: DISCONTINUED | OUTPATIENT
Start: 2018-10-08 | End: 2018-10-09

## 2018-10-08 RX ORDER — OXYCODONE AND ACETAMINOPHEN 5; 325 MG/1; MG/1
1 TABLET ORAL ONCE
Qty: 0 | Refills: 0 | Status: DISCONTINUED | OUTPATIENT
Start: 2018-10-08 | End: 2018-10-08

## 2018-10-08 RX ADMIN — ZOLPIDEM TARTRATE 5 MILLIGRAM(S): 10 TABLET ORAL at 22:09

## 2018-10-08 RX ADMIN — TAMSULOSIN HYDROCHLORIDE 0.4 MILLIGRAM(S): 0.4 CAPSULE ORAL at 22:06

## 2018-10-08 RX ADMIN — Medication 20 MILLIGRAM(S): at 06:05

## 2018-10-08 RX ADMIN — Medication 100 MILLIGRAM(S): at 06:05

## 2018-10-08 RX ADMIN — MORPHINE SULFATE 2 MILLIGRAM(S): 50 CAPSULE, EXTENDED RELEASE ORAL at 21:12

## 2018-10-08 RX ADMIN — Medication 40 MILLIGRAM(S): at 06:05

## 2018-10-08 RX ADMIN — HEPARIN SODIUM 11 UNIT(S)/HR: 5000 INJECTION INTRAVENOUS; SUBCUTANEOUS at 18:48

## 2018-10-08 RX ADMIN — DULOXETINE HYDROCHLORIDE 60 MILLIGRAM(S): 30 CAPSULE, DELAYED RELEASE ORAL at 11:36

## 2018-10-08 RX ADMIN — MORPHINE SULFATE 2 MILLIGRAM(S): 50 CAPSULE, EXTENDED RELEASE ORAL at 20:41

## 2018-10-08 RX ADMIN — Medication 20 MILLIGRAM(S): at 17:15

## 2018-10-08 RX ADMIN — Medication 100 MILLIGRAM(S): at 22:06

## 2018-10-08 RX ADMIN — MORPHINE SULFATE 1 MILLIGRAM(S): 50 CAPSULE, EXTENDED RELEASE ORAL at 09:56

## 2018-10-08 RX ADMIN — LATANOPROST 1 DROP(S): 0.05 SOLUTION/ DROPS OPHTHALMIC; TOPICAL at 22:07

## 2018-10-08 RX ADMIN — SENNA PLUS 2 TABLET(S): 8.6 TABLET ORAL at 22:06

## 2018-10-08 RX ADMIN — IOHEXOL 30 MILLILITER(S): 300 INJECTION, SOLUTION INTRAVENOUS at 13:30

## 2018-10-08 RX ADMIN — CARBAMIDE PEROXIDE 5 DROP(S): 81.86 SOLUTION/ DROPS AURICULAR (OTIC) at 06:05

## 2018-10-08 RX ADMIN — Medication 100 MILLIGRAM(S): at 13:12

## 2018-10-08 RX ADMIN — MORPHINE SULFATE 2 MILLIGRAM(S): 50 CAPSULE, EXTENDED RELEASE ORAL at 06:03

## 2018-10-08 RX ADMIN — OXYCODONE AND ACETAMINOPHEN 1 TABLET(S): 5; 325 TABLET ORAL at 15:54

## 2018-10-08 RX ADMIN — CARBAMIDE PEROXIDE 5 DROP(S): 81.86 SOLUTION/ DROPS AURICULAR (OTIC) at 17:15

## 2018-10-08 RX ADMIN — Medication 650 MILLIGRAM(S): at 13:11

## 2018-10-08 RX ADMIN — Medication 50 MILLIGRAM(S): at 22:06

## 2018-10-08 NOTE — PROCEDURE NOTE - PROCEDURE
<<-----Click on this checkbox to enter Procedure Chest tube insertion  10/08/2018  right chest pigtail  Active  ALACKEY

## 2018-10-08 NOTE — PROCEDURE NOTE - NSPROCDETAILS_GEN_ALL_CORE
location identified, draped/prepped, sterile technique used, needle inserted/introduced
ultrasound assessment of effusion (localization)/location identified, draped/prepped, sterile technique used, needle inserted/introduced
dressing applied/ultrasound assessment of fluid (location)/sterile dressing applied/thoracostomy tube placed percutaneously/Seldinger technique/secured in place/percutaneous

## 2018-10-08 NOTE — PROGRESS NOTE ADULT - SUBJECTIVE AND OBJECTIVE BOX
AMRIK MADRID 58y Male  MRN#: [id number]     SUBJECTIVE  Patient is a 58y old Male who presents with a chief complaint of worsening SOB (08 Oct 2018 02:12)  Currently admitted to medicine with the primary diagnosis of Pleural effusion    OBJECTIVE  PAST MEDICAL & SURGICAL HISTORY  Pleural effusion  CHF (congestive heart failure)  Insomnia  BPH (benign prostatic hyperplasia)  Depression  Bipolar 1 disorder  HTN (hypertension)  History of thoracentesis    ALLERGIES:  No Known Allergies    MEDICATIONS:  STANDING MEDICATIONS  carbamide peroxide Otic Solution 5 Drop(s) Left Ear two times a day  chlorhexidine 4% Liquid 1 Application(s) Topical <User Schedule>  diltiazem    Tablet 60 milliGRAM(s) Oral every 8 hours  docusate sodium 100 milliGRAM(s) Oral three times a day  DULoxetine 60 milliGRAM(s) Oral daily  furosemide   Injectable 40 milliGRAM(s) IV Push daily  heparin  Infusion 1100 Unit(s)/Hr IV Continuous <Continuous>  iohexol 300 mG (iodine)/mL Oral Solution 30 milliLiter(s) Oral once  latanoprost 0.005% Ophthalmic Solution 1 Drop(s) Both EYES at bedtime  propranolol 20 milliGRAM(s) Oral every 12 hours  senna 2 Tablet(s) Oral at bedtime  tamsulosin 0.4 milliGRAM(s) Oral at bedtime  traZODone 50 milliGRAM(s) Oral at bedtime    PRN MEDICATIONS  acetaminophen   Tablet .. 650 milliGRAM(s) Oral every 6 hours PRN  zolpidem 5 milliGRAM(s) Oral at bedtime PRN      VITAL SIGNS: Last 24 Hours  T(C): 35.8 (08 Oct 2018 06:43), Max: 36.5 (07 Oct 2018 12:37)  T(F): 96.5 (08 Oct 2018 06:43), Max: 97.7 (07 Oct 2018 12:37)  HR: 95 (08 Oct 2018 06:43) (80 - 95)  BP: 114/61 (08 Oct 2018 06:43) (113/67 - 120/58)  BP(mean): --  RR: 18 (08 Oct 2018 06:43) (18 - 18)  SpO2: 99% (08 Oct 2018 06:43) (98% - 99%)    LABS:                        11.5   6.94  )-----------( 348      ( 07 Oct 2018 17:43 )             36.0     10-07    138  |  92<L>  |  15  ----------------------------<  135<H>  4.1   |  32  |  0.8    Ca    9.1      07 Oct 2018 17:43  Phos  3.7     10-07  Mg     1.9     10-07      PT/INR - ( 07 Oct 2018 17:43 )   PT: 12.90 sec;   INR: 1.20 ratio         PTT - ( 08 Oct 2018 09:01 )  PTT:41.9 sec    PHYSICAL EXAM:  GENERAL: NAD, well-developed, AAOx3  HEENT:  Atraumatic, Normocephalic. Strabismus noted.  PULMONARY:  Decreased B/L lung sounds - unchanged  CARDIOVASCULAR: Tachycardiac, but normal rhythm; No murmurs.  GASTROINTESTINAL: Dullness on percussion; Bowel sound noted, Non-tender, + distension   MUSCULOSKELETAL:  Moderate pitting edema noted B/L LE    ASSESSMENT & PLAN  59 yo M with HTN, DLD, depression, DVT on heparin, and recurrent pleural effusions s/p pigtail insertion back in july, s/p thoracentesis 3 days PTP in [facility] presenting from [facility] for worsening SOB x3 days.    #Recurrent bilateral pleural effusions s/p thoracentesis showing serosanguineous fluid  -recurrent, exudative in nature  -rule out underlying malignancy vs infection vs volume overload vs autoimmune  -c ANCA, p ANCA negative.  -GREGG, RA factor, Anti-dsdna negative  -anti ro, anti la negative    -Rheumatology w/up negative so far.  -Pleural fluid cytology not showing any malignant cells.  -Just shows few mesothelial cells.  -Patient also complaining of left sided hearing loss and balance issues.  -Tried to get CT scan of the head, but patient simply cannot lie down, gets Severe SOB.  -ENT consult was put, They recommend debrox ear drops every 12 hours.  -surgery today was not performed and was kept on hold because according to the thoracic surgeon abdominal issues  should be addressed before the surgery.  -They did a put a catheter on the right side of the chest today.    -ECHO: Normal EF  -c/w lasix 40mg IV daily    #Bacteruria growing pseudomonas and enterococcus faecalis - stable  -asympatomatic bacteruia  -blood culture NGTD  -d/c abx as per ID    #Tachycardia - likely secondary to chronic pleural effusion . stable now   -EKG shows sinus tachycardia   -as per pt, baseline tachycardiac  -c/w Cardizem 60mg q8h and resume propanolol 20mg q12h (home medication)    #Borderline BP - stable  -resume home medication: cardizem 60mg q8h and propanolol   -holding other home BP meds for now    #Abdominal distention / Worsening Ascities   -Abdominal ultrasound rules out ascites.   - Parasynthesis will help decrease work of breathing as well and relieve stretch abdominal discomfort/pain.   -CT abdomen and pelvis for IV and oral contrast ordered today to rule out any mass in the abdomen causing distension.    #Constipation - persistent  -colace/ senna    #Venous duplex shows: rt femoral vein DVT and lt femoral, popliteal and gastrocnemius veins DVT  - heparin drip     #BPH - c/w flomax  #Depression - c/w home meds  #Code status: DNR/ DNI    Dispo: VATS with Pleuraldesis tomorrow. Heparin drip till 1-2 hrs before OR time tomorrow. Notify sx pls. AMRIK MADRID 58y Male  MRN#: [id number]     SUBJECTIVE  Patient is a 58y old Male who presents with a chief complaint of worsening SOB (08 Oct 2018 02:12)  Currently admitted to medicine with the primary diagnosis of Pleural effusion    OBJECTIVE  PAST MEDICAL & SURGICAL HISTORY  Pleural effusion  CHF (congestive heart failure)  Insomnia  BPH (benign prostatic hyperplasia)  Depression  Bipolar 1 disorder  HTN (hypertension)  History of thoracentesis    ALLERGIES:  No Known Allergies    MEDICATIONS:  STANDING MEDICATIONS  carbamide peroxide Otic Solution 5 Drop(s) Left Ear two times a day  chlorhexidine 4% Liquid 1 Application(s) Topical <User Schedule>  diltiazem    Tablet 60 milliGRAM(s) Oral every 8 hours  docusate sodium 100 milliGRAM(s) Oral three times a day  DULoxetine 60 milliGRAM(s) Oral daily  furosemide   Injectable 40 milliGRAM(s) IV Push daily  heparin  Infusion 1100 Unit(s)/Hr IV Continuous <Continuous>  iohexol 300 mG (iodine)/mL Oral Solution 30 milliLiter(s) Oral once  latanoprost 0.005% Ophthalmic Solution 1 Drop(s) Both EYES at bedtime  propranolol 20 milliGRAM(s) Oral every 12 hours  senna 2 Tablet(s) Oral at bedtime  tamsulosin 0.4 milliGRAM(s) Oral at bedtime  traZODone 50 milliGRAM(s) Oral at bedtime    PRN MEDICATIONS  acetaminophen   Tablet .. 650 milliGRAM(s) Oral every 6 hours PRN  zolpidem 5 milliGRAM(s) Oral at bedtime PRN      VITAL SIGNS: Last 24 Hours  T(C): 35.8 (08 Oct 2018 06:43), Max: 36.5 (07 Oct 2018 12:37)  T(F): 96.5 (08 Oct 2018 06:43), Max: 97.7 (07 Oct 2018 12:37)  HR: 95 (08 Oct 2018 06:43) (80 - 95)  BP: 114/61 (08 Oct 2018 06:43) (113/67 - 120/58)  BP(mean): --  RR: 18 (08 Oct 2018 06:43) (18 - 18)  SpO2: 99% (08 Oct 2018 06:43) (98% - 99%)    LABS:                        11.5   6.94  )-----------( 348      ( 07 Oct 2018 17:43 )             36.0     10-07    138  |  92<L>  |  15  ----------------------------<  135<H>  4.1   |  32  |  0.8    Ca    9.1      07 Oct 2018 17:43  Phos  3.7     10-07  Mg     1.9     10-07      PT/INR - ( 07 Oct 2018 17:43 )   PT: 12.90 sec;   INR: 1.20 ratio         PTT - ( 08 Oct 2018 09:01 )  PTT:41.9 sec    PHYSICAL EXAM:  GENERAL: NAD, well-developed, AAOx3  HEENT:  Atraumatic, Normocephalic. Strabismus noted.  PULMONARY:  Decreased B/L lung sounds - unchanged  CARDIOVASCULAR: Tachycardiac, but normal rhythm; No murmurs.  GASTROINTESTINAL: Dullness on percussion; Bowel sound noted, Non-tender, + distension   MUSCULOSKELETAL:  Moderate pitting edema noted B/L LE    ASSESSMENT & PLAN  59 yo M with HTN, DLD, depression, DVT on heparin, and recurrent pleural effusions s/p pigtail insertion back in july, s/p thoracentesis 3 days PTP in [facility] presenting from [facility] for worsening SOB x3 days.    #Recurrent bilateral pleural effusions s/p thoracentesis showing serosanguineous fluid  -recurrent, exudative in nature  -rule out underlying malignancy vs infection vs volume overload vs autoimmune  -c ANCA, p ANCA negative.  -GREGG, RA factor, Anti-dsdna negative  -anti ro, anti la negative    -Rheumatology w/up negative so far.  -Pleural fluid cytology not showing any malignant cells.  -Just shows few mesothelial cells.  -Patient also complaining of left sided hearing loss and balance issues.  -Tried to get CT scan of the head, but patient simply cannot lie down, gets Severe SOB.  -ENT consult was put, They recommend debrox ear drops every 12 hours.  -surgery today was not performed and was kept on hold because according to the thoracic surgeon abdominal issues  should be addressed before the surgery.  -They did a put a catheter on the right side of the chest today.    -ECHO: Normal EF  -c/w lasix 40mg IV daily    #Bacteruria growing pseudomonas and enterococcus faecalis - stable  -asympatomatic bacteruia  -blood culture NGTD  -d/c abx as per ID    #Tachycardia - likely secondary to chronic pleural effusion . stable now   -EKG shows sinus tachycardia   -as per pt, baseline tachycardiac  -c/w Cardizem 60mg q8h and resume propanolol 20mg q12h (home medication)    #Borderline BP - stable  -resume home medication: cardizem 60mg q8h and propanolol   -holding other home BP meds for now    #Abdominal distention   - Ruled out ascities by sono  - Check CT A/P IV / Oral Contrast and re-assess for GI consultation upon CT.     #Constipation - persistent  -colace/ senna    #Venous duplex shows: rt femoral vein DVT and lt femoral, popliteal and gastrocnemius veins DVT  - heparin drip, ptt q6h     #BPH - c/w flomax  #Depression - c/w home meds  #Code status: DNR/ DNI    Dispo:   Worsening SOB due to recurrent exudative pleural effusions working to rule out malignancy - awaiting Thoracoscopy with Talc Intervention once abdomen rules out for acute emergency. Today with distended abd and abd pain. No N/V or diarrhea. s/p Chest Tube today draining 2L serosenguinous fluid.   Will get CT A/P with oral and po contrast today. May need GI consult upon CT results. Off OR schedule for today.   Poor Prognosis AMRIK MADRID 58y Male  MRN#: [id number]     SUBJECTIVE  Patient is a 58y old Male who presents with a chief complaint of worsening SOB (08 Oct 2018 02:12)  Currently admitted to medicine with the primary diagnosis of Pleural effusion    OBJECTIVE  PAST MEDICAL & SURGICAL HISTORY  Pleural effusion  CHF (congestive heart failure)  Insomnia  BPH (benign prostatic hyperplasia)  Depression  Bipolar 1 disorder  HTN (hypertension)  History of thoracentesis    ALLERGIES:  No Known Allergies    MEDICATIONS:  STANDING MEDICATIONS  carbamide peroxide Otic Solution 5 Drop(s) Left Ear two times a day  chlorhexidine 4% Liquid 1 Application(s) Topical <User Schedule>  diltiazem    Tablet 60 milliGRAM(s) Oral every 8 hours  docusate sodium 100 milliGRAM(s) Oral three times a day  DULoxetine 60 milliGRAM(s) Oral daily  furosemide   Injectable 40 milliGRAM(s) IV Push daily  heparin  Infusion 1100 Unit(s)/Hr IV Continuous <Continuous>  iohexol 300 mG (iodine)/mL Oral Solution 30 milliLiter(s) Oral once  latanoprost 0.005% Ophthalmic Solution 1 Drop(s) Both EYES at bedtime  propranolol 20 milliGRAM(s) Oral every 12 hours  senna 2 Tablet(s) Oral at bedtime  tamsulosin 0.4 milliGRAM(s) Oral at bedtime  traZODone 50 milliGRAM(s) Oral at bedtime    PRN MEDICATIONS  acetaminophen   Tablet .. 650 milliGRAM(s) Oral every 6 hours PRN  zolpidem 5 milliGRAM(s) Oral at bedtime PRN      VITAL SIGNS: Last 24 Hours  T(C): 35.8 (08 Oct 2018 06:43), Max: 36.5 (07 Oct 2018 12:37)  T(F): 96.5 (08 Oct 2018 06:43), Max: 97.7 (07 Oct 2018 12:37)  HR: 95 (08 Oct 2018 06:43) (80 - 95)  BP: 114/61 (08 Oct 2018 06:43) (113/67 - 120/58)  BP(mean): --  RR: 18 (08 Oct 2018 06:43) (18 - 18)  SpO2: 99% (08 Oct 2018 06:43) (98% - 99%)    LABS:                        11.5   6.94  )-----------( 348      ( 07 Oct 2018 17:43 )             36.0     10-07    138  |  92<L>  |  15  ----------------------------<  135<H>  4.1   |  32  |  0.8    Ca    9.1      07 Oct 2018 17:43  Phos  3.7     10-07  Mg     1.9     10-07      PT/INR - ( 07 Oct 2018 17:43 )   PT: 12.90 sec;   INR: 1.20 ratio         PTT - ( 08 Oct 2018 09:01 )  PTT:41.9 sec    PHYSICAL EXAM:  GENERAL: NAD, well-developed, AAOx3  HEENT:  Atraumatic, Normocephalic. Strabismus noted.  PULMONARY:  Decreased B/L lung sounds - unchanged  CARDIOVASCULAR: Tachycardiac, but normal rhythm; No murmurs.  GASTROINTESTINAL: Dullness on percussion; Bowel sound noted, Non-tender, + distension   MUSCULOSKELETAL:  Moderate pitting edema noted B/L LE    ASSESSMENT & PLAN  57 yo M with HTN, DLD, depression, DVT on heparin, and recurrent pleural effusions s/p pigtail insertion back in july, s/p thoracentesis 3 days PTP in [facility] presenting from [facility] for worsening SOB x3 days.    #Recurrent bilateral pleural effusions s/p thoracentesis showing serosanguineous fluid  -recurrent, exudative in nature  -rule out underlying malignancy vs infection vs volume overload vs autoimmune  -c ANCA, p ANCA negative.  -GREGG, RA factor, Anti-dsdna negative  -anti ro, anti la negative    -Rheumatology w/up negative so far.  -Pleural fluid cytology not showing any malignant cells.  -Just shows few mesothelial cells.  -Patient also complaining of left sided hearing loss and balance issues.  -Tried to get CT scan of the head, but patient simply cannot lie down, gets Severe SOB.  -ENT consult was put, They recommend debrox ear drops every 12 hours.  -surgery today was not performed and was kept on hold because according to the thoracic surgeon abdominal issues  should be addressed before the surgery.  -They did a put a catheter on the right side of the chest today.    -ECHO: Normal EF  -c/w lasix 40mg IV daily    #Bacteruria growing pseudomonas and enterococcus faecalis - stable  -asympatomatic bacteruia  -blood culture NGTD  -d/c abx as per ID    #Tachycardia - likely secondary to chronic pleural effusion . stable now   -EKG shows sinus tachycardia   -as per pt, baseline tachycardiac  -c/w Cardizem 60mg q8h and resume propanolol 20mg q12h (home medication)    #Borderline BP - stable  -resume home medication: cardizem 60mg q8h and propanolol   -holding other home BP meds for now    #Abdominal distention   - Ruled out ascities by sono  - Check CT A/P IV / Oral Contrast and re-assess for GI consultation upon CT.     #Constipation - persistent  -colace/ senna    #Venous duplex shows: rt femoral vein DVT and lt femoral, popliteal and gastrocnemius veins DVT  - heparin drip, ptt q6h     #BPH - c/w flomax  #Depression - c/w home meds  #Code status: DNR/ DNI    Attending Attestation  Pt seen and examined. Plan and case discussed with pt, ct sx and floor team. Agree with above note as corrected.   Worsening SOB due to recurrent exudative pleural effusions working to rule out malignancy - awaiting Thoracoscopy with Talc Intervention once abdomen rules out for acute emergency. Today with distended abd and abd pain. No N/V or diarrhea. s/p Chest Tube today draining 2L serosenguinous fluid.   Will get CT A/P with oral and po contrast today. May need GI consult upon CT results. Off OR schedule for today.   Poor Prognosis     Dispo: Acute

## 2018-10-08 NOTE — PROGRESS NOTE ADULT - SUBJECTIVE AND OBJECTIVE BOX
Patient is a 58y old  Male who presents with a chief complaint of worsening SOB patient has pleural effusion (07 Oct 2018 18:38)    PAST MEDICAL & SURGICAL HISTORY:  Pleural effusion  CHF (congestive heart failure)  Insomnia  BPH (benign prostatic hyperplasia)  Depression  Bipolar 1 disorder  HTN (hypertension)  History of thoracentesis      Events of the Last 24h:none  Vital Signs Last 24 Hrs  T(C): 35.8 (07 Oct 2018 21:25), Max: 36.5 (07 Oct 2018 12:37)  T(F): 96.4 (07 Oct 2018 21:25), Max: 97.7 (07 Oct 2018 12:37)  HR: 94 (07 Oct 2018 21:25) (91 - 94)  BP: 113/67 (07 Oct 2018 21:25) (113/67 - 133/68)  BP(mean): --  RR: 18 (07 Oct 2018 21:25) (18 - 18)  SpO2: --        Diet, DASH/TLC:   Sodium & Cholesterol Restricted (18 @ 22:31)  Diet, NPO after Midnight:      NPO Start Date: 07-Oct-2018,   NPO Start Time: 23:59 (10-07-18 @ 09:55)      I&O's Summary    06 Oct 2018 07:  -  07 Oct 2018 07:00  --------------------------------------------------------  IN: 240 mL / OUT: 1100 mL / NET: -860 mL    07 Oct 2018 07:  -  08 Oct 2018 02:13  --------------------------------------------------------  IN: 360 mL / OUT: 1525 mL / NET: -1165 mL     I&O's Detail    06 Oct 2018 07:  -  07 Oct 2018 07:00  --------------------------------------------------------  IN:    Oral Fluid: 240 mL  Total IN: 240 mL    OUT:    Voided: 1100 mL  Total OUT: 1100 mL    Total NET: -860 mL      07 Oct 2018 07:01  -  08 Oct 2018 02:13  --------------------------------------------------------  IN:    Oral Fluid: 360 mL  Total IN: 360 mL    OUT:    Voided: 1525 mL  Total OUT: 1525 mL    Total NET: -1165 mL          MEDICATIONS  (STANDING):  carbamide peroxide Otic Solution 5 Drop(s) Left Ear two times a day  chlorhexidine 4% Liquid 1 Application(s) Topical <User Schedule>  diltiazem    Tablet 60 milliGRAM(s) Oral every 8 hours  docusate sodium 100 milliGRAM(s) Oral three times a day  DULoxetine 60 milliGRAM(s) Oral daily  furosemide   Injectable 40 milliGRAM(s) IV Push daily  heparin  Infusion 1100 Unit(s)/Hr (11 mL/Hr) IV Continuous <Continuous>  latanoprost 0.005% Ophthalmic Solution 1 Drop(s) Both EYES at bedtime  propranolol 20 milliGRAM(s) Oral every 12 hours  senna 2 Tablet(s) Oral at bedtime  tamsulosin 0.4 milliGRAM(s) Oral at bedtime  traZODone 50 milliGRAM(s) Oral at bedtime    MEDICATIONS  (PRN):  acetaminophen   Tablet .. 650 milliGRAM(s) Oral every 6 hours PRN Moderate Pain (4 - 6)  zolpidem 5 milliGRAM(s) Oral at bedtime PRN Insomnia      PHYSICAL EXAM:    GENERAL: NAD    HEENT: NCAT    CHEST/LUNGS: CTAB    HEART: RRR,  No murmurs, rubs, or gallops    ABDOMEN: SNTND +BS    EXTREMITIES:  FROM, No clubbing, cyanosis, or edema, palpable pulse    NEURO: No focal neurological deficits    SKIN: No rashes or lesions    INCISION/WOUNDS:                          11.5   6.94  )-----------( 348      ( 07 Oct 2018 17:43 )             36.0        CBC Full  -  ( 07 Oct 2018 17:43 )  WBC Count : 6.94 K/uL  Hemoglobin : 11.5 g/dL  Hematocrit : 36.0 %  Platelet Count - Automated : 348 K/uL  Mean Cell Volume : 83.3 fL  Mean Cell Hemoglobin : 26.6 pg  Mean Cell Hemoglobin Concentration : 31.9 g/dL  Auto Neutrophil # : 5.22 K/uL  Auto Lymphocyte # : 0.92 K/uL  Auto Monocyte # : 0.47 K/uL  Auto Eosinophil # : 0.22 K/uL  Auto Basophil # : 0.04 K/uL  Auto Neutrophil % : 75.1 %  Auto Lymphocyte % : 13.3 %  Auto Monocyte % : 6.8 %  Auto Eosinophil % : 3.2 %  Auto Basophil % : 0.6 %               138   |  92    |  15                 Ca: 9.1    BMP:   ----------------------------< 135    M.9   (10-07-18 @ 17:43)             4.1    |  32    | 0.8                Ph: 3.7      LFT:     TPro: 5.9 / Alb: 3.5 / TBili: 0.3 / DBili: x / AST: 15 / ALT: 25 / AlkPhos: 373   (10-01-18 @ 09:28)      PT/INR - ( 07 Oct 2018 17:43 )   PT: 12.90 sec;   INR: 1.20 ratio         PTT - ( 07 Oct 2018 23:58 )  PTT:67.8 sec          < from: Xray Chest 1 View- PORTABLE-Routine (10.06.18 @ 08:03) >  Impression:      Stable pleural effusions and interstitial edema.

## 2018-10-09 LAB
ANION GAP SERPL CALC-SCNC: 11 MMOL/L — SIGNIFICANT CHANGE UP (ref 7–14)
ANION GAP SERPL CALC-SCNC: 16 MMOL/L — HIGH (ref 7–14)
APTT BLD: 41.2 SEC — HIGH (ref 27–39.2)
APTT BLD: 43.5 SEC — HIGH (ref 27–39.2)
APTT BLD: 48.3 SEC — HIGH (ref 27–39.2)
APTT BLD: 48.4 SEC — HIGH (ref 27–39.2)
APTT BLD: 53.8 SEC — HIGH (ref 27–39.2)
BUN SERPL-MCNC: 13 MG/DL — SIGNIFICANT CHANGE UP (ref 10–20)
BUN SERPL-MCNC: 14 MG/DL — SIGNIFICANT CHANGE UP (ref 10–20)
CALCIUM SERPL-MCNC: 8.6 MG/DL — SIGNIFICANT CHANGE UP (ref 8.5–10.1)
CALCIUM SERPL-MCNC: 9.1 MG/DL — SIGNIFICANT CHANGE UP (ref 8.5–10.1)
CHLORIDE SERPL-SCNC: 89 MMOL/L — LOW (ref 98–110)
CHLORIDE SERPL-SCNC: 93 MMOL/L — LOW (ref 98–110)
CO2 SERPL-SCNC: 31 MMOL/L — SIGNIFICANT CHANGE UP (ref 17–32)
CO2 SERPL-SCNC: 32 MMOL/L — SIGNIFICANT CHANGE UP (ref 17–32)
CREAT SERPL-MCNC: 0.7 MG/DL — SIGNIFICANT CHANGE UP (ref 0.7–1.5)
CREAT SERPL-MCNC: 0.8 MG/DL — SIGNIFICANT CHANGE UP (ref 0.7–1.5)
GLUCOSE FLD-MCNC: 84 MG/DL — SIGNIFICANT CHANGE UP
GLUCOSE SERPL-MCNC: 101 MG/DL — HIGH (ref 70–99)
GLUCOSE SERPL-MCNC: 92 MG/DL — SIGNIFICANT CHANGE UP (ref 70–99)
HCT VFR BLD CALC: 35.4 % — LOW (ref 42–52)
HCT VFR BLD CALC: 37.6 % — LOW (ref 42–52)
HGB BLD-MCNC: 11.2 G/DL — LOW (ref 14–18)
HGB BLD-MCNC: 12.2 G/DL — LOW (ref 14–18)
INR BLD: 1.15 RATIO — SIGNIFICANT CHANGE UP (ref 0.65–1.3)
INR BLD: 1.16 RATIO — SIGNIFICANT CHANGE UP (ref 0.65–1.3)
INR BLD: 1.17 RATIO — SIGNIFICANT CHANGE UP (ref 0.65–1.3)
LDH SERPL L TO P-CCNC: 374 U/L — SIGNIFICANT CHANGE UP
MAGNESIUM SERPL-MCNC: 2.2 MG/DL — SIGNIFICANT CHANGE UP (ref 1.8–2.4)
MCHC RBC-ENTMCNC: 26.2 PG — LOW (ref 27–31)
MCHC RBC-ENTMCNC: 26.6 PG — LOW (ref 27–31)
MCHC RBC-ENTMCNC: 31.6 G/DL — LOW (ref 32–37)
MCHC RBC-ENTMCNC: 32.4 G/DL — SIGNIFICANT CHANGE UP (ref 32–37)
MCV RBC AUTO: 82.1 FL — SIGNIFICANT CHANGE UP (ref 80–94)
MCV RBC AUTO: 82.7 FL — SIGNIFICANT CHANGE UP (ref 80–94)
NIGHT BLUE STAIN TISS: SIGNIFICANT CHANGE UP
NRBC # BLD: 0 /100 WBCS — SIGNIFICANT CHANGE UP (ref 0–0)
NRBC # BLD: 0 /100 WBCS — SIGNIFICANT CHANGE UP (ref 0–0)
PHOSPHATE SERPL-MCNC: 3.6 MG/DL — SIGNIFICANT CHANGE UP (ref 2.1–4.9)
PLATELET # BLD AUTO: 312 K/UL — SIGNIFICANT CHANGE UP (ref 130–400)
PLATELET # BLD AUTO: 401 K/UL — HIGH (ref 130–400)
POTASSIUM SERPL-MCNC: 4 MMOL/L — SIGNIFICANT CHANGE UP (ref 3.5–5)
POTASSIUM SERPL-MCNC: 4.1 MMOL/L — SIGNIFICANT CHANGE UP (ref 3.5–5)
POTASSIUM SERPL-SCNC: 4 MMOL/L — SIGNIFICANT CHANGE UP (ref 3.5–5)
POTASSIUM SERPL-SCNC: 4.1 MMOL/L — SIGNIFICANT CHANGE UP (ref 3.5–5)
PROTHROM AB SERPL-ACNC: 12.4 SEC — SIGNIFICANT CHANGE UP (ref 9.95–12.87)
PROTHROM AB SERPL-ACNC: 12.5 SEC — SIGNIFICANT CHANGE UP (ref 9.95–12.87)
PROTHROM AB SERPL-ACNC: 12.6 SEC — SIGNIFICANT CHANGE UP (ref 9.95–12.87)
RBC # BLD: 4.28 M/UL — LOW (ref 4.7–6.1)
RBC # BLD: 4.58 M/UL — LOW (ref 4.7–6.1)
RBC # FLD: 15.2 % — HIGH (ref 11.5–14.5)
RBC # FLD: 15.2 % — HIGH (ref 11.5–14.5)
SODIUM SERPL-SCNC: 136 MMOL/L — SIGNIFICANT CHANGE UP (ref 135–146)
SODIUM SERPL-SCNC: 136 MMOL/L — SIGNIFICANT CHANGE UP (ref 135–146)
SPECIMEN SOURCE: SIGNIFICANT CHANGE UP
WBC # BLD: 6.12 K/UL — SIGNIFICANT CHANGE UP (ref 4.8–10.8)
WBC # BLD: 8.52 K/UL — SIGNIFICANT CHANGE UP (ref 4.8–10.8)
WBC # FLD AUTO: 6.12 K/UL — SIGNIFICANT CHANGE UP (ref 4.8–10.8)
WBC # FLD AUTO: 8.52 K/UL — SIGNIFICANT CHANGE UP (ref 4.8–10.8)

## 2018-10-09 RX ORDER — SIMETHICONE 80 MG/1
80 TABLET, CHEWABLE ORAL THREE TIMES A DAY
Qty: 0 | Refills: 0 | Status: DISCONTINUED | OUTPATIENT
Start: 2018-10-09 | End: 2018-10-10

## 2018-10-09 RX ORDER — MORPHINE SULFATE 50 MG/1
2 CAPSULE, EXTENDED RELEASE ORAL ONCE
Qty: 0 | Refills: 0 | Status: DISCONTINUED | OUTPATIENT
Start: 2018-10-09 | End: 2018-10-09

## 2018-10-09 RX ORDER — MORPHINE SULFATE 50 MG/1
2 CAPSULE, EXTENDED RELEASE ORAL EVERY 6 HOURS
Qty: 0 | Refills: 0 | Status: DISCONTINUED | OUTPATIENT
Start: 2018-10-09 | End: 2018-10-10

## 2018-10-09 RX ORDER — MORPHINE SULFATE 50 MG/1
4 CAPSULE, EXTENDED RELEASE ORAL ONCE
Qty: 0 | Refills: 0 | Status: DISCONTINUED | OUTPATIENT
Start: 2018-10-09 | End: 2018-10-09

## 2018-10-09 RX ORDER — POLYETHYLENE GLYCOL 3350 17 G/17G
17 POWDER, FOR SOLUTION ORAL AT BEDTIME
Qty: 0 | Refills: 0 | Status: DISCONTINUED | OUTPATIENT
Start: 2018-10-09 | End: 2018-10-10

## 2018-10-09 RX ORDER — HEPARIN SODIUM 5000 [USP'U]/ML
1300 INJECTION INTRAVENOUS; SUBCUTANEOUS
Qty: 25000 | Refills: 0 | Status: DISCONTINUED | OUTPATIENT
Start: 2018-10-09 | End: 2018-10-09

## 2018-10-09 RX ORDER — HEPARIN SODIUM 5000 [USP'U]/ML
1500 INJECTION INTRAVENOUS; SUBCUTANEOUS
Qty: 25000 | Refills: 0 | Status: DISCONTINUED | OUTPATIENT
Start: 2018-10-09 | End: 2018-10-10

## 2018-10-09 RX ORDER — MORPHINE SULFATE 50 MG/1
2 CAPSULE, EXTENDED RELEASE ORAL ONCE
Qty: 0 | Refills: 0 | Status: COMPLETED | OUTPATIENT
Start: 2018-10-09 | End: 2018-10-09

## 2018-10-09 RX ORDER — MORPHINE SULFATE 50 MG/1
2 CAPSULE, EXTENDED RELEASE ORAL EVERY 6 HOURS
Qty: 0 | Refills: 0 | Status: DISCONTINUED | OUTPATIENT
Start: 2018-10-09 | End: 2018-10-09

## 2018-10-09 RX ADMIN — POLYETHYLENE GLYCOL 3350 17 GRAM(S): 17 POWDER, FOR SOLUTION ORAL at 22:26

## 2018-10-09 RX ADMIN — DULOXETINE HYDROCHLORIDE 60 MILLIGRAM(S): 30 CAPSULE, DELAYED RELEASE ORAL at 13:13

## 2018-10-09 RX ADMIN — Medication 50 MILLIGRAM(S): at 22:27

## 2018-10-09 RX ADMIN — Medication 100 MILLIGRAM(S): at 13:14

## 2018-10-09 RX ADMIN — MORPHINE SULFATE 2 MILLIGRAM(S): 50 CAPSULE, EXTENDED RELEASE ORAL at 21:11

## 2018-10-09 RX ADMIN — MORPHINE SULFATE 2 MILLIGRAM(S): 50 CAPSULE, EXTENDED RELEASE ORAL at 06:45

## 2018-10-09 RX ADMIN — CHLORHEXIDINE GLUCONATE 1 APPLICATION(S): 213 SOLUTION TOPICAL at 06:36

## 2018-10-09 RX ADMIN — Medication 40 MILLIGRAM(S): at 06:36

## 2018-10-09 RX ADMIN — MORPHINE SULFATE 2 MILLIGRAM(S): 50 CAPSULE, EXTENDED RELEASE ORAL at 07:13

## 2018-10-09 RX ADMIN — ZOLPIDEM TARTRATE 5 MILLIGRAM(S): 10 TABLET ORAL at 22:24

## 2018-10-09 RX ADMIN — Medication 20 MILLIGRAM(S): at 06:35

## 2018-10-09 RX ADMIN — MORPHINE SULFATE 2 MILLIGRAM(S): 50 CAPSULE, EXTENDED RELEASE ORAL at 14:53

## 2018-10-09 RX ADMIN — SIMETHICONE 80 MILLIGRAM(S): 80 TABLET, CHEWABLE ORAL at 22:27

## 2018-10-09 RX ADMIN — SIMETHICONE 80 MILLIGRAM(S): 80 TABLET, CHEWABLE ORAL at 06:36

## 2018-10-09 RX ADMIN — LATANOPROST 1 DROP(S): 0.05 SOLUTION/ DROPS OPHTHALMIC; TOPICAL at 22:25

## 2018-10-09 RX ADMIN — Medication 100 MILLIGRAM(S): at 22:25

## 2018-10-09 RX ADMIN — HEPARIN SODIUM 1300 UNIT(S)/HR: 5000 INJECTION INTRAVENOUS; SUBCUTANEOUS at 03:03

## 2018-10-09 RX ADMIN — Medication 100 MILLIGRAM(S): at 06:35

## 2018-10-09 RX ADMIN — CARBAMIDE PEROXIDE 5 DROP(S): 81.86 SOLUTION/ DROPS AURICULAR (OTIC) at 06:36

## 2018-10-09 RX ADMIN — CARBAMIDE PEROXIDE 5 DROP(S): 81.86 SOLUTION/ DROPS AURICULAR (OTIC) at 19:05

## 2018-10-09 RX ADMIN — HEPARIN SODIUM 15 UNIT(S)/HR: 5000 INJECTION INTRAVENOUS; SUBCUTANEOUS at 19:04

## 2018-10-09 RX ADMIN — TAMSULOSIN HYDROCHLORIDE 0.4 MILLIGRAM(S): 0.4 CAPSULE ORAL at 22:27

## 2018-10-09 RX ADMIN — Medication 20 MILLIGRAM(S): at 19:09

## 2018-10-09 RX ADMIN — SENNA PLUS 2 TABLET(S): 8.6 TABLET ORAL at 22:26

## 2018-10-09 NOTE — PROGRESS NOTE ADULT - SUBJECTIVE AND OBJECTIVE BOX
AMRIK MADRID 58y Male  MRN#: [id number]   SUBJECTIVE  Patient is a 58y old Male who presents with a chief complaint of worsening SOB (02 Oct 2018 10:43)  Currently admitted to medicine with the primary diagnosis of acute resp failure due to Hypoxia, Recurrent Pleural effusion    OBJECTIVE  PAST MEDICAL & SURGICAL HISTORY  Pleural effusion  CHF (congestive heart failure)  Insomnia  BPH (benign prostatic hyperplasia)  Depression  Bipolar 1 disorder   HTN (hypertension)  History of thoracentesis    ALLERGIES:  No Known Allergies    MEDICATIONS:  carbamide peroxide Otic Solution 5 Drop(s) Left Ear two times a day  chlorhexidine 4% Liquid 1 Application(s) Topical <User Schedule>  diltiazem    Tablet 60 milliGRAM(s) Oral every 8 hours  docusate sodium 100 milliGRAM(s) Oral three times a day  DULoxetine 60 milliGRAM(s) Oral daily  enoxaparin Injectable 80 milliGRAM(s) SubCutaneous every 12 hours  furosemide   Injectable 40 milliGRAM(s) IV Push daily  latanoprost 0.005% Ophthalmic Solution 1 Drop(s) Both EYES at bedtime  propranolol 20 milliGRAM(s) Oral every 12 hours  senna 2 Tablet(s) Oral at bedtime  tamsulosin 0.4 milliGRAM(s) Oral at bedtime  traZODone 50 milliGRAM(s) Oral at bedtime    MEDICATIONS  (PRN):  acetaminophen   Tablet .. 650 milliGRAM(s) Oral every 6 hours PRN Moderate Pain (4 - 6)  morphine  - Injectable 2 milliGRAM(s) IV Push every 6 hours PRN Severe Pain (7 - 10)  zolpidem 5 milliGRAM(s) Oral at bedtime PRN Insomnia      VITAL SIGNS: Last 24 Hours  T(C): 37 (09 Oct 2018 05:00), Max: 37 (09 Oct 2018 05:00)  T(F): 98.6 (09 Oct 2018 05:00), Max: 98.6 (09 Oct 2018 05:00)  HR: 83 (09 Oct 2018 05:00) (83 - 91)  BP: 117/66 (09 Oct 2018 05:00) (110/58 - 117/66)  RR: 18 (09 Oct 2018 05:00) (18 - 18)  SpO2: 100% (08 Oct 2018 22:30) (100% - 100%)    LABS:                        11.2   6.12  )-----------( 312      ( 09 Oct 2018 04:30 )             35.4                     10-09    136  |  93<L>  |  14  ----------------------------<  92  4.1   |  32  |  0.7    Ca    8.6      09 Oct 2018 04:30  Phos  3.7     10-07  Mg     1.9     10-07      PT/INR - ( 07 Oct 2018 17:43 )   PT: 12.90 sec;   INR: 1.20 ratio         PTT - ( 09 Oct 2018 04:30 )  PTT:43.5 sec    CXR: no new improvement.     PHYSICAL EXAM:  GENERAL: NAD, well-developed, AAOx3  HEENT:  Atraumatic, Normocephalic. Strabismus noted.  PULMONARY:  Decreased B/L lung sounds - unchanged  CARDIOVASCULAR: Tachycardiac, but normal rhythm; No murmurs.  GASTROINTESTINAL: Dullness on percussion; Bowel sound noted, Non-tender, + distension   MUSCULOSKELETAL:  Moderate pitting edema noted B/L LE    ASSESSMENT & PLAN  57 yo M with HTN, DLD, depression, DVT on eliquis, and recurrent pleural effusions s/p pigtail insertion back in july, s/p thoracentesis 3 days PTP in [facility] presenting from [facility] for worsening SOB x3 days. Eliquis is switched to Lovenox for possible Pleurx catheter however so far insurance doesn't cover it.     #Recurrent bilateral pleural effusions s/p thoracentesis showing serosanguineous fluid / NOW ON PIGTAIL still draining 3.5Liters since yesterday in total to now.   -recurrent, exudative in nature  -rule out underlying malignancy vs infection vs volume overload vs autoimmune  -c ANCA, p ANCA negative.  -GREGG, RA factor, Anti-dsdna negative  -anti ro, anti la negative    -Rheumatology w/up negative so far.  -Pleural fluid cytology not showing any malignant cells.  -Just shows few mesothelial cells.  -Patient also complaining of left sided hearing loss and balance issues.  -Tried to get CT scan of the head, but patient simply cannot lie down, gets Severe SOB.  -ENT consult was put, They recommend debrox ear drops every 12 hours.  -Will do audiogram if patient is inpatient after 3-5 days.    -ECHO: Normal EF  -c/w lasix 40mg IV daily    #Bacteruria growing pseudomonas and enterococcus faecalis - stable  -asympatomatic bacteruia  -blood culture NGTD  -d/c abx as per ID    #Tachycardia - likely secondary to chronic pleural effusion . stable now   -EKG shows sinus tachycardia   -as per pt, baseline tachycardiac  -c/w Cardizem 60mg q8h and resume propanolol 20mg q12h (home medication)    #Borderline BP - stable  -resume home medication: cardizem 60mg q8h and propanolol   -holding other home BP meds for now    #Abdominal distention / Worsening   -CT A/P revealing Ascending Colon circumferential lesion suspecious for malignancy.   -Pt has had CT here in July with IV contrast which showed no colon lesion  -[facility] and no bx was done due to being on AC and 2 polyps were seen. ?f/u plan at the time.     #Constipation - persistent  -colace/ senna    #Venous duplex shows: rt femoral vein DVT and lt femoral, popliteal and gastrocnemius veins DVT  - heparin drip PTT q6h    #BPH - c/w flomax  #Depression - c/w home meds  #Code status: DNR/ DNI    Dispo: GI CONSULT r/o METASTATIC COLON CA.   HOLD CT-SURGERY PLANS. CONTINUE PIGTAIL 3.5L fluid from yesterday to today.   Poor Prognosis.   C/W Hep drip. PTT q6h.   Case d/w pt at bedside in detail.   Time for all care coordination and management 35 min.

## 2018-10-09 NOTE — PROGRESS NOTE ADULT - ASSESSMENT
Patient is a 59 y/o gentleman with PMHx of HTN, known DVT ( on Heparin GTT), BPH, Depression, that presented to [facility] for SOB ( found to have a large Pleural effusion- followed by CT surgery and has catheter in place). Gastroenterology consulted for abnormal CT imagining ( CT scan with thickening of Ascending Colon- cannot rule out malignancy). Of note patient notes longstanding history of abdominal pain and discomfort associated with constipation ( he does not follow a specific GI doctor but notes he had a colonoscopy 10 months ago and was found to have polyps). He currently notes diffuse abdominal pain, crampy, constant only improved by pain medication. Imaging done demonstrated Ascending colon circumferential thickening ( Radiology recommends direct visualization) but previous imaging here was without discrepancy. Patient did have Colonoscopy at [facility] 10 months ago.     Abdominal Pain/ abnormal imaging/ Constipation   - Try to obtain Colonoscopy records from [facility]- done 10 months ago  - Can compare CT scans with radiology  - May need Colonoscopy done for direct visualization and to sample polyps at a latter date  - Would defer Colonoscopy currently given acute and active Pulmonary issues along with DVT  - Would change Colace and Senna to Miralax 17gram nightly  - Wll follow

## 2018-10-09 NOTE — PROGRESS NOTE ADULT - SUBJECTIVE AND OBJECTIVE BOX
Patient is a 59 y/o gentleman with PMHx of HTN, known DVT ( on Heparin GTT), BPH, Depression, that presented to [facility] for SOB ( found to have a large Pleural effusion- followed by CT surgery and has catheter in place). Gastroenterology consulted for abnormal CT imagining ( CT scan with thickening of Ascending Colon- cannot rule out malignancy). Of note patient notes longstanding history of abdominal pain and discomfort associated with constipation ( he does not follow a specific GI doctor but notes he had a colonoscopy 10 months ago and was found to have polyps). He currently notes diffuse abdominal pain, crampy, constant only improved by pain medication. He is tolerating a full diet currently and is eating during interview.     Review of Systems  General:  Denies Fatigue, Denies Fever, Denies Weakness ,Denies Weight Loss   HEENT: Denies Trouble Swallowing ,Denies  Sore Throat , Denies Change in hearing/vision/speech ,Denies Dizziness    Cardio: See HPI  Respiratory: See HPI  Abdomen: See detailed HPI  Neuro: Denies Headache Denies Dizziness, Denies Paresthesias  MSK: Denies pain in Bones/Joints/Muscles   Psych: Patient denies depression, denies suicidal or homicidal ideations  Integ: Patient Denies rash, or new skin lesions     Family Hx:  Father: Non Contributory   Mother: Non Contributory    Vital Signs:  T(F): 98.6   HR: 83  BP: 117/66  RR: 18  SpO2: 100%  Physical Exam  Gen: NAD  HEENT: NC/AT, Mucosal Membranes  Cardio: S1/S2   Resp: pigtail connected to drain, no accessory muscle use   Abdomen: Soft, distended, no tenderness on distractible exam   Neuro: AAOx3  Extremities: FROM x 4                          11.2   6.12  )-----------( 312      ( 09 Oct 2018 04:30 )             35.4     10-09    136  |  93<L>  |  14  ----------------------------<  92  4.1   |  32  |  0.7    Ca    8.6      09 Oct 2018 04:30  Phos  3.7     10-07  Mg     1.9     10-07    CT of Abdomen:    IMPRESSION:     1.  Bilateral pleural effusions (left greater than right). Loculated on   the left. There is a pigtail catheter in the right lung base with a small   pneumothorax.  2.  Mild interstitial pulmonary edema.  3.  Short segment ascending colon wall circumferential thickening -   likely mass, which is somewhat limited secondary to presence of fecal   material. Not well appreciated in the previous examination due to lack of   contrast opacification. Direct visualization of right colon is   recommended.    Abdominal U/S:  IMPRESSION:    No abdominopelvic ascites.

## 2018-10-09 NOTE — PROGRESS NOTE ADULT - SUBJECTIVE AND OBJECTIVE BOX
Hospital Day: 18  Post Operative Day:  Procedure: s/p pigtail insertion 10/8  Patient is a 58y old  Male who presents with a chief complaint of Worsening SOB (08 Oct 2018 11:38)    PAST MEDICAL & SURGICAL HISTORY:  Pleural effusion  CHF (congestive heart failure)  Insomnia  BPH (benign prostatic hyperplasia)  Depression  Bipolar 1 disorder  HTN (hypertension)  History of thoracentesis      Events of the Last 24h:>2L serosang out of left pigtail   Vital Signs Last 24 Hrs  T(C): 36.6 (08 Oct 2018 21:42), Max: 36.6 (08 Oct 2018 21:42)  T(F): 97.9 (08 Oct 2018 21:42), Max: 97.9 (08 Oct 2018 21:42)  HR: 87 (08 Oct 2018 22:30) (80 - 95)  BP: 110/58 (08 Oct 2018 21:42) (110/58 - 114/65)  BP(mean): --  RR: 18 (08 Oct 2018 21:42) (18 - 18)  SpO2: 100% (08 Oct 2018 22:30) (98% - 100%)        Diet, DASH/TLC:   Sodium & Cholesterol Restricted (18 @ 22:31)      I&O's Summary    07 Oct 2018 07:  -  08 Oct 2018 07:00  --------------------------------------------------------  IN: 360 mL / OUT: 1975 mL / NET: -1615 mL    08 Oct 2018 07:  -  09 Oct 2018 01:51  --------------------------------------------------------  IN: 0 mL / OUT: 3790 mL / NET: -3790 mL     I&O's Detail    07 Oct 2018 07:  -  08 Oct 2018 07:00  --------------------------------------------------------  IN:    Oral Fluid: 360 mL  Total IN: 360 mL    OUT:    Voided: 1975 mL  Total OUT: 1975 mL    Total NET: -1615 mL      08 Oct 2018 07:01  -  09 Oct 2018 01:51  --------------------------------------------------------  IN:  Total IN: 0 mL    OUT:    Drain: 1890 mL    Voided: 1900 mL  Total OUT: 3790 mL    Total NET: -3790 mL          MEDICATIONS  (STANDING):  carbamide peroxide Otic Solution 5 Drop(s) Left Ear two times a day  chlorhexidine 4% Liquid 1 Application(s) Topical <User Schedule>  diltiazem    Tablet 60 milliGRAM(s) Oral every 8 hours  docusate sodium 100 milliGRAM(s) Oral three times a day  DULoxetine 60 milliGRAM(s) Oral daily  furosemide   Injectable 40 milliGRAM(s) IV Push daily  heparin  Infusion 1100 Unit(s)/Hr (11 mL/Hr) IV Continuous <Continuous>  latanoprost 0.005% Ophthalmic Solution 1 Drop(s) Both EYES at bedtime  propranolol 20 milliGRAM(s) Oral every 12 hours  senna 2 Tablet(s) Oral at bedtime  tamsulosin 0.4 milliGRAM(s) Oral at bedtime  traZODone 50 milliGRAM(s) Oral at bedtime    MEDICATIONS  (PRN):  acetaminophen   Tablet .. 650 milliGRAM(s) Oral every 6 hours PRN Moderate Pain (4 - 6)  zolpidem 5 milliGRAM(s) Oral at bedtime PRN Insomnia      PHYSICAL EXAM:    GENERAL: NAD    HEENT: NCAT    CHEST/LUNGS: CTAB    HEART: RRR,  No murmurs, rubs, or gallops    ABDOMEN: SNTND +BS    EXTREMITIES:  FROM, No clubbing, cyanosis, or edema, palpable pulse    NEURO: No focal neurological deficits    SKIN: No rashes or lesions    INCISION/WOUNDS:                          11.5   6.94  )-----------( 348      ( 07 Oct 2018 17:43 )             36.0        CBC Full  -  ( 07 Oct 2018 17:43 )  WBC Count : 6.94 K/uL  Hemoglobin : 11.5 g/dL  Hematocrit : 36.0 %  Platelet Count - Automated : 348 K/uL  Mean Cell Volume : 83.3 fL  Mean Cell Hemoglobin : 26.6 pg  Mean Cell Hemoglobin Concentration : 31.9 g/dL  Auto Neutrophil # : 5.22 K/uL  Auto Lymphocyte # : 0.92 K/uL  Auto Monocyte # : 0.47 K/uL  Auto Eosinophil # : 0.22 K/uL  Auto Basophil # : 0.04 K/uL  Auto Neutrophil % : 75.1 %  Auto Lymphocyte % : 13.3 %  Auto Monocyte % : 6.8 %  Auto Eosinophil % : 3.2 %  Auto Basophil % : 0.6 %               138   |  92    |  15                 Ca: 9.1    BMP:   ----------------------------< 135    M.9   (10-07-18 @ 17:43)             4.1    |  32    | 0.8                Ph: 3.7      LFT:     TPro: 5.9 / Alb: 3.5 / TBili: 0.3 / DBili: x / AST: 15 / ALT: 25 / AlkPhos: 373   (10-01-18 @ 09:28)      PT/INR - ( 07 Oct 2018 17:43 )   PT: 12.90 sec;   INR: 1.20 ratio         PTT - ( 08 Oct 2018 09:01 )  PTT:41.9 sec          Culture - Body Fluid with Gram Stain (collected 08 Oct 2018 10:47)  Source: .Body Fluid Pleural Fluid  Gram Stain (08 Oct 2018 21:52):    polymorphonuclear leukocytes seen per low power field    No organisms seen per oil power field    by cytocentrifuge    < from: Xray Chest 1 View- PORTABLE-Urgent (10.08.18 @ 15:39) >    Impression:      Left greater than right opacifications and effusions.    Follow-up as needed.    < end of copied text >

## 2018-10-09 NOTE — PROGRESS NOTE ADULT - SUBJECTIVE AND OBJECTIVE BOX
AMRIK MADRID 58y Male  MRN#: [id number]     SUBJECTIVE  Patient is a 58y old Male who presents with a chief complaint of worsening SOB (09 Oct 2018 12:00)  Currently admitted to medicine with the primary diagnosis of Pleural effusion    OBJECTIVE  PAST MEDICAL & SURGICAL HISTORY  Pleural effusion  CHF (congestive heart failure)  Insomnia  BPH (benign prostatic hyperplasia)  Depression  Bipolar 1 disorder  HTN (hypertension)  History of thoracentesis    ALLERGIES:  No Known Allergies    MEDICATIONS:  STANDING MEDICATIONS  carbamide peroxide Otic Solution 5 Drop(s) Left Ear two times a day  chlorhexidine 4% Liquid 1 Application(s) Topical <User Schedule>  diltiazem    Tablet 60 milliGRAM(s) Oral every 8 hours  docusate sodium 100 milliGRAM(s) Oral three times a day  DULoxetine 60 milliGRAM(s) Oral daily  furosemide   Injectable 40 milliGRAM(s) IV Push daily  heparin  Infusion. 1300 Unit(s)/Hr IV Continuous <Continuous>  latanoprost 0.005% Ophthalmic Solution 1 Drop(s) Both EYES at bedtime  propranolol 20 milliGRAM(s) Oral every 12 hours  senna 2 Tablet(s) Oral at bedtime  simethicone 80 milliGRAM(s) Chew three times a day  tamsulosin 0.4 milliGRAM(s) Oral at bedtime  traZODone 50 milliGRAM(s) Oral at bedtime    PRN MEDICATIONS  acetaminophen   Tablet .. 650 milliGRAM(s) Oral every 6 hours PRN  zolpidem 5 milliGRAM(s) Oral at bedtime PRN      VITAL SIGNS: Last 24 Hours  T(C): 36.9 (09 Oct 2018 13:33), Max: 37 (09 Oct 2018 05:00)  T(F): 98.5 (09 Oct 2018 13:33), Max: 98.6 (09 Oct 2018 05:00)  HR: 96 (09 Oct 2018 13:33) (83 - 96)  BP: 98/54 (09 Oct 2018 13:33) (98/54 - 117/66)  BP(mean): --  RR: 18 (09 Oct 2018 13:33) (18 - 18)  SpO2: 100% (08 Oct 2018 22:30) (100% - 100%)    LABS:                        11.2   6.12  )-----------( 312      ( 09 Oct 2018 04:30 )             35.4     10-09    136  |  93<L>  |  14  ----------------------------<  92  4.1   |  32  |  0.7    Ca    8.6      09 Oct 2018 04:30  Phos  3.7     10-07  Mg     1.9     10-07      PT/INR - ( 09 Oct 2018 11:42 )   PT: 12.60 sec;   INR: 1.17 ratio         PTT - ( 09 Oct 2018 11:42 )  PTT:48.4 sec  Culture - Acid Fast - Body Fluid w/Smear (collected 08 Oct 2018 10:47)  Source: .Body Fluid Pleural Fluid    Culture - Body Fluid with Gram Stain (collected 08 Oct 2018 10:47)  Source: .Body Fluid Pleural Fluid  Gram Stain (08 Oct 2018 21:52):    polymorphonuclear leukocytes seen per low power field    No organisms seen per oil power field    by cytocentrifuge    PHYSICAL EXAM:  GENERAL: NAD, well-developed, AAOx3  HEENT:  Atraumatic, Normocephalic. Strabismus noted.  PULMONARY:  Decreased B/L lung sounds - unchanged  CARDIOVASCULAR: Tachycardiac, but normal rhythm; No murmurs.  GASTROINTESTINAL: Dullness on percussion; Bowel sound noted, Non-tender, + distension   MUSCULOSKELETAL:  Moderate pitting edema noted B/L LE    ASSESSMENT & PLAN  59 yo M with HTN, DLD, depression, DVT on heparin, and recurrent pleural effusions s/p pigtail insertion back in july, s/p thoracentesis 3 days PTP in [facility] presenting from [facility] for worsening SOB x3 days.     #Recurrent bilateral pleural effusions s/p thoracentesis showing serosanguineous fluid / NOW ON PIGTAIL still draining 3.5Liters since yesterday in total to now.   -recurrent, exudative in nature  -rule out underlying malignancy vs infection vs volume overload vs autoimmune  -c ANCA, p ANCA negative.  -GREGG, RA factor, Anti-dsdna negative  -anti ro, anti la negative    -Rheumatology w/up negative so far.  -Pleural fluid cytology not showing any malignant cells.  -Just shows few mesothelial cells.  -Patient also complaining of left sided hearing loss and balance issues.  -Tried to get CT scan of the head, but patient simply cannot lie down, gets Severe SOB.  -ENT consult was put, They recommend debrox ear drops every 12 hours.  -Will do audiogram if patient is inpatient after 3-5 days.    -ECHO: Normal EF  -c/w lasix 40mg IV daily    #Bacteruria growing pseudomonas and enterococcus faecalis - stable  -asympatomatic bacteruia  -blood culture NGTD  -d/c abx as per ID    #Tachycardia - likely secondary to chronic pleural effusion . stable now   -EKG shows sinus tachycardia   -as per pt, baseline tachycardiac  -c/w Cardizem 60mg q8h and resume propanolol 20mg q12h (home medication)    #Borderline BP - stable  -resume home medication: cardizem 60mg q8h and propanolol   -holding other home BP meds for now    #Abdominal distention / Worsening   -CT A/P revealing Ascending Colon circumferential lesion suspecious for malignancy.   -Pt has had CT here in July with IV contrast which showed no colon lesion  -Pt had Colonoscopy 10months ago in [facility] and no bx was done due to being on AC and 2 polyps were seen. ?f/u plan at the time.     #Constipation - persistent  -colace/ senna    #Venous duplex shows: rt femoral vein DVT and lt femoral, popliteal and gastrocnemius veins DVT  - heparin drip PTT q6h    #BPH - c/w flomax  #Depression - c/w home meds  #Code status: DNR/ DNI    Dispo: GI CONSULT r/o METASTATIC COLON CA.   HOLD CT-SURGERY PLANS. CONTINUE PIGTAIL 3.5L fluid from yesterday to today.   Poor Prognosis.   C/W Hep drip. PTT q6h.   Case d/w pt at bedside in detail. AMRIK MADRID 58y Male  MRN#: [id number]     SUBJECTIVE  Patient is a 58y old Male who presents with a chief complaint of worsening SOB (09 Oct 2018 12:00)  Currently admitted to medicine with the primary diagnosis of Pleural effusion    OBJECTIVE  PAST MEDICAL & SURGICAL HISTORY  Pleural effusion  CHF (congestive heart failure)  Insomnia  BPH (benign prostatic hyperplasia)  Depression  Bipolar 1 disorder  HTN (hypertension)  History of thoracentesis    ALLERGIES:  No Known Allergies    MEDICATIONS:  STANDING MEDICATIONS  carbamide peroxide Otic Solution 5 Drop(s) Left Ear two times a day  chlorhexidine 4% Liquid 1 Application(s) Topical <User Schedule>  diltiazem    Tablet 60 milliGRAM(s) Oral every 8 hours  docusate sodium 100 milliGRAM(s) Oral three times a day  DULoxetine 60 milliGRAM(s) Oral daily  furosemide   Injectable 40 milliGRAM(s) IV Push daily  heparin  Infusion. 1300 Unit(s)/Hr IV Continuous <Continuous>  latanoprost 0.005% Ophthalmic Solution 1 Drop(s) Both EYES at bedtime  propranolol 20 milliGRAM(s) Oral every 12 hours  senna 2 Tablet(s) Oral at bedtime  simethicone 80 milliGRAM(s) Chew three times a day  tamsulosin 0.4 milliGRAM(s) Oral at bedtime  traZODone 50 milliGRAM(s) Oral at bedtime    PRN MEDICATIONS  acetaminophen   Tablet .. 650 milliGRAM(s) Oral every 6 hours PRN  zolpidem 5 milliGRAM(s) Oral at bedtime PRN      VITAL SIGNS: Last 24 Hours  T(C): 36.9 (09 Oct 2018 13:33), Max: 37 (09 Oct 2018 05:00)  T(F): 98.5 (09 Oct 2018 13:33), Max: 98.6 (09 Oct 2018 05:00)  HR: 96 (09 Oct 2018 13:33) (83 - 96)  BP: 98/54 (09 Oct 2018 13:33) (98/54 - 117/66)  BP(mean): --  RR: 18 (09 Oct 2018 13:33) (18 - 18)  SpO2: 100% (08 Oct 2018 22:30) (100% - 100%)    LABS:                        11.2   6.12  )-----------( 312      ( 09 Oct 2018 04:30 )             35.4     10-09    136  |  93<L>  |  14  ----------------------------<  92  4.1   |  32  |  0.7    Ca    8.6      09 Oct 2018 04:30  Phos  3.7     10-07  Mg     1.9     10-07      PT/INR - ( 09 Oct 2018 11:42 )   PT: 12.60 sec;   INR: 1.17 ratio         PTT - ( 09 Oct 2018 11:42 )  PTT:48.4 sec  Culture - Acid Fast - Body Fluid w/Smear (collected 08 Oct 2018 10:47)  Source: .Body Fluid Pleural Fluid    Culture - Body Fluid with Gram Stain (collected 08 Oct 2018 10:47)  Source: .Body Fluid Pleural Fluid  Gram Stain (08 Oct 2018 21:52):    polymorphonuclear leukocytes seen per low power field    No organisms seen per oil power field    by cytocentrifuge    PHYSICAL EXAM:  GENERAL: NAD, well-developed, AAOx3  HEENT:  Atraumatic, Normocephalic. Strabismus noted.  PULMONARY:  Decreased B/L lung sounds - unchanged  CARDIOVASCULAR: Tachycardiac, but normal rhythm; No murmurs.  GASTROINTESTINAL: Dullness on percussion; Bowel sound noted, Non-tender, + distension   MUSCULOSKELETAL:  Moderate pitting edema noted B/L LE    ASSESSMENT & PLAN  57 yo M with HTN, DLD, depression, DVT on heparin, and recurrent pleural effusions s/p pigtail insertion back in july, s/p thoracentesis 3 days PTP in [facility] presenting from [facility] for worsening SOB x3 days.     #Recurrent bilateral pleural effusions s/p thoracentesis showing serosanguineous fluid / NOW ON PIGTAIL still draining 3.5Liters since yesterday in total to now.   -recurrent, exudative in nature  -rule out underlying malignancy vs infection vs volume overload vs autoimmune  -c ANCA, p ANCA negative.  -GREGG, RA factor, Anti-dsdna negative  -anti ro, anti la negative    -Rheumatology w/up negative so far.  -Pleural fluid cytology not showing any malignant cells.  -Just shows few mesothelial cells.  -Patient also complaining of left sided hearing loss and balance issues.  -Tried to get CT scan of the head, but patient simply cannot lie down, gets Severe SOB.  -ENT consult was put, They recommend debrox ear drops every 12 hours.  -Will do audiogram if patient is inpatient after 3-5 days.    -ECHO: Normal EF  -c/w lasix 40mg IV daily    #Bacteruria growing pseudomonas and enterococcus faecalis - stable  -asympatomatic bacteruia  -blood culture NGTD  -d/c abx as per ID    #Tachycardia - likely secondary to chronic pleural effusion . stable now   -EKG shows sinus tachycardia   -as per pt, baseline tachycardiac  -c/w Cardizem 60mg q8h and resume propanolol 20mg q12h (home medication)    #Borderline BP - stable  -resume home medication: cardizem 60mg q8h and propanolol   -holding other home BP meds for now    #Abdominal distention / Worsening   -CT A/P revealing Ascending Colon circumferential lesion suspecious for malignancy.   -Pt has had CT here in July with IV contrast which showed no colon lesion  -Pt had Colonoscopy 10months ago in [facility] and no bx was done due to being on AC and 2 polyps were seen. ?f/u plan at the time.     #Constipation - persistent  -colace/ senna    #Venous duplex shows: rt femoral vein DVT and lt femoral, popliteal and gastrocnemius veins DVT  - heparin drip PTT q6h  -Heparin infusion increased to 1500 unit/hr.    #BPH - c/w flomax  #Depression - c/w home meds  #Code status: DNR/ DNI    Dispo: GI CONSULT r/o METASTATIC COLON CA.   HOLD CT-SURGERY PLANS. CONTINUE PIGTAIL 3.5L fluid from yesterday to today.   Poor Prognosis.   C/W Hep drip. PTT q6h.   Case d/w pt at bedside in detail. AMRIK MADRID 58y Male  MRN#: [id number]     SUBJECTIVE  Patient is a 58y old Male who presents with a chief complaint of worsening SOB (09 Oct 2018 12:00)  Currently admitted to medicine with the primary diagnosis of Pleural effusion    OBJECTIVE  PAST MEDICAL & SURGICAL HISTORY  Pleural effusion  CHF (congestive heart failure)  Insomnia  BPH (benign prostatic hyperplasia)  Depression  Bipolar 1 disorder  HTN (hypertension)  History of thoracentesis    ALLERGIES:  No Known Allergies    MEDICATIONS:  STANDING MEDICATIONS  carbamide peroxide Otic Solution 5 Drop(s) Left Ear two times a day  chlorhexidine 4% Liquid 1 Application(s) Topical <User Schedule>  diltiazem    Tablet 60 milliGRAM(s) Oral every 8 hours  docusate sodium 100 milliGRAM(s) Oral three times a day  DULoxetine 60 milliGRAM(s) Oral daily  furosemide   Injectable 40 milliGRAM(s) IV Push daily  heparin  Infusion. 1300 Unit(s)/Hr IV Continuous <Continuous>  latanoprost 0.005% Ophthalmic Solution 1 Drop(s) Both EYES at bedtime  propranolol 20 milliGRAM(s) Oral every 12 hours  senna 2 Tablet(s) Oral at bedtime  simethicone 80 milliGRAM(s) Chew three times a day  tamsulosin 0.4 milliGRAM(s) Oral at bedtime  traZODone 50 milliGRAM(s) Oral at bedtime    PRN MEDICATIONS  acetaminophen   Tablet .. 650 milliGRAM(s) Oral every 6 hours PRN  zolpidem 5 milliGRAM(s) Oral at bedtime PRN      VITAL SIGNS: Last 24 Hours  T(C): 36.9 (09 Oct 2018 13:33), Max: 37 (09 Oct 2018 05:00)  T(F): 98.5 (09 Oct 2018 13:33), Max: 98.6 (09 Oct 2018 05:00)  HR: 96 (09 Oct 2018 13:33) (83 - 96)  BP: 98/54 (09 Oct 2018 13:33) (98/54 - 117/66)  BP(mean): --  RR: 18 (09 Oct 2018 13:33) (18 - 18)  SpO2: 100% (08 Oct 2018 22:30) (100% - 100%)    LABS:                        11.2   6.12  )-----------( 312      ( 09 Oct 2018 04:30 )             35.4     10-09    136  |  93<L>  |  14  ----------------------------<  92  4.1   |  32  |  0.7    Ca    8.6      09 Oct 2018 04:30  Phos  3.7     10-07  Mg     1.9     10-07      PT/INR - ( 09 Oct 2018 11:42 )   PT: 12.60 sec;   INR: 1.17 ratio         PTT - ( 09 Oct 2018 11:42 )  PTT:48.4 sec  Culture - Acid Fast - Body Fluid w/Smear (collected 08 Oct 2018 10:47)  Source: .Body Fluid Pleural Fluid    Culture - Body Fluid with Gram Stain (collected 08 Oct 2018 10:47)  Source: .Body Fluid Pleural Fluid  Gram Stain (08 Oct 2018 21:52):    polymorphonuclear leukocytes seen per low power field    No organisms seen per oil power field    by cytocentrifuge    PHYSICAL EXAM:  GENERAL: NAD, well-developed, AAOx3  HEENT:  Atraumatic, Normocephalic. Strabismus noted.  PULMONARY:  Decreased B/L lung sounds - unchanged  CARDIOVASCULAR: Tachycardiac, but normal rhythm; No murmurs.  GASTROINTESTINAL: Dullness on percussion; Bowel sound noted, Non-tender, + distension   MUSCULOSKELETAL:  Moderate pitting edema noted B/L LE    ASSESSMENT & PLAN  57 yo M with HTN, DLD, depression, DVT on heparin, and recurrent pleural effusions s/p pigtail insertion back in july, s/p thoracentesis 3 days PTP in [facility] presenting from [facility] for worsening SOB x3 days.     #Recurrent bilateral pleural effusions s/p thoracentesis showing serosanguineous fluid / NOW ON PIGTAIL still draining 3.5Liters since yesterday in total to now.   -recurrent, exudative in nature  -rule out underlying malignancy vs infection vs volume overload vs autoimmune  -c ANCA, p ANCA negative.  -GREGG, RA factor, Anti-dsdna negative  -anti ro, anti la negative    -Rheumatology w/up negative so far.  -Pleural fluid cytology not showing any malignant cells.  -Just shows few mesothelial cells.  -Patient also complaining of left sided hearing loss and balance issues.  -Tried to get CT scan of the head, but patient simply cannot lie down, gets Severe SOB.  -ENT consult was put, They recommend debrox ear drops every 12 hours.  -Will do audiogram if patient is inpatient after 3-5 days.    -CT surgery taking the patient tomorrow for the VATS biopsy and talc procedure.  -npo after midnight.    -ECHO: Normal EF  -c/w lasix 40mg IV daily    #Bacteruria growing pseudomonas and enterococcus faecalis - stable  -asympatomatic bacteruia  -blood culture NGTD  -d/c abx as per ID    #Tachycardia - likely secondary to chronic pleural effusion . stable now   -EKG shows sinus tachycardia   -as per pt, baseline tachycardiac  -c/w Cardizem 60mg q8h and resume propanolol 20mg q12h (home medication)    #Borderline BP - stable  -resume home medication: cardizem 60mg q8h and propanolol   -holding other home BP meds for now    #Abdominal distention / Worsening   -CT A/P revealing Ascending Colon circumferential lesion suspecious for malignancy.   -Pt has had CT here in July with IV contrast which showed no colon lesion  -Pt had Colonoscopy 10months ago in [facility] and no bx was done due to being on AC and 2 polyps were seen. ?f/u plan at the time.     #Constipation - persistent  -colace/ senna    #Venous duplex shows: rt femoral vein DVT and lt femoral, popliteal and gastrocnemius veins DVT  - heparin drip PTT q6h  -Heparin infusion increased to 1500 unit/hr.    #BPH - c/w flomax  #Depression - c/w home meds  #Code status: DNR/ DNI    Dispo: GI CONSULT r/o METASTATIC COLON CA.   HOLD CT-SURGERY PLANS. CONTINUE PIGTAIL 3.5L fluid from yesterday to today.   Poor Prognosis.   C/W Hep drip. PTT q6h.   Case d/w pt at bedside in detail.

## 2018-10-10 ENCOUNTER — RESULT REVIEW (OUTPATIENT)
Age: 58
End: 2018-10-10

## 2018-10-10 LAB
BLD GP AB SCN SERPL QL: SIGNIFICANT CHANGE UP
TYPE + AB SCN PNL BLD: SIGNIFICANT CHANGE UP

## 2018-10-10 RX ORDER — HEPARIN SODIUM 5000 [USP'U]/ML
6500 INJECTION INTRAVENOUS; SUBCUTANEOUS EVERY 6 HOURS
Qty: 0 | Refills: 0 | Status: DISCONTINUED | OUTPATIENT
Start: 2018-10-10 | End: 2018-10-11

## 2018-10-10 RX ORDER — FUROSEMIDE 40 MG
40 TABLET ORAL DAILY
Qty: 0 | Refills: 0 | Status: DISCONTINUED | OUTPATIENT
Start: 2018-10-10 | End: 2018-10-11

## 2018-10-10 RX ORDER — SODIUM CHLORIDE 9 MG/ML
1000 INJECTION INTRAMUSCULAR; INTRAVENOUS; SUBCUTANEOUS
Qty: 0 | Refills: 0 | Status: DISCONTINUED | OUTPATIENT
Start: 2018-10-10 | End: 2018-10-10

## 2018-10-10 RX ORDER — HEPARIN SODIUM 5000 [USP'U]/ML
3000 INJECTION INTRAVENOUS; SUBCUTANEOUS EVERY 6 HOURS
Qty: 0 | Refills: 0 | Status: DISCONTINUED | OUTPATIENT
Start: 2018-10-10 | End: 2018-10-11

## 2018-10-10 RX ORDER — ONDANSETRON 8 MG/1
4 TABLET, FILM COATED ORAL EVERY 6 HOURS
Qty: 0 | Refills: 0 | Status: DISCONTINUED | OUTPATIENT
Start: 2018-10-10 | End: 2018-10-17

## 2018-10-10 RX ORDER — TAMSULOSIN HYDROCHLORIDE 0.4 MG/1
0.4 CAPSULE ORAL AT BEDTIME
Qty: 0 | Refills: 0 | Status: DISCONTINUED | OUTPATIENT
Start: 2018-10-10 | End: 2018-10-17

## 2018-10-10 RX ORDER — MORPHINE SULFATE 50 MG/1
2 CAPSULE, EXTENDED RELEASE ORAL
Qty: 0 | Refills: 0 | Status: DISCONTINUED | OUTPATIENT
Start: 2018-10-10 | End: 2018-10-10

## 2018-10-10 RX ORDER — MORPHINE SULFATE 50 MG/1
30 CAPSULE, EXTENDED RELEASE ORAL
Qty: 0 | Refills: 0 | Status: DISCONTINUED | OUTPATIENT
Start: 2018-10-10 | End: 2018-10-15

## 2018-10-10 RX ORDER — POLYETHYLENE GLYCOL 3350 17 G/17G
17 POWDER, FOR SOLUTION ORAL AT BEDTIME
Qty: 0 | Refills: 0 | Status: DISCONTINUED | OUTPATIENT
Start: 2018-10-10 | End: 2018-10-17

## 2018-10-10 RX ORDER — BUPIVACAINE 13.3 MG/ML
20 INJECTION, SUSPENSION, LIPOSOMAL INFILTRATION ONCE
Qty: 0 | Refills: 0 | Status: DISCONTINUED | OUTPATIENT
Start: 2018-10-10 | End: 2018-10-17

## 2018-10-10 RX ORDER — MORPHINE SULFATE 50 MG/1
2 CAPSULE, EXTENDED RELEASE ORAL
Qty: 0 | Refills: 0 | Status: DISCONTINUED | OUTPATIENT
Start: 2018-10-10 | End: 2018-10-12

## 2018-10-10 RX ORDER — NALOXONE HYDROCHLORIDE 4 MG/.1ML
0.1 SPRAY NASAL
Qty: 0 | Refills: 0 | Status: DISCONTINUED | OUTPATIENT
Start: 2018-10-10 | End: 2018-10-17

## 2018-10-10 RX ORDER — ACETAMINOPHEN 500 MG
650 TABLET ORAL ONCE
Qty: 0 | Refills: 0 | Status: DISCONTINUED | OUTPATIENT
Start: 2018-10-10 | End: 2018-10-17

## 2018-10-10 RX ORDER — TRAZODONE HCL 50 MG
50 TABLET ORAL AT BEDTIME
Qty: 0 | Refills: 0 | Status: DISCONTINUED | OUTPATIENT
Start: 2018-10-10 | End: 2018-10-17

## 2018-10-10 RX ORDER — MORPHINE SULFATE 50 MG/1
4 CAPSULE, EXTENDED RELEASE ORAL
Qty: 0 | Refills: 0 | Status: DISCONTINUED | OUTPATIENT
Start: 2018-10-10 | End: 2018-10-10

## 2018-10-10 RX ORDER — DOCUSATE SODIUM 100 MG
100 CAPSULE ORAL DAILY
Qty: 0 | Refills: 0 | Status: DISCONTINUED | OUTPATIENT
Start: 2018-10-10 | End: 2018-10-17

## 2018-10-10 RX ORDER — ACETAMINOPHEN 500 MG
650 TABLET ORAL EVERY 6 HOURS
Qty: 0 | Refills: 0 | Status: DISCONTINUED | OUTPATIENT
Start: 2018-10-10 | End: 2018-10-17

## 2018-10-10 RX ORDER — DULOXETINE HYDROCHLORIDE 30 MG/1
60 CAPSULE, DELAYED RELEASE ORAL DAILY
Qty: 0 | Refills: 0 | Status: DISCONTINUED | OUTPATIENT
Start: 2018-10-10 | End: 2018-10-17

## 2018-10-10 RX ORDER — HEPARIN SODIUM 5000 [USP'U]/ML
800 INJECTION INTRAVENOUS; SUBCUTANEOUS
Qty: 25000 | Refills: 0 | Status: DISCONTINUED | OUTPATIENT
Start: 2018-10-10 | End: 2018-10-11

## 2018-10-10 RX ORDER — SIMETHICONE 80 MG/1
80 TABLET, CHEWABLE ORAL THREE TIMES A DAY
Qty: 0 | Refills: 0 | Status: DISCONTINUED | OUTPATIENT
Start: 2018-10-10 | End: 2018-10-17

## 2018-10-10 RX ORDER — CARBAMIDE PEROXIDE 81.86 MG/ML
5 SOLUTION/ DROPS AURICULAR (OTIC)
Qty: 0 | Refills: 0 | Status: DISCONTINUED | OUTPATIENT
Start: 2018-10-10 | End: 2018-10-17

## 2018-10-10 RX ORDER — ZOLPIDEM TARTRATE 10 MG/1
5 TABLET ORAL AT BEDTIME
Qty: 0 | Refills: 0 | Status: DISCONTINUED | OUTPATIENT
Start: 2018-10-10 | End: 2018-10-17

## 2018-10-10 RX ORDER — SENNA PLUS 8.6 MG/1
2 TABLET ORAL AT BEDTIME
Qty: 0 | Refills: 0 | Status: DISCONTINUED | OUTPATIENT
Start: 2018-10-10 | End: 2018-10-17

## 2018-10-10 RX ORDER — PROPRANOLOL HCL 160 MG
20 CAPSULE, EXTENDED RELEASE 24HR ORAL
Qty: 0 | Refills: 0 | Status: DISCONTINUED | OUTPATIENT
Start: 2018-10-10 | End: 2018-10-17

## 2018-10-10 RX ADMIN — SENNA PLUS 2 TABLET(S): 8.6 TABLET ORAL at 21:53

## 2018-10-10 RX ADMIN — Medication 40 MILLIGRAM(S): at 17:43

## 2018-10-10 RX ADMIN — MORPHINE SULFATE 2 MILLIGRAM(S): 50 CAPSULE, EXTENDED RELEASE ORAL at 21:50

## 2018-10-10 RX ADMIN — MORPHINE SULFATE 2 MILLIGRAM(S): 50 CAPSULE, EXTENDED RELEASE ORAL at 12:18

## 2018-10-10 RX ADMIN — Medication 100 MILLIGRAM(S): at 06:23

## 2018-10-10 RX ADMIN — SIMETHICONE 80 MILLIGRAM(S): 80 TABLET, CHEWABLE ORAL at 06:24

## 2018-10-10 RX ADMIN — MORPHINE SULFATE 30 MILLILITER(S): 50 CAPSULE, EXTENDED RELEASE ORAL at 12:30

## 2018-10-10 RX ADMIN — CARBAMIDE PEROXIDE 5 DROP(S): 81.86 SOLUTION/ DROPS AURICULAR (OTIC) at 06:22

## 2018-10-10 RX ADMIN — HEPARIN SODIUM 800 UNIT(S)/HR: 5000 INJECTION INTRAVENOUS; SUBCUTANEOUS at 22:45

## 2018-10-10 RX ADMIN — CHLORHEXIDINE GLUCONATE 1 APPLICATION(S): 213 SOLUTION TOPICAL at 06:25

## 2018-10-10 RX ADMIN — SIMETHICONE 80 MILLIGRAM(S): 80 TABLET, CHEWABLE ORAL at 15:21

## 2018-10-10 RX ADMIN — Medication 20 MILLIGRAM(S): at 17:41

## 2018-10-10 RX ADMIN — MORPHINE SULFATE 2 MILLIGRAM(S): 50 CAPSULE, EXTENDED RELEASE ORAL at 06:19

## 2018-10-10 RX ADMIN — TAMSULOSIN HYDROCHLORIDE 0.4 MILLIGRAM(S): 0.4 CAPSULE ORAL at 21:52

## 2018-10-10 RX ADMIN — CARBAMIDE PEROXIDE 5 DROP(S): 81.86 SOLUTION/ DROPS AURICULAR (OTIC) at 17:41

## 2018-10-10 RX ADMIN — MORPHINE SULFATE 4 MILLIGRAM(S): 50 CAPSULE, EXTENDED RELEASE ORAL at 11:45

## 2018-10-10 RX ADMIN — Medication 20 MILLIGRAM(S): at 06:24

## 2018-10-10 RX ADMIN — Medication 50 MILLIGRAM(S): at 21:52

## 2018-10-10 RX ADMIN — ZOLPIDEM TARTRATE 5 MILLIGRAM(S): 10 TABLET ORAL at 21:51

## 2018-10-10 RX ADMIN — MORPHINE SULFATE 2 MILLIGRAM(S): 50 CAPSULE, EXTENDED RELEASE ORAL at 12:08

## 2018-10-10 RX ADMIN — SIMETHICONE 80 MILLIGRAM(S): 80 TABLET, CHEWABLE ORAL at 21:52

## 2018-10-10 RX ADMIN — MORPHINE SULFATE 4 MILLIGRAM(S): 50 CAPSULE, EXTENDED RELEASE ORAL at 11:30

## 2018-10-10 RX ADMIN — SODIUM CHLORIDE 20 MILLILITER(S): 9 INJECTION INTRAMUSCULAR; INTRAVENOUS; SUBCUTANEOUS at 11:30

## 2018-10-10 NOTE — PROGRESS NOTE ADULT - SUBJECTIVE AND OBJECTIVE BOX
Hospital Day: 19  Patient is a 58y old  Male who presents with a chief complaint of worsening SOB, pleural effusion (09 Oct 2018 15:38)    PAST MEDICAL & SURGICAL HISTORY:  Pleural effusion  CHF (congestive heart failure)  Insomnia  BPH (benign prostatic hyperplasia)  Depression  Bipolar 1 disorder  HTN (hypertension)  History of thoracentesis      Events of the Last 24h:none  Vital Signs Last 24 Hrs  T(C): 36.9 (09 Oct 2018 21:17), Max: 37 (09 Oct 2018 05:00)  T(F): 98.5 (09 Oct 2018 21:17), Max: 98.6 (09 Oct 2018 05:00)  HR: 98 (09 Oct 2018 21:17) (83 - 98)  BP: 118/72 (09 Oct 2018 21:17) (98/54 - 118/72)  BP(mean): --  RR: 18 (09 Oct 2018 21:17) (18 - 18)  SpO2: 98% (09 Oct 2018 18:30) (98% - 98%)        Diet, Regular (10-09-18 @ 17:51)  Diet, NPO after Midnight:      NPO Start Date: 09-Oct-2018,   NPO Start Time: 23:59 (10-09-18 @ 17:51)  Diet, NPO after Midnight:      NPO Start Date: 09-Oct-2018,   NPO Start Time: 23:59 (10-09-18 @ 17:51)      I&O's Summary    08 Oct 2018 07:  -  09 Oct 2018 07:00  --------------------------------------------------------  IN: 0 mL / OUT: 4490 mL / NET: -4490 mL     I&O's Detail    08 Oct 2018 07:  -  09 Oct 2018 07:00  --------------------------------------------------------  IN:  Total IN: 0 mL    OUT:    Drain: 1890 mL    Voided: 2600 mL  Total OUT: 4490 mL    Total NET: -4490 mL          MEDICATIONS  (STANDING):  carbamide peroxide Otic Solution 5 Drop(s) Left Ear two times a day  chlorhexidine 4% Liquid 1 Application(s) Topical <User Schedule>  diltiazem    Tablet 60 milliGRAM(s) Oral every 8 hours  docusate sodium 100 milliGRAM(s) Oral three times a day  DULoxetine 60 milliGRAM(s) Oral daily  furosemide   Injectable 40 milliGRAM(s) IV Push daily  heparin  Infusion 1500 Unit(s)/Hr (15 mL/Hr) IV Continuous <Continuous>  latanoprost 0.005% Ophthalmic Solution 1 Drop(s) Both EYES at bedtime  polyethylene glycol 3350 17 Gram(s) Oral at bedtime  propranolol 20 milliGRAM(s) Oral every 12 hours  senna 2 Tablet(s) Oral at bedtime  simethicone 80 milliGRAM(s) Chew three times a day  tamsulosin 0.4 milliGRAM(s) Oral at bedtime  traZODone 50 milliGRAM(s) Oral at bedtime    MEDICATIONS  (PRN):  acetaminophen   Tablet .. 650 milliGRAM(s) Oral every 6 hours PRN Moderate Pain (4 - 6)  morphine  - Injectable 2 milliGRAM(s) IV Push every 6 hours PRN Severe Pain (7 - 10)  zolpidem 5 milliGRAM(s) Oral at bedtime PRN Insomnia      PHYSICAL EXAM:    GENERAL: NAD    HEENT: NCAT    CHEST/LUNGS: CTAB, right pigtail to suction no airleak     HEART: RRR,  No murmurs, rubs, or gallops    ABDOMEN: SNTND +BS    EXTREMITIES:  FROM, No clubbing, cyanosis, or edema, palpable pulse    NEURO: No focal neurological deficits    SKIN: No rashes or lesions    INCISION/WOUNDS:                          12.2   8.52  )-----------( 401      ( 09 Oct 2018 21:40 )             37.6        CBC Full  -  ( 09 Oct 2018 21:40 )  WBC Count : 8.52 K/uL  Hemoglobin : 12.2 g/dL  Hematocrit : 37.6 %  Platelet Count - Automated : 401 K/uL  Mean Cell Volume : 82.1 fL  Mean Cell Hemoglobin : 26.6 pg  Mean Cell Hemoglobin Concentration : 32.4 g/dL  Auto Neutrophil # : x  Auto Lymphocyte # : x  Auto Monocyte # : x  Auto Eosinophil # : x  Auto Basophil # : x  Auto Neutrophil % : x  Auto Lymphocyte % : x  Auto Monocyte % : x  Auto Eosinophil % : x  Auto Basophil % : x               136   |  89    |  13                 Ca: 9.1    BMP:   ----------------------------< 101    M.2   (10-09-18 @ 21:40)             4.0    |  31    | 0.8                Ph: 3.6      LFT:     TPro: 5.9 / Alb: 3.5 / TBili: 0.3 / DBili: x / AST: 15 / ALT: 25 / AlkPhos: 373   (10-01-18 @ 09:28)      PT/INR - ( 09 Oct 2018 21:40 )   PT: 12.40 sec;   INR: 1.15 ratio         PTT - ( 09 Oct 2018 21:40 )  PTT:48.3 sec          Culture - Acid Fast - Body Fluid w/Smear (collected 08 Oct 2018 10:47)  Source: .Body Fluid Pleural Fluid    Culture - Body Fluid with Gram Stain (collected 08 Oct 2018 10:47)  Source: .Body Fluid Pleural Fluid  Gram Stain (08 Oct 2018 21:52):    polymorphonuclear leukocytes seen per low power field    No organisms seen per oil power field    by cytocentrifuge  Preliminary Report (09 Oct 2018 19:48):    No growth to date.    < from: Xray Chest 1 View- PORTABLE-Routine (10.09.18 @ 06:31) >    Support devices: Interval placement of right-sided pigtail catheter.    Cardiac/mediastinum/hilum: Stable.    Lung parenchyma/Pleura: Unchanged bilateral opacities and effusions. No   pneumothorax.     Skeleton/soft tissues: Stable.    Impression:      Unchanged bilateral opacities and effusions.    Interval placement of right-sided pigtail catheter.     < end of copied text >

## 2018-10-10 NOTE — BRIEF OPERATIVE NOTE - PROCEDURE
<<-----Click on this checkbox to enter Procedure Thoracoscopy  10/10/2018  left vats pleural biopsy, pleural implant biopsy, talc pleurodesis  Active  ALACKEY

## 2018-10-10 NOTE — PROGRESS NOTE ADULT - SUBJECTIVE AND OBJECTIVE BOX
AMRIK MADRID 58y Male  MRN#: [id number]     SUBJECTIVE  Patient is a 58y old Male who presents with a chief complaint of worsening SOB (10 Oct 2018 00:38)  Currently admitted to medicine with the primary diagnosis of Pleural effusion      OBJECTIVE  PAST MEDICAL & SURGICAL HISTORY  Pleural effusion  CHF (congestive heart failure)  Insomnia  BPH (benign prostatic hyperplasia)  Depression  Bipolar 1 disorder  HTN (hypertension)  History of thoracentesis    ALLERGIES:  No Known Allergies    MEDICATIONS:  STANDING MEDICATIONS  acetaminophen   Tablet .. 650 milliGRAM(s) Oral every 6 hours  acetaminophen   Tablet .. 650 milliGRAM(s) Oral once  BUpivacaine liposome 1.3% Injectable (no eMAR) 20 milliLiter(s) Local Injection once  carbamide peroxide Otic Solution 5 Drop(s) Left Ear two times a day  diltiazem    Tablet 60 milliGRAM(s) Oral every 8 hours  docusate sodium 100 milliGRAM(s) Oral daily  DULoxetine 60 milliGRAM(s) Oral daily  furosemide   Injectable 40 milliGRAM(s) IV Push daily  morphine PCA (5 mG/mL) 30 milliLiter(s) PCA Continuous PCA Continuous  polyethylene glycol 3350 17 Gram(s) Oral at bedtime  propranolol 20 milliGRAM(s) Oral two times a day  senna 2 Tablet(s) Oral at bedtime  simethicone 80 milliGRAM(s) Chew three times a day  sodium chloride 0.9%. 1000 milliLiter(s) IV Continuous <Continuous>  tamsulosin 0.4 milliGRAM(s) Oral at bedtime  traZODone 50 milliGRAM(s) Oral at bedtime    PRN MEDICATIONS  morphine  - Injectable 2 milliGRAM(s) IV Push every 10 minutes PRN  naloxone Injectable 0.1 milliGRAM(s) IV Push every 3 minutes PRN  ondansetron Injectable 4 milliGRAM(s) IV Push every 6 hours PRN  zolpidem 5 milliGRAM(s) Oral at bedtime PRN      VITAL SIGNS: Last 24 Hours  T(C): 35.8 (10 Oct 2018 14:38), Max: 36.9 (09 Oct 2018 21:17)  T(F): 96.4 (10 Oct 2018 14:38), Max: 98.5 (09 Oct 2018 21:17)  HR: 87 (10 Oct 2018 14:38) (78 - 98)  BP: 118/64 (10 Oct 2018 14:38) (90/49 - 131/73)  BP(mean): --  RR: 18 (10 Oct 2018 14:38) (15 - 23)  SpO2: 97% (10 Oct 2018 13:30) (96% - 98%)    LABS:                        12.2   8.52  )-----------( 401      ( 09 Oct 2018 21:40 )             37.6     10-09    136  |  89<L>  |  13  ----------------------------<  101<H>  4.0   |  31  |  0.8    Ca    9.1      09 Oct 2018 21:40  Phos  3.6     10-09  Mg     2.2     10-09      PT/INR - ( 09 Oct 2018 21:40 )   PT: 12.40 sec;   INR: 1.15 ratio         PTT - ( 09 Oct 2018 21:40 )  PTT:48.3 sec          Culture - Acid Fast - Body Fluid w/Smear (collected 08 Oct 2018 10:47)  Source: .Body Fluid Pleural Fluid    Culture - Body Fluid with Gram Stain (collected 08 Oct 2018 10:47)  Source: .Body Fluid Pleural Fluid  Gram Stain (08 Oct 2018 21:52):    polymorphonuclear leukocytes seen per low power field    No organisms seen per oil power field    by cytocentrifuge  Preliminary Report (09 Oct 2018 19:48):    No growth to date.    PHYSICAL EXAM:  GENERAL: NAD, well-developed, AAOx3  HEENT:  Atraumatic, Normocephalic. Strabismus noted.  PULMONARY:  Decreased B/L lung sounds - unchanged  CARDIOVASCULAR: Tachycardiac, but normal rhythm; No murmurs.  GASTROINTESTINAL: Dullness on percussion; Bowel sound noted, Non-tender, + distension   MUSCULOSKELETAL:  Moderate pitting edema noted B/L LE      ASSESSMENT & PLAN  57 yo M with HTN, DLD, depression, DVT on heparin, and recurrent pleural effusions s/p pigtail insertion back in july, s/p thoracentesis 3 days PTP in [facility] presenting from [facility] for worsening SOB x3 days.     #Recurrent bilateral pleural effusions s/p thoracentesis showing serosanguineous fluid / NOW ON PIGTAIL still draining 3.5Liters since yesterday in total to now.   -recurrent, exudative in nature  -rule out underlying malignancy vs infection vs volume overload vs autoimmune  -c ANCA, p ANCA negative.  -GREGG, RA factor, Anti-dsdna negative  -anti ro, anti la negative    -Rheumatology w/up negative so far.  -Pleural fluid cytology not showing any malignant cells.  -Just shows few mesothelial cells.  -Patient also complaining of left sided hearing loss and balance issues.  -Tried to get CT scan of the head, but patient simply cannot lie down, gets Severe SOB.  -ENT consult was put, They recommend debrox ear drops every 12 hours.  -Will do audiogram if patient is inpatient after 3-5 days.    -CT surgery was done today,  pleural biopsy, talc pleurodesis done.  -Patient has right pigtail and 2 Chest tube on left.  -Will follow up on the output.  -WILL HOLD THE HEPARIN  -COMPRESSION STOCKINGS.    -ECHO: Normal EF  -c/w lasix 40mg IV daily    #Bacteruria growing pseudomonas and enterococcus faecalis - stable  -asympatomatic bacteruia  -blood culture NGTD  -d/c abx as per ID    #Tachycardia - likely secondary to chronic pleural effusion . stable now   -EKG shows sinus tachycardia   -as per pt, baseline tachycardiac  -c/w Cardizem 60mg q8h and resume propanolol 20mg q12h (home medication)    #Borderline BP - stable  -resume home medication: cardizem 60mg q8h and propanolol   -holding other home BP meds for now    #Abdominal distention / Worsening   -CT A/P revealing Ascending Colon circumferential lesion suspecious for malignancy.   -Pt has had CT here in July with IV contrast which showed no colon lesion  -Pt had Colonoscopy 10months ago in [facility] and no bx was done due to being on AC and 2 polyps were seen. ?f/u plan at the time.     #Constipation - persistent  -colace/ senna    #Venous duplex shows: rt femoral vein DVT and lt femoral, popliteal and gastrocnemius veins DVT  -WILL HOLD HEPARIN.  -ON COMPRESSION STOCKINGS.    #BPH - c/w flomax  #Depression - c/w home meds  #Code status: DNR/ DNI    Dispo: GI CONSULT r/o METASTATIC COLON CA.   Poor Prognosis.   Case d/w pt at bedside in detail.

## 2018-10-11 LAB
ANION GAP SERPL CALC-SCNC: 12 MMOL/L — SIGNIFICANT CHANGE UP (ref 7–14)
APTT BLD: 31.7 SEC — SIGNIFICANT CHANGE UP (ref 27–39.2)
APTT BLD: 32.6 SEC — SIGNIFICANT CHANGE UP (ref 27–39.2)
APTT BLD: 33.8 SEC — SIGNIFICANT CHANGE UP (ref 27–39.2)
BASOPHILS # BLD AUTO: 0.01 K/UL — SIGNIFICANT CHANGE UP (ref 0–0.2)
BASOPHILS NFR BLD AUTO: 0.1 % — SIGNIFICANT CHANGE UP (ref 0–1)
BUN SERPL-MCNC: 10 MG/DL — SIGNIFICANT CHANGE UP (ref 10–20)
CALCIUM SERPL-MCNC: 8.2 MG/DL — LOW (ref 8.5–10.1)
CHLORIDE SERPL-SCNC: 95 MMOL/L — LOW (ref 98–110)
CO2 SERPL-SCNC: 30 MMOL/L — SIGNIFICANT CHANGE UP (ref 17–32)
CREAT SERPL-MCNC: 0.9 MG/DL — SIGNIFICANT CHANGE UP (ref 0.7–1.5)
EOSINOPHIL # BLD AUTO: 0.01 K/UL — SIGNIFICANT CHANGE UP (ref 0–0.7)
EOSINOPHIL NFR BLD AUTO: 0.1 % — SIGNIFICANT CHANGE UP (ref 0–8)
GLUCOSE SERPL-MCNC: 127 MG/DL — HIGH (ref 70–99)
HCT VFR BLD CALC: 34 % — LOW (ref 42–52)
HGB BLD-MCNC: 10.8 G/DL — LOW (ref 14–18)
IMM GRANULOCYTES NFR BLD AUTO: 1 % — HIGH (ref 0.1–0.3)
INR BLD: 1.09 RATIO — SIGNIFICANT CHANGE UP (ref 0.65–1.3)
INR BLD: 1.21 RATIO — SIGNIFICANT CHANGE UP (ref 0.65–1.3)
INR BLD: 1.24 RATIO — SIGNIFICANT CHANGE UP (ref 0.65–1.3)
LYMPHOCYTES # BLD AUTO: 0.67 K/UL — LOW (ref 1.2–3.4)
LYMPHOCYTES # BLD AUTO: 7.3 % — LOW (ref 20.5–51.1)
MAGNESIUM SERPL-MCNC: 2 MG/DL — SIGNIFICANT CHANGE UP (ref 1.8–2.4)
MCHC RBC-ENTMCNC: 26.2 PG — LOW (ref 27–31)
MCHC RBC-ENTMCNC: 31.8 G/DL — LOW (ref 32–37)
MCV RBC AUTO: 82.5 FL — SIGNIFICANT CHANGE UP (ref 80–94)
MONOCYTES # BLD AUTO: 0.56 K/UL — SIGNIFICANT CHANGE UP (ref 0.1–0.6)
MONOCYTES NFR BLD AUTO: 6.1 % — SIGNIFICANT CHANGE UP (ref 1.7–9.3)
NEUTROPHILS # BLD AUTO: 7.89 K/UL — HIGH (ref 1.4–6.5)
NEUTROPHILS NFR BLD AUTO: 85.4 % — HIGH (ref 42.2–75.2)
PHOSPHATE SERPL-MCNC: 3.5 MG/DL — SIGNIFICANT CHANGE UP (ref 2.1–4.9)
PLATELET # BLD AUTO: 324 K/UL — SIGNIFICANT CHANGE UP (ref 130–400)
POTASSIUM SERPL-MCNC: 4.4 MMOL/L — SIGNIFICANT CHANGE UP (ref 3.5–5)
POTASSIUM SERPL-SCNC: 4.4 MMOL/L — SIGNIFICANT CHANGE UP (ref 3.5–5)
PROTHROM AB SERPL-ACNC: 11.8 SEC — SIGNIFICANT CHANGE UP (ref 9.95–12.87)
PROTHROM AB SERPL-ACNC: 13 SEC — HIGH (ref 9.95–12.87)
PROTHROM AB SERPL-ACNC: 13.4 SEC — HIGH (ref 9.95–12.87)
RBC # BLD: 4.12 M/UL — LOW (ref 4.7–6.1)
RBC # FLD: 15.1 % — HIGH (ref 11.5–14.5)
SODIUM SERPL-SCNC: 137 MMOL/L — SIGNIFICANT CHANGE UP (ref 135–146)
WBC # BLD: 9.23 K/UL — SIGNIFICANT CHANGE UP (ref 4.8–10.8)
WBC # FLD AUTO: 9.23 K/UL — SIGNIFICANT CHANGE UP (ref 4.8–10.8)

## 2018-10-11 RX ORDER — FUROSEMIDE 40 MG
20 TABLET ORAL EVERY 12 HOURS
Qty: 0 | Refills: 0 | Status: DISCONTINUED | OUTPATIENT
Start: 2018-10-11 | End: 2018-10-17

## 2018-10-11 RX ORDER — HEPARIN SODIUM 5000 [USP'U]/ML
1000 INJECTION INTRAVENOUS; SUBCUTANEOUS
Qty: 25000 | Refills: 0 | Status: DISCONTINUED | OUTPATIENT
Start: 2018-10-11 | End: 2018-10-12

## 2018-10-11 RX ORDER — SODIUM CHLORIDE 9 MG/ML
1000 INJECTION INTRAMUSCULAR; INTRAVENOUS; SUBCUTANEOUS
Qty: 0 | Refills: 0 | Status: DISCONTINUED | OUTPATIENT
Start: 2018-10-11 | End: 2018-10-16

## 2018-10-11 RX ORDER — HEPARIN SODIUM 5000 [USP'U]/ML
800 INJECTION INTRAVENOUS; SUBCUTANEOUS
Qty: 25000 | Refills: 0 | Status: DISCONTINUED | OUTPATIENT
Start: 2018-10-11 | End: 2018-10-11

## 2018-10-11 RX ORDER — ALBUMIN HUMAN 25 %
250 VIAL (ML) INTRAVENOUS ONCE
Qty: 0 | Refills: 0 | Status: DISCONTINUED | OUTPATIENT
Start: 2018-10-11 | End: 2018-10-17

## 2018-10-11 RX ADMIN — CARBAMIDE PEROXIDE 5 DROP(S): 81.86 SOLUTION/ DROPS AURICULAR (OTIC) at 06:16

## 2018-10-11 RX ADMIN — SIMETHICONE 80 MILLIGRAM(S): 80 TABLET, CHEWABLE ORAL at 14:47

## 2018-10-11 RX ADMIN — Medication 650 MILLIGRAM(S): at 12:07

## 2018-10-11 RX ADMIN — Medication 20 MILLIGRAM(S): at 07:31

## 2018-10-11 RX ADMIN — SIMETHICONE 80 MILLIGRAM(S): 80 TABLET, CHEWABLE ORAL at 06:17

## 2018-10-11 RX ADMIN — POLYETHYLENE GLYCOL 3350 17 GRAM(S): 17 POWDER, FOR SOLUTION ORAL at 21:29

## 2018-10-11 RX ADMIN — SIMETHICONE 80 MILLIGRAM(S): 80 TABLET, CHEWABLE ORAL at 21:29

## 2018-10-11 RX ADMIN — TAMSULOSIN HYDROCHLORIDE 0.4 MILLIGRAM(S): 0.4 CAPSULE ORAL at 21:28

## 2018-10-11 RX ADMIN — HEPARIN SODIUM 800 UNIT(S)/HR: 5000 INJECTION INTRAVENOUS; SUBCUTANEOUS at 10:06

## 2018-10-11 RX ADMIN — Medication 50 MILLIGRAM(S): at 21:28

## 2018-10-11 RX ADMIN — DULOXETINE HYDROCHLORIDE 60 MILLIGRAM(S): 30 CAPSULE, DELAYED RELEASE ORAL at 12:07

## 2018-10-11 RX ADMIN — Medication 100 MILLIGRAM(S): at 12:07

## 2018-10-11 RX ADMIN — CARBAMIDE PEROXIDE 5 DROP(S): 81.86 SOLUTION/ DROPS AURICULAR (OTIC) at 17:51

## 2018-10-11 RX ADMIN — HEPARIN SODIUM 1000 UNIT(S)/HR: 5000 INJECTION INTRAVENOUS; SUBCUTANEOUS at 21:27

## 2018-10-11 RX ADMIN — Medication 20 MILLIGRAM(S): at 17:51

## 2018-10-11 RX ADMIN — Medication 650 MILLIGRAM(S): at 12:10

## 2018-10-11 NOTE — PROGRESS NOTE ADULT - SUBJECTIVE AND OBJECTIVE BOX
OVERNIGHT EVENTS: events noted, improved SOB, reports abd pain, S/P CT A/P    Vital Signs Last 24 Hrs  T(C): 35.8 (11 Oct 2018 06:03), Max: 36.8 (10 Oct 2018 12:45)  T(F): 96.5 (11 Oct 2018 06:03), Max: 98.3 (10 Oct 2018 12:45)  HR: 80 (11 Oct 2018 06:03) (78 - 94)  BP: 102/60 (11 Oct 2018 06:03) (90/49 - 122/60)  SpO2: 97% (10 Oct 2018 13:30) (96% - 97%)    PHYSICAL EXAMINATION:    GENERAL: The patient is awake and alert in no apparent distress.     HEENT: Head is normocephalic and atraumatic. Extraocular muscles are intact. Mucous membranes are moist.    NECK: Supple.    LUNGS: DEC BS BOTH BASES    HEART: Regular rate and rhythm without murmur.    ABDOMEN: Soft, nontender, and nondistended.      EXTREMITIES: Without any cyanosis, clubbing, rash, lesions or edema.    NEUROLOGIC: Grossly intact.    SKIN: No ulceration or induration present.      LABS:                        10.8   9.23  )-----------( 324      ( 11 Oct 2018 00:12 )             34.0     10-11    137  |  95<L>  |  10  ----------------------------<  127<H>  4.4   |  30  |  0.9    Ca    8.2<L>      11 Oct 2018 00:12  Phos  3.5     10-11  Mg     2.0     10-11      PT/INR - ( 11 Oct 2018 00:12 )   PT: 13.40 sec;   INR: 1.24 ratio         PTT - ( 11 Oct 2018 00:12 )  PTT:33.8 sec                      10-10-18 @ 07:01  -  10-11-18 @ 07:00  --------------------------------------------------------  IN: 120 mL / OUT: 3220 mL / NET: -3100 mL        MICROBIOLOGY:  Culture Results:   No growth to date. (10-08 @ 10:47)      MEDICATIONS  (STANDING):  acetaminophen   Tablet .. 650 milliGRAM(s) Oral every 6 hours  acetaminophen   Tablet .. 650 milliGRAM(s) Oral once  BUpivacaine liposome 1.3% Injectable (no eMAR) 20 milliLiter(s) Local Injection once  carbamide peroxide Otic Solution 5 Drop(s) Left Ear two times a day  diltiazem    Tablet 60 milliGRAM(s) Oral every 8 hours  docusate sodium 100 milliGRAM(s) Oral daily  DULoxetine 60 milliGRAM(s) Oral daily  furosemide    Tablet 20 milliGRAM(s) Oral every 12 hours  heparin  Infusion. 800 Unit(s)/Hr (8 mL/Hr) IV Continuous <Continuous>  morphine PCA (5 mG/mL) 30 milliLiter(s) PCA Continuous PCA Continuous  polyethylene glycol 3350 17 Gram(s) Oral at bedtime  propranolol 20 milliGRAM(s) Oral two times a day  senna 2 Tablet(s) Oral at bedtime  simethicone 80 milliGRAM(s) Chew three times a day  sodium chloride 0.9%. 1000 milliLiter(s) (20 mL/Hr) IV Continuous <Continuous>  tamsulosin 0.4 milliGRAM(s) Oral at bedtime  traZODone 50 milliGRAM(s) Oral at bedtime    MEDICATIONS  (PRN):  morphine  - Injectable 2 milliGRAM(s) IV Push every 10 minutes PRN Moderate Pain (4 - 6)  naloxone Injectable 0.1 milliGRAM(s) IV Push every 3 minutes PRN For ANY of the following changes in patient status:  A. RR LESS THAN 10 breaths per minute, B. Oxygen saturation LESS THAN 90%, C. Sedation score of 6  ondansetron Injectable 4 milliGRAM(s) IV Push every 6 hours PRN Nausea  zolpidem 5 milliGRAM(s) Oral at bedtime PRN Insomnia      RADIOLOGY & ADDITIONAL STUDIES:

## 2018-10-11 NOTE — CHART NOTE - NSCHARTNOTEFT_GEN_A_CORE
RD limited note:   Pt on DASH/TLC diet reports continues with adequate hilaria/PO intake. Requests yogurt with each meal for medications and sandwich with hot meal for dinner in evenings (saves sandwich for later in the evening to prevent stomach pain). Sent prefs to kitchen. Labs reviewed-stable. + BM x 2 on 10/9. No further interventions. RD to follow up in 7 days.

## 2018-10-11 NOTE — PROGRESS NOTE ADULT - SUBJECTIVE AND OBJECTIVE BOX
GENERAL SURGERY PROGRESS NOTE     AMRIK MADRID  58y  Male  Hospital day :20d  POD:  Procedure: Thoracoscopy  Chest tube insertion  Thoracentesis  Thoracentesis    OVERNIGHT EVENTS:  No acute events overnight. Patient breathing well. Pain controlled on PCA    T(F): 96.5 (10-11-18 @ 06:03), Max: 98.3 (10-10-18 @ 12:45)  HR: 80 (10-11-18 @ 06:03) (78 - 94)  BP: 102/60 (10-11-18 @ 06:03) (90/49 - 122/60)  ABP: 100/62 (10-10-18 @ 13:30) (98/54 - 108/62)  ABP(mean): --  RR: 18 (10-11-18 @ 06:03) (15 - 23)  SpO2: 97% (10-10-18 @ 13:30) (96% - 98%)    DIET/FLUIDS: sodium chloride 0.9%. 1000 milliLiter(s) IV Continuous <Continuous>    NG:                                                                                DRAINS:   10-10-18 @ 07:01  -  10-11-18 @ 07:00  --------------------------------------------------------  OUT: 900 mL      BM:     EMESIS:     URINE:   10-10-18 @ 07:01  -  10-11-18 @ 07:00  --------------------------------------------------------  OUT: 820 mL     Indwelling Urethral Catheter:     Connect To:  Straight Drainage/Gravity    Indication:  Urine Output Monitoring in Critically Ill (10-10-18 @ 11:29)    GI proph:    AC/ proph: heparin  Infusion. 800 Unit(s)/Hr IV Continuous <Continuous>    ABx:     PHYSICAL EXAM:  GENERAL: NAD, well-appearing  CHEST/LUNG: No obvious increased WOB        LABS  Labs:  CAPILLARY BLOOD GLUCOSE                              10.8   9.23  )-----------( 324      ( 11 Oct 2018 00:12 )             34.0       Auto Neutrophil %: 85.4 % (10-11-18 @ 00:12)  Auto Immature Granulocyte %: 1.0 % (10-11-18 @ 00:12)    10-11    137  |  95<L>  |  10  ----------------------------<  127<H>  4.4   |  30  |  0.9      Calcium, Total Serum: 8.2 mg/dL (10-11-18 @ 00:12)      LFTs:         Coags:     13.40  ----< 1.24    ( 11 Oct 2018 00:12 )     33.8                Culture - Body Fluid with Gram Stain (collected 10 Oct 2018 11:40)  Source: Pleural Fl None  Gram Stain (11 Oct 2018 06:04):    No polymorphonuclear leukocytes seen per low power field    No organisms seen per oil power field    by cytocentrifuge    Culture - Acid Fast - Body Fluid w/Smear (collected 08 Oct 2018 10:47)  Source: .Body Fluid Pleural Fluid    Culture - Body Fluid with Gram Stain (collected 08 Oct 2018 10:47)  Source: .Body Fluid Pleural Fluid  Gram Stain (08 Oct 2018 21:52):    polymorphonuclear leukocytes seen per low power field    No organisms seen per oil power field    by cytocentrifuge  Preliminary Report (09 Oct 2018 19:48):    No growth to date.          RADIOLOGY & ADDITIONAL TESTS:  < from: Xray Chest 1 View AP/PA (10.10.18 @ 11:25) >    IMPRESSION:     Decreased left pleural-based opacity. Unchanged bilateral opacities and   right pleural effusion.     Small left lateral pneumothorax.    < end of copied text >      A/P    s/p L TALC pleurodesis    PLAN:    - Continue PCA for pain control    - encourage ambulation    - continue hamilton    - Switch Lasix to 20 mg PO BID   - IS   - Heparin drip for DVT   - daily CXR   - Colonoscopy when patient medically stable to further characterize thickened asc colon

## 2018-10-11 NOTE — PROGRESS NOTE ADULT - ASSESSMENT
recurrent effusion s/p thora 2 times l side, 1 thora r side, f/up r/o malignancy b/l exudative repeat cyto/ flow NEG (had ct/c/a/p -) S/P R PIGTAIL, VATS BIOPSY/ CT L SIDE    F/UP CYTO/ TUBE AS PER CT SX  POOR PROGNOSIS

## 2018-10-12 LAB
ANION GAP SERPL CALC-SCNC: 11 MMOL/L — SIGNIFICANT CHANGE UP (ref 7–14)
ANION GAP SERPL CALC-SCNC: 13 MMOL/L — SIGNIFICANT CHANGE UP (ref 7–14)
APTT BLD: 33 SEC — SIGNIFICANT CHANGE UP (ref 27–39.2)
APTT BLD: 35.9 SEC — SIGNIFICANT CHANGE UP (ref 27–39.2)
APTT BLD: 36 SEC — SIGNIFICANT CHANGE UP (ref 27–39.2)
APTT BLD: 47.7 SEC — HIGH (ref 27–39.2)
BUN SERPL-MCNC: 12 MG/DL — SIGNIFICANT CHANGE UP (ref 10–20)
BUN SERPL-MCNC: 18 MG/DL — SIGNIFICANT CHANGE UP (ref 10–20)
CALCIUM SERPL-MCNC: 8.4 MG/DL — LOW (ref 8.5–10.1)
CALCIUM SERPL-MCNC: 8.4 MG/DL — LOW (ref 8.5–10.1)
CHLORIDE SERPL-SCNC: 93 MMOL/L — LOW (ref 98–110)
CHLORIDE SERPL-SCNC: 97 MMOL/L — LOW (ref 98–110)
CO2 SERPL-SCNC: 27 MMOL/L — SIGNIFICANT CHANGE UP (ref 17–32)
CO2 SERPL-SCNC: 28 MMOL/L — SIGNIFICANT CHANGE UP (ref 17–32)
CREAT SERPL-MCNC: 0.7 MG/DL — SIGNIFICANT CHANGE UP (ref 0.7–1.5)
CREAT SERPL-MCNC: 0.9 MG/DL — SIGNIFICANT CHANGE UP (ref 0.7–1.5)
GLUCOSE SERPL-MCNC: 108 MG/DL — HIGH (ref 70–99)
GLUCOSE SERPL-MCNC: 119 MG/DL — HIGH (ref 70–99)
HCT VFR BLD CALC: 33.6 % — LOW (ref 42–52)
HCT VFR BLD CALC: 35.4 % — LOW (ref 42–52)
HCT VFR BLD CALC: 35.8 % — LOW (ref 42–52)
HGB BLD-MCNC: 10.7 G/DL — LOW (ref 14–18)
HGB BLD-MCNC: 11 G/DL — LOW (ref 14–18)
HGB BLD-MCNC: 11.3 G/DL — LOW (ref 14–18)
INR BLD: 1.11 RATIO — SIGNIFICANT CHANGE UP (ref 0.65–1.3)
INR BLD: 1.11 RATIO — SIGNIFICANT CHANGE UP (ref 0.65–1.3)
INR BLD: 1.18 RATIO — SIGNIFICANT CHANGE UP (ref 0.65–1.3)
MAGNESIUM SERPL-MCNC: 1.8 MG/DL — SIGNIFICANT CHANGE UP (ref 1.8–2.4)
MAGNESIUM SERPL-MCNC: 1.9 MG/DL — SIGNIFICANT CHANGE UP (ref 1.8–2.4)
MCHC RBC-ENTMCNC: 26.1 PG — LOW (ref 27–31)
MCHC RBC-ENTMCNC: 26.3 PG — LOW (ref 27–31)
MCHC RBC-ENTMCNC: 26.4 PG — LOW (ref 27–31)
MCHC RBC-ENTMCNC: 31.1 G/DL — LOW (ref 32–37)
MCHC RBC-ENTMCNC: 31.6 G/DL — LOW (ref 32–37)
MCHC RBC-ENTMCNC: 31.8 G/DL — LOW (ref 32–37)
MCV RBC AUTO: 82.6 FL — SIGNIFICANT CHANGE UP (ref 80–94)
MCV RBC AUTO: 83.6 FL — SIGNIFICANT CHANGE UP (ref 80–94)
MCV RBC AUTO: 83.9 FL — SIGNIFICANT CHANGE UP (ref 80–94)
NON-GYNECOLOGICAL CYTOLOGY STUDY: SIGNIFICANT CHANGE UP
NRBC # BLD: 0 /100 WBCS — SIGNIFICANT CHANGE UP (ref 0–0)
PHOSPHATE SERPL-MCNC: 3.2 MG/DL — SIGNIFICANT CHANGE UP (ref 2.1–4.9)
PHOSPHATE SERPL-MCNC: 3.4 MG/DL — SIGNIFICANT CHANGE UP (ref 2.1–4.9)
PLATELET # BLD AUTO: 295 K/UL — SIGNIFICANT CHANGE UP (ref 130–400)
PLATELET # BLD AUTO: 301 K/UL — SIGNIFICANT CHANGE UP (ref 130–400)
PLATELET # BLD AUTO: 349 K/UL — SIGNIFICANT CHANGE UP (ref 130–400)
POTASSIUM SERPL-MCNC: 3.9 MMOL/L — SIGNIFICANT CHANGE UP (ref 3.5–5)
POTASSIUM SERPL-MCNC: 4 MMOL/L — SIGNIFICANT CHANGE UP (ref 3.5–5)
POTASSIUM SERPL-SCNC: 3.9 MMOL/L — SIGNIFICANT CHANGE UP (ref 3.5–5)
POTASSIUM SERPL-SCNC: 4 MMOL/L — SIGNIFICANT CHANGE UP (ref 3.5–5)
PROTHROM AB SERPL-ACNC: 12 SEC — SIGNIFICANT CHANGE UP (ref 9.95–12.87)
PROTHROM AB SERPL-ACNC: 12.7 SEC — SIGNIFICANT CHANGE UP (ref 9.95–12.87)
PROTHROM AB SERPL-ACNC: 12.7 SEC — SIGNIFICANT CHANGE UP (ref 9.95–12.87)
RBC # BLD: 4.07 M/UL — LOW (ref 4.7–6.1)
RBC # BLD: 4.22 M/UL — LOW (ref 4.7–6.1)
RBC # BLD: 4.28 M/UL — LOW (ref 4.7–6.1)
RBC # FLD: 15.3 % — HIGH (ref 11.5–14.5)
RBC # FLD: 15.3 % — HIGH (ref 11.5–14.5)
RBC # FLD: 15.6 % — HIGH (ref 11.5–14.5)
SODIUM SERPL-SCNC: 133 MMOL/L — LOW (ref 135–146)
SODIUM SERPL-SCNC: 136 MMOL/L — SIGNIFICANT CHANGE UP (ref 135–146)
WBC # BLD: 10.96 K/UL — HIGH (ref 4.8–10.8)
WBC # BLD: 9.71 K/UL — SIGNIFICANT CHANGE UP (ref 4.8–10.8)
WBC # BLD: 9.98 K/UL — SIGNIFICANT CHANGE UP (ref 4.8–10.8)
WBC # FLD AUTO: 10.96 K/UL — HIGH (ref 4.8–10.8)
WBC # FLD AUTO: 9.71 K/UL — SIGNIFICANT CHANGE UP (ref 4.8–10.8)
WBC # FLD AUTO: 9.98 K/UL — SIGNIFICANT CHANGE UP (ref 4.8–10.8)

## 2018-10-12 RX ORDER — SOD SULF/SODIUM/NAHCO3/KCL/PEG
2000 SOLUTION, RECONSTITUTED, ORAL ORAL ONCE
Qty: 0 | Refills: 0 | Status: COMPLETED | OUTPATIENT
Start: 2018-10-12 | End: 2018-10-12

## 2018-10-12 RX ORDER — HEPARIN SODIUM 5000 [USP'U]/ML
1200 INJECTION INTRAVENOUS; SUBCUTANEOUS
Qty: 25000 | Refills: 0 | Status: DISCONTINUED | OUTPATIENT
Start: 2018-10-12 | End: 2018-10-12

## 2018-10-12 RX ORDER — HEPARIN SODIUM 5000 [USP'U]/ML
1600 INJECTION INTRAVENOUS; SUBCUTANEOUS
Qty: 25000 | Refills: 0 | Status: DISCONTINUED | OUTPATIENT
Start: 2018-10-12 | End: 2018-10-13

## 2018-10-12 RX ADMIN — SENNA PLUS 2 TABLET(S): 8.6 TABLET ORAL at 21:06

## 2018-10-12 RX ADMIN — Medication 100 MILLIGRAM(S): at 11:54

## 2018-10-12 RX ADMIN — CARBAMIDE PEROXIDE 5 DROP(S): 81.86 SOLUTION/ DROPS AURICULAR (OTIC) at 05:58

## 2018-10-12 RX ADMIN — Medication 20 MILLIGRAM(S): at 18:21

## 2018-10-12 RX ADMIN — SIMETHICONE 80 MILLIGRAM(S): 80 TABLET, CHEWABLE ORAL at 21:07

## 2018-10-12 RX ADMIN — TAMSULOSIN HYDROCHLORIDE 0.4 MILLIGRAM(S): 0.4 CAPSULE ORAL at 21:06

## 2018-10-12 RX ADMIN — DULOXETINE HYDROCHLORIDE 60 MILLIGRAM(S): 30 CAPSULE, DELAYED RELEASE ORAL at 11:54

## 2018-10-12 RX ADMIN — SIMETHICONE 80 MILLIGRAM(S): 80 TABLET, CHEWABLE ORAL at 14:49

## 2018-10-12 RX ADMIN — Medication 10 MILLIGRAM(S): at 12:44

## 2018-10-12 RX ADMIN — Medication 20 MILLIGRAM(S): at 05:55

## 2018-10-12 RX ADMIN — Medication 650 MILLIGRAM(S): at 05:57

## 2018-10-12 RX ADMIN — Medication 50 MILLIGRAM(S): at 21:06

## 2018-10-12 RX ADMIN — Medication 2000 MILLILITER(S): at 20:55

## 2018-10-12 RX ADMIN — HEPARIN SODIUM 1600 UNIT(S)/HR: 5000 INJECTION INTRAVENOUS; SUBCUTANEOUS at 20:55

## 2018-10-12 RX ADMIN — SIMETHICONE 80 MILLIGRAM(S): 80 TABLET, CHEWABLE ORAL at 05:57

## 2018-10-12 RX ADMIN — Medication 20 MILLIGRAM(S): at 18:20

## 2018-10-12 RX ADMIN — Medication 650 MILLIGRAM(S): at 05:58

## 2018-10-12 NOTE — PROGRESS NOTE ADULT - SUBJECTIVE AND OBJECTIVE BOX
GI recalled for moderate stool burden in the right colon in patient with Rt colon wall thickening Pt remains on Morphine drip.    Interval Events: Pt had one large BM today after he received enema, passing gas but still abdominal distension    Allergies:  No Known Allergies      Hospital Medications:  acetaminophen   Tablet .. 650 milliGRAM(s) Oral every 6 hours  acetaminophen   Tablet .. 650 milliGRAM(s) Oral once  albumin human  5% IVPB 250 milliLiter(s) IV Intermittent once  BUpivacaine liposome 1.3% Injectable (no eMAR) 20 milliLiter(s) Local Injection once  carbamide peroxide Otic Solution 5 Drop(s) Left Ear two times a day  diltiazem    Tablet 30 milliGRAM(s) Oral every 8 hours  docusate sodium 100 milliGRAM(s) Oral daily  DULoxetine 60 milliGRAM(s) Oral daily  furosemide    Tablet 20 milliGRAM(s) Oral every 12 hours  heparin  Infusion 1200 Unit(s)/Hr IV Continuous <Continuous>  morphine PCA (5 mG/mL) 30 milliLiter(s) PCA Continuous PCA Continuous  naloxone Injectable 0.1 milliGRAM(s) IV Push every 3 minutes PRN  ondansetron Injectable 4 milliGRAM(s) IV Push every 6 hours PRN  polyethylene glycol 3350 17 Gram(s) Oral at bedtime  propranolol 20 milliGRAM(s) Oral two times a day  senna 2 Tablet(s) Oral at bedtime  simethicone 80 milliGRAM(s) Chew three times a day  sodium chloride 0.9%. 1000 milliLiter(s) IV Continuous <Continuous>  tamsulosin 0.4 milliGRAM(s) Oral at bedtime  traZODone 50 milliGRAM(s) Oral at bedtime  zolpidem 5 milliGRAM(s) Oral at bedtime PRN      PMHX/PSHX:  Pleural effusion  CHF (congestive heart failure)  Insomnia  BPH (benign prostatic hyperplasia)  Depression  Bipolar 1 disorder  HTN (hypertension)  History of thoracentesis      Family history:  No pertinent family history in first degree relatives      ROS:     General:  No wt loss, fevers, chills, night sweats, fatigue,   Eyes:  Good vision, no reported pain  ENT:  No sore throat, pain, runny nose, dysphagia  CV:  No pain, palpitations, hypo/hypertension  Resp:  SOB improved, chest tube in place  GI:  See HPI  :  No pain, bleeding, incontinence, nocturia  Muscle:  No pain, weakness  Neuro:  No weakness, tingling, memory problem  Heme:  No petechiae, ecchymosis, easy bruisability  Skin:  No rash, edema      PHYSICAL EXAM:     GENERAL:  Appears stated age, well-groomed, well-nourished, no distress  HEENT:  NC/AT,  conjunctivae clear, sclera -anicteric  CHEST:  Decreased breath sounds B/L   HEART:  Regular rhythm, S1, S2, no murmur/rub/S3/S4,  no edema  ABDOMEN:  Soft, non-tender, distended, normoactive bowel sounds  NEURO:  Alert, oriented    Vital Signs:  Vital Signs Last 24 Hrs  T(C): 36.6 (12 Oct 2018 16:28), Max: 37.3 (11 Oct 2018 17:30)  T(F): 97.8 (12 Oct 2018 16:28), Max: 99.1 (11 Oct 2018 17:30)  HR: 107 (12 Oct 2018 16:28) (87 - 107)  BP: 116/70 (12 Oct 2018 16:28) (110/62 - 121/67)  BP(mean): 79 (11 Oct 2018 17:30) (79 - 79)  RR: 18 (12 Oct 2018 16:28) (18 - 18)  SpO2: --  Daily Height in cm: 187.96 (12 Oct 2018 00:36)    Daily     LABS:                        11.3   9.98  )-----------( 349      ( 12 Oct 2018 06:22 )             35.8     10-12    136  |  97<L>  |  18  ----------------------------<  119<H>  3.9   |  28  |  0.9    Ca    8.4<L>      12 Oct 2018 01:36  Phos  3.4     10-12  Mg     1.9     10-12        PT/INR - ( 12 Oct 2018 11:05 )   PT: 12.70 sec;   INR: 1.11 ratio         PTT - ( 12 Oct 2018 11:05 )  PTT:36.0 sec        Imaging: < from: Xray Abdomen 1 View PORTABLE -Urgent (10.12.18 @ 09:22) >  Nonobstructive bowel gas pattern. Moderate stool burden seen in the large   bowel.    Right-sided pigtail catheter seen overlying the right upper quadrant.    Partially imaged left-sided chest tube.    No acute osseous abnormalities.

## 2018-10-12 NOTE — PROGRESS NOTE ADULT - SUBJECTIVE AND OBJECTIVE BOX
GENERAL SURGERY PROGRESS NOTE     AMRIK MADRID  58y  Male  Hospital day :21d  POD:  Procedure: Thoracoscopy  Chest tube insertion  Thoracentesis  Thoracentesis    OVERNIGHT EVENTS:  No acute events overnight. Pain controlled on PCA.     T(F): 96.1 (10-12-18 @ 07:38), Max: 99.1 (10-11-18 @ 17:30)  HR: 102 (10-12-18 @ 07:38) (87 - 103)  BP: 119/70 (10-12-18 @ 07:38) (104/57 - 121/67)  ABP: --  ABP(mean): --  RR: 18 (10-12-18 @ 07:38) (18 - 18)  SpO2: --    DIET/FLUIDS: albumin human  5% IVPB 250 milliLiter(s) IV Intermittent once  sodium chloride 0.9%. 1000 milliLiter(s) IV Continuous <Continuous>    NG:                                                                                DRAINS:     BM:     EMESIS:     URINE:   10-11-18 @ 07:01  -  10-12-18 @ 07:00  --------------------------------------------------------  OUT: 1500 mL       GI proph:    AC/ proph: heparin  Infusion. 1000 Unit(s)/Hr IV Continuous <Continuous>    ABx:     PHYSICAL EXAM:  GENERAL: NAD, well-appearing  CHEST/LUNG: No obvious increased WOB. L 2 y connected CT with 140 serosanguinous fluid, R pigtail with 540 serosanguinous fluid. to suction, no air leak   EXTREMITIES:  No clubbing, cyanosis, or edema      LABS  Labs:  CAPILLARY BLOOD GLUCOSE                              11.0   9.71  )-----------( 295      ( 12 Oct 2018 01:36 )             35.4         10-12    136  |  97<L>  |  18  ----------------------------<  119<H>  3.9   |  28  |  0.9      Calcium, Total Serum: 8.4 mg/dL (10-12-18 @ 01:36)      LFTs:         Coags:     12.70  ----< 1.18    ( 12 Oct 2018 01:36 )     35.9                Culture - Fungal, Body Fluid (collected 10 Oct 2018 11:40)  Source: .Body Fluid None  Preliminary Report (12 Oct 2018 06:34):    Testing in progress    Culture - Acid Fast - Body Fluid w/Smear (collected 10 Oct 2018 11:40)  Source: .Body Fluid None    Culture - Body Fluid with Gram Stain (collected 10 Oct 2018 11:40)  Source: Pleural Fl None  Gram Stain (11 Oct 2018 06:04):    No polymorphonuclear leukocytes seen per low power field    No organisms seen per oil power field    by cytocentrifuge  Preliminary Report (11 Oct 2018 22:12):    No growth to date.          RADIOLOGY & ADDITIONAL TESTS:  < from: Xray Chest 1 View- PORTABLE-Routine (10.11.18 @ 07:33) >    Impression:      Mildly improved bilateral opacities. Stable small pleural effusions.    Small left pneumothorax, unchanged.    < end of copied text >      A/P    s/p L talc pleurodesis and R pigtail placement    PLAN:   - d/c hamilton   - L CT remain for at least 3 days post surgery. continue to suction   - pain control: PCA   - ambulate   - IS   - f/u pathology report

## 2018-10-12 NOTE — PROGRESS NOTE ADULT - SUBJECTIVE AND OBJECTIVE BOX
OVERNIGHT EVENTS: still c/o abd pain, r side pig tail, l side CT    Vital Signs Last 24 Hrs  T(C): 35.6 (12 Oct 2018 07:38), Max: 37.3 (11 Oct 2018 17:30)  T(F): 96.1 (12 Oct 2018 07:38), Max: 99.1 (11 Oct 2018 17:30)  HR: 102 (12 Oct 2018 07:38) (87 - 103)  BP: 119/70 (12 Oct 2018 07:38) (104/57 - 121/67)  BP(mean): 79 (11 Oct 2018 17:30) (79 - 79)  RR: 18 (12 Oct 2018 07:38) (18 - 18)  SpO2: 94%    PHYSICAL EXAMINATION:    GENERAL: The patient is awake and alert in no apparent distress.     HEENT: Head is normocephalic and atraumatic. Extraocular muscles are intact. Mucous membranes are moist.    NECK: Supple.    LUNGS: DEC BS BOTH BASES    HEART: Regular rate and rhythm without murmur.    ABDOMEN: Soft, nontender, and nondistended.      EXTREMITIES: Without any cyanosis, clubbing, rash, lesions or edema.    NEUROLOGIC: Grossly intact.    SKIN: No ulceration or induration present.      LABS:                        11.3   9.98  )-----------( 349      ( 12 Oct 2018 06:22 )             35.8     10-12    136  |  97<L>  |  18  ----------------------------<  119<H>  3.9   |  28  |  0.9    Ca    8.4<L>      12 Oct 2018 01:36  Phos  3.4     10-12  Mg     1.9     10-12      PT/INR - ( 12 Oct 2018 06:22 )   PT: 12.00 sec;   INR: 1.11 ratio         PTT - ( 12 Oct 2018 06:22 )  PTT:33.0 sec                      10-11-18 @ 07:01  -  10-12-18 @ 07:00  --------------------------------------------------------  IN: 840 mL / OUT: 2290 mL / NET: -1450 mL    10-12-18 @ 07:01  -  10-12-18 @ 09:51  --------------------------------------------------------  IN: 960 mL / OUT: 150 mL / NET: 810 mL        MICROBIOLOGY:  Culture Results:   No growth to date. (10-10 @ 11:40)  Culture Results:   Testing in progress (10-10 @ 11:40)  Culture Results:   No growth to date. (10-08 @ 10:47)      MEDICATIONS  (STANDING):  acetaminophen   Tablet .. 650 milliGRAM(s) Oral every 6 hours  acetaminophen   Tablet .. 650 milliGRAM(s) Oral once  albumin human  5% IVPB 250 milliLiter(s) IV Intermittent once  BUpivacaine liposome 1.3% Injectable (no eMAR) 20 milliLiter(s) Local Injection once  carbamide peroxide Otic Solution 5 Drop(s) Left Ear two times a day  diltiazem    Tablet 30 milliGRAM(s) Oral every 8 hours  docusate sodium 100 milliGRAM(s) Oral daily  DULoxetine 60 milliGRAM(s) Oral daily  furosemide    Tablet 20 milliGRAM(s) Oral every 12 hours  heparin  Infusion. 1200 Unit(s)/Hr (12 mL/Hr) IV Continuous <Continuous>  morphine PCA (5 mG/mL) 30 milliLiter(s) PCA Continuous PCA Continuous  polyethylene glycol 3350 17 Gram(s) Oral at bedtime  propranolol 20 milliGRAM(s) Oral two times a day  senna 2 Tablet(s) Oral at bedtime  simethicone 80 milliGRAM(s) Chew three times a day  sodium chloride 0.9%. 1000 milliLiter(s) (20 mL/Hr) IV Continuous <Continuous>  tamsulosin 0.4 milliGRAM(s) Oral at bedtime  traZODone 50 milliGRAM(s) Oral at bedtime    MEDICATIONS  (PRN):  bisacodyl 5 milliGRAM(s) Oral every 12 hours PRN Constipation  naloxone Injectable 0.1 milliGRAM(s) IV Push every 3 minutes PRN For ANY of the following changes in patient status:  A. RR LESS THAN 10 breaths per minute, B. Oxygen saturation LESS THAN 90%, C. Sedation score of 6  ondansetron Injectable 4 milliGRAM(s) IV Push every 6 hours PRN Nausea  zolpidem 5 milliGRAM(s) Oral at bedtime PRN Insomnia      RADIOLOGY & ADDITIONAL STUDIES:

## 2018-10-12 NOTE — PROGRESS NOTE ADULT - ASSESSMENT
57 y/o M with PMH of HTN, known DVT ( on Heparin GTT), BPH, Depression, that presented to [facility] for SOB ( found to have a large Pleural effusion- followed by CT surgery and has catheter in place). Gastroenterology was initially consulted for abnormal CT imagining ( CT scan with thickening of Ascending Colon- cannot rule out malignancy). Of note patient notes longstanding history of abdominal pain and discomfort associated with constipation ( he does not follow a specific GI doctor but notes he had a colonoscopy 10 months ago and was found to have polyps). He currently notes diffuse abdominal pain, crampy, constant only improved by pain medication. Imaging done demonstrated Ascending colon circumferential thickening ( Radiology recommends direct visualization) but previous imaging here was without discrepancy. Patient did have Colonoscopy at [facility] 10 months ago.   GI recalled for significant abdominal distension and moderate stool burden in right colon. Pt remains on Morphine pump.    Abdominal Pain/ abnormal imaging/ Constipation, large stool burden and remains on Morphine drip by PCA pump :  - Try to obtain Colonoscopy records from [facility]- done 10 months ago  - Continue bowel regimen, will give 2000 ml of Golytelty and give a dose of Methylnaltrexone today and can repeat tomorrow if no significant improvement.  - Will need outpatient colonoscopy after improvement in cardiopulmonary status.  - GI will follow up. 57 y/o M with PMH of HTN, known DVT ( on Heparin GTT), BPH, Depression, that presented to [facility] for SOB ( found to have a large Pleural effusion- followed by CT surgery and has catheter in place). Gastroenterology was initially consulted for abnormal CT imagining ( CT scan with thickening of Ascending Colon- cannot rule out malignancy). Of note patient notes longstanding history of abdominal pain and discomfort associated with constipation ( he does not follow a specific GI doctor but notes he had a colonoscopy 10 months ago and was found to have polyps). He currently notes diffuse abdominal pain, crampy, constant only improved by pain medication. Imaging done demonstrated Ascending colon circumferential thickening ( Radiology recommends direct visualization) but previous imaging here was without discrepancy. Patient did have Colonoscopy at [facility] 10 months ago.   GI recalled for significant abdominal distension and moderate stool burden in right colon. Pt remains on Morphine pump.    Abdominal Pain/ abnormal imaging/ Constipation, large stool burden and remains on Morphine drip by PCA pump :  - Please obtain Colonoscopy records from [facility]- done 10 months ago  - Continue bowel regimen, will give 2000 ml of GoLYTELY and give a dose of Methylnaltrexone today and can repeat tomorrow if no significant improvement.  - Reviewed imaging with Radiology , no obvious obstructing mass lesion on CT scan.  - Will schedule for inpatient colonoscopy after improvement in cardiopulmonary status and  after Pulmonary Clearance.   - Serial abdominal exam.  - GI will follow up.

## 2018-10-12 NOTE — PROGRESS NOTE ADULT - ASSESSMENT
recurrent effusion s/p thora 2 times l side, 1 thora r side, f/up r/o malignancy b/l exudative repeat cyto/ flow NEG (had ct/c/a/p -) S/P R PIGTAIL, VATS BIOPSY/ CT L SIDE/ abd pain    F/UP CYTO/ TUBE AS PER CT SX/ MODERATE RISK FOR COLONOSCOPY  INCENTIVE SPIROMETER  POOR PROGNOSIS

## 2018-10-13 LAB
ANION GAP SERPL CALC-SCNC: 12 MMOL/L — SIGNIFICANT CHANGE UP (ref 7–14)
APTT BLD: 47.3 SEC — HIGH (ref 27–39.2)
APTT BLD: 78.6 SEC — CRITICAL HIGH (ref 27–39.2)
BUN SERPL-MCNC: 10 MG/DL — SIGNIFICANT CHANGE UP (ref 10–20)
CALCIUM SERPL-MCNC: 8 MG/DL — LOW (ref 8.5–10.1)
CHLORIDE SERPL-SCNC: 92 MMOL/L — LOW (ref 98–110)
CO2 SERPL-SCNC: 27 MMOL/L — SIGNIFICANT CHANGE UP (ref 17–32)
CREAT SERPL-MCNC: 0.6 MG/DL — LOW (ref 0.7–1.5)
CULTURE RESULTS: SIGNIFICANT CHANGE UP
GLUCOSE SERPL-MCNC: 121 MG/DL — HIGH (ref 70–99)
HCT VFR BLD CALC: 32.1 % — LOW (ref 42–52)
HGB BLD-MCNC: 10.3 G/DL — LOW (ref 14–18)
MCHC RBC-ENTMCNC: 26.4 PG — LOW (ref 27–31)
MCHC RBC-ENTMCNC: 32.1 G/DL — SIGNIFICANT CHANGE UP (ref 32–37)
MCV RBC AUTO: 82.3 FL — SIGNIFICANT CHANGE UP (ref 80–94)
NRBC # BLD: 0 /100 WBCS — SIGNIFICANT CHANGE UP (ref 0–0)
PLATELET # BLD AUTO: 269 K/UL — SIGNIFICANT CHANGE UP (ref 130–400)
POTASSIUM SERPL-MCNC: 3.8 MMOL/L — SIGNIFICANT CHANGE UP (ref 3.5–5)
POTASSIUM SERPL-SCNC: 3.8 MMOL/L — SIGNIFICANT CHANGE UP (ref 3.5–5)
RBC # BLD: 3.9 M/UL — LOW (ref 4.7–6.1)
RBC # FLD: 15.3 % — HIGH (ref 11.5–14.5)
SODIUM SERPL-SCNC: 131 MMOL/L — LOW (ref 135–146)
SPECIMEN SOURCE: SIGNIFICANT CHANGE UP
WBC # BLD: 9.4 K/UL — SIGNIFICANT CHANGE UP (ref 4.8–10.8)
WBC # FLD AUTO: 9.4 K/UL — SIGNIFICANT CHANGE UP (ref 4.8–10.8)

## 2018-10-13 RX ORDER — HEPARIN SODIUM 5000 [USP'U]/ML
1900 INJECTION INTRAVENOUS; SUBCUTANEOUS
Qty: 25000 | Refills: 0 | Status: DISCONTINUED | OUTPATIENT
Start: 2018-10-13 | End: 2018-10-14

## 2018-10-13 RX ORDER — SOD SULF/SODIUM/NAHCO3/KCL/PEG
4000 SOLUTION, RECONSTITUTED, ORAL ORAL ONCE
Qty: 0 | Refills: 0 | Status: DISCONTINUED | OUTPATIENT
Start: 2018-10-13 | End: 2018-10-13

## 2018-10-13 RX ORDER — HEPARIN SODIUM 5000 [USP'U]/ML
1800 INJECTION INTRAVENOUS; SUBCUTANEOUS
Qty: 25000 | Refills: 0 | Status: DISCONTINUED | OUTPATIENT
Start: 2018-10-13 | End: 2018-10-13

## 2018-10-13 RX ORDER — ONDANSETRON 8 MG/1
4 TABLET, FILM COATED ORAL ONCE
Qty: 0 | Refills: 0 | Status: COMPLETED | OUTPATIENT
Start: 2018-10-13 | End: 2018-10-13

## 2018-10-13 RX ADMIN — Medication 20 MILLIGRAM(S): at 05:53

## 2018-10-13 RX ADMIN — HEPARIN SODIUM 1900 UNIT(S)/HR: 5000 INJECTION INTRAVENOUS; SUBCUTANEOUS at 17:29

## 2018-10-13 RX ADMIN — SIMETHICONE 80 MILLIGRAM(S): 80 TABLET, CHEWABLE ORAL at 21:37

## 2018-10-13 RX ADMIN — POLYETHYLENE GLYCOL 3350 17 GRAM(S): 17 POWDER, FOR SOLUTION ORAL at 21:36

## 2018-10-13 RX ADMIN — SIMETHICONE 80 MILLIGRAM(S): 80 TABLET, CHEWABLE ORAL at 15:03

## 2018-10-13 RX ADMIN — SIMETHICONE 80 MILLIGRAM(S): 80 TABLET, CHEWABLE ORAL at 05:54

## 2018-10-13 RX ADMIN — Medication 100 MILLIGRAM(S): at 11:32

## 2018-10-13 RX ADMIN — ONDANSETRON 4 MILLIGRAM(S): 8 TABLET, FILM COATED ORAL at 11:37

## 2018-10-13 RX ADMIN — CARBAMIDE PEROXIDE 5 DROP(S): 81.86 SOLUTION/ DROPS AURICULAR (OTIC) at 18:31

## 2018-10-13 RX ADMIN — TAMSULOSIN HYDROCHLORIDE 0.4 MILLIGRAM(S): 0.4 CAPSULE ORAL at 21:37

## 2018-10-13 RX ADMIN — DULOXETINE HYDROCHLORIDE 60 MILLIGRAM(S): 30 CAPSULE, DELAYED RELEASE ORAL at 11:32

## 2018-10-13 RX ADMIN — Medication 50 MILLIGRAM(S): at 21:37

## 2018-10-13 RX ADMIN — HEPARIN SODIUM 18 UNIT(S)/HR: 5000 INJECTION INTRAVENOUS; SUBCUTANEOUS at 03:28

## 2018-10-13 RX ADMIN — SENNA PLUS 2 TABLET(S): 8.6 TABLET ORAL at 21:36

## 2018-10-13 RX ADMIN — HEPARIN SODIUM 1900 UNIT(S)/HR: 5000 INJECTION INTRAVENOUS; SUBCUTANEOUS at 09:30

## 2018-10-13 RX ADMIN — ONDANSETRON 4 MILLIGRAM(S): 8 TABLET, FILM COATED ORAL at 05:58

## 2018-10-13 NOTE — PROGRESS NOTE ADULT - ASSESSMENT
57y/o M s/p L talc pleurodesis and R pigtail placement    PLAN:  - f/u CT output  - pain control  - Golytely for BM assistance if needed  - encourage ambulation  - f/u pathology  - f/u AM CXR  - adjust heparin for therapeutic value

## 2018-10-13 NOTE — PROGRESS NOTE ADULT - SUBJECTIVE AND OBJECTIVE BOX
GENERAL SURGERY PROGRESS NOTE    Patient: AMRIK MADRID , 58y ([birth year])Male   MRN: [id number]  Location: [address]  Visit: 09-21-18 Inpatient  Date: 10-13-18 @ 03:39    Hospital Day #: 22    Procedure/Dx/Injuries: s/p L TALC pleurodesis and pigtail placement    Events of past 24 hours: Pt had difficulty passing BM yesterday, for which he received an enema. He was started on Golytely. He admits to multiple bowel movements since, without difficulty. Pt complaining of mild abdominal pain/discomfort, the nature of which he couldn't describe, but is not more distended than previously. Abdominal X/R from yesterday showed non-obstructive bowel gas pattern.  No acute events overnight. Otherwise denies pain.     PAST MEDICAL & SURGICAL HISTORY:  Pleural effusion  CHF (congestive heart failure)  Insomnia  BPH (benign prostatic hyperplasia)  Depression  Bipolar 1 disorder  HTN (hypertension)  History of thoracentesis      Vitals: T(F): 97.2 (10-12-18 @ 23:48), Max: 97.8 (10-12-18 @ 16:28)  HR: 91 (10-12-18 @ 23:48)  BP: 119/78 (10-12-18 @ 23:48)  RR: 18 (10-12-18 @ 23:48)  SpO2: 95% (10-13-18 @ 03:35)      Diet, DASH/TLC:   Sodium & Cholesterol Restricted      Fluids:     I & O's:    10-11-18 @ 07:01  -  10-12-18 @ 07:00  --------------------------------------------------------  IN:    heparin  Infusion.: 80 mL    Oral Fluid: 600 mL    sodium chloride 0.9%.: 160 mL  Total IN: 840 mL    OUT:    Chest Tube: 200 mL    Chest Tube: 590 mL    Indwelling Catheter - Urethral: 1500 mL  Total OUT: 2290 mL    Total NET: -1450 mL        Bowel Movement: : [x] YES [] NO  Flatus: : [] YES [] NO    PHYSICAL EXAM  GEN: NAD  CV: RRR, no murmur  LUNGS: CTAB, nonlabored, no wheeze; L chest 2 y-connected CTs w/ serosanguinous fluid, R pigtail w/ serosanguinous fluid - all tubes to suction  ABD: distended, nontender, no guarding  EXT: FROM, no cyanosis or clubbing      MEDICATIONS  (STANDING):  acetaminophen   Tablet .. 650 milliGRAM(s) Oral every 6 hours  acetaminophen   Tablet .. 650 milliGRAM(s) Oral once  albumin human  5% IVPB 250 milliLiter(s) IV Intermittent once  BUpivacaine liposome 1.3% Injectable (no eMAR) 20 milliLiter(s) Local Injection once  carbamide peroxide Otic Solution 5 Drop(s) Left Ear two times a day  diltiazem    Tablet 30 milliGRAM(s) Oral every 8 hours  docusate sodium 100 milliGRAM(s) Oral daily  DULoxetine 60 milliGRAM(s) Oral daily  furosemide    Tablet 20 milliGRAM(s) Oral every 12 hours  heparin  Infusion 1800 Unit(s)/Hr (18 mL/Hr) IV Continuous <Continuous>  morphine PCA (5 mG/mL) 30 milliLiter(s) PCA Continuous PCA Continuous  polyethylene glycol 3350 17 Gram(s) Oral at bedtime  propranolol 20 milliGRAM(s) Oral two times a day  senna 2 Tablet(s) Oral at bedtime  simethicone 80 milliGRAM(s) Chew three times a day  sodium chloride 0.9%. 1000 milliLiter(s) (20 mL/Hr) IV Continuous <Continuous>  tamsulosin 0.4 milliGRAM(s) Oral at bedtime  traZODone 50 milliGRAM(s) Oral at bedtime    MEDICATIONS  (PRN):  naloxone Injectable 0.1 milliGRAM(s) IV Push every 3 minutes PRN For ANY of the following changes in patient status:  A. RR LESS THAN 10 breaths per minute, B. Oxygen saturation LESS THAN 90%, C. Sedation score of 6  ondansetron Injectable 4 milliGRAM(s) IV Push every 6 hours PRN Nausea  zolpidem 5 milliGRAM(s) Oral at bedtime PRN Insomnia      LAB/STUDIES:  CAPILLARY BLOOD GLUCOSE                              10.7   10.96 )-----------( 301      ( 12 Oct 2018 20:49 )             33.6     10-12    133<L>  |  93<L>  |  12  ----------------------------<  108<H>  4.0   |  27  |  0.7    Ca    8.4<L>      12 Oct 2018 20:49  Phos  3.2     10-12  Mg     1.8     10-12        PT/INR - ( 12 Oct 2018 11:05 )   PT: 12.70 sec;   INR: 1.11 ratio         PTT - ( 12 Oct 2018 20:49 )  PTT:47.7 sec            Culture - Fungal, Body Fluid (collected 10 Oct 2018 11:40)  Source: .Body Fluid None  Preliminary Report (12 Oct 2018 06:34):    Testing in progress    Culture - Acid Fast - Body Fluid w/Smear (collected 10 Oct 2018 11:40)  Source: .Body Fluid None    Culture - Body Fluid with Gram Stain (collected 10 Oct 2018 11:40)  Source: Pleural Fl None  Gram Stain (11 Oct 2018 06:04):    No polymorphonuclear leukocytes seen per low power field    No organisms seen per oil power field    by cytocentrifuge  Preliminary Report (11 Oct 2018 22:12):    No growth to date.

## 2018-10-13 NOTE — PROGRESS NOTE ADULT - ASSESSMENT
57 y/o M with PMH of HTN, known DVT ( on Heparin GTT), BPH, Depression, that presented to [facility] for SOB ( found to have a large Pleural effusion- followed by CT surgery and has catheter in place). Gastroenterology was initially consulted for abnormal CT imagining ( CT scan with thickening of Ascending Colon- cannot rule out malignancy). Of note patient notes longstanding history of abdominal pain and discomfort associated with constipation ( he does not follow a specific GI doctor but notes he had a colonoscopy 10 months ago and was found to have polyps). He currently notes diffuse abdominal pain, crampy, constant only improved by pain medication. Imaging done demonstrated Ascending colon circumferential thickening ( Radiology recommends direct visualization) but previous imaging here was without discrepancy. Patient did have Colonoscopy at [facility] 10 months ago.   GI recalled for significant abdominal distension and moderate stool burden in right colon. Pt remains on Morphine pump.    Abdominal Pain/ abnormal imaging/ Constipation, large stool burden and remains on Morphine drip by PCA pump :  - Please obtain Colonoscopy records from [facility]- done 10 months ago  - Continue bowel regimen, will give 2000 ml of GoLYTELY and give a dose of Methylnaltrexone today and can repeat tomorrow if no significant improvement.  - Reviewed imaging with Radiology , no obvious obstructing mass lesion on CT scan.  - Will schedule for inpatient colonoscopy after improvement in cardiopulmonary status and  after Pulmonary Clearance.   - Serial abdominal exam.  - GI will follow up. 57 y/o M with PMH of HTN, known DVT ( on Heparin GTT), BPH, Depression, that presented to [facility] for SOB ( found to have a large Pleural effusion- followed by CT surgery and has catheter in place). Gastroenterology was initially consulted for abnormal CT imagining ( CT scan with thickening of Ascending Colon- cannot rule out malignancy). Of note patient notes longstanding history of abdominal pain and discomfort associated with constipation ( he does not follow a specific GI doctor but notes he had a colonoscopy 10 months ago and was found to have polyps). He currently notes diffuse abdominal pain, crampy, constant only improved by pain medication. Imaging done demonstrated Ascending colon circumferential thickening ( Radiology recommends direct visualization) but previous imaging here was without discrepancy. Patient did have Colonoscopy at [facility] 10 months ago.   GI recalled for significant abdominal distension and moderate stool burden in right colon. Pt remains on Morphine pump.    Abdominal Pain/ abnormal imaging/ Constipation, large stool burden and remains on Morphine drip by PCA pump :  - Please obtain Colonoscopy records from [facility]- done 10 months ago  - Give a dose of Methylnaltrexone today and can repeat tomorrow if no significant improvement.  - Will schedule for inpatient colonoscopy after improvement in cardiopulmonary status and  after Pulmonary Clearance.   - Serial abdominal exam.  - GI will follow up. 57 y/o M with PMH of HTN, known DVT ( on Heparin GTT), BPH, Depression, that presented to [facility] for SOB ( found to have a large Pleural effusion- followed by CT surgery and has catheter in place). Gastroenterology was initially consulted for abnormal CT imagining ( CT scan with thickening of Ascending Colon- cannot rule out malignancy). Of note patient notes longstanding history of abdominal pain and discomfort associated with constipation ( he does not follow a specific GI doctor but notes he had a colonoscopy 10 months ago and was found to have polyps). He currently notes diffuse abdominal pain, crampy, constant only improved by pain medication. Imaging done demonstrated Ascending colon circumferential thickening ( Radiology recommends direct visualization) but previous imaging here was without discrepancy. Patient did have Colonoscopy at [facility] 10 months ago.   GI recalled for significant abdominal distension and moderate stool burden in right colon. Pt remains on Morphine pump.    1- Opioids induced constipation  - Please obtain Colonoscopy records from [facility]- done 10 months ago  - Give a dose of Methylnaltrexone today and can repeat tomorrow if no significant improvement.  - Serial abdominal exam.  - No need for GI intervention at this point    2- Anemia, likely of chronic disease.  Monitor H/H    3-Ascending colon circumferential thickening ( Radiology recommends direct visualization) but previous imaging here was without discrepancy. Patient did have Colonoscopy at [facility] 10 months ago. Please obtain records.  Consider f/up with same GI as outpt to clear this issue.      - will follow up.

## 2018-10-13 NOTE — PROGRESS NOTE ADULT - SUBJECTIVE AND OBJECTIVE BOX
GI Followup Note:   Pt seen and examined at bedside.   58 year old  Male seen  by GI  for moderate stool burden in the right colon in patient with Rt colon wall thickening Pt remains on Morphine drip.      Subjective:      REVIEW OF SYSTEMS:  Constitutional: No fever, weight loss or fatigue  Cardiovascular: No chest pain, palpitations, dizziness or leg swelling  Gastrointestinal: No abdominal or epigastric pain. No nausea, vomiting or hematemesis; No diarrhea or constipation. No melena or hematochezia.  Skin: No itching, burning, rashes or lesions     Allergies: No Known Allergies      Medications:   acetaminophen   Tablet .. 650 milliGRAM(s) Oral every 6 hours  acetaminophen   Tablet .. 650 milliGRAM(s) Oral once  albumin human  5% IVPB 250 milliLiter(s) IV Intermittent once  BUpivacaine liposome 1.3% Injectable (no eMAR) 20 milliLiter(s) Local Injection once  carbamide peroxide Otic Solution 5 Drop(s) Left Ear two times a day  diltiazem    Tablet 30 milliGRAM(s) Oral every 8 hours  docusate sodium 100 milliGRAM(s) Oral daily  DULoxetine 60 milliGRAM(s) Oral daily  furosemide    Tablet 20 milliGRAM(s) Oral every 12 hours  heparin  Infusion. 1900 Unit(s)/Hr IV Continuous <Continuous>  morphine PCA (5 mG/mL) 30 milliLiter(s) PCA Continuous PCA Continuous  naloxone Injectable 0.1 milliGRAM(s) IV Push every 3 minutes PRN  ondansetron Injectable 4 milliGRAM(s) IV Push every 6 hours PRN  polyethylene glycol 3350 17 Gram(s) Oral at bedtime  propranolol 20 milliGRAM(s) Oral two times a day  senna 2 Tablet(s) Oral at bedtime  simethicone 80 milliGRAM(s) Chew three times a day  sodium chloride 0.9%. 1000 milliLiter(s) IV Continuous <Continuous>  tamsulosin 0.4 milliGRAM(s) Oral at bedtime  traZODone 50 milliGRAM(s) Oral at bedtime  zolpidem 5 milliGRAM(s) Oral at bedtime PRN      PHYSICAL EXAM:    Vital Signs Last 24 Hrs  T(C): 36.7 (13 Oct 2018 07:57), Max: 36.7 (13 Oct 2018 07:57)  T(F): 98 (13 Oct 2018 07:57), Max: 98 (13 Oct 2018 07:57)  HR: 90 (13 Oct 2018 07:57) (87 - 107)  BP: 116/63 (13 Oct 2018 07:57) (116/63 - 121/64)  BP(mean): --  RR: 18 (13 Oct 2018 07:57) (18 - 18)  SpO2: 95% (13 Oct 2018 03:35) (95% - 98%)    10-12 @ 07:01  -  10-13 @ 07:00  --------------------------------------------------------  IN: 1176 mL / OUT: 1235 mL / NET: -59 mL    10-13 @ 07:01  -  10-13 @ 10:13  --------------------------------------------------------  IN: 480 mL / OUT: 300 mL / NET: 180 mL        General: Well developed; well nourished; in no acute distress  HEENT: MMM, conjunctiva and sclera clear  Lungs: Clear, no Rhonchi  Gastrointestinal: Soft non-tender non-distended; Normal bowel sounds; No hepatosplenomegaly. No rebound or guarding  Skin: Warm and dry. No obvious rash    LABS:                        10.3   9.40  )-----------( 269      ( 13 Oct 2018 08:09 )             32.1     MCV 82.3 (10-13-18)  82.6 (10-12-18)    RDW 15.3 (10-13-18)  15.3 (10-12-18)    10.3  10-13-18 @ 08:09  10.7  10-12-18 @ 20:49  11.3  10-12-18 @ 06:22  11.0  10-12-18 @ 01:36  10.8  10-11-18 @ 00:12        9.40  10-13-18 @ 08:09  10.96  10-12-18 @ 20:49  9.98  10-12-18 @ 06:22  9.71  10-12-18 @ 01:36  9.23  10-11-18 @ 00:12    10-13    131<L>  |  92<L>  |  10  ----------------------------<  121<H>  3.8   |  27  |  0.6<L>    Ca    8.0<L>      13 Oct 2018 08:09  Phos  3.2     10-12  Mg     1.8     10-12      Liver panel trend:    PT/INR - ( 12 Oct 2018 11:05 )   PT: 12.70 sec;   INR: 1.11 ratio         PTT - ( 13 Oct 2018 08:09 )  PTT:47.3 sec                  Culture - Fungal, Body Fluid (collected 10 Oct 2018 11:40)  Source: .Body Fluid None  Preliminary Report (12 Oct 2018 06:34):    Testing in progress    Culture - Acid Fast - Body Fluid w/Smear (collected 10 Oct 2018 11:40)  Source: .Body Fluid None    Culture - Body Fluid with Gram Stain (collected 10 Oct 2018 11:40)  Source: Pleural Fl None  Gram Stain (11 Oct 2018 06:04):    No polymorphonuclear leukocytes seen per low power field    No organisms seen per oil power field    by cytocentrifuge  Preliminary Report (11 Oct 2018 22:12):    No growth to date. GI Followup Note:   Pt seen and examined at bedside.   58 year old  Male seen  by GI  for moderate stool burden in the right colon in patient with Rt colon wall thickening Pt remains on Morphine drip.      Subjective:  Had a large BM witnessed by the RN  Complaining of crampy pain    REVIEW OF SYSTEMS:  Constitutional: No fever, weight loss or fatigue  Cardiovascular: No chest pain, palpitations, dizziness or leg swelling  Gastrointestinal: No abdominal or epigastric pain. No nausea, vomiting or hematemesis; No diarrhea or constipation. No melena or hematochezia.  Skin: No itching, burning, rashes or lesions     Allergies: No Known Allergies      Medications:   acetaminophen   Tablet .. 650 milliGRAM(s) Oral every 6 hours  acetaminophen   Tablet .. 650 milliGRAM(s) Oral once  albumin human  5% IVPB 250 milliLiter(s) IV Intermittent once  BUpivacaine liposome 1.3% Injectable (no eMAR) 20 milliLiter(s) Local Injection once  carbamide peroxide Otic Solution 5 Drop(s) Left Ear two times a day  diltiazem    Tablet 30 milliGRAM(s) Oral every 8 hours  docusate sodium 100 milliGRAM(s) Oral daily  DULoxetine 60 milliGRAM(s) Oral daily  furosemide    Tablet 20 milliGRAM(s) Oral every 12 hours  heparin  Infusion. 1900 Unit(s)/Hr IV Continuous <Continuous>  morphine PCA (5 mG/mL) 30 milliLiter(s) PCA Continuous PCA Continuous  naloxone Injectable 0.1 milliGRAM(s) IV Push every 3 minutes PRN  ondansetron Injectable 4 milliGRAM(s) IV Push every 6 hours PRN  polyethylene glycol 3350 17 Gram(s) Oral at bedtime  propranolol 20 milliGRAM(s) Oral two times a day  senna 2 Tablet(s) Oral at bedtime  simethicone 80 milliGRAM(s) Chew three times a day  sodium chloride 0.9%. 1000 milliLiter(s) IV Continuous <Continuous>  tamsulosin 0.4 milliGRAM(s) Oral at bedtime  traZODone 50 milliGRAM(s) Oral at bedtime  zolpidem 5 milliGRAM(s) Oral at bedtime PRN      PHYSICAL EXAM:    Vital Signs Last 24 Hrs  T(C): 36.7 (13 Oct 2018 07:57), Max: 36.7 (13 Oct 2018 07:57)  T(F): 98 (13 Oct 2018 07:57), Max: 98 (13 Oct 2018 07:57)  HR: 90 (13 Oct 2018 07:57) (87 - 107)  BP: 116/63 (13 Oct 2018 07:57) (116/63 - 121/64)  BP(mean): --  RR: 18 (13 Oct 2018 07:57) (18 - 18)  SpO2: 95% (13 Oct 2018 03:35) (95% - 98%)    10-12 @ 07:01  -  10-13 @ 07:00  --------------------------------------------------------  IN: 1176 mL / OUT: 1235 mL / NET: -59 mL    10-13 @ 07:01  -  10-13 @ 10:13  --------------------------------------------------------  IN: 480 mL / OUT: 300 mL / NET: 180 mL        General: Well developed; well nourished; in no acute distress  HEENT: MMM, conjunctiva and sclera clear  Lungs: Clear, no Rhonchi  Gastrointestinal: Soft non-tender non-distended; Normal bowel sounds; No hepatosplenomegaly. No rebound or guarding  Skin: Warm and dry. No obvious rash    LABS:                        10.3   9.40  )-----------( 269      ( 13 Oct 2018 08:09 )             32.1     MCV 82.3 (10-13-18)  82.6 (10-12-18)    RDW 15.3 (10-13-18)  15.3 (10-12-18)    10.3  10-13-18 @ 08:09  10.7  10-12-18 @ 20:49  11.3  10-12-18 @ 06:22  11.0  10-12-18 @ 01:36  10.8  10-11-18 @ 00:12        9.40  10-13-18 @ 08:09  10.96  10-12-18 @ 20:49  9.98  10-12-18 @ 06:22  9.71  10-12-18 @ 01:36  9.23  10-11-18 @ 00:12    10-13    131<L>  |  92<L>  |  10  ----------------------------<  121<H>  3.8   |  27  |  0.6<L>    Ca    8.0<L>      13 Oct 2018 08:09  Phos  3.2     10-12  Mg     1.8     10-12      Liver panel trend:    PT/INR - ( 12 Oct 2018 11:05 )   PT: 12.70 sec;   INR: 1.11 ratio         PTT - ( 13 Oct 2018 08:09 )  PTT:47.3 sec                  Culture - Fungal, Body Fluid (collected 10 Oct 2018 11:40)  Source: .Body Fluid None  Preliminary Report (12 Oct 2018 06:34):    Testing in progress    Culture - Acid Fast - Body Fluid w/Smear (collected 10 Oct 2018 11:40)  Source: .Body Fluid None    Culture - Body Fluid with Gram Stain (collected 10 Oct 2018 11:40)  Source: Pleural Fl None  Gram Stain (11 Oct 2018 06:04):    No polymorphonuclear leukocytes seen per low power field    No organisms seen per oil power field    by cytocentrifuge  Preliminary Report (11 Oct 2018 22:12):    No growth to date. GI Followup Note:   Pt seen and examined at bedside.   58 year old  Male seen  by GI  for moderate stool burden in the right colon in patient with Rt colon wall thickening Pt remains on Morphine drip.    Subjective:  Had a large BM witnessed by the RN  Complaining of crampy pain    REVIEW OF SYSTEMS  General:  No weight loss, fevers, or chills.  Eyes:  No reported pain or visual changes  ENT:  No sore throat or runny nose.  NECK: No stiffness or lymphadenopathy  CV:  No chest pain or palpitations.  Resp:  No shortness of breath, cough, wheezing or hemoptysis  GI:  No abdominal pain, nausea, vomiting, dysphagia, diarrhea or constipation. No rectal bleeding, melena, or hematemesis.  :  No dysuria, urinary incontinence or hematuria.  Muscle:  No aches or weakness  Neuro:  No tingling, numbness or vertigo  Psych:  No mood problems or irritability.  Endocrine:  No polyuria, polydipsia or cold/heat intolerance  Heme:  No ecchymosis or easy bruisability  All other review of systems is negative unless indicated above.    PHYSICAL EXAM:  GENERAL: AAOx3, no acute distress.  HEAD:  Atraumatic, Normocephalic  EYES: EOMI, PERRLA, conjunctiva and sclera clear  NECK: Supple, no JVD or thyromegaly  NODES: No palpable cervical or supraclavicular lymphadenopathy   CHEST/LUNG: Clear to auscultation bilaterally; No wheeze, rhonchi, or rales  HEART: Regular rate and rhythm; normal S1, S2, No murmurs.  ABDOMEN: Hard and distended- + bowel sounds.  EXTREMITIES:  2+ Peripheral Pulses. No clubbing, cyanosis, or edema  PSYCH:  Cooperative and appropriate, normal affect.  NEUROLOGY: No asterixis or tremor. Motor and sensory functions grossly intact  SKIN: Intact, no jaundice  EXTREMITIES: warm, no periph edema.  Rectal exam: not done.        Allergies: No Known Allergies      Medications:   acetaminophen   Tablet .. 650 milliGRAM(s) Oral every 6 hours  acetaminophen   Tablet .. 650 milliGRAM(s) Oral once  albumin human  5% IVPB 250 milliLiter(s) IV Intermittent once  BUpivacaine liposome 1.3% Injectable (no eMAR) 20 milliLiter(s) Local Injection once  carbamide peroxide Otic Solution 5 Drop(s) Left Ear two times a day  diltiazem    Tablet 30 milliGRAM(s) Oral every 8 hours  docusate sodium 100 milliGRAM(s) Oral daily  DULoxetine 60 milliGRAM(s) Oral daily  furosemide    Tablet 20 milliGRAM(s) Oral every 12 hours  heparin  Infusion. 1900 Unit(s)/Hr IV Continuous <Continuous>  morphine PCA (5 mG/mL) 30 milliLiter(s) PCA Continuous PCA Continuous  naloxone Injectable 0.1 milliGRAM(s) IV Push every 3 minutes PRN  ondansetron Injectable 4 milliGRAM(s) IV Push every 6 hours PRN  polyethylene glycol 3350 17 Gram(s) Oral at bedtime  propranolol 20 milliGRAM(s) Oral two times a day  senna 2 Tablet(s) Oral at bedtime  simethicone 80 milliGRAM(s) Chew three times a day  sodium chloride 0.9%. 1000 milliLiter(s) IV Continuous <Continuous>  tamsulosin 0.4 milliGRAM(s) Oral at bedtime  traZODone 50 milliGRAM(s) Oral at bedtime  zolpidem 5 milliGRAM(s) Oral at bedtime PRN    Vital Signs Last 24 Hrs  T(C): 36.7 (13 Oct 2018 07:57), Max: 36.7 (13 Oct 2018 07:57)  T(F): 98 (13 Oct 2018 07:57), Max: 98 (13 Oct 2018 07:57)  HR: 90 (13 Oct 2018 07:57) (87 - 107)  BP: 116/63 (13 Oct 2018 07:57) (116/63 - 121/64)  BP(mean): --  RR: 18 (13 Oct 2018 07:57) (18 - 18)  SpO2: 95% (13 Oct 2018 03:35) (95% - 98%)    10-12 @ 07:01  -  10-13 @ 07:00  --------------------------------------------------------  IN: 1176 mL / OUT: 1235 mL / NET: -59 mL    10-13 @ 07:01  -  10-13 @ 10:13  --------------------------------------------------------  IN: 480 mL / OUT: 300 mL / NET: 180 mL      LABS:                        10.3   9.40  )-----------( 269      ( 13 Oct 2018 08:09 )             32.1     MCV 82.3 (10-13-18)  82.6 (10-12-18)    RDW 15.3 (10-13-18)  15.3 (10-12-18)    10.3  10-13-18 @ 08:09  10.7  10-12-18 @ 20:49  11.3  10-12-18 @ 06:22  11.0  10-12-18 @ 01:36  10.8  10-11-18 @ 00:12        9.40  10-13-18 @ 08:09  10.96  10-12-18 @ 20:49  9.98  10-12-18 @ 06:22  9.71  10-12-18 @ 01:36  9.23  10-11-18 @ 00:12    10-13    131<L>  |  92<L>  |  10  ----------------------------<  121<H>  3.8   |  27  |  0.6<L>    Ca    8.0<L>      13 Oct 2018 08:09  Phos  3.2     10-12  Mg     1.8     10-12      Liver panel trend:    PT/INR - ( 12 Oct 2018 11:05 )   PT: 12.70 sec;   INR: 1.11 ratio    PTT - ( 13 Oct 2018 08:09 )  PTT:47.3 sec    Culture - Fungal, Body Fluid (collected 10 Oct 2018 11:40)  Source: .Body Fluid None  Preliminary Report (12 Oct 2018 06:34):    Testing in progress    Culture - Acid Fast - Body Fluid w/Smear (collected 10 Oct 2018 11:40)  Source: .Body Fluid None    Culture - Body Fluid with Gram Stain (collected 10 Oct 2018 11:40)  Source: Pleural Fl None  Gram Stain (11 Oct 2018 06:04):    No polymorphonuclear leukocytes seen per low power field    No organisms seen per oil power field    by cytocentrifuge  Preliminary Report (11 Oct 2018 22:12):    No growth to date. GI Followup Note:   Pt seen and examined at bedside.   58 year old  Male seen  by GI  for moderate stool burden in the right colon in patient with Rt colon wall thickening Pt remains on Morphine drip.    Subjective:  Had a large BM witnessed by the RN  Complaining of crampy pain    REVIEW OF SYSTEMS  General:  No weight loss, fevers, or chills.  Eyes:  No reported pain or visual changes  ENT:  No sore throat or runny nose.  NECK: No stiffness or lymphadenopathy  CV:  No chest pain or palpitations.  Resp:  No shortness of breath, cough, wheezing or hemoptysis  GI: minimal abdominal discomfort and distention, no vomiting, + constipation. No rectal bleeding, melena, or hematemesis.  :  No dysuria, urinary incontinence or hematuria.  Muscle:  No aches or weakness  Neuro:  No tingling, numbness or vertigo  Heme:  No ecchymosis or easy bruisability  All other review of systems is negative unless indicated above.    PHYSICAL EXAM:  GENERAL: AAOx3, no acute distress.  HEAD:  Atraumatic, Normocephalic  EYES: EOMI, PERRLA, conjunctiva and sclera clear  NECK: Supple, no JVD or thyromegaly  NODES: No palpable cervical or supraclavicular lymphadenopathy   CHEST/LUNG: Clear to auscultation bilaterally; No wheeze, rhonchi, or rales  HEART: Regular rate and rhythm; normal S1, S2, No murmurs.  ABDOMEN: Hard and distended- + bowel sounds.  EXTREMITIES:  2+ Peripheral Pulses. No clubbing, cyanosis, or edema  PSYCH:  Cooperative and appropriate, normal affect.  NEUROLOGY: No asterixis or tremor. Motor and sensory functions grossly intact  SKIN: Intact, no jaundice  EXTREMITIES: warm, no periph edema.  Rectal exam: not done.        Allergies: No Known Allergies      Medications:   acetaminophen   Tablet .. 650 milliGRAM(s) Oral every 6 hours  acetaminophen   Tablet .. 650 milliGRAM(s) Oral once  albumin human  5% IVPB 250 milliLiter(s) IV Intermittent once  BUpivacaine liposome 1.3% Injectable (no eMAR) 20 milliLiter(s) Local Injection once  carbamide peroxide Otic Solution 5 Drop(s) Left Ear two times a day  diltiazem    Tablet 30 milliGRAM(s) Oral every 8 hours  docusate sodium 100 milliGRAM(s) Oral daily  DULoxetine 60 milliGRAM(s) Oral daily  furosemide    Tablet 20 milliGRAM(s) Oral every 12 hours  heparin  Infusion. 1900 Unit(s)/Hr IV Continuous <Continuous>  morphine PCA (5 mG/mL) 30 milliLiter(s) PCA Continuous PCA Continuous  naloxone Injectable 0.1 milliGRAM(s) IV Push every 3 minutes PRN  ondansetron Injectable 4 milliGRAM(s) IV Push every 6 hours PRN  polyethylene glycol 3350 17 Gram(s) Oral at bedtime  propranolol 20 milliGRAM(s) Oral two times a day  senna 2 Tablet(s) Oral at bedtime  simethicone 80 milliGRAM(s) Chew three times a day  sodium chloride 0.9%. 1000 milliLiter(s) IV Continuous <Continuous>  tamsulosin 0.4 milliGRAM(s) Oral at bedtime  traZODone 50 milliGRAM(s) Oral at bedtime  zolpidem 5 milliGRAM(s) Oral at bedtime PRN    Vital Signs Last 24 Hrs  T(C): 36.7 (13 Oct 2018 07:57), Max: 36.7 (13 Oct 2018 07:57)  T(F): 98 (13 Oct 2018 07:57), Max: 98 (13 Oct 2018 07:57)  HR: 90 (13 Oct 2018 07:57) (87 - 107)  BP: 116/63 (13 Oct 2018 07:57) (116/63 - 121/64)  BP(mean): --  RR: 18 (13 Oct 2018 07:57) (18 - 18)  SpO2: 95% (13 Oct 2018 03:35) (95% - 98%)    10-12 @ 07:01  -  10-13 @ 07:00  --------------------------------------------------------  IN: 1176 mL / OUT: 1235 mL / NET: -59 mL    10-13 @ 07:01  -  10-13 @ 10:13  --------------------------------------------------------  IN: 480 mL / OUT: 300 mL / NET: 180 mL      LABS:                        10.3   9.40  )-----------( 269      ( 13 Oct 2018 08:09 )             32.1     MCV 82.3 (10-13-18)  82.6 (10-12-18)    RDW 15.3 (10-13-18)  15.3 (10-12-18)    10.3  10-13-18 @ 08:09  10.7  10-12-18 @ 20:49  11.3  10-12-18 @ 06:22  11.0  10-12-18 @ 01:36  10.8  10-11-18 @ 00:12        9.40  10-13-18 @ 08:09  10.96  10-12-18 @ 20:49  9.98  10-12-18 @ 06:22  9.71  10-12-18 @ 01:36  9.23  10-11-18 @ 00:12    10-13    131<L>  |  92<L>  |  10  ----------------------------<  121<H>  3.8   |  27  |  0.6<L>    Ca    8.0<L>      13 Oct 2018 08:09  Phos  3.2     10-12  Mg     1.8     10-12      Liver panel trend:    PT/INR - ( 12 Oct 2018 11:05 )   PT: 12.70 sec;   INR: 1.11 ratio    PTT - ( 13 Oct 2018 08:09 )  PTT:47.3 sec    Culture - Fungal, Body Fluid (collected 10 Oct 2018 11:40)  Source: .Body Fluid None  Preliminary Report (12 Oct 2018 06:34):    Testing in progress    Culture - Acid Fast - Body Fluid w/Smear (collected 10 Oct 2018 11:40)  Source: .Body Fluid None    Culture - Body Fluid with Gram Stain (collected 10 Oct 2018 11:40)  Source: Pleural Fl None  Gram Stain (11 Oct 2018 06:04):    No polymorphonuclear leukocytes seen per low power field    No organisms seen per oil power field    by cytocentrifuge  Preliminary Report (11 Oct 2018 22:12):    No growth to date. GI Followup Note:   Pt seen and examined at bedside.   58 year old  Male seen  by GI  for moderate stool burden in the right colon in patient with Rt colon wall thickening Pt remains on Morphine drip.    Subjective:  Had a large BM witnessed by the RN  Complaining of crampy pain    PAST MEDICAL & SURGICAL HISTORY:  Pleural effusion  CHF (congestive heart failure)  Insomnia  BPH (benign prostatic hyperplasia)  Depression  Bipolar 1 disorder  HTN (hypertension)  History of thoracentesis      FAMILY HISTORY:  No pertinent family history in first degree relatives    REVIEW OF SYSTEMS  General:  No weight loss, fevers, or chills.  Eyes:  No reported pain or visual changes  ENT:  No sore throat or runny nose.  NECK: No stiffness or lymphadenopathy  CV:  No chest pain or palpitations.  Resp:  No shortness of breath, cough, wheezing or hemoptysis  GI: minimal abdominal discomfort and distention, no vomiting, + constipation. No rectal bleeding, melena, or hematemesis.  :  No dysuria, urinary incontinence or hematuria.  Muscle:  No aches or weakness  Neuro:  No tingling, numbness or vertigo  Heme:  No ecchymosis or easy bruisability  All other review of systems is negative unless indicated above.    PHYSICAL EXAM:  GENERAL: AAOx3, no acute distress.  HEAD:  Atraumatic, Normocephalic  EYES: EOMI, PERRLA, conjunctiva and sclera clear  NECK: Supple, no JVD or thyromegaly  NODES: No palpable cervical or supraclavicular lymphadenopathy   CHEST/LUNG: Clear to auscultation bilaterally;  HEART: Regular rate and rhythm; normal S1, S2  ABDOMEN: Hard and distended, but not tense,  + bowel sounds.  EXTREMITIES:  2+ Peripheral Pulses. No clubbing, cyanosis, or edema  NEUROLOGY: Motor and sensory functions grossly intact  SKIN: Intact, no jaundice  EXTREMITIES: warm, no periph edema.  Rectal exam: not done.        Allergies: No Known Allergies      Medications:   acetaminophen   Tablet .. 650 milliGRAM(s) Oral every 6 hours  acetaminophen   Tablet .. 650 milliGRAM(s) Oral once  albumin human  5% IVPB 250 milliLiter(s) IV Intermittent once  BUpivacaine liposome 1.3% Injectable (no eMAR) 20 milliLiter(s) Local Injection once  carbamide peroxide Otic Solution 5 Drop(s) Left Ear two times a day  diltiazem    Tablet 30 milliGRAM(s) Oral every 8 hours  docusate sodium 100 milliGRAM(s) Oral daily  DULoxetine 60 milliGRAM(s) Oral daily  furosemide    Tablet 20 milliGRAM(s) Oral every 12 hours  heparin  Infusion. 1900 Unit(s)/Hr IV Continuous <Continuous>  morphine PCA (5 mG/mL) 30 milliLiter(s) PCA Continuous PCA Continuous  naloxone Injectable 0.1 milliGRAM(s) IV Push every 3 minutes PRN  ondansetron Injectable 4 milliGRAM(s) IV Push every 6 hours PRN  polyethylene glycol 3350 17 Gram(s) Oral at bedtime  propranolol 20 milliGRAM(s) Oral two times a day  senna 2 Tablet(s) Oral at bedtime  simethicone 80 milliGRAM(s) Chew three times a day  sodium chloride 0.9%. 1000 milliLiter(s) IV Continuous <Continuous>  tamsulosin 0.4 milliGRAM(s) Oral at bedtime  traZODone 50 milliGRAM(s) Oral at bedtime  zolpidem 5 milliGRAM(s) Oral at bedtime PRN    Vital Signs Last 24 Hrs  T(C): 36.7 (13 Oct 2018 07:57), Max: 36.7 (13 Oct 2018 07:57)  T(F): 98 (13 Oct 2018 07:57), Max: 98 (13 Oct 2018 07:57)  HR: 90 (13 Oct 2018 07:57) (87 - 107)  BP: 116/63 (13 Oct 2018 07:57) (116/63 - 121/64)  BP(mean): --  RR: 18 (13 Oct 2018 07:57) (18 - 18)  SpO2: 95% (13 Oct 2018 03:35) (95% - 98%)    10-12 @ 07:01  -  10-13 @ 07:00  --------------------------------------------------------  IN: 1176 mL / OUT: 1235 mL / NET: -59 mL    10-13 @ 07:01  -  10-13 @ 10:13  --------------------------------------------------------  IN: 480 mL / OUT: 300 mL / NET: 180 mL      LABS:                        10.3   9.40  )-----------( 269      ( 13 Oct 2018 08:09 )             32.1     MCV 82.3 (10-13-18)  82.6 (10-12-18)    RDW 15.3 (10-13-18)  15.3 (10-12-18)    10.3  10-13-18 @ 08:09  10.7  10-12-18 @ 20:49  11.3  10-12-18 @ 06:22  11.0  10-12-18 @ 01:36  10.8  10-11-18 @ 00:12        9.40  10-13-18 @ 08:09  10.96  10-12-18 @ 20:49  9.98  10-12-18 @ 06:22  9.71  10-12-18 @ 01:36  9.23  10-11-18 @ 00:12    10-13    131<L>  |  92<L>  |  10  ----------------------------<  121<H>  3.8   |  27  |  0.6<L>    Ca    8.0<L>      13 Oct 2018 08:09  Phos  3.2     10-12  Mg     1.8     10-12      Liver panel trend:    PT/INR - ( 12 Oct 2018 11:05 )   PT: 12.70 sec;   INR: 1.11 ratio    PTT - ( 13 Oct 2018 08:09 )  PTT:47.3 sec    Culture - Fungal, Body Fluid (collected 10 Oct 2018 11:40)  Source: .Body Fluid None  Preliminary Report (12 Oct 2018 06:34):    Testing in progress    Culture - Acid Fast - Body Fluid w/Smear (collected 10 Oct 2018 11:40)  Source: .Body Fluid None    Culture - Body Fluid with Gram Stain (collected 10 Oct 2018 11:40)  Source: Pleural Fl None  Gram Stain (11 Oct 2018 06:04):    No polymorphonuclear leukocytes seen per low power field    No organisms seen per oil power field    by cytocentrifuge  Preliminary Report (11 Oct 2018 22:12):    No growth to date.

## 2018-10-14 LAB
ANION GAP SERPL CALC-SCNC: 11 MMOL/L — SIGNIFICANT CHANGE UP (ref 7–14)
APTT BLD: 103.6 SEC — CRITICAL HIGH (ref 27–39.2)
APTT BLD: 79.5 SEC — CRITICAL HIGH (ref 27–39.2)
APTT BLD: 94.7 SEC — CRITICAL HIGH (ref 27–39.2)
BUN SERPL-MCNC: 7 MG/DL — LOW (ref 10–20)
CALCIUM SERPL-MCNC: 8.1 MG/DL — LOW (ref 8.5–10.1)
CHLORIDE SERPL-SCNC: 96 MMOL/L — LOW (ref 98–110)
CO2 SERPL-SCNC: 29 MMOL/L — SIGNIFICANT CHANGE UP (ref 17–32)
CREAT SERPL-MCNC: 0.6 MG/DL — LOW (ref 0.7–1.5)
GLUCOSE SERPL-MCNC: 109 MG/DL — HIGH (ref 70–99)
HCT VFR BLD CALC: 31 % — LOW (ref 42–52)
HCT VFR BLD CALC: 31.7 % — LOW (ref 42–52)
HGB BLD-MCNC: 10 G/DL — LOW (ref 14–18)
HGB BLD-MCNC: 10.1 G/DL — LOW (ref 14–18)
MCHC RBC-ENTMCNC: 26.5 PG — LOW (ref 27–31)
MCHC RBC-ENTMCNC: 26.7 PG — LOW (ref 27–31)
MCHC RBC-ENTMCNC: 31.9 G/DL — LOW (ref 32–37)
MCHC RBC-ENTMCNC: 32.3 G/DL — SIGNIFICANT CHANGE UP (ref 32–37)
MCV RBC AUTO: 82.9 FL — SIGNIFICANT CHANGE UP (ref 80–94)
MCV RBC AUTO: 83.2 FL — SIGNIFICANT CHANGE UP (ref 80–94)
NRBC # BLD: 0 /100 WBCS — SIGNIFICANT CHANGE UP (ref 0–0)
NRBC # BLD: 0 /100 WBCS — SIGNIFICANT CHANGE UP (ref 0–0)
PLATELET # BLD AUTO: 268 K/UL — SIGNIFICANT CHANGE UP (ref 130–400)
PLATELET # BLD AUTO: 283 K/UL — SIGNIFICANT CHANGE UP (ref 130–400)
POTASSIUM SERPL-MCNC: 3.8 MMOL/L — SIGNIFICANT CHANGE UP (ref 3.5–5)
POTASSIUM SERPL-SCNC: 3.8 MMOL/L — SIGNIFICANT CHANGE UP (ref 3.5–5)
RBC # BLD: 3.74 M/UL — LOW (ref 4.7–6.1)
RBC # BLD: 3.81 M/UL — LOW (ref 4.7–6.1)
RBC # FLD: 15 % — HIGH (ref 11.5–14.5)
RBC # FLD: 15.3 % — HIGH (ref 11.5–14.5)
SODIUM SERPL-SCNC: 136 MMOL/L — SIGNIFICANT CHANGE UP (ref 135–146)
WBC # BLD: 7.34 K/UL — SIGNIFICANT CHANGE UP (ref 4.8–10.8)
WBC # BLD: 7.35 K/UL — SIGNIFICANT CHANGE UP (ref 4.8–10.8)
WBC # FLD AUTO: 7.34 K/UL — SIGNIFICANT CHANGE UP (ref 4.8–10.8)
WBC # FLD AUTO: 7.35 K/UL — SIGNIFICANT CHANGE UP (ref 4.8–10.8)

## 2018-10-14 RX ORDER — HEPARIN SODIUM 5000 [USP'U]/ML
1700 INJECTION INTRAVENOUS; SUBCUTANEOUS
Qty: 25000 | Refills: 0 | Status: DISCONTINUED | OUTPATIENT
Start: 2018-10-14 | End: 2018-10-15

## 2018-10-14 RX ADMIN — Medication 50 MILLIGRAM(S): at 22:00

## 2018-10-14 RX ADMIN — SIMETHICONE 80 MILLIGRAM(S): 80 TABLET, CHEWABLE ORAL at 05:37

## 2018-10-14 RX ADMIN — TAMSULOSIN HYDROCHLORIDE 0.4 MILLIGRAM(S): 0.4 CAPSULE ORAL at 22:02

## 2018-10-14 RX ADMIN — Medication 100 MILLIGRAM(S): at 11:04

## 2018-10-14 RX ADMIN — Medication 650 MILLIGRAM(S): at 11:03

## 2018-10-14 RX ADMIN — HEPARIN SODIUM 17 UNIT(S)/HR: 5000 INJECTION INTRAVENOUS; SUBCUTANEOUS at 05:40

## 2018-10-14 RX ADMIN — SIMETHICONE 80 MILLIGRAM(S): 80 TABLET, CHEWABLE ORAL at 22:02

## 2018-10-14 RX ADMIN — DULOXETINE HYDROCHLORIDE 60 MILLIGRAM(S): 30 CAPSULE, DELAYED RELEASE ORAL at 11:04

## 2018-10-14 RX ADMIN — SODIUM CHLORIDE 20 MILLILITER(S): 9 INJECTION INTRAMUSCULAR; INTRAVENOUS; SUBCUTANEOUS at 05:40

## 2018-10-14 RX ADMIN — HEPARIN SODIUM 1700 UNIT(S)/HR: 5000 INJECTION INTRAVENOUS; SUBCUTANEOUS at 05:39

## 2018-10-14 RX ADMIN — Medication 20 MILLIGRAM(S): at 05:37

## 2018-10-14 RX ADMIN — SENNA PLUS 2 TABLET(S): 8.6 TABLET ORAL at 22:03

## 2018-10-14 RX ADMIN — Medication 650 MILLIGRAM(S): at 11:06

## 2018-10-14 RX ADMIN — SIMETHICONE 80 MILLIGRAM(S): 80 TABLET, CHEWABLE ORAL at 14:40

## 2018-10-14 RX ADMIN — ONDANSETRON 4 MILLIGRAM(S): 8 TABLET, FILM COATED ORAL at 10:51

## 2018-10-14 RX ADMIN — CARBAMIDE PEROXIDE 5 DROP(S): 81.86 SOLUTION/ DROPS AURICULAR (OTIC) at 17:30

## 2018-10-14 RX ADMIN — POLYETHYLENE GLYCOL 3350 17 GRAM(S): 17 POWDER, FOR SOLUTION ORAL at 22:01

## 2018-10-14 RX ADMIN — CARBAMIDE PEROXIDE 5 DROP(S): 81.86 SOLUTION/ DROPS AURICULAR (OTIC) at 05:37

## 2018-10-14 NOTE — PROGRESS NOTE ADULT - SUBJECTIVE AND OBJECTIVE BOX
GI Followup Note:   Pt seen and examined at bedside.   58y year old  Male seen  by GI  for abdominal distention    Subjective:  Passing gas  NPO  Awaiting an obstructing series  Endorses abdominal pain    REVIEW OF SYSTEMS  General:  No weight loss, fevers, or chills.  Eyes:  No reported pain or visual changes  ENT:  No sore throat or runny nose.  NECK: No stiffness or lymphadenopathy  CV:  No chest pain or palpitations.  Resp:  No shortness of breath, cough, wheezing or hemoptysis  GI:  No nausea, vomiting, dysphagia. No rectal bleeding, melena, or hematemesis.  :  No dysuria, urinary incontinence or hematuria.  Muscle:  No aches or weakness  Neuro:  No tingling, numbness or vertigo  Psych:  No mood problems or irritability.  Endocrine:  No polyuria, polydipsia or cold/heat intolerance  Heme:  No ecchymosis or easy bruisability  All other review of systems is negative unless indicated above.    PHYSICAL EXAM:  GENERAL: AAOx3, no acute distress.  HEAD:  Atraumatic, Normocephalic  EYES: EOMI, PERRLA, conjunctiva and sclera clear  NECK: Supple, no JVD or thyromegaly  NODES: No palpable cervical or supraclavicular lymphadenopathy   CHEST/LUNG: Clear to auscultation bilaterally; No wheeze, rhonchi, or rales  HEART: Regular rate and rhythm; normal S1, S2, No murmurs.  ABDOMEN: Distended but softer than yesterday- non tender  EXTREMITIES:  2+ Peripheral Pulses. No clubbing, cyanosis, or edema  PSYCH:  Cooperative and appropriate, normal affect.  NEUROLOGY: No asterixis or tremor. Motor and sensory functions grossly intact  SKIN: Intact, no jaundice  EXTREMITIES: warm, no periph edema.  Rectal exam: not done.    Allergies: No Known Allergies      Medications:  acetaminophen   Tablet .. 650 milliGRAM(s) Oral every 6 hours  acetaminophen   Tablet .. 650 milliGRAM(s) Oral once  albumin human  5% IVPB 250 milliLiter(s) IV Intermittent once  BUpivacaine liposome 1.3% Injectable (no eMAR) 20 milliLiter(s) Local Injection once  carbamide peroxide Otic Solution 5 Drop(s) Left Ear two times a day  diltiazem    Tablet 30 milliGRAM(s) Oral every 8 hours  docusate sodium 100 milliGRAM(s) Oral daily  DULoxetine 60 milliGRAM(s) Oral daily  furosemide    Tablet 20 milliGRAM(s) Oral every 12 hours  heparin  Infusion 1700 Unit(s)/Hr IV Continuous <Continuous>  morphine PCA (5 mG/mL) 30 milliLiter(s) PCA Continuous PCA Continuous  naloxone Injectable 0.1 milliGRAM(s) IV Push every 3 minutes PRN  ondansetron Injectable 4 milliGRAM(s) IV Push every 6 hours PRN  polyethylene glycol 3350 17 Gram(s) Oral at bedtime  propranolol 20 milliGRAM(s) Oral two times a day  senna 2 Tablet(s) Oral at bedtime  simethicone 80 milliGRAM(s) Chew three times a day  sodium chloride 0.9%. 1000 milliLiter(s) IV Continuous <Continuous>  tamsulosin 0.4 milliGRAM(s) Oral at bedtime  traZODone 50 milliGRAM(s) Oral at bedtime  zolpidem 5 milliGRAM(s) Oral at bedtime PRN      PHYSICAL EXAM:    Vital Signs Last 24 Hrs  T(C): 36.9 (14 Oct 2018 11:57), Max: 36.9 (14 Oct 2018 04:00)  T(F): 98.4 (14 Oct 2018 11:57), Max: 98.5 (14 Oct 2018 04:00)  HR: 92 (14 Oct 2018 11:57) (84 - 99)  BP: 118/72 (14 Oct 2018 11:57) (108/61 - 119/73)  BP(mean): --  RR: 18 (14 Oct 2018 11:57) (18 - 18)  SpO2: 97% (13 Oct 2018 19:31) (97% - 100%)    10-13 @ 07:01  -  10-14 @ 07:00  --------------------------------------------------------  IN: 1268 mL / OUT: 840 mL / NET: 428 mL    10-14 @ 07:01  -  10-14 @ 15:19  --------------------------------------------------------  IN: 480 mL / OUT: 1050 mL / NET: -570 mL          LABS:                        10.0   7.35  )-----------( 268      ( 14 Oct 2018 06:02 )             31.0     MCV 82.9 (10-14-18)  83.2 (10-14-18)    RDW 15.0 (10-14-18)  15.3 (10-14-18)    10.0  10-14-18 @ 06:02  10.1  10-14-18 @ 02:20  10.3  10-13-18 @ 08:09  10.7  10-12-18 @ 20:49  11.3  10-12-18 @ 06:22  11.0  10-12-18 @ 01:36        7.35  10-14-18 @ 06:02  7.34  10-14-18 @ 02:20  9.40  10-13-18 @ 08:09  10.96  10-12-18 @ 20:49  9.98  10-12-18 @ 06:22  9.71  10-12-18 @ 01:36    10-14    136  |  96<L>  |  7<L>  ----------------------------<  109<H>  3.8   |  29  |  0.6<L>    Ca    8.1<L>      14 Oct 2018 02:20  Phos  3.2     10-12  Mg     1.8     10-12      Liver panel trend:    PTT - ( 14 Oct 2018 11:11 )  PTT:94.7 sec GI Followup Note:   Pt seen and examined at bedside.   58y year old  Male seen  by GI  for abdominal distention    Subjective:  Passing gas  NPO  Awaiting an obstructing series  Endorses abdominal pain bu feels less distended.    PAST MEDICAL & SURGICAL HISTORY:  Pleural effusion  CHF (congestive heart failure)  Insomnia  BPH (benign prostatic hyperplasia)  Depression  Bipolar 1 disorder  HTN (hypertension)  History of thoracentesis    FAMILY HISTORY:  No pertinent family history in first degree relatives    REVIEW OF SYSTEMS  General:  No weight loss, fevers, or chills.  Eyes:  No reported pain or visual changes  ENT:  No sore throat or runny nose.  NECK: No stiffness or lymphadenopathy  CV:  No chest pain or palpitations.  Resp:  No shortness of breath, cough  GI:  No nausea, vomiting, dysphagia. No rectal bleeding, melena, or hematemesis.  :  No dysuria, urinary incontinence or hematuria.  Muscle:  No aches or weakness  Neuro:  No tingling, numbness or vertigo  Endocrine:  No polyuria, polydipsia or cold/heat intolerance  Heme:  No ecchymosis or easy bruisability  All other review of systems is negative unless indicated above.      Allergies: No Known Allergies      Medications:  acetaminophen   Tablet .. 650 milliGRAM(s) Oral every 6 hours  acetaminophen   Tablet .. 650 milliGRAM(s) Oral once  albumin human  5% IVPB 250 milliLiter(s) IV Intermittent once  BUpivacaine liposome 1.3% Injectable (no eMAR) 20 milliLiter(s) Local Injection once  carbamide peroxide Otic Solution 5 Drop(s) Left Ear two times a day  diltiazem    Tablet 30 milliGRAM(s) Oral every 8 hours  docusate sodium 100 milliGRAM(s) Oral daily  DULoxetine 60 milliGRAM(s) Oral daily  furosemide    Tablet 20 milliGRAM(s) Oral every 12 hours  heparin  Infusion 1700 Unit(s)/Hr IV Continuous <Continuous>  morphine PCA (5 mG/mL) 30 milliLiter(s) PCA Continuous PCA Continuous  naloxone Injectable 0.1 milliGRAM(s) IV Push every 3 minutes PRN  ondansetron Injectable 4 milliGRAM(s) IV Push every 6 hours PRN  polyethylene glycol 3350 17 Gram(s) Oral at bedtime  propranolol 20 milliGRAM(s) Oral two times a day  senna 2 Tablet(s) Oral at bedtime  simethicone 80 milliGRAM(s) Chew three times a day  sodium chloride 0.9%. 1000 milliLiter(s) IV Continuous <Continuous>  tamsulosin 0.4 milliGRAM(s) Oral at bedtime  traZODone 50 milliGRAM(s) Oral at bedtime  zolpidem 5 milliGRAM(s) Oral at bedtime PRN      PHYSICAL EXAM:    Vital Signs Last 24 Hrs  T(C): 36.9 (14 Oct 2018 11:57), Max: 36.9 (14 Oct 2018 04:00)  T(F): 98.4 (14 Oct 2018 11:57), Max: 98.5 (14 Oct 2018 04:00)  HR: 92 (14 Oct 2018 11:57) (84 - 99)  BP: 118/72 (14 Oct 2018 11:57) (108/61 - 119/73)  BP(mean): --  RR: 18 (14 Oct 2018 11:57) (18 - 18)  SpO2: 97% (13 Oct 2018 19:31) (97% - 100%)    10-13 @ 07:01  -  10-14 @ 07:00  --------------------------------------------------------  IN: 1268 mL / OUT: 840 mL / NET: 428 mL    10-14 @ 07:01  -  10-14 @ 15:19  --------------------------------------------------------  IN: 480 mL / OUT: 1050 mL / NET: -570 mL    GENERAL: AAOx3, no acute distress.  HEAD:  Atraumatic, Normocephalic  EYES: EOMI, PERRLA, conjunctiva and sclera clear  NECK: Supple, no JVD or thyromegaly  NODES: No palpable cervical or supraclavicular lymphadenopathy   CHEST/LUNG: Clear to auscultation bilaterally; No wheeze, rhonchi, or rales  HEART: Regular rate and rhythm; normal S1, S2, No murmurs.  ABDOMEN: Distended but softer than yesterday- non tender  EXTREMITIES:  2+ Peripheral Pulses. No clubbing, cyanosis, or edema  NEUROLOGY: No asterixis or tremor. Motor and sensory functions grossly intact  SKIN: Intact, no jaundice  EXTREMITIES: warm, no periph edema.  Rectal exam: not done.        LABS:                        10.0   7.35  )-----------( 268      ( 14 Oct 2018 06:02 )             31.0     MCV 82.9 (10-14-18)  83.2 (10-14-18)    RDW 15.0 (10-14-18)  15.3 (10-14-18)    10.0  10-14-18 @ 06:02  10.1  10-14-18 @ 02:20  10.3  10-13-18 @ 08:09  10.7  10-12-18 @ 20:49  11.3  10-12-18 @ 06:22  11.0  10-12-18 @ 01:36        7.35  10-14-18 @ 06:02  7.34  10-14-18 @ 02:20  9.40  10-13-18 @ 08:09  10.96  10-12-18 @ 20:49  9.98  10-12-18 @ 06:22  9.71  10-12-18 @ 01:36    10-14    136  |  96<L>  |  7<L>  ----------------------------<  109<H>  3.8   |  29  |  0.6<L>    Ca    8.1<L>      14 Oct 2018 02:20  Phos  3.2     10-12  Mg     1.8     10-12      Liver panel trend:    PTT - ( 14 Oct 2018 11:11 )  PTT:94.7 sec      < from: Xray Abdomen 2 Views (10.14.18 @ 13:33) >  IMPRESSION:  Dilated bowel loops measuring up to 9 cm with multiple air-fluid levels.      < end of copied text >

## 2018-10-14 NOTE — PROGRESS NOTE ADULT - SUBJECTIVE AND OBJECTIVE BOX
Hospital Day: 10  Post Operative Day:9  Procedure:s/p left talc pleurodesis  Patient is a 58y old  Male who presents with a chief complaint of worsening SOB (13 Oct 2018 10:12)    PAST MEDICAL & SURGICAL HISTORY:  Pleural effusion  CHF (congestive heart failure)  Insomnia  BPH (benign prostatic hyperplasia)  Depression  Bipolar 1 disorder  HTN (hypertension)  History of thoracentesis      Events of the Last 24h:none  Vital Signs Last 24 Hrs  T(C): 36.4 (13 Oct 2018 23:56), Max: 36.7 (13 Oct 2018 07:57)  T(F): 97.5 (13 Oct 2018 23:56), Max: 98 (13 Oct 2018 07:57)  HR: 95 (13 Oct 2018 23:56) (90 - 99)  BP: 118/67 (13 Oct 2018 23:56) (108/61 - 118/68)  BP(mean): --  RR: 18 (13 Oct 2018 23:56) (18 - 18)  SpO2: 97% (13 Oct 2018 19:31) (95% - 100%)        Diet, DASH/TLC:   Sodium & Cholesterol Restricted (10-10-18 @ 17:58)      I&O's Summary    12 Oct 2018 07:01  -  13 Oct 2018 07:00  --------------------------------------------------------  IN: 1176 mL / OUT: 1235 mL / NET: -59 mL    13 Oct 2018 07:01  -  14 Oct 2018 01:03  --------------------------------------------------------  IN: 960 mL / OUT: 800 mL / NET: 160 mL     I&O's Detail    12 Oct 2018 07:01  -  13 Oct 2018 07:00  --------------------------------------------------------  IN:    heparin  Infusion.: 40 mL    heparin Infusion: 36 mL    Oral Fluid: 960 mL    sodium chloride 0.9%.: 140 mL  Total IN: 1176 mL    OUT:    Chest Tube: 450 mL    Chest Tube: 85 mL    Indwelling Catheter - Urethral: 150 mL    Voided: 550 mL  Total OUT: 1235 mL    Total NET: -59 mL      13 Oct 2018 07:01  -  14 Oct 2018 01:03  --------------------------------------------------------  IN:    Oral Fluid: 960 mL  Total IN: 960 mL    OUT:    Voided: 800 mL  Total OUT: 800 mL    Total NET: 160 mL          MEDICATIONS  (STANDING):  acetaminophen   Tablet .. 650 milliGRAM(s) Oral every 6 hours  acetaminophen   Tablet .. 650 milliGRAM(s) Oral once  albumin human  5% IVPB 250 milliLiter(s) IV Intermittent once  BUpivacaine liposome 1.3% Injectable (no eMAR) 20 milliLiter(s) Local Injection once  carbamide peroxide Otic Solution 5 Drop(s) Left Ear two times a day  diltiazem    Tablet 30 milliGRAM(s) Oral every 8 hours  docusate sodium 100 milliGRAM(s) Oral daily  DULoxetine 60 milliGRAM(s) Oral daily  furosemide    Tablet 20 milliGRAM(s) Oral every 12 hours  heparin  Infusion. 1900 Unit(s)/Hr (19 mL/Hr) IV Continuous <Continuous>  morphine PCA (5 mG/mL) 30 milliLiter(s) PCA Continuous PCA Continuous  polyethylene glycol 3350 17 Gram(s) Oral at bedtime  propranolol 20 milliGRAM(s) Oral two times a day  senna 2 Tablet(s) Oral at bedtime  simethicone 80 milliGRAM(s) Chew three times a day  sodium chloride 0.9%. 1000 milliLiter(s) (20 mL/Hr) IV Continuous <Continuous>  tamsulosin 0.4 milliGRAM(s) Oral at bedtime  traZODone 50 milliGRAM(s) Oral at bedtime    MEDICATIONS  (PRN):  naloxone Injectable 0.1 milliGRAM(s) IV Push every 3 minutes PRN For ANY of the following changes in patient status:  A. RR LESS THAN 10 breaths per minute, B. Oxygen saturation LESS THAN 90%, C. Sedation score of 6  ondansetron Injectable 4 milliGRAM(s) IV Push every 6 hours PRN Nausea  zolpidem 5 milliGRAM(s) Oral at bedtime PRN Insomnia      PHYSICAL EXAM:    GENERAL: NAD    HEENT: NCAT    CHEST/LUNGS: CTAB, right pitail to suction no airleak, left y connected ct to suction no airleak    HEART: RRR,  No murmurs, rubs, or gallops    ABDOMEN: SNTND +BS    EXTREMITIES:  FROM, No clubbing, cyanosis, or edema, palpable pulse    NEURO: No focal neurological deficits    SKIN: No rashes or lesions    INCISION/WOUNDS:                          10.3   9.40  )-----------( 269      ( 13 Oct 2018 08:09 )             32.1        CBC Full  -  ( 13 Oct 2018 08:09 )  WBC Count : 9.40 K/uL  Hemoglobin : 10.3 g/dL  Hematocrit : 32.1 %  Platelet Count - Automated : 269 K/uL  Mean Cell Volume : 82.3 fL  Mean Cell Hemoglobin : 26.4 pg  Mean Cell Hemoglobin Concentration : 32.1 g/dL  Auto Neutrophil # : x  Auto Lymphocyte # : x  Auto Monocyte # : x  Auto Eosinophil # : x  Auto Basophil # : x  Auto Neutrophil % : x  Auto Lymphocyte % : x  Auto Monocyte % : x  Auto Eosinophil % : x  Auto Basophil % : x               131   |  92    |  10                 Ca: 8.0    BMP:   ----------------------------< 121    Mg: x     (10-13-18 @ 08:09)             3.8    |  27    | 0.6                Ph: x            PT/INR - ( 12 Oct 2018 11:05 )   PT: 12.70 sec;   INR: 1.11 ratio         PTT - ( 13 Oct 2018 15:50 )  PTT:78.6 sec          < from: Xray Chest 1 View- PORTABLE-Urgent (10.13.18 @ 10:05) >  Support devices: Stable left chest tubes.    Cardiac/mediastinum/hilum: Stable cardiomegaly.    Lung parenchyma/Pleura: Unchanged bilateral opacities and effusions. No   pneumothorax.    Skeleton/soft tissues: Stable.    Impression:      Unchanged bilateral opacities and effusions.    < end of copied text > Hospital Day: 23  Post Operative Day:4  Procedure:s/p left talc pleurodesis  Patient is a 58y old  Male who presents with a chief complaint of worsening SOB (13 Oct 2018 10:12)    PAST MEDICAL & SURGICAL HISTORY:  Pleural effusion  CHF (congestive heart failure)  Insomnia  BPH (benign prostatic hyperplasia)  Depression  Bipolar 1 disorder  HTN (hypertension)  History of thoracentesis      Events of the Last 24h:none  Vital Signs Last 24 Hrs  T(C): 36.4 (13 Oct 2018 23:56), Max: 36.7 (13 Oct 2018 07:57)  T(F): 97.5 (13 Oct 2018 23:56), Max: 98 (13 Oct 2018 07:57)  HR: 95 (13 Oct 2018 23:56) (90 - 99)  BP: 118/67 (13 Oct 2018 23:56) (108/61 - 118/68)  BP(mean): --  RR: 18 (13 Oct 2018 23:56) (18 - 18)  SpO2: 97% (13 Oct 2018 19:31) (95% - 100%)        Diet, DASH/TLC:   Sodium & Cholesterol Restricted (10-10-18 @ 17:58)      I&O's Summary    12 Oct 2018 07:01  -  13 Oct 2018 07:00  --------------------------------------------------------  IN: 1176 mL / OUT: 1235 mL / NET: -59 mL    13 Oct 2018 07:01  -  14 Oct 2018 01:03  --------------------------------------------------------  IN: 960 mL / OUT: 800 mL / NET: 160 mL     I&O's Detail    12 Oct 2018 07:01  -  13 Oct 2018 07:00  --------------------------------------------------------  IN:    heparin  Infusion.: 40 mL    heparin Infusion: 36 mL    Oral Fluid: 960 mL    sodium chloride 0.9%.: 140 mL  Total IN: 1176 mL    OUT:    Chest Tube: 450 mL    Chest Tube: 85 mL    Indwelling Catheter - Urethral: 150 mL    Voided: 550 mL  Total OUT: 1235 mL    Total NET: -59 mL      13 Oct 2018 07:01  -  14 Oct 2018 01:03  --------------------------------------------------------  IN:    Oral Fluid: 960 mL  Total IN: 960 mL    OUT:    Voided: 800 mL  Total OUT: 800 mL    Total NET: 160 mL          MEDICATIONS  (STANDING):  acetaminophen   Tablet .. 650 milliGRAM(s) Oral every 6 hours  acetaminophen   Tablet .. 650 milliGRAM(s) Oral once  albumin human  5% IVPB 250 milliLiter(s) IV Intermittent once  BUpivacaine liposome 1.3% Injectable (no eMAR) 20 milliLiter(s) Local Injection once  carbamide peroxide Otic Solution 5 Drop(s) Left Ear two times a day  diltiazem    Tablet 30 milliGRAM(s) Oral every 8 hours  docusate sodium 100 milliGRAM(s) Oral daily  DULoxetine 60 milliGRAM(s) Oral daily  furosemide    Tablet 20 milliGRAM(s) Oral every 12 hours  heparin  Infusion. 1900 Unit(s)/Hr (19 mL/Hr) IV Continuous <Continuous>  morphine PCA (5 mG/mL) 30 milliLiter(s) PCA Continuous PCA Continuous  polyethylene glycol 3350 17 Gram(s) Oral at bedtime  propranolol 20 milliGRAM(s) Oral two times a day  senna 2 Tablet(s) Oral at bedtime  simethicone 80 milliGRAM(s) Chew three times a day  sodium chloride 0.9%. 1000 milliLiter(s) (20 mL/Hr) IV Continuous <Continuous>  tamsulosin 0.4 milliGRAM(s) Oral at bedtime  traZODone 50 milliGRAM(s) Oral at bedtime    MEDICATIONS  (PRN):  naloxone Injectable 0.1 milliGRAM(s) IV Push every 3 minutes PRN For ANY of the following changes in patient status:  A. RR LESS THAN 10 breaths per minute, B. Oxygen saturation LESS THAN 90%, C. Sedation score of 6  ondansetron Injectable 4 milliGRAM(s) IV Push every 6 hours PRN Nausea  zolpidem 5 milliGRAM(s) Oral at bedtime PRN Insomnia      PHYSICAL EXAM:    GENERAL: NAD    HEENT: NCAT    CHEST/LUNGS: CTAB, right pitail to suction no airleak, left y connected ct to suction no airleak    HEART: RRR,  No murmurs, rubs, or gallops    ABDOMEN: SNTND +BS    EXTREMITIES:  FROM, No clubbing, cyanosis, or edema, palpable pulse    NEURO: No focal neurological deficits    SKIN: No rashes or lesions    INCISION/WOUNDS:                          10.3   9.40  )-----------( 269      ( 13 Oct 2018 08:09 )             32.1        CBC Full  -  ( 13 Oct 2018 08:09 )  WBC Count : 9.40 K/uL  Hemoglobin : 10.3 g/dL  Hematocrit : 32.1 %  Platelet Count - Automated : 269 K/uL  Mean Cell Volume : 82.3 fL  Mean Cell Hemoglobin : 26.4 pg  Mean Cell Hemoglobin Concentration : 32.1 g/dL  Auto Neutrophil # : x  Auto Lymphocyte # : x  Auto Monocyte # : x  Auto Eosinophil # : x  Auto Basophil # : x  Auto Neutrophil % : x  Auto Lymphocyte % : x  Auto Monocyte % : x  Auto Eosinophil % : x  Auto Basophil % : x               131   |  92    |  10                 Ca: 8.0    BMP:   ----------------------------< 121    Mg: x     (10-13-18 @ 08:09)             3.8    |  27    | 0.6                Ph: x            PT/INR - ( 12 Oct 2018 11:05 )   PT: 12.70 sec;   INR: 1.11 ratio         PTT - ( 13 Oct 2018 15:50 )  PTT:78.6 sec          < from: Xray Chest 1 View- PORTABLE-Urgent (10.13.18 @ 10:05) >  Support devices: Stable left chest tubes.    Cardiac/mediastinum/hilum: Stable cardiomegaly.    Lung parenchyma/Pleura: Unchanged bilateral opacities and effusions. No   pneumothorax.    Skeleton/soft tissues: Stable.    Impression:      Unchanged bilateral opacities and effusions.    < end of copied text >

## 2018-10-14 NOTE — PROGRESS NOTE ADULT - ASSESSMENT
daily chest xrays  pain management  follow up chest tube outputs  all chest tubes to suction  regular diet daily chest xrays  pain management  follow up chest tube outputs  all chest tubes to suction  regular diet   hep drip  monitor ptt adjust accordingly

## 2018-10-14 NOTE — PROGRESS NOTE ADULT - ASSESSMENT
57 y/o M with PMH of HTN, known DVT ( on Heparin GTT), BPH, Depression, that presented to [facility] for SOB ( found to have a large Pleural effusion- followed by CT surgery and has catheter in place). Gastroenterology was initially consulted for abnormal CT imagining ( CT scan with thickening of Ascending Colon- cannot rule out malignancy). Of note patient notes longstanding history of abdominal pain and discomfort associated with constipation ( he does not follow a specific GI doctor but notes he had a colonoscopy 10 months ago and was found to have polyps). He currently notes diffuse abdominal pain, crampy, constant only improved by pain medication. Imaging done demonstrated Ascending colon circumferential thickening ( Radiology recommends direct visualization) but previous imaging here was without discrepancy. Patient did have Colonoscopy at [facility], [place] 10 months ago.   GI recalled for significant abdominal distension and moderate stool burden in right colon. Pt remains on Morphine pump.    Abdominal Pain/ abnormal imaging/ Constipation, large stool burden and remains on Morphine drip by PCA pump :  - Please obtain Colonoscopy records from [facility], [place]- done 10 months ago  - Give a dose of Methylnaltrexone today and can repeat tomorrow if no significant improvement.  - Will schedule for inpatient colonoscopy after improvement in cardiopulmonary status and  after Pulmonary Clearance.   - Serial abdominal exam.  - GI will follow up. 57 y/o M with PMH of HTN, known DVT ( on Heparin GTT), BPH, Depression, that presented to [facility] for SOB ( found to have a large Pleural effusion- followed by CT surgery and has catheter in place). Gastroenterology was initially consulted for abnormal CT imagining ( CT scan with thickening of Ascending Colon- cannot rule out malignancy). Of note patient notes longstanding history of abdominal pain and discomfort associated with constipation ( he does not follow a specific GI doctor but notes he had a colonoscopy 10 months ago and was found to have polyps). He currently notes diffuse abdominal pain, crampy, constant only improved by pain medication. Imaging done demonstrated Ascending colon circumferential thickening ( Radiology recommends direct visualization) but previous imaging here was without discrepancy. Patient did have Colonoscopy at [facility] 10 months ago.   GI recalled for significant abdominal distension and moderate stool burden in right colon. Pt remains on Morphine pump.    1- Opioids induced constipation  - Please obtain Colonoscopy records from [facility]- done 10 months ago  - Give a dose of Methylnaltrexone today and can repeat tomorrow if no significant improvement.  - Serial abdominal exam.  - No need for GI intervention at this point  if no improvement after relistor, will need CT scan of abdomen.    2- Anemia, likely of chronic disease.  Monitor H/H    will f/up

## 2018-10-15 LAB
ANION GAP SERPL CALC-SCNC: 14 MMOL/L — SIGNIFICANT CHANGE UP (ref 7–14)
APTT BLD: 33.5 SEC — SIGNIFICANT CHANGE UP (ref 27–39.2)
BUN SERPL-MCNC: 8 MG/DL — LOW (ref 10–20)
CALCIUM SERPL-MCNC: 8.1 MG/DL — LOW (ref 8.5–10.1)
CHLORIDE SERPL-SCNC: 95 MMOL/L — LOW (ref 98–110)
CO2 SERPL-SCNC: 27 MMOL/L — SIGNIFICANT CHANGE UP (ref 17–32)
CREAT SERPL-MCNC: 0.7 MG/DL — SIGNIFICANT CHANGE UP (ref 0.7–1.5)
GLUCOSE SERPL-MCNC: 99 MG/DL — SIGNIFICANT CHANGE UP (ref 70–99)
HCT VFR BLD CALC: 33.1 % — LOW (ref 42–52)
HCT VFR BLD CALC: 35.1 % — LOW (ref 42–52)
HGB BLD-MCNC: 10.5 G/DL — LOW (ref 14–18)
HGB BLD-MCNC: 11 G/DL — LOW (ref 14–18)
MCHC RBC-ENTMCNC: 26 PG — LOW (ref 27–31)
MCHC RBC-ENTMCNC: 26.3 PG — LOW (ref 27–31)
MCHC RBC-ENTMCNC: 31.3 G/DL — LOW (ref 32–37)
MCHC RBC-ENTMCNC: 31.7 G/DL — LOW (ref 32–37)
MCV RBC AUTO: 82.8 FL — SIGNIFICANT CHANGE UP (ref 80–94)
MCV RBC AUTO: 83 FL — SIGNIFICANT CHANGE UP (ref 80–94)
NRBC # BLD: 0 /100 WBCS — SIGNIFICANT CHANGE UP (ref 0–0)
NRBC # BLD: 0 /100 WBCS — SIGNIFICANT CHANGE UP (ref 0–0)
PLATELET # BLD AUTO: 265 K/UL — SIGNIFICANT CHANGE UP (ref 130–400)
PLATELET # BLD AUTO: 292 K/UL — SIGNIFICANT CHANGE UP (ref 130–400)
POTASSIUM SERPL-MCNC: 4.1 MMOL/L — SIGNIFICANT CHANGE UP (ref 3.5–5)
POTASSIUM SERPL-SCNC: 4.1 MMOL/L — SIGNIFICANT CHANGE UP (ref 3.5–5)
RBC # BLD: 4 M/UL — LOW (ref 4.7–6.1)
RBC # BLD: 4.23 M/UL — LOW (ref 4.7–6.1)
RBC # FLD: 15 % — HIGH (ref 11.5–14.5)
RBC # FLD: 15 % — HIGH (ref 11.5–14.5)
SODIUM SERPL-SCNC: 136 MMOL/L — SIGNIFICANT CHANGE UP (ref 135–146)
WBC # BLD: 6.89 K/UL — SIGNIFICANT CHANGE UP (ref 4.8–10.8)
WBC # BLD: 7.57 K/UL — SIGNIFICANT CHANGE UP (ref 4.8–10.8)
WBC # FLD AUTO: 6.89 K/UL — SIGNIFICANT CHANGE UP (ref 4.8–10.8)
WBC # FLD AUTO: 7.57 K/UL — SIGNIFICANT CHANGE UP (ref 4.8–10.8)

## 2018-10-15 RX ORDER — IOHEXOL 300 MG/ML
30 INJECTION, SOLUTION INTRAVENOUS ONCE
Qty: 0 | Refills: 0 | Status: COMPLETED | OUTPATIENT
Start: 2018-10-15 | End: 2018-10-15

## 2018-10-15 RX ORDER — MORPHINE SULFATE 50 MG/1
2 CAPSULE, EXTENDED RELEASE ORAL EVERY 4 HOURS
Qty: 0 | Refills: 0 | Status: DISCONTINUED | OUTPATIENT
Start: 2018-10-15 | End: 2018-10-17

## 2018-10-15 RX ORDER — METHYLNALTREXONE BROMIDE 12 MG/.6ML
12 INJECTION, SOLUTION SUBCUTANEOUS ONCE
Qty: 0 | Refills: 0 | Status: COMPLETED | OUTPATIENT
Start: 2018-10-15 | End: 2018-10-15

## 2018-10-15 RX ORDER — ONDANSETRON 8 MG/1
4 TABLET, FILM COATED ORAL EVERY 6 HOURS
Qty: 0 | Refills: 0 | Status: DISCONTINUED | OUTPATIENT
Start: 2018-10-15 | End: 2018-10-18

## 2018-10-15 RX ORDER — CALCIUM GLUCONATE 100 MG/ML
2 VIAL (ML) INTRAVENOUS ONCE
Qty: 0 | Refills: 0 | Status: COMPLETED | OUTPATIENT
Start: 2018-10-15 | End: 2018-10-15

## 2018-10-15 RX ADMIN — DULOXETINE HYDROCHLORIDE 60 MILLIGRAM(S): 30 CAPSULE, DELAYED RELEASE ORAL at 11:37

## 2018-10-15 RX ADMIN — MORPHINE SULFATE 2 MILLIGRAM(S): 50 CAPSULE, EXTENDED RELEASE ORAL at 09:23

## 2018-10-15 RX ADMIN — MORPHINE SULFATE 2 MILLIGRAM(S): 50 CAPSULE, EXTENDED RELEASE ORAL at 19:51

## 2018-10-15 RX ADMIN — SIMETHICONE 80 MILLIGRAM(S): 80 TABLET, CHEWABLE ORAL at 22:02

## 2018-10-15 RX ADMIN — Medication 650 MILLIGRAM(S): at 11:37

## 2018-10-15 RX ADMIN — SIMETHICONE 80 MILLIGRAM(S): 80 TABLET, CHEWABLE ORAL at 13:38

## 2018-10-15 RX ADMIN — SIMETHICONE 80 MILLIGRAM(S): 80 TABLET, CHEWABLE ORAL at 06:10

## 2018-10-15 RX ADMIN — MORPHINE SULFATE 2 MILLIGRAM(S): 50 CAPSULE, EXTENDED RELEASE ORAL at 20:03

## 2018-10-15 RX ADMIN — SENNA PLUS 2 TABLET(S): 8.6 TABLET ORAL at 22:01

## 2018-10-15 RX ADMIN — Medication 20 MILLIGRAM(S): at 17:43

## 2018-10-15 RX ADMIN — TAMSULOSIN HYDROCHLORIDE 0.4 MILLIGRAM(S): 0.4 CAPSULE ORAL at 22:01

## 2018-10-15 RX ADMIN — MORPHINE SULFATE 2 MILLIGRAM(S): 50 CAPSULE, EXTENDED RELEASE ORAL at 15:42

## 2018-10-15 RX ADMIN — Medication 650 MILLIGRAM(S): at 11:30

## 2018-10-15 RX ADMIN — METHYLNALTREXONE BROMIDE 12 MILLIGRAM(S): 12 INJECTION, SOLUTION SUBCUTANEOUS at 13:38

## 2018-10-15 RX ADMIN — IOHEXOL 30 MILLILITER(S): 300 INJECTION, SOLUTION INTRAVENOUS at 13:37

## 2018-10-15 RX ADMIN — MORPHINE SULFATE 2 MILLIGRAM(S): 50 CAPSULE, EXTENDED RELEASE ORAL at 15:53

## 2018-10-15 RX ADMIN — Medication 100 MILLIGRAM(S): at 11:37

## 2018-10-15 RX ADMIN — Medication 20 MILLIGRAM(S): at 06:09

## 2018-10-15 RX ADMIN — ZOLPIDEM TARTRATE 5 MILLIGRAM(S): 10 TABLET ORAL at 22:04

## 2018-10-15 RX ADMIN — Medication 200 GRAM(S): at 17:43

## 2018-10-15 RX ADMIN — SODIUM CHLORIDE 20 MILLILITER(S): 9 INJECTION INTRAMUSCULAR; INTRAVENOUS; SUBCUTANEOUS at 06:17

## 2018-10-15 RX ADMIN — POLYETHYLENE GLYCOL 3350 17 GRAM(S): 17 POWDER, FOR SOLUTION ORAL at 22:02

## 2018-10-15 RX ADMIN — Medication 50 MILLIGRAM(S): at 22:01

## 2018-10-15 RX ADMIN — ONDANSETRON 4 MILLIGRAM(S): 8 TABLET, FILM COATED ORAL at 06:11

## 2018-10-15 NOTE — PROGRESS NOTE ADULT - ASSESSMENT
ct to suction   follow up chest tube outpts  methylnaltrexone daily for opiod ileus  daily chest xray  pain management  hold heparin drip   follow up gi to scope   follow up obs series final read  continue enemas

## 2018-10-15 NOTE — PROGRESS NOTE ADULT - SUBJECTIVE AND OBJECTIVE BOX
Hospital Day: 24  Post Operative Day:5  Procedure:s/p talc pleurodesis   Patient is a 58y old  Male who presents with a chief complaint of worsening SOB (14 Oct 2018 15:18)    PAST MEDICAL & SURGICAL HISTORY:  Pleural effusion  CHF (congestive heart failure)  Insomnia  BPH (benign prostatic hyperplasia)  Depression  Bipolar 1 disorder  HTN (hypertension)  History of thoracentesis      Events of the Last 24h:none  Vital Signs Last 24 Hrs  T(C): 37 (15 Oct 2018 00:28), Max: 37 (15 Oct 2018 00:28)  T(F): 98.6 (15 Oct 2018 00:28), Max: 98.6 (15 Oct 2018 00:28)  HR: 104 (15 Oct 2018 00:28) (84 - 104)  BP: 118/69 (15 Oct 2018 00:28) (111/69 - 119/73)  BP(mean): --  RR: 18 (15 Oct 2018 00:28) (18 - 18)  SpO2: --        Diet, NPO:   Except Medications (10-14-18 @ 08:09)      I&O's Summary    13 Oct 2018 07:01  -  14 Oct 2018 07:00  --------------------------------------------------------  IN: 1268 mL / OUT: 840 mL / NET: 428 mL    14 Oct 2018 07:01  -  15 Oct 2018 01:14  --------------------------------------------------------  IN: 780 mL / OUT: 1630 mL / NET: -850 mL     I&O's Detail    13 Oct 2018 07:01  -  14 Oct 2018 07:00  --------------------------------------------------------  IN:    heparin  Infusion.: 114 mL    heparin Infusion: 34 mL    Oral Fluid: 960 mL    sodium chloride 0.9%.: 160 mL  Total IN: 1268 mL    OUT:    Chest Tube: 20 mL    Chest Tube: 20 mL    Voided: 800 mL  Total OUT: 840 mL    Total NET: 428 mL      14 Oct 2018 07:01  -  15 Oct 2018 01:14  --------------------------------------------------------  IN:    Oral Fluid: 780 mL  Total IN: 780 mL    OUT:    Chest Tube: 50 mL    Chest Tube: 130 mL    Voided: 1450 mL  Total OUT: 1630 mL    Total NET: -850 mL          MEDICATIONS  (STANDING):  acetaminophen   Tablet .. 650 milliGRAM(s) Oral every 6 hours  acetaminophen   Tablet .. 650 milliGRAM(s) Oral once  albumin human  5% IVPB 250 milliLiter(s) IV Intermittent once  BUpivacaine liposome 1.3% Injectable (no eMAR) 20 milliLiter(s) Local Injection once  carbamide peroxide Otic Solution 5 Drop(s) Left Ear two times a day  diltiazem    Tablet 30 milliGRAM(s) Oral every 8 hours  docusate sodium 100 milliGRAM(s) Oral daily  DULoxetine 60 milliGRAM(s) Oral daily  furosemide    Tablet 20 milliGRAM(s) Oral every 12 hours  heparin  Infusion 1700 Unit(s)/Hr (17 mL/Hr) IV Continuous <Continuous>  morphine PCA (5 mG/mL) 30 milliLiter(s) PCA Continuous PCA Continuous  polyethylene glycol 3350 17 Gram(s) Oral at bedtime  propranolol 20 milliGRAM(s) Oral two times a day  senna 2 Tablet(s) Oral at bedtime  simethicone 80 milliGRAM(s) Chew three times a day  sodium chloride 0.9%. 1000 milliLiter(s) (20 mL/Hr) IV Continuous <Continuous>  tamsulosin 0.4 milliGRAM(s) Oral at bedtime  traZODone 50 milliGRAM(s) Oral at bedtime    MEDICATIONS  (PRN):  naloxone Injectable 0.1 milliGRAM(s) IV Push every 3 minutes PRN For ANY of the following changes in patient status:  A. RR LESS THAN 10 breaths per minute, B. Oxygen saturation LESS THAN 90%, C. Sedation score of 6  ondansetron Injectable 4 milliGRAM(s) IV Push every 6 hours PRN Nausea  zolpidem 5 milliGRAM(s) Oral at bedtime PRN Insomnia      PHYSICAL EXAM:    GENERAL: NAD    HEENT: NCAT    CHEST/LUNGS: CTAB right pigtail to suction no airleak , left 2 ct y connect to suction no airleak    HEART: RRR,  No murmurs, rubs, or gallops    ABDOMEN: SNTND +BS    EXTREMITIES:  FROM, No clubbing, cyanosis, or edema, palpable pulse    NEURO: No focal neurological deficits    SKIN: No rashes or lesions    INCISION/WOUNDS:                          10.0   7.35  )-----------( 268      ( 14 Oct 2018 06:02 )             31.0        CBC Full  -  ( 14 Oct 2018 06:02 )  WBC Count : 7.35 K/uL  Hemoglobin : 10.0 g/dL  Hematocrit : 31.0 %  Platelet Count - Automated : 268 K/uL  Mean Cell Volume : 82.9 fL  Mean Cell Hemoglobin : 26.7 pg  Mean Cell Hemoglobin Concentration : 32.3 g/dL  Auto Neutrophil # : x  Auto Lymphocyte # : x  Auto Monocyte # : x  Auto Eosinophil # : x  Auto Basophil # : x  Auto Neutrophil % : x  Auto Lymphocyte % : x  Auto Monocyte % : x  Auto Eosinophil % : x  Auto Basophil % : x               136   |  96    |  7                  Ca: 8.1    BMP:   ----------------------------< 109    Mg: x     (10-14-18 @ 02:20)             3.8    |  29    | 0.6                Ph: x            PTT - ( 14 Oct 2018 18:54 )  PTT:79.5 sec

## 2018-10-15 NOTE — CHART NOTE - NSCHARTNOTEFT_GEN_A_CORE
Patient with increasing abdominal pain and distension. Patient seen multiple times during the day to check abdominal exam. He has increased pain, has received one tap water enema today.  Patient is vitally stable, but is tachycardic ( BP: 123/63, HR: 100, Temp: 96.2). Discussed with GI who believes patient has obstructing mass causing dilated R colon loops (as does Dr. Caldwell). They recommend q4 enema to disimpact the L colon, to prepare for colonoscopy. They do not believe that bowel prep would be safe in light of possible obstructing mass and can not do colonoscopy with heavy stool burden due to risk of perforation. Patient is to get CT with po and IV contrast which he went down for at 4:30 PM. [facility] colonoscopy documents were received and call was given to GI to alert them that the documents were in the chart.

## 2018-10-15 NOTE — PROGRESS NOTE ADULT - ASSESSMENT
57 y/o M with PMH of HTN, known DVT ( on Heparin GTT), BPH, Depression, that presented to [facility] for SOB ( found to have a large Pleural effusion- followed by CT surgery and has pigtail catheter in right chest cavity and chest tube in left cavity).   Gastroenterology was initially consulted for abnormal CT imagining ( CT scan with thickening of Ascending Colon- cannot rule out malignancy). Of note patient notes longstanding history of abdominal pain and discomfort associated with constipation ( he does not follow a specific GI doctor but notes he had a colonoscopy 10 months ago and was found to have polyps). He currently notes diffuse abdominal pain,  constant only improved by pain medication. Imaging done demonstrated Ascending colon circumferential thickening ( Radiology recommends direct visualization) but previous imaging here was without discrepancy. Patient did have Colonoscopy at [facility] 10 months ago.   GI was recalled for significant abdominal distension and moderate stool burden in right colon. Pt off Morphine pump, never received dose of Methylnaltrexone,  received half dose of 2000 ml Golytely 3 days ago, had one large BM 3 days ago and small 2 days ago.    Opoid Induced Constipation and Ileus :   - Ordered dose of Methylnaltrexone today on non formulary form and handed over to pharmacist.   - Serial abdominal exams, daily KUB.  - Repeat CT abdomen if no improvement in next 24 hrs after dose of Methylnaltrexone   - Check lytes , K , Mg and Ca daily and replete as needed.   - Please obtain Colonoscopy records from [facility]- done 10 months ago  - Give a dose of Methylnaltrexone today and can repeat tomorrow if no significant improvement.  - Reviewed imaging with Radiology , no obvious obstructing mass lesion on CT scan.  - Will schedule for inpatient colonoscopy after improvement in cardiopulmonary status and  after Pulmonary Clearance.   - GI will follow up. 59 y/o M with PMH of HTN, known DVT ( on Heparin GTT), BPH, Depression, that presented to [facility] for SOB ( found to have a large Pleural effusion- followed by CT surgery and has pigtail catheter in right chest cavity and chest tube in left cavity).   Gastroenterology was initially consulted for abnormal CT imagining ( CT scan with thickening of Ascending Colon- cannot rule out malignancy). Of note patient notes longstanding history of abdominal pain and discomfort associated with constipation ( he does not follow a specific GI doctor but notes he had a colonoscopy 10 months ago and was found to have polyps). He currently notes diffuse abdominal pain,  constant only improved by pain medication. Imaging done demonstrated Ascending colon circumferential thickening ( Radiology recommends direct visualization) but previous imaging here was without discrepancy. Patient did have Colonoscopy at [facility] 10 months ago.   GI was recalled for significant abdominal distension and moderate stool burden in right colon. Pt off Morphine pump, never received dose of Methylnaltrexone,  received half dose of 2000 ml Golytely 3 days ago, had one large BM 3 days ago and small 2 days ago.    Opoid Induced Constipation and Ileus :   - Ordered dose of Methylnaltrexone today on non formulary form and handed over to pharmacist.   - Serial abdominal exams, daily KUB.  - Repeat CT abdomen if no improvement in next 24 hrs after dose of Methylnaltrexone   - Check lytes , K , Mg and Ca daily and replete as needed.   - Please obtain Colonoscopy records from [facility]- done 10 months ago  - Give a dose of Methylnaltrexone today and can repeat tomorrow if no significant improvement.  - Reviewed imaging with Radiology , no obvious obstructing mass lesion on CT scan. To repeat CT scan with oral contrast  - Will schedule for inpatient colonoscopy after improvement in cardiopulmonary status and  after Pulmonary Clearance.   - GI will follow up.

## 2018-10-15 NOTE — CONSULT NOTE ADULT - SUBJECTIVE AND OBJECTIVE BOX
Surgery Consultation Note  =====================================================  HPI: 58y Male    HPI: 57 y/o M with PMH of HTN, known DVT (on Heparin GTT), BPH, Depression, that presented to [facility] for SOB (found to have a large Pleural effusion- followed by CT surgery and has catheter in place). Gastroenterology was consulted for abnormal CT imagining (CT scan with thickening of Ascending Colon- cannot rule out malignancy). Of note patient notes longstanding history of abdominal pain and discomfort associated with constipation ( he does not follow a specific GI doctor but notes he had a colonoscopy 10 months ago and was found to have polyps). He currently notes diffuse abdominal pain, crampy, constant only improved by pain medication. Imaging done demonstrated Ascending colon circumferential thickening ( Radiology recommends direct visualization) but previous imaging here was without discrepancy. Patient did have Colonoscopy at [facility] 10 months ago. Patient reports that his last bowl movement was last Friday following an enema. Patient reports nausea but no vomiting.       PAST MEDICAL & SURGICAL HISTORY:  Pleural effusion  CHF (congestive heart failure)  Insomnia  BPH (benign prostatic hyperplasia)  Depression  Bipolar 1 disorder  HTN (hypertension)  History of thoracentesis    Home Meds: Home Medications:  Ambien 10 mg oral tablet: 1 tab(s) orally once a day (at bedtime) (21 Sep 2018 21:50)  apixaban 5 mg oral tablet: 1 tab(s) orally every 12 hours (21 Sep 2018 21:50)  Cardizem 60 mg oral tablet: 1 tab(s) orally every 8 hours (21 Sep 2018 21:50)  clotrimazole 1% topical cream: 1 application topically  (21 Sep 2018 21:50)  DULoxetine 60 mg oral delayed release capsule: 1 cap(s) orally once a day (21 Sep 2018 21:50)  isosorbide mononitrate 20 mg oral tablet: 1 tab(s) orally once a day (21 Sep 2018 21:50)  latanoprost 0.005% ophthalmic solution: 1 drop(s) to each affected eye once a day (in the evening) (21 Sep 2018 21:50)  Multiple Vitamins oral capsule: 1 cap(s) orally once a day (21 Sep 2018 21:50)  propranolol 20 mg oral tablet: 1 tab(s) orally 2 times a day (21 Sep 2018 21:50)  tamsulosin 0.4 mg oral capsule: 1 cap(s) orally once a day (21 Sep 2018 21:50)  traZODone 50 mg oral tablet: 1 tab(s) orally once a day (at bedtime) (21 Sep 2018 21:50)    Allergies: Allergies    No Known Allergies    Intolerances      Soc:   Advanced Directives: Presumed Full Code     ROS:    REVIEW OF SYSTEMS    [x] A ten-point review of systems was otherwise negative except as noted.  [ ] Due to altered mental status/intubation, subjective information were not able to be obtained from the patient. History was obtained, to the extent possible, from review of the chart and collateral sources of information.      CURRENT MEDICATIONS:   --------------------------------------------------------------------------------------  Neurologic Medications  acetaminophen   Tablet .. 650 milliGRAM(s) Oral every 6 hours  acetaminophen   Tablet .. 650 milliGRAM(s) Oral once  DULoxetine 60 milliGRAM(s) Oral daily  morphine  - Injectable 2 milliGRAM(s) IV Push every 4 hours PRN Severe Pain (7 - 10)  ondansetron    Tablet 4 milliGRAM(s) Oral every 6 hours PRN Nausea  ondansetron Injectable 4 milliGRAM(s) IV Push every 6 hours PRN Nausea  traZODone 50 milliGRAM(s) Oral at bedtime  zolpidem 5 milliGRAM(s) Oral at bedtime PRN Insomnia    Respiratory Medications    Cardiovascular Medications  diltiazem    Tablet 30 milliGRAM(s) Oral every 8 hours  furosemide    Tablet 20 milliGRAM(s) Oral every 12 hours  propranolol 20 milliGRAM(s) Oral two times a day  tamsulosin 0.4 milliGRAM(s) Oral at bedtime    Gastrointestinal Medications  albumin human  5% IVPB 250 milliLiter(s) IV Intermittent once  docusate sodium 100 milliGRAM(s) Oral daily  polyethylene glycol 3350 17 Gram(s) Oral at bedtime  senna 2 Tablet(s) Oral at bedtime  simethicone 80 milliGRAM(s) Chew three times a day  sodium chloride 0.9%. 1000 milliLiter(s) IV Continuous <Continuous>    Genitourinary Medications    Hematologic/Oncologic Medications    Antimicrobial/Immunologic Medications    Endocrine/Metabolic Medications    Topical/Other Medications  BUpivacaine liposome 1.3% Injectable (no eMAR) 20 milliLiter(s) Local Injection once  carbamide peroxide Otic Solution 5 Drop(s) Left Ear two times a day  naloxone Injectable 0.1 milliGRAM(s) IV Push every 3 minutes PRN For ANY of the following changes in patient status:  A. RR LESS THAN 10 breaths per minute, B. Oxygen saturation LESS THAN 90%, C. Sedation score of 6    --------------------------------------------------------------------------------------    VITAL SIGNS, INS/OUTS (last 24 hours):  --------------------------------------------------------------------------------------  ICU Vital Signs Last 24 Hrs  T(C): 36.5 (15 Oct 2018 08:11), Max: 37 (15 Oct 2018 00:28)  T(F): 97.7 (15 Oct 2018 08:11), Max: 98.6 (15 Oct 2018 00:28)  HR: 96 (15 Oct 2018 08:11) (88 - 111)  BP: 123/59 (15 Oct 2018 08:11) (111/69 - 131/71)  BP(mean): --  ABP: --  ABP(mean): --  RR: 20 (15 Oct 2018 08:11) (18 - 20)  SpO2: --    I&O's Summary    14 Oct 2018 07:01  -  15 Oct 2018 07:00  --------------------------------------------------------  IN: 1059 mL / OUT: 1630 mL / NET: -571 mL      --------------------------------------------------------------------------------------    EXAM:  General/Neuro  RASS: 0  GCS: 15  Exam: Normal, NAD, alert, oriented x 3, no focal deficits. PERRLA      Respiratory  Exam: Lungs clear to auscultation, Normal expansion/effort. Chest tube in place  [] Tracheostomy   [] Intubated  Mechanical Ventilation:     Cardiovascular  Exam: S1, S2.  Regular rate and rhythm. Peripheral edema    Cardiac Rhythm: Normal Sinus Rhythm  ECHO:     GI  Exam: Abdomen soft, distended, mildly tender. No guarding or rigidity noted  Current Diet:  NPO      Tubes/Lines/Drains    [x] Peripheral IV  [] Central Venous Line     	[] R	[] L	[] IJ	[] Fem	[] SC        Type:	    Date Placed:   [] Arterial Line		[] R	[] L	[] Fem	[] Rad	[] Ax	Date Placed:   [] PICC:         	[] Midline		[] Mediport           [] Urinary Catheter		Date Placed:     Extremities  Exam: Extremities warm, pink, well-perfused.      Derm:  Exam: Good skin turgor, no skin breakdown.      :   Exam: Velásquez catheter in place.     LABS  --------------------------------------------------------------------------------------  Labs:  CAPILLARY BLOOD GLUCOSE                              10.5   6.89  )-----------( 292      ( 15 Oct 2018 06:06 )             33.1         10-15    136  |  95<L>  |  8<L>  ----------------------------<  99  4.1   |  27  |  0.7      Calcium, Total Serum: 8.1 mg/dL (10-15-18 @ 00:52)      LFTs:         Coags:     x      ----< x       ( 14 Oct 2018 18:54 )     79.5                  --------------------------------------------------------------------------------------    OTHER LABS    IMAGING RESULTS      ASSESSMENT:  58y Male with opiod induced Ileus    PLAN:   -Stop PCA  -Start Promotility agents  -Serial abdominal exams  -Colonoscopy for non obstructing colon mass   -Obstructive series (Requested by GI on 10/13)   -Obtain colonoscopy records from [facility]

## 2018-10-15 NOTE — PROGRESS NOTE ADULT - SUBJECTIVE AND OBJECTIVE BOX
GI following the patient for Opioid Induced Constipation     Interval Events: Pt had one large BM 3 days ago and one small BM 2 days ago, not sure if received Methylnaltrexone. received half a dose of 2000 ml Golytely and not able to finish it due to nausea. Pt still remains significantly distended, c/o abdominal pain as well due to distension. off morphine drip and pending results of Obstructive series. Pt also has a pigtail catheter in right chest cavity and a chest tube in left.     Allergies:  No Known Allergies      Hospital Medications:  acetaminophen   Tablet .. 650 milliGRAM(s) Oral every 6 hours  acetaminophen   Tablet .. 650 milliGRAM(s) Oral once  albumin human  5% IVPB 250 milliLiter(s) IV Intermittent once  BUpivacaine liposome 1.3% Injectable (no eMAR) 20 milliLiter(s) Local Injection once  carbamide peroxide Otic Solution 5 Drop(s) Left Ear two times a day  diltiazem    Tablet 30 milliGRAM(s) Oral every 8 hours  docusate sodium 100 milliGRAM(s) Oral daily  DULoxetine 60 milliGRAM(s) Oral daily  furosemide    Tablet 20 milliGRAM(s) Oral every 12 hours  morphine  - Injectable 2 milliGRAM(s) IV Push every 4 hours PRN  naloxone Injectable 0.1 milliGRAM(s) IV Push every 3 minutes PRN  ondansetron    Tablet 4 milliGRAM(s) Oral every 6 hours PRN  ondansetron Injectable 4 milliGRAM(s) IV Push every 6 hours PRN  polyethylene glycol 3350 17 Gram(s) Oral at bedtime  propranolol 20 milliGRAM(s) Oral two times a day  senna 2 Tablet(s) Oral at bedtime  simethicone 80 milliGRAM(s) Chew three times a day  sodium chloride 0.9%. 1000 milliLiter(s) IV Continuous <Continuous>  tamsulosin 0.4 milliGRAM(s) Oral at bedtime  traZODone 50 milliGRAM(s) Oral at bedtime  zolpidem 5 milliGRAM(s) Oral at bedtime PRN      PMHX/PSHX:  Pleural effusion  CHF (congestive heart failure)  Insomnia  BPH (benign prostatic hyperplasia)  Depression  Bipolar 1 disorder  HTN (hypertension)  History of thoracentesis      Family history:  No pertinent family history in first degree relatives      ROS:     General:  No fevers, chills, night sweats, fatigue.  Eyes:  Good vision, no reported pain  ENT:  No sore throat, pain, runny nose, dysphagia  CV:  No pain, palpitations, hypo/hypertension  Resp:  No dyspnea, cough, tachypnea, wheezing  GI:  See HPI  :  No pain, bleeding, incontinence, nocturia  Muscle:  No pain, weakness  Neuro:  No weakness, tingling, memory problems  Heme:  No petechiae, ecchymosis, easy bruisability  Skin:  No rash, edema      PHYSICAL EXAM:     GENERAL:  Appears stated age, well-groomed, well-nourished, no distress  HEENT:  NC/AT,  conjunctivae clear, sclera -anicteric  CHEST:  Breath sounds decreased B/L.   HEART:  Regular rhythm, S1, S2, no added sounds  ABDOMEN:  Soft, non-tender, non-distended, normoactive bowel sounds.  NEURO:  Alert, oriented, non focal    Vital Signs:  Vital Signs Last 24 Hrs  T(C): 36.5 (15 Oct 2018 08:11), Max: 37 (15 Oct 2018 00:28)  T(F): 97.7 (15 Oct 2018 08:11), Max: 98.6 (15 Oct 2018 00:28)  HR: 96 (15 Oct 2018 08:11) (88 - 111)  BP: 123/59 (15 Oct 2018 08:11) (111/69 - 131/71)  BP(mean): --  RR: 20 (15 Oct 2018 08:11) (18 - 20)  SpO2: --  Daily     Daily     LABS:                        10.5   6.89  )-----------( 292      ( 15 Oct 2018 06:06 )             33.1     10-15    136  |  95<L>  |  8<L>  ----------------------------<  99  4.1   |  27  |  0.7    Ca    8.1<L>      15 Oct 2018 00:52        PTT - ( 14 Oct 2018 18:54 )  PTT:79.5 sec GI following the patient for Opioid Induced Constipation     Interval Events: Pt had one large BM 3 days ago and one small BM 2 days ago, not sure if received Methylnaltrexone. received half a dose of 2000 ml Golytely and not able to finish it due to nausea. Pt still remains significantly distended, c/o abdominal pain as well due to distension. off morphine drip and pending results of Obstructive series. Pt also has a pigtail catheter in right chest cavity and a chest tube in left.     Allergies:  No Known Allergies      Hospital Medications:  acetaminophen   Tablet .. 650 milliGRAM(s) Oral every 6 hours  acetaminophen   Tablet .. 650 milliGRAM(s) Oral once  albumin human  5% IVPB 250 milliLiter(s) IV Intermittent once  BUpivacaine liposome 1.3% Injectable (no eMAR) 20 milliLiter(s) Local Injection once  carbamide peroxide Otic Solution 5 Drop(s) Left Ear two times a day  diltiazem    Tablet 30 milliGRAM(s) Oral every 8 hours  docusate sodium 100 milliGRAM(s) Oral daily  DULoxetine 60 milliGRAM(s) Oral daily  furosemide    Tablet 20 milliGRAM(s) Oral every 12 hours  morphine  - Injectable 2 milliGRAM(s) IV Push every 4 hours PRN  naloxone Injectable 0.1 milliGRAM(s) IV Push every 3 minutes PRN  ondansetron    Tablet 4 milliGRAM(s) Oral every 6 hours PRN  ondansetron Injectable 4 milliGRAM(s) IV Push every 6 hours PRN  polyethylene glycol 3350 17 Gram(s) Oral at bedtime  propranolol 20 milliGRAM(s) Oral two times a day  senna 2 Tablet(s) Oral at bedtime  simethicone 80 milliGRAM(s) Chew three times a day  sodium chloride 0.9%. 1000 milliLiter(s) IV Continuous <Continuous>  tamsulosin 0.4 milliGRAM(s) Oral at bedtime  traZODone 50 milliGRAM(s) Oral at bedtime  zolpidem 5 milliGRAM(s) Oral at bedtime PRN      PMHX/PSHX:  Pleural effusion  CHF (congestive heart failure)  Insomnia  BPH (benign prostatic hyperplasia)  Depression  Bipolar 1 disorder  HTN (hypertension)  History of thoracentesis      Family history:  No pertinent family history in first degree relatives      ROS:     General:  No fevers, chills, night sweats, fatigue.  Eyes:  Good vision, no reported pain  ENT:  No sore throat, pain, runny nose, dysphagia  CV:  No pain, palpitations, hypo/hypertension  Resp:  No dyspnea, cough, tachypnea, wheezing  GI:  See HPI  :  No pain, bleeding, incontinence, nocturia  Muscle:  No pain, weakness  Neuro:  No weakness, tingling, memory problems  Heme:  No petechiae, ecchymosis, easy bruisability  Skin:  No rash, edema      PHYSICAL EXAM:     GENERAL:  Appears stated age, well-groomed, well-nourished, no distress  HEENT:  NC/AT,  conjunctivae clear, sclera -anicteric  CHEST:  Breath sounds decreased B/L.   HEART:  Regular rhythm, S1, S2, no added sounds  ABDOMEN:  Soft, non-tender, Distended++ Minimal  bowel sounds.  NEURO:  Alert, oriented, non focal    Vital Signs:  Vital Signs Last 24 Hrs  T(C): 36.5 (15 Oct 2018 08:11), Max: 37 (15 Oct 2018 00:28)  T(F): 97.7 (15 Oct 2018 08:11), Max: 98.6 (15 Oct 2018 00:28)  HR: 96 (15 Oct 2018 08:11) (88 - 111)  BP: 123/59 (15 Oct 2018 08:11) (111/69 - 131/71)  BP(mean): --  RR: 20 (15 Oct 2018 08:11) (18 - 20)  SpO2: --  Daily     Daily     LABS:                        10.5   6.89  )-----------( 292      ( 15 Oct 2018 06:06 )             33.1     10-15    136  |  95<L>  |  8<L>  ----------------------------<  99  4.1   |  27  |  0.7    Ca    8.1<L>      15 Oct 2018 00:52        PTT - ( 14 Oct 2018 18:54 )  PTT:79.5 sec

## 2018-10-16 LAB
ANION GAP SERPL CALC-SCNC: 16 MMOL/L — HIGH (ref 7–14)
APTT BLD: 32.6 SEC — SIGNIFICANT CHANGE UP (ref 27–39.2)
APTT BLD: 34.2 SEC — SIGNIFICANT CHANGE UP (ref 27–39.2)
APTT BLD: 44.6 SEC — HIGH (ref 27–39.2)
BLD GP AB SCN SERPL QL: SIGNIFICANT CHANGE UP
BUN SERPL-MCNC: 10 MG/DL — SIGNIFICANT CHANGE UP (ref 10–20)
CALCIUM SERPL-MCNC: 8.3 MG/DL — LOW (ref 8.5–10.1)
CHLORIDE SERPL-SCNC: 92 MMOL/L — LOW (ref 98–110)
CO2 SERPL-SCNC: 27 MMOL/L — SIGNIFICANT CHANGE UP (ref 17–32)
CREAT SERPL-MCNC: 0.8 MG/DL — SIGNIFICANT CHANGE UP (ref 0.7–1.5)
GLUCOSE SERPL-MCNC: 89 MG/DL — SIGNIFICANT CHANGE UP (ref 70–99)
HCT VFR BLD CALC: 34.3 % — LOW (ref 42–52)
HCT VFR BLD CALC: 35.6 % — LOW (ref 42–52)
HGB BLD-MCNC: 11 G/DL — LOW (ref 14–18)
HGB BLD-MCNC: 11.4 G/DL — LOW (ref 14–18)
INR BLD: 1.23 RATIO — SIGNIFICANT CHANGE UP (ref 0.65–1.3)
INR BLD: 1.24 RATIO — SIGNIFICANT CHANGE UP (ref 0.65–1.3)
LACTATE SERPL-SCNC: 0.7 MMOL/L — SIGNIFICANT CHANGE UP (ref 0.5–2.2)
MCHC RBC-ENTMCNC: 26.1 PG — LOW (ref 27–31)
MCHC RBC-ENTMCNC: 26.3 PG — LOW (ref 27–31)
MCHC RBC-ENTMCNC: 32 G/DL — SIGNIFICANT CHANGE UP (ref 32–37)
MCHC RBC-ENTMCNC: 32.1 G/DL — SIGNIFICANT CHANGE UP (ref 32–37)
MCV RBC AUTO: 81.7 FL — SIGNIFICANT CHANGE UP (ref 80–94)
MCV RBC AUTO: 82.1 FL — SIGNIFICANT CHANGE UP (ref 80–94)
NRBC # BLD: 0 /100 WBCS — SIGNIFICANT CHANGE UP (ref 0–0)
NRBC # BLD: 0 /100 WBCS — SIGNIFICANT CHANGE UP (ref 0–0)
PLATELET # BLD AUTO: 291 K/UL — SIGNIFICANT CHANGE UP (ref 130–400)
PLATELET # BLD AUTO: 306 K/UL — SIGNIFICANT CHANGE UP (ref 130–400)
POTASSIUM SERPL-MCNC: 4.2 MMOL/L — SIGNIFICANT CHANGE UP (ref 3.5–5)
POTASSIUM SERPL-SCNC: 4.2 MMOL/L — SIGNIFICANT CHANGE UP (ref 3.5–5)
PROTHROM AB SERPL-ACNC: 14.1 SEC — HIGH (ref 9.95–12.87)
PROTHROM AB SERPL-ACNC: 14.2 SEC — HIGH (ref 9.95–12.87)
RBC # BLD: 4.18 M/UL — LOW (ref 4.7–6.1)
RBC # BLD: 4.36 M/UL — LOW (ref 4.7–6.1)
RBC # FLD: 14.9 % — HIGH (ref 11.5–14.5)
RBC # FLD: 15 % — HIGH (ref 11.5–14.5)
SODIUM SERPL-SCNC: 135 MMOL/L — SIGNIFICANT CHANGE UP (ref 135–146)
SURGICAL PATHOLOGY STUDY: SIGNIFICANT CHANGE UP
TYPE + AB SCN PNL BLD: SIGNIFICANT CHANGE UP
WBC # BLD: 8.15 K/UL — SIGNIFICANT CHANGE UP (ref 4.8–10.8)
WBC # BLD: 9.38 K/UL — SIGNIFICANT CHANGE UP (ref 4.8–10.8)
WBC # FLD AUTO: 8.15 K/UL — SIGNIFICANT CHANGE UP (ref 4.8–10.8)
WBC # FLD AUTO: 9.38 K/UL — SIGNIFICANT CHANGE UP (ref 4.8–10.8)

## 2018-10-16 RX ORDER — METHYLNALTREXONE BROMIDE 12 MG/.6ML
12 INJECTION, SOLUTION SUBCUTANEOUS
Qty: 0 | Refills: 0 | Status: COMPLETED | OUTPATIENT
Start: 2018-10-16 | End: 2018-10-16

## 2018-10-16 RX ORDER — SODIUM CHLORIDE 9 MG/ML
1000 INJECTION INTRAMUSCULAR; INTRAVENOUS; SUBCUTANEOUS
Qty: 0 | Refills: 0 | Status: DISCONTINUED | OUTPATIENT
Start: 2018-10-16 | End: 2018-10-17

## 2018-10-16 RX ORDER — HEPARIN SODIUM 5000 [USP'U]/ML
1700 INJECTION INTRAVENOUS; SUBCUTANEOUS
Qty: 25000 | Refills: 0 | Status: DISCONTINUED | OUTPATIENT
Start: 2018-10-16 | End: 2018-10-16

## 2018-10-16 RX ORDER — ONDANSETRON 8 MG/1
8 TABLET, FILM COATED ORAL EVERY 8 HOURS
Qty: 0 | Refills: 0 | Status: DISCONTINUED | OUTPATIENT
Start: 2018-10-16 | End: 2018-10-18

## 2018-10-16 RX ORDER — CALCIUM GLUCONATE 100 MG/ML
2 VIAL (ML) INTRAVENOUS ONCE
Qty: 0 | Refills: 0 | Status: COMPLETED | OUTPATIENT
Start: 2018-10-16 | End: 2018-10-16

## 2018-10-16 RX ORDER — SOD SULF/SODIUM/NAHCO3/KCL/PEG
4000 SOLUTION, RECONSTITUTED, ORAL ORAL ONCE
Qty: 0 | Refills: 0 | Status: COMPLETED | OUTPATIENT
Start: 2018-10-16 | End: 2018-10-16

## 2018-10-16 RX ORDER — HEPARIN SODIUM 5000 [USP'U]/ML
1700 INJECTION INTRAVENOUS; SUBCUTANEOUS
Qty: 25000 | Refills: 0 | Status: DISCONTINUED | OUTPATIENT
Start: 2018-10-16 | End: 2018-10-17

## 2018-10-16 RX ADMIN — METHYLNALTREXONE BROMIDE 12 MILLIGRAM(S): 12 INJECTION, SOLUTION SUBCUTANEOUS at 12:59

## 2018-10-16 RX ADMIN — MORPHINE SULFATE 2 MILLIGRAM(S): 50 CAPSULE, EXTENDED RELEASE ORAL at 07:57

## 2018-10-16 RX ADMIN — Medication 4000 MILLILITER(S): at 17:41

## 2018-10-16 RX ADMIN — Medication 100 MILLIGRAM(S): at 11:28

## 2018-10-16 RX ADMIN — TAMSULOSIN HYDROCHLORIDE 0.4 MILLIGRAM(S): 0.4 CAPSULE ORAL at 21:01

## 2018-10-16 RX ADMIN — MORPHINE SULFATE 2 MILLIGRAM(S): 50 CAPSULE, EXTENDED RELEASE ORAL at 19:10

## 2018-10-16 RX ADMIN — Medication 100 GRAM(S): at 17:32

## 2018-10-16 RX ADMIN — ZOLPIDEM TARTRATE 5 MILLIGRAM(S): 10 TABLET ORAL at 22:39

## 2018-10-16 RX ADMIN — DULOXETINE HYDROCHLORIDE 60 MILLIGRAM(S): 30 CAPSULE, DELAYED RELEASE ORAL at 11:28

## 2018-10-16 RX ADMIN — Medication 50 MILLIGRAM(S): at 21:01

## 2018-10-16 RX ADMIN — MORPHINE SULFATE 2 MILLIGRAM(S): 50 CAPSULE, EXTENDED RELEASE ORAL at 08:15

## 2018-10-16 RX ADMIN — CARBAMIDE PEROXIDE 5 DROP(S): 81.86 SOLUTION/ DROPS AURICULAR (OTIC) at 17:29

## 2018-10-16 RX ADMIN — SIMETHICONE 80 MILLIGRAM(S): 80 TABLET, CHEWABLE ORAL at 06:26

## 2018-10-16 RX ADMIN — SODIUM CHLORIDE 20 MILLILITER(S): 9 INJECTION INTRAMUSCULAR; INTRAVENOUS; SUBCUTANEOUS at 20:49

## 2018-10-16 RX ADMIN — Medication 20 MILLIGRAM(S): at 17:29

## 2018-10-16 RX ADMIN — Medication 20 MILLIGRAM(S): at 06:26

## 2018-10-16 RX ADMIN — HEPARIN SODIUM 17 UNIT(S)/HR: 5000 INJECTION INTRAVENOUS; SUBCUTANEOUS at 21:41

## 2018-10-16 RX ADMIN — SIMETHICONE 80 MILLIGRAM(S): 80 TABLET, CHEWABLE ORAL at 13:53

## 2018-10-16 RX ADMIN — MORPHINE SULFATE 2 MILLIGRAM(S): 50 CAPSULE, EXTENDED RELEASE ORAL at 01:59

## 2018-10-16 RX ADMIN — SIMETHICONE 80 MILLIGRAM(S): 80 TABLET, CHEWABLE ORAL at 21:01

## 2018-10-16 RX ADMIN — POLYETHYLENE GLYCOL 3350 17 GRAM(S): 17 POWDER, FOR SOLUTION ORAL at 21:01

## 2018-10-16 RX ADMIN — HEPARIN SODIUM 1700 UNIT(S)/HR: 5000 INJECTION INTRAVENOUS; SUBCUTANEOUS at 06:45

## 2018-10-16 RX ADMIN — MORPHINE SULFATE 2 MILLIGRAM(S): 50 CAPSULE, EXTENDED RELEASE ORAL at 14:55

## 2018-10-16 RX ADMIN — MORPHINE SULFATE 2 MILLIGRAM(S): 50 CAPSULE, EXTENDED RELEASE ORAL at 18:43

## 2018-10-16 RX ADMIN — MORPHINE SULFATE 2 MILLIGRAM(S): 50 CAPSULE, EXTENDED RELEASE ORAL at 14:39

## 2018-10-16 RX ADMIN — SENNA PLUS 2 TABLET(S): 8.6 TABLET ORAL at 21:01

## 2018-10-16 NOTE — PROGRESS NOTE ADULT - SUBJECTIVE AND OBJECTIVE BOX
GENERAL SURGERY PROGRESS NOTE     AMRIK MADRID  58y  Male  Hospital day :25d  POD:  Procedure: Thoracoscopy  Chest tube insertion  Thoracentesis  Thoracentesis    OVERNIGHT EVENTS:  No acute events overnight. Pt states pain lessened enough to sleep. Has only had watery bm after q4 tap water enemas. No gas. Denies C/P, SOB. Nausea improved.   T(F): 99.3 (10-16-18 @ 00:19), Max: 99.3 (10-16-18 @ 00:19)  HR: 100 (10-16-18 @ 00:19) (96 - 100)  BP: 125/68 (10-16-18 @ 00:19) (122/69 - 125/68)  ABP: --  ABP(mean): --  RR: 18 (10-16-18 @ 00:19) (18 - 20)  SpO2: --    DIET/FLUIDS: albumin human  5% IVPB 250 milliLiter(s) IV Intermittent once  sodium chloride 0.9%. 1000 milliLiter(s) IV Continuous <Continuous>    NG:                                                                                DRAINS:     BM:     EMESIS:     URINE:      GI proph:    AC/ proph: heparin  Infusion. 1700 Unit(s)/Hr IV Continuous <Continuous>    ABx:     PHYSICAL EXAM:  GENERAL: Mild distress.   CHEST/LUNG: on 3 L NC, no obvious increased WOB  ABDOMEN: soft, distended, NTTP.         LABS  Labs:  CAPILLARY BLOOD GLUCOSE                              11.4   9.38  )-----------( 306      ( 16 Oct 2018 01:51 )             35.6         10-15    136  |  95<L>  |  8<L>  ----------------------------<  99  4.1   |  27  |  0.7          LFTs:         Coags:     14.20  ----< 1.24    ( 16 Oct 2018 01:51 )     34.2                    RADIOLOGY & ADDITIONAL TESTS:  < from: CT Abdomen and Pelvis w/ Oral Cont and w/ IV Cont (10.15.18 @ 17:12) >  IMPRESSION:     1. Dilatation of the large bowel to the level of the sigmoid colon which   is collapsed. No obstructive lesion identified. Findings indicative of   functional bowel obstruction.  2. Partially loculated bilateral pleural effusions. Interlobularseptal   thickening with nodularity worrisome for lymphangitic spread of tumor.  3. Nonspecific sclerotic lesion in the T8 vertebral body. Bone scan may   be obtained for further evaluation.    < end of copied text >        A/P    s/p L TALC pleurodesis    PLAN:    - Heparin drip     - q4 enemas    - pain control   - ambulation   - pigtail to suction   -f/u w/ GI and surgery for recs

## 2018-10-16 NOTE — PRE-ANESTHESIA EVALUATION ADULT - NSANTHPEFT_GEN_ALL_CORE
RECOMMENDATIONS:   - cardiology consult preferred;  - Pt will need to be intubated for the procedure (EGD and colonoscopy)
NAD, AAOx3  S1, S2 +, RRR  CTBA, left breath sound decreased  abdomen soft, non tender  no peripheral edema

## 2018-10-16 NOTE — PROGRESS NOTE ADULT - SUBJECTIVE AND OBJECTIVE BOX
GI following the patient for Opioid induced constipation and Ileus.     Interval Events: Pt had CT scan done last night, No volvulus, or obstruction,. Distension much improved than yesterday, No more abdominal pain.    Allergies:  No Known Allergies      Hospital Medications:  acetaminophen   Tablet .. 650 milliGRAM(s) Oral every 6 hours  acetaminophen   Tablet .. 650 milliGRAM(s) Oral once  albumin human  5% IVPB 250 milliLiter(s) IV Intermittent once  BUpivacaine liposome 1.3% Injectable (no eMAR) 20 milliLiter(s) Local Injection once  carbamide peroxide Otic Solution 5 Drop(s) Left Ear two times a day  diltiazem    Tablet 30 milliGRAM(s) Oral every 8 hours  docusate sodium 100 milliGRAM(s) Oral daily  DULoxetine 60 milliGRAM(s) Oral daily  furosemide    Tablet 20 milliGRAM(s) Oral every 12 hours  heparin  Infusion. 1700 Unit(s)/Hr IV Continuous <Continuous>  morphine  - Injectable 2 milliGRAM(s) IV Push every 4 hours PRN  naloxone Injectable 0.1 milliGRAM(s) IV Push every 3 minutes PRN  ondansetron    Tablet 4 milliGRAM(s) Oral every 6 hours PRN  ondansetron Injectable 4 milliGRAM(s) IV Push every 6 hours PRN  polyethylene glycol 3350 17 Gram(s) Oral at bedtime  propranolol 20 milliGRAM(s) Oral two times a day  senna 2 Tablet(s) Oral at bedtime  simethicone 80 milliGRAM(s) Chew three times a day  sodium chloride 0.9%. 1000 milliLiter(s) IV Continuous <Continuous>  tamsulosin 0.4 milliGRAM(s) Oral at bedtime  traZODone 50 milliGRAM(s) Oral at bedtime  zolpidem 5 milliGRAM(s) Oral at bedtime PRN      PMHX/PSHX:  Pleural effusion  CHF (congestive heart failure)  Insomnia  BPH (benign prostatic hyperplasia)  Depression  Bipolar 1 disorder  HTN (hypertension)  History of thoracentesis      Family history:  No pertinent family history in first degree relatives      ROS:     General:  No fevers, chills, night sweats, fatigue.  Eyes:  Good vision, no reported pain  ENT:  No sore throat, pain, runny nose, dysphagia  CV:  No pain, palpitations, hypo/hypertension  Resp:  No dyspnea, cough, tachypnea, wheezing  GI:  See HPI  :  No pain, bleeding, incontinence, nocturia  Muscle:  No pain, weakness  Neuro:  No weakness, tingling, memory problems      PHYSICAL EXAM:     GENERAL:  Appears stated age, well-groomed, well-nourished, no distress  HEENT:  NC/AT,  conjunctivae clear, sclera -anicteric  CHEST:  Decreased breath sounds B/L, coarse crepts  HEART:  Regular rhythm, S1, S2, no added sounds  ABDOMEN:  Soft, non-tender, much less distended than yesterday, normoactive bowel sounds.  NEURO:  Alert, oriented    Vital Signs:  Vital Signs Last 24 Hrs  T(C): 36.4 (16 Oct 2018 08:00), Max: 37.4 (16 Oct 2018 00:19)  T(F): 97.6 (16 Oct 2018 08:00), Max: 99.3 (16 Oct 2018 00:19)  HR: 110 (16 Oct 2018 08:00) (99 - 110)  BP: 127/63 (16 Oct 2018 08:00) (105/52 - 127/63)  BP(mean): --  RR: 18 (16 Oct 2018 08:00) (18 - 20)  SpO2: --  Daily     Daily     LABS:                        11.0   8.15  )-----------( 291      ( 16 Oct 2018 06:34 )             34.3     10-16    135  |  92<L>  |  10  ----------------------------<  89  4.2   |  27  |  0.8    Ca    8.3<L>      16 Oct 2018 06:34        PT/INR - ( 16 Oct 2018 01:51 )   PT: 14.20 sec;   INR: 1.24 ratio         PTT - ( 16 Oct 2018 06:34 )  PTT:32.6 sec        Imaging : < from: CT Abdomen and Pelvis No Cont (10.16.18 @ 01:11) >    1.  No evidence of sigmoid volvulus. Air-filled distended colon without   any definite transition. Mesenteric inflammatory changes around the   sigmoid colon and cecum, concerning for colitis.  2.  Rectal tube in place.  3.  Stable bilateral loculated pleural effusions and pericardial effusion.

## 2018-10-16 NOTE — PROGRESS NOTE ADULT - ASSESSMENT
57 y/o M with PMH of HTN, known DVT ( on Heparin GTT), BPH, Depression, that presented to [facility] for SOB ( found to have a large Pleural effusion- followed by CT surgery and has pigtail catheter in right chest cavity and chest tube in left cavity).   Gastroenterology was initially consulted for abnormal CT imagining ( CT scan with thickening of Ascending Colon- cannot rule out malignancy).   GI was recalled for significant abdominal distension and moderate stool burden in right colon likely due to Opioids.     Opoid Induced Constipation and Ileus :   - Significant improvement in distension after receiving dose of Methylnaltrexone. Will repeat one dose today.  - No obstruction, volvulus or pneumoperitoneum on CT scan.   - Serial abdominal exams, daily KUB.  - Check lytes , K , Mg and Ca daily and replete as needed.   - Reviewed records of colonoscopy done by Dr Feliciano, few colon polyps, not removed due to anticoagulation, normal right colon as per report.   - Will need repeat colonoscopy after improvement in pulmonary status for removal of colon polyps ( can be done as outpatient )  - GI will follow up. 59 y/o M with PMH of HTN, known DVT ( on Heparin GTT), BPH, Depression, that presented to [facility] for SOB ( found to have a large Pleural effusion- followed by CT surgery and has pigtail catheter in right chest cavity and chest tube in left cavity).   Gastroenterology was initially consulted for abnormal CT imagining ( CT scan with thickening of Ascending Colon- cannot rule out malignancy).   GI was recalled for significant abdominal distension and moderate stool burden in right colon likely due to Opioids.     Opoid Induced Constipation and Ileus :   - Significant improvement in distension after receiving dose of Methylnaltrexone. Will repeat one dose today.  - No obstruction, volvulus or pneumoperitoneum on CT scan.   - Serial abdominal exams, daily KUB.  - Check lytes , K , Mg and Ca daily and replete as needed.   - Reviewed records of colonoscopy done by Dr Feliciano, few colon polyps, not removed due to anticoagulation, normal right colon as per report.   - Will need repeat colonoscopy after improvement in pulmonary status for removal of colon polyps ( can be done as outpatient )  Pathology of Lung lesion reviewed  Will perform EGD and Colonoscopy. Anaethesia to evaluate and stratify risk of patient,.

## 2018-10-17 ENCOUNTER — RESULT REVIEW (OUTPATIENT)
Age: 58
End: 2018-10-17

## 2018-10-17 LAB
ANION GAP SERPL CALC-SCNC: 14 MMOL/L — SIGNIFICANT CHANGE UP (ref 7–14)
ANION GAP SERPL CALC-SCNC: 21 MMOL/L — HIGH (ref 7–14)
APTT BLD: 30.8 SEC — SIGNIFICANT CHANGE UP (ref 27–39.2)
APTT BLD: 70.6 SEC — CRITICAL HIGH (ref 27–39.2)
BASOPHILS # BLD AUTO: 0.04 K/UL — SIGNIFICANT CHANGE UP (ref 0–0.2)
BASOPHILS NFR BLD AUTO: 0.3 % — SIGNIFICANT CHANGE UP (ref 0–1)
BUN SERPL-MCNC: 8 MG/DL — LOW (ref 10–20)
BUN SERPL-MCNC: 9 MG/DL — LOW (ref 10–20)
CALCIUM SERPL-MCNC: 7.8 MG/DL — LOW (ref 8.5–10.1)
CALCIUM SERPL-MCNC: 8 MG/DL — LOW (ref 8.5–10.1)
CHLORIDE SERPL-SCNC: 92 MMOL/L — LOW (ref 98–110)
CHLORIDE SERPL-SCNC: 93 MMOL/L — LOW (ref 98–110)
CK MB CFR SERPL CALC: 3.9 NG/ML — SIGNIFICANT CHANGE UP (ref 0.6–6.3)
CK SERPL-CCNC: 62 U/L — SIGNIFICANT CHANGE UP (ref 0–225)
CO2 SERPL-SCNC: 22 MMOL/L — SIGNIFICANT CHANGE UP (ref 17–32)
CO2 SERPL-SCNC: 27 MMOL/L — SIGNIFICANT CHANGE UP (ref 17–32)
CREAT SERPL-MCNC: 0.7 MG/DL — SIGNIFICANT CHANGE UP (ref 0.7–1.5)
CREAT SERPL-MCNC: 0.7 MG/DL — SIGNIFICANT CHANGE UP (ref 0.7–1.5)
EOSINOPHIL # BLD AUTO: 0.11 K/UL — SIGNIFICANT CHANGE UP (ref 0–0.7)
EOSINOPHIL NFR BLD AUTO: 0.9 % — SIGNIFICANT CHANGE UP (ref 0–8)
GAS PNL BLDA: SIGNIFICANT CHANGE UP
GLUCOSE BLDC GLUCOMTR-MCNC: 115 MG/DL — HIGH (ref 70–99)
GLUCOSE SERPL-MCNC: 129 MG/DL — HIGH (ref 70–99)
GLUCOSE SERPL-MCNC: 99 MG/DL — SIGNIFICANT CHANGE UP (ref 70–99)
HCT VFR BLD CALC: 34.2 % — LOW (ref 42–52)
HCT VFR BLD CALC: 35.7 % — LOW (ref 42–52)
HGB BLD-MCNC: 11 G/DL — LOW (ref 14–18)
HGB BLD-MCNC: 11.5 G/DL — LOW (ref 14–18)
IMM GRANULOCYTES NFR BLD AUTO: 2 % — HIGH (ref 0.1–0.3)
INR BLD: 1.18 RATIO — SIGNIFICANT CHANGE UP (ref 0.65–1.3)
INR BLD: 1.26 RATIO — SIGNIFICANT CHANGE UP (ref 0.65–1.3)
LYMPHOCYTES # BLD AUTO: 0.56 K/UL — LOW (ref 1.2–3.4)
LYMPHOCYTES # BLD AUTO: 4.4 % — LOW (ref 20.5–51.1)
MCHC RBC-ENTMCNC: 26.3 PG — LOW (ref 27–31)
MCHC RBC-ENTMCNC: 26.7 PG — LOW (ref 27–31)
MCHC RBC-ENTMCNC: 32.2 G/DL — SIGNIFICANT CHANGE UP (ref 32–37)
MCHC RBC-ENTMCNC: 32.2 G/DL — SIGNIFICANT CHANGE UP (ref 32–37)
MCV RBC AUTO: 81.6 FL — SIGNIFICANT CHANGE UP (ref 80–94)
MCV RBC AUTO: 82.8 FL — SIGNIFICANT CHANGE UP (ref 80–94)
MONOCYTES # BLD AUTO: 0.98 K/UL — HIGH (ref 0.1–0.6)
MONOCYTES NFR BLD AUTO: 7.6 % — SIGNIFICANT CHANGE UP (ref 1.7–9.3)
NEUTROPHILS # BLD AUTO: 10.91 K/UL — HIGH (ref 1.4–6.5)
NEUTROPHILS NFR BLD AUTO: 84.8 % — HIGH (ref 42.2–75.2)
NRBC # BLD: 0 /100 WBCS — SIGNIFICANT CHANGE UP (ref 0–0)
NRBC # BLD: 0 /100 WBCS — SIGNIFICANT CHANGE UP (ref 0–0)
PLATELET # BLD AUTO: 290 K/UL — SIGNIFICANT CHANGE UP (ref 130–400)
PLATELET # BLD AUTO: 308 K/UL — SIGNIFICANT CHANGE UP (ref 130–400)
POTASSIUM SERPL-MCNC: 3.7 MMOL/L — SIGNIFICANT CHANGE UP (ref 3.5–5)
POTASSIUM SERPL-MCNC: 3.9 MMOL/L — SIGNIFICANT CHANGE UP (ref 3.5–5)
POTASSIUM SERPL-SCNC: 3.7 MMOL/L — SIGNIFICANT CHANGE UP (ref 3.5–5)
POTASSIUM SERPL-SCNC: 3.9 MMOL/L — SIGNIFICANT CHANGE UP (ref 3.5–5)
PROTHROM AB SERPL-ACNC: 13.6 SEC — HIGH (ref 9.95–12.87)
PROTHROM AB SERPL-ACNC: 14.5 SEC — HIGH (ref 9.95–12.87)
RBC # BLD: 4.19 M/UL — LOW (ref 4.7–6.1)
RBC # BLD: 4.31 M/UL — LOW (ref 4.7–6.1)
RBC # FLD: 14.9 % — HIGH (ref 11.5–14.5)
RBC # FLD: 15.1 % — HIGH (ref 11.5–14.5)
SODIUM SERPL-SCNC: 133 MMOL/L — LOW (ref 135–146)
SODIUM SERPL-SCNC: 136 MMOL/L — SIGNIFICANT CHANGE UP (ref 135–146)
TROPONIN T SERPL-MCNC: 0.06 NG/ML — CRITICAL HIGH
WBC # BLD: 12.86 K/UL — HIGH (ref 4.8–10.8)
WBC # BLD: 8.69 K/UL — SIGNIFICANT CHANGE UP (ref 4.8–10.8)
WBC # FLD AUTO: 12.86 K/UL — HIGH (ref 4.8–10.8)
WBC # FLD AUTO: 8.69 K/UL — SIGNIFICANT CHANGE UP (ref 4.8–10.8)

## 2018-10-17 RX ORDER — HEPARIN SODIUM 5000 [USP'U]/ML
1600 INJECTION INTRAVENOUS; SUBCUTANEOUS
Qty: 25000 | Refills: 0 | Status: DISCONTINUED | OUTPATIENT
Start: 2018-10-17 | End: 2018-10-23

## 2018-10-17 RX ORDER — SODIUM CHLORIDE 9 MG/ML
1000 INJECTION INTRAMUSCULAR; INTRAVENOUS; SUBCUTANEOUS
Qty: 0 | Refills: 0 | Status: DISCONTINUED | OUTPATIENT
Start: 2018-10-17 | End: 2018-10-17

## 2018-10-17 RX ORDER — SIMETHICONE 80 MG/1
80 TABLET, CHEWABLE ORAL THREE TIMES A DAY
Qty: 0 | Refills: 0 | Status: DISCONTINUED | OUTPATIENT
Start: 2018-10-17 | End: 2018-10-17

## 2018-10-17 RX ORDER — CHLORHEXIDINE GLUCONATE 213 G/1000ML
1 SOLUTION TOPICAL
Qty: 0 | Refills: 0 | Status: DISCONTINUED | OUTPATIENT
Start: 2018-10-17 | End: 2018-10-23

## 2018-10-17 RX ORDER — POLYETHYLENE GLYCOL 3350 17 G/17G
17 POWDER, FOR SOLUTION ORAL AT BEDTIME
Qty: 0 | Refills: 0 | Status: DISCONTINUED | OUTPATIENT
Start: 2018-10-17 | End: 2018-10-18

## 2018-10-17 RX ORDER — DEXMEDETOMIDINE HYDROCHLORIDE IN 0.9% SODIUM CHLORIDE 4 UG/ML
0.2 INJECTION INTRAVENOUS
Qty: 200 | Refills: 0 | Status: DISCONTINUED | OUTPATIENT
Start: 2018-10-17 | End: 2018-10-17

## 2018-10-17 RX ORDER — CARBAMIDE PEROXIDE 81.86 MG/ML
5 SOLUTION/ DROPS AURICULAR (OTIC)
Qty: 0 | Refills: 0 | Status: DISCONTINUED | OUTPATIENT
Start: 2018-10-17 | End: 2018-10-23

## 2018-10-17 RX ORDER — DOCUSATE SODIUM 100 MG
100 CAPSULE ORAL THREE TIMES A DAY
Qty: 0 | Refills: 0 | Status: DISCONTINUED | OUTPATIENT
Start: 2018-10-17 | End: 2018-10-17

## 2018-10-17 RX ORDER — PROPRANOLOL HCL 160 MG
20 CAPSULE, EXTENDED RELEASE 24HR ORAL EVERY 12 HOURS
Qty: 0 | Refills: 0 | Status: DISCONTINUED | OUTPATIENT
Start: 2018-10-17 | End: 2018-10-18

## 2018-10-17 RX ORDER — MORPHINE SULFATE 50 MG/1
2 CAPSULE, EXTENDED RELEASE ORAL EVERY 6 HOURS
Qty: 0 | Refills: 0 | Status: DISCONTINUED | OUTPATIENT
Start: 2018-10-17 | End: 2018-10-23

## 2018-10-17 RX ORDER — ZOLPIDEM TARTRATE 10 MG/1
5 TABLET ORAL AT BEDTIME
Qty: 0 | Refills: 0 | Status: DISCONTINUED | OUTPATIENT
Start: 2018-10-17 | End: 2018-10-17

## 2018-10-17 RX ORDER — SODIUM CHLORIDE 9 MG/ML
1000 INJECTION, SOLUTION INTRAVENOUS
Qty: 0 | Refills: 0 | Status: DISCONTINUED | OUTPATIENT
Start: 2018-10-17 | End: 2018-10-17

## 2018-10-17 RX ORDER — ACETAMINOPHEN 500 MG
650 TABLET ORAL EVERY 6 HOURS
Qty: 0 | Refills: 0 | Status: DISCONTINUED | OUTPATIENT
Start: 2018-10-17 | End: 2018-10-23

## 2018-10-17 RX ORDER — LATANOPROST 0.05 MG/ML
1 SOLUTION/ DROPS OPHTHALMIC; TOPICAL AT BEDTIME
Qty: 0 | Refills: 0 | Status: DISCONTINUED | OUTPATIENT
Start: 2018-10-17 | End: 2018-10-23

## 2018-10-17 RX ORDER — SENNA PLUS 8.6 MG/1
2 TABLET ORAL AT BEDTIME
Qty: 0 | Refills: 0 | Status: DISCONTINUED | OUTPATIENT
Start: 2018-10-17 | End: 2018-10-22

## 2018-10-17 RX ORDER — MIDAZOLAM HYDROCHLORIDE 1 MG/ML
2 INJECTION, SOLUTION INTRAMUSCULAR; INTRAVENOUS ONCE
Qty: 0 | Refills: 0 | Status: DISCONTINUED | OUTPATIENT
Start: 2018-10-17 | End: 2018-10-17

## 2018-10-17 RX ORDER — PROPRANOLOL HCL 160 MG
20 CAPSULE, EXTENDED RELEASE 24HR ORAL EVERY 12 HOURS
Qty: 0 | Refills: 0 | Status: DISCONTINUED | OUTPATIENT
Start: 2018-10-17 | End: 2018-10-17

## 2018-10-17 RX ORDER — DULOXETINE HYDROCHLORIDE 30 MG/1
60 CAPSULE, DELAYED RELEASE ORAL DAILY
Qty: 0 | Refills: 0 | Status: DISCONTINUED | OUTPATIENT
Start: 2018-10-17 | End: 2018-10-23

## 2018-10-17 RX ORDER — SODIUM CHLORIDE 9 MG/ML
250 INJECTION INTRAMUSCULAR; INTRAVENOUS; SUBCUTANEOUS ONCE
Qty: 0 | Refills: 0 | Status: COMPLETED | OUTPATIENT
Start: 2018-10-17 | End: 2018-10-18

## 2018-10-17 RX ORDER — DOCUSATE SODIUM 100 MG
100 CAPSULE ORAL THREE TIMES A DAY
Qty: 0 | Refills: 0 | Status: DISCONTINUED | OUTPATIENT
Start: 2018-10-17 | End: 2018-10-22

## 2018-10-17 RX ORDER — TAMSULOSIN HYDROCHLORIDE 0.4 MG/1
0.4 CAPSULE ORAL AT BEDTIME
Qty: 0 | Refills: 0 | Status: DISCONTINUED | OUTPATIENT
Start: 2018-10-17 | End: 2018-10-23

## 2018-10-17 RX ORDER — TRAZODONE HCL 50 MG
50 TABLET ORAL AT BEDTIME
Qty: 0 | Refills: 0 | Status: DISCONTINUED | OUTPATIENT
Start: 2018-10-17 | End: 2018-10-17

## 2018-10-17 RX ORDER — FUROSEMIDE 40 MG
40 TABLET ORAL DAILY
Qty: 0 | Refills: 0 | Status: DISCONTINUED | OUTPATIENT
Start: 2018-10-17 | End: 2018-10-17

## 2018-10-17 RX ADMIN — MORPHINE SULFATE 2 MILLIGRAM(S): 50 CAPSULE, EXTENDED RELEASE ORAL at 03:26

## 2018-10-17 RX ADMIN — Medication 40 MILLIGRAM(S): at 20:01

## 2018-10-17 RX ADMIN — SODIUM CHLORIDE 80 MILLILITER(S): 9 INJECTION, SOLUTION INTRAVENOUS at 18:07

## 2018-10-17 RX ADMIN — MORPHINE SULFATE 2 MILLIGRAM(S): 50 CAPSULE, EXTENDED RELEASE ORAL at 10:04

## 2018-10-17 RX ADMIN — MORPHINE SULFATE 2 MILLIGRAM(S): 50 CAPSULE, EXTENDED RELEASE ORAL at 22:48

## 2018-10-17 RX ADMIN — SIMETHICONE 80 MILLIGRAM(S): 80 TABLET, CHEWABLE ORAL at 05:22

## 2018-10-17 RX ADMIN — Medication 20 MILLIGRAM(S): at 05:23

## 2018-10-17 RX ADMIN — CARBAMIDE PEROXIDE 5 DROP(S): 81.86 SOLUTION/ DROPS AURICULAR (OTIC) at 05:23

## 2018-10-17 RX ADMIN — MIDAZOLAM HYDROCHLORIDE 2 MILLIGRAM(S): 1 INJECTION, SOLUTION INTRAMUSCULAR; INTRAVENOUS at 18:40

## 2018-10-17 RX ADMIN — DEXMEDETOMIDINE HYDROCHLORIDE IN 0.9% SODIUM CHLORIDE 5.21 MICROGRAM(S)/KG/HR: 4 INJECTION INTRAVENOUS at 18:47

## 2018-10-17 RX ADMIN — MORPHINE SULFATE 2 MILLIGRAM(S): 50 CAPSULE, EXTENDED RELEASE ORAL at 22:49

## 2018-10-17 RX ADMIN — HEPARIN SODIUM 16 UNIT(S)/HR: 5000 INJECTION INTRAVENOUS; SUBCUTANEOUS at 19:49

## 2018-10-17 NOTE — CHART NOTE - NSCHARTNOTEFT_GEN_A_CORE
PACU ANESTHESIA ADMISSION NOTE      Procedure: Thoracoscopy: left vats pleural biopsy, pleural implant biopsy, talc pleurodesis  Chest tube insertion: right chest pigtail  Thoracentesis  Thoracentesis    Post op diagnosis:  Pleural effusion      ____  Intubated  PS: 24_____       Rate:14 ______      FiO2: ___100___    _x___  Patent Airway    _x___  Full return of protective reflexes    _x___  Full recovery from anesthesia / back to baseline status    Vitals:            T:   97.6             BP :  122/76              R: on vent              Sat:    99           P:102      Mental Status:  ____ Awake   _____ Alert   _x____ Drowsy   _____ Sedated    Nausea/Vomiting:  _x___  NO       ______Yes,   See Post - Op Orders         Pain Scale (0-10):  __0___    Treatment: _x___ None    ____ See Post - Op/PCA Orders    Post - Operative Fluids:   __Oral   ___x_ See Post - Op Orders    Plan: Discharge:   __Home       _____Floor     ___x__Critical Care    _____  Other:_________________    Comments:  Unble to extubate pt. Pt was pulling low TV< 100ml, rapid, and not following commands. Pt transferred to PACU and will try to extubate.

## 2018-10-17 NOTE — PROCEDURE NOTE - ADDITIONAL PROCEDURE DETAILS
Pt had EGD and colonoscopy with a finding of a sigmoid colon mass with complete distorsion of glandular architecture highly suspicious for Sigmoid Colon AdenoCA. Biopsies were taken and sent to pathology.  Pt was electively intubated for the procedure and anesthesia could not extubate due to poor effort to pull in adequate tidal volume.  Pt was signed to green team to upgrade to ICU.  Pt was sent from endo to PACU and then will be sent to ICU.    REC :  ICU care.  Can restart anticoagulation from GI stand point if benefits more than risks.  Follow up on biopsy results.  Call Oncology consults.  GI will follow up.

## 2018-10-17 NOTE — CONSULT NOTE ADULT - SUBJECTIVE AND OBJECTIVE BOX
SICU Consultation Note  =====================================================  58y Male  HPI:   57 y/o M with PMH of HTN, known DVT (on Heparin GTT), BPH, Depression, that presented to [facility] for SOB (found to have a large Pleural effusion- followed by CT surgery and has catheter in place). Gastroenterology was consulted for abnormal CT imagining (CT scan with thickening of Ascending Colon- cannot rule out malignancy). Of note patient notes longstanding history of abdominal pain and discomfort associated with constipation ( he does not follow a specific GI doctor but notes he had a colonoscopy 10 months ago and was found to have polyps). He currently notes diffuse abdominal pain, crampy, constant only improved by pain medication. Imaging done demonstrated Ascending colon circumferential thickening ( Radiology recommends direct visualization) but previous imaging here was without discrepancy. Patient did have Colonoscopy at [email or web address] 10 months ago. Patient reports that his last bowl movement was last Friday following an enema. Patient reports nausea but no vomiting.     10/10 Patient underwent Left Vats w/ Pleural Biopsy, Pleural implant biospy talc pleurodesis. patient also had his of abdominal pain with CTAP done demonstrating an ascending colon mass found on CT Abd to rule out malignancy. patient then underwent Coloscopy/EGD today 10/17. Due to the patients history of CHF, and Pulmonary effusions and low saturations preop, the decision was made to undergo the Colonoscopy under general anesthesia. Patient was induced with propfol/etomidate/sucx, and underwent the procedure without incident with findings of a possible invasive CA.  Post procedure anesthesia was unable to extubate the patient due to low Tidal Volumes around 100. per anesthesia patient was adequately reversed. Requesting a ICU consult for respiratory failure.     PAST MEDICAL & SURGICAL HISTORY:  Pleural effusion  CHF (congestive heart failure)  Insomnia  BPH (benign prostatic hyperplasia)  Depression  Bipolar 1 disorder  HTN (hypertension)  History of thoracentesis    Home Meds: Home Medications:  Ambien 10 mg oral tablet: 1 tab(s) orally once a day (at bedtime) (21 Sep 2018 21:50)  apixaban 5 mg oral tablet: 1 tab(s) orally every 12 hours (21 Sep 2018 21:50)  Cardizem 60 mg oral tablet: 1 tab(s) orally every 8 hours (21 Sep 2018 21:50)  clotrimazole 1% topical cream: 1 application topically  (21 Sep 2018 21:50)  DULoxetine 60 mg oral delayed release capsule: 1 cap(s) orally once a day (21 Sep 2018 21:50)  isosorbide mononitrate 20 mg oral tablet: 1 tab(s) orally once a day (21 Sep 2018 21:50)  latanoprost 0.005% ophthalmic solution: 1 drop(s) to each affected eye once a day (in the evening) (21 Sep 2018 21:50)  Multiple Vitamins oral capsule: 1 cap(s) orally once a day (21 Sep 2018 21:50)  propranolol 20 mg oral tablet: 1 tab(s) orally 2 times a day (21 Sep 2018 21:50)  tamsulosin 0.4 mg oral capsule: 1 cap(s) orally once a day (21 Sep 2018 21:50)  traZODone 50 mg oral tablet: 1 tab(s) orally once a day (at bedtime) (21 Sep 2018 21:50)    Allergies: Allergies    No Known Allergies    Intolerances      Soc:   Advanced Directives: Presumed Full Code     CURRENT MEDICATIONS:   --------------------------------------------------------------------------------------  Neurologic Medications  acetaminophen   Tablet .. 650 milliGRAM(s) Oral every 6 hours  acetaminophen   Tablet .. 650 milliGRAM(s) Oral once  DULoxetine 60 milliGRAM(s) Oral daily  morphine  - Injectable 2 milliGRAM(s) IV Push every 4 hours PRN Severe Pain (7 - 10)  ondansetron    Tablet 4 milliGRAM(s) Oral every 6 hours PRN Nausea  ondansetron Injectable 8 milliGRAM(s) IV Push every 8 hours PRN Nausea and/or Vomiting  ondansetron Injectable 4 milliGRAM(s) IV Push every 6 hours PRN Nausea  traZODone 50 milliGRAM(s) Oral at bedtime  zolpidem 5 milliGRAM(s) Oral at bedtime PRN Insomnia    Respiratory Medications    Cardiovascular Medications  diltiazem    Tablet 30 milliGRAM(s) Oral every 8 hours  furosemide    Tablet 20 milliGRAM(s) Oral every 12 hours  propranolol 20 milliGRAM(s) Oral two times a day  tamsulosin 0.4 milliGRAM(s) Oral at bedtime    Gastrointestinal Medications  albumin human  5% IVPB 250 milliLiter(s) IV Intermittent once  docusate sodium 100 milliGRAM(s) Oral daily  polyethylene glycol 3350 17 Gram(s) Oral at bedtime  senna 2 Tablet(s) Oral at bedtime  simethicone 80 milliGRAM(s) Chew three times a day  sodium chloride 0.9%. 1000 milliLiter(s) IV Continuous <Continuous>    Genitourinary Medications    Hematologic/Oncologic Medications    Antimicrobial/Immunologic Medications    Endocrine/Metabolic Medications    Topical/Other Medications  BUpivacaine liposome 1.3% Injectable (no eMAR) 20 milliLiter(s) Local Injection once  carbamide peroxide Otic Solution 5 Drop(s) Left Ear two times a day  naloxone Injectable 0.1 milliGRAM(s) IV Push every 3 minutes PRN For ANY of the following changes in patient status:  A. RR LESS THAN 10 breaths per minute, B. Oxygen saturation LESS THAN 90%, C. Sedation score of 6    --------------------------------------------------------------------------------------    VITAL SIGNS, INS/OUTS (last 24 hours):  --------------------------------------------------------------------------------------  ICU Vital Signs Last 24 Hrs  T(C): 36.8 (17 Oct 2018 15:26), Max: 36.8 (17 Oct 2018 12:40)  T(F): 98.3 (17 Oct 2018 14:52), Max: 98.3 (17 Oct 2018 12:40)  HR: 97 (17 Oct 2018 15:26) (91 - 100)  BP: 148/91 (17 Oct 2018 15:26) (115/70 - 148/91)  BP(mean): 65 (17 Oct 2018 07:43) (65 - 65)  RR: 18 (17 Oct 2018 15:26) (18 - 30)  SpO2: 94% (17 Oct 2018 15:26) (94% - 94%)    I&O's Summary    16 Oct 2018 07:01  -  17 Oct 2018 07:00  --------------------------------------------------------  IN: 0 mL / OUT: 800 mL / NET: -800 mL    17 Oct 2018 07:01  -  17 Oct 2018 17:44  --------------------------------------------------------  IN: 0 mL / OUT: 400 mL / NET: -400 mL    EXAM:  General/Neuro  RASS:   GCS:   Exam: Normal, NAD, alert, oriented x 3, no focal deficits. PERRLA  ***    Respiratory  Exam: Lungs clear to auscultation, Normal expansion/effort.  ***  [] Tracheostomy   [] Intubated  Mechanical Ventilation:     Cardiovascular  Exam: S1, S2.  Regular rate and rhythm.  Peripheral edema  ***  Cardiac Rhythm: Normal Sinus Rhythm  ECHO:     GI  Exam: Abdomen soft, Non-tender, Non-distended.  Gastrostomy / Jejunostomy tube in place.  Nasogastric tube in place.  Colostomy / Ileostomy.  ***  Wound:   ***  Current Diet:  NPO***    Extremities  Exam: Extremities warm, pink, well-perfused.      Derm:  Exam: Good skin turgor, no skin breakdown.      :   Exam: Velásquez catheter in place.     Tubes/Lines/Drains  ***  [x] Peripheral IV    LABS  --------------------------------------------------------------------------------------             11.0   8.69  )-----------( 290      ( 17 Oct 2018 06:21 )             34.2     10-17    133<L>  |  92<L>  |  8<L>  ----------------------------<  99  3.7   |  27  |  0.7    Ca    8.0<L>      17 Oct 2018 01:33    PT/INR - ( 17 Oct 2018 01:33 )   PT: 14.50 sec;   INR: 1.26 ratio      PTT - ( 17 Oct 2018 01:33 )  PTT:70.6 sec    CAPILLARY BLOOD GLUCOSE    ASSESSMENT:  57 y/o M with PMH of HTN, known DVT (on Heparin GTT), CHF (EF 50-55%), Patient admitted for recurrent pleural effusions  s/p  Left Vats w/ Pleural Biopsy, Pleural implant biospy talc pleurodesis.10/10. patient was found to have a CTAP done demonstrating an ascending colon mass found on CT Abd to rule out malignancy. patient then underwent Coloscopy/EGD today 10/17. Due to the patients history of CHF, and Pulmonary effusions and low saturations preop, the decision was made to undergo the Colonoscopy under general anesthesia. Patient was induced with propfol/etomidate/sucx, and underwent the procedure without incident with findings of a possible invasive CA.  Post procedure anesthesia was unable to extubate the patient due to low Tidal Volumes around 100. per anesthesia patient was adequately reversed. Requesting a ICU consult for respiratory failure.       PLAN:    Neurologic:   Neurologic Medications  acetaminophen   Tablet .. 650 milliGRAM(s) Oral every 6 hours  acetaminophen   Tablet .. 650 milliGRAM(s) Oral once  DULoxetine 60 milliGRAM(s) Oral daily  morphine  - Injectable 2 milliGRAM(s) IV Push every 4 hours PRN Severe Pain (7 - 10)  ondansetron    Tablet 4 milliGRAM(s) Oral every 6 hours PRN Nausea  ondansetron Injectable 8 milliGRAM(s) IV Push every 8 hours PRN Nausea and/or Vomiting  ondansetron Injectable 4 milliGRAM(s) IV Push every 6 hours PRN Nausea  traZODone 50 milliGRAM(s) Oral at bedtime  zolpidem 5 milliGRAM(s) Oral at bedtime PRN Insomnia    Respiratory:   RR: 18 (10-17-18 @ 15:26) (18 - 30)  SpO2: 94% (10-17-18 @ 15:26) (94% - 94%)    Cardiovascular:   HR: 97 (10-17-18 @ 15:26) (91 - 100)  BP: 148/91 (10-17-18 @ 15:26) (115/70 - 148/91)  BP(mean): 65 (10-17-18 @ 07:43) (65 - 65)      Cardiovascular Medications  diltiazem    Tablet 30 milliGRAM(s) Oral every 8 hours  furosemide    Tablet 20 milliGRAM(s) Oral every 12 hours  propranolol 20 milliGRAM(s) Oral two times a day  tamsulosin 0.4 milliGRAM(s) Oral at bedtime      Gastrointestinal/Nutrition:   Diet, NPO:   Except Medications (10-14-18 @ 08:09)      Gastrointestinal Medications  albumin human  5% IVPB 250 milliLiter(s) IV Intermittent once  docusate sodium 100 milliGRAM(s) Oral daily  polyethylene glycol 3350 17 Gram(s) Oral at bedtime  senna 2 Tablet(s) Oral at bedtime  simethicone 80 milliGRAM(s) Chew three times a day    Renal/Genitourinary:       Hematologic:     Infectious Disease:   T(F): 98.3 (10-17-18 @ 14:52), Max: 98.3 (10-17-18 @ 12:40)  WBC:   8.69 (10-17-18 @ 06:21)  8.15 (10-16-18 @ 06:34)    Endocrine:     Lines/Tubes:    Disposition: SICU Consultation Note  =====================================================  58y Male  HPI:   57 y/o M with PMH of HTN, known DVT (on Heparin GTT), BPH, Depression, that presented to [facility] for SOB (found to have a large Pleural effusion- followed by CT surgery and has catheter in place). Gastroenterology was consulted for abnormal CT imagining (CT scan with thickening of Ascending Colon- cannot rule out malignancy). Of note patient notes longstanding history of abdominal pain and discomfort associated with constipation ( he does not follow a specific GI doctor but notes he had a colonoscopy 10 months ago and was found to have polyps). He currently notes diffuse abdominal pain, crampy, constant only improved by pain medication. Imaging done demonstrated Ascending colon circumferential thickening ( Radiology recommends direct visualization) but previous imaging here was without discrepancy. Patient did have Colonoscopy at [facility] 10 months ago. Patient reports that his last bowl movement was last Friday following an enema. Patient reports nausea but no vomiting.     10/10 Patient underwent Left Vats w/ Pleural Biopsy, Pleural implant biospy talc pleurodesis. patient also had his of abdominal pain with CTAP done demonstrating an ascending colon mass found on CT Abd to rule out malignancy. patient then underwent Coloscopy/EGD today 10/17. Due to the patients history of CHF, and Pulmonary effusions and low saturations preop, the decision was made to undergo the Colonoscopy under general anesthesia. Patient was induced with propfol/etomidate/sucx, and underwent the procedure without incident with findings of a possible invasive CA.  Post procedure anesthesia was unable to extubate the patient due to low Tidal Volumes around 100. per anesthesia patient was adequately reversed. Requesting a ICU consult for respiratory failure.     PAST MEDICAL & SURGICAL HISTORY:  Pleural effusion  CHF (congestive heart failure)  Insomnia  BPH (benign prostatic hyperplasia)  Depression  Bipolar 1 disorder  HTN (hypertension)  History of thoracentesis    Home Meds: Home Medications:  Ambien 10 mg oral tablet: 1 tab(s) orally once a day (at bedtime) (21 Sep 2018 21:50)  apixaban 5 mg oral tablet: 1 tab(s) orally every 12 hours (21 Sep 2018 21:50)  Cardizem 60 mg oral tablet: 1 tab(s) orally every 8 hours (21 Sep 2018 21:50)  clotrimazole 1% topical cream: 1 application topically  (21 Sep 2018 21:50)  DULoxetine 60 mg oral delayed release capsule: 1 cap(s) orally once a day (21 Sep 2018 21:50)  isosorbide mononitrate 20 mg oral tablet: 1 tab(s) orally once a day (21 Sep 2018 21:50)  latanoprost 0.005% ophthalmic solution: 1 drop(s) to each affected eye once a day (in the evening) (21 Sep 2018 21:50)  Multiple Vitamins oral capsule: 1 cap(s) orally once a day (21 Sep 2018 21:50)  propranolol 20 mg oral tablet: 1 tab(s) orally 2 times a day (21 Sep 2018 21:50)  tamsulosin 0.4 mg oral capsule: 1 cap(s) orally once a day (21 Sep 2018 21:50)  traZODone 50 mg oral tablet: 1 tab(s) orally once a day (at bedtime) (21 Sep 2018 21:50)    Allergies: Allergies    No Known Allergies    Intolerances    Soc:   Advanced Directives: Presumed Full Code     CURRENT MEDICATIONS:   --------------------------------------------------------------------------------------  Neurologic Medications  acetaminophen   Tablet .. 650 milliGRAM(s) Oral every 6 hours  acetaminophen   Tablet .. 650 milliGRAM(s) Oral once  DULoxetine 60 milliGRAM(s) Oral daily  morphine  - Injectable 2 milliGRAM(s) IV Push every 4 hours PRN Severe Pain (7 - 10)  ondansetron    Tablet 4 milliGRAM(s) Oral every 6 hours PRN Nausea  ondansetron Injectable 8 milliGRAM(s) IV Push every 8 hours PRN Nausea and/or Vomiting  ondansetron Injectable 4 milliGRAM(s) IV Push every 6 hours PRN Nausea  traZODone 50 milliGRAM(s) Oral at bedtime  zolpidem 5 milliGRAM(s) Oral at bedtime PRN Insomnia    Respiratory Medications    Cardiovascular Medications  diltiazem    Tablet 30 milliGRAM(s) Oral every 8 hours  furosemide    Tablet 20 milliGRAM(s) Oral every 12 hours  propranolol 20 milliGRAM(s) Oral two times a day  tamsulosin 0.4 milliGRAM(s) Oral at bedtime    Gastrointestinal Medications  albumin human  5% IVPB 250 milliLiter(s) IV Intermittent once  docusate sodium 100 milliGRAM(s) Oral daily  polyethylene glycol 3350 17 Gram(s) Oral at bedtime  senna 2 Tablet(s) Oral at bedtime  simethicone 80 milliGRAM(s) Chew three times a day  sodium chloride 0.9%. 1000 milliLiter(s) IV Continuous <Continuous>    Genitourinary Medications    Hematologic/Oncologic Medications    Antimicrobial/Immunologic Medications    Endocrine/Metabolic Medications    Topical/Other Medications  BUpivacaine liposome 1.3% Injectable (no eMAR) 20 milliLiter(s) Local Injection once  carbamide peroxide Otic Solution 5 Drop(s) Left Ear two times a day  naloxone Injectable 0.1 milliGRAM(s) IV Push every 3 minutes PRN For ANY of the following changes in patient status:  A. RR LESS THAN 10 breaths per minute, B. Oxygen saturation LESS THAN 90%, C. Sedation score of 6    --------------------------------------------------------------------------------------    VITAL SIGNS, INS/OUTS (last 24 hours):  --------------------------------------------------------------------------------------  ICU Vital Signs Last 24 Hrs  T(C): 36.8 (17 Oct 2018 15:26), Max: 36.8 (17 Oct 2018 12:40)  T(F): 98.3 (17 Oct 2018 14:52), Max: 98.3 (17 Oct 2018 12:40)  HR: 97 (17 Oct 2018 15:26) (91 - 100)  BP: 148/91 (17 Oct 2018 15:26) (115/70 - 148/91)  BP(mean): 65 (17 Oct 2018 07:43) (65 - 65)  RR: 18 (17 Oct 2018 15:26) (18 - 30)  SpO2: 94% (17 Oct 2018 15:26) (94% - 94%)    I&O's Summary    16 Oct 2018 07:01  -  17 Oct 2018 07:00  --------------------------------------------------------  IN: 0 mL / OUT: 800 mL / NET: -800 mL    17 Oct 2018 07:01  -  17 Oct 2018 17:44  --------------------------------------------------------  IN: 0 mL / OUT: 400 mL / NET: -400 mL    EXAM:  General/Neuro  RASS:   GCS:   Exam: Normal, NAD, alert, oriented x 3, no focal deficits. PERRLA  ***    Respiratory  Exam: Lungs clear to auscultation, Normal expansion/effort.  ***  [] Tracheostomy   [] Intubated  Mechanical Ventilation:     Cardiovascular  Exam: S1, S2.  Regular rate and rhythm.  Peripheral edema  ***  Cardiac Rhythm: Normal Sinus Rhythm  ECHO:     GI  Exam: Abdomen soft, Non-tender, Non-distended.  Gastrostomy / Jejunostomy tube in place.  Nasogastric tube in place.  Colostomy / Ileostomy.  ***  Wound:   ***  Current Diet:  NPO***    Extremities  Exam: Extremities warm, pink, well-perfused.      Derm:  Exam: Good skin turgor, no skin breakdown.      :   Exam: Velásquez catheter in place.     Tubes/Lines/Drains  ***  [x] Peripheral IV    LABS  --------------------------------------------------------------------------------------             11.0   8.69  )-----------( 290      ( 17 Oct 2018 06:21 )             34.2     10-17    133<L>  |  92<L>  |  8<L>  ----------------------------<  99  3.7   |  27  |  0.7    Ca    8.0<L>      17 Oct 2018 01:33    PT/INR - ( 17 Oct 2018 01:33 )   PT: 14.50 sec;   INR: 1.26 ratio      PTT - ( 17 Oct 2018 01:33 )  PTT:70.6 sec    CAPILLARY BLOOD GLUCOSE    ASSESSMENT:  57 y/o M with PMH of HTN, known DVT (on Heparin GTT), CHF (EF 50-55%), Patient admitted for recurrent pleural effusions  s/p  Left Vats w/ Pleural Biopsy, Pleural implant biospy talc pleurodesis.10/10. patient was found to have a CTAP done demonstrating an ascending colon mass found on CT Abd to rule out malignancy. patient then underwent Coloscopy/EGD today 10/17. Due to the patients history of CHF, and Pulmonary effusions and low saturations preop, the decision was made to undergo the Colonoscopy under general anesthesia. Patient was induced with propfol/etomidate/sucx, and underwent the procedure without incident with findings of a possible invasive CA.  Post procedure anesthesia was unable to extubate the patient due to low Tidal Volumes around 100. per anesthesia patient was adequately reversed. Requesting a ICU consult for respiratory failure.       PLAN:    Neurologic:   Neurologic Medications  acetaminophen   Tablet .. 650 milliGRAM(s) Oral every 6 hours  acetaminophen   Tablet .. 650 milliGRAM(s) Oral once  DULoxetine 60 milliGRAM(s) Oral daily  morphine  - Injectable 2 milliGRAM(s) IV Push every 4 hours PRN Severe Pain (7 - 10)  ondansetron    Tablet 4 milliGRAM(s) Oral every 6 hours PRN Nausea  ondansetron Injectable 8 milliGRAM(s) IV Push every 8 hours PRN Nausea and/or Vomiting  ondansetron Injectable 4 milliGRAM(s) IV Push every 6 hours PRN Nausea  traZODone 50 milliGRAM(s) Oral at bedtime  zolpidem 5 milliGRAM(s) Oral at bedtime PRN Insomnia    Respiratory:   RR: 18 (10-17-18 @ 15:26) (18 - 30)  SpO2: 94% (10-17-18 @ 15:26) (94% - 94%)    Cardiovascular:   HR: 97 (10-17-18 @ 15:26) (91 - 100)  BP: 148/91 (10-17-18 @ 15:26) (115/70 - 148/91)  BP(mean): 65 (10-17-18 @ 07:43) (65 - 65)      Cardiovascular Medications  diltiazem    Tablet 30 milliGRAM(s) Oral every 8 hours  furosemide    Tablet 20 milliGRAM(s) Oral every 12 hours  propranolol 20 milliGRAM(s) Oral two times a day  tamsulosin 0.4 milliGRAM(s) Oral at bedtime      Gastrointestinal/Nutrition:   Diet, NPO:   Except Medications (10-14-18 @ 08:09)      Gastrointestinal Medications  albumin human  5% IVPB 250 milliLiter(s) IV Intermittent once  docusate sodium 100 milliGRAM(s) Oral daily  polyethylene glycol 3350 17 Gram(s) Oral at bedtime  senna 2 Tablet(s) Oral at bedtime  simethicone 80 milliGRAM(s) Chew three times a day    Renal/Genitourinary:       Hematologic:     Infectious Disease:   T(F): 98.3 (10-17-18 @ 14:52), Max: 98.3 (10-17-18 @ 12:40)  WBC:   8.69 (10-17-18 @ 06:21)  8.15 (10-16-18 @ 06:34)    Endocrine:     Lines/Tubes:    Disposition: SICU Consultation Note  =====================================================  58y Male  HPI:   59 y/o M with PMH of HTN, known DVT (on Heparin GTT), BPH, Depression, that presented to [facility] for SOB (found to have a large Pleural effusion- followed by CT surgery and has catheter in place). Gastroenterology was consulted for abnormal CT imagining (CT scan with thickening of Ascending Colon- cannot rule out malignancy). Of note patient notes longstanding history of abdominal pain and discomfort associated with constipation ( he does not follow a specific GI doctor but notes he had a colonoscopy 10 months ago and was found to have polyps). He currently notes diffuse abdominal pain, crampy, constant only improved by pain medication. Imaging done demonstrated Ascending colon circumferential thickening ( Radiology recommends direct visualization) but previous imaging here was without discrepancy. Patient did have Colonoscopy at [facility] 10 months ago. Patient reports that his last bowl movement was last Friday following an enema. Patient reports nausea but no vomiting.     10/10 Patient underwent Left Vats w/ Pleural Biopsy, Pleural implant biospy talc pleurodesis. patient also had his of abdominal pain with CTAP done demonstrating an ascending colon mass found on CT Abd to rule out malignancy. patient then underwent Coloscopy/EGD today 10/17. Due to the patients history of CHF, and Pulmonary effusions and low saturations preop, the decision was made to undergo the Colonoscopy under general anesthesia. Patient was induced with propfol/etomidate/sucx, and underwent the procedure without incident with findings of a possible invasive CA.  Post procedure anesthesia was unable to extubate the patient due to low Tidal Volumes around 100. per anesthesia patient was adequately reversed. Requesting a ICU consult for respiratory failure.     PAST MEDICAL & SURGICAL HISTORY:  Pleural effusion  CHF (congestive heart failure)  Insomnia  BPH (benign prostatic hyperplasia)  Depression  Bipolar 1 disorder  HTN (hypertension)  History of thoracentesis    Home Meds: Home Medications:  Ambien 10 mg oral tablet: 1 tab(s) orally once a day (at bedtime) (21 Sep 2018 21:50)  apixaban 5 mg oral tablet: 1 tab(s) orally every 12 hours (21 Sep 2018 21:50)  Cardizem 60 mg oral tablet: 1 tab(s) orally every 8 hours (21 Sep 2018 21:50)  clotrimazole 1% topical cream: 1 application topically  (21 Sep 2018 21:50)  DULoxetine 60 mg oral delayed release capsule: 1 cap(s) orally once a day (21 Sep 2018 21:50)  isosorbide mononitrate 20 mg oral tablet: 1 tab(s) orally once a day (21 Sep 2018 21:50)  latanoprost 0.005% ophthalmic solution: 1 drop(s) to each affected eye once a day (in the evening) (21 Sep 2018 21:50)  Multiple Vitamins oral capsule: 1 cap(s) orally once a day (21 Sep 2018 21:50)  propranolol 20 mg oral tablet: 1 tab(s) orally 2 times a day (21 Sep 2018 21:50)  tamsulosin 0.4 mg oral capsule: 1 cap(s) orally once a day (21 Sep 2018 21:50)  traZODone 50 mg oral tablet: 1 tab(s) orally once a day (at bedtime) (21 Sep 2018 21:50)    Allergies: Allergies    No Known Allergies    Intolerances    Soc:   Advanced Directives: Presumed Full Code     CURRENT MEDICATIONS:   --------------------------------------------------------------------------------------  Neurologic Medications  acetaminophen   Tablet .. 650 milliGRAM(s) Oral every 6 hours  acetaminophen   Tablet .. 650 milliGRAM(s) Oral once  DULoxetine 60 milliGRAM(s) Oral daily  morphine  - Injectable 2 milliGRAM(s) IV Push every 4 hours PRN Severe Pain (7 - 10)  ondansetron    Tablet 4 milliGRAM(s) Oral every 6 hours PRN Nausea  ondansetron Injectable 8 milliGRAM(s) IV Push every 8 hours PRN Nausea and/or Vomiting  ondansetron Injectable 4 milliGRAM(s) IV Push every 6 hours PRN Nausea  traZODone 50 milliGRAM(s) Oral at bedtime  zolpidem 5 milliGRAM(s) Oral at bedtime PRN Insomnia    Respiratory Medications    Cardiovascular Medications  diltiazem    Tablet 30 milliGRAM(s) Oral every 8 hours  furosemide    Tablet 20 milliGRAM(s) Oral every 12 hours  propranolol 20 milliGRAM(s) Oral two times a day  tamsulosin 0.4 milliGRAM(s) Oral at bedtime    Gastrointestinal Medications  albumin human  5% IVPB 250 milliLiter(s) IV Intermittent once  docusate sodium 100 milliGRAM(s) Oral daily  polyethylene glycol 3350 17 Gram(s) Oral at bedtime  senna 2 Tablet(s) Oral at bedtime  simethicone 80 milliGRAM(s) Chew three times a day  sodium chloride 0.9%. 1000 milliLiter(s) IV Continuous <Continuous>    Genitourinary Medications    Hematologic/Oncologic Medications    Antimicrobial/Immunologic Medications    Endocrine/Metabolic Medications    Topical/Other Medications  BUpivacaine liposome 1.3% Injectable (no eMAR) 20 milliLiter(s) Local Injection once  carbamide peroxide Otic Solution 5 Drop(s) Left Ear two times a day  naloxone Injectable 0.1 milliGRAM(s) IV Push every 3 minutes PRN For ANY of the following changes in patient status:  A. RR LESS THAN 10 breaths per minute, B. Oxygen saturation LESS THAN 90%, C. Sedation score of 6    --------------------------------------------------------------------------------------    VITAL SIGNS, INS/OUTS (last 24 hours):  --------------------------------------------------------------------------------------  ICU Vital Signs Last 24 Hrs  T(C): 36.8 (17 Oct 2018 15:26), Max: 36.8 (17 Oct 2018 12:40)  T(F): 98.3 (17 Oct 2018 14:52), Max: 98.3 (17 Oct 2018 12:40)  HR: 97 (17 Oct 2018 15:26) (91 - 100)  BP: 148/91 (17 Oct 2018 15:26) (115/70 - 148/91)  BP(mean): 65 (17 Oct 2018 07:43) (65 - 65)  RR: 18 (17 Oct 2018 15:26) (18 - 30)  SpO2: 94% (17 Oct 2018 15:26) (94% - 94%)    I&O's Summary    16 Oct 2018 07:01  -  17 Oct 2018 07:00  --------------------------------------------------------  IN: 0 mL / OUT: 800 mL / NET: -800 mL    17 Oct 2018 07:01  -  17 Oct 2018 17:44  --------------------------------------------------------  IN: 0 mL / OUT: 400 mL / NET: -400 mL    EXAM:  General/Neuro  RASS: 2  GCS: 11T  Exam: Intubated, awake and alert, agitated.    Respiratory  Exam: vented Lungs clear to auscultation, Lt chest tube out w/ incision sites w/ dressing in place,   [x] Intubated   Mechanical Ventilation: 40/5    Cardiovascular  Exam: S1, S2.  Regular rate and rhythm. No Peripheral edema    GI  Exam: Abdomen soft, Non-tender, Non-distended. No rebound no guarding.  Extremities  Exam: Extremities warm, pink, well-perfused.      Derm:  Exam: Good skin turgor, no skin breakdown.      :   Exam: No Velásquez catheter in place.     Tubes/Lines/Drains  ***  [x] Peripheral IV    LABS  --------------------------------------------------------------------------------------             11.0   8.69  )-----------( 290      ( 17 Oct 2018 06:21 )             34.2     10-17    133<L>  |  92<L>  |  8<L>  ----------------------------<  99  3.7   |  27  |  0.7    Ca    8.0<L>      17 Oct 2018 01:33    PT/INR - ( 17 Oct 2018 01:33 )   PT: 14.50 sec;   INR: 1.26 ratio      PTT - ( 17 Oct 2018 01:33 )  PTT:70.6 sec    CAPILLARY BLOOD GLUCOSE    ASSESSMENT:  59 y/o M with PMH of HTN, known DVT (on Heparin GTT), CHF (EF 50-55%), Patient admitted for recurrent pleural effusions  s/p  Left Vats w/ Pleural Biopsy, Pleural implant biospy talc pleurodesis.10/10. patient was found to have a CTAP done demonstrating an ascending colon mass found on CT Abd to rule out malignancy. patient then underwent Coloscopy/EGD today 10/17. Due to the patients history of CHF, and Pulmonary effusions and low saturations preop, the decision was made to undergo the Colonoscopy under general anesthesia. Patient was induced with propfol/etomidate/sucx, and underwent the procedure without incident with findings of a possible invasive CA.  Post procedure anesthesia was unable to extubate the patient due to low Tidal Volumes around 100. per anesthesia patient was adequately reversed. Requesting a ICU consult for respiratory failure.       PLAN:    Neurologic: Intubated, agitated, patient was started on Precedex and propofol for sedation.      Respiratory: Vent 40/5, pulling TV of >450. f/u ABG. and optimize vent settings as needed.   Hx of CHF, Pleural effusions s/p talc pleurodesis 10/10, was saturating 93% preprocedure on 3L N/C. patient also takes Lasix.    Cardiovascular: Hx of HTN, CHF (50-55%), on Lasix 40 IV daily.   - Baseline tachycardiac Cardizem 60mg q8h and resume propanolol 20mg q12h (home medication)     Cardiovascular Medications  diltiazem    Tablet 30 milliGRAM(s) Oral every 8 hours  furosemide    Tablet 20 milliGRAM(s) Oral every 12 hours  propranolol 20 milliGRAM(s) Oral two times a day  tamsulosin 0.4 milliGRAM(s) Oral at bedtime    Gastrointestinal/Nutrition: NPO, can resume diet once extubated. Continue Bowel regimen.      Renal/Genitourinary: No Velásquez monitor electrolytes replete as needed.     Hematologic: Hx of DVT, Resume Hep drip heparin  Infusion@ 1700, OK'd by GI fellow to restart post procedure.    Infectious Disease: No Acute Dx, Afebrile, F/U Wbc  T(F): 98.3 (10-17-18 @ 14:52), Max: 98.3 (10-17-18 @ 12:40)  Endocrine: No Acute Dx, FS q6h.    Glucose:   115 (10-17-18 @ 18:20)    Lines/Tubes: PIV, ET tube.    Disposition: ICU overnight for hemodynamic monitoring. Extubation tonight vs. in AM. SICU Consultation Note  =====================================================  58y Male  HPI:   57 y/o M with PMH of HTN, known DVT (on Heparin GTT), BPH, Depression, that presented to [facility] for SOB (found to have a large Pleural effusion- followed by CT surgery and has catheter in place). Gastroenterology was consulted for abnormal CT imagining (CT scan with thickening of Ascending Colon- cannot rule out malignancy). Of note patient notes longstanding history of abdominal pain and discomfort associated with constipation ( he does not follow a specific GI doctor but notes he had a colonoscopy 10 months ago and was found to have polyps). He currently notes diffuse abdominal pain, crampy, constant only improved by pain medication. Imaging done demonstrated Ascending colon circumferential thickening ( Radiology recommends direct visualization) but previous imaging here was without discrepancy. Patient did have Colonoscopy at [facility] 10 months ago. Patient reports that his last bowl movement was last Friday following an enema. Patient reports nausea but no vomiting.     10/10 Patient underwent Left Vats w/ Pleural Biopsy, Pleural implant biospy talc pleurodesis. patient also had his of abdominal pain with CTAP done demonstrating an ascending colon mass found on CT Abd to rule out malignancy. patient then underwent Coloscopy/EGD today 10/17. Due to the patients history of CHF, and Pulmonary effusions and low saturations preop, the decision was made to undergo the Colonoscopy under general anesthesia. Patient was induced with propfol/etomidate/sucx, and underwent the procedure without incident with findings of a possible invasive CA.  Post procedure anesthesia was unable to extubate the patient due to low Tidal Volumes around 100. per anesthesia patient was adequately reversed. Requesting a ICU consult for respiratory failure.     PAST MEDICAL & SURGICAL HISTORY:  Pleural effusion  CHF (congestive heart failure)  Insomnia  BPH (benign prostatic hyperplasia)  Depression  Bipolar 1 disorder  HTN (hypertension)  History of thoracentesis    Home Meds: Home Medications:  Ambien 10 mg oral tablet: 1 tab(s) orally once a day (at bedtime) (21 Sep 2018 21:50)  apixaban 5 mg oral tablet: 1 tab(s) orally every 12 hours (21 Sep 2018 21:50)  Cardizem 60 mg oral tablet: 1 tab(s) orally every 8 hours (21 Sep 2018 21:50)  clotrimazole 1% topical cream: 1 application topically  (21 Sep 2018 21:50)  DULoxetine 60 mg oral delayed release capsule: 1 cap(s) orally once a day (21 Sep 2018 21:50)  isosorbide mononitrate 20 mg oral tablet: 1 tab(s) orally once a day (21 Sep 2018 21:50)  latanoprost 0.005% ophthalmic solution: 1 drop(s) to each affected eye once a day (in the evening) (21 Sep 2018 21:50)  Multiple Vitamins oral capsule: 1 cap(s) orally once a day (21 Sep 2018 21:50)  propranolol 20 mg oral tablet: 1 tab(s) orally 2 times a day (21 Sep 2018 21:50)  tamsulosin 0.4 mg oral capsule: 1 cap(s) orally once a day (21 Sep 2018 21:50)  traZODone 50 mg oral tablet: 1 tab(s) orally once a day (at bedtime) (21 Sep 2018 21:50)    Allergies: Allergies    No Known Allergies    Intolerances    Soc:   Advanced Directives: Presumed Full Code     CURRENT MEDICATIONS:   --------------------------------------------------------------------------------------  Neurologic Medications  acetaminophen   Tablet .. 650 milliGRAM(s) Oral every 6 hours  acetaminophen   Tablet .. 650 milliGRAM(s) Oral once  DULoxetine 60 milliGRAM(s) Oral daily  morphine  - Injectable 2 milliGRAM(s) IV Push every 4 hours PRN Severe Pain (7 - 10)  ondansetron    Tablet 4 milliGRAM(s) Oral every 6 hours PRN Nausea  ondansetron Injectable 8 milliGRAM(s) IV Push every 8 hours PRN Nausea and/or Vomiting  ondansetron Injectable 4 milliGRAM(s) IV Push every 6 hours PRN Nausea  traZODone 50 milliGRAM(s) Oral at bedtime  zolpidem 5 milliGRAM(s) Oral at bedtime PRN Insomnia    Respiratory Medications    Cardiovascular Medications  diltiazem    Tablet 30 milliGRAM(s) Oral every 8 hours  furosemide    Tablet 20 milliGRAM(s) Oral every 12 hours  propranolol 20 milliGRAM(s) Oral two times a day  tamsulosin 0.4 milliGRAM(s) Oral at bedtime    Gastrointestinal Medications  albumin human  5% IVPB 250 milliLiter(s) IV Intermittent once  docusate sodium 100 milliGRAM(s) Oral daily  polyethylene glycol 3350 17 Gram(s) Oral at bedtime  senna 2 Tablet(s) Oral at bedtime  simethicone 80 milliGRAM(s) Chew three times a day  sodium chloride 0.9%. 1000 milliLiter(s) IV Continuous <Continuous>    Genitourinary Medications    Hematologic/Oncologic Medications    Antimicrobial/Immunologic Medications    Endocrine/Metabolic Medications    Topical/Other Medications  BUpivacaine liposome 1.3% Injectable (no eMAR) 20 milliLiter(s) Local Injection once  carbamide peroxide Otic Solution 5 Drop(s) Left Ear two times a day  naloxone Injectable 0.1 milliGRAM(s) IV Push every 3 minutes PRN For ANY of the following changes in patient status:  A. RR LESS THAN 10 breaths per minute, B. Oxygen saturation LESS THAN 90%, C. Sedation score of 6    --------------------------------------------------------------------------------------    VITAL SIGNS, INS/OUTS (last 24 hours):  --------------------------------------------------------------------------------------  ICU Vital Signs Last 24 Hrs  T(C): 36.8 (17 Oct 2018 15:26), Max: 36.8 (17 Oct 2018 12:40)  T(F): 98.3 (17 Oct 2018 14:52), Max: 98.3 (17 Oct 2018 12:40)  HR: 97 (17 Oct 2018 15:26) (91 - 100)  BP: 148/91 (17 Oct 2018 15:26) (115/70 - 148/91)  BP(mean): 65 (17 Oct 2018 07:43) (65 - 65)  RR: 18 (17 Oct 2018 15:26) (18 - 30)  SpO2: 94% (17 Oct 2018 15:26) (94% - 94%)    I&O's Summary    16 Oct 2018 07:01  -  17 Oct 2018 07:00  --------------------------------------------------------  IN: 0 mL / OUT: 800 mL / NET: -800 mL    17 Oct 2018 07:01  -  17 Oct 2018 17:44  --------------------------------------------------------  IN: 0 mL / OUT: 400 mL / NET: -400 mL    EXAM:  General/Neuro  RASS: 2  GCS: 11T  Exam: Intubated, awake and alert, agitated.    Respiratory  Exam: vented Lungs clear to auscultation, Lt chest tube out w/ incision sites w/ dressing in place,   [x] Intubated   Mechanical Ventilation: 40/5    Cardiovascular  Exam: S1, S2.  Regular rate and rhythm. No Peripheral edema    GI  Exam: Abdomen soft, Non-tender, Non-distended. No rebound no guarding.  Extremities  Exam: Extremities warm, pink, well-perfused.      Derm:  Exam: Good skin turgor, no skin breakdown.      :   Exam: No Velásquez catheter in place.     Tubes/Lines/Drains  ***  [x] Peripheral IV    LABS  --------------------------------------------------------------------------------------             11.0   8.69  )-----------( 290      ( 17 Oct 2018 06:21 )             34.2     10-17    133<L>  |  92<L>  |  8<L>  ----------------------------<  99  3.7   |  27  |  0.7    Ca    8.0<L>      17 Oct 2018 01:33    PT/INR - ( 17 Oct 2018 01:33 )   PT: 14.50 sec;   INR: 1.26 ratio      PTT - ( 17 Oct 2018 01:33 )  PTT:70.6 sec    CAPILLARY BLOOD GLUCOSE    ASSESSMENT:  57 y/o M with PMH of HTN, known DVT (on Heparin GTT), CHF (EF 50-55%), Patient admitted for recurrent pleural effusions  s/p  Left Vats w/ Pleural Biopsy, Pleural implant biospy talc pleurodesis.10/10. patient was found to have a CTAP done demonstrating an ascending colon mass found on CT Abd to rule out malignancy. patient then underwent Coloscopy/EGD today 10/17. Due to the patients history of CHF, and Pulmonary effusions and low saturations preop, the decision was made to undergo the Colonoscopy under general anesthesia. Patient was induced with propfol/etomidate/sucx, and underwent the procedure without incident with findings of a possible invasive CA.  Post procedure anesthesia was unable to extubate the patient due to low Tidal Volumes around 100. per anesthesia patient was adequately reversed. Requesting a ICU consult for respiratory failure.       PLAN:    Neurologic: Intubated, agitated, patient was started on Precedex and propofol for sedation.      Respiratory: Vent 40/5, pulling TV of >450. f/u ABG. and optimize vent settings as needed.   Hx of CHF, Pleural effusions s/p talc pleurodesis 10/10, was saturating 93% preprocedure on 3L N/C. patient also takes Lasix.    Cardiovascular: Hx of HTN, CHF (50-55%), on Lasix 40 IV daily.   - Baseline tachycardiac Cardizem 60mg q8h and resume propanolol 20mg q12h (home medication)     Cardiovascular Medications  diltiazem    Tablet 30 milliGRAM(s) Oral every 8 hours  furosemide    Tablet 20 milliGRAM(s) Oral every 12 hours  propranolol 20 milliGRAM(s) Oral two times a day  tamsulosin 0.4 milliGRAM(s) Oral at bedtime    Gastrointestinal/Nutrition: NPO, can resume diet once extubated. Continue Bowel regimen.      Renal/Genitourinary: No Velásquez monitor electrolytes replete as needed.     Hematologic: Hx of DVT, Resume Hep drip heparin  Infusion@ 1700, OK'd by GI fellow to restart post procedure.  - Venous duplex shows: rt femoral vein DVT and lt femoral, popliteal and gastrocnemius veins DVT      Infectious Disease: No Acute Dx, Afebrile, F/U Wbc  T(F): 98.3 (10-17-18 @ 14:52), Max: 98.3 (10-17-18 @ 12:40)  Endocrine: No Acute Dx, FS q6h.    Glucose:   115 (10-17-18 @ 18:20)    Lines/Tubes: PIV, ET tube.    Disposition: ICU overnight for hemodynamic monitoring. Extubation tonight vs. in AM.

## 2018-10-17 NOTE — PROGRESS NOTE ADULT - ASSESSMENT
recurrent effusion s/p thora 2 times l side, 1 thora r side, f/up r/o malignancy b/l exudative repeat cyto/ flow NEG (had ct/c/a/p -) S/P R PIGTAIL, VATS BIOPSY/ CT L SIDE/ abd pain worsening    - hemoc eval  - repeat CXR  - High risk for any invasive procedure  - very poor prognosis

## 2018-10-17 NOTE — PROGRESS NOTE ADULT - SUBJECTIVE AND OBJECTIVE BOX
Progress Note: General Surgery  Patient: AMRIK MADRID , 58y (1960)Male   MRN: [id number]  Location: [place] [address] A  Visit: 09-21-18 Inpatient  Date: 10-17-18 @ 06:04    Procedure/Diagnosis: s/p VATS pleurodesis    Events/ 24h: No acute events overnight. Pain controlled.    Vitals: T(F): 98 (10-17-18 @ 00:00), Max: 98.1 (10-16-18 @ 14:00)  HR: 93 (10-17-18 @ 00:00)  BP: 133/79 (10-17-18 @ 00:00) (127/63 - 142/74)  RR: 18 (10-17-18 @ 00:00)  SpO2: 94% (10-16-18 @ 15:12)    In:   10-15-18 @ 07:01  -  10-16-18 @ 07:00  --------------------------------------------------------  IN: 0 mL    10-16-18 @ 07:01  -  10-17-18 @ 06:04  --------------------------------------------------------  IN: 0 mL      Out:   10-15-18 @ 07:01  -  10-16-18 @ 07:00  --------------------------------------------------------  OUT:    Chest Tube: 70 mL    Voided: 600 mL  Total OUT: 670 mL      10-16-18 @ 07:01  -  10-17-18 @ 06:04  --------------------------------------------------------  OUT:    Voided: 800 mL  Total OUT: 800 mL        Net:   10-15-18 @ 07:01  -  10-16-18 @ 07:00  --------------------------------------------------------  NET: -670 mL    10-16-18 @ 07:01  -  10-17-18 @ 06:04  --------------------------------------------------------  NET: -800 mL        Diet: Diet, NPO:   Except Medications (10-14-18 @ 08:09)    IV Fluids: albumin human  5% IVPB 250 milliLiter(s) IV Intermittent once  sodium chloride 0.9%. 1000 milliLiter(s) (20 mL/Hr) IV Continuous <Continuous>      Physical Examination:  General Appearance: NAD  HEENT: EOMI, sclera non-icteric.  Heart: RRR   Lungs: CTABL.   Abdomen:  Soft, nontender, nondistended. No rigidity, guarding, or rebound tenderness.   MSK/Extremities: Warm & well-perfused. Peripheral pulses intact.  Skin: Warm, dry. No jaundice.       Medications: [Standing]  acetaminophen   Tablet .. 650 milliGRAM(s) Oral every 6 hours  acetaminophen   Tablet .. 650 milliGRAM(s) Oral once  albumin human  5% IVPB 250 milliLiter(s) IV Intermittent once  BUpivacaine liposome 1.3% Injectable (no eMAR) 20 milliLiter(s) Local Injection once  carbamide peroxide Otic Solution 5 Drop(s) Left Ear two times a day  diltiazem    Tablet 30 milliGRAM(s) Oral every 8 hours  docusate sodium 100 milliGRAM(s) Oral daily  DULoxetine 60 milliGRAM(s) Oral daily  furosemide    Tablet 20 milliGRAM(s) Oral every 12 hours  heparin  Infusion 1700 Unit(s)/Hr (17 mL/Hr) IV Continuous <Continuous>  polyethylene glycol 3350 17 Gram(s) Oral at bedtime  propranolol 20 milliGRAM(s) Oral two times a day  senna 2 Tablet(s) Oral at bedtime  simethicone 80 milliGRAM(s) Chew three times a day  sodium chloride 0.9%. 1000 milliLiter(s) (20 mL/Hr) IV Continuous <Continuous>  tamsulosin 0.4 milliGRAM(s) Oral at bedtime  traZODone 50 milliGRAM(s) Oral at bedtime    DVT Prophylaxis: heparin  Infusion 1700 Unit(s)/Hr IV Continuous <Continuous>    GI Prophylaxis:   Antibiotics:   Anticoagulation:   Medications:[PRN]  morphine  - Injectable 2 milliGRAM(s) IV Push every 4 hours PRN  naloxone Injectable 0.1 milliGRAM(s) IV Push every 3 minutes PRN  ondansetron    Tablet 4 milliGRAM(s) Oral every 6 hours PRN  ondansetron Injectable 8 milliGRAM(s) IV Push every 8 hours PRN  ondansetron Injectable 4 milliGRAM(s) IV Push every 6 hours PRN  zolpidem 5 milliGRAM(s) Oral at bedtime PRN      Labs:                        11.0   8.15  )-----------( 291      ( 16 Oct 2018 06:34 )             34.3     10-17    133<L>  |  92<L>  |  8<L>  ----------------------------<  99  3.7   |  27  |  0.7    Ca    8.0<L>      17 Oct 2018 01:33        PT/INR - ( 17 Oct 2018 01:33 )   PT: 14.50 sec;   INR: 1.26 ratio         PTT - ( 17 Oct 2018 01:33 )  PTT:70.6 sec      Imaging:     < from: Xray Chest 1 View- PORTABLE-Routine (10.16.18 @ 07:15) >    Stable bilateral lung opacities    < end of copied text >      Assessment:  58y Male patient admitted S/P VATS pleurodesis      Plan:    GI to scope today  Pigtail out  F/u CXRs  DVT/GI ppx  OOBAT  IS  Pain control    Date/Time: 10-17-18 @ 06:04

## 2018-10-17 NOTE — PROGRESS NOTE ADULT - SUBJECTIVE AND OBJECTIVE BOX
OVERNIGHT EVENTS: events noted, c/o abd pain, pigtail r side fell    Vital Signs Last 24 Hrs  T(C): 36.2 (17 Oct 2018 07:43), Max: 36.7 (16 Oct 2018 14:00)  T(F): 97.1 (17 Oct 2018 07:43), Max: 98.1 (16 Oct 2018 14:00)  HR: 91 (17 Oct 2018 07:43) (91 - 117)  BP: 118/- (17 Oct 2018 07:43) (115/70 - 142/74)  BP(mean): 65 (17 Oct 2018 07:43) (65 - 65)  RR: 18 (17 Oct 2018 07:43) (18 - 25)  SpO2: 94% (16 Oct 2018 15:12) (94% - 94%)    PHYSICAL EXAMINATION:    GENERAL: ill looking    HEENT: Head is normocephalic and atraumatic. Extraocular muscles are intact. Mucous membranes are moist.    NECK: Supple.    LUNGS: dec bs diffusely    HEART: Regular rate and rhythm without murmur.    ABDOMEN: Soft, nontender, and nondistended.      EXTREMITIES: Without any cyanosis, clubbing, rash, lesions or edema.    NEUROLOGIC: Grossly intact.    SKIN: No ulceration or induration present.      LABS:                        11.0   8.69  )-----------( 290      ( 17 Oct 2018 06:21 )             34.2     10-17    133<L>  |  92<L>  |  8<L>  ----------------------------<  99  3.7   |  27  |  0.7    Ca    8.0<L>      17 Oct 2018 01:33      PT/INR - ( 17 Oct 2018 01:33 )   PT: 14.50 sec;   INR: 1.26 ratio         PTT - ( 17 Oct 2018 01:33 )  PTT:70.6 sec                      10-16-18 @ 07:01  -  10-17-18 @ 07:00  --------------------------------------------------------  IN: 0 mL / OUT: 800 mL / NET: -800 mL        MICROBIOLOGY:      MEDICATIONS  (STANDING):  acetaminophen   Tablet .. 650 milliGRAM(s) Oral every 6 hours  acetaminophen   Tablet .. 650 milliGRAM(s) Oral once  albumin human  5% IVPB 250 milliLiter(s) IV Intermittent once  BUpivacaine liposome 1.3% Injectable (no eMAR) 20 milliLiter(s) Local Injection once  carbamide peroxide Otic Solution 5 Drop(s) Left Ear two times a day  diltiazem    Tablet 30 milliGRAM(s) Oral every 8 hours  docusate sodium 100 milliGRAM(s) Oral daily  DULoxetine 60 milliGRAM(s) Oral daily  furosemide    Tablet 20 milliGRAM(s) Oral every 12 hours  polyethylene glycol 3350 17 Gram(s) Oral at bedtime  propranolol 20 milliGRAM(s) Oral two times a day  senna 2 Tablet(s) Oral at bedtime  simethicone 80 milliGRAM(s) Chew three times a day  sodium chloride 0.9%. 1000 milliLiter(s) (20 mL/Hr) IV Continuous <Continuous>  tamsulosin 0.4 milliGRAM(s) Oral at bedtime  traZODone 50 milliGRAM(s) Oral at bedtime    MEDICATIONS  (PRN):  morphine  - Injectable 2 milliGRAM(s) IV Push every 4 hours PRN Severe Pain (7 - 10)  naloxone Injectable 0.1 milliGRAM(s) IV Push every 3 minutes PRN For ANY of the following changes in patient status:  A. RR LESS THAN 10 breaths per minute, B. Oxygen saturation LESS THAN 90%, C. Sedation score of 6  ondansetron    Tablet 4 milliGRAM(s) Oral every 6 hours PRN Nausea  ondansetron Injectable 8 milliGRAM(s) IV Push every 8 hours PRN Nausea and/or Vomiting  ondansetron Injectable 4 milliGRAM(s) IV Push every 6 hours PRN Nausea  zolpidem 5 milliGRAM(s) Oral at bedtime PRN Insomnia      RADIOLOGY & ADDITIONAL STUDIES:

## 2018-10-17 NOTE — PROGRESS NOTE ADULT - SUBJECTIVE AND OBJECTIVE BOX
AMRIK MADRID  58y Male   [id number]    Hospital Day:   Post Operative Day:    Procedure/dx:  Events of the Last 24h:   Pt had an EGD and colonoscopy with a finding of a sigmoid colon mass with complete distorsion of glandular architecture highly suspicious for Sigmoid Colon AdenoCA. Biopsies were taken and sent to pathology.  Pt was electively intubated for the procedure and anesthesia could not extubate due to poor effort to pull in adequate tidal volume.    PAST MEDICAL & SURGICAL HISTORY:  Pleural effusion  CHF (congestive heart failure)  Insomnia  BPH (benign prostatic hyperplasia)  Depression  Bipolar 1 disorder  HTN (hypertension)  History of thoracentesis    Vital Signs Last 24 Hrs  T(C): 36.4 (17 Oct 2018 18:07), Max: 36.8 (17 Oct 2018 12:40)  T(F): 97.6 (17 Oct 2018 18:07), Max: 98.3 (17 Oct 2018 12:40)  HR: 92 (17 Oct 2018 20:12) (90 - 110)  BP: 91/61 (17 Oct 2018 20:12) (83/57 - 188/119)  BP(mean): 65 (17 Oct 2018 07:43) (65 - 65)  RR: 20 (17 Oct 2018 20:12) (14 - 39)  SpO2: 98% (17 Oct 2018 20:12) (86% - 100%)    Mode: AC/ CMV (Assist Control/ Continuous Mandatory Ventilation), RR (machine): 20, TV (machine): 450, FiO2: 80, PS: 5, ITime: 1, MAP: 11, PIP: 30    Diet, NPO:   Except Medications (10-14-18 @ 08:09)    I&O's Detail    16 Oct 2018 07:01  -  17 Oct 2018 07:00  --------------------------------------------------------  IN:  Total IN: 0 mL    OUT:    Voided: 800 mL  Total OUT: 800 mL    Total NET: -800 mL      17 Oct 2018 07:01  -  17 Oct 2018 20:37  --------------------------------------------------------  IN:    lactated ringers.: 80 mL  Total IN: 80 mL    OUT:    Indwelling Catheter - Urethral: 60 mL    Voided: 400 mL  Total OUT: 460 mL    Total NET: -380 mL    MEDICATIONS  (STANDING):  carbamide peroxide Otic Solution 5 Drop(s) Left Ear two times a day  chlorhexidine 4% Liquid 1 Application(s) Topical <User Schedule>  dexmedetomidine Infusion 0.2 MICROgram(s)/kG/Hr (5.21 mL/Hr) IV Continuous <Continuous>  diltiazem    Tablet 60 milliGRAM(s) Oral every 8 hours  docusate sodium 100 milliGRAM(s) Oral three times a day  DULoxetine 60 milliGRAM(s) Oral daily  furosemide   Injectable 40 milliGRAM(s) IV Push daily  heparin  Infusion 1600 Unit(s)/Hr (16 mL/Hr) IV Continuous <Continuous>  latanoprost 0.005% Ophthalmic Solution 1 Drop(s) Both EYES at bedtime  polyethylene glycol 3350 17 Gram(s) Oral at bedtime  propranolol 20 milliGRAM(s) Oral every 12 hours  senna 2 Tablet(s) Oral at bedtime  senna 2 Tablet(s) Oral at bedtime  tamsulosin 0.4 milliGRAM(s) Oral at bedtime    MEDICATIONS  (PRN):  acetaminophen   Tablet .. 650 milliGRAM(s) Oral every 6 hours PRN Moderate Pain (4 - 6)  morphine  - Injectable 2 milliGRAM(s) IV Push every 6 hours PRN Severe Pain (7 - 10)  ondansetron    Tablet 4 milliGRAM(s) Oral every 6 hours PRN Nausea  ondansetron Injectable 8 milliGRAM(s) IV Push every 8 hours PRN Nausea and/or Vomiting      PHYSICAL EXAM:    GENERAL: Intubated  CHEST/LUNGS: cracke    HEART: RRR,  No murmurs, rubs, or gallops    ABDOMEN: SNTND +BS    EXTREMITIES:  FROM, No clubbing, cyanosis, or edema, palpable pulse    NEURO: No focal neurological deficits    SKIN: No rashes or lesions    INCISION/WOUNDS:      LABS:                    11.5   12.86 )-----------( 308      ( 17 Oct 2018 17:41 )             35.7        10-17    136  |  93<L>  |  9<L>  ----------------------------<  129<H>  3.9   |  22  |  0.7    Ca    7.8<L>      17 Oct 2018 17:41    PT/INR - ( 17 Oct 2018 17:41 )   PT: 13.60 sec;   INR: 1.18 ratio       PTT - ( 17 Oct 2018 17:41 )  PTT:30.8 sec  CARDIAC MARKERS ( 17 Oct 2018 17:41 )  x     / 0.06 ng/mL / 62 U/L / x     / 3.9 ng/mL AMRIK MADRID  58y Male   [id number]    Hospital Day:   Post Operative Day:    Procedure/dx:  Events of the Last 24h:   Pt had an EGD and colonoscopy with a finding of a sigmoid colon mass with complete distorsion of glandular architecture highly suspicious for Sigmoid Colon AdenoCA. Biopsies were taken and sent to pathology.  Pt was electively intubated for the procedure and anesthesia could not extubate due to poor effort to pull in adequate tidal volume.    PAST MEDICAL & SURGICAL HISTORY:  Pleural effusion  CHF (congestive heart failure)  Insomnia  BPH (benign prostatic hyperplasia)  Depression  Bipolar 1 disorder  HTN (hypertension)  History of thoracentesis    Vital Signs Last 24 Hrs  T(C): 36.4 (17 Oct 2018 18:07), Max: 36.8 (17 Oct 2018 12:40)  T(F): 97.6 (17 Oct 2018 18:07), Max: 98.3 (17 Oct 2018 12:40)  HR: 92 (17 Oct 2018 20:12) (90 - 110)  BP: 91/61 (17 Oct 2018 20:12) (83/57 - 188/119)  BP(mean): 65 (17 Oct 2018 07:43) (65 - 65)  RR: 20 (17 Oct 2018 20:12) (14 - 39)  SpO2: 98% (17 Oct 2018 20:12) (86% - 100%)    Mode: AC/ CMV (Assist Control/ Continuous Mandatory Ventilation), RR (machine): 20, TV (machine): 450, FiO2: 80, PS: 5, ITime: 1, MAP: 11, PIP: 30    Diet, NPO:   Except Medications (10-14-18 @ 08:09)    I&O's Detail    16 Oct 2018 07:01  -  17 Oct 2018 07:00  --------------------------------------------------------  IN:  Total IN: 0 mL    OUT:    Voided: 800 mL  Total OUT: 800 mL    Total NET: -800 mL      17 Oct 2018 07:01  -  17 Oct 2018 20:37  --------------------------------------------------------  IN:    lactated ringers.: 80 mL  Total IN: 80 mL    OUT:    Indwelling Catheter - Urethral: 60 mL    Voided: 400 mL  Total OUT: 460 mL    Total NET: -380 mL    MEDICATIONS  (STANDING):  carbamide peroxide Otic Solution 5 Drop(s) Left Ear two times a day  chlorhexidine 4% Liquid 1 Application(s) Topical <User Schedule>  dexmedetomidine Infusion 0.2 MICROgram(s)/kG/Hr (5.21 mL/Hr) IV Continuous <Continuous>  diltiazem    Tablet 60 milliGRAM(s) Oral every 8 hours  docusate sodium 100 milliGRAM(s) Oral three times a day  DULoxetine 60 milliGRAM(s) Oral daily  furosemide   Injectable 40 milliGRAM(s) IV Push daily  heparin  Infusion 1600 Unit(s)/Hr (16 mL/Hr) IV Continuous <Continuous>  latanoprost 0.005% Ophthalmic Solution 1 Drop(s) Both EYES at bedtime  polyethylene glycol 3350 17 Gram(s) Oral at bedtime  propranolol 20 milliGRAM(s) Oral every 12 hours  senna 2 Tablet(s) Oral at bedtime  senna 2 Tablet(s) Oral at bedtime  tamsulosin 0.4 milliGRAM(s) Oral at bedtime    MEDICATIONS  (PRN):  acetaminophen   Tablet .. 650 milliGRAM(s) Oral every 6 hours PRN Moderate Pain (4 - 6)  morphine  - Injectable 2 milliGRAM(s) IV Push every 6 hours PRN Severe Pain (7 - 10)  ondansetron    Tablet 4 milliGRAM(s) Oral every 6 hours PRN Nausea  ondansetron Injectable 8 milliGRAM(s) IV Push every 8 hours PRN Nausea and/or Vomiting      PHYSICAL EXAM:    GENERAL: Intubated  CHEST/LUNGS: decreased breath sounds  HEART: RRR,    ABDOMEN: Mildly distended.       LABS:                    11.5   12.86 )-----------( 308      ( 17 Oct 2018 17:41 )             35.7        10-17    136  |  93<L>  |  9<L>  ----------------------------<  129<H>  3.9   |  22  |  0.7    Ca    7.8<L>      17 Oct 2018 17:41    PT/INR - ( 17 Oct 2018 17:41 )   PT: 13.60 sec;   INR: 1.18 ratio       PTT - ( 17 Oct 2018 17:41 )  PTT:30.8 sec  CARDIAC MARKERS ( 17 Oct 2018 17:41 )  x     / 0.06 ng/mL / 62 U/L / x     / 3.9 ng/mL

## 2018-10-17 NOTE — ANESTHESIA FOLLOW-UP NOTE - NSEVALATIONFT_GEN_ALL_CORE
abg and respiratory parameters.  tv 450 spo2 96% abg and respiratory parameters.  tv 450 spo2 96%  abg 7.288 59 60  Will keep intubated

## 2018-10-17 NOTE — PROGRESS NOTE ADULT - ASSESSMENT
58y Male patient admitted S/P VATS pleurodesis. General surgery consulted for distended bowel with multiple air fluid levels. Patient went for colonoscopy today withy findings as above. Post procedure patient had low tidal volume and decision was made not to extubate at the moment and he was upgraded to ICU    Plan:  ICU overnight for hemodynamic monitoring. Extubation tonight vs. in AM  Follow up on biopsy results.  f/u GI

## 2018-10-18 LAB
ANION GAP SERPL CALC-SCNC: 19 MMOL/L — HIGH (ref 7–14)
APTT BLD: 59.3 SEC — HIGH (ref 27–39.2)
APTT BLD: 69.5 SEC — HIGH (ref 27–39.2)
BASE EXCESS BLDA CALC-SCNC: 3 MMOL/L — HIGH (ref -2–2)
BASE EXCESS BLDA CALC-SCNC: 3.4 MMOL/L — HIGH (ref -2–2)
BASOPHILS # BLD AUTO: 0.03 K/UL — SIGNIFICANT CHANGE UP (ref 0–0.2)
BASOPHILS NFR BLD AUTO: 0.3 % — SIGNIFICANT CHANGE UP (ref 0–1)
BUN SERPL-MCNC: 8 MG/DL — LOW (ref 10–20)
CALCIUM SERPL-MCNC: 7.8 MG/DL — LOW (ref 8.5–10.1)
CHLORIDE SERPL-SCNC: 95 MMOL/L — LOW (ref 98–110)
CK MB CFR SERPL CALC: 2.9 NG/ML — SIGNIFICANT CHANGE UP (ref 0.6–6.3)
CK SERPL-CCNC: 51 U/L — SIGNIFICANT CHANGE UP (ref 0–225)
CO2 SERPL-SCNC: 25 MMOL/L — SIGNIFICANT CHANGE UP (ref 17–32)
CREAT SERPL-MCNC: 0.7 MG/DL — SIGNIFICANT CHANGE UP (ref 0.7–1.5)
EOSINOPHIL # BLD AUTO: 0.06 K/UL — SIGNIFICANT CHANGE UP (ref 0–0.7)
EOSINOPHIL NFR BLD AUTO: 0.7 % — SIGNIFICANT CHANGE UP (ref 0–8)
GAS PNL BLDA: SIGNIFICANT CHANGE UP
GLUCOSE SERPL-MCNC: 87 MG/DL — SIGNIFICANT CHANGE UP (ref 70–99)
HCO3 BLDA-SCNC: 27 MMOL/L — SIGNIFICANT CHANGE UP (ref 23–27)
HCO3 BLDA-SCNC: 28 MMOL/L — HIGH (ref 23–27)
HCT VFR BLD CALC: 33.8 % — LOW (ref 42–52)
HCT VFR BLD CALC: 33.9 % — LOW (ref 42–52)
HCT VFR BLD CALC: 34.2 % — LOW (ref 42–52)
HGB BLD-MCNC: 10.7 G/DL — LOW (ref 14–18)
HGB BLD-MCNC: 10.8 G/DL — LOW (ref 14–18)
HGB BLD-MCNC: 10.8 G/DL — LOW (ref 14–18)
HOROWITZ INDEX BLDA+IHG-RTO: 40 — SIGNIFICANT CHANGE UP
IMM GRANULOCYTES NFR BLD AUTO: 1.6 % — HIGH (ref 0.1–0.3)
INR BLD: 1.14 RATIO — SIGNIFICANT CHANGE UP (ref 0.65–1.3)
INR BLD: 1.2 RATIO — SIGNIFICANT CHANGE UP (ref 0.65–1.3)
LYMPHOCYTES # BLD AUTO: 0.86 K/UL — LOW (ref 1.2–3.4)
LYMPHOCYTES # BLD AUTO: 9.9 % — LOW (ref 20.5–51.1)
MAGNESIUM SERPL-MCNC: 1.8 MG/DL — SIGNIFICANT CHANGE UP (ref 1.8–2.4)
MCHC RBC-ENTMCNC: 26 PG — LOW (ref 27–31)
MCHC RBC-ENTMCNC: 26 PG — LOW (ref 27–31)
MCHC RBC-ENTMCNC: 26.3 PG — LOW (ref 27–31)
MCHC RBC-ENTMCNC: 31.6 G/DL — LOW (ref 32–37)
MCHC RBC-ENTMCNC: 31.6 G/DL — LOW (ref 32–37)
MCHC RBC-ENTMCNC: 32 G/DL — SIGNIFICANT CHANGE UP (ref 32–37)
MCV RBC AUTO: 82.2 FL — SIGNIFICANT CHANGE UP (ref 80–94)
MCV RBC AUTO: 82.2 FL — SIGNIFICANT CHANGE UP (ref 80–94)
MCV RBC AUTO: 82.5 FL — SIGNIFICANT CHANGE UP (ref 80–94)
MONOCYTES # BLD AUTO: 0.95 K/UL — HIGH (ref 0.1–0.6)
MONOCYTES NFR BLD AUTO: 10.9 % — HIGH (ref 1.7–9.3)
NEUTROPHILS # BLD AUTO: 6.67 K/UL — HIGH (ref 1.4–6.5)
NEUTROPHILS NFR BLD AUTO: 76.6 % — HIGH (ref 42.2–75.2)
NRBC # BLD: 0 /100 WBCS — SIGNIFICANT CHANGE UP (ref 0–0)
PCO2 BLDA: 39 MMHG — SIGNIFICANT CHANGE UP (ref 38–42)
PCO2 BLDA: 43 MMHG — HIGH (ref 38–42)
PH BLDA: 7.42 — SIGNIFICANT CHANGE UP (ref 7.38–7.42)
PH BLDA: 7.45 — HIGH (ref 7.38–7.42)
PHOSPHATE SERPL-MCNC: 3.4 MG/DL — SIGNIFICANT CHANGE UP (ref 2.1–4.9)
PLATELET # BLD AUTO: 285 K/UL — SIGNIFICANT CHANGE UP (ref 130–400)
PLATELET # BLD AUTO: 286 K/UL — SIGNIFICANT CHANGE UP (ref 130–400)
PLATELET # BLD AUTO: 323 K/UL — SIGNIFICANT CHANGE UP (ref 130–400)
PO2 BLDA: 63 MMHG — LOW (ref 78–95)
PO2 BLDA: 95 MMHG — SIGNIFICANT CHANGE UP (ref 78–95)
POTASSIUM SERPL-MCNC: 3.8 MMOL/L — SIGNIFICANT CHANGE UP (ref 3.5–5)
POTASSIUM SERPL-SCNC: 3.8 MMOL/L — SIGNIFICANT CHANGE UP (ref 3.5–5)
PROTHROM AB SERPL-ACNC: 13.1 SEC — HIGH (ref 9.95–12.87)
PROTHROM AB SERPL-ACNC: 13.8 SEC — HIGH (ref 9.95–12.87)
RBC # BLD: 4.11 M/UL — LOW (ref 4.7–6.1)
RBC # BLD: 4.11 M/UL — LOW (ref 4.7–6.1)
RBC # BLD: 4.16 M/UL — LOW (ref 4.7–6.1)
RBC # FLD: 15 % — HIGH (ref 11.5–14.5)
RBC # FLD: 15 % — HIGH (ref 11.5–14.5)
RBC # FLD: 15.1 % — HIGH (ref 11.5–14.5)
SAO2 % BLDA: 93 % — LOW (ref 94–98)
SAO2 % BLDA: 99 % — HIGH (ref 94–98)
SODIUM SERPL-SCNC: 139 MMOL/L — SIGNIFICANT CHANGE UP (ref 135–146)
TROPONIN T SERPL-MCNC: <0.01 NG/ML — SIGNIFICANT CHANGE UP
WBC # BLD: 8.71 K/UL — SIGNIFICANT CHANGE UP (ref 4.8–10.8)
WBC # BLD: 8.84 K/UL — SIGNIFICANT CHANGE UP (ref 4.8–10.8)
WBC # BLD: 9.07 K/UL — SIGNIFICANT CHANGE UP (ref 4.8–10.8)
WBC # FLD AUTO: 8.71 K/UL — SIGNIFICANT CHANGE UP (ref 4.8–10.8)
WBC # FLD AUTO: 8.84 K/UL — SIGNIFICANT CHANGE UP (ref 4.8–10.8)
WBC # FLD AUTO: 9.07 K/UL — SIGNIFICANT CHANGE UP (ref 4.8–10.8)

## 2018-10-18 RX ORDER — FUROSEMIDE 40 MG
20 TABLET ORAL DAILY
Qty: 0 | Refills: 0 | Status: DISCONTINUED | OUTPATIENT
Start: 2018-10-18 | End: 2018-10-23

## 2018-10-18 RX ORDER — ONDANSETRON 8 MG/1
4 TABLET, FILM COATED ORAL EVERY 6 HOURS
Qty: 0 | Refills: 0 | Status: DISCONTINUED | OUTPATIENT
Start: 2018-10-18 | End: 2018-10-23

## 2018-10-18 RX ORDER — PANTOPRAZOLE SODIUM 20 MG/1
40 TABLET, DELAYED RELEASE ORAL
Qty: 0 | Refills: 0 | Status: DISCONTINUED | OUTPATIENT
Start: 2018-10-18 | End: 2018-10-23

## 2018-10-18 RX ORDER — CHLORHEXIDINE GLUCONATE 213 G/1000ML
15 SOLUTION TOPICAL
Qty: 0 | Refills: 0 | Status: DISCONTINUED | OUTPATIENT
Start: 2018-10-18 | End: 2018-10-18

## 2018-10-18 RX ORDER — MAGNESIUM SULFATE 500 MG/ML
2 VIAL (ML) INJECTION ONCE
Qty: 0 | Refills: 0 | Status: COMPLETED | OUTPATIENT
Start: 2018-10-18 | End: 2018-10-18

## 2018-10-18 RX ORDER — POTASSIUM CHLORIDE 20 MEQ
20 PACKET (EA) ORAL ONCE
Qty: 0 | Refills: 0 | Status: COMPLETED | OUTPATIENT
Start: 2018-10-18 | End: 2018-10-18

## 2018-10-18 RX ORDER — PANTOPRAZOLE SODIUM 20 MG/1
40 TABLET, DELAYED RELEASE ORAL DAILY
Qty: 0 | Refills: 0 | Status: DISCONTINUED | OUTPATIENT
Start: 2018-10-18 | End: 2018-10-18

## 2018-10-18 RX ORDER — PROPRANOLOL HCL 160 MG
20 CAPSULE, EXTENDED RELEASE 24HR ORAL EVERY 12 HOURS
Qty: 0 | Refills: 0 | Status: DISCONTINUED | OUTPATIENT
Start: 2018-10-19 | End: 2018-10-23

## 2018-10-18 RX ADMIN — DULOXETINE HYDROCHLORIDE 60 MILLIGRAM(S): 30 CAPSULE, DELAYED RELEASE ORAL at 14:45

## 2018-10-18 RX ADMIN — ONDANSETRON 8 MILLIGRAM(S): 8 TABLET, FILM COATED ORAL at 00:03

## 2018-10-18 RX ADMIN — LATANOPROST 1 DROP(S): 0.05 SOLUTION/ DROPS OPHTHALMIC; TOPICAL at 01:14

## 2018-10-18 RX ADMIN — MORPHINE SULFATE 2 MILLIGRAM(S): 50 CAPSULE, EXTENDED RELEASE ORAL at 22:56

## 2018-10-18 RX ADMIN — MORPHINE SULFATE 2 MILLIGRAM(S): 50 CAPSULE, EXTENDED RELEASE ORAL at 22:41

## 2018-10-18 RX ADMIN — MORPHINE SULFATE 2 MILLIGRAM(S): 50 CAPSULE, EXTENDED RELEASE ORAL at 14:45

## 2018-10-18 RX ADMIN — HEPARIN SODIUM 16 UNIT(S)/HR: 5000 INJECTION INTRAVENOUS; SUBCUTANEOUS at 09:02

## 2018-10-18 RX ADMIN — MORPHINE SULFATE 2 MILLIGRAM(S): 50 CAPSULE, EXTENDED RELEASE ORAL at 04:49

## 2018-10-18 RX ADMIN — Medication 100 MILLIGRAM(S): at 21:11

## 2018-10-18 RX ADMIN — SODIUM CHLORIDE 500 MILLILITER(S): 9 INJECTION INTRAMUSCULAR; INTRAVENOUS; SUBCUTANEOUS at 00:03

## 2018-10-18 RX ADMIN — Medication 100 MILLIGRAM(S): at 06:10

## 2018-10-18 RX ADMIN — MORPHINE SULFATE 2 MILLIGRAM(S): 50 CAPSULE, EXTENDED RELEASE ORAL at 05:04

## 2018-10-18 RX ADMIN — LATANOPROST 1 DROP(S): 0.05 SOLUTION/ DROPS OPHTHALMIC; TOPICAL at 21:11

## 2018-10-18 RX ADMIN — Medication 50 MILLIEQUIVALENT(S): at 02:30

## 2018-10-18 RX ADMIN — SENNA PLUS 2 TABLET(S): 8.6 TABLET ORAL at 01:14

## 2018-10-18 RX ADMIN — TAMSULOSIN HYDROCHLORIDE 0.4 MILLIGRAM(S): 0.4 CAPSULE ORAL at 21:11

## 2018-10-18 RX ADMIN — CARBAMIDE PEROXIDE 5 DROP(S): 81.86 SOLUTION/ DROPS AURICULAR (OTIC) at 17:41

## 2018-10-18 RX ADMIN — Medication 20 MILLIGRAM(S): at 14:45

## 2018-10-18 RX ADMIN — Medication 50 GRAM(S): at 02:30

## 2018-10-18 RX ADMIN — MORPHINE SULFATE 2 MILLIGRAM(S): 50 CAPSULE, EXTENDED RELEASE ORAL at 15:00

## 2018-10-18 RX ADMIN — Medication 100 MILLIGRAM(S): at 14:46

## 2018-10-18 RX ADMIN — SENNA PLUS 2 TABLET(S): 8.6 TABLET ORAL at 21:11

## 2018-10-18 RX ADMIN — PANTOPRAZOLE SODIUM 40 MILLIGRAM(S): 20 TABLET, DELAYED RELEASE ORAL at 14:48

## 2018-10-18 RX ADMIN — POLYETHYLENE GLYCOL 3350 17 GRAM(S): 17 POWDER, FOR SOLUTION ORAL at 01:15

## 2018-10-18 NOTE — PROGRESS NOTE ADULT - SUBJECTIVE AND OBJECTIVE BOX
AMRIK MADRID  [id number]  59 y/o M with PMH of HTN, known DVT (on Heparin GTT), BPH, Depression, s/p pigtail insertion at [facility] 3 days ptp, presented to [facility] 9/21 for SOB (found to have a large Pleural effusion- followed by CT surgery, L chest tube placed, now out.) CT with thickening of Ascending Colon- r/o malignancy, GI following). Last colono 10 months ago at [facility].     10/10 Patient underwent Left Vats w/ Pleural implant biopsy talc pleurodesis, placement of R pigtail and 2 CT on the left, all dc'd as of 10/17. Colonoscopy/EGD completed 10/17 under general anesthesia due to poor respiratory status. Patient was induced with propofol/etomidate/sucx, and underwent the procedure without incident with findings of a possible invasive CA.  Post procedure anesthesia was unable to extubate the patient due to low Tidal Volumes around 100. per anesthesia patient was adequately reversed.       Indication for ICU admission: respiratory failure, inability to extubate s/p colonoscopy with general anesthesia  Admit Date:09-21-18  ICU Date: 10-17-18  OR Date: 10-17-18    No Known Allergies    PAST MEDICAL & SURGICAL HISTORY:  r/o malignancy  Pleural effusion  CHF (congestive heart failure)  Insomnia  BPH (benign prostatic hyperplasia)  Depression  Bipolar 1 disorder  HTN (hypertension)  History of thoracentesis    Home Medications:  Ambien 10 mg oral tablet: 1 tab(s) orally once a day (at bedtime) (21 Sep 2018 21:50)  apixaban 5 mg oral tablet: 1 tab(s) orally every 12 hours (21 Sep 2018 21:50)  Cardizem 60 mg oral tablet: 1 tab(s) orally every 8 hours (21 Sep 2018 21:50)  clotrimazole 1% topical cream: 1 application topically  (21 Sep 2018 21:50)  DULoxetine 60 mg oral delayed release capsule: 1 cap(s) orally once a day (21 Sep 2018 21:50)  isosorbide mononitrate 20 mg oral tablet: 1 tab(s) orally once a day (21 Sep 2018 21:50)  latanoprost 0.005% ophthalmic solution: 1 drop(s) to each affected eye once a day (in the evening) (21 Sep 2018 21:50)  Multiple Vitamins oral capsule: 1 cap(s) orally once a day (21 Sep 2018 21:50)  propranolol 20 mg oral tablet: 1 tab(s) orally 2 times a day (21 Sep 2018 21:50)  tamsulosin 0.4 mg oral capsule: 1 cap(s) orally once a day (21 Sep 2018 21:50)  traZODone 50 mg oral tablet: 1 tab(s) orally once a day (at bedtime) (21 Sep 2018 21:50)        24HRS EVENT:  Neurologic: Intubated, mild agitation post-op, patient started on Precedex for sedation. off sedation 9pm, no further agitation, awake and interactive late evening 10/17, GCS11T    Respiratory:   - hx of CHF, Pleural effusions s/p talc pleurodesis 10/10, was saturating 93% preprocedure on 3L N/C. patient also takes Lasix.  - Vent 100/5 due to poor pao2 50s on abg. no apparent distress, pulling TV of >450.   - repeat abg 1h s/p vent change 7.34/48/171, o2 sat 100%. vent settings adjusted to 80/5.  - f/u ABG. and optimize vent settings as needed.     Cardiovascular: Hx of HTN, CHF (50-55%), on Lasix 40 IV daily.   - Baseline tachycardia, on Cardizem 60mg q8h and propanolol 20mg q12h (home medication), ordered with holding parameters  - Home cardiovascular Medications: diltiazem 30mg po q8h, flomax 0.4mg qhs, propranolol 20mg po bid  - lasix 20mg po q12 home medication held post-op. patient received one time dose 40mg iv lasix post-op for pulmonary edema/respiratory insufficiency. resume home po lasix in AM?    Gastrointestinal/Nutrition: NPO, can resume diet once extubated. Continue Bowel regimen.      Renal/Genitourinary: Hamilton placed post-op 10/17. monitor electrolytes replete as needed.     Hematologic:   - Known hx of DVT  - Venous duplex shows: rt femoral vein DVT and lt femoral, popliteal and gastrocnemius veins DVT  - Resumed heparin  Infusion@ 1600 post procedure (ok as per GI fellow), follow PTT    Infectious Disease: No Acute Dx, Afebrile, F/U Wbc    Endocrine: No Acute Dx, FS q6h.    Lines/Tubes: PIV, ET tube, hamilton    Disposition: ICU overnight for hemodynamic monitoring. SBT if on minimal vent settings 10/18 am.      DVT PTX: heparin gtt    GI PTX: PPI    ***Tubes/Lines/Drains  ***  Peripheral IV  OG tube, ET tube 	Date Placed: 10/17/18  Urinary Catheter	 [X]	Indication: Strict I&O    Date Placed: 10/17/18      REVIEW OF SYSTEMS    [ ] A ten-point review of systems was otherwise negative except as noted.  [X] Due to altered mental status/intubation, subjective information were not able to be obtained from the patient. History was obtained, to the extent possible, from review of the chart and collateral sources of information. AMRIK MADRID  [id number]  59 y/o M with PMH of HTN, known DVT (on Heparin GTT), BPH, Depression, s/p pigtail insertion at [facility] 3 days ptp, presented to [facility]  for SOB (found to have a large Pleural effusion- followed by CT surgery, L chest tube placed, now out.) CT with thickening of Ascending Colon- r/o malignancy, GI following). Last colono 10 months ago at [facility].     10/10 Patient underwent Left Vats w/ Pleural implant biopsy talc pleurodesis, placement of R pigtail and 2 CT on the left, all dc'd as of 10/17. Colonoscopy/EGD completed 10/17 under general anesthesia due to poor respiratory status. Patient was induced with propofol/etomidate/sucx, and underwent the procedure without incident with findings of a possible invasive CA.  Post procedure anesthesia was unable to extubate the patient due to low Tidal Volumes around 100. per anesthesia patient was adequately reversed.       Indication for ICU admission: respiratory failure, inability to extubate s/p colonoscopy with general anesthesia  Admit Date:18  ICU Date: 10-17-18  OR Date: 10-17-18    Allergies:  No Known Allergies    PAST MEDICAL & SURGICAL HISTORY:  r/o malignancy  Pleural effusion  CHF (congestive heart failure)  Insomnia  BPH (benign prostatic hyperplasia)  Depression  Bipolar 1 disorder  HTN (hypertension)  History of thoracentesis    Home Medications:  Ambien 10 mg oral tablet: 1 tab(s) orally once a day (at bedtime) (21 Sep 2018 21:50)  apixaban 5 mg oral tablet: 1 tab(s) orally every 12 hours (21 Sep 2018 21:50)  Cardizem 60 mg oral tablet: 1 tab(s) orally every 8 hours (21 Sep 2018 21:50)  clotrimazole 1% topical cream: 1 application topically  (21 Sep 2018 21:50)  DULoxetine 60 mg oral delayed release capsule: 1 cap(s) orally once a day (21 Sep 2018 21:50)  isosorbide mononitrate 20 mg oral tablet: 1 tab(s) orally once a day (21 Sep 2018 21:50)  latanoprost 0.005% ophthalmic solution: 1 drop(s) to each affected eye once a day (in the evening) (21 Sep 2018 21:50)  Multiple Vitamins oral capsule: 1 cap(s) orally once a day (21 Sep 2018 21:50)  propranolol 20 mg oral tablet: 1 tab(s) orally 2 times a day (21 Sep 2018 21:50)  tamsulosin 0.4 mg oral capsule: 1 cap(s) orally once a day (21 Sep 2018 21:50)  traZODone 50 mg oral tablet: 1 tab(s) orally once a day (at bedtime) (21 Sep 2018 21:50)      24HRS EVENT:  Neurologic: Intubated, mild agitation post-op, patient started on Precedex for sedation. off sedation 9pm, no further agitation, awake and interactive late evening 10/17, GCS11T    Respiratory:   - hx of CHF, Pleural effusions s/p talc pleurodesis 10/10, was saturating 93% preprocedure on 3L N/C. patient also takes Lasix.  - Vent 100/5 due to poor pao2 50s on abg. no apparent distress, pulling TV of >450.   - repeat abg 1h s/p vent change 7.34/48/171, o2 sat 100%. vent settings adjusted to 80/5.  - f/u ABG. and optimize vent settings as needed.     Cardiovascular: Hx of HTN, CHF (50-55%), on Lasix 40 IV daily.   - Baseline tachycardia, on Cardizem 60mg q8h and propanolol 20mg q12h (home medication), ordered with holding parameters  - Home cardiovascular Medications: diltiazem 30mg po q8h, flomax 0.4mg qhs, propranolol 20mg po bid  - lasix 20mg po q12 home medication held post-op. patient received one time dose 40mg iv lasix post-op for pulmonary edema/respiratory insufficiency. resume home po lasix in AM?    Gastrointestinal/Nutrition: NPO, can resume diet once extubated. Continue Bowel regimen.      Renal/Genitourinary: Hamilton placed post-op 10/17. monitor electrolytes replete as needed.     Hematologic:   - Known hx of DVT  - Venous duplex shows: rt femoral vein DVT and lt femoral, popliteal and gastrocnemius veins DVT  - Resumed heparin  Infusion@ 1600 post procedure (ok as per GI fellow), follow PTT    Infectious Disease: No Acute Dx, Afebrile, F/U Wbc    Endocrine: No Acute Dx, FS q6h.    Lines/Tubes: PIV, ET tube, hamilton    Disposition: ICU overnight for hemodynamic monitoring. SBT if on minimal vent settings 1018 am.      DVT PTX: heparin gtt    GI PTX: PPI    ***Tubes/Lines/Drains  ***  Peripheral IV  OG tube, ET tube 	Date Placed: 10/17/18  Urinary Catheter	 [X]	Indication: Strict I&O    Date Placed: 10/17/18    REVIEW OF SYSTEMS    [X] Due to altered mental status/intubation, subjective information were not able to be obtained from the patient. History was obtained, to the extent possible, from review of the chart and collateral sources of information.    Daily Height in cm: 187.96 (17 Oct 2018 15:26)    Daily Weight in k.1 (17 Oct 2018 22:00)    Diet, NPO:   Except Medications (10-14-18 @ 08:09)    CURRENT MEDS:  Neurologic Medications  acetaminophen   Tablet .. 650 milliGRAM(s) Oral every 6 hours PRN Moderate Pain (4 - 6)  DULoxetine 60 milliGRAM(s) Oral daily  morphine  - Injectable 2 milliGRAM(s) IV Push every 6 hours PRN Severe Pain (7 - 10)    Respiratory Medications    Cardiovascular Medications  diltiazem    Tablet 60 milliGRAM(s) Oral every 8 hours  propranolol 20 milliGRAM(s) Oral every 12 hours  tamsulosin 0.4 milliGRAM(s) Oral at bedtime    Gastrointestinal Medications  docusate sodium Liquid 100 milliGRAM(s) Oral three times a day  pantoprazole   Suspension 40 milliGRAM(s) Oral daily  polyethylene glycol 3350 17 Gram(s) Oral at bedtime  senna 2 Tablet(s) Oral at bedtime    Genitourinary Medications    Hematologic/Oncologic Medications  heparin  Infusion 1600 Unit(s)/Hr IV Continuous <Continuous>    Antimicrobial/Immunologic Medications    Endocrine/Metabolic Medications    Topical/Other Medications  carbamide peroxide Otic Solution 5 Drop(s) Left Ear two times a day  chlorhexidine 0.12% Liquid 15 milliLiter(s) Oral Mucosa two times a day  chlorhexidine 4% Liquid 1 Application(s) Topical <User Schedule>  latanoprost 0.005% Ophthalmic Solution 1 Drop(s) Both EYES at bedtime    ICU Vital Signs Last 24 Hrs  T(C): 37.3 (18 Oct 2018 08:00), Max: 37.4 (17 Oct 2018 22:00)  T(F): 99.1 (18 Oct 2018 08:00), Max: 99.3 (17 Oct 2018 22:00)  HR: 112 (18 Oct 2018 08:00) (88 - 122)  BP: 108/69 (18 Oct 2018 08:00) (81/60 - 188/119)  BP(mean): 84 (18 Oct 2018 08:00) (61 - 90)  ABP: --  ABP(mean): --  RR: 24 (18 Oct 2018 08:00) (14 - 39)  SpO2: 99% (18 Oct 2018 08:00) (86% - 100%)    Mode: CPAP with PS  FiO2: 40  PEEP: 5    ABG - ( 18 Oct 2018 04:22 )  pH, Arterial: 7.45  pH, Blood: x     /  pCO2: 39    /  pO2: 95    / HCO3: 27    / Base Excess: 3.0   /  SaO2: 99        I&O's Summary    17 Oct 2018 07:  -  18 Oct 2018 07:00  --------------------------------------------------------  IN: 314.4 mL / OUT: 1790 mL / NET: -1475.6 mL    18 Oct 2018 07:01  -  18 Oct 2018 08:20  --------------------------------------------------------  IN: 16 mL / OUT: 50 mL / NET: -34 mL      I&O's Detail    17 Oct 2018 07:  -  18 Oct 2018 07:00  --------------------------------------------------------  IN:    dexmedetomidine Infusion: 28.4 mL    Enteral Tube Flush: 30 mL    heparin Infusion: 176 mL    lactated ringers.: 80 mL  Total IN: 314.4 mL    OUT:    Indwelling Catheter - Urethral: 1390 mL    Voided: 400 mL  Total OUT: 1790 mL    Total NET: -1475.6 mL    18 Oct 2018 07:01  -  18 Oct 2018 08:20  --------------------------------------------------------  IN:    heparin Infusion: 16 mL  Total IN: 16 mL    OUT:    Indwelling Catheter - Urethral: 50 mL  Total OUT: 50 mL    Total NET: -34 mL    PHYSICAL EXAM:    General/Neuro  RASS:             GCS:     = E   / V   / M      Deficits:                             alert & oriented x 3, no focal deficits  Pupils:    Lungs:      clear to auscultation, Normal expansion/effort.     Cardiovascular : S1, S2.  Regular rate and rhythm.  Peripheral edema   Cardiac Rhythm: Normal Sinus Rhythm    GI: Abdomen soft, Non-tender, Non-distended.    Gastrostomy / Jejunostomy tube in place.  Nasogastric tube in place.  Colostomy / Ileostomy.    Wound:    Extremities: Extremities warm, pink, well-perfused. Pulses:Rt     Lt    Derm: Good skin turgor, no skin breakdown.      :       Hamilton catheter in place.    CXR:     LABS:  CAPILLARY BLOOD GLUCOSE    POCT Blood Glucose.: 115 mg/dL (17 Oct 2018 18:20)               10.8   8.84  )-----------( 323      ( 18 Oct 2018 04:57 )             33.8       10-18    139  |  95<L>  |  8<L>  ----------------------------<  87  3.8   |  25  |  0.7    Ca    7.8<L>      18 Oct 2018 00:53  Phos  3.4     10  Mg     1.8     10      PT/INR - ( 18 Oct 2018 04:57 )   PT: 13.80 sec;   INR: 1.20 ratio      PTT - ( 18 Oct 2018 04:57 )  PTT:69.5 sec  CARDIAC MARKERS ( 17 Oct 2018 17:41 )  x     / 0.06 ng/mL / 62 U/L / x     / 3.9 ng/mL AMRIK MADRID  [id number]  59 y/o M with PMH of HTN, known DVT (on Heparin GTT), BPH, Depression, s/p pigtail insertion at [facility] 3 days ptp, presented to [facility]  for SOB (found to have a large Pleural effusion- followed by CT surgery, L chest tube placed, now out.) CT with thickening of Ascending Colon- r/o malignancy, GI following). Last colono 10 months ago at [facility].     10/10 Patient underwent Left Vats w/ Pleural implant biopsy talc pleurodesis, placement of R pigtail and 2 CT on the left, all dc'd as of 10/17. Colonoscopy/EGD completed 10/17 under general anesthesia due to poor respiratory status. Patient was induced with propofol/etomidate/sucx, and underwent the procedure without incident with findings of a possible invasive CA.  Post procedure anesthesia was unable to extubate the patient due to low Tidal Volumes around 100. per anesthesia patient was adequately reversed.       Indication for ICU admission: respiratory failure, inability to extubate s/p colonoscopy with general anesthesia  Admit Date:18  ICU Date: 10-17-18  OR Date: 10-17-18    Allergies:  No Known Allergies    PAST MEDICAL & SURGICAL HISTORY:  r/o malignancy  Pleural effusion  CHF (congestive heart failure)  Insomnia  BPH (benign prostatic hyperplasia)  Depression  Bipolar 1 disorder  HTN (hypertension)  History of thoracentesis    Home Medications:  Ambien 10 mg oral tablet: 1 tab(s) orally once a day (at bedtime) (21 Sep 2018 21:50)  apixaban 5 mg oral tablet: 1 tab(s) orally every 12 hours (21 Sep 2018 21:50)  Cardizem 60 mg oral tablet: 1 tab(s) orally every 8 hours (21 Sep 2018 21:50)  clotrimazole 1% topical cream: 1 application topically  (21 Sep 2018 21:50)  DULoxetine 60 mg oral delayed release capsule: 1 cap(s) orally once a day (21 Sep 2018 21:50)  isosorbide mononitrate 20 mg oral tablet: 1 tab(s) orally once a day (21 Sep 2018 21:50)  latanoprost 0.005% ophthalmic solution: 1 drop(s) to each affected eye once a day (in the evening) (21 Sep 2018 21:50)  Multiple Vitamins oral capsule: 1 cap(s) orally once a day (21 Sep 2018 21:50)  propranolol 20 mg oral tablet: 1 tab(s) orally 2 times a day (21 Sep 2018 21:50)  tamsulosin 0.4 mg oral capsule: 1 cap(s) orally once a day (21 Sep 2018 21:50)  traZODone 50 mg oral tablet: 1 tab(s) orally once a day (at bedtime) (21 Sep 2018 21:50)      24HRS EVENT:  Neurologic: Intubated, mild agitation post-op, patient started on Precedex for sedation. off sedation 9pm, no further agitation, awake and interactive late evening 10/17, GCS11T    Respiratory:   - hx of CHF, Pleural effusions s/p talc pleurodesis 10/10, was saturating 93% preprocedure on 3L N/C. patient also takes Lasix.  - Vent 100/5 due to poor pao2 50s on abg. no apparent distress, pulling TV of >450.   - repeat abg 1h s/p vent change 7.34/48/171, o2 sat 100%. vent settings adjusted to 80/5.  - f/u ABG. and optimize vent settings as needed.     Cardiovascular: Hx of HTN, CHF (50-55%), on Lasix 40 IV daily.   - Baseline tachycardia, on Cardizem 60mg q8h and propanolol 20mg q12h (home medication), ordered with holding parameters  - Home cardiovascular Medications: diltiazem 30mg po q8h, flomax 0.4mg qhs, propranolol 20mg po bid  - lasix 20mg po q12 home medication held post-op. patient received one time dose 40mg iv lasix post-op for pulmonary edema/respiratory insufficiency. resume home po lasix in AM?    Gastrointestinal/Nutrition: NPO, can resume diet once extubated. Continue Bowel regimen.      Renal/Genitourinary: Hamilton placed post-op 10/17. monitor electrolytes replete as needed.     Hematologic:   - Known hx of DVT  - Venous duplex shows: rt femoral vein DVT and lt femoral, popliteal and gastrocnemius veins DVT  - Resumed heparin  Infusion@ 1600 post procedure (ok as per GI fellow), follow PTT    Infectious Disease: No Acute Dx, Afebrile, F/U Wbc    Endocrine: No Acute Dx, FS q6h.    Lines/Tubes: PIV, ET tube, hamilton    Disposition: ICU overnight for hemodynamic monitoring. SBT if on minimal vent settings 1018 am.      DVT PTX: heparin gtt    GI PTX: PPI    ***Tubes/Lines/Drains  ***  Peripheral IV  OG tube, ET tube 	Date Placed: 10/17/18  Urinary Catheter	 [X]	Indication: Strict I&O    Date Placed: 10/17/18    REVIEW OF SYSTEMS    [X] Due to altered mental status/intubation, subjective information were not able to be obtained from the patient. History was obtained, to the extent possible, from review of the chart and collateral sources of information.    Daily Height in cm: 187.96 (17 Oct 2018 15:26)    Daily Weight in k.1 (17 Oct 2018 22:00)    Diet, NPO:   Except Medications (10-14-18 @ 08:09)    CURRENT MEDS:  Neurologic Medications  acetaminophen   Tablet .. 650 milliGRAM(s) Oral every 6 hours PRN Moderate Pain (4 - 6)  DULoxetine 60 milliGRAM(s) Oral daily  morphine  - Injectable 2 milliGRAM(s) IV Push every 6 hours PRN Severe Pain (7 - 10)    Respiratory Medications    Cardiovascular Medications  diltiazem    Tablet 60 milliGRAM(s) Oral every 8 hours  propranolol 20 milliGRAM(s) Oral every 12 hours  tamsulosin 0.4 milliGRAM(s) Oral at bedtime    Gastrointestinal Medications  docusate sodium Liquid 100 milliGRAM(s) Oral three times a day  pantoprazole   Suspension 40 milliGRAM(s) Oral daily  polyethylene glycol 3350 17 Gram(s) Oral at bedtime  senna 2 Tablet(s) Oral at bedtime    Genitourinary Medications    Hematologic/Oncologic Medications  heparin  Infusion 1600 Unit(s)/Hr IV Continuous <Continuous>    Antimicrobial/Immunologic Medications    Endocrine/Metabolic Medications    Topical/Other Medications  carbamide peroxide Otic Solution 5 Drop(s) Left Ear two times a day  chlorhexidine 0.12% Liquid 15 milliLiter(s) Oral Mucosa two times a day  chlorhexidine 4% Liquid 1 Application(s) Topical <User Schedule>  latanoprost 0.005% Ophthalmic Solution 1 Drop(s) Both EYES at bedtime    ICU Vital Signs Last 24 Hrs  T(C): 37.3 (18 Oct 2018 08:00), Max: 37.4 (17 Oct 2018 22:00)  T(F): 99.1 (18 Oct 2018 08:00), Max: 99.3 (17 Oct 2018 22:00)  HR: 112 (18 Oct 2018 08:00) (88 - 122)  BP: 108/69 (18 Oct 2018 08:00) (81/60 - 188/119)  BP(mean): 84 (18 Oct 2018 08:00) (61 - 90)  ABP: --  ABP(mean): --  RR: 24 (18 Oct 2018 08:00) (14 - 39)  SpO2: 99% (18 Oct 2018 08:00) (86% - 100%)    Mode: CPAP with PS  FiO2: 40  PEEP: 5    ABG - ( 18 Oct 2018 04:22 )  pH, Arterial: 7.45  pH, Blood: x     /  pCO2: 39    /  pO2: 95    / HCO3: 27    / Base Excess: 3.0   /  SaO2: 99        I&O's Summary    17 Oct 2018 07:  -  18 Oct 2018 07:00  --------------------------------------------------------  IN: 314.4 mL / OUT: 1790 mL / NET: -1475.6 mL    18 Oct 2018 07:01  -  18 Oct 2018 08:20  --------------------------------------------------------  IN: 16 mL / OUT: 50 mL / NET: -34 mL      I&O's Detail    17 Oct 2018 07:  -  18 Oct 2018 07:00  --------------------------------------------------------  IN:    dexmedetomidine Infusion: 28.4 mL    Enteral Tube Flush: 30 mL    heparin Infusion: 176 mL    lactated ringers.: 80 mL  Total IN: 314.4 mL    OUT:    Indwelling Catheter - Urethral: 1390 mL    Voided: 400 mL  Total OUT: 1790 mL    Total NET: -1475.6 mL    18 Oct 2018 07:01  -  18 Oct 2018 08:20  --------------------------------------------------------  IN:    heparin Infusion: 16 mL  Total IN: 16 mL    OUT:    Indwelling Catheter - Urethral: 50 mL  Total OUT: 50 mL    Total NET: -34 mL    PHYSICAL EXAM:    General/Neuro  GCS:     = 4  / 5   / 6      Deficits:  alert & oriented, no focal deficits  Pupils: Patient left eye cannot cross midline; blind in left eye for 20 years    Lungs:      Patient placed on spontaneous pressure support of 5mmhg. Presence of a leak. NIF of -35.    Cardiovascular : S1, S2.  Regular rate and rhythm.    Cardiac Rhythm: Normal Sinus Rhythm    GI: Abdomen soft, non-distended, slight tenderness in LRQ  .  Nasogastric tube in place.  Colostomy / Ileostomy.    Wound:    Extremities: Extremities warm, pink, well-perfused. 2 peripheral IVs in place  Derm: Good skin turgor, no skin breakdown.      :       Hamilton catheter in place.    CXR: Interval placement of enteric tube, which extends below the diphram. Stable bilateral pleural effusion/opacities    LABS:  CAPILLARY BLOOD GLUCOSE    POCT Blood Glucose.: 115 mg/dL (17 Oct 2018 18:20)               10.8   8.84  )-----------( 323      ( 18 Oct 2018 04:57 )             33.8       10-18    139  |  95<L>  |  8<L>  ----------------------------<  87  3.8   |  25  |  0.7    Ca    7.8<L>      18 Oct 2018 00:53  Phos  3.4     10-18  Mg     1.8     10-18      PT/INR - ( 18 Oct 2018 04:57 )   PT: 13.80 sec;   INR: 1.20 ratio      PTT - ( 18 Oct 2018 04:57 )  PTT:69.5 sec  CARDIAC MARKERS ( 17 Oct 2018 17:41 )  x     / 0.06 ng/mL / 62 U/L / x     / 3.9 ng/mL

## 2018-10-18 NOTE — PROGRESS NOTE ADULT - SUBJECTIVE AND OBJECTIVE BOX
· Subjective and Objective: 	  GENERAL SURGERY PROGRESS NOTE     AMRIK MADRID  58y  Male  Hospital day :27d  POD:  Procedure: Thoracoscopy  Chest tube insertion  Thoracentesis  Thoracentesis    OVERNIGHT EVENTS:  No acute events overnight. Patient weened from 100/5 vent to 40/5. Held bp and lasix medications due to low bp.   T(F): 98.6 (10-18-18 @ 04:00), Max: 99.3 (10-17-18 @ 22:00)  HR: 108 (10-18-18 @ 05:30) (88 - 114)  BP: 96/62 (10-18-18 @ 05:30) (81/60 - 188/119)  ABP: --  ABP(mean): --  RR: 21 (10-18-18 @ 05:30) (14 - 39)  SpO2: 99% (10-18-18 @ 05:30) (86% - 100%)    DIET/FLUIDS:   NG:                                                                                DRAINS:     BM:     EMESIS:     URINE:    Indwelling Urethral Catheter:     Connect To:  Straight Drainage/[place]    Indication:  Perioperative use for Selected Surgical Procedures (10-17-18 @ 19:27)  Indwelling Urethral Catheter:     Connect To:  Straight Drainage/Gravity    Indication:  Urine Output Monitoring in Critically Ill (10-17-18 @ 22:11)    GI proph:  pantoprazole   Suspension 40 milliGRAM(s) Oral daily    AC/ proph: heparin  Infusion 1600 Unit(s)/Hr IV Continuous <Continuous>    ABx:     PHYSICAL EXAM:  GENERAL: NAD, well-appearing  CHEST/LUNG: Vent 40/5.  ABDOMEN: distended, mildly tender to palpation.   EXTREMITIES:  No clubbing, cyanosis, or edema      LABS  Labs:  CAPILLARY BLOOD GLUCOSE      POCT Blood Glucose.: 115 mg/dL (17 Oct 2018 18:20)                          10.8   8.84  )-----------( 323      ( 18 Oct 2018 04:57 )             33.8       Auto Neutrophil %: 76.6 % (10-18-18 @ 00:53)  Auto Immature Granulocyte %: 1.6 % (10-18-18 @ 00:53)  Auto Neutrophil %: 84.8 % (10-17-18 @ 17:41)  Auto Immature Granulocyte %: 2.0 % (10-17-18 @ 17:41)    10-18    139  |  95<L>  |  8<L>  ----------------------------<  87  3.8   |  25  |  0.7      Calcium, Total Serum: 7.8 mg/dL (10-18-18 @ 00:53)      LFTs:     Blood Gas Arterial, Lactate: 0.8 mmoL/L (10-18-18 @ 04:22)  Blood Gas Arterial, Lactate: 0.7 mmoL/L (10-18-18 @ 00:00)  Blood Gas Arterial, Lactate: 0.7 mmoL/L (10-17-18 @ 20:11)  Blood Gas Arterial, Lactate: 0.9 mmoL/L (10-17-18 @ 18:31)  Lactate, Blood: 0.7 mmol/L (10-16-18 @ 06:34)    ABG - ( 18 Oct 2018 04:22 )  pH: 7.45  /  pCO2: 39    /  pO2: 95    / HCO3: 27    / Base Excess: 3.0   /  SaO2: 99              ABG - ( 18 Oct 2018 00:00 )  pH: 7.42  /  pCO2: 44    /  pO2: 210   / HCO3: 28    / Base Excess: 3.1   /  SaO2: 100             ABG - ( 17 Oct 2018 20:11 )  pH: 7.35  /  pCO2: 48    /  pO2: 171   / HCO3: 26    / Base Excess: 0.3   /  SaO2: 100               Coags:     13.80  ----< 1.20    ( 18 Oct 2018 04:57 )     x           CARDIAC MARKERS ( 17 Oct 2018 17:41 )  x     / 0.06 ng/mL / 62 U/L / x     / 3.9 ng/mL              RADIOLOGY & ADDITIONAL TESTS:      A/P< from: Xray Chest 1 View- PORTABLE-Routine (10.17.18 @ 06:36) >  Impression:      Increased bilateral opacities and effusions.    Enlarged cardiac silhouette.       < end of copied text >              Assessment and Plan:   · Assessment		  s/p L TALC pleurodesis  PLAN:    - Continue to wean vent setting    - continue hep drip 16    - restart bp medications when appropriate    - f/u biopsy

## 2018-10-18 NOTE — ANESTHESIA FOLLOW-UP NOTE - NSRECOMMENDFT_GEN_ALL_CORE
Pt was still intubated but breathing on his own. Plan from ICU team was to extuabte patient today.
keep intubated

## 2018-10-18 NOTE — CHART NOTE - NSCHARTNOTEFT_GEN_A_CORE
AMRIK MADRID  [id number]  57 y/o M with PMH of HTN, known DVT (on Heparin GTT), BPH, Depression, s/p pigtail insertion at [facility] 3 days ptp, presented to [facility] 9/21 for SOB (found to have a large Pleural effusion- followed by CT surgery, L chest tube placed, now out.) CT with thickening of Ascending Colon- r/o malignancy, GI following). Last colono 10 months ago at [facility].     10/10 Patient underwent Left Vats w/ Pleural implant biopsy talc pleurodesis, placement of R pigtail and 2 CT on the left, all dc'd as of 10/17. Colonoscopy/EGD completed 10/17 under general anesthesia due to poor respiratory status. Patient was induced with propofol/etomidate/sucx, and underwent the procedure without incident with findings of a possible invasive CA.  Post procedure anesthesia was unable to extubate the patient due to low Tidal Volumes around 100. per anesthesia patient was adequately reversed.       Today, patient was extubated successfully. He is at his baseline pulmonary status PAo2 post extubation 63.  Extensive h/o COPD  Cards- Held Propanolol today for hypotension 106, got the am dose of Cardizem and resumed lasix 20 mg daily.  Plan to continue cardizem but resume propanalol tomorrow am.  GI s/p colonoscopy- mass suspect of invasive adenocarcinoma. Starting clear liquid diet   Renal - IVL, no electrolyte abnormalities- hamilton discontinued- due to void  Heme- DVT/ PE on Eliquis at home, heparin gtt here at 1600 units/hr repeat ptt in lab pending results.  ID- no issues  MSK- ambulate as tolerated    Code status- DNR/DNI reinstated this am      Allergies:  No Known Allergies    PAST MEDICAL & SURGICAL HISTORY:  r/o malignancy  Pleural effusion  CHF (congestive heart failure)  Insomnia  BPH (benign prostatic hyperplasia)  Depression  Bipolar 1 disorder  HTN (hypertension)  History of thoracentesis    Home Medications:  Ambien 10 mg oral tablet: 1 tab(s) orally once a day (at bedtime) (21 Sep 2018 21:50)  apixaban 5 mg oral tablet: 1 tab(s) orally every 12 hours (21 Sep 2018 21:50)  Cardizem 60 mg oral tablet: 1 tab(s) orally every 8 hours (21 Sep 2018 21:50)  clotrimazole 1% topical cream: 1 application topically  (21 Sep 2018 21:50)  DULoxetine 60 mg oral delayed release capsule: 1 cap(s) orally once a day (21 Sep 2018 21:50)  isosorbide mononitrate 20 mg oral tablet: 1 tab(s) orally once a day (21 Sep 2018 21:50)  latanoprost 0.005% ophthalmic solution: 1 drop(s) to each affected eye once a day (in the evening) (21 Sep 2018 21:50)  Multiple Vitamins oral capsule: 1 cap(s) orally once a day (21 Sep 2018 21:50)  propranolol 20 mg oral tablet: 1 tab(s) orally 2 times a day (21 Sep 2018 21:50)  tamsulosin 0.4 mg oral capsule: 1 cap(s) orally once a day (21 Sep 2018 21:50)  traZODone 50 mg oral tablet: 1 tab(s) orally once a day (at bedtime) (21 Sep 2018 21:50)      disposition- transfer to   case endorsed to Dr Ayse stein team

## 2018-10-18 NOTE — PROGRESS NOTE ADULT - ASSESSMENT
57 y/o M with PMH of HTN, known DVT ( on Heparin GTT), BPH, Depression, that presented to [facility] for SOB and being managed here for Metastatic Adenocarcinoma of GI origin , patient s/p EGD and Colonoscopy that revealed a sigmoid colon mass with glandular distortion, multiple biopsies were taken from the mass. Patient was intubated for the procedure and was not able to get extubated after the procedure and was sent to SICU.     Metastatic AdenoCA of GI origin + Opoid Induced Constipation and Ileus :   - Serial abdominal exams, daily KUB.  - Check lytes , K , Mg and Ca daily and replete as needed.   - Pending biopsy results from the sigmoid colon mass.   - Get Oncology on board, send for CEA.  - GI will follow up.

## 2018-10-18 NOTE — PROGRESS NOTE ADULT - ASSESSMENT
s/p L TALC pleurodesis  PLAN:    - Continue to wean vent setting    - continue hep drip 16    - restart bp medications when appropriate    - f/u biopsy

## 2018-10-18 NOTE — PROGRESS NOTE ADULT - SUBJECTIVE AND OBJECTIVE BOX
GENERAL SURGERY PROGRESS NOTE     AMRIK MADRID  58y  Male  Hospital day :27d  POD:  Procedure: Thoracoscopy  Chest tube insertion  Thoracentesis  Thoracentesis    OVERNIGHT EVENTS:  No acute events overnight. Patient weened from 100/5 vent to 40/5. Held bp and lasix medications due to low bp.   T(F): 98.6 (10-18-18 @ 04:00), Max: 99.3 (10-17-18 @ 22:00)  HR: 108 (10-18-18 @ 05:30) (88 - 114)  BP: 96/62 (10-18-18 @ 05:30) (81/60 - 188/119)  ABP: --  ABP(mean): --  RR: 21 (10-18-18 @ 05:30) (14 - 39)  SpO2: 99% (10-18-18 @ 05:30) (86% - 100%)    DIET/FLUIDS:   NG:                                                                                DRAINS:     BM:     EMESIS:     URINE:    Indwelling Urethral Catheter:     Connect To:  Straight Drainage/[place]    Indication:  Perioperative use for Selected Surgical Procedures (10-17-18 @ 19:27)  Indwelling Urethral Catheter:     Connect To:  Straight Drainage/Gravity    Indication:  Urine Output Monitoring in Critically Ill (10-17-18 @ 22:11)    GI proph:  pantoprazole   Suspension 40 milliGRAM(s) Oral daily    AC/ proph: heparin  Infusion 1600 Unit(s)/Hr IV Continuous <Continuous>    ABx:     PHYSICAL EXAM:  GENERAL: NAD, well-appearing  CHEST/LUNG: Vent 40/5.  ABDOMEN: distended, mildly tender to palpation.   EXTREMITIES:  No clubbing, cyanosis, or edema      LABS  Labs:  CAPILLARY BLOOD GLUCOSE      POCT Blood Glucose.: 115 mg/dL (17 Oct 2018 18:20)                          10.8   8.84  )-----------( 323      ( 18 Oct 2018 04:57 )             33.8       Auto Neutrophil %: 76.6 % (10-18-18 @ 00:53)  Auto Immature Granulocyte %: 1.6 % (10-18-18 @ 00:53)  Auto Neutrophil %: 84.8 % (10-17-18 @ 17:41)  Auto Immature Granulocyte %: 2.0 % (10-17-18 @ 17:41)    10-18    139  |  95<L>  |  8<L>  ----------------------------<  87  3.8   |  25  |  0.7      Calcium, Total Serum: 7.8 mg/dL (10-18-18 @ 00:53)      LFTs:     Blood Gas Arterial, Lactate: 0.8 mmoL/L (10-18-18 @ 04:22)  Blood Gas Arterial, Lactate: 0.7 mmoL/L (10-18-18 @ 00:00)  Blood Gas Arterial, Lactate: 0.7 mmoL/L (10-17-18 @ 20:11)  Blood Gas Arterial, Lactate: 0.9 mmoL/L (10-17-18 @ 18:31)  Lactate, Blood: 0.7 mmol/L (10-16-18 @ 06:34)    ABG - ( 18 Oct 2018 04:22 )  pH: 7.45  /  pCO2: 39    /  pO2: 95    / HCO3: 27    / Base Excess: 3.0   /  SaO2: 99              ABG - ( 18 Oct 2018 00:00 )  pH: 7.42  /  pCO2: 44    /  pO2: 210   / HCO3: 28    / Base Excess: 3.1   /  SaO2: 100             ABG - ( 17 Oct 2018 20:11 )  pH: 7.35  /  pCO2: 48    /  pO2: 171   / HCO3: 26    / Base Excess: 0.3   /  SaO2: 100               Coags:     13.80  ----< 1.20    ( 18 Oct 2018 04:57 )     x           CARDIAC MARKERS ( 17 Oct 2018 17:41 )  x     / 0.06 ng/mL / 62 U/L / x     / 3.9 ng/mL              RADIOLOGY & ADDITIONAL TESTS:      A/P< from: Xray Chest 1 View- PORTABLE-Routine (10.17.18 @ 06:36) >  Impression:      Increased bilateral opacities and effusions.    Enlarged cardiac silhouette.       < end of copied text >

## 2018-10-18 NOTE — PROGRESS NOTE ADULT - SUBJECTIVE AND OBJECTIVE BOX
GI following the patient for Metastatic AdenoCA of GI origin and Opiate Indices Constipation and Ileus s/p EGD and Colonoscopy with finding a sigmoid mass.     Interval Events: Pt remains intubated, awake, alert and has some abdominal distension but better than before.      Allergies:  No Known Allergies      Hospital Medications:  acetaminophen   Tablet .. 650 milliGRAM(s) Oral every 6 hours PRN  carbamide peroxide Otic Solution 5 Drop(s) Left Ear two times a day  chlorhexidine 0.12% Liquid 15 milliLiter(s) Oral Mucosa two times a day  chlorhexidine 4% Liquid 1 Application(s) Topical <User Schedule>  diltiazem    Tablet 60 milliGRAM(s) Oral every 8 hours  docusate sodium Liquid 100 milliGRAM(s) Oral three times a day  DULoxetine 60 milliGRAM(s) Oral daily  furosemide    Tablet 20 milliGRAM(s) Oral daily  heparin  Infusion 1600 Unit(s)/Hr IV Continuous <Continuous>  latanoprost 0.005% Ophthalmic Solution 1 Drop(s) Both EYES at bedtime  morphine  - Injectable 2 milliGRAM(s) IV Push every 6 hours PRN  ondansetron Injectable 4 milliGRAM(s) IV Push every 6 hours PRN  pantoprazole   Suspension 40 milliGRAM(s) Oral daily  polyethylene glycol 3350 17 Gram(s) Oral at bedtime  senna 2 Tablet(s) Oral at bedtime  tamsulosin 0.4 milliGRAM(s) Oral at bedtime      PMHX/PSHX:  Pleural effusion  CHF (congestive heart failure)  Insomnia  BPH (benign prostatic hyperplasia)  Depression  Bipolar 1 disorder  HTN (hypertension)  History of thoracentesis      Family history:  No pertinent family history in first degree relatives      ROS:   Unable to asses due to intubation but looks comfortable.     PHYSICAL EXAM:     GENERAL:  Appears stated age, well-groomed, well-nourished, no distress  HEENT:  NC/AT,  conjunctivae clear, sclera -anicteric  CHEST:  Coarse breath sounds b/l,   HEART:  Regular rhythm, S1, S2, no added sounds  ABDOMEN:  Soft, non-tender, distended, normoactive bowel sounds.  NEURO:  Alert, oriented    Vital Signs:  Vital Signs Last 24 Hrs  T(C): 37.3 (18 Oct 2018 08:00), Max: 37.4 (17 Oct 2018 22:00)  T(F): 99.1 (18 Oct 2018 08:00), Max: 99.3 (17 Oct 2018 22:00)  HR: 110 (18 Oct 2018 10:00) (88 - 122)  BP: 99/62 (18 Oct 2018 10:00) (81/60 - 188/119)  BP(mean): 74 (18 Oct 2018 10:00) (61 - 96)  RR: 26 (18 Oct 2018 10:00) (14 - 39)  SpO2: 95% (18 Oct 2018 10:00) (86% - 100%)  Daily Height in cm: 187.96 (17 Oct 2018 15:26)    Daily Weight in k.1 (17 Oct 2018 22:00)    LABS:                        10.8   8.84  )-----------( 323      ( 18 Oct 2018 04:57 )             33.8     10-18    139  |  95<L>  |  8<L>  ----------------------------<  87  3.8   |  25  |  0.7    Ca    7.8<L>      18 Oct 2018 00:53  Phos  3.4     10-18  Mg     1.8     10-18        PT/INR - ( 18 Oct 2018 04:57 )   PT: 13.80 sec;   INR: 1.20 ratio         PTT - ( 18 Oct 2018 04:57 )  PTT:69.5 sec

## 2018-10-18 NOTE — CHART NOTE - NSCHARTNOTEFT_GEN_A_CORE
Registered Dietitian Follow-Up     Patient Profile Reviewed                           Yes [x]   No []     Nutrition History Previously Obtained        Yes [x]  No []  Pt was not at risk, now is at risk      Pertinent Medical Information: Pt presented to [facility] for SOB and being managed here for Metastatic Adenocarcinoma of GI origin , patient s/p EGD and Colonoscopy 10/17 that revealed a sigmoid colon mass with glandular distortion, multiple biopsies were taken from the mass. Patient was intubated for the procedure and was not able to get extubated after the procedure and was sent to SICU. Pt was able to be exubated this morning, now with opoid induced constipation and ileus. Plans for serial abdominal exams, daily KUB, pending biposy results from the sigmoid colon mass.       Diet order: NPO      Anthropometrics:  - Ht.  - Wt. 10/17: 104.2 kg vs 104.1 kg (9/27)- noted wt stable, pt has had very good intake throuhgout admission while on medicine floor- will continue to monitor  - %wt change  - BMI  - IBW     Pertinent Lab Data: 10/18: Na 139, K 3.8, BUN 8, Cr 0.7, Glucose 87     Pertinent Meds: heparin, propranolol, furosemide, ondansetron, pantoprazole, docusate, senna     Physical Findings:  - Appearance: lethargic, asleep; no edema noted  - GI function: + loose, liquid brown BM 10/16   - Tubes:  - Oral/Mouth cavity: NPO   - Skin: surgical incision      Nutrition Requirements  Weight Used: 104 kg/ 78 kg     Estimated Needs: ongoing from 9/27  Estimated Calorie Needs: 6611-4608 kcal/day (MSJ x 1-1.1 AF)--d/t borderline obese BMI  Estimated Protein Needs: 78-94 gm/day (1-1.2 gm/kg IBW)  Estimated Fluid Needs: per LIP     Nutrient Intake: Not meeting needs        [] Previous Nutrition Diagnosis:            [] Ongoing          [] Resolved    New Nutrition Diagnosis: Inadequate protein energy intake related to opoid induced constipation/ileus AEB NPO diet order     Nutrition Intervention: Meals and snacks, medical food supplements     Goal/Expected Outcome: Diet to be advanced by f/u in 4 days     Indicator/Monitoring: RD to monitor diet order, energy intake    RECS:  1) When feasible start pt on clears with Ensure Clear TID and advance to DASH/TLC diet as tolerated

## 2018-10-18 NOTE — PROGRESS NOTE ADULT - ASSESSMENT
ASSESSMENT:  59 y/o M with PMH of HTN, known DVT (on Heparin GTT), CHF (EF 50-55%), Patient admitted for recurrent pleural effusions  s/p  Left Vats w/ Pleural Biopsy, Pleural implant biospy talc pleurodesis.10/10. patient was found to have a CTAP done demonstrating an ascending colon mass found on CT Abd to rule out malignancy. patient then underwent Coloscopy/EGD today 10/17. Due to the patients history of CHF, and Pulmonary effusions and low saturations preop, the decision was made to undergo the Colonoscopy under general anesthesia. Patient was induced with propfol/etomidate/sucx, and underwent the procedure without incident with findings of a possible invasive CA.  Post procedure anesthesia was unable to extubate the patient due to low Tidal Volumes around 100. per anesthesia patient was adequately reversed. Requesting a ICU consult for respiratory failure.     PLAN:    Neurologic: Intubated, agitated, patient was started on Precedex for sedation post-op, which was held due to hypotension.      Respiratory: Vent initial 100/5, overnight slowly weaned down to 40/5, SBT trial in am.   Hx of CHF, Pleural effusions s/p talc pleurodesis 10/10, was saturating 93% preprocedure on 3L N/C, patient also takes Lasix.    Cardiovascular: Hx of HTN, CHF (50-55%), on Lasix 40 IV daily.  Post-op received Lasix 40 IV, responded well, however dropped her SBP to 80s, gave 250cc bolus x 1  - Baseline tachycardiac Cardizem 60mg q8h and propanolol 20mg q12h (home medication).  HOLD diuretics and anti-hypertensive meds for now until BP improves.    Cardiovascular Medications  diltiazem    Tablet 30 milliGRAM(s) Oral every 8 hours  furosemide    Tablet 20 milliGRAM(s) Oral every 12 hours  propranolol 20 milliGRAM(s) Oral two times a day  tamsulosin 0.4 milliGRAM(s) Oral at bedtime    Gastrointestinal/Nutrition: NPO, can resume diet once extubated. Continue Bowel regimen.      Renal/Genitourinary: No Velásquez monitor electrolytes replete as needed.     Hematologic: Hx of DVT, Resume Hep drip heparin  Infusion@ 1600, OK'd by GI fellow to restart post procedure.  Keep PTT therapeutic.  - Venous duplex shows: rt femoral vein DVT and lt femoral, popliteal and gastrocnemius veins DVT    Infectious Disease: No Acute Dx, Afebrile, F/U Wbc    Endocrine: No Acute Dx, FS q6h.    Lines/Tubes: PIV, ET tube.    Disposition: SICU care ASSESSMENT:  59 y/o M with PMH of HTN, known DVT (on Heparin GTT), CHF (EF 50-55%), Patient admitted for recurrent pleural effusions  s/p  Left Vats w/ Pleural Biopsy, Pleural implant biospy talc pleurodesis.10/10. patient was found to have a CTAP done demonstrating an ascending colon mass found on CT Abd to rule out malignancy. patient then underwent Coloscopy/EGD today 10/17. Due to the patients history of CHF, and Pulmonary effusions and low saturations preop, the decision was made to undergo the Colonoscopy under general anesthesia. Patient was induced with propfol/etomidate/sucx, and underwent the procedure without incident with findings of a possible invasive CA.  Post procedure anesthesia was unable to extubate the patient due to low Tidal Volumes around 100. per anesthesia patient was adequately reversed. Requesting a ICU consult for respiratory failure.     PLAN:    Neurologic: Intubated, agitated, patient was started on Precedex for sedation post-op, which was held due to hypotension.    Neurologic Medications  acetaminophen   Tablet .. 650 milliGRAM(s) Oral every 6 hours PRN Moderate Pain (4 - 6)  DULoxetine 60 milliGRAM(s) Oral daily  morphine  - Injectable 2 milliGRAM(s) IV Push every 6 hours PRN Severe Pain (7 - 10)    Respiratory: Vent initial 100/5, overnight slowly weaned down to 40/5, SBT trial in am.   Hx of CHF, Pleural effusions s/p talc pleurodesis 10/10, was saturating 93% preprocedure on 3L N/C, patient also takes Lasix.    RR: 24 (10-18-18 @ 08:00) (14 - 39)  SpO2: 99% (10-18-18 @ 08:00) (86% - 100%)    Mode: CPAP with PS, FiO2: 40, PEEP: 5  ( 18 Oct 2018 04:22 ) pH: 7.45  /  pCO2: 39    /  pO2: 95    / HCO3: 27    / Base Excess: 3.0   /  SaO2: 99      ( 18 Oct 2018 00:00 ) pH: 7.42  /  pCO2: 44    /  pO2: 210   / HCO3: 28    / Base Excess: 3.1   /  SaO2: 100     ( 17 Oct 2018 20:11 ) pH: 7.35  /  pCO2: 48    /  pO2: 171   / HCO3: 26    / Base Excess: 0.3   /  SaO2: 100       Cardiovascular: Hx of HTN, CHF (50-55%), on Lasix 40 IV daily.  Post-op received Lasix 40 IV, responded well, however dropped her SBP to 80s, gave 250cc bolus x 1  - Baseline tachycardiac Cardizem 60mg q8h and propanolol 20mg q12h (home medication).  HOLD diuretics and anti-hypertensive meds for now until BP improves.    Cardiovascular Medications  diltiazem    Tablet 30 milliGRAM(s) Oral every 8 hours  furosemide    Tablet 20 milliGRAM(s) Oral every 12 hours  propranolol 20 milliGRAM(s) Oral two times a day    Gastrointestinal/Nutrition: NPO, can resume diet once extubated. Continue Bowel regimen.     Gastrointestinal Medications  docusate sodium Liquid 100 milliGRAM(s) Oral three times a day  pantoprazole   Suspension 40 milliGRAM(s) Oral daily  polyethylene glycol 3350 17 Gram(s) Oral at bedtime  senna 2 Tablet(s) Oral at bedtime    Renal/Genitourinary: No Velásquez monitor electrolytes replete as needed.     Genitourinary Medications  tamsulosin 0.4 milliGRAM(s) Oral at bedtime  UOP: Indwelling Catheter 1390cc/24hr. (40-50cc/hr)    BUN/Creat: 8/ 0.7<-- 9/ 0.7   Na:139<--136   K: 3.8<--3.9  M.8  Phos: 3.4    Hematologic: Hx of DVT, Resume Hep drip heparin  Infusion@ 1600  procedure.  Keep PTT therapeutic. PTT: 69.5<-- 30.8   - Venous duplex shows: rt femoral vein DVT and lt femoral, popliteal and gastrocnemius veins DVT    Hematologic/Oncologic Medications  heparin  Infusion 1600 Unit(s)/Hr IV Continuous    Hb:10.8<-- 10.8  Plt: 323<-- 285  INR: 1.20<-- 1.18  PTT: 69.5<-- 30.8   Lactate (ABG):0.8<--0.7 <--0.7     Infectious Disease: No Acute Dx, Afebrile Max: 99.3, WBC: 8.84<--8.71    Endocrine: No Acute Dx, FS q6h.  Glucose:   115 (10-17-18 @ 18:20)    Lines/Tubes: PIV, ET tube.    Disposition: Possible downgrade from ICU in afternoon. ASSESSMENT:  57 y/o M with PMH of HTN, known DVT (on Heparin GTT), CHF (EF 50-55%), Patient admitted for recurrent pleural effusions  s/p  Left Vats w/ Pleural Biopsy, Pleural implant biospy talc pleurodesis.10/10. patient was found to have a CTAP done demonstrating an ascending colon mass found on CT Abd to rule out malignancy. patient then underwent Coloscopy/EGD today 10/17. Due to the patients history of CHF, and Pulmonary effusions and low saturations preop, the decision was made to undergo the Colonoscopy under general anesthesia. Patient was induced with propfol/etomidate/sucx, and underwent the procedure without incident with findings of a possible invasive CA.  Post procedure anesthesia was unable to extubate the patient due to low Tidal Volumes around 100. per anesthesia patient was adequately reversed. Requesting a ICU consult for respiratory failure.     PLAN:    Neurologic: Intubated, agitated, patient was started on Precedex for sedation post-op, which was held due to hypotension.    Neurologic Medications  acetaminophen   Tablet .. 650 milliGRAM(s) Oral every 6 hours PRN Moderate Pain (4 - 6)  DULoxetine 60 milliGRAM(s) Oral daily  morphine  - Injectable 2 milliGRAM(s) IV Push every 6 hours PRN Severe Pain (7 - 10)    Respiratory: Vent initial 100/5, overnight slowly weaned down to 40/5, SBT trial in am.   Hx of CHF, Pleural effusions s/p talc pleurodesis 10/10, was saturating 93% preprocedure on 3L N/C, patient also takes Lasix.  Patient planned to extubate today. Spontaneous pressure support reduced to 5mmhg from 10. Blood gas before extubation.  RR: 24 (10-18-18 @ 08:00) (14 - 39)  SpO2: 99% (10-18-18 @ 08:00) (86% - 100%)    Mode: CPAP with PS, FiO2: 40, PEEP: 5  ( 18 Oct 2018 04:22 ) pH: 7.45  /  pCO2: 39    /  pO2: 95    / HCO3: 27    / Base Excess: 3.0   /  SaO2: 99      ( 18 Oct 2018 00:00 ) pH: 7.42  /  pCO2: 44    /  pO2: 210   / HCO3: 28    / Base Excess: 3.1   /  SaO2: 100     ( 17 Oct 2018 20:11 ) pH: 7.35  /  pCO2: 48    /  pO2: 171   / HCO3: 26    / Base Excess: 0.3   /  SaO2: 100       Cardiovascular: Hx of HTN, CHF (50-55%), on Lasix 40 IV daily.  Post-op received Lasix 40 IV, responded well, however dropped her SBP to 80s, gave 250cc bolus x 1  - Baseline tachycardiac Cardizem 60mg q8h and propanolol 20mg q12h (home medication).  HOLD diuretics and anti-hypertensive meds for now until BP improves.    Cardiovascular Medications  diltiazem    Tablet 30 milliGRAM(s) Oral every 8 hours  furosemide    Tablet 20 milliGRAM(s) Oral every 12 hours  propranolol 20 milliGRAM(s) Oral two times a day    Gastrointestinal/Nutrition: NPO, can resume diet once extubated. Continue Bowel regimen.     Gastrointestinal Medications  docusate sodium Liquid 100 milliGRAM(s) Oral three times a day  pantoprazole   Suspension 40 milliGRAM(s) Oral daily  polyethylene glycol 3350 17 Gram(s) Oral at bedtime  senna 2 Tablet(s) Oral at bedtime    Renal/Genitourinary: No Velásquez monitor electrolytes replete as needed.     Genitourinary Medications  tamsulosin 0.4 milliGRAM(s) Oral at bedtime  UOP: Indwelling Catheter 1390cc/24hr. (40-50cc/hr)    BUN/Creat: 8/ 0.7<-- 9/ 0.7   Na:139<--136   K: 3.8<--3.9  M.8  Phos: 3.4    Hematologic: Hx of DVT, Resume Hep drip heparin  Infusion@ 1600  procedure.  Keep PTT therapeutic. PTT: 69.5<-- 30.8   - Venous duplex shows: rt femoral vein DVT and lt femoral, popliteal and gastrocnemius veins DVT    Hematologic/Oncologic Medications  heparin  Infusion 1600 Unit(s)/Hr IV Continuous    Hb:10.8<-- 10.8  Plt: 323<-- 285  INR: 1.20<-- 1.18  PTT: 69.5<-- 30.8   Lactate (ABG):0.8<--0.7 <--0.7     Infectious Disease: No Acute Dx, Afebrile Max: 99.3, WBC: 8.84<--8.71    Endocrine: No Acute Dx, FS q6h.  Glucose:   115 (10-17-18 @ 18:20)    Lines/Tubes: PIV, ET tube.    Disposition: Possible downgrade from ICU in afternoon.

## 2018-10-19 LAB
ALBUMIN SERPL ELPH-MCNC: 3 G/DL — LOW (ref 3.5–5.2)
ALP SERPL-CCNC: 297 U/L — HIGH (ref 30–115)
ALT FLD-CCNC: 13 U/L — SIGNIFICANT CHANGE UP (ref 0–41)
ANION GAP SERPL CALC-SCNC: 11 MMOL/L — SIGNIFICANT CHANGE UP (ref 7–14)
APTT BLD: 59.2 SEC — HIGH (ref 27–39.2)
APTT BLD: 79.8 SEC — CRITICAL HIGH (ref 27–39.2)
AST SERPL-CCNC: 13 U/L — SIGNIFICANT CHANGE UP (ref 0–41)
BASOPHILS # BLD AUTO: 0.02 K/UL — SIGNIFICANT CHANGE UP (ref 0–0.2)
BASOPHILS NFR BLD AUTO: 0.3 % — SIGNIFICANT CHANGE UP (ref 0–1)
BILIRUB SERPL-MCNC: 0.4 MG/DL — SIGNIFICANT CHANGE UP (ref 0.2–1.2)
BUN SERPL-MCNC: 6 MG/DL — LOW (ref 10–20)
CALCIUM SERPL-MCNC: 7.7 MG/DL — LOW (ref 8.5–10.1)
CHLORIDE SERPL-SCNC: 95 MMOL/L — LOW (ref 98–110)
CO2 SERPL-SCNC: 30 MMOL/L — SIGNIFICANT CHANGE UP (ref 17–32)
CREAT SERPL-MCNC: 0.7 MG/DL — SIGNIFICANT CHANGE UP (ref 0.7–1.5)
EOSINOPHIL # BLD AUTO: 0.16 K/UL — SIGNIFICANT CHANGE UP (ref 0–0.7)
EOSINOPHIL NFR BLD AUTO: 2.3 % — SIGNIFICANT CHANGE UP (ref 0–8)
GLUCOSE SERPL-MCNC: 120 MG/DL — HIGH (ref 70–99)
HCT VFR BLD CALC: 34.4 % — LOW (ref 42–52)
HGB BLD-MCNC: 11.2 G/DL — LOW (ref 14–18)
IMM GRANULOCYTES NFR BLD AUTO: 1.4 % — HIGH (ref 0.1–0.3)
INR BLD: 1.19 RATIO — SIGNIFICANT CHANGE UP (ref 0.65–1.3)
LYMPHOCYTES # BLD AUTO: 0.86 K/UL — LOW (ref 1.2–3.4)
LYMPHOCYTES # BLD AUTO: 12.4 % — LOW (ref 20.5–51.1)
MAGNESIUM SERPL-MCNC: 2.1 MG/DL — SIGNIFICANT CHANGE UP (ref 1.8–2.4)
MCHC RBC-ENTMCNC: 26.7 PG — LOW (ref 27–31)
MCHC RBC-ENTMCNC: 32.6 G/DL — SIGNIFICANT CHANGE UP (ref 32–37)
MCV RBC AUTO: 81.9 FL — SIGNIFICANT CHANGE UP (ref 80–94)
MONOCYTES # BLD AUTO: 0.95 K/UL — HIGH (ref 0.1–0.6)
MONOCYTES NFR BLD AUTO: 13.7 % — HIGH (ref 1.7–9.3)
NEUTROPHILS # BLD AUTO: 4.82 K/UL — SIGNIFICANT CHANGE UP (ref 1.4–6.5)
NEUTROPHILS NFR BLD AUTO: 69.9 % — SIGNIFICANT CHANGE UP (ref 42.2–75.2)
NRBC # BLD: 0 /100 WBCS — SIGNIFICANT CHANGE UP (ref 0–0)
PHOSPHATE SERPL-MCNC: 2.2 MG/DL — SIGNIFICANT CHANGE UP (ref 2.1–4.9)
PLATELET # BLD AUTO: 333 K/UL — SIGNIFICANT CHANGE UP (ref 130–400)
POTASSIUM SERPL-MCNC: 3.8 MMOL/L — SIGNIFICANT CHANGE UP (ref 3.5–5)
POTASSIUM SERPL-SCNC: 3.8 MMOL/L — SIGNIFICANT CHANGE UP (ref 3.5–5)
PROT SERPL-MCNC: 5.2 G/DL — LOW (ref 6–8)
PROTHROM AB SERPL-ACNC: 13.7 SEC — HIGH (ref 9.95–12.87)
RBC # BLD: 4.2 M/UL — LOW (ref 4.7–6.1)
RBC # FLD: 15.2 % — HIGH (ref 11.5–14.5)
SODIUM SERPL-SCNC: 136 MMOL/L — SIGNIFICANT CHANGE UP (ref 135–146)
WBC # BLD: 6.91 K/UL — SIGNIFICANT CHANGE UP (ref 4.8–10.8)
WBC # FLD AUTO: 6.91 K/UL — SIGNIFICANT CHANGE UP (ref 4.8–10.8)

## 2018-10-19 PROCEDURE — 99232 SBSQ HOSP IP/OBS MODERATE 35: CPT

## 2018-10-19 RX ORDER — DULOXETINE HYDROCHLORIDE 30 MG/1
60 CAPSULE, DELAYED RELEASE ORAL DAILY
Qty: 0 | Refills: 0 | Status: DISCONTINUED | OUTPATIENT
Start: 2018-10-19 | End: 2018-10-19

## 2018-10-19 RX ORDER — ZOLPIDEM TARTRATE 10 MG/1
5 TABLET ORAL AT BEDTIME
Qty: 0 | Refills: 0 | Status: DISCONTINUED | OUTPATIENT
Start: 2018-10-19 | End: 2018-10-23

## 2018-10-19 RX ORDER — ZOLPIDEM TARTRATE 10 MG/1
5 TABLET ORAL ONCE
Qty: 0 | Refills: 0 | Status: DISCONTINUED | OUTPATIENT
Start: 2018-10-19 | End: 2018-10-19

## 2018-10-19 RX ORDER — TRAZODONE HCL 50 MG
50 TABLET ORAL AT BEDTIME
Qty: 0 | Refills: 0 | Status: DISCONTINUED | OUTPATIENT
Start: 2018-10-19 | End: 2018-10-23

## 2018-10-19 RX ORDER — POTASSIUM PHOSPHATE, MONOBASIC POTASSIUM PHOSPHATE, DIBASIC 236; 224 MG/ML; MG/ML
30 INJECTION, SOLUTION INTRAVENOUS ONCE
Qty: 0 | Refills: 0 | Status: COMPLETED | OUTPATIENT
Start: 2018-10-19 | End: 2018-10-19

## 2018-10-19 RX ADMIN — POTASSIUM PHOSPHATE, MONOBASIC POTASSIUM PHOSPHATE, DIBASIC 85 MILLIMOLE(S): 236; 224 INJECTION, SOLUTION INTRAVENOUS at 02:41

## 2018-10-19 RX ADMIN — Medication 20 MILLIGRAM(S): at 17:26

## 2018-10-19 RX ADMIN — MORPHINE SULFATE 2 MILLIGRAM(S): 50 CAPSULE, EXTENDED RELEASE ORAL at 05:19

## 2018-10-19 RX ADMIN — MORPHINE SULFATE 2 MILLIGRAM(S): 50 CAPSULE, EXTENDED RELEASE ORAL at 06:14

## 2018-10-19 RX ADMIN — LATANOPROST 1 DROP(S): 0.05 SOLUTION/ DROPS OPHTHALMIC; TOPICAL at 21:45

## 2018-10-19 RX ADMIN — PANTOPRAZOLE SODIUM 40 MILLIGRAM(S): 20 TABLET, DELAYED RELEASE ORAL at 08:29

## 2018-10-19 RX ADMIN — Medication 100 MILLIGRAM(S): at 05:19

## 2018-10-19 RX ADMIN — Medication 20 MILLIGRAM(S): at 05:19

## 2018-10-19 RX ADMIN — DULOXETINE HYDROCHLORIDE 60 MILLIGRAM(S): 30 CAPSULE, DELAYED RELEASE ORAL at 11:20

## 2018-10-19 RX ADMIN — TAMSULOSIN HYDROCHLORIDE 0.4 MILLIGRAM(S): 0.4 CAPSULE ORAL at 21:44

## 2018-10-19 RX ADMIN — HEPARIN SODIUM 16 UNIT(S)/HR: 5000 INJECTION INTRAVENOUS; SUBCUTANEOUS at 15:38

## 2018-10-19 RX ADMIN — ZOLPIDEM TARTRATE 5 MILLIGRAM(S): 10 TABLET ORAL at 22:17

## 2018-10-19 RX ADMIN — MORPHINE SULFATE 2 MILLIGRAM(S): 50 CAPSULE, EXTENDED RELEASE ORAL at 17:25

## 2018-10-19 RX ADMIN — CARBAMIDE PEROXIDE 5 DROP(S): 81.86 SOLUTION/ DROPS AURICULAR (OTIC) at 21:45

## 2018-10-19 RX ADMIN — CARBAMIDE PEROXIDE 5 DROP(S): 81.86 SOLUTION/ DROPS AURICULAR (OTIC) at 05:27

## 2018-10-19 RX ADMIN — Medication 50 MILLIGRAM(S): at 21:45

## 2018-10-19 NOTE — CONSULT NOTE ADULT - SUBJECTIVE AND OBJECTIVE BOX
Patient is a 58y old  Male who presents with a chief complaint of worsening SOB (19 Oct 2018 05:56)    HPI:  59 yo M with HTN, DLD, depression, DVT on eliquis, and recurrent pleural effusions s/p multiple thoracentesis presenting from [facility] for worsening SOB since discharge from [facility]. Pt denies any fever/ chills. He reports intermittent non productive cough. No chest pain.  Pt also c/o persistent diffuse abdominal pain along with distension, constipation and flatus.   Pt denies any other symptoms  To note, pt is very poor historian and can't confirm his medications    Pt was admitted for same complaint back in july and was found to have large L pleural effusion s/p pigtail insertion for 3 days with resolution of effusion. pleural studies at that time were consistent with exudative fluid, + atypical cell but no malignant cells.  Pt was admitted last week to [facility] for SOB and had a L thoracentesis 3 days PTP (21 Sep 2018 21:27)      PAST MEDICAL & SURGICAL HISTORY:  Pleural effusion  CHF (congestive heart failure)  Insomnia  BPH (benign prostatic hyperplasia)  Depression  Bipolar 1 disorder  HTN (hypertension)  History of thoracentesis      Hospital Course: being managed here for Metastatic Adenocarcinoma of GI origin , patient s/p EGD and Colonoscopy that revealed a sigmoid colon mass with glandular distortion, multiple biopsies were taken from the mass. Patient was intubated for the procedure and was not able to get extubated after the procedure and was sent to SICU.   Opoid Induced Constipation and Ileus :   L TALC pleurodesis for recurrent pleural effusions  Afib-On Diltiazem/metoprolol/IV heparin  Now extubated  TODAY'S SUBJECTIVE & REVIEW OF SYMPTOMS:     Constitutional Weakness   Cardio WNL   Resp WNL   GI abd pain  Heme WNL  Endo WNL  Skin WNL  MSK WNL  Neuro WNL  Cognitive WNL  Psych hxbipolar      MEDICATIONS  (STANDING):  carbamide peroxide Otic Solution 5 Drop(s) Left Ear two times a day  chlorhexidine 4% Liquid 1 Application(s) Topical <User Schedule>  diltiazem    Tablet 60 milliGRAM(s) Oral every 8 hours  docusate sodium Liquid 100 milliGRAM(s) Oral three times a day  DULoxetine 60 milliGRAM(s) Oral daily  furosemide    Tablet 20 milliGRAM(s) Oral daily  heparin  Infusion 1600 Unit(s)/Hr (16 mL/Hr) IV Continuous <Continuous>  latanoprost 0.005% Ophthalmic Solution 1 Drop(s) Both EYES at bedtime  pantoprazole    Tablet 40 milliGRAM(s) Oral before breakfast  propranolol 20 milliGRAM(s) Oral every 12 hours  senna 2 Tablet(s) Oral at bedtime  tamsulosin 0.4 milliGRAM(s) Oral at bedtime  traZODone 50 milliGRAM(s) Oral at bedtime    MEDICATIONS  (PRN):  acetaminophen   Tablet .. 650 milliGRAM(s) Oral every 6 hours PRN Moderate Pain (4 - 6)  morphine  - Injectable 2 milliGRAM(s) IV Push every 6 hours PRN Severe Pain (7 - 10)  ondansetron Injectable 4 milliGRAM(s) IV Push every 6 hours PRN Nausea      FAMILY HISTORY:  No pertinent family history in first degree relatives      Allergies    No Known Allergies    Intolerances        SOCIAL HISTORY:    [    ] Etoh  [    ] Smoking  [    ] Substance abuse     Home Environment:  [ x   ] Home Alone  [    ] Lives with Family  [    ] Home Health Aid    Dwelling:  [    ] Apartment  [    ] Private House  [    ] Adult Home  [   x ] Skilled Nursing Facility      [    ] Short Term  [x    ] Long Term  [    ] Stairs                           [    ] Elevator     FUNCTIONAL STATUS PTA: (Check all that apply)  Ambulation: [ x    ]Independent    [    ] Dependent     [    ] Non-Ambulatory  Assistive Device: [    ] SA Cane  [    ]  Q Cane  [ x   ] Walker  [x    ]  Wheelchair  ADL : [   x ] Independent  [    ]  Dependent       Vital Signs Last 24 Hrs  T(C): 35.7 (19 Oct 2018 12:00), Max: 36.6 (18 Oct 2018 16:00)  T(F): 96.3 (19 Oct 2018 12:00), Max: 97.9 (18 Oct 2018 20:00)  HR: 98 (19 Oct 2018 14:00) (80 - 114)  BP: 99/69 (19 Oct 2018 14:00) (94/68 - 145/75)  BP(mean): 77 (19 Oct 2018 14:00) (70 - 101)  RR: 31 (19 Oct 2018 14:00) (13 - 38)  SpO2: 97% (19 Oct 2018 14:00) (94% - 98%)      PHYSICAL EXAM: Alert & Oriented X3  GENERAL: NAD, well-groomed, well-developed  HEAD:  Atraumatic, Normocephalic  EYES: DiminishedVision R eye  NECK: Supple, No JVD, Normal thyroid  CHEST/LUNG: diminished BSbases  HEART: S1S2 +tachy  ABDOMEN: Soft, sl tender, sldistended; Bowel sounds present  EXTREMITIES:  2+ Peripheral Pulses, No clubbing, cyanosis, or edema    NERVOUS SYSTEM:  Cranial Nerves 2-12 intact [    ] 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  ROM: WFL all extremities [  x  ]  Abnormal [     ]  Motor Strength: WFL all extremities  [ x   ]  Abnormal [    ]  Sensation: intact to light touch [  x  ] Abnormal [    ]  Reflexes: Symmetric [    ]  Abnormal [    ]    FUNCTIONAL STATUS:  Bed Mobility: [   ]  Independent [    ]  Supervision [  x  ]  Needs Assistance [  ]  N/A  Transfers: [    ]  Independent [    ]  Supervision [  x  ]  Needs Assistance [    ]  N/A    Ambulation:  [    ]  Independent [    ]  Supervision [    ]  Needs Assistance [x    ]  N/A   ADL:  [    ]   Independent [  x  ] Requires Assistance [    ] N/A       LABS:                        11.2   6.91  )-----------( 333      ( 18 Oct 2018 23:47 )             34.4     10-18    136  |  95<L>  |  6<L>  ----------------------------<  120<H>  3.8   |  30  |  0.7    Ca    7.7<L>      18 Oct 2018 23:47  Phos  2.2     10-18  Mg     2.1     10-18    TPro  5.2<L>  /  Alb  3.0<L>  /  TBili  0.4  /  DBili  x   /  AST  13  /  ALT  13  /  AlkPhos  297<H>  10-18    PT/INR - ( 18 Oct 2018 23:47 )   PT: 13.70 sec;   INR: 1.19 ratio         PTT - ( 19 Oct 2018 11:59 )  PTT:59.2 sec      RADIOLOGY & ADDITIONAL STUDIES:

## 2018-10-19 NOTE — PROGRESS NOTE ADULT - REASON FOR ADMISSION
Instructions:    1. NO CHANGES TO MEDICATIONS TODAY    2. NEXT VISIT 7/19 AT 8 AM    I have read and agree with all of the above.   Lauren Del Angel MD worsening SOB

## 2018-10-19 NOTE — PROGRESS NOTE ADULT - SUBJECTIVE AND OBJECTIVE BOX
GENERAL SURGERY PROGRESS NOTE     AMRIK MADRID  58y  Male  Hospital day :28d  POD:  Procedure: Thoracoscopy  Chest tube insertion  Thoracentesis  Thoracentesis    OVERNIGHT EVENTS:  No acute events overnight. Patient remains tachycardic on diltiazem and propanolol. Has had burping and abdominal pain. Denies any bm, but has had flatus.   T(F): 97.9 (10-18-18 @ 20:00), Max: 99.1 (10-18-18 @ 08:00)  HR: 108 (10-19-18 @ 05:00) (96 - 122)  BP: 131/71 (10-19-18 @ 04:00) (90/59 - 145/75)  ABP: --  ABP(mean): --  RR: 23 (10-19-18 @ 05:00) (13 - 44)  SpO2: 95% (10-19-18 @ 05:00) (95% - 100%)    DIET/FLUIDS:   NG:                                                                                DRAINS:     BM:     EMESIS:     URINE:   10-17-18 @ 07:01  -  10-18-18 @ 07:00  --------------------------------------------------------  OUT: 1390 mL       GI proph:  pantoprazole    Tablet 40 milliGRAM(s) Oral before breakfast    AC/ proph: heparin  Infusion 1600 Unit(s)/Hr IV Continuous <Continuous>    ABx:     PHYSICAL EXAM:  GENERAL:. short interrupted speech, R eye blind with downward gaze.   CHEST/LUNG: 3 L NC  ABDOMEN: Soft, distended, NTTP;       LABS  Labs:  CAPILLARY BLOOD GLUCOSE                              11.2   6.91  )-----------( 333      ( 18 Oct 2018 23:47 )             34.4       Auto Neutrophil %: 69.9 % (10-18-18 @ 23:47)  Auto Immature Granulocyte %: 1.4 % (10-18-18 @ 23:47)    10-18    136  |  95<L>  |  6<L>  ----------------------------<  120<H>  3.8   |  30  |  0.7      Calcium, Total Serum: 7.7 mg/dL (10-18-18 @ 23:47)      LFTs:             5.2  | 0.4  | 13       ------------------[297     ( 18 Oct 2018 23:47 )  3.0  | x    | 13          Lipase:x      Amylase:x         Blood Gas Arterial, Lactate: 0.7 mmoL/L (10-18-18 @ 10:28)  Blood Gas Arterial, Lactate: 0.7 mmoL/L (10-18-18 @ 09:03)  Blood Gas Arterial, Lactate: 0.8 mmoL/L (10-18-18 @ 04:22)  Blood Gas Arterial, Lactate: 0.7 mmoL/L (10-18-18 @ 00:00)  Blood Gas Arterial, Lactate: 0.7 mmoL/L (10-17-18 @ 20:11)  Blood Gas Arterial, Lactate: 0.9 mmoL/L (10-17-18 @ 18:31)  Lactate, Blood: 0.7 mmol/L (10-16-18 @ 06:34)    ABG - ( 18 Oct 2018 10:28 )  pH: 7.42  /  pCO2: 43    /  pO2: 63    / HCO3: 28    / Base Excess: 3.4   /  SaO2: 93              ABG - ( 18 Oct 2018 09:03 )  pH: 7.41  /  pCO2: 44    /  pO2: 101   / HCO3: 28    / Base Excess: 2.4   /  SaO2: 99              ABG - ( 18 Oct 2018 04:22 )  pH: 7.45  /  pCO2: 39    /  pO2: 95    / HCO3: 27    / Base Excess: 3.0   /  SaO2: 99                Coags:     13.70  ----< 1.19    ( 18 Oct 2018 23:47 )     79.8        CARDIAC MARKERS ( 18 Oct 2018 12:03 )  x     / <0.01 ng/mL / 51 U/L / x     / 2.9 ng/mL  CARDIAC MARKERS ( 17 Oct 2018 17:41 )  x     / 0.06 ng/mL / 62 U/L / x     / 3.9 ng/mL        A/P    59 yo M with sigmoid mass s/p L TALC pleurodesis for recurrent pleural effusions    PLAN:   - IS   - Bowel regiment   - Hep continue at 16 mL/hr   - regular diet- return to npo if increased abd pain, n/v.    - upon d/c outpatient follow up with oncology/surgery

## 2018-10-19 NOTE — CHART NOTE - NSCHARTNOTEFT_GEN_A_CORE
Pt is still awaiting bed  PT/Rehab consult placed  on Heparin drip, therapeutic  primary team updated, Dr Arita

## 2018-10-19 NOTE — CONSULT NOTE ADULT - SUBJECTIVE AND OBJECTIVE BOX
Patient is a 58y old  Male who presents with a chief complaint of worsening SOB (19 Oct 2018 15:53)    HPI:  57 yo M with HTN, DLD, depression, DVT on eliquis, and recurrent pleural effusions s/p multiple thoracentesis presenting from [facility] for worsening SOB since discharge from [facility]. Pt denies any fever/ chills. He reports intermittent non productive cough. No chest pain.  Pt also c/o persistent diffuse abdominal pain along with distension, constipation and flatus.   Pt denies any other symptoms. Pt was found to have b/l pleural effusion s/p thoracentesis. Pt underwent VATS with pleural biopsy and talc pleurodesis. Pleural biopsy showed metastatic adenocarcinoma. Pt also underwent EGD/ colono which showed sigmoid colon mass. As per surgery team pt might undergo exploratory laparotomy, creation of transverse loop colostomy next week and cardiology consulted for preop clearance.     Pt states that since last year his functional status is poor ( walker/ wheelchair bound), but denies any chest pain/ dyspnea upon exertion. Denies any h/o heart disease.       ROS:  All other systems reviewed and are negative    PAST MEDICAL & SURGICAL HISTORY  Pleural effusion  CHF (congestive heart failure)  Insomnia  BPH (benign prostatic hyperplasia)  Depression  Bipolar 1 disorder  HTN (hypertension)  History of thoracentesis      FAMILY HISTORY:  FAMILY HISTORY:  No pertinent family history in first degree relatives      SOCIAL HISTORY:  []smoker-former smoker   [-]Alcohol  [-]Drug    ALLERGIES:  No Known Allergies      MEDICATIONS:  MEDICATIONS  (STANDING):  carbamide peroxide Otic Solution 5 Drop(s) Left Ear two times a day  chlorhexidine 4% Liquid 1 Application(s) Topical <User Schedule>  diltiazem    Tablet 60 milliGRAM(s) Oral every 8 hours  docusate sodium Liquid 100 milliGRAM(s) Oral three times a day  DULoxetine 60 milliGRAM(s) Oral daily  furosemide    Tablet 20 milliGRAM(s) Oral daily  heparin  Infusion 1600 Unit(s)/Hr (16 mL/Hr) IV Continuous <Continuous>  latanoprost 0.005% Ophthalmic Solution 1 Drop(s) Both EYES at bedtime  pantoprazole    Tablet 40 milliGRAM(s) Oral before breakfast  propranolol 20 milliGRAM(s) Oral every 12 hours  senna 2 Tablet(s) Oral at bedtime  tamsulosin 0.4 milliGRAM(s) Oral at bedtime  traZODone 50 milliGRAM(s) Oral at bedtime    MEDICATIONS  (PRN):  acetaminophen   Tablet .. 650 milliGRAM(s) Oral every 6 hours PRN Moderate Pain (4 - 6)  morphine  - Injectable 2 milliGRAM(s) IV Push every 6 hours PRN Severe Pain (7 - 10)  ondansetron Injectable 4 milliGRAM(s) IV Push every 6 hours PRN Nausea  zolpidem 5 milliGRAM(s) Oral at bedtime PRN Insomnia      HOME MEDICATIONS:  Home Medications:  Ambien 10 mg oral tablet: 1 tab(s) orally once a day (at bedtime) (21 Sep 2018 21:50)  apixaban 5 mg oral tablet: 1 tab(s) orally every 12 hours (21 Sep 2018 21:50)  Cardizem 60 mg oral tablet: 1 tab(s) orally every 8 hours (21 Sep 2018 21:50)  clotrimazole 1% topical cream: 1 application topically  (21 Sep 2018 21:50)  DULoxetine 60 mg oral delayed release capsule: 1 cap(s) orally once a day (21 Sep 2018 21:50)  isosorbide mononitrate 20 mg oral tablet: 1 tab(s) orally once a day (21 Sep 2018 21:50)  latanoprost 0.005% ophthalmic solution: 1 drop(s) to each affected eye once a day (in the evening) (21 Sep 2018 21:50)  Multiple Vitamins oral capsule: 1 cap(s) orally once a day (21 Sep 2018 21:50)  propranolol 20 mg oral tablet: 1 tab(s) orally 2 times a day (21 Sep 2018 21:50)  tamsulosin 0.4 mg oral capsule: 1 cap(s) orally once a day (21 Sep 2018 21:50)  traZODone 50 mg oral tablet: 1 tab(s) orally once a day (at bedtime) (21 Sep 2018 21:50)      VITALS:   T(F): 97.3 (10-19 @ 16:56), Max: 99.3 (10-17 @ 22:00)  HR: 101 (10-19 @ 16:56) (80 - 122)  BP: 125/68 (10-19 @ 16:56) (81/60 - 188/119)  BP(mean): 85 (10-19 @ 15:00) (61 - 102)  RR: 40 (10-19 @ 16:56) (13 - 44)  SpO2: 96% (10-19 @ 15:00) (86% - 100%)    I&O's Summary    18 Oct 2018 07:01  -  19 Oct 2018 07:00  --------------------------------------------------------  IN: 1338 mL / OUT: 1765 mL / NET: -427 mL    19 Oct 2018 07:01  -  19 Oct 2018 23:32  --------------------------------------------------------  IN: 472 mL / OUT: 2300 mL / NET: -1828 mL        PHYSICAL EXAM:  GEN:  No acute distress  HEENT: no pallor  NECK: Supple, no JVD  LUNGS: decreased breath sounds at b/l bases  CARDIOVASCULAR: S1/S2 regular, no murmurs or rubs  ABD: Soft, BS+  EXT: No Lower extremity edema/cyanosis  NEURO: AAOx3  SKIN: Intact    LABS:                        11.2   6.91  )-----------( 333      ( 18 Oct 2018 23:47 )             34.4     10-18    136  |  95<L>  |  6<L>  ----------------------------<  120<H>  3.8   |  30  |  0.7    Ca    7.7<L>      18 Oct 2018 23:47  Phos  2.2     10-18  Mg     2.1     10-18    TPro  5.2<L>  /  Alb  3.0<L>  /  TBili  0.4  /  DBili  x   /  AST  13  /  ALT  13  /  AlkPhos  297<H>  10-18    PT/INR - ( 18 Oct 2018 23:47 )   PT: 13.70 sec;   INR: 1.19 ratio         PTT - ( 19 Oct 2018 11:59 )  PTT:59.2 sec    CARDIAC MARKERS ( 18 Oct 2018 12:03 )  x     / <0.01 ng/mL / 51 U/L / x     / 2.9 ng/mL        Troponin trend:        Hemoglobin A1C   Thyroid      RADIOLOGY:  -CXR:  < from: Xray Chest 1 View- PORTABLE-Urgent (10.19.18 @ 12:52) >    Impression:      Stable bilateral pulmonary consolidations and pleural effusions.    < end of copied text >    -CT:  < from: CT Abdomen and Pelvis No Cont (10.16.18 @ 01:11) >    IMPRESSION:    1.  No evidence of sigmoid volvulus. Air-filled distended colon without   any definite transition. Mesenteric inflammatory changes around the   sigmoid colon and cecum, concerning for colitis.  2.  Rectal tube in place.  3.  Stable bilateral loculated pleural effusions and pericardial effusion.    < end of copied text >    -TTE:  < from: Transthoracic Echocardiogram (09.27.18 @ 15:13) >  Summary:   1. Left ventricular ejection fraction, by visual estimation, is 50 to   55%.   2. Normal left ventricular size and wall thicknesses, with normal   systolic function.   3. Spectral Doppler shows impaired relaxation pattern of left   ventricular myocardial filling (Grade I diastolic dysfunction).   4. Moderate pleural effusion in both left and right lateral regions.   5. Mild tricuspid regurgitation.   6. There is mild aortic root calcification.    < end of copied text >    ECG:  NSR Patient is a 58y old  Male who presents with a chief complaint of worsening SOB (19 Oct 2018 15:53)    HPI:  57 yo M with HTN, DLD, depression, DVT on eliquis, and recurrent pleural effusions s/p multiple thoracentesis presenting from [facility] for worsening SOB since discharge from [facility]. Pt denies any fever/ chills. He reports intermittent non productive cough. No chest pain.  Pt also c/o persistent diffuse abdominal pain along with distension, constipation and flatus.   Pt denies any other symptoms. Pt was found to have b/l pleural effusion s/p thoracentesis. Pt underwent VATS with pleural biopsy and talc pleurodesis. Pleural biopsy showed metastatic adenocarcinoma. Pt also underwent EGD/ colono which showed sigmoid colon mass. As per surgery team pt might undergo exploratory laparotomy, creation of transverse loop colostomy next week and cardiology consulted for preop clearance.     Pt states that since last year his functional status is poor ( walker/ wheelchair bound), but denies any chest pain/ dyspnea upon exertion. Denies any h/o heart disease.       ROS:  All other systems reviewed and are negative    PAST MEDICAL & SURGICAL HISTORY  Pleural effusion  CHF (congestive heart failure)  Insomnia  BPH (benign prostatic hyperplasia)  Depression  Bipolar 1 disorder  HTN (hypertension)  History of thoracentesis      FAMILY HISTORY:  FAMILY HISTORY:  No pertinent family history in first degree relatives      SOCIAL HISTORY:  []smoker-former smoker   [-]Alcohol  [-]Drug    ALLERGIES:  No Known Allergies      MEDICATIONS:  MEDICATIONS  (STANDING):  carbamide peroxide Otic Solution 5 Drop(s) Left Ear two times a day  chlorhexidine 4% Liquid 1 Application(s) Topical <User Schedule>  diltiazem    Tablet 60 milliGRAM(s) Oral every 8 hours  docusate sodium Liquid 100 milliGRAM(s) Oral three times a day  DULoxetine 60 milliGRAM(s) Oral daily  furosemide    Tablet 20 milliGRAM(s) Oral daily  heparin  Infusion 1600 Unit(s)/Hr (16 mL/Hr) IV Continuous <Continuous>  latanoprost 0.005% Ophthalmic Solution 1 Drop(s) Both EYES at bedtime  pantoprazole    Tablet 40 milliGRAM(s) Oral before breakfast  propranolol 20 milliGRAM(s) Oral every 12 hours  senna 2 Tablet(s) Oral at bedtime  tamsulosin 0.4 milliGRAM(s) Oral at bedtime  traZODone 50 milliGRAM(s) Oral at bedtime    MEDICATIONS  (PRN):  acetaminophen   Tablet .. 650 milliGRAM(s) Oral every 6 hours PRN Moderate Pain (4 - 6)  morphine  - Injectable 2 milliGRAM(s) IV Push every 6 hours PRN Severe Pain (7 - 10)  ondansetron Injectable 4 milliGRAM(s) IV Push every 6 hours PRN Nausea  zolpidem 5 milliGRAM(s) Oral at bedtime PRN Insomnia      HOME MEDICATIONS:  Home Medications:  Ambien 10 mg oral tablet: 1 tab(s) orally once a day (at bedtime) (21 Sep 2018 21:50)  apixaban 5 mg oral tablet: 1 tab(s) orally every 12 hours (21 Sep 2018 21:50)  Cardizem 60 mg oral tablet: 1 tab(s) orally every 8 hours (21 Sep 2018 21:50)  clotrimazole 1% topical cream: 1 application topically  (21 Sep 2018 21:50)  DULoxetine 60 mg oral delayed release capsule: 1 cap(s) orally once a day (21 Sep 2018 21:50)  isosorbide mononitrate 20 mg oral tablet: 1 tab(s) orally once a day (21 Sep 2018 21:50)  latanoprost 0.005% ophthalmic solution: 1 drop(s) to each affected eye once a day (in the evening) (21 Sep 2018 21:50)  Multiple Vitamins oral capsule: 1 cap(s) orally once a day (21 Sep 2018 21:50)  propranolol 20 mg oral tablet: 1 tab(s) orally 2 times a day (21 Sep 2018 21:50)  tamsulosin 0.4 mg oral capsule: 1 cap(s) orally once a day (21 Sep 2018 21:50)  traZODone 50 mg oral tablet: 1 tab(s) orally once a day (at bedtime) (21 Sep 2018 21:50)      VITALS:   T(F): 97.3 (10-19 @ 16:56), Max: 99.3 (10-17 @ 22:00)  HR: 101 (10-19 @ 16:56) (80 - 122)  BP: 125/68 (10-19 @ 16:56) (81/60 - 188/119)  BP(mean): 85 (10-19 @ 15:00) (61 - 102)  RR: 40 (10-19 @ 16:56) (13 - 44)  SpO2: 96% (10-19 @ 15:00) (86% - 100%)    I&O's Summary    18 Oct 2018 07:01  -  19 Oct 2018 07:00  --------------------------------------------------------  IN: 1338 mL / OUT: 1765 mL / NET: -427 mL    19 Oct 2018 07:01  -  19 Oct 2018 23:32  --------------------------------------------------------  IN: 472 mL / OUT: 2300 mL / NET: -1828 mL        PHYSICAL EXAM:  GEN:  No acute distress  HEENT: no pallor  NECK: Supple, no JVD  LUNGS: decreased breath sounds at b/l bases  CARDIOVASCULAR: S1/S2 regular, no murmurs or rubs  ABD: Soft, BS+  EXT: No Lower extremity edema/cyanosis  NEURO: non-focal  Psych: AAOX3  SKIN: Intact    LABS:                        11.2   6.91  )-----------( 333      ( 18 Oct 2018 23:47 )             34.4     10-18    136  |  95<L>  |  6<L>  ----------------------------<  120<H>  3.8   |  30  |  0.7    Ca    7.7<L>      18 Oct 2018 23:47  Phos  2.2     10-18  Mg     2.1     10-18    TPro  5.2<L>  /  Alb  3.0<L>  /  TBili  0.4  /  DBili  x   /  AST  13  /  ALT  13  /  AlkPhos  297<H>  10-18    PT/INR - ( 18 Oct 2018 23:47 )   PT: 13.70 sec;   INR: 1.19 ratio         PTT - ( 19 Oct 2018 11:59 )  PTT:59.2 sec    CARDIAC MARKERS ( 18 Oct 2018 12:03 )  x     / <0.01 ng/mL / 51 U/L / x     / 2.9 ng/mL        Troponin trend:        Hemoglobin A1C   Thyroid      RADIOLOGY:  -CXR:  < from: Xray Chest 1 View- PORTABLE-Urgent (10.19.18 @ 12:52) >    Impression:      Stable bilateral pulmonary consolidations and pleural effusions.    < end of copied text >    -CT:  < from: CT Abdomen and Pelvis No Cont (10.16.18 @ 01:11) >    IMPRESSION:    1.  No evidence of sigmoid volvulus. Air-filled distended colon without   any definite transition. Mesenteric inflammatory changes around the   sigmoid colon and cecum, concerning for colitis.  2.  Rectal tube in place.  3.  Stable bilateral loculated pleural effusions and pericardial effusion.    < end of copied text >    -TTE:  < from: Transthoracic Echocardiogram (09.27.18 @ 15:13) >  Summary:   1. Left ventricular ejection fraction, by visual estimation, is 50 to   55%.   2. Normal left ventricular size and wall thicknesses, with normal   systolic function.   3. Spectral Doppler shows impaired relaxation pattern of left   ventricular myocardial filling (Grade I diastolic dysfunction).   4. Moderate pleural effusion in both left and right lateral regions.   5. Mild tricuspid regurgitation.   6. There is mild aortic root calcification.    < end of copied text >    ECG:  NSR

## 2018-10-19 NOTE — CONSULT NOTE ADULT - ASSESSMENT
IMPRESSION: Rehab of Debilitation met colon CA    PRECAUTIONS: [  x  ] Cardiac  [x    ] Respiratory  [    ] Seizures [    ] Contact Isolation  [    ] Droplet Isolation  [    ] Other    Weight Bearing Status:     RECOMMENDATION:    Out of Bed to Chair     DVT/Decubiti Prophylaxis    REHAB PLAN:     [x     ] Bedside P/T 3-5 times a week   [     ] Bedside O/T  2-3 times a week   [     ] No Rehab Therapy Indicated   [     ]  Speech Therapy   Conditioning/ROM                                 ADL  Bed Mobility                                            Conditioning/ROM  Transfers                                                  Bed Mobility  Sitting /Standing Balance                      Transfers                                        Gait Training                                            Sitting/Standing Balance  Stair Training [   ]Applicable                 Home equipment Eval                                                                     Splinting  [   ] Only      GOALS:   ADL   [ x   ]   Independent         Transfers  [  x  ] Independent            Ambulation  [  x   ] Independent     [ x    ] With device                            [    ]  CG                                               [    ]  CG                                                    [     ] CG                            [    ] Min A                                          [    ] Min A                                                [     ] Min  A          DISCHARGE PLAN:   [     ]  Good candidate for Intensive Rehabilitation/Hospital based                                             Will tolerate 3hrs Intensive Rehab Daily                                       [   x   ]   [facility]                                       [      ]  Home with Outpatient or  services                                         [      ]  Possible Candidate for Intensive Hospital based Rehab

## 2018-10-19 NOTE — PROGRESS NOTE ADULT - ASSESSMENT
Neurologic: Extubated 10/18, AAOX4. 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 tylenol/morphine prn. duloxetine qd.    Respiratory: tolerated extubation well, no respiratory events.    Cardiovascular: Hx of HTN, CHF (50-55%), on Lasix 40 IV daily.  Post-op received Lasix 40 IV, responded well, however dropped SBP to 80s, gave 250cc bolus x 1 overnight 10/17. normotensive during day and overnight 10/18  - Baseline tachycardiac Cardizem 60mg q8h and propanolol 20mg q12h (home medication).  HOLD diuretics and anti-hypertensive meds for now.    Gastrointestinal/Nutrition: diet, soft. Patient c/o abd discomfort. abd soft nt, mild distension. Continue Bowel regimen.     Renal/Genitourinary: No Velásquez monitor electrolytes replete as needed. c/w flomax qhs    Hematologic: Hx of DVT, Resume heparin  Infusion@ 1600  procedure.  Keep PTT therapeutic.  - Venous duplex shows: rt femoral vein DVT and lt femoral, popliteal and gastrocnemius veins DVT    Infectious Disease: No Acute Dx, Afebrile    Endocrine: No Acute Dx, FS q6h.    Lines/Tubes: PIV.    Disposition: downgraded. Neurologic: Extubated 10/18, AAOX4. 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 tylenol/morphine prn. duloxetine qd.    Respiratory: tolerated extubation well, no respiratory events.     Cardiovascular: Hx of HTN, CHF (50-55%), on Lasix 40 IV daily.  Post-op received Lasix 40 IV, responded well, however dropped SBP to 80s, gave 250cc bolus x 1 overnight 10/17. normotensive during day and overnight 10/18  - Baseline tachycardiac Cardizem 60mg q8h and propanolol 20mg q12h (home medication).  restarted lasix     Gastrointestinal/Nutrition: diet, soft. Patient c/o abd discomfort. abd soft nt, mild distension. Continue Bowel regimen.     Renal/Genitourinary: No Velásquez monitor electrolytes replete as needed. c/w flomax qhs    Hematologic: Hx of DVT, Resume heparin  Infusion@ 1600  procedure.  Keep PTT therapeutic.  - Venous duplex shows: rt femoral vein DVT and lt femoral, popliteal and gastrocnemius veins DVT    Infectious Disease: No Acute Dx, Afebrile    Endocrine: No Acute Dx, FS q6h.    Lines/Tubes: PIV.    Disposition: downgraded.

## 2018-10-19 NOTE — PROGRESS NOTE ADULT - SUBJECTIVE AND OBJECTIVE BOX
AMRIK MADRID  [id number]    Indication for ICU admission: respiratory failure, inability to extubate s/p colonoscopy with general anesthesia  Admit Date:09-21-18  ICU Date: 10-17-18  OR Date: 10-10-18, colonoscopy 10/17    No Known Allergies    PAST MEDICAL & SURGICAL HISTORY:  r/o malignancy  Pleural effusion  CHF (congestive heart failure)  Insomnia  BPH (benign prostatic hyperplasia)  Depression  Bipolar 1 disorder  HTN (hypertension)  History of thoracentesis    Home Medications:  Ambien 10 mg oral tablet: 1 tab(s) orally once a day (at bedtime) (21 Sep 2018 21:50)  apixaban 5 mg oral tablet: 1 tab(s) orally every 12 hours (21 Sep 2018 21:50)  Cardizem 60 mg oral tablet: 1 tab(s) orally every 8 hours (21 Sep 2018 21:50)  clotrimazole 1% topical cream: 1 application topically  (21 Sep 2018 21:50)  DULoxetine 60 mg oral delayed release capsule: 1 cap(s) orally once a day (21 Sep 2018 21:50)  isosorbide mononitrate 20 mg oral tablet: 1 tab(s) orally once a day (21 Sep 2018 21:50)  latanoprost 0.005% ophthalmic solution: 1 drop(s) to each affected eye once a day (in the evening) (21 Sep 2018 21:50)  Multiple Vitamins oral capsule: 1 cap(s) orally once a day (21 Sep 2018 21:50)  propranolol 20 mg oral tablet: 1 tab(s) orally 2 times a day (21 Sep 2018 21:50)  tamsulosin 0.4 mg oral capsule: 1 cap(s) orally once a day (21 Sep 2018 21:50)  traZODone 50 mg oral tablet: 1 tab(s) orally once a day (at bedtime) (21 Sep 2018 21:50)    HPI:  57 y/o M with PMH of HTN, known DVT (on Heparin GTT), BPH, Depression, s/p pigtail insertion at [facility] 3 days ptp, presented to [facility] 9/21 with SOB- VATS 10/10 for talc pleurodesis.  CT since removed.  During VATS leland was found to have multiple implants, suspicious for carcinoma.  W/U for cancer included a CT scan [facility] showed thickening of Ascending Colon   Colonoscopy/EGD completed 10/17 under general anesthesia due to poor respiratory status. Patient was induced with propofol/etomidate/sucx, and underwent the procedure without incident with findings of a possible invasive CA.  Post procedure anesthesia was unable to extubate the patient due to low Tidal Volumes around 100. per anesthesia patient was adequately reversed.   On 10/18 patient was successfully weaned and extubated.     24H EVENTS:     Neurologic: Extubated 10/18, AAOX4. 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 tylenol/morphine prn. duloxetine qd.  Respiratory: tolerated extubation well, no respiratory events.  Cardiovascular: Hx of HTN, CHF (50-55%), on Lasix 40 IV daily.  Post-op received Lasix 40 IV, responded well, however dropped SBP to 80s, gave 250cc bolus x 1 overnight 10/17. normotensive during day and overnight 10/18  - Baseline tachycardiac Cardizem 60mg q8h and propanolol 20mg q12h (home medication).  HOLD diuretics and anti-hypertensive meds for now.  Gastrointestinal/Nutrition: diet, soft. Patient c/o abd discomfort. abd soft nt, mild distension. Continue Bowel regimen.   Renal/Genitourinary: No Hamilton monitor electrolytes replete as needed. c/w flomax qhs  Hematologic: Hx of DVT, Resume heparin  Infusion@ 1600  procedure.  Keep PTT therapeutic.  - Venous duplex shows: rt femoral vein DVT and lt femoral, popliteal and gastrocnemius veins DVT  Infectious Disease: No Acute Dx, Afebrile  Endocrine: No Acute Dx, FS q6h.  Lines/Tubes: PIV.  Disposition: downgraded.       CODE STATUS-  DNR/DNI  resumed post op    Lines/Tubes: PIV, ET tube, hamilton      DVT PTX: heparin gtt    GI PTX: PPI    ***Tubes/Lines/Drains  ***  Peripheral IV  OG tube, ET tube 	Date Placed: 10/17/18  Urinary Catheter	 [X]	Indication: Strict I&O    Date Placed: 10/17/18      REVIEW OF SYSTEMS    [x ] A ten-point review of systems was otherwise negative except as noted.    dispo- written T/O [facility] AMRIK MADRID  [id number]    Indication for ICU admission: respiratory failure, inability to extubate s/p colonoscopy with general anesthesia  Admit Date:09-21-18  ICU Date: 10-17-18  OR Date: 10-10-18, colonoscopy 10/17    No Known Allergies    PAST MEDICAL & SURGICAL HISTORY:  r/o malignancy  Pleural effusion  CHF (congestive heart failure)  Insomnia  BPH (benign prostatic hyperplasia)  Depression  Bipolar 1 disorder  HTN (hypertension)  History of thoracentesis    Home Medications:  Ambien 10 mg oral tablet: 1 tab(s) orally once a day (at bedtime) (21 Sep 2018 21:50)  apixaban 5 mg oral tablet: 1 tab(s) orally every 12 hours (21 Sep 2018 21:50)  Cardizem 60 mg oral tablet: 1 tab(s) orally every 8 hours (21 Sep 2018 21:50)  clotrimazole 1% topical cream: 1 application topically  (21 Sep 2018 21:50)  DULoxetine 60 mg oral delayed release capsule: 1 cap(s) orally once a day (21 Sep 2018 21:50)  isosorbide mononitrate 20 mg oral tablet: 1 tab(s) orally once a day (21 Sep 2018 21:50)  latanoprost 0.005% ophthalmic solution: 1 drop(s) to each affected eye once a day (in the evening) (21 Sep 2018 21:50)  Multiple Vitamins oral capsule: 1 cap(s) orally once a day (21 Sep 2018 21:50)  propranolol 20 mg oral tablet: 1 tab(s) orally 2 times a day (21 Sep 2018 21:50)  tamsulosin 0.4 mg oral capsule: 1 cap(s) orally once a day (21 Sep 2018 21:50)  traZODone 50 mg oral tablet: 1 tab(s) orally once a day (at bedtime) (21 Sep 2018 21:50)    HPI:  57 y/o M with PMH of HTN, known DVT (on Heparin GTT), BPH, Depression, s/p pigtail insertion at [facility] 3 days ptp, presented to [facility] 9/21 with SOB- VATS 10/10 for talc pleurodesis.  CT since removed.  During VATS leland was found to have multiple implants, suspicious for carcinoma.  W/U for cancer included a CT scan [facility] showed thickening of Ascending Colon   Colonoscopy/EGD completed 10/17 under general anesthesia due to poor respiratory status. Patient was induced with propofol/etomidate/sucx, and underwent the procedure without incident with findings of a possible invasive CA.  Post procedure anesthesia was unable to extubate the patient due to low Tidal Volumes around 100. per anesthesia patient was adequately reversed.   On 10/18 patient was successfully weaned and extubated.     24H EVENTS:     Neurologic: Extubated 10/18, AAOX4. 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 tylenol/morphine prn. duloxetine qd.  Respiratory: tolerated extubation well, no respiratory events.  Cardiovascular: Hx of HTN, CHF (50-55%), on Lasix 40 IV daily.  Post-op received Lasix 40 IV, responded well, however dropped SBP to 80s, gave 250cc bolus x 1 overnight 10/17. normotensive during day and overnight 10/18  - Baseline tachycardiac Cardizem 60mg q8h and propanolol 20mg q12h (home medication).  HOLD diuretics and anti-hypertensive meds for now.  Gastrointestinal/Nutrition: diet, soft. Patient c/o abd discomfort. abd soft nt, mild distension. Continue Bowel regimen.   Renal/Genitourinary: No Hamilton, passed TOV. monitor electrolytes replete as needed. c/w flomax qhs  Hematologic: Hx of DVT, Resume heparin  Infusion@ 1600  procedure.  Keep PTT therapeutic.  - Venous duplex shows: rt femoral vein DVT and lt femoral, popliteal and gastrocnemius veins DVT  Infectious Disease: No Acute Dx, Afebrile  Endocrine: No Acute Dx, FS q6h.  Lines/Tubes: PIV.  Disposition: downgraded.       CODE STATUS-  DNR/DNI  resumed post op    Lines/Tubes: PIV, ET tube, hamilton      DVT PTX: heparin gtt    GI PTX: PPI    ***Tubes/Lines/Drains  ***  Peripheral IV  OG tube, ET tube 	Date Placed: 10/17/18  Urinary Catheter	 [X]	Indication: Strict I&O    Date Placed: 10/17/18      REVIEW OF SYSTEMS    [x ] A ten-point review of systems was otherwise negative except as noted.    dispo- written T/O [facility]      Daily     Daily     Diet, Soft (10-18-18 @ 17:05)      CURRENT MEDS:  Neurologic Medications  acetaminophen   Tablet .. 650 milliGRAM(s) Oral every 6 hours PRN Moderate Pain (4 - 6)  DULoxetine 60 milliGRAM(s) Oral daily  morphine  - Injectable 2 milliGRAM(s) IV Push every 6 hours PRN Severe Pain (7 - 10)  ondansetron Injectable 4 milliGRAM(s) IV Push every 6 hours PRN Nausea    Cardiovascular Medications  diltiazem    Tablet 60 milliGRAM(s) Oral every 8 hours  furosemide    Tablet 20 milliGRAM(s) Oral daily  propranolol 20 milliGRAM(s) Oral every 12 hours  tamsulosin 0.4 milliGRAM(s) Oral at bedtime    Gastrointestinal Medications  docusate sodium Liquid 100 milliGRAM(s) Oral three times a day  pantoprazole    Tablet 40 milliGRAM(s) Oral before breakfast  senna 2 Tablet(s) Oral at bedtime    Hematologic/Oncologic Medications  heparin  Infusion 1600 Unit(s)/Hr IV Continuous <Continuous>    Topical/Other Medications  carbamide peroxide Otic Solution 5 Drop(s) Left Ear two times a day  chlorhexidine 4% Liquid 1 Application(s) Topical <User Schedule>  latanoprost 0.005% Ophthalmic Solution 1 Drop(s) Both EYES at bedtime      ICU Vital Signs Last 24 Hrs  T(C): 36.6 (18 Oct 2018 20:00), Max: 37.3 (18 Oct 2018 08:00)  T(F): 97.9 (18 Oct 2018 20:00), Max: 99.1 (18 Oct 2018 08:00)  HR: 92 (19 Oct 2018 06:00) (92 - 120)  BP: 115/79 (19 Oct 2018 06:00) (99/62 - 145/75)  BP(mean): 90 (19 Oct 2018 06:00) (74 - 102)  RR: 25 (19 Oct 2018 06:00) (13 - 44)  SpO2: 97% (19 Oct 2018 06:00) (95% - 99%)    Mode: CPAP with PS  FiO2: 40  PEEP: 5  PS: 5    ABG - ( 18 Oct 2018 10:28 )  pH, Arterial: 7.42  pH, Blood: x     /  pCO2: 43    /  pO2: 63    / HCO3: 28    / Base Excess: 3.4   /  SaO2: 93                  I&O's Summary    17 Oct 2018 07:01  -  18 Oct 2018 07:00  --------------------------------------------------------  IN: 314.4 mL / OUT: 1790 mL / NET: -1475.6 mL    18 Oct 2018 07:01  -  19 Oct 2018 06:46  --------------------------------------------------------  IN: 1338 mL / OUT: 1440 mL / NET: -102 mL      I&O's Detail    17 Oct 2018 07:01  -  18 Oct 2018 07:00  --------------------------------------------------------  IN:    dexmedetomidine Infusion: 28.4 mL    Enteral Tube Flush: 30 mL    heparin Infusion: 176 mL    lactated ringers.: 80 mL  Total IN: 314.4 mL    OUT:    Indwelling Catheter - Urethral: 1390 mL    Voided: 400 mL  Total OUT: 1790 mL    Total NET: -1475.6 mL      18 Oct 2018 07:01  -  19 Oct 2018 06:46  --------------------------------------------------------  IN:    heparin Infusion: 368 mL    IV PiggyBack: 500 mL    Oral Fluid: 470 mL  Total IN: 1338 mL    OUT:    Indwelling Catheter - Urethral: 290 mL    Voided: 1150 mL  Total OUT: 1440 mL    Total NET: -102 mL          PHYSICAL EXAM:    General/Neuro  GCS:     = 4  / 5   / 6      Deficits:  alert & oriented, no focal deficits  Pupils: Patient left eye cannot cross midline; blind in left eye for 20 years    Lungs:      extubated 10/18. sating well RA, no increased wob or tachypnea. lungs CTAB    Cardiovascular : S1, S2.  Regular rate and rhythm.    Cardiac Rhythm: Normal Sinus Rhythm    GI: Abdomen soft, mild distension and diffuse discomfort    Extremities: Extremities warm, pink, well-perfused. 2 peripheral IVs in place  Derm: Good skin turgor, no skin breakdown.      :     no hamilton          LABS:                        11.2 (stable)  6.91  )-----------( 333      ( 18 Oct 2018 23:47 )             34.4       10-18    136  |  95<L>  |  6<L>  ----------------------------<  120<H>  3.8   |  30  |  0.7    Ca    7.7<L>      18 Oct 2018 23:47  Phos  2.2     10-18  Mg     2.1     10-18    TPro  5.2<L>  /  Alb  3.0<L>  /  TBili  0.4  /  DBili  x   /  AST  13  /  ALT  13  /  AlkPhos  297<H>  10-18      PT/INR - ( 18 Oct 2018 23:47 )   PT: 13.70 sec;   INR: 1.19 ratio         PTT - ( 18 Oct 2018 23:47 )  PTT:79.8 sec  CARDIAC MARKERS ( 18 Oct 2018 12:03 )  x     / <0.01 ng/mL / 51 U/L / x     / 2.9 ng/mL  CARDIAC MARKERS ( 17 Oct 2018 17:41 )  x     / 0.06 ng/mL / 62 U/L / x     / 3.9 ng/mL AMRIK MADRID  [id number]    Indication for ICU admission: respiratory failure, inability to extubate s/p colonoscopy with general anesthesia  Admit Date:09-21-18  ICU Date: 10-17-18  OR Date: 10-10-18, colonoscopy 10/17    No Known Allergies    PAST MEDICAL & SURGICAL HISTORY:  r/o malignancy  Pleural effusion  CHF (congestive heart failure)  Insomnia  BPH (benign prostatic hyperplasia)  Depression  Bipolar 1 disorder  HTN (hypertension)  History of thoracentesis    Home Medications:  Ambien 10 mg oral tablet: 1 tab(s) orally once a day (at bedtime) (21 Sep 2018 21:50)  apixaban 5 mg oral tablet: 1 tab(s) orally every 12 hours (21 Sep 2018 21:50)  Cardizem 60 mg oral tablet: 1 tab(s) orally every 8 hours (21 Sep 2018 21:50)  clotrimazole 1% topical cream: 1 application topically  (21 Sep 2018 21:50)  DULoxetine 60 mg oral delayed release capsule: 1 cap(s) orally once a day (21 Sep 2018 21:50)  isosorbide mononitrate 20 mg oral tablet: 1 tab(s) orally once a day (21 Sep 2018 21:50)  latanoprost 0.005% ophthalmic solution: 1 drop(s) to each affected eye once a day (in the evening) (21 Sep 2018 21:50)  Multiple Vitamins oral capsule: 1 cap(s) orally once a day (21 Sep 2018 21:50)  propranolol 20 mg oral tablet: 1 tab(s) orally 2 times a day (21 Sep 2018 21:50)  tamsulosin 0.4 mg oral capsule: 1 cap(s) orally once a day (21 Sep 2018 21:50)  traZODone 50 mg oral tablet: 1 tab(s) orally once a day (at bedtime) (21 Sep 2018 21:50)    HPI:  57 y/o M with PMH of HTN, known DVT (on Heparin GTT), BPH, Depression, s/p pigtail insertion at [facility] 3 days ptp, presented to [facility] 9/21 with SOB- VATS 10/10 for talc pleurodesis.  CT since removed.  During VATS leland was found to have multiple implants, suspicious for carcinoma.  W/U for cancer included a CT scan [facility] showed thickening of Ascending Colon   Colonoscopy/EGD completed 10/17 under general anesthesia due to poor respiratory status. Patient was induced with propofol/etomidate/sucx, and underwent the procedure without incident with findings of a possible invasive CA.  Post procedure anesthesia was unable to extubate the patient due to low Tidal Volumes around 100. per anesthesia patient was adequately reversed.   On 10/18 patient was successfully weaned and extubated.     24H EVENTS:     Neurologic: Extubated 10/18, AAOX4. 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 tylenol/morphine prn. duloxetine qd.  Respiratory: tolerated extubation well, no respiratory events.  Cardiovascular: Hx of HTN, CHF (50-55%), on Lasix 40 IV daily.  Post-op received Lasix 40 IV, responded well, however dropped SBP to 80s, gave 250cc bolus x 1 overnight 10/17. normotensive during day and overnight 10/18  - Tachycardia improved 10/19 am s/p propranolol home medication (restarted, held yesterday). c/w Cardizem 60mg q8h and propanolol 20mg q12h.  HOLD diuretics and anti-hypertensive meds for now for soft BP.  Gastrointestinal/Nutrition: diet, soft. Patient c/o abd discomfort. abd soft nt, mild distension. Continue Bowel regimen.   Renal/Genitourinary: No Hamilton, passed TOV. monitor electrolytes replete as needed. c/w flomax qhs  Hematologic: Hx of DVT, Resume heparin  Infusion@ 1600. PTT 79 on midnight labs 10/18.  procedure.  Keep PTT therapeutic.  - Venous duplex shows: rt femoral vein DVT and lt femoral, popliteal and gastrocnemius veins DVT  Infectious Disease: No Acute Dx, Afebrile  Endocrine: No Acute Dx, FS q6h.  Lines/Tubes: PIV.  Disposition: downgraded.       CODE STATUS-  DNR/DNI  resumed post op    Lines/Tubes: PIV, ET tube, hamilton      DVT PTX: heparin gtt    GI PTX: PPI    ***Tubes/Lines/Drains  ***  Peripheral IV  OG tube, ET tube 	Date Placed: 10/17/18  Urinary Catheter	 [X]	Indication: Strict I&O    Date Placed: 10/17/18      REVIEW OF SYSTEMS    [x ] A ten-point review of systems was otherwise negative except as noted.    dispo- written T/O [facility]      Daily     Daily     Diet, Soft (10-18-18 @ 17:05)      CURRENT MEDS:  Neurologic Medications  acetaminophen   Tablet .. 650 milliGRAM(s) Oral every 6 hours PRN Moderate Pain (4 - 6)  DULoxetine 60 milliGRAM(s) Oral daily  morphine  - Injectable 2 milliGRAM(s) IV Push every 6 hours PRN Severe Pain (7 - 10)  ondansetron Injectable 4 milliGRAM(s) IV Push every 6 hours PRN Nausea    Cardiovascular Medications  diltiazem    Tablet 60 milliGRAM(s) Oral every 8 hours  furosemide    Tablet 20 milliGRAM(s) Oral daily  propranolol 20 milliGRAM(s) Oral every 12 hours  tamsulosin 0.4 milliGRAM(s) Oral at bedtime    Gastrointestinal Medications  docusate sodium Liquid 100 milliGRAM(s) Oral three times a day  pantoprazole    Tablet 40 milliGRAM(s) Oral before breakfast  senna 2 Tablet(s) Oral at bedtime    Hematologic/Oncologic Medications  heparin  Infusion 1600 Unit(s)/Hr IV Continuous <Continuous>    Topical/Other Medications  carbamide peroxide Otic Solution 5 Drop(s) Left Ear two times a day  chlorhexidine 4% Liquid 1 Application(s) Topical <User Schedule>  latanoprost 0.005% Ophthalmic Solution 1 Drop(s) Both EYES at bedtime      ICU Vital Signs Last 24 Hrs  T(C): 36.6 (18 Oct 2018 20:00), Max: 37.3 (18 Oct 2018 08:00)  T(F): 97.9 (18 Oct 2018 20:00), Max: 99.1 (18 Oct 2018 08:00)  HR: 92 (19 Oct 2018 06:00) (92 - 120)  BP: 115/79 (19 Oct 2018 06:00) (99/62 - 145/75)  BP(mean): 90 (19 Oct 2018 06:00) (74 - 102)  RR: 25 (19 Oct 2018 06:00) (13 - 44)  SpO2: 97% (19 Oct 2018 06:00) (95% - 99%)    Mode: CPAP with PS  FiO2: 40  PEEP: 5  PS: 5    ABG - ( 18 Oct 2018 10:28 )  pH, Arterial: 7.42  pH, Blood: x     /  pCO2: 43    /  pO2: 63    / HCO3: 28    / Base Excess: 3.4   /  SaO2: 93                  I&O's Summary    17 Oct 2018 07:01  -  18 Oct 2018 07:00  --------------------------------------------------------  IN: 314.4 mL / OUT: 1790 mL / NET: -1475.6 mL    18 Oct 2018 07:01  -  19 Oct 2018 06:46  --------------------------------------------------------  IN: 1338 mL / OUT: 1440 mL / NET: -102 mL      I&O's Detail    17 Oct 2018 07:01  -  18 Oct 2018 07:00  --------------------------------------------------------  IN:    dexmedetomidine Infusion: 28.4 mL    Enteral Tube Flush: 30 mL    heparin Infusion: 176 mL    lactated ringers.: 80 mL  Total IN: 314.4 mL    OUT:    Indwelling Catheter - Urethral: 1390 mL    Voided: 400 mL  Total OUT: 1790 mL    Total NET: -1475.6 mL      18 Oct 2018 07:01  -  19 Oct 2018 06:46  --------------------------------------------------------  IN:    heparin Infusion: 368 mL    IV PiggyBack: 500 mL    Oral Fluid: 470 mL  Total IN: 1338 mL    OUT:    Indwelling Catheter - Urethral: 290 mL    Voided: 1150 mL  Total OUT: 1440 mL    Total NET: -102 mL          PHYSICAL EXAM:    General/Neuro  GCS:     = 4  / 5   / 6      Deficits:  alert & oriented, no focal deficits  Pupils: Patient left eye cannot cross midline; blind in left eye for 20 years    Lungs:      extubated 10/18. sating well RA, no increased wob or tachypnea. lungs CTAB    Cardiovascular : S1, S2.  Regular rate and rhythm.    Cardiac Rhythm: Normal Sinus Rhythm    GI: Abdomen soft, mild distension and diffuse discomfort    Extremities: Extremities warm, pink, well-perfused. 2 peripheral IVs in place  Derm: Good skin turgor, no skin breakdown.      :     no hamilton          LABS:                        11.2 (stable)  6.91  )-----------( 333      ( 18 Oct 2018 23:47 )             34.4       10-18    136  |  95<L>  |  6<L>  ----------------------------<  120<H>  3.8   |  30  |  0.7    Ca    7.7<L>      18 Oct 2018 23:47  Phos  2.2     10-18  Mg     2.1     10-18    TPro  5.2<L>  /  Alb  3.0<L>  /  TBili  0.4  /  DBili  x   /  AST  13  /  ALT  13  /  AlkPhos  297<H>  10-18      PT/INR - ( 18 Oct 2018 23:47 )   PT: 13.70 sec;   INR: 1.19 ratio         PTT - ( 18 Oct 2018 23:47 )  PTT:79.8 sec  CARDIAC MARKERS ( 18 Oct 2018 12:03 )  x     / <0.01 ng/mL / 51 U/L / x     / 2.9 ng/mL  CARDIAC MARKERS ( 17 Oct 2018 17:41 )  x     / 0.06 ng/mL / 62 U/L / x     / 3.9 ng/mL

## 2018-10-19 NOTE — CONSULT NOTE ADULT - ASSESSMENT
59 yo M with HTN, DLD, depression, DVT on eliquis, and recurrent pleural effusions s/p multiple thoracentesis presenting from [facility] for worsening SOB since discharge from [facility]. Pt underwent VATS with pleural biopsy and talc pleurodesis. Pleural biopsy showed metastatic adenocarcinoma. Pt also underwent EGD/ colono which showed sigmoid colon mass. As per surgery team pt might undergo exploratory laparotomy, creation of transverse loop colostomy next week and cardiology consulted for preop clearance.     -METs <4  -denies any chest pain at baseline  -no evidence of HF  -ECHO reviewed- normal EF  -ECG-NSR  -From cardiology stand point, pt is at moderate cardiac risk for the planned intermediate risk procedure. 59 yo M with HTN, DLD, depression, DVT on eliquis, and recurrent pleural effusions s/p multiple thoracentesis presenting from [facility] for worsening SOB since discharge from [facility]. Pt underwent VATS with pleural biopsy and talc pleurodesis. Pleural biopsy showed metastatic adenocarcinoma. Pt also underwent EGD/ colono which showed sigmoid colon mass. As per surgery team pt might undergo exploratory laparotomy, creation of transverse loop colostomy next week and cardiology consulted for preop clearance.     -METs <4  -denies any chest pain at baseline  -no evidence of CHF  -ECHO reviewed- normal EF  -ECG-NSR  -From cardiology stand point, pt is at low to moderate cardiac risk for the planned intermediate risk procedure.

## 2018-10-20 LAB
ANION GAP SERPL CALC-SCNC: 13 MMOL/L — SIGNIFICANT CHANGE UP (ref 7–14)
APTT BLD: 74.1 SEC — CRITICAL HIGH (ref 27–39.2)
APTT BLD: 81.9 SEC — CRITICAL HIGH (ref 27–39.2)
BUN SERPL-MCNC: 7 MG/DL — LOW (ref 10–20)
CALCIUM SERPL-MCNC: 8.3 MG/DL — LOW (ref 8.5–10.1)
CEA SERPL-MCNC: 4.4 NG/ML — HIGH (ref 0–3.8)
CHLORIDE SERPL-SCNC: 98 MMOL/L — SIGNIFICANT CHANGE UP (ref 98–110)
CO2 SERPL-SCNC: 29 MMOL/L — SIGNIFICANT CHANGE UP (ref 17–32)
CREAT SERPL-MCNC: 0.7 MG/DL — SIGNIFICANT CHANGE UP (ref 0.7–1.5)
GLUCOSE SERPL-MCNC: 112 MG/DL — HIGH (ref 70–99)
HCT VFR BLD CALC: 32.8 % — LOW (ref 42–52)
HGB BLD-MCNC: 10.5 G/DL — LOW (ref 14–18)
INR BLD: 1.11 RATIO — SIGNIFICANT CHANGE UP (ref 0.65–1.3)
INR BLD: 1.17 RATIO — SIGNIFICANT CHANGE UP (ref 0.65–1.3)
MAGNESIUM SERPL-MCNC: 2 MG/DL — SIGNIFICANT CHANGE UP (ref 1.8–2.4)
MCHC RBC-ENTMCNC: 26.4 PG — LOW (ref 27–31)
MCHC RBC-ENTMCNC: 32 G/DL — SIGNIFICANT CHANGE UP (ref 32–37)
MCV RBC AUTO: 82.6 FL — SIGNIFICANT CHANGE UP (ref 80–94)
NRBC # BLD: 0 /100 WBCS — SIGNIFICANT CHANGE UP (ref 0–0)
PHOSPHATE SERPL-MCNC: 3.6 MG/DL — SIGNIFICANT CHANGE UP (ref 2.1–4.9)
PLATELET # BLD AUTO: 292 K/UL — SIGNIFICANT CHANGE UP (ref 130–400)
POTASSIUM SERPL-MCNC: 3.8 MMOL/L — SIGNIFICANT CHANGE UP (ref 3.5–5)
POTASSIUM SERPL-SCNC: 3.8 MMOL/L — SIGNIFICANT CHANGE UP (ref 3.5–5)
PROTHROM AB SERPL-ACNC: 12.7 SEC — SIGNIFICANT CHANGE UP (ref 9.95–12.87)
PROTHROM AB SERPL-ACNC: 13.4 SEC — HIGH (ref 9.95–12.87)
RBC # BLD: 3.97 M/UL — LOW (ref 4.7–6.1)
RBC # FLD: 15 % — HIGH (ref 11.5–14.5)
SODIUM SERPL-SCNC: 140 MMOL/L — SIGNIFICANT CHANGE UP (ref 135–146)
WBC # BLD: 5.96 K/UL — SIGNIFICANT CHANGE UP (ref 4.8–10.8)
WBC # FLD AUTO: 5.96 K/UL — SIGNIFICANT CHANGE UP (ref 4.8–10.8)

## 2018-10-20 RX ADMIN — MORPHINE SULFATE 2 MILLIGRAM(S): 50 CAPSULE, EXTENDED RELEASE ORAL at 18:19

## 2018-10-20 RX ADMIN — MORPHINE SULFATE 2 MILLIGRAM(S): 50 CAPSULE, EXTENDED RELEASE ORAL at 17:42

## 2018-10-20 RX ADMIN — PANTOPRAZOLE SODIUM 40 MILLIGRAM(S): 20 TABLET, DELAYED RELEASE ORAL at 05:06

## 2018-10-20 RX ADMIN — Medication 20 MILLIGRAM(S): at 17:43

## 2018-10-20 RX ADMIN — ZOLPIDEM TARTRATE 5 MILLIGRAM(S): 10 TABLET ORAL at 21:27

## 2018-10-20 RX ADMIN — Medication 20 MILLIGRAM(S): at 05:06

## 2018-10-20 RX ADMIN — MORPHINE SULFATE 2 MILLIGRAM(S): 50 CAPSULE, EXTENDED RELEASE ORAL at 10:50

## 2018-10-20 RX ADMIN — Medication 100 MILLIGRAM(S): at 05:07

## 2018-10-20 RX ADMIN — HEPARIN SODIUM 16 UNIT(S)/HR: 5000 INJECTION INTRAVENOUS; SUBCUTANEOUS at 23:42

## 2018-10-20 RX ADMIN — TAMSULOSIN HYDROCHLORIDE 0.4 MILLIGRAM(S): 0.4 CAPSULE ORAL at 21:27

## 2018-10-20 RX ADMIN — DULOXETINE HYDROCHLORIDE 60 MILLIGRAM(S): 30 CAPSULE, DELAYED RELEASE ORAL at 11:23

## 2018-10-20 RX ADMIN — Medication 50 MILLIGRAM(S): at 21:27

## 2018-10-20 RX ADMIN — CARBAMIDE PEROXIDE 5 DROP(S): 81.86 SOLUTION/ DROPS AURICULAR (OTIC) at 05:07

## 2018-10-20 RX ADMIN — Medication 100 MILLIGRAM(S): at 13:48

## 2018-10-20 NOTE — PHYSICAL THERAPY INITIAL EVALUATION ADULT - CRITERIA FOR SKILLED THERAPEUTIC INTERVENTIONS
risk reduction/prevention/anticipated equipment needs at discharge/rehab potential/predicted duration of therapy intervention/impairments found/therapy frequency/functional limitations in following categories

## 2018-10-20 NOTE — PHYSICAL THERAPY INITIAL EVALUATION ADULT - IMPAIRMENTS FOUND, PT EVAL
ROM/anthropometric characteristics/ergonomics and body mechanics/gross motor/aerobic capacity/endurance/fine motor/gait, locomotion, and balance/poor safety awareness/posture

## 2018-10-20 NOTE — PROGRESS NOTE ADULT - SUBJECTIVE AND OBJECTIVE BOX
CTS ATTENDING    PT SEEN FOR DR. ARREDONDO  REPORTS TAKING SOME SOLIDS WELL  HAD A SMALL BOWEL MOVEMENT  READY FOR AMBULATION WITH Therapist THIS pm    APPEARS COMFORTABLE AND ABDOMEN IS SOFT TO PALPATION AND NON-TENDER, ALTHOUGH STILL DISTENDED.  CASE DISCUSSED WITH GREEN TEAM RESIDENT

## 2018-10-20 NOTE — PHYSICAL THERAPY INITIAL EVALUATION ADULT - TRANSFER SAFETY CONCERNS NOTED: TOILET, REHAB EVAL
decreased balance during turns/decreased proprioception/decreased safety awareness/decreased sequencing ability

## 2018-10-20 NOTE — PROGRESS NOTE ADULT - SUBJECTIVE AND OBJECTIVE BOX
AMRIK MADRID  58y Male   [id number]    Procedure: Thoracoscopy  Chest tube insertion  Thoracentesis    Events of the Last 24h: No acute events overnight. patient downgraded from ICU to 4B. Has had burping and abdominal pain. Denies any bm, but has had flatus.     PAST MEDICAL & SURGICAL HISTORY:  Pleural effusion  CHF (congestive heart failure)  Insomnia  BPH (benign prostatic hyperplasia)  Depression  Bipolar 1 disorder  HTN (hypertension)  History of thoracentesis    Vital Signs Last 24 Hrs  T(C): 36.7 (19 Oct 2018 23:19), Max: 36.7 (19 Oct 2018 23:19)  T(F): 98 (19 Oct 2018 23:19), Max: 98 (19 Oct 2018 23:19)  HR: 92 (19 Oct 2018 23:19) (80 - 108)  BP: 83/52 (19 Oct 2018 23:19) (83/52 - 133/83)  BP(mean): 85 (19 Oct 2018 15:00) (70 - 101)  RR: 41 (19 Oct 2018 23:19) (13 - 41)  SpO2: 96% (19 Oct 2018 15:00) (94% - 97%)    Diet, Soft (10-18-18 @ 17:05)    I&O's Detail    18 Oct 2018 07:01  -  19 Oct 2018 07:00  --------------------------------------------------------  IN:    heparin Infusion: 368 mL    IV PiggyBack: 500 mL    Oral Fluid: 470 mL  Total IN: 1338 mL    OUT:    Indwelling Catheter - Urethral: 290 mL    Voided: 1475 mL  Total OUT: 1765 mL    Total NET: -427 ml    19 Oct 2018 07:01  -  20 Oct 2018 02:10  --------------------------------------------------------  IN:    heparin Infusion: 112 mL    Oral Fluid: 360 mL  Total IN: 472 mL    OUT:    Voided: 2300 mL  Total OUT: 2300 mL    Total NET: -1828 mL    MEDICATIONS  (STANDING):  carbamide peroxide Otic Solution 5 Drop(s) Left Ear two times a day  chlorhexidine 4% Liquid 1 Application(s) Topical <User Schedule>  diltiazem    Tablet 60 milliGRAM(s) Oral every 8 hours  docusate sodium Liquid 100 milliGRAM(s) Oral three times a day  DULoxetine 60 milliGRAM(s) Oral daily  furosemide    Tablet 20 milliGRAM(s) Oral daily  heparin  Infusion 1600 Unit(s)/Hr (16 mL/Hr) IV Continuous <Continuous>  latanoprost 0.005% Ophthalmic Solution 1 Drop(s) Both EYES at bedtime  pantoprazole    Tablet 40 milliGRAM(s) Oral before breakfast  propranolol 20 milliGRAM(s) Oral every 12 hours  senna 2 Tablet(s) Oral at bedtime  tamsulosin 0.4 milliGRAM(s) Oral at bedtime  traZODone 50 milliGRAM(s) Oral at bedtime    MEDICATIONS  (PRN):  acetaminophen   Tablet .. 650 milliGRAM(s) Oral every 6 hours PRN Moderate Pain (4 - 6)  morphine  - Injectable 2 milliGRAM(s) IV Push every 6 hours PRN Severe Pain (7 - 10)  ondansetron Injectable 4 milliGRAM(s) IV Push every 6 hours PRN Nausea  zolpidem 5 milliGRAM(s) Oral at bedtime PRN Insomnia    PHYSICAL EXAM:  GENERAL: NAD,   EYES: conjunctiva and sclera clear  NECK: Supple. No JVD.   CHEST/LUNG: Decreased breath sounds b/l   HEART: S1 and S2 noted  ABDOMEN: Soft, distended; Bowel sounds present  EXTREMITIES: No clubbing, cyanosis, or edema  PSYCH: AAOx3    LABS:                    11.2   6.91  )-----------( 333      ( 18 Oct 2018 23:47 )             34.4        10-18    136  |  95<L>  |  6<L>  ----------------------------<  120<H>  3.8   |  30  |  0.7    Ca    7.7<L>      18 Oct 2018 23:47  Phos  2.2     10-18  Mg     2.1     10-18  TPro  5.2<L>  /  Alb  3.0<L>  /  TBili  0.4  /  DBili  x   /  AST  13  /  ALT  13  /  AlkPhos  297<H>  10-18  LIVER FUNCTIONS - ( 18 Oct 2018 23:47 )  Alb: 3.0 g/dL / Pro: 5.2 g/dL / ALK PHOS: 297 U/L / ALT: 13 U/L / AST: 13 U/L / GGT: x           PT/INR - ( 18 Oct 2018 23:47 )   PT: 13.70 sec;   INR: 1.19 ratio      PTT - ( 19 Oct 2018 11:59 )  PTT:59.2 sec  CARDIAC MARKERS ( 18 Oct 2018 12:03 )  x     / <0.01 ng/mL / 51 U/L / x     / 2.9 ng/mL    IMAGING:  Xray Chest 1 View- PORTABLE-Urgent (10.19.18 @ 12:52) >  Impression:    Stable bilateral pulmonary consolidations and pleural effusions.

## 2018-10-20 NOTE — PROGRESS NOTE ADULT - ASSESSMENT
59 y/o M with sigmoid mass s/p L TALC pleurodesis for recurrent pleural effusions      PLAN:   - IS   - Bowel regiment   - Hep continue at 16 mL/hr   - soft diet   - upon d/c outpatient follow up with oncology/surgery    - F/U MED, PUL CLEARANCE. CARDS: MODERATE RISK   - F/U GI (DR ANDREW) FOR POSSIBLE STENT PLACEMENT FOR COLON MASS   - F/U PT; PHYS- SNF.

## 2018-10-20 NOTE — PROGRESS NOTE ADULT - ASSESSMENT
57 y/o M with sigmoid mass s/p L TALC pleurodesis for recurrent pleural effusions      PLAN:   - IS   - Bowel regiment   - Hep continue at 16 mL/hr   - soft diet   - upon d/c outpatient follow up with oncology/surgery    - F/U MED, PUL CLEARANCE. CARDS: MODERATE RISK   - F/U GI (DR ANDREW) FOR POSSIBLE STENT PLACEMENT FOR COLON MASS   - F/U PT; PHYS- SNF.

## 2018-10-20 NOTE — PROGRESS NOTE ADULT - SUBJECTIVE AND OBJECTIVE BOX
AMRIK MADRID  58y Male   [id number]    Procedure: Thoracoscopy  Chest tube insertion  Thoracentesis    Events of the Last 24h: No acute events overnight. patient downgraded from ICU to 4B. Has had burping and abdominal pain. Denies any bm, but has had flatus.     PAST MEDICAL & SURGICAL HISTORY:  Pleural effusion  CHF (congestive heart failure)  Insomnia  BPH (benign prostatic hyperplasia)  Depression  Bipolar 1 disorder  HTN (hypertension)  History of thoracentesis    Vital Signs Last 24 Hrs  T(C): 36.7 (19 Oct 2018 23:19), Max: 36.7 (19 Oct 2018 23:19)  T(F): 98 (19 Oct 2018 23:19), Max: 98 (19 Oct 2018 23:19)  HR: 92 (19 Oct 2018 23:19) (80 - 108)  BP: 83/52 (19 Oct 2018 23:19) (83/52 - 133/83)  BP(mean): 85 (19 Oct 2018 15:00) (70 - 101)  RR: 41 (19 Oct 2018 23:19) (13 - 41)  SpO2: 96% (19 Oct 2018 15:00) (94% - 97%)    Diet, Soft (10-18-18 @ 17:05)    I&O's Detail    18 Oct 2018 07:01  -  19 Oct 2018 07:00  --------------------------------------------------------  IN:    heparin Infusion: 368 mL    IV PiggyBack: 500 mL    Oral Fluid: 470 mL  Total IN: 1338 mL    OUT:    Indwelling Catheter - Urethral: 290 mL    Voided: 1475 mL  Total OUT: 1765 mL    Total NET: -427 ml    19 Oct 2018 07:01  -  20 Oct 2018 02:10  --------------------------------------------------------  IN:    heparin Infusion: 112 mL    Oral Fluid: 360 mL  Total IN: 472 mL    OUT:    Voided: 2300 mL  Total OUT: 2300 mL    Total NET: -1828 mL    MEDICATIONS  (STANDING):  carbamide peroxide Otic Solution 5 Drop(s) Left Ear two times a day  chlorhexidine 4% Liquid 1 Application(s) Topical <User Schedule>  diltiazem    Tablet 60 milliGRAM(s) Oral every 8 hours  docusate sodium Liquid 100 milliGRAM(s) Oral three times a day  DULoxetine 60 milliGRAM(s) Oral daily  furosemide    Tablet 20 milliGRAM(s) Oral daily  heparin  Infusion 1600 Unit(s)/Hr (16 mL/Hr) IV Continuous <Continuous>  latanoprost 0.005% Ophthalmic Solution 1 Drop(s) Both EYES at bedtime  pantoprazole    Tablet 40 milliGRAM(s) Oral before breakfast  propranolol 20 milliGRAM(s) Oral every 12 hours  senna 2 Tablet(s) Oral at bedtime  tamsulosin 0.4 milliGRAM(s) Oral at bedtime  traZODone 50 milliGRAM(s) Oral at bedtime    MEDICATIONS  (PRN):  acetaminophen   Tablet .. 650 milliGRAM(s) Oral every 6 hours PRN Moderate Pain (4 - 6)  morphine  - Injectable 2 milliGRAM(s) IV Push every 6 hours PRN Severe Pain (7 - 10)  ondansetron Injectable 4 milliGRAM(s) IV Push every 6 hours PRN Nausea  zolpidem 5 milliGRAM(s) Oral at bedtime PRN Insomnia    PHYSICAL EXAM:  GENERAL: NAD,   EYES: conjunctiva and sclera clear  NECK: Supple. No JVD.   CHEST/LUNG: Decreased breath sounds b/l   HEART: S1 and S2 noted  ABDOMEN: Soft, distended; Bowel sounds present  EXTREMITIES: No clubbing, cyanosis, or edema  PSYCH: AAOx3    LABS:                    11.2   6.91  )-----------( 333      ( 18 Oct 2018 23:47 )             34.4        10-18    136  |  95<L>  |  6<L>  ----------------------------<  120<H>  3.8   |  30  |  0.7    Ca    7.7<L>      18 Oct 2018 23:47  Phos  2.2     10-18  Mg     2.1     10-18  TPro  5.2<L>  /  Alb  3.0<L>  /  TBili  0.4  /  DBili  x   /  AST  13  /  ALT  13  /  AlkPhos  297<H>  10-18  LIVER FUNCTIONS - ( 18 Oct 2018 23:47 )  Alb: 3.0 g/dL / Pro: 5.2 g/dL / ALK PHOS: 297 U/L / ALT: 13 U/L / AST: 13 U/L / GGT: x           PT/INR - ( 18 Oct 2018 23:47 )   PT: 13.70 sec;   INR: 1.19 ratio      PTT - ( 19 Oct 2018 11:59 )  PTT:59.2 sec  CARDIAC MARKERS ( 18 Oct 2018 12:03 )  x     / <0.01 ng/mL / 51 U/L / x     / 2.9 ng/mL    IMAGING:  Xray Chest 1 View- PORTABLE-Urgent (10.19.18 @ 12:52) >  Impression:    Stable bilateral pulmonary consolidations and pleural effusions.

## 2018-10-20 NOTE — PHYSICAL THERAPY INITIAL EVALUATION ADULT - PERTINENT HX OF CURRENT PROBLEM, REHAB EVAL
Pt adm for worsening SOB on sept21/2018. Pt has had a prolonged hospital stay findings significant for met colon CA

## 2018-10-20 NOTE — PHYSICAL THERAPY INITIAL EVALUATION ADULT - ADDITIONAL COMMENTS
Pt from long term care as per chart. Pt reports he did not ambulate much prior to admission but was able to transfer and ambulate short distances.

## 2018-10-21 LAB
ANION GAP SERPL CALC-SCNC: 11 MMOL/L — SIGNIFICANT CHANGE UP (ref 7–14)
APTT BLD: 83.4 SEC — CRITICAL HIGH (ref 27–39.2)
BASOPHILS # BLD AUTO: 0.04 K/UL — SIGNIFICANT CHANGE UP (ref 0–0.2)
BASOPHILS NFR BLD AUTO: 0.6 % — SIGNIFICANT CHANGE UP (ref 0–1)
BUN SERPL-MCNC: 9 MG/DL — LOW (ref 10–20)
CALCIUM SERPL-MCNC: 8.3 MG/DL — LOW (ref 8.5–10.1)
CHLORIDE SERPL-SCNC: 100 MMOL/L — SIGNIFICANT CHANGE UP (ref 98–110)
CO2 SERPL-SCNC: 29 MMOL/L — SIGNIFICANT CHANGE UP (ref 17–32)
CREAT SERPL-MCNC: 0.8 MG/DL — SIGNIFICANT CHANGE UP (ref 0.7–1.5)
EOSINOPHIL # BLD AUTO: 0.19 K/UL — SIGNIFICANT CHANGE UP (ref 0–0.7)
EOSINOPHIL NFR BLD AUTO: 2.7 % — SIGNIFICANT CHANGE UP (ref 0–8)
GLUCOSE SERPL-MCNC: 114 MG/DL — HIGH (ref 70–99)
HCT VFR BLD CALC: 33.4 % — LOW (ref 42–52)
HGB BLD-MCNC: 10.5 G/DL — LOW (ref 14–18)
IMM GRANULOCYTES NFR BLD AUTO: 1.5 % — HIGH (ref 0.1–0.3)
INR BLD: 1.1 RATIO — SIGNIFICANT CHANGE UP (ref 0.65–1.3)
LYMPHOCYTES # BLD AUTO: 1.24 K/UL — SIGNIFICANT CHANGE UP (ref 1.2–3.4)
LYMPHOCYTES # BLD AUTO: 17.3 % — LOW (ref 20.5–51.1)
MAGNESIUM SERPL-MCNC: 2 MG/DL — SIGNIFICANT CHANGE UP (ref 1.8–2.4)
MCHC RBC-ENTMCNC: 26.2 PG — LOW (ref 27–31)
MCHC RBC-ENTMCNC: 31.4 G/DL — LOW (ref 32–37)
MCV RBC AUTO: 83.3 FL — SIGNIFICANT CHANGE UP (ref 80–94)
MONOCYTES # BLD AUTO: 0.85 K/UL — HIGH (ref 0.1–0.6)
MONOCYTES NFR BLD AUTO: 11.9 % — HIGH (ref 1.7–9.3)
NEUTROPHILS # BLD AUTO: 4.73 K/UL — SIGNIFICANT CHANGE UP (ref 1.4–6.5)
NEUTROPHILS NFR BLD AUTO: 66 % — SIGNIFICANT CHANGE UP (ref 42.2–75.2)
NRBC # BLD: 0 /100 WBCS — SIGNIFICANT CHANGE UP (ref 0–0)
PHOSPHATE SERPL-MCNC: 4.2 MG/DL — SIGNIFICANT CHANGE UP (ref 2.1–4.9)
PLATELET # BLD AUTO: 320 K/UL — SIGNIFICANT CHANGE UP (ref 130–400)
POTASSIUM SERPL-MCNC: 3.9 MMOL/L — SIGNIFICANT CHANGE UP (ref 3.5–5)
POTASSIUM SERPL-SCNC: 3.9 MMOL/L — SIGNIFICANT CHANGE UP (ref 3.5–5)
PROTHROM AB SERPL-ACNC: 12.6 SEC — SIGNIFICANT CHANGE UP (ref 9.95–12.87)
RBC # BLD: 4.01 M/UL — LOW (ref 4.7–6.1)
RBC # FLD: 15 % — HIGH (ref 11.5–14.5)
SODIUM SERPL-SCNC: 140 MMOL/L — SIGNIFICANT CHANGE UP (ref 135–146)
WBC # BLD: 7.16 K/UL — SIGNIFICANT CHANGE UP (ref 4.8–10.8)
WBC # FLD AUTO: 7.16 K/UL — SIGNIFICANT CHANGE UP (ref 4.8–10.8)

## 2018-10-21 RX ADMIN — TAMSULOSIN HYDROCHLORIDE 0.4 MILLIGRAM(S): 0.4 CAPSULE ORAL at 21:40

## 2018-10-21 RX ADMIN — Medication 50 MILLIGRAM(S): at 21:39

## 2018-10-21 RX ADMIN — MORPHINE SULFATE 2 MILLIGRAM(S): 50 CAPSULE, EXTENDED RELEASE ORAL at 01:48

## 2018-10-21 RX ADMIN — MORPHINE SULFATE 2 MILLIGRAM(S): 50 CAPSULE, EXTENDED RELEASE ORAL at 08:02

## 2018-10-21 RX ADMIN — Medication 100 MILLIGRAM(S): at 06:01

## 2018-10-21 RX ADMIN — MORPHINE SULFATE 2 MILLIGRAM(S): 50 CAPSULE, EXTENDED RELEASE ORAL at 23:04

## 2018-10-21 RX ADMIN — ZOLPIDEM TARTRATE 5 MILLIGRAM(S): 10 TABLET ORAL at 21:44

## 2018-10-21 RX ADMIN — MORPHINE SULFATE 2 MILLIGRAM(S): 50 CAPSULE, EXTENDED RELEASE ORAL at 22:40

## 2018-10-21 RX ADMIN — Medication 20 MILLIGRAM(S): at 05:59

## 2018-10-21 RX ADMIN — Medication 100 MILLIGRAM(S): at 14:37

## 2018-10-21 RX ADMIN — MORPHINE SULFATE 2 MILLIGRAM(S): 50 CAPSULE, EXTENDED RELEASE ORAL at 14:35

## 2018-10-21 RX ADMIN — Medication 20 MILLIGRAM(S): at 17:21

## 2018-10-21 RX ADMIN — PANTOPRAZOLE SODIUM 40 MILLIGRAM(S): 20 TABLET, DELAYED RELEASE ORAL at 06:02

## 2018-10-21 RX ADMIN — CARBAMIDE PEROXIDE 5 DROP(S): 81.86 SOLUTION/ DROPS AURICULAR (OTIC) at 07:12

## 2018-10-21 RX ADMIN — MORPHINE SULFATE 2 MILLIGRAM(S): 50 CAPSULE, EXTENDED RELEASE ORAL at 01:18

## 2018-10-21 RX ADMIN — LATANOPROST 1 DROP(S): 0.05 SOLUTION/ DROPS OPHTHALMIC; TOPICAL at 21:43

## 2018-10-21 RX ADMIN — HEPARIN SODIUM 16 UNIT(S)/HR: 5000 INJECTION INTRAVENOUS; SUBCUTANEOUS at 17:20

## 2018-10-21 RX ADMIN — DULOXETINE HYDROCHLORIDE 60 MILLIGRAM(S): 30 CAPSULE, DELAYED RELEASE ORAL at 12:41

## 2018-10-21 RX ADMIN — CHLORHEXIDINE GLUCONATE 1 APPLICATION(S): 213 SOLUTION TOPICAL at 08:22

## 2018-10-21 RX ADMIN — CARBAMIDE PEROXIDE 5 DROP(S): 81.86 SOLUTION/ DROPS AURICULAR (OTIC) at 17:21

## 2018-10-21 NOTE — PROGRESS NOTE ADULT - SUBJECTIVE AND OBJECTIVE BOX
Progress Note: Thoracic Surgery  Patient: AMRIK MADRID , 58y (1960)Male   MRN: [id number]  Location: [address]  Visit: 09-21-18 Inpatient  Date: 10-21-18 @ 00:49    Procedure/Diagnosis: s/p pigtail placement 10/8, talc pleurodesis 10/10, now working up partially obstructing sigmoid adenoCa    Events/ 24h: BM and gas yesterday. No acute events overnight. Pain controlled.    Vitals: T(F): 96.5 (10-20-18 @ 16:00), Max: 97.1 (10-20-18 @ 08:00)  HR: 97 (10-20-18 @ 16:00)  BP: 119/71 (10-20-18 @ 16:00) (108/71 - 119/71)  RR: 22 (10-20-18 @ 16:00)  SpO2: 94% (10-20-18 @ 13:17)    In:   10-19-18 @ 07:01  -  10-20-18 @ 07:00  --------------------------------------------------------  IN: 722 mL      Out:   10-19-18 @ 07:01  -  10-20-18 @ 07:00  --------------------------------------------------------  OUT:    Voided: 2800 mL  Total OUT: 2800 mL        Net:   10-19-18 @ 07:01  -  10-20-18 @ 07:00  --------------------------------------------------------  NET: -2078 mL    Diet: Diet, Soft (10-18-18 @ 17:05)    IV Fluids: --    Physical Examination:  General Appearance: NAD  HEENT: EOMI, sclera non-icteric.  Heart: RRR   Lungs: CTABL.   Abdomen:  Soft, nontender, distended. No rigidity, guarding, or rebound tenderness.   MSK/Extremities: Warm & well-perfused. Peripheral pulses intact.  Skin: Warm, dry. No jaundice.     Medications: [Standing]  carbamide peroxide Otic Solution 5 Drop(s) Left Ear two times a day  chlorhexidine 4% Liquid 1 Application(s) Topical <User Schedule>  diltiazem    Tablet 60 milliGRAM(s) Oral every 8 hours  docusate sodium Liquid 100 milliGRAM(s) Oral three times a day  DULoxetine 60 milliGRAM(s) Oral daily  furosemide    Tablet 20 milliGRAM(s) Oral daily  heparin  Infusion 1600 Unit(s)/Hr (16 mL/Hr) IV Continuous <Continuous>  latanoprost 0.005% Ophthalmic Solution 1 Drop(s) Both EYES at bedtime  pantoprazole    Tablet 40 milliGRAM(s) Oral before breakfast  propranolol 20 milliGRAM(s) Oral every 12 hours  senna 2 Tablet(s) Oral at bedtime  tamsulosin 0.4 milliGRAM(s) Oral at bedtime  traZODone 50 milliGRAM(s) Oral at bedtime    DVT Prophylaxis: heparin  Infusion 1600 Unit(s)/Hr IV Continuous <Continuous>    GI Prophylaxis: pantoprazole    Tablet 40 milliGRAM(s) Oral before breakfast    Antibiotics:   Anticoagulation:   Medications:[PRN]  acetaminophen   Tablet .. 650 milliGRAM(s) Oral every 6 hours PRN  morphine  - Injectable 2 milliGRAM(s) IV Push every 6 hours PRN  ondansetron Injectable 4 milliGRAM(s) IV Push every 6 hours PRN  zolpidem 5 milliGRAM(s) Oral at bedtime PRN      Labs:                        10.5   5.96  )-----------( 292      ( 20 Oct 2018 02:11 )             32.8     10-20    140  |  98  |  7<L>  ----------------------------<  112<H>  3.8   |  29  |  0.7    Ca    8.3<L>      20 Oct 2018 02:11  Phos  3.6     10-20  Mg     2.0     10-20        PT/INR - ( 20 Oct 2018 19:24 )   PT: 12.70 sec;   INR: 1.11 ratio         PTT - ( 20 Oct 2018 19:24 )  PTT:74.1 sec        Imaging:     < from: Xray Chest 1 View- PORTABLE-Routine (10.20.18 @ 05:33) >  Stable bilateral diffuse lung opacities    < end of copied text >    < from: CT Abdomen and Pelvis No Cont (10.16.18 @ 01:11) >  1.  No evidence of sigmoid volvulus. Air-filled distended colon without   any definite transition. Mesenteric inflammatory changes around the   sigmoid colon and cecum, concerning for colitis.  2.  Rectal tube in place.  3.  Stable bilateral loculated pleural effusions and pericardial effusion.    < end of copied text >    Assessment:  58y Male patient admitted s/p pigtail placement 10/8, talc pleurodesis 10/10, now working up partially obstructing sigmoid adenoCa    Plan:    f/u GI Monday for possible stenting across lesion  if not, preop Monday for placement transverse loop colostomy Tuesday  f/u med clearance  Pulm: moderate risk  DVT/GI ppx  OOBAT  IS  Pain control    Date/Time: 10-21-18 @ 00:44

## 2018-10-21 NOTE — PROGRESS NOTE ADULT - ASSESSMENT
CTS ATTENDING    TOLERATING REGULAR DIET  AMBULATES WITH ASSISTANCE  HAS HAD SMALL BOWEL MOVEMENTS  ABDOMEN IS LESS DISTENDED, NON-TENDER AND SOFT    AWAITING DEFINITIVE PLANS FOR LARGE BOWEL LESION/OBSTRUCTION

## 2018-10-21 NOTE — PROGRESS NOTE ADULT - SUBJECTIVE AND OBJECTIVE BOX
Progress Note: General Surgery  Patient: AMRIK MADRID , 58y (1960)Male   MRN: [id number]  Location: [address]21 A  Visit: 09-21-18 Inpatient  Date: 10-21-18 @ 00:44    Procedure/Diagnosis: s/p pigtail placement 10/8, talc pleurodesis 10/10, now working up partially obstructing sigmoid adenoCa    Events/ 24h: BM and gas yesterday. No acute events overnight. Pain controlled.    Vitals: T(F): 96.5 (10-20-18 @ 16:00), Max: 97.1 (10-20-18 @ 08:00)  HR: 97 (10-20-18 @ 16:00)  BP: 119/71 (10-20-18 @ 16:00) (108/71 - 119/71)  RR: 22 (10-20-18 @ 16:00)  SpO2: 94% (10-20-18 @ 13:17)    In:   10-19-18 @ 07:01  -  10-20-18 @ 07:00  --------------------------------------------------------  IN: 722 mL      Out:   10-19-18 @ 07:01  -  10-20-18 @ 07:00  --------------------------------------------------------  OUT:    Voided: 2800 mL  Total OUT: 2800 mL        Net:   10-19-18 @ 07:01  -  10-20-18 @ 07:00  --------------------------------------------------------  NET: -2078 mL        Diet: Diet, Soft (10-18-18 @ 17:05)    IV Fluids: --    Physical Examination:  General Appearance: NAD  HEENT: EOMI, sclera non-icteric.  Heart: RRR   Lungs: CTABL.   Abdomen:  Soft, nontender, distended. No rigidity, guarding, or rebound tenderness.   MSK/Extremities: Warm & well-perfused. Peripheral pulses intact.  Skin: Warm, dry. No jaundice.       Medications: [Standing]  carbamide peroxide Otic Solution 5 Drop(s) Left Ear two times a day  chlorhexidine 4% Liquid 1 Application(s) Topical <User Schedule>  diltiazem    Tablet 60 milliGRAM(s) Oral every 8 hours  docusate sodium Liquid 100 milliGRAM(s) Oral three times a day  DULoxetine 60 milliGRAM(s) Oral daily  furosemide    Tablet 20 milliGRAM(s) Oral daily  heparin  Infusion 1600 Unit(s)/Hr (16 mL/Hr) IV Continuous <Continuous>  latanoprost 0.005% Ophthalmic Solution 1 Drop(s) Both EYES at bedtime  pantoprazole    Tablet 40 milliGRAM(s) Oral before breakfast  propranolol 20 milliGRAM(s) Oral every 12 hours  senna 2 Tablet(s) Oral at bedtime  tamsulosin 0.4 milliGRAM(s) Oral at bedtime  traZODone 50 milliGRAM(s) Oral at bedtime    DVT Prophylaxis: heparin  Infusion 1600 Unit(s)/Hr IV Continuous <Continuous>    GI Prophylaxis: pantoprazole    Tablet 40 milliGRAM(s) Oral before breakfast    Antibiotics:   Anticoagulation:   Medications:[PRN]  acetaminophen   Tablet .. 650 milliGRAM(s) Oral every 6 hours PRN  morphine  - Injectable 2 milliGRAM(s) IV Push every 6 hours PRN  ondansetron Injectable 4 milliGRAM(s) IV Push every 6 hours PRN  zolpidem 5 milliGRAM(s) Oral at bedtime PRN      Labs:                        10.5   5.96  )-----------( 292      ( 20 Oct 2018 02:11 )             32.8     10-20    140  |  98  |  7<L>  ----------------------------<  112<H>  3.8   |  29  |  0.7    Ca    8.3<L>      20 Oct 2018 02:11  Phos  3.6     10-20  Mg     2.0     10-20        PT/INR - ( 20 Oct 2018 19:24 )   PT: 12.70 sec;   INR: 1.11 ratio         PTT - ( 20 Oct 2018 19:24 )  PTT:74.1 sec        Imaging:     < from: CT Abdomen and Pelvis No Cont (10.16.18 @ 01:11) >  1.  No evidence of sigmoid volvulus. Air-filled distended colon without   any definite transition. Mesenteric inflammatory changes around the   sigmoid colon and cecum, concerning for colitis.  2.  Rectal tube in place.  3.  Stable bilateral loculated pleural effusions and pericardial effusion.    < end of copied text >      Assessment:  58y Male patient admitted s/p pigtail placement 10/8, talc pleurodesis 10/10, now working up partially obstructing sigmoid adenoCa    Plan:    f/u GI Monday for possible stenting across lesion  if not, preop Monday for placement transverse loop colostomy Tuesday  f/u med clearance  Pulm: moderate risk  DVT/GI ppx  OOBAT  IS  Pain control    Date/Time: 10-21-18 @ 00:44

## 2018-10-22 LAB
ANION GAP SERPL CALC-SCNC: 9 MMOL/L — SIGNIFICANT CHANGE UP (ref 7–14)
APTT BLD: 70.2 SEC — CRITICAL HIGH (ref 27–39.2)
APTT BLD: 84.1 SEC — CRITICAL HIGH (ref 27–39.2)
BLD GP AB SCN SERPL QL: SIGNIFICANT CHANGE UP
BLD GP AB SCN SERPL QL: SIGNIFICANT CHANGE UP
BUN SERPL-MCNC: 8 MG/DL — LOW (ref 10–20)
CALCIUM SERPL-MCNC: 8.4 MG/DL — LOW (ref 8.5–10.1)
CHLORIDE SERPL-SCNC: 96 MMOL/L — LOW (ref 98–110)
CO2 SERPL-SCNC: 29 MMOL/L — SIGNIFICANT CHANGE UP (ref 17–32)
CREAT SERPL-MCNC: 0.7 MG/DL — SIGNIFICANT CHANGE UP (ref 0.7–1.5)
GLUCOSE SERPL-MCNC: 110 MG/DL — HIGH (ref 70–99)
HCT VFR BLD CALC: 34.3 % — LOW (ref 42–52)
HGB BLD-MCNC: 10.7 G/DL — LOW (ref 14–18)
INR BLD: 1.11 RATIO — SIGNIFICANT CHANGE UP (ref 0.65–1.3)
MAGNESIUM SERPL-MCNC: 2 MG/DL — SIGNIFICANT CHANGE UP (ref 1.8–2.4)
MCHC RBC-ENTMCNC: 26.2 PG — LOW (ref 27–31)
MCHC RBC-ENTMCNC: 31.2 G/DL — LOW (ref 32–37)
MCV RBC AUTO: 83.9 FL — SIGNIFICANT CHANGE UP (ref 80–94)
NRBC # BLD: 0 /100 WBCS — SIGNIFICANT CHANGE UP (ref 0–0)
PHOSPHATE SERPL-MCNC: 3.7 MG/DL — SIGNIFICANT CHANGE UP (ref 2.1–4.9)
PLATELET # BLD AUTO: 301 K/UL — SIGNIFICANT CHANGE UP (ref 130–400)
POTASSIUM SERPL-MCNC: 4.2 MMOL/L — SIGNIFICANT CHANGE UP (ref 3.5–5)
POTASSIUM SERPL-SCNC: 4.2 MMOL/L — SIGNIFICANT CHANGE UP (ref 3.5–5)
PROTHROM AB SERPL-ACNC: 12.7 SEC — SIGNIFICANT CHANGE UP (ref 9.95–12.87)
RBC # BLD: 4.09 M/UL — LOW (ref 4.7–6.1)
RBC # FLD: 15 % — HIGH (ref 11.5–14.5)
SODIUM SERPL-SCNC: 134 MMOL/L — LOW (ref 135–146)
SURGICAL PATHOLOGY STUDY: SIGNIFICANT CHANGE UP
SURGICAL PATHOLOGY STUDY: SIGNIFICANT CHANGE UP
TYPE + AB SCN PNL BLD: SIGNIFICANT CHANGE UP
TYPE + AB SCN PNL BLD: SIGNIFICANT CHANGE UP
WBC # BLD: 7.24 K/UL — SIGNIFICANT CHANGE UP (ref 4.8–10.8)
WBC # FLD AUTO: 7.24 K/UL — SIGNIFICANT CHANGE UP (ref 4.8–10.8)

## 2018-10-22 PROCEDURE — 93970 EXTREMITY STUDY: CPT | Mod: 26

## 2018-10-22 RX ORDER — SODIUM CHLORIDE 9 MG/ML
1000 INJECTION, SOLUTION INTRAVENOUS
Qty: 0 | Refills: 0 | Status: DISCONTINUED | OUTPATIENT
Start: 2018-10-22 | End: 2018-10-23

## 2018-10-22 RX ADMIN — SODIUM CHLORIDE 75 MILLILITER(S): 9 INJECTION, SOLUTION INTRAVENOUS at 06:56

## 2018-10-22 RX ADMIN — CARBAMIDE PEROXIDE 5 DROP(S): 81.86 SOLUTION/ DROPS AURICULAR (OTIC) at 17:01

## 2018-10-22 RX ADMIN — MORPHINE SULFATE 2 MILLIGRAM(S): 50 CAPSULE, EXTENDED RELEASE ORAL at 06:07

## 2018-10-22 RX ADMIN — DULOXETINE HYDROCHLORIDE 60 MILLIGRAM(S): 30 CAPSULE, DELAYED RELEASE ORAL at 11:09

## 2018-10-22 RX ADMIN — ZOLPIDEM TARTRATE 5 MILLIGRAM(S): 10 TABLET ORAL at 21:15

## 2018-10-22 RX ADMIN — PANTOPRAZOLE SODIUM 40 MILLIGRAM(S): 20 TABLET, DELAYED RELEASE ORAL at 06:26

## 2018-10-22 RX ADMIN — MORPHINE SULFATE 2 MILLIGRAM(S): 50 CAPSULE, EXTENDED RELEASE ORAL at 18:59

## 2018-10-22 RX ADMIN — CARBAMIDE PEROXIDE 5 DROP(S): 81.86 SOLUTION/ DROPS AURICULAR (OTIC) at 05:02

## 2018-10-22 RX ADMIN — Medication 20 MILLIGRAM(S): at 17:01

## 2018-10-22 RX ADMIN — MORPHINE SULFATE 2 MILLIGRAM(S): 50 CAPSULE, EXTENDED RELEASE ORAL at 19:19

## 2018-10-22 RX ADMIN — LATANOPROST 1 DROP(S): 0.05 SOLUTION/ DROPS OPHTHALMIC; TOPICAL at 21:14

## 2018-10-22 RX ADMIN — MORPHINE SULFATE 2 MILLIGRAM(S): 50 CAPSULE, EXTENDED RELEASE ORAL at 12:26

## 2018-10-22 RX ADMIN — TAMSULOSIN HYDROCHLORIDE 0.4 MILLIGRAM(S): 0.4 CAPSULE ORAL at 21:15

## 2018-10-22 RX ADMIN — Medication 20 MILLIGRAM(S): at 06:26

## 2018-10-22 RX ADMIN — Medication 20 MILLIGRAM(S): at 05:02

## 2018-10-22 RX ADMIN — Medication 50 MILLIGRAM(S): at 21:15

## 2018-10-22 NOTE — CONSULT NOTE ADULT - SUBJECTIVE AND OBJECTIVE BOX
[facility] consulted for Colonic Stent Placement.    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 Patient was intubated for the procedure and was not able to get extubated after the procedure and was sent to SICU, was extubated next day and now back on floor.  Pt also had distension of the colon likely due to Opiates, initially responded to Methylnaltrexone and then developed some distension again passing gas.   [facility] has been consulted to consider placing colonic stent due to colon distension. Imaging reviewed and Colonoscopy images were also reviewed. Pt has only 2 cm size lesion, not causing obstruction in the sigmoid colon.       Allergies:  No Known Allergies    Hospital Medications:  acetaminophen   Tablet .. 650 milliGRAM(s) Oral every 6 hours PRN  carbamide peroxide Otic Solution 5 Drop(s) Left Ear two times a day  chlorhexidine 4% Liquid 1 Application(s) Topical <User Schedule>  dextrose 5% + sodium chloride 0.45%. 1000 milliLiter(s) IV Continuous <Continuous>  diltiazem    Tablet 60 milliGRAM(s) Oral every 8 hours  DULoxetine 60 milliGRAM(s) Oral daily  furosemide    Tablet 20 milliGRAM(s) Oral daily  heparin  Infusion 1600 Unit(s)/Hr IV Continuous <Continuous>  latanoprost 0.005% Ophthalmic Solution 1 Drop(s) Both EYES at bedtime  morphine  - Injectable 2 milliGRAM(s) IV Push every 6 hours PRN  ondansetron Injectable 4 milliGRAM(s) IV Push every 6 hours PRN  pantoprazole    Tablet 40 milliGRAM(s) Oral before breakfast  propranolol 20 milliGRAM(s) Oral every 12 hours  tamsulosin 0.4 milliGRAM(s) Oral at bedtime  traZODone 50 milliGRAM(s) Oral at bedtime  zolpidem 5 milliGRAM(s) Oral at bedtime PRN      PMHX/PSHX:  Pleural effusion  CHF (congestive heart failure)  Insomnia  BPH (benign prostatic hyperplasia)  Depression  Bipolar 1 disorder  HTN (hypertension)  History of thoracentesis      Family history:  No pertinent family history in first degree relatives    ROS:     General:  No fevers, chills, night sweats, fatigue,   Eyes:  Good vision, no reported pain  ENT:  No sore throat, pain, runny nose, dysphagia  CV:  No pain, palpitations, hypo/hypertension  Resp:  wheezing and inability to complete sentences.   GI:  See HPI  :  No pain, bleeding, incontinence, nocturia  Muscle:  No pain, weakness  Heme:  No petechiae, ecchymosis, easy bruisability  Skin:  No rash, edema      PHYSICAL EXAM:     GENERAL:  Appears stated age, unable to complete sentences.   HEENT:  NC/AT,  conjunctivae clear and pink,  no JVD  CHEST:  B/L decreased breath sounds,. wheezing.  HEART:  Regular rhythm, S1, S2, no added sounds  ABDOMEN:  Soft, non-tender, distended, normoactive bowel sounds.  NEURO:  Alert, oriented    Vital Signs:  Vital Signs Last 24 Hrs  T(C): 36.2 (22 Oct 2018 15:42), Max: 36.6 (22 Oct 2018 07:35)  T(F): 97.1 (22 Oct 2018 15:42), Max: 97.8 (22 Oct 2018 07:35)  HR: 90 (22 Oct 2018 15:42) (81 - 103)  BP: 126/83 (22 Oct 2018 15:42) (90/58 - 169/73)  BP(mean): --  RR: 18 (22 Oct 2018 15:42) (18 - 20)  SpO2: 94% (22 Oct 2018 07:58) (94% - 95%)  Daily Height in cm: 187.96 (22 Oct 2018 13:56)    Daily     LABS:                        10.7   7.24  )-----------( 301      ( 22 Oct 2018 00:56 )             34.3     10-22    134<L>  |  96<L>  |  8<L>  ----------------------------<  110<H>  4.2   |  29  |  0.7    Ca    8.4<L>      22 Oct 2018 00:56  Phos  3.7     10-22  Mg     2.0     10-22        PT/INR - ( 22 Oct 2018 00:56 )   PT: 12.70 sec;   INR: 1.11 ratio         PTT - ( 22 Oct 2018 12:31 )  PTT:84.1 sec        Imaging: < from: CT Abdomen and Pelvis No Cont (10.16.18 @ 01:11) >    1.  No evidence of sigmoid volvulus. Air-filled distended colon without   any definite transition. Mesenteric inflammatory changes around the   sigmoid colon and cecum, concerning for colitis.  2.  Rectal tube in place.  3.  Stable bilateral loculated pleural effusions and pericardial effusion.    < end of copied text >

## 2018-10-22 NOTE — CONSULT NOTE ADULT - ASSESSMENT
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  [facility] has been consulted to consider placing colonic stent due to colon distension. Imaging reviewed and Colonoscopy images were also reviewed. Pt has only 2 cm size lesion, not causing obstruction in the sigmoid colon.     # Opoid induced Constipation and Colonic Pseudoobstruction :  - No evidence of colonic obstruction.  - No indication or benefit of colonic stent placement.  - Consider repeating a dose of Methylnaltrexone 12 mg.  - Palliative consult.  - Call [facility] needed.

## 2018-10-22 NOTE — CHART NOTE - NSCHARTNOTEFT_GEN_A_CORE
Registered Dietitian Follow-Up     Patient Profile Reviewed                           Yes [x]   No []     Nutrition History Previously Obtained        Yes [x]  No []      Pertinent Medical Information: S/P VATS for talc pleurodesis, currently being worked up for sigmoid colon mass. F/u advanced GI today for possible colonoscopy and stenting across sigmoid mass vs preop for possible xverse loop colostomy tomorrow.    Diet order: NPO at this time. Prior to this, pt was receiving Soft diet from 10/18 to 10/22. Pt was consuming 50% of meals at this time d/t decreased appetite 2/2 weakness following recent procedures.     Anthropometrics:  - Ht. 187.96 cm  - Wt. no new wt at this time. Latest wt is 104.2 kg (10/17)  - %wt change  - BMI 29.5  - IBW 86.4 kg     Pertinent Lab Data: 10/22: RBC-4.09, H/H-10.7/34.3, Na-134, Cl-96, BUN-8, gluc-110     Pertinent Meds: heparin, D5%-0.45% NaCl, lasix, zofran, protonix     Physical Findings:  - Appearance: alert & oriented  - GI function: Abd noted distended. Pt c/o nausea + abd discomfort. Last BM 10/21.  - Tubes: No feeding tube  - Oral/Mouth cavity: no chewing/swallowing difficulty reported at this time  - Skin: surgical incision     Nutrition Requirements  Weight Used: 104 kg + 86.4 kg (IBW) - will adjust protein recommendation to account for re-calculated IBW.     Estimated Calorie Needs: 2181-0379 kcal/day (MSJ x 1-1.1 AF)--d/t borderline obese BMI      Estimated Protein Needs:  g/day (1-1.2 g/kg IBW)      Estimated Fluid Needs: 1 mL/kcal         Nutrient Intake: Not meeting needs at this time.        [x] Previous Nutrition Diagnosis: Inadequate protein-energy intake            [x] Ongoing          [] Resolved    Nutrition Intervention: Meals and snacks, Medical Food Supplements     Goal/Expected Outcome: Pt to demonstrate at least 75% po intake of meals & po supplements over next 3 days.     Indicator/Monitoring: Energy intake, glucose profile, renal profile, nutrition focused physical findings, body composition.    Recommendation: When medically feasible to resume solid po diet, can resume Soft diet + add DASH/TLC diet modifier + order Ensure Clear q24hrs. none

## 2018-10-22 NOTE — PROGRESS NOTE ADULT - SUBJECTIVE AND OBJECTIVE BOX
Progress Note: Thoracic Surgery  Patient: AMRIK MADRID , 58y (1960)Male   MRN: [id number]  Location: [address]  Visit: 09-21-18 Inpatient  Date: 10-22-18 @ 05:20    Procedure/Diagnosis: s/p talc pleurodesis 10/10, s/p pigtail 10/8, sigmoid colon mass    Events/ 24h: No acute events overnight. Pain controlled.    Vitals: T(F): 97.5 (10-21-18 @ 23:44), Max: 98.8 (10-21-18 @ 16:00)  HR: 88 (10-21-18 @ 23:44)  BP: 120/71 (10-21-18 @ 23:44) (117/58 - 147/62)  RR: 18 (10-21-18 @ 23:44)  SpO2: 95% (10-21-18 @ 05:57)    In:   10-20-18 @ 07:01  -  10-21-18 @ 07:00  --------------------------------------------------------  IN: 128 mL    10-21-18 @ 07:01  -  10-22-18 @ 05:20  --------------------------------------------------------  IN: 228 mL      Out:   10-20-18 @ 07:01  -  10-21-18 @ 07:00  --------------------------------------------------------  OUT:    Voided: 200 mL  Total OUT: 200 mL      10-21-18 @ 07:01  -  10-22-18 @ 05:20  --------------------------------------------------------  OUT:    Voided: 400 mL  Total OUT: 400 mL        Net:   10-20-18 @ 07:01  -  10-21-18 @ 07:00  --------------------------------------------------------  NET: -72 mL    10-21-18 @ 07:01  -  10-22-18 @ 05:20  --------------------------------------------------------  NET: -172 mL        Diet: Diet, Soft (10-18-18 @ 17:05)    IV Fluids:     Physical Examination:  General Appearance: NAD  HEENT: sclera non-icteric.  Heart: RRR   Lungs: CTABL.   Abdomen:  Soft, nontender, nondistended. No rigidity, guarding, or rebound tenderness.   MSK/Extremities: Warm & well-perfused. Peripheral pulses intact.  Skin: Warm, dry. No jaundice.       Medications: [Standing]  carbamide peroxide Otic Solution 5 Drop(s) Left Ear two times a day  chlorhexidine 4% Liquid 1 Application(s) Topical <User Schedule>  diltiazem    Tablet 60 milliGRAM(s) Oral every 8 hours  docusate sodium Liquid 100 milliGRAM(s) Oral three times a day  DULoxetine 60 milliGRAM(s) Oral daily  furosemide    Tablet 20 milliGRAM(s) Oral daily  heparin  Infusion 1600 Unit(s)/Hr (16 mL/Hr) IV Continuous <Continuous>  latanoprost 0.005% Ophthalmic Solution 1 Drop(s) Both EYES at bedtime  pantoprazole    Tablet 40 milliGRAM(s) Oral before breakfast  propranolol 20 milliGRAM(s) Oral every 12 hours  senna 2 Tablet(s) Oral at bedtime  tamsulosin 0.4 milliGRAM(s) Oral at bedtime  traZODone 50 milliGRAM(s) Oral at bedtime    DVT Prophylaxis: heparin  Infusion 1600 Unit(s)/Hr IV Continuous <Continuous>    GI Prophylaxis: pantoprazole    Tablet 40 milliGRAM(s) Oral before breakfast    Antibiotics:   Anticoagulation:   Medications:[PRN]  acetaminophen   Tablet .. 650 milliGRAM(s) Oral every 6 hours PRN  morphine  - Injectable 2 milliGRAM(s) IV Push every 6 hours PRN  ondansetron Injectable 4 milliGRAM(s) IV Push every 6 hours PRN  zolpidem 5 milliGRAM(s) Oral at bedtime PRN      Labs:                        10.7   7.24  )-----------( 301      ( 22 Oct 2018 00:56 )             34.3     10-22    134<L>  |  96<L>  |  8<L>  ----------------------------<  110<H>  4.2   |  29  |  0.7    Ca    8.4<L>      22 Oct 2018 00:56  Phos  3.7     10-22  Mg     2.0     10-22        PT/INR - ( 22 Oct 2018 00:56 )   PT: 12.70 sec;   INR: 1.11 ratio         PTT - ( 22 Oct 2018 00:56 )  PTT:70.2 sec      Imaging:     < from: Xray Chest 1 View- PORTABLE-Routine (10.20.18 @ 05:33) >  Stable bilateral diffuse lung opacities    < end of copied text >      < from: CT Abdomen and Pelvis No Cont (10.16.18 @ 01:11) >  1.  No evidence of sigmoid volvulus. Air-filled distended colon without   any definite transition. Mesenteric inflammatory changes around the   sigmoid colon and cecum, concerning for colitis.  2.  Rectal tube in place.  3.  Stable bilateral loculated pleural effusions and pericardial effusion.    < end of copied text >      Assessment:  58y Male patient admitted S/P VATS for talc pleurodesis    Plan:    F/u advanced GI today for possible colonoscopy and stenting across sigmoid mass vs preop for possible xverse loop colostomy tomorrow  DVT/GI ppx  OOBAT  IS  Pain control    Date/Time: 10-22-18 @ 05:20

## 2018-10-22 NOTE — PRE-ANESTHESIA EVALUATION ADULT - NSRADCARDRESULTSFT_GEN_ALL_CORE
no cardiology consult on chart during this admission    ECHO (09/27/2018): LVEF 50-55%; normal LV size and wall thickness, with normal systolic function;  mild TR, there is mild aortic root calcification.
cardiac clearence low to moderate risk

## 2018-10-22 NOTE — PROGRESS NOTE ADULT - SUBJECTIVE AND OBJECTIVE BOX
Progress Note: General Surgery  Patient: AMRIK MADRID , 58y (1960)Male   MRN: [id number]  Location: [address]  Visit: 09-21-18 Inpatient  Date: 10-22-18 @ 05:20    Procedure/Diagnosis: s/p talc pleurodesis 10/10, s/p pigtail 10/8, sigmoid colon mass    Events/ 24h: No acute events overnight. Pain controlled.    Vitals: T(F): 97.5 (10-21-18 @ 23:44), Max: 98.8 (10-21-18 @ 16:00)  HR: 88 (10-21-18 @ 23:44)  BP: 120/71 (10-21-18 @ 23:44) (117/58 - 147/62)  RR: 18 (10-21-18 @ 23:44)  SpO2: 95% (10-21-18 @ 05:57)    In:   10-20-18 @ 07:01  -  10-21-18 @ 07:00  --------------------------------------------------------  IN: 128 mL    10-21-18 @ 07:01  -  10-22-18 @ 05:20  --------------------------------------------------------  IN: 228 mL      Out:   10-20-18 @ 07:01  -  10-21-18 @ 07:00  --------------------------------------------------------  OUT:    Voided: 200 mL  Total OUT: 200 mL      10-21-18 @ 07:01  -  10-22-18 @ 05:20  --------------------------------------------------------  OUT:    Voided: 400 mL  Total OUT: 400 mL        Net:   10-20-18 @ 07:01  -  10-21-18 @ 07:00  --------------------------------------------------------  NET: -72 mL    10-21-18 @ 07:01  -  10-22-18 @ 05:20  --------------------------------------------------------  NET: -172 mL        Diet: Diet, Soft (10-18-18 @ 17:05)    IV Fluids:     Physical Examination:  General Appearance: NAD  HEENT: sclera non-icteric.  Heart: RRR   Lungs: CTABL.   Abdomen:  Soft, nontender, nondistended. No rigidity, guarding, or rebound tenderness.   MSK/Extremities: Warm & well-perfused. Peripheral pulses intact.  Skin: Warm, dry. No jaundice.       Medications: [Standing]  carbamide peroxide Otic Solution 5 Drop(s) Left Ear two times a day  chlorhexidine 4% Liquid 1 Application(s) Topical <User Schedule>  diltiazem    Tablet 60 milliGRAM(s) Oral every 8 hours  docusate sodium Liquid 100 milliGRAM(s) Oral three times a day  DULoxetine 60 milliGRAM(s) Oral daily  furosemide    Tablet 20 milliGRAM(s) Oral daily  heparin  Infusion 1600 Unit(s)/Hr (16 mL/Hr) IV Continuous <Continuous>  latanoprost 0.005% Ophthalmic Solution 1 Drop(s) Both EYES at bedtime  pantoprazole    Tablet 40 milliGRAM(s) Oral before breakfast  propranolol 20 milliGRAM(s) Oral every 12 hours  senna 2 Tablet(s) Oral at bedtime  tamsulosin 0.4 milliGRAM(s) Oral at bedtime  traZODone 50 milliGRAM(s) Oral at bedtime    DVT Prophylaxis: heparin  Infusion 1600 Unit(s)/Hr IV Continuous <Continuous>    GI Prophylaxis: pantoprazole    Tablet 40 milliGRAM(s) Oral before breakfast    Antibiotics:   Anticoagulation:   Medications:[PRN]  acetaminophen   Tablet .. 650 milliGRAM(s) Oral every 6 hours PRN  morphine  - Injectable 2 milliGRAM(s) IV Push every 6 hours PRN  ondansetron Injectable 4 milliGRAM(s) IV Push every 6 hours PRN  zolpidem 5 milliGRAM(s) Oral at bedtime PRN      Labs:                        10.7   7.24  )-----------( 301      ( 22 Oct 2018 00:56 )             34.3     10-22    134<L>  |  96<L>  |  8<L>  ----------------------------<  110<H>  4.2   |  29  |  0.7    Ca    8.4<L>      22 Oct 2018 00:56  Phos  3.7     10-22  Mg     2.0     10-22        PT/INR - ( 22 Oct 2018 00:56 )   PT: 12.70 sec;   INR: 1.11 ratio         PTT - ( 22 Oct 2018 00:56 )  PTT:70.2 sec      Imaging:     < from: Xray Chest 1 View- PORTABLE-Routine (10.20.18 @ 05:33) >  Stable bilateral diffuse lung opacities    < end of copied text >      < from: CT Abdomen and Pelvis No Cont (10.16.18 @ 01:11) >  1.  No evidence of sigmoid volvulus. Air-filled distended colon without   any definite transition. Mesenteric inflammatory changes around the   sigmoid colon and cecum, concerning for colitis.  2.  Rectal tube in place.  3.  Stable bilateral loculated pleural effusions and pericardial effusion.    < end of copied text >      Assessment:  58y Male patient admitted S/P VATS for talc pleurodesis, currently being worked up for sigmoid colon mass    Plan:    F/u advanced GI today for possible colonoscopy and stenting across sigmoid mass vs preop for possible xverse loop colostomy tomorrow  DVT/GI ppx  OOBAT  IS  Pain control    Date/Time: 10-22-18 @ 05:20

## 2018-10-23 LAB
ALBUMIN SERPL ELPH-MCNC: 2.8 G/DL — LOW (ref 3.5–5.2)
ALP SERPL-CCNC: 362 U/L — HIGH (ref 30–115)
ALT FLD-CCNC: 15 U/L — SIGNIFICANT CHANGE UP (ref 0–41)
ANION GAP SERPL CALC-SCNC: 10 MMOL/L — SIGNIFICANT CHANGE UP (ref 7–14)
ANION GAP SERPL CALC-SCNC: 13 MMOL/L — SIGNIFICANT CHANGE UP (ref 7–14)
APTT BLD: 29.5 SEC — SIGNIFICANT CHANGE UP (ref 27–39.2)
APTT BLD: 76.4 SEC — CRITICAL HIGH (ref 27–39.2)
AST SERPL-CCNC: 16 U/L — SIGNIFICANT CHANGE UP (ref 0–41)
BASOPHILS # BLD AUTO: 0.03 K/UL — SIGNIFICANT CHANGE UP (ref 0–0.2)
BASOPHILS # BLD AUTO: 0.03 K/UL — SIGNIFICANT CHANGE UP (ref 0–0.2)
BASOPHILS NFR BLD AUTO: 0.4 % — SIGNIFICANT CHANGE UP (ref 0–1)
BASOPHILS NFR BLD AUTO: 0.4 % — SIGNIFICANT CHANGE UP (ref 0–1)
BILIRUB DIRECT SERPL-MCNC: 0.2 MG/DL — SIGNIFICANT CHANGE UP (ref 0–0.2)
BILIRUB INDIRECT FLD-MCNC: 0.3 MG/DL — SIGNIFICANT CHANGE UP (ref 0.2–1.2)
BILIRUB SERPL-MCNC: 0.5 MG/DL — SIGNIFICANT CHANGE UP (ref 0.2–1.2)
BLD GP AB SCN SERPL QL: SIGNIFICANT CHANGE UP
BUN SERPL-MCNC: 7 MG/DL — LOW (ref 10–20)
BUN SERPL-MCNC: 8 MG/DL — LOW (ref 10–20)
CALCIUM SERPL-MCNC: 8.1 MG/DL — LOW (ref 8.5–10.1)
CALCIUM SERPL-MCNC: 8.5 MG/DL — SIGNIFICANT CHANGE UP (ref 8.5–10.1)
CHLORIDE SERPL-SCNC: 97 MMOL/L — LOW (ref 98–110)
CHLORIDE SERPL-SCNC: 97 MMOL/L — LOW (ref 98–110)
CK MB CFR SERPL CALC: 1.3 NG/ML — SIGNIFICANT CHANGE UP (ref 0.6–6.3)
CO2 SERPL-SCNC: 28 MMOL/L — SIGNIFICANT CHANGE UP (ref 17–32)
CO2 SERPL-SCNC: 29 MMOL/L — SIGNIFICANT CHANGE UP (ref 17–32)
CREAT SERPL-MCNC: 0.7 MG/DL — SIGNIFICANT CHANGE UP (ref 0.7–1.5)
CREAT SERPL-MCNC: 0.7 MG/DL — SIGNIFICANT CHANGE UP (ref 0.7–1.5)
EOSINOPHIL # BLD AUTO: 0.16 K/UL — SIGNIFICANT CHANGE UP (ref 0–0.7)
EOSINOPHIL # BLD AUTO: 0.19 K/UL — SIGNIFICANT CHANGE UP (ref 0–0.7)
EOSINOPHIL NFR BLD AUTO: 2 % — SIGNIFICANT CHANGE UP (ref 0–8)
EOSINOPHIL NFR BLD AUTO: 2.5 % — SIGNIFICANT CHANGE UP (ref 0–8)
GLUCOSE BLDC GLUCOMTR-MCNC: 101 MG/DL — HIGH (ref 70–99)
GLUCOSE SERPL-MCNC: 104 MG/DL — HIGH (ref 70–99)
GLUCOSE SERPL-MCNC: 112 MG/DL — HIGH (ref 70–99)
HCT VFR BLD CALC: 33.9 % — LOW (ref 42–52)
HCT VFR BLD CALC: 34.5 % — LOW (ref 42–52)
HGB BLD-MCNC: 10.5 G/DL — LOW (ref 14–18)
HGB BLD-MCNC: 10.8 G/DL — LOW (ref 14–18)
IMM GRANULOCYTES NFR BLD AUTO: 1.3 % — HIGH (ref 0.1–0.3)
IMM GRANULOCYTES NFR BLD AUTO: 1.5 % — HIGH (ref 0.1–0.3)
INR BLD: 1.14 RATIO — SIGNIFICANT CHANGE UP (ref 0.65–1.3)
LYMPHOCYTES # BLD AUTO: 0.78 K/UL — LOW (ref 1.2–3.4)
LYMPHOCYTES # BLD AUTO: 1.16 K/UL — LOW (ref 1.2–3.4)
LYMPHOCYTES # BLD AUTO: 15.4 % — LOW (ref 20.5–51.1)
LYMPHOCYTES # BLD AUTO: 9.8 % — LOW (ref 20.5–51.1)
MAGNESIUM SERPL-MCNC: 1.9 MG/DL — SIGNIFICANT CHANGE UP (ref 1.8–2.4)
MAGNESIUM SERPL-MCNC: 2 MG/DL — SIGNIFICANT CHANGE UP (ref 1.8–2.4)
MCHC RBC-ENTMCNC: 25.8 PG — LOW (ref 27–31)
MCHC RBC-ENTMCNC: 25.9 PG — LOW (ref 27–31)
MCHC RBC-ENTMCNC: 31 G/DL — LOW (ref 32–37)
MCHC RBC-ENTMCNC: 31.3 G/DL — LOW (ref 32–37)
MCV RBC AUTO: 82.7 FL — SIGNIFICANT CHANGE UP (ref 80–94)
MCV RBC AUTO: 83.3 FL — SIGNIFICANT CHANGE UP (ref 80–94)
MONOCYTES # BLD AUTO: 0.62 K/UL — HIGH (ref 0.1–0.6)
MONOCYTES # BLD AUTO: 0.69 K/UL — HIGH (ref 0.1–0.6)
MONOCYTES NFR BLD AUTO: 8.3 % — SIGNIFICANT CHANGE UP (ref 1.7–9.3)
MONOCYTES NFR BLD AUTO: 8.7 % — SIGNIFICANT CHANGE UP (ref 1.7–9.3)
NEUTROPHILS # BLD AUTO: 5.41 K/UL — SIGNIFICANT CHANGE UP (ref 1.4–6.5)
NEUTROPHILS # BLD AUTO: 6.17 K/UL — SIGNIFICANT CHANGE UP (ref 1.4–6.5)
NEUTROPHILS NFR BLD AUTO: 72.1 % — SIGNIFICANT CHANGE UP (ref 42.2–75.2)
NEUTROPHILS NFR BLD AUTO: 77.6 % — HIGH (ref 42.2–75.2)
NRBC # BLD: 0 /100 WBCS — SIGNIFICANT CHANGE UP (ref 0–0)
PHOSPHATE SERPL-MCNC: 3.6 MG/DL — SIGNIFICANT CHANGE UP (ref 2.1–4.9)
PHOSPHATE SERPL-MCNC: 3.8 MG/DL — SIGNIFICANT CHANGE UP (ref 2.1–4.9)
PLATELET # BLD AUTO: 286 K/UL — SIGNIFICANT CHANGE UP (ref 130–400)
PLATELET # BLD AUTO: 324 K/UL — SIGNIFICANT CHANGE UP (ref 130–400)
POTASSIUM SERPL-MCNC: 4 MMOL/L — SIGNIFICANT CHANGE UP (ref 3.5–5)
POTASSIUM SERPL-MCNC: 4.2 MMOL/L — SIGNIFICANT CHANGE UP (ref 3.5–5)
POTASSIUM SERPL-SCNC: 4 MMOL/L — SIGNIFICANT CHANGE UP (ref 3.5–5)
POTASSIUM SERPL-SCNC: 4.2 MMOL/L — SIGNIFICANT CHANGE UP (ref 3.5–5)
PROT SERPL-MCNC: 4.7 G/DL — LOW (ref 6–8)
PROTHROM AB SERPL-ACNC: 13.1 SEC — HIGH (ref 9.95–12.87)
RBC # BLD: 4.07 M/UL — LOW (ref 4.7–6.1)
RBC # BLD: 4.17 M/UL — LOW (ref 4.7–6.1)
RBC # FLD: 14.9 % — HIGH (ref 11.5–14.5)
RBC # FLD: 15 % — HIGH (ref 11.5–14.5)
SODIUM SERPL-SCNC: 136 MMOL/L — SIGNIFICANT CHANGE UP (ref 135–146)
SODIUM SERPL-SCNC: 138 MMOL/L — SIGNIFICANT CHANGE UP (ref 135–146)
TYPE + AB SCN PNL BLD: SIGNIFICANT CHANGE UP
WBC # BLD: 7.51 K/UL — SIGNIFICANT CHANGE UP (ref 4.8–10.8)
WBC # BLD: 7.95 K/UL — SIGNIFICANT CHANGE UP (ref 4.8–10.8)
WBC # FLD AUTO: 7.51 K/UL — SIGNIFICANT CHANGE UP (ref 4.8–10.8)
WBC # FLD AUTO: 7.95 K/UL — SIGNIFICANT CHANGE UP (ref 4.8–10.8)

## 2018-10-23 RX ORDER — MORPHINE SULFATE 50 MG/1
4 CAPSULE, EXTENDED RELEASE ORAL ONCE
Qty: 0 | Refills: 0 | Status: DISCONTINUED | OUTPATIENT
Start: 2018-10-23 | End: 2018-10-23

## 2018-10-23 RX ORDER — FENTANYL CITRATE 50 UG/ML
0.5 INJECTION INTRAVENOUS
Qty: 2500 | Refills: 0 | Status: DISCONTINUED | OUTPATIENT
Start: 2018-10-23 | End: 2018-10-23

## 2018-10-23 RX ORDER — ACETAMINOPHEN 500 MG
650 TABLET ORAL EVERY 6 HOURS
Qty: 0 | Refills: 0 | Status: DISCONTINUED | OUTPATIENT
Start: 2018-10-23 | End: 2018-10-29

## 2018-10-23 RX ORDER — HYDROMORPHONE HYDROCHLORIDE 2 MG/ML
0.5 INJECTION INTRAMUSCULAR; INTRAVENOUS; SUBCUTANEOUS ONCE
Qty: 0 | Refills: 0 | Status: DISCONTINUED | OUTPATIENT
Start: 2018-10-23 | End: 2018-10-23

## 2018-10-23 RX ORDER — OXYCODONE HYDROCHLORIDE 5 MG/1
10 TABLET ORAL EVERY 6 HOURS
Qty: 0 | Refills: 0 | Status: DISCONTINUED | OUTPATIENT
Start: 2018-10-23 | End: 2018-10-29

## 2018-10-23 RX ORDER — SODIUM CHLORIDE 9 MG/ML
1000 INJECTION INTRAMUSCULAR; INTRAVENOUS; SUBCUTANEOUS
Qty: 0 | Refills: 0 | Status: DISCONTINUED | OUTPATIENT
Start: 2018-10-23 | End: 2018-10-23

## 2018-10-23 RX ORDER — PANTOPRAZOLE SODIUM 20 MG/1
40 TABLET, DELAYED RELEASE ORAL DAILY
Qty: 0 | Refills: 0 | Status: DISCONTINUED | OUTPATIENT
Start: 2018-10-23 | End: 2018-10-24

## 2018-10-23 RX ORDER — PROPRANOLOL HCL 160 MG
20 CAPSULE, EXTENDED RELEASE 24HR ORAL EVERY 12 HOURS
Qty: 0 | Refills: 0 | Status: DISCONTINUED | OUTPATIENT
Start: 2018-10-23 | End: 2018-10-29

## 2018-10-23 RX ORDER — OXYCODONE HYDROCHLORIDE 5 MG/1
5 TABLET ORAL EVERY 6 HOURS
Qty: 0 | Refills: 0 | Status: DISCONTINUED | OUTPATIENT
Start: 2018-10-23 | End: 2018-10-29

## 2018-10-23 RX ORDER — DEXMEDETOMIDINE HYDROCHLORIDE IN 0.9% SODIUM CHLORIDE 4 UG/ML
0.2 INJECTION INTRAVENOUS
Qty: 200 | Refills: 0 | Status: DISCONTINUED | OUTPATIENT
Start: 2018-10-23 | End: 2018-10-23

## 2018-10-23 RX ORDER — SODIUM CHLORIDE 9 MG/ML
1000 INJECTION, SOLUTION INTRAVENOUS
Qty: 0 | Refills: 0 | Status: DISCONTINUED | OUTPATIENT
Start: 2018-10-23 | End: 2018-10-23

## 2018-10-23 RX ORDER — ONDANSETRON 8 MG/1
4 TABLET, FILM COATED ORAL EVERY 6 HOURS
Qty: 0 | Refills: 0 | Status: DISCONTINUED | OUTPATIENT
Start: 2018-10-23 | End: 2018-10-29

## 2018-10-23 RX ORDER — TAMSULOSIN HYDROCHLORIDE 0.4 MG/1
0.4 CAPSULE ORAL AT BEDTIME
Qty: 0 | Refills: 0 | Status: DISCONTINUED | OUTPATIENT
Start: 2018-10-23 | End: 2018-10-29

## 2018-10-23 RX ORDER — CHLORHEXIDINE GLUCONATE 213 G/1000ML
1 SOLUTION TOPICAL
Qty: 0 | Refills: 0 | Status: DISCONTINUED | OUTPATIENT
Start: 2018-10-23 | End: 2018-10-29

## 2018-10-23 RX ORDER — FUROSEMIDE 40 MG
20 TABLET ORAL DAILY
Qty: 0 | Refills: 0 | Status: DISCONTINUED | OUTPATIENT
Start: 2018-10-23 | End: 2018-10-29

## 2018-10-23 RX ORDER — LATANOPROST 0.05 MG/ML
1 SOLUTION/ DROPS OPHTHALMIC; TOPICAL AT BEDTIME
Qty: 0 | Refills: 0 | Status: DISCONTINUED | OUTPATIENT
Start: 2018-10-23 | End: 2018-10-29

## 2018-10-23 RX ORDER — MORPHINE SULFATE 50 MG/1
2 CAPSULE, EXTENDED RELEASE ORAL
Qty: 0 | Refills: 0 | Status: DISCONTINUED | OUTPATIENT
Start: 2018-10-23 | End: 2018-10-23

## 2018-10-23 RX ADMIN — Medication 20 MILLIGRAM(S): at 05:47

## 2018-10-23 RX ADMIN — DEXMEDETOMIDINE HYDROCHLORIDE IN 0.9% SODIUM CHLORIDE 5.21 MICROGRAM(S)/KG/HR: 4 INJECTION INTRAVENOUS at 19:54

## 2018-10-23 RX ADMIN — DULOXETINE HYDROCHLORIDE 60 MILLIGRAM(S): 30 CAPSULE, DELAYED RELEASE ORAL at 11:31

## 2018-10-23 RX ADMIN — LATANOPROST 1 DROP(S): 0.05 SOLUTION/ DROPS OPHTHALMIC; TOPICAL at 21:40

## 2018-10-23 RX ADMIN — FENTANYL CITRATE 5.21 MICROGRAM(S)/KG/HR: 50 INJECTION INTRAVENOUS at 20:11

## 2018-10-23 RX ADMIN — MORPHINE SULFATE 2 MILLIGRAM(S): 50 CAPSULE, EXTENDED RELEASE ORAL at 01:09

## 2018-10-23 RX ADMIN — MORPHINE SULFATE 2 MILLIGRAM(S): 50 CAPSULE, EXTENDED RELEASE ORAL at 07:51

## 2018-10-23 RX ADMIN — MORPHINE SULFATE 2 MILLIGRAM(S): 50 CAPSULE, EXTENDED RELEASE ORAL at 08:06

## 2018-10-23 RX ADMIN — PANTOPRAZOLE SODIUM 40 MILLIGRAM(S): 20 TABLET, DELAYED RELEASE ORAL at 07:58

## 2018-10-23 RX ADMIN — MORPHINE SULFATE 2 MILLIGRAM(S): 50 CAPSULE, EXTENDED RELEASE ORAL at 00:54

## 2018-10-23 RX ADMIN — MORPHINE SULFATE 4 MILLIGRAM(S): 50 CAPSULE, EXTENDED RELEASE ORAL at 09:32

## 2018-10-23 RX ADMIN — HYDROMORPHONE HYDROCHLORIDE 0.5 MILLIGRAM(S): 2 INJECTION INTRAMUSCULAR; INTRAVENOUS; SUBCUTANEOUS at 23:41

## 2018-10-23 RX ADMIN — HYDROMORPHONE HYDROCHLORIDE 0.5 MILLIGRAM(S): 2 INJECTION INTRAMUSCULAR; INTRAVENOUS; SUBCUTANEOUS at 23:56

## 2018-10-23 RX ADMIN — MORPHINE SULFATE 4 MILLIGRAM(S): 50 CAPSULE, EXTENDED RELEASE ORAL at 09:47

## 2018-10-23 NOTE — BRIEF OPERATIVE NOTE - POST-OP DX
Colon tumor  10/23/2018    Active  Randy Good  Pleural effusion  10/10/2018    Active  William Caldwell

## 2018-10-23 NOTE — PROVIDER CONTACT NOTE (MEDICATION) - SITUATION
pt is c/o of severe pain. 2 mg of morphine was given at 0750 AM pt is not stating that he is in a lot of pain and the only other medication he has ordered is Tylenol

## 2018-10-23 NOTE — CONSULT NOTE ADULT - REASON FOR ADMISSION
worsening SOB

## 2018-10-23 NOTE — PRE-ANESTHESIA EVALUATION ADULT - NSPROPOSEDPROCEDFT_GEN_ALL_CORE
EGD, colonoscopy
Exploratory laprotomy, possible bowel resection
exploratory laparotomy, possible bowel resection, possible ostomy
Left VATS and left pleurodesis

## 2018-10-23 NOTE — PROVIDER CONTACT NOTE (OTHER) - ACTION/TREATMENT ORDERED:
MD Will order labs and make adjustments as per MD Garcia
continue hep infusion @ 16 cc/h
okay, thank you please document.

## 2018-10-23 NOTE — PRE-ANESTHESIA EVALUATION ADULT - NSANTHPMHFT_GEN_ALL_CORE
57 yo M with HTN, HLD, depression, DVT on Eliquis, CHF, recurrent pleural effusions s/p multiple thotacentesis. Presented on 10/07/2018 from [facility] for worsening SOB and abdominal pain and distension. S/p LEFT VATS pleural bx, pleural implant bx, talc pleurodesis on 10/10/2018.    On physical assessment today Pt is laying in bed with HOB 30 degrees, on n/c at 3L/min saturating 95%, rapid shallow breathing, unable to finish sentence without getting SOB. Reports abdominal pain and increased distention, no flatus or BM.    Pt is DNR/DNI.
VATS 10/10  Sigmoid mass  Colonoscopy- began as sedation and patient stated he needed to be intubated due to difficulty with his breathing during     Cardiology- low to intermediate risk

## 2018-10-23 NOTE — CONSULT NOTE ADULT - CONSULT REQUESTED DATE/TIME
02-Oct-2018 10:43
04-Oct-2018 12:24
15-Oct-2018 09:24
17-Oct-2018 17:43
19-Oct-2018 15:53
19-Oct-2018 23:29
22-Oct-2018 19:12
22-Sep-2018 10:26
25-Sep-2018 06:53
23-Oct-2018 18:24

## 2018-10-23 NOTE — PRE-ANESTHESIA EVALUATION ADULT - MALLAMPATI CLASS
Class III - visualization of the soft palate and the base of the uvula
Class II - visualization of the soft palate, fauces, and uvula
Class III - visualization of the soft palate and the base of the uvula

## 2018-10-23 NOTE — PROGRESS NOTE ADULT - ASSESSMENT
Plan and assessment  Pt. 58yMale status post transverse loop colostomy     -Pain management  -icu management  -npo   -intubated   -strict i and o  - [facility] follow up outpt     HARDEEP Jean   10-23-18 @ 20:55  # [id number]/ [id number]

## 2018-10-23 NOTE — CONSULT NOTE ADULT - SUBJECTIVE AND OBJECTIVE BOX
SICU Consultation Note  =====================================================  HPI:  59yo male with a PMH of HTN, COPD, CHF, DVT on home Eliquis BPH and depression, with a history of recurrent episodes of malignant pleural effusions, VATS 10/10 complaining of abdominal pain, distension, constipation. CT abdomen showed thickening of the ascending colon circumferential thickening. 10/17 Colonscopy was done under general anesthesia due to comorbidities patient was not extubated after the OR and spent the night in the ICU. Patient was extubated without difficulty the next morning. 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 Today (10/23) patient is s/p transverse loop colostomy. Patients surgery was uneventful, currently still intubated since he was a difficult intubation. On Fentanyl and Precedex drip. Patient is alert and following commands. Colostomy is pink and gas is already present in the bag. DNR/DNI was resended for OR.     Surgery Information  OR time: 1.5 hours     EBL:1 cc       UO: 100cc       IV Fluids: 1.4L           PAST MEDICAL & SURGICAL HISTORY:  Pleural effusion  CHF (congestive heart failure)  Insomnia  BPH (benign prostatic hyperplasia)  Depression  Bipolar 1 disorder  HTN (hypertension)  History of thoracentesis      Allergies  No Known Allergies    Home Medications:  Ambien 10 mg oral tablet: 1 tab(s) orally once a day (at bedtime) (21 Sep 2018 21:50)  apixaban 5 mg oral tablet: 1 tab(s) orally every 12 hours (21 Sep 2018 21:50)  Cardizem 60 mg oral tablet: 1 tab(s) orally every 8 hours (21 Sep 2018 21:50)  clotrimazole 1% topical cream: 1 application topically  (21 Sep 2018 21:50)  DULoxetine 60 mg oral delayed release capsule: 1 cap(s) orally once a day (21 Sep 2018 21:50)  isosorbide mononitrate 20 mg oral tablet: 1 tab(s) orally once a day (21 Sep 2018 21:50)  latanoprost 0.005% ophthalmic solution: 1 drop(s) to each affected eye once a day (in the evening) (21 Sep 2018 21:50)  Multiple Vitamins oral capsule: 1 cap(s) orally once a day (21 Sep 2018 21:50)  propranolol 20 mg oral tablet: 1 tab(s) orally 2 times a day (21 Sep 2018 21:50)  tamsulosin 0.4 mg oral capsule: 1 cap(s) orally once a day (21 Sep 2018 21:50)  traZODone 50 mg oral tablet: 1 tab(s) orally once a day (at bedtime) (21 Sep 2018 21:50)    Advanced Directives: Full Code     ROS:  [ ] A ten-point review of systems was otherwise negative except as noted.  [x ] Due to altered mental status/intubation, subjective information were not able to be obtained from the patient. History was obtained, to the extent possible, from review of the chart and collateral sources of information.      CURRENT MEDICATIONS:   --------------------------------------------------------------------------------------  Neurologic Medications  dexmedetomidine Infusion 0.2 MICROgram(s)/kG/Hr IV Continuous <Continuous>  fentaNYL   Infusion 0.5 MICROgram(s)/kG/Hr IV Continuous <Continuous>  morphine  - Injectable 2 milliGRAM(s) IV Push every 5 minutes PRN Severe Pain (7 - 10)  ondansetron Injectable 4 milliGRAM(s) IV Push every 6 hours PRN Nausea    Cardiovascular Medications  diltiazem    Tablet 60 milliGRAM(s) Oral every 8 hours  furosemide    Tablet 20 milliGRAM(s) Oral daily  propranolol 20 milliGRAM(s) Oral every 12 hours  tamsulosin 0.4 milliGRAM(s) Oral at bedtime    Gastrointestinal Medications  pantoprazole  Injectable 40 milliGRAM(s) IV Push daily  sodium chloride 0.9%. 1000 milliLiter(s) IV Continuous <Continuous>    Topical/Other Medications  chlorhexidine 4% Liquid 1 Application(s) Topical <User Schedule>  latanoprost 0.005% Ophthalmic Solution 1 Drop(s) Both EYES at bedtime    --------------------------------------------------------------------------------------    Vital Signs Last 24 Hrs  T(C): 36.9 (23 Oct 2018 18:29), Max: 36.9 (23 Oct 2018 18:29)  T(F): 98.4 (23 Oct 2018 18:29), Max: 98.4 (23 Oct 2018 18:29)  HR: 104 (23 Oct 2018 20:59) (83 - 104)  BP: 110/66 (23 Oct 2018 20:59) (101/68 - 137/63)  BP(mean): --  RR: 18 (23 Oct 2018 20:59) (10 - 22)  SpO2: 99% (23 Oct 2018 20:59) (92% - 100%)  I&O's Detail    22 Oct 2018 07:01  -  23 Oct 2018 07:00  --------------------------------------------------------  IN:    dextrose 5% + sodium chloride 0.45%.: 450 mL    heparin Infusion: 80 mL    Oral Fluid: 200 mL  Total IN: 730 mL    OUT:    Voided: 1425 mL  Total OUT: 1425 mL    Total NET: -695 mL      23 Oct 2018 07:01  -  23 Oct 2018 21:37  --------------------------------------------------------  IN:    dexmedetomidine Infusion: 6.3 mL    dextrose 5% + sodium chloride 0.45%.: 600 mL    fentaNYL  Infusion: 4.1 mL    sodium chloride 0.9%.: 103 mL  Total IN: 713.4 mL    OUT:    Intermittent Catheterization - Urethral: 45 mL    Voided: 1400 mL  Total OUT: 1445 mL    Total NET: -731.6 mL        I&O's Summary    22 Oct 2018 07:01  -  23 Oct 2018 07:00  --------------------------------------------------------  IN: 730 mL / OUT: 1425 mL / NET: -695 mL    23 Oct 2018 07:01  -  23 Oct 2018 21:37  --------------------------------------------------------  IN: 713.4 mL / OUT: 1445 mL / NET: -731.6 mL        LABS:  ABG - ( 23 Oct 2018 19:53 )  pH, Arterial: 7.44  pH, Blood: x     /  pCO2: 43    /  pO2: 88    / HCO3: 29    / Base Excess: 4.2   /  SaO2: 98                                      10.5   7.95  )-----------( 286      ( 23 Oct 2018 19:20 )             33.9     10-23    138  |  97<L>  |  8<L>  ----------------------------<  112<H>  4.0   |  28  |  0.7    Ca    8.1<L>      23 Oct 2018 19:20  Phos  3.6     10-23  Mg     1.9     10-23    TPro  4.7<L>  /  Alb  2.8<L>  /  TBili  0.5  /  DBili  0.2  /  AST  16  /  ALT  15  /  AlkPhos  362<H>  10-23    LIVER FUNCTIONS - ( 23 Oct 2018 19:20 )  Alb: 2.8 g/dL / Pro: 4.7 g/dL / ALK PHOS: 362 U/L / ALT: 15 U/L / AST: 16 U/L / GGT: x           PT/INR - ( 23 Oct 2018 19:20 )   PT: 13.10 sec;   INR: 1.14 ratio         PTT - ( 23 Oct 2018 19:20 )  PTT:29.5 sec  CARDIAC MARKERS ( 23 Oct 2018 19:20 )  x     / x     / x     / x     / 1.3 ng/mL        EXAM:  General/Neuro: Intubated. Alert. Follows commands. PERRL.     Respiratory  Exam: Intubated 50/5. Lungs clear to auscultation, Normal expansion/effort.    Mechanical Ventilation: Mode: AC/ CMV (Assist Control/ Continuous Mandatory Ventilation), RR (machine): 18, TV (machine): 500, FiO2: 50, PEEP: 5, ITime: 1, MAP: 13, PIP: 30    Cardiovascular  Exam: S1, S2.  Regular rate and rhythm.     GI  Exam: Abdomen soft, Non-tender, Non-distended. Colostomy pink and viable, gas in the bag. Current Diet:  NPO    Extremities  Exam: Extremities warm, pink, well-perfused. Palpable pulses BL.     Derm:  Exam: Good skin turgor, no skin breakdown.      :   Exam: Velásquez catheter in place.     Tubes/Lines/Drains  ***  [x] Peripheral IV  [x] Arterial Line		[] R	[x] L	[] Fem	[x] Rad	[] Ax	Date Placed:  10/23       [x] Urinary Catheter		Date Placed: 10/23 SICU Consultation Note  =====================================================  HPI:  57yo male with a PMH of HTN, COPD, CHF, DVT on home Eliquis BPH and depression, with a history of recurrent episodes of malignant pleural effusions, VATS 10/10 complaining of abdominal pain, distension, constipation. CT abdomen showed thickening of the ascending colon. 10/17 Colonscopy was done under general anesthesia due to comorbidities patient was not extubated after the OR and spent the night in the ICU. Patient was extubated without difficulty the next morning. 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 Today (10/23) patient is s/p transverse loop colostomy. Patients surgery was uneventful, currently still intubated since he was a difficult intubation. Patient tolerated procedure well, hemodynamically stable. On Fentanyl and Precedex drip. Patient is alert and following commands. Colostomy is pink and gas is already present in the bag. DNR/DNI was resended for OR.     Surgery Information  OR time: 1.5 hours     EBL:1 cc       UO: 100cc       IV Fluids: 1.4L           PAST MEDICAL & SURGICAL HISTORY:  Pleural effusion  CHF (congestive heart failure)  Insomnia  BPH (benign prostatic hyperplasia)  Depression  Bipolar 1 disorder  HTN (hypertension)  History of thoracentesis      Allergies  No Known Allergies    Home Medications:  Ambien 10 mg oral tablet: 1 tab(s) orally once a day (at bedtime) (21 Sep 2018 21:50)  apixaban 5 mg oral tablet: 1 tab(s) orally every 12 hours (21 Sep 2018 21:50)  Cardizem 60 mg oral tablet: 1 tab(s) orally every 8 hours (21 Sep 2018 21:50)  clotrimazole 1% topical cream: 1 application topically  (21 Sep 2018 21:50)  DULoxetine 60 mg oral delayed release capsule: 1 cap(s) orally once a day (21 Sep 2018 21:50)  isosorbide mononitrate 20 mg oral tablet: 1 tab(s) orally once a day (21 Sep 2018 21:50)  latanoprost 0.005% ophthalmic solution: 1 drop(s) to each affected eye once a day (in the evening) (21 Sep 2018 21:50)  Multiple Vitamins oral capsule: 1 cap(s) orally once a day (21 Sep 2018 21:50)  propranolol 20 mg oral tablet: 1 tab(s) orally 2 times a day (21 Sep 2018 21:50)  tamsulosin 0.4 mg oral capsule: 1 cap(s) orally once a day (21 Sep 2018 21:50)  traZODone 50 mg oral tablet: 1 tab(s) orally once a day (at bedtime) (21 Sep 2018 21:50)    Advanced Directives: Full Code     ROS:  [ ] A ten-point review of systems was otherwise negative except as noted.  [x ] Due to altered mental status/intubation, subjective information were not able to be obtained from the patient. History was obtained, to the extent possible, from review of the chart and collateral sources of information.      CURRENT MEDICATIONS:   --------------------------------------------------------------------------------------  Neurologic Medications  dexmedetomidine Infusion 0.2 MICROgram(s)/kG/Hr IV Continuous <Continuous>  fentaNYL   Infusion 0.5 MICROgram(s)/kG/Hr IV Continuous <Continuous>  morphine  - Injectable 2 milliGRAM(s) IV Push every 5 minutes PRN Severe Pain (7 - 10)  ondansetron Injectable 4 milliGRAM(s) IV Push every 6 hours PRN Nausea    Cardiovascular Medications  diltiazem    Tablet 60 milliGRAM(s) Oral every 8 hours  furosemide    Tablet 20 milliGRAM(s) Oral daily  propranolol 20 milliGRAM(s) Oral every 12 hours  tamsulosin 0.4 milliGRAM(s) Oral at bedtime    Gastrointestinal Medications  pantoprazole  Injectable 40 milliGRAM(s) IV Push daily  sodium chloride 0.9%. 1000 milliLiter(s) IV Continuous <Continuous>    Topical/Other Medications  chlorhexidine 4% Liquid 1 Application(s) Topical <User Schedule>  latanoprost 0.005% Ophthalmic Solution 1 Drop(s) Both EYES at bedtime    --------------------------------------------------------------------------------------    Vital Signs Last 24 Hrs  T(C): 36.9 (23 Oct 2018 18:29), Max: 36.9 (23 Oct 2018 18:29)  T(F): 98.4 (23 Oct 2018 18:29), Max: 98.4 (23 Oct 2018 18:29)  HR: 104 (23 Oct 2018 20:59) (83 - 104)  BP: 110/66 (23 Oct 2018 20:59) (101/68 - 137/63)  BP(mean): --  RR: 18 (23 Oct 2018 20:59) (10 - 22)  SpO2: 99% (23 Oct 2018 20:59) (92% - 100%)  I&O's Detail    22 Oct 2018 07:01  -  23 Oct 2018 07:00  --------------------------------------------------------  IN:    dextrose 5% + sodium chloride 0.45%.: 450 mL    heparin Infusion: 80 mL    Oral Fluid: 200 mL  Total IN: 730 mL    OUT:    Voided: 1425 mL  Total OUT: 1425 mL    Total NET: -695 mL      23 Oct 2018 07:01  -  23 Oct 2018 21:37  --------------------------------------------------------  IN:    dexmedetomidine Infusion: 6.3 mL    dextrose 5% + sodium chloride 0.45%.: 600 mL    fentaNYL  Infusion: 4.1 mL    sodium chloride 0.9%.: 103 mL  Total IN: 713.4 mL    OUT:    Intermittent Catheterization - Urethral: 45 mL    Voided: 1400 mL  Total OUT: 1445 mL    Total NET: -731.6 mL        I&O's Summary    22 Oct 2018 07:01  -  23 Oct 2018 07:00  --------------------------------------------------------  IN: 730 mL / OUT: 1425 mL / NET: -695 mL    23 Oct 2018 07:01  -  23 Oct 2018 21:37  --------------------------------------------------------  IN: 713.4 mL / OUT: 1445 mL / NET: -731.6 mL        LABS:  ABG - ( 23 Oct 2018 19:53 )  pH, Arterial: 7.44  pH, Blood: x     /  pCO2: 43    /  pO2: 88    / HCO3: 29    / Base Excess: 4.2   /  SaO2: 98                                      10.5   7.95  )-----------( 286      ( 23 Oct 2018 19:20 )             33.9     10-23    138  |  97<L>  |  8<L>  ----------------------------<  112<H>  4.0   |  28  |  0.7    Ca    8.1<L>      23 Oct 2018 19:20  Phos  3.6     10-23  Mg     1.9     10-23    TPro  4.7<L>  /  Alb  2.8<L>  /  TBili  0.5  /  DBili  0.2  /  AST  16  /  ALT  15  /  AlkPhos  362<H>  10-23    LIVER FUNCTIONS - ( 23 Oct 2018 19:20 )  Alb: 2.8 g/dL / Pro: 4.7 g/dL / ALK PHOS: 362 U/L / ALT: 15 U/L / AST: 16 U/L / GGT: x           PT/INR - ( 23 Oct 2018 19:20 )   PT: 13.10 sec;   INR: 1.14 ratio         PTT - ( 23 Oct 2018 19:20 )  PTT:29.5 sec  CARDIAC MARKERS ( 23 Oct 2018 19:20 )  x     / x     / x     / x     / 1.3 ng/mL        EXAM:  General/Neuro: Intubated. Alert. Follows commands. PERRL.     Respiratory  Exam: Intubated 50/5. Lungs clear to auscultation, Normal expansion/effort.    Mechanical Ventilation: Mode: AC/ CMV (Assist Control/ Continuous Mandatory Ventilation), RR (machine): 18, TV (machine): 500, FiO2: 50, PEEP: 5, ITime: 1, MAP: 13, PIP: 30    Cardiovascular  Exam: S1, S2.  Regular rate and rhythm.     GI  Exam: Abdomen soft, Non-tender, Non-distended. Colostomy pink and viable, gas in the bag. Current Diet:  NPO    Extremities  Exam: Extremities warm, pink, well-perfused. Palpable pulses BL.     Derm:  Exam: Good skin turgor, no skin breakdown.      :   Exam: Velásquez catheter in place.     Tubes/Lines/Drains  ***  [x] Peripheral IV  [x] Arterial Line		[] R	[x] L	[] Fem	[x] Rad	[] Ax	Date Placed:  10/23       [x] Urinary Catheter		Date Placed: 10/23

## 2018-10-23 NOTE — PRE-ANESTHESIA EVALUATION ADULT - NSANTHOSAYNRD_GEN_A_CORE
No. VERENICE screening performed.  STOP BANG Legend: 0-2 = LOW Risk; 3-4 = INTERMEDIATE Risk; 5-8 = HIGH Risk

## 2018-10-23 NOTE — PRE-ANESTHESIA EVALUATION ADULT - [id number]_GEN_ALL_CORE
Anesthesia risk alerts addressed and discussed with second provider

## 2018-10-23 NOTE — PRE-ANESTHESIA EVALUATION ADULT - LAST ECHOCARDIOGRAM
EF 50-55%, grade 1 diastolic
EF 50%, grade 1 diastolic
EF 50-55%, grade 1 diastolic,
EF 50-55%, grade 1 diastolic
EF 50-55%, grade 1 diastolic,

## 2018-10-23 NOTE — PROGRESS NOTE ADULT - SUBJECTIVE AND OBJECTIVE BOX
Progress Note: General Surgery  Patient: AMRIK MADRID , 58y (1960)Male   MRN: [id number]  Location: [address]  Visit: 09-21-18 Inpatient  Date: 10-23-18 @ 05:29    Procedure/Diagnosis: s/p pigtail 10/8. s/p vats 10/10. s/p c-scope 10/17    Events/ 24h: No acute events overnight. Pain controlled. Surgery team offering pt ex lap, possible bowel resection, possible ostomy for symptomatic sigmoid mass. Pt deciding    Vitals: T(F): 97.1 (10-23-18 @ 00:00), Max: 97.8 (10-22-18 @ 07:35)  HR: 91 (10-23-18 @ 00:00)  BP: 110/63 (10-23-18 @ 00:00) (90/58 - 169/73)  RR: 22 (10-23-18 @ 03:09)  SpO2: 92% (10-23-18 @ 03:09)    In:   10-21-18 @ 07:01  -  10-22-18 @ 07:00  --------------------------------------------------------  IN: 228 mL    10-22-18 @ 07:01  -  10-23-18 @ 05:29  --------------------------------------------------------  IN: 655 mL      Out:   10-21-18 @ 07:01  -  10-22-18 @ 07:00  --------------------------------------------------------  OUT:    Voided: 800 mL  Total OUT: 800 mL      10-22-18 @ 07:01  -  10-23-18 @ 05:29  --------------------------------------------------------  OUT:    Voided: 1425 mL  Total OUT: 1425 mL        Net:   10-21-18 @ 07:01  -  10-22-18 @ 07:00  --------------------------------------------------------  NET: -572 mL    10-22-18 @ 07:01  -  10-23-18 @ 05:29  --------------------------------------------------------  NET: -770 mL        Diet: Diet, NPO:   Except Medications (10-22-18 @ 06:20)    IV Fluids: dextrose 5% + sodium chloride 0.45%. 1000 milliLiter(s) (75 mL/Hr) IV Continuous <Continuous>      Physical Examination:  General Appearance: NAD  HEENT: EOMI, sclera non-icteric.  Heart: RRR   Lungs: CTABL.   Abdomen:  Soft, nontender, mild distention. No rigidity, guarding, or rebound tenderness.   MSK/Extremities: Warm & well-perfused. Peripheral pulses intact.  Skin: Warm, dry. No jaundice.       Medications: [Standing]  carbamide peroxide Otic Solution 5 Drop(s) Left Ear two times a day  chlorhexidine 4% Liquid 1 Application(s) Topical <User Schedule>  dextrose 5% + sodium chloride 0.45%. 1000 milliLiter(s) (75 mL/Hr) IV Continuous <Continuous>  diltiazem    Tablet 60 milliGRAM(s) Oral every 8 hours  DULoxetine 60 milliGRAM(s) Oral daily  furosemide    Tablet 20 milliGRAM(s) Oral daily  heparin  Infusion 1600 Unit(s)/Hr (16 mL/Hr) IV Continuous <Continuous>  latanoprost 0.005% Ophthalmic Solution 1 Drop(s) Both EYES at bedtime  pantoprazole    Tablet 40 milliGRAM(s) Oral before breakfast  propranolol 20 milliGRAM(s) Oral every 12 hours  tamsulosin 0.4 milliGRAM(s) Oral at bedtime  traZODone 50 milliGRAM(s) Oral at bedtime    DVT Prophylaxis: heparin  Infusion 1600 Unit(s)/Hr IV Continuous <Continuous>    GI Prophylaxis: pantoprazole    Tablet 40 milliGRAM(s) Oral before breakfast    Antibiotics:   Anticoagulation:   Medications:[PRN]  acetaminophen   Tablet .. 650 milliGRAM(s) Oral every 6 hours PRN  morphine  - Injectable 2 milliGRAM(s) IV Push every 6 hours PRN  ondansetron Injectable 4 milliGRAM(s) IV Push every 6 hours PRN  zolpidem 5 milliGRAM(s) Oral at bedtime PRN      Labs:                        10.8   7.51  )-----------( 324      ( 23 Oct 2018 00:05 )             34.5     10-23    136  |  97<L>  |  7<L>  ----------------------------<  104<H>  4.2   |  29  |  0.7    Ca    8.5      23 Oct 2018 00:05  Phos  3.8     10-23  Mg     2.0     10-23        PT/INR - ( 22 Oct 2018 00:56 )   PT: 12.70 sec;   INR: 1.11 ratio         PTT - ( 23 Oct 2018 00:05 )  PTT:76.4 sec      Imaging:     < from: CT Abdomen and Pelvis No Cont (10.16.18 @ 01:11) >  1.  No evidence of sigmoid volvulus. Air-filled distended colon without   any definite transition. Mesenteric inflammatory changes around the   sigmoid colon and cecum, concerning for colitis.  2.  Rectal tube in place.  3.  Stable bilateral loculated pleural effusions and pericardial effusion.    < end of copied text >      Assessment:  58y Male patient admitted s/p pigtail 10/8. s/p vats 10/10. s/p c-scope 10/17      Plan:    f/u pt decision  DVT/GI ppx  OOBAT  IS  Pain control    Date/Time: 10-23-18 @ 05:29 Progress Note: Thoracic Surgery  Patient: AMRIK MADRID , 58y (1960)Male   MRN: [id number]  Location: [address]  Visit: 09-21-18 Inpatient  Date: 10-23-18 @ 05:29    Procedure/Diagnosis: s/p pigtail 10/8. s/p vats 10/10. s/p c-scope 10/17    Events/ 24h: No acute events overnight. Pain controlled. Surgery team offering pt ex lap, possible bowel resection, possible ostomy for symptomatic sigmoid mass. Pt deciding    Vitals: T(F): 97.1 (10-23-18 @ 00:00), Max: 97.8 (10-22-18 @ 07:35)  HR: 91 (10-23-18 @ 00:00)  BP: 110/63 (10-23-18 @ 00:00) (90/58 - 169/73)  RR: 22 (10-23-18 @ 03:09)  SpO2: 92% (10-23-18 @ 03:09)    In:   10-21-18 @ 07:01  -  10-22-18 @ 07:00  --------------------------------------------------------  IN: 228 mL    10-22-18 @ 07:01  -  10-23-18 @ 05:29  --------------------------------------------------------  IN: 655 mL      Out:   10-21-18 @ 07:01  -  10-22-18 @ 07:00  --------------------------------------------------------  OUT:    Voided: 800 mL  Total OUT: 800 mL      10-22-18 @ 07:01  -  10-23-18 @ 05:29  --------------------------------------------------------  OUT:    Voided: 1425 mL  Total OUT: 1425 mL        Net:   10-21-18 @ 07:01  -  10-22-18 @ 07:00  --------------------------------------------------------  NET: -572 mL    10-22-18 @ 07:01  -  10-23-18 @ 05:29  --------------------------------------------------------  NET: -770 mL        Diet: Diet, NPO:   Except Medications (10-22-18 @ 06:20)    IV Fluids: dextrose 5% + sodium chloride 0.45%. 1000 milliLiter(s) (75 mL/Hr) IV Continuous <Continuous>      Physical Examination:  General Appearance: NAD  HEENT: EOMI, sclera non-icteric.  Heart: RRR   Lungs: CTABL.   Abdomen:  Soft, nontender, mild distention. No rigidity, guarding, or rebound tenderness.   MSK/Extremities: Warm & well-perfused. Peripheral pulses intact.  Skin: Warm, dry. No jaundice.       Medications: [Standing]  carbamide peroxide Otic Solution 5 Drop(s) Left Ear two times a day  chlorhexidine 4% Liquid 1 Application(s) Topical <User Schedule>  dextrose 5% + sodium chloride 0.45%. 1000 milliLiter(s) (75 mL/Hr) IV Continuous <Continuous>  diltiazem    Tablet 60 milliGRAM(s) Oral every 8 hours  DULoxetine 60 milliGRAM(s) Oral daily  furosemide    Tablet 20 milliGRAM(s) Oral daily  heparin  Infusion 1600 Unit(s)/Hr (16 mL/Hr) IV Continuous <Continuous>  latanoprost 0.005% Ophthalmic Solution 1 Drop(s) Both EYES at bedtime  pantoprazole    Tablet 40 milliGRAM(s) Oral before breakfast  propranolol 20 milliGRAM(s) Oral every 12 hours  tamsulosin 0.4 milliGRAM(s) Oral at bedtime  traZODone 50 milliGRAM(s) Oral at bedtime    DVT Prophylaxis: heparin  Infusion 1600 Unit(s)/Hr IV Continuous <Continuous>    GI Prophylaxis: pantoprazole    Tablet 40 milliGRAM(s) Oral before breakfast    Antibiotics:   Anticoagulation:   Medications:[PRN]  acetaminophen   Tablet .. 650 milliGRAM(s) Oral every 6 hours PRN  morphine  - Injectable 2 milliGRAM(s) IV Push every 6 hours PRN  ondansetron Injectable 4 milliGRAM(s) IV Push every 6 hours PRN  zolpidem 5 milliGRAM(s) Oral at bedtime PRN      Labs:                        10.8   7.51  )-----------( 324      ( 23 Oct 2018 00:05 )             34.5     10-23    136  |  97<L>  |  7<L>  ----------------------------<  104<H>  4.2   |  29  |  0.7    Ca    8.5      23 Oct 2018 00:05  Phos  3.8     10-23  Mg     2.0     10-23        PT/INR - ( 22 Oct 2018 00:56 )   PT: 12.70 sec;   INR: 1.11 ratio         PTT - ( 23 Oct 2018 00:05 )  PTT:76.4 sec      Imaging:     < from: CT Abdomen and Pelvis No Cont (10.16.18 @ 01:11) >  1.  No evidence of sigmoid volvulus. Air-filled distended colon without   any definite transition. Mesenteric inflammatory changes around the   sigmoid colon and cecum, concerning for colitis.  2.  Rectal tube in place.  3.  Stable bilateral loculated pleural effusions and pericardial effusion.    < end of copied text >      Assessment:  58y Male patient admitted s/p pigtail 10/8. s/p vats 10/10. s/p c-scope 10/17      Plan:    f/u pt decision  DVT/GI ppx  OOBAT  IS  Pain control    Date/Time: 10-23-18 @ 05:29

## 2018-10-23 NOTE — PROGRESS NOTE ADULT - SUBJECTIVE AND OBJECTIVE BOX
Progress Note: General Surgery  Patient: AMRIK MADRID , 58y (1960)Male   MRN: [id number]  Location: [address]  Visit: 09-21-18 Inpatient  Date: 10-23-18 @ 05:29    Procedure/Diagnosis: s/p pigtail 10/8. s/p vats 10/10. s/p c-scope 10/17    Events/ 24h: No acute events overnight. Pain controlled. Surgery team offering pt ex lap, possible bowel resection, possible ostomy for symptomatic sigmoid mass. Pt deciding    Vitals: T(F): 97.1 (10-23-18 @ 00:00), Max: 97.8 (10-22-18 @ 07:35)  HR: 91 (10-23-18 @ 00:00)  BP: 110/63 (10-23-18 @ 00:00) (90/58 - 169/73)  RR: 22 (10-23-18 @ 03:09)  SpO2: 92% (10-23-18 @ 03:09)    In:   10-21-18 @ 07:01  -  10-22-18 @ 07:00  --------------------------------------------------------  IN: 228 mL    10-22-18 @ 07:01  -  10-23-18 @ 05:29  --------------------------------------------------------  IN: 655 mL      Out:   10-21-18 @ 07:01  -  10-22-18 @ 07:00  --------------------------------------------------------  OUT:    Voided: 800 mL  Total OUT: 800 mL      10-22-18 @ 07:01  -  10-23-18 @ 05:29  --------------------------------------------------------  OUT:    Voided: 1425 mL  Total OUT: 1425 mL        Net:   10-21-18 @ 07:01  -  10-22-18 @ 07:00  --------------------------------------------------------  NET: -572 mL    10-22-18 @ 07:01  -  10-23-18 @ 05:29  --------------------------------------------------------  NET: -770 mL        Diet: Diet, NPO:   Except Medications (10-22-18 @ 06:20)    IV Fluids: dextrose 5% + sodium chloride 0.45%. 1000 milliLiter(s) (75 mL/Hr) IV Continuous <Continuous>      Physical Examination:  General Appearance: NAD  HEENT: EOMI, sclera non-icteric.  Heart: RRR   Lungs: CTABL.   Abdomen:  Soft, nontender, mild distention. No rigidity, guarding, or rebound tenderness.   MSK/Extremities: Warm & well-perfused. Peripheral pulses intact.  Skin: Warm, dry. No jaundice.       Medications: [Standing]  carbamide peroxide Otic Solution 5 Drop(s) Left Ear two times a day  chlorhexidine 4% Liquid 1 Application(s) Topical <User Schedule>  dextrose 5% + sodium chloride 0.45%. 1000 milliLiter(s) (75 mL/Hr) IV Continuous <Continuous>  diltiazem    Tablet 60 milliGRAM(s) Oral every 8 hours  DULoxetine 60 milliGRAM(s) Oral daily  furosemide    Tablet 20 milliGRAM(s) Oral daily  heparin  Infusion 1600 Unit(s)/Hr (16 mL/Hr) IV Continuous <Continuous>  latanoprost 0.005% Ophthalmic Solution 1 Drop(s) Both EYES at bedtime  pantoprazole    Tablet 40 milliGRAM(s) Oral before breakfast  propranolol 20 milliGRAM(s) Oral every 12 hours  tamsulosin 0.4 milliGRAM(s) Oral at bedtime  traZODone 50 milliGRAM(s) Oral at bedtime    DVT Prophylaxis: heparin  Infusion 1600 Unit(s)/Hr IV Continuous <Continuous>    GI Prophylaxis: pantoprazole    Tablet 40 milliGRAM(s) Oral before breakfast    Antibiotics:   Anticoagulation:   Medications:[PRN]  acetaminophen   Tablet .. 650 milliGRAM(s) Oral every 6 hours PRN  morphine  - Injectable 2 milliGRAM(s) IV Push every 6 hours PRN  ondansetron Injectable 4 milliGRAM(s) IV Push every 6 hours PRN  zolpidem 5 milliGRAM(s) Oral at bedtime PRN      Labs:                        10.8   7.51  )-----------( 324      ( 23 Oct 2018 00:05 )             34.5     10-23    136  |  97<L>  |  7<L>  ----------------------------<  104<H>  4.2   |  29  |  0.7    Ca    8.5      23 Oct 2018 00:05  Phos  3.8     10-23  Mg     2.0     10-23        PT/INR - ( 22 Oct 2018 00:56 )   PT: 12.70 sec;   INR: 1.11 ratio         PTT - ( 23 Oct 2018 00:05 )  PTT:76.4 sec      Imaging:     < from: CT Abdomen and Pelvis No Cont (10.16.18 @ 01:11) >  1.  No evidence of sigmoid volvulus. Air-filled distended colon without   any definite transition. Mesenteric inflammatory changes around the   sigmoid colon and cecum, concerning for colitis.  2.  Rectal tube in place.  3.  Stable bilateral loculated pleural effusions and pericardial effusion.    < end of copied text >      Assessment:  58y Male patient admitted s/p pigtail 10/8. s/p vats 10/10. s/p c-scope 10/17      Plan:    f/u pt decision  DVT/GI ppx  OOBAT  IS  Pain control    Date/Time: 10-23-18 @ 05:34

## 2018-10-23 NOTE — CHART NOTE - NSCHARTNOTEFT_GEN_A_CORE
PACU ANESTHESIA ADMISSION NOTE      Procedure: Exploratory Lapartomy and loop colostomy      Post op diagnosis:  Bowel Obstruction      ____  Intubated  TV:______       Rate: ______      FiO2: ______    _x___  Patent Airway    _x___  Full return of protective reflexes    _x___  Full recovery from anesthesia / back to baseline status    Vitals:  T(F): 98.4   HR: 93  BP: 108/79  RR: 10  SpO2: 100%     Mental Status:  ____ Awake   _____ Alert   _____ Drowsy   ___x__ Sedated    Nausea/Vomiting:  _x___  NO       ______Yes,   See Post - Op Orders         Pain Scale (0-10):  __0___    Treatment: _x___ None    ____ See Post - Op/PCA Orders    Post - Operative Fluids:   ____ Oral   __x__ See Post - Op Orders    Plan: Discharge:   ____Home       _____Floor     __x___Critical Care    _____  Other:_________________    Comments:  No anesthesia issues or complications noted.  Discharge when criteria met.    Report given to RN and ICU LA Ramey.  All questions answered.

## 2018-10-23 NOTE — CONSULT NOTE ADULT - CONSULT REASON
s/p x-lap transverse loop colostomy
Colonic Stent Placement
ICU upgrade
Ileus
Pleural effusion, b/l
Respiratory failure w/ inability to extubate s/p EGD/ Colonoscopy
hearing loss, balance issues
preop risk assessment for possible exploratory laparotomy, creation of transverse loop colostomy
uti
weakness

## 2018-10-23 NOTE — CONSULT NOTE ADULT - ASSESSMENT
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 On Fentanyl and Precedex gtt.     Respiratory: Intubated, 50/5. Consider extubation in the morning.     Cardiovascular: HTN: back on home meds of Cardizem and Lasix Pending 3 sets of cardiac enzymes.     Gastrointestinal/Nutrition: NPO for now. OG tube placed. IVF NS at 75. Senna/Colace bowel regimen. PPI ppx.     Genitourinary/Renal: BPH: back on home meds of tamsulosin. Velásquez placed making good urine. Monitor lytes and replete as needed.     Hematologic: Will restart Heparin gtt tomorrow as per primary team. SCD placed.     Infectious Disease: Monitor for fevers/chills.     Endocrine: Monitor FS.     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 Precedex and Fentanyl gtt. Holding fentanyl for SBT trial.     Respiratory: Intubated, 50/5. Consider extubation after SBT trial.     Cardiovascular: HTN: back on home meds of Cardizem and Lasix Pending 3 sets of cardiac enzymes.     Gastrointestinal/Nutrition: NPO for now. OG tube in place. IVF NS at 75. Senna/Colace bowel regimen. PPI ppx.     Genitourinary/Renal: BPH: back on home meds of tamsulosin. Velásquez placed making good urine. Monitor lytes and replete as needed.     Hematologic: Will restart Heparin gtt tomorrow as per primary team. SCD placed.     Infectious Disease: Monitor for fevers/chills.     Endocrine: Monitor FS.     Disposition: SICU    Disposition:

## 2018-10-23 NOTE — PRE-ANESTHESIA EVALUATION ADULT - NSDENTALSD_ENT_ALL_CORE
missing teeth
appears normal and intact
missing teeth
poor dentition, multiple chipped
loose teeth/missing teeth

## 2018-10-23 NOTE — PROGRESS NOTE ADULT - SUBJECTIVE AND OBJECTIVE BOX
Procedure: Status post transverse loop colostomy  Post Operative day #0    Patient intubated resting comfortably no compliants    pmh:  Pleural effusion  CHF (congestive heart failure)  Insomnia  BPH (benign prostatic hyperplasia)  Depression  Bipolar 1 disorder  HTN (hypertension)  Colon tumor  Pleural effusion  Colon tumor  Pleural effusion  Pleural effusion  Loop colostomy  Thoracoscopy  Chest tube insertion  Thoracentesis  Thoracentesis  History of thoracentesis  SOB      No Known Allergies    chlorhexidine 4% Liquid 1 Application(s) Topical <User Schedule>  dexmedetomidine Infusion 0.2 MICROgram(s)/kG/Hr IV Continuous <Continuous>  diltiazem    Tablet 60 milliGRAM(s) Oral every 8 hours  fentaNYL   Infusion 0.5 MICROgram(s)/kG/Hr IV Continuous <Continuous>  furosemide    Tablet 20 milliGRAM(s) Oral daily  latanoprost 0.005% Ophthalmic Solution 1 Drop(s) Both EYES at bedtime  morphine  - Injectable 2 milliGRAM(s) IV Push every 5 minutes PRN  ondansetron Injectable 4 milliGRAM(s) IV Push every 6 hours PRN  pantoprazole  Injectable 40 milliGRAM(s) IV Push daily  propranolol 20 milliGRAM(s) Oral every 12 hours  sodium chloride 0.9%. 1000 milliLiter(s) IV Continuous <Continuous>  tamsulosin 0.4 milliGRAM(s) Oral at bedtime          T(C): 36.9 (10-23-18 @ 18:29), Max: 36.9 (10-23-18 @ 18:29)  HR: 100 (10-23-18 @ 19:59) (83 - 100)  BP: 113/89 (10-23-18 @ 19:59) (101/68 - 137/63)  RR: 20 (10-23-18 @ 19:59) (10 - 22)  SpO2: 99% (10-23-18 @ 19:59) (92% - 100%)    10-22-18 @ 07:01  -  10-23-18 @ 07:00  --------------------------------------------------------  IN: 730 mL / OUT: 1425 mL / NET: -695 mL    10-23-18 @ 07:01  -  10-23-18 @ 20:55  --------------------------------------------------------  IN: 713.4 mL / OUT: 1445 mL / NET: -731.6 mL        General: Alert Intubated 50/5, not in acute distress   Heart: Regular rate and rhythm, no rubs , murmurs or gallops  Lungs: Clear to auscultation bilaterally, no wheezes, rales, rhonci appreciated  Abdomen: Soft , positive bowel sounds, no tenderness, no distention, no peritoneal signs, ostomy pink  Exremites:  warm extremities,  good color, no swelling, motor and sensation , pulses                10.5   7.95  )-----------( 286      ( 10-23 @ 19:20 )             33.9                10.8   7.51  )-----------( 324      ( 10-23 @ 00:05 )             34.5                10.7   7.24  )-----------( 301      ( 10-22 @ 00:56 )             34.3                    138   |  97    |  8                  Ca: 8.1    BMP:   ----------------------------< 112    M.9   (10-23-18 @ 19:20)             4.0    |  28    | 0.7                Ph: 3.6      LFT:     TPro: 4.7 / Alb: 2.8 / TBili: 0.5 / DBili: 0.2 / AST: 16 / ALT: 15 / AlkPhos: 362   (10-23-18 @ 19:20)          PT/INR - ( 23 Oct 2018 19:20 )   PT: 13.10 sec;   INR: 1.14 ratio         PTT - ( 23 Oct 2018 19:20 )  PTT:29.5 sec    CARDIAC MARKERS ( 23 Oct 2018 19:20 )  x     / x     / x     / x     / 1.3 ng/mL            ABG - ( 23 Oct 2018 19:53 )  pH: 7.44  /  pCO2: 43    /  pO2: 88    / HCO3: 29    / Base Excess: 4.2   /  SaO2: 98

## 2018-10-23 NOTE — CONSULT NOTE ADULT - ATTENDING COMMENTS
bilateral impacted earwax. Cleaning by ENT PA after debrox. Audiogram after.
Assessment and plan above were modified and discussed with residents, physician assistants, and nurses.
Assessment and plan above were modified and discussed with residents, physician assistants, and nurses.  I have provided 35min of Critical Care to this patient.

## 2018-10-23 NOTE — PROVIDER CONTACT NOTE (OTHER) - SITUATION
pt on heprin drip. Labs was ordered for 16:00. According to nomogram pt need labs drawn after 17:11.
RN and PCA encouraged patient to ambulate or even sit in the chair this shift. pt refused and stated he is tired and has pain . pain medication was given as ordered
hep infusing at 16 cc/h, bag is finished.  pt is  on pt specific hep protocol.  last aPTT @ 2am 81.9. should  hep drip  rate be adjusted?
notified md , aptt is ordered for 20:00. haparin infusion is at 16ml hour

## 2018-10-24 LAB
ALBUMIN SERPL ELPH-MCNC: 2.9 G/DL — LOW (ref 3.5–5.2)
ALP SERPL-CCNC: 390 U/L — HIGH (ref 30–115)
ALT FLD-CCNC: 17 U/L — SIGNIFICANT CHANGE UP (ref 0–41)
ANION GAP SERPL CALC-SCNC: 11 MMOL/L — SIGNIFICANT CHANGE UP (ref 7–14)
ANION GAP SERPL CALC-SCNC: 13 MMOL/L — SIGNIFICANT CHANGE UP (ref 7–14)
APTT BLD: 25.9 SEC — LOW (ref 27–39.2)
APTT BLD: 31.6 SEC — SIGNIFICANT CHANGE UP (ref 27–39.2)
APTT BLD: 59.6 SEC — HIGH (ref 27–39.2)
AST SERPL-CCNC: 18 U/L — SIGNIFICANT CHANGE UP (ref 0–41)
BASOPHILS # BLD AUTO: 0.02 K/UL — SIGNIFICANT CHANGE UP (ref 0–0.2)
BASOPHILS # BLD AUTO: 0.03 K/UL — SIGNIFICANT CHANGE UP (ref 0–0.2)
BASOPHILS NFR BLD AUTO: 0.2 % — SIGNIFICANT CHANGE UP (ref 0–1)
BASOPHILS NFR BLD AUTO: 0.2 % — SIGNIFICANT CHANGE UP (ref 0–1)
BILIRUB DIRECT SERPL-MCNC: 0.2 MG/DL — SIGNIFICANT CHANGE UP (ref 0–0.2)
BILIRUB INDIRECT FLD-MCNC: 0.4 MG/DL — SIGNIFICANT CHANGE UP (ref 0.2–1.2)
BILIRUB SERPL-MCNC: 0.6 MG/DL — SIGNIFICANT CHANGE UP (ref 0.2–1.2)
BUN SERPL-MCNC: 9 MG/DL — LOW (ref 10–20)
BUN SERPL-MCNC: 9 MG/DL — LOW (ref 10–20)
CALCIUM SERPL-MCNC: 8.3 MG/DL — LOW (ref 8.5–10.1)
CALCIUM SERPL-MCNC: 8.5 MG/DL — SIGNIFICANT CHANGE UP (ref 8.5–10.1)
CHLORIDE SERPL-SCNC: 100 MMOL/L — SIGNIFICANT CHANGE UP (ref 98–110)
CHLORIDE SERPL-SCNC: 97 MMOL/L — LOW (ref 98–110)
CK MB CFR SERPL CALC: 1.5 NG/ML — SIGNIFICANT CHANGE UP (ref 0.6–6.3)
CK MB CFR SERPL CALC: 1.7 NG/ML — SIGNIFICANT CHANGE UP (ref 0.6–6.3)
CK MB CFR SERPL CALC: 1.7 NG/ML — SIGNIFICANT CHANGE UP (ref 0.6–6.3)
CK SERPL-CCNC: 190 U/L — SIGNIFICANT CHANGE UP (ref 0–225)
CK SERPL-CCNC: 207 U/L — SIGNIFICANT CHANGE UP (ref 0–225)
CK SERPL-CCNC: 224 U/L — SIGNIFICANT CHANGE UP (ref 0–225)
CO2 SERPL-SCNC: 26 MMOL/L — SIGNIFICANT CHANGE UP (ref 17–32)
CO2 SERPL-SCNC: 27 MMOL/L — SIGNIFICANT CHANGE UP (ref 17–32)
CREAT SERPL-MCNC: 0.8 MG/DL — SIGNIFICANT CHANGE UP (ref 0.7–1.5)
CREAT SERPL-MCNC: 0.9 MG/DL — SIGNIFICANT CHANGE UP (ref 0.7–1.5)
EOSINOPHIL # BLD AUTO: 0.04 K/UL — SIGNIFICANT CHANGE UP (ref 0–0.7)
EOSINOPHIL # BLD AUTO: 0.07 K/UL — SIGNIFICANT CHANGE UP (ref 0–0.7)
EOSINOPHIL NFR BLD AUTO: 0.4 % — SIGNIFICANT CHANGE UP (ref 0–8)
EOSINOPHIL NFR BLD AUTO: 0.5 % — SIGNIFICANT CHANGE UP (ref 0–8)
GLUCOSE BLDC GLUCOMTR-MCNC: 116 MG/DL — HIGH (ref 70–99)
GLUCOSE BLDC GLUCOMTR-MCNC: 117 MG/DL — HIGH (ref 70–99)
GLUCOSE BLDC GLUCOMTR-MCNC: 124 MG/DL — HIGH (ref 70–99)
GLUCOSE SERPL-MCNC: 118 MG/DL — HIGH (ref 70–99)
GLUCOSE SERPL-MCNC: 127 MG/DL — HIGH (ref 70–99)
HCT VFR BLD CALC: 34.5 % — LOW (ref 42–52)
HCT VFR BLD CALC: 35.9 % — LOW (ref 42–52)
HGB BLD-MCNC: 10.9 G/DL — LOW (ref 14–18)
HGB BLD-MCNC: 11.3 G/DL — LOW (ref 14–18)
IMM GRANULOCYTES NFR BLD AUTO: 0.7 % — HIGH (ref 0.1–0.3)
IMM GRANULOCYTES NFR BLD AUTO: 0.8 % — HIGH (ref 0.1–0.3)
INR BLD: 1.04 RATIO — SIGNIFICANT CHANGE UP (ref 0.65–1.3)
INR BLD: 1.15 RATIO — SIGNIFICANT CHANGE UP (ref 0.65–1.3)
INR BLD: 1.18 RATIO — SIGNIFICANT CHANGE UP (ref 0.65–1.3)
LYMPHOCYTES # BLD AUTO: 0.86 K/UL — LOW (ref 1.2–3.4)
LYMPHOCYTES # BLD AUTO: 0.87 K/UL — LOW (ref 1.2–3.4)
LYMPHOCYTES # BLD AUTO: 6.6 % — LOW (ref 20.5–51.1)
LYMPHOCYTES # BLD AUTO: 8.2 % — LOW (ref 20.5–51.1)
MAGNESIUM SERPL-MCNC: 1.9 MG/DL — SIGNIFICANT CHANGE UP (ref 1.8–2.4)
MAGNESIUM SERPL-MCNC: 2 MG/DL — SIGNIFICANT CHANGE UP (ref 1.8–2.4)
MCHC RBC-ENTMCNC: 26.1 PG — LOW (ref 27–31)
MCHC RBC-ENTMCNC: 26.2 PG — LOW (ref 27–31)
MCHC RBC-ENTMCNC: 31.5 G/DL — LOW (ref 32–37)
MCHC RBC-ENTMCNC: 31.6 G/DL — LOW (ref 32–37)
MCV RBC AUTO: 82.7 FL — SIGNIFICANT CHANGE UP (ref 80–94)
MCV RBC AUTO: 83.1 FL — SIGNIFICANT CHANGE UP (ref 80–94)
MONOCYTES # BLD AUTO: 0.75 K/UL — HIGH (ref 0.1–0.6)
MONOCYTES # BLD AUTO: 0.81 K/UL — HIGH (ref 0.1–0.6)
MONOCYTES NFR BLD AUTO: 5.7 % — SIGNIFICANT CHANGE UP (ref 1.7–9.3)
MONOCYTES NFR BLD AUTO: 7.7 % — SIGNIFICANT CHANGE UP (ref 1.7–9.3)
NEUTROPHILS # BLD AUTO: 11.43 K/UL — HIGH (ref 1.4–6.5)
NEUTROPHILS # BLD AUTO: 8.75 K/UL — HIGH (ref 1.4–6.5)
NEUTROPHILS NFR BLD AUTO: 82.8 % — HIGH (ref 42.2–75.2)
NEUTROPHILS NFR BLD AUTO: 86.2 % — HIGH (ref 42.2–75.2)
NRBC # BLD: 0 /100 WBCS — SIGNIFICANT CHANGE UP (ref 0–0)
PHOSPHATE SERPL-MCNC: 4.1 MG/DL — SIGNIFICANT CHANGE UP (ref 2.1–4.9)
PHOSPHATE SERPL-MCNC: 4.2 MG/DL — SIGNIFICANT CHANGE UP (ref 2.1–4.9)
PLATELET # BLD AUTO: 280 K/UL — SIGNIFICANT CHANGE UP (ref 130–400)
PLATELET # BLD AUTO: 302 K/UL — SIGNIFICANT CHANGE UP (ref 130–400)
POTASSIUM SERPL-MCNC: 4.4 MMOL/L — SIGNIFICANT CHANGE UP (ref 3.5–5)
POTASSIUM SERPL-MCNC: 4.6 MMOL/L — SIGNIFICANT CHANGE UP (ref 3.5–5)
POTASSIUM SERPL-SCNC: 4.4 MMOL/L — SIGNIFICANT CHANGE UP (ref 3.5–5)
POTASSIUM SERPL-SCNC: 4.6 MMOL/L — SIGNIFICANT CHANGE UP (ref 3.5–5)
PROT SERPL-MCNC: 5.1 G/DL — LOW (ref 6–8)
PROTHROM AB SERPL-ACNC: 12 SEC — SIGNIFICANT CHANGE UP (ref 9.95–12.87)
PROTHROM AB SERPL-ACNC: 13.2 SEC — HIGH (ref 9.95–12.87)
PROTHROM AB SERPL-ACNC: 13.5 SEC — HIGH (ref 9.95–12.87)
RBC # BLD: 4.17 M/UL — LOW (ref 4.7–6.1)
RBC # BLD: 4.32 M/UL — LOW (ref 4.7–6.1)
RBC # FLD: 14.9 % — HIGH (ref 11.5–14.5)
RBC # FLD: 15.2 % — HIGH (ref 11.5–14.5)
SODIUM SERPL-SCNC: 136 MMOL/L — SIGNIFICANT CHANGE UP (ref 135–146)
SODIUM SERPL-SCNC: 138 MMOL/L — SIGNIFICANT CHANGE UP (ref 135–146)
TROPONIN T SERPL-MCNC: <0.01 NG/ML — SIGNIFICANT CHANGE UP
WBC # BLD: 10.55 K/UL — SIGNIFICANT CHANGE UP (ref 4.8–10.8)
WBC # BLD: 13.26 K/UL — HIGH (ref 4.8–10.8)
WBC # FLD AUTO: 10.55 K/UL — SIGNIFICANT CHANGE UP (ref 4.8–10.8)
WBC # FLD AUTO: 13.26 K/UL — HIGH (ref 4.8–10.8)

## 2018-10-24 PROCEDURE — 99291 CRITICAL CARE FIRST HOUR: CPT

## 2018-10-24 RX ORDER — HYDROMORPHONE HYDROCHLORIDE 2 MG/ML
0.5 INJECTION INTRAMUSCULAR; INTRAVENOUS; SUBCUTANEOUS ONCE
Qty: 0 | Refills: 0 | Status: DISCONTINUED | OUTPATIENT
Start: 2018-10-24 | End: 2018-10-24

## 2018-10-24 RX ORDER — HYDROMORPHONE HYDROCHLORIDE 2 MG/ML
1 INJECTION INTRAMUSCULAR; INTRAVENOUS; SUBCUTANEOUS EVERY 4 HOURS
Qty: 0 | Refills: 0 | Status: DISCONTINUED | OUTPATIENT
Start: 2018-10-24 | End: 2018-10-25

## 2018-10-24 RX ORDER — PANTOPRAZOLE SODIUM 20 MG/1
40 TABLET, DELAYED RELEASE ORAL
Qty: 0 | Refills: 0 | Status: DISCONTINUED | OUTPATIENT
Start: 2018-10-25 | End: 2018-10-29

## 2018-10-24 RX ORDER — HEPARIN SODIUM 5000 [USP'U]/ML
1600 INJECTION INTRAVENOUS; SUBCUTANEOUS
Qty: 25000 | Refills: 0 | Status: DISCONTINUED | OUTPATIENT
Start: 2018-10-24 | End: 2018-10-25

## 2018-10-24 RX ORDER — INFLUENZA VIRUS VACCINE 15; 15; 15; 15 UG/.5ML; UG/.5ML; UG/.5ML; UG/.5ML
0.5 SUSPENSION INTRAMUSCULAR ONCE
Qty: 0 | Refills: 0 | Status: COMPLETED | OUTPATIENT
Start: 2018-10-24 | End: 2018-10-24

## 2018-10-24 RX ORDER — SODIUM CHLORIDE 9 MG/ML
1000 INJECTION INTRAMUSCULAR; INTRAVENOUS; SUBCUTANEOUS
Qty: 0 | Refills: 0 | Status: DISCONTINUED | OUTPATIENT
Start: 2018-10-24 | End: 2018-10-24

## 2018-10-24 RX ORDER — ZOLPIDEM TARTRATE 10 MG/1
5 TABLET ORAL AT BEDTIME
Qty: 0 | Refills: 0 | Status: DISCONTINUED | OUTPATIENT
Start: 2018-10-24 | End: 2018-10-29

## 2018-10-24 RX ORDER — HYDROMORPHONE HYDROCHLORIDE 2 MG/ML
1 INJECTION INTRAMUSCULAR; INTRAVENOUS; SUBCUTANEOUS ONCE
Qty: 0 | Refills: 0 | Status: DISCONTINUED | OUTPATIENT
Start: 2018-10-24 | End: 2018-10-24

## 2018-10-24 RX ADMIN — Medication 20 MILLIGRAM(S): at 05:39

## 2018-10-24 RX ADMIN — OXYCODONE HYDROCHLORIDE 5 MILLIGRAM(S): 5 TABLET ORAL at 08:30

## 2018-10-24 RX ADMIN — HYDROMORPHONE HYDROCHLORIDE 1 MILLIGRAM(S): 2 INJECTION INTRAMUSCULAR; INTRAVENOUS; SUBCUTANEOUS at 16:41

## 2018-10-24 RX ADMIN — ZOLPIDEM TARTRATE 5 MILLIGRAM(S): 10 TABLET ORAL at 22:39

## 2018-10-24 RX ADMIN — HYDROMORPHONE HYDROCHLORIDE 1 MILLIGRAM(S): 2 INJECTION INTRAMUSCULAR; INTRAVENOUS; SUBCUTANEOUS at 10:36

## 2018-10-24 RX ADMIN — HYDROMORPHONE HYDROCHLORIDE 1 MILLIGRAM(S): 2 INJECTION INTRAMUSCULAR; INTRAVENOUS; SUBCUTANEOUS at 10:26

## 2018-10-24 RX ADMIN — HYDROMORPHONE HYDROCHLORIDE 0.5 MILLIGRAM(S): 2 INJECTION INTRAMUSCULAR; INTRAVENOUS; SUBCUTANEOUS at 05:58

## 2018-10-24 RX ADMIN — HEPARIN SODIUM 16 UNIT(S)/HR: 5000 INJECTION INTRAVENOUS; SUBCUTANEOUS at 10:34

## 2018-10-24 RX ADMIN — OXYCODONE HYDROCHLORIDE 5 MILLIGRAM(S): 5 TABLET ORAL at 07:41

## 2018-10-24 RX ADMIN — HYDROMORPHONE HYDROCHLORIDE 1 MILLIGRAM(S): 2 INJECTION INTRAMUSCULAR; INTRAVENOUS; SUBCUTANEOUS at 20:23

## 2018-10-24 RX ADMIN — OXYCODONE HYDROCHLORIDE 10 MILLIGRAM(S): 5 TABLET ORAL at 02:34

## 2018-10-24 RX ADMIN — HYDROMORPHONE HYDROCHLORIDE 1 MILLIGRAM(S): 2 INJECTION INTRAMUSCULAR; INTRAVENOUS; SUBCUTANEOUS at 16:05

## 2018-10-24 RX ADMIN — PANTOPRAZOLE SODIUM 40 MILLIGRAM(S): 20 TABLET, DELAYED RELEASE ORAL at 12:09

## 2018-10-24 RX ADMIN — OXYCODONE HYDROCHLORIDE 10 MILLIGRAM(S): 5 TABLET ORAL at 02:04

## 2018-10-24 RX ADMIN — HYDROMORPHONE HYDROCHLORIDE 0.5 MILLIGRAM(S): 2 INJECTION INTRAMUSCULAR; INTRAVENOUS; SUBCUTANEOUS at 05:43

## 2018-10-24 NOTE — ANESTHESIA FOLLOW-UP NOTE - NSPROCEDUREFT_GEN_ALL_CORE
after procedure, low tidal volume, lethargic and low saturation.  Brought to pacu on ventilator.  abg and respiratory parameters ordered
Ex lap colon resection colostomy

## 2018-10-24 NOTE — PROGRESS NOTE ADULT - ASSESSMENT
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 Pain controlled with Oxycodone     Respiratory: Extubated. Satting well on room air.     Cardiovascular: HTN: back on home meds of Cardizem and Lasix Pending 3 sets of cardiac enzymes.     Gastrointestinal/Nutrition: NPO for now. IVF NS at 75. Senna/Colace bowel regimen. PPI ppx.     Genitourinary/Renal: BPH: back on home meds of tamsulosin. Velásquez placed making good urine. Monitor lytes and replete as needed.     Hematologic: Will restart Heparin gtt AM as per primary team. SCD placed.     Infectious Disease: Monitor for fevers/chills.     Endocrine: Monitor FS.     Disposition: SICU ASSESSMENT/PLAN: 58yMale with a PMH of HTN, COPD, CHF, DVT on Elliquis, BPH and Depression with a history of malignant pleural effusions 2/2 to colon AdenoCa s/p transverse loop colostomy      Neurologic: Alert, follows commands. Pain controlled with Oxycodone     Respiratory: Extubated. Satting well on room air.     Cardiovascular: HTN: back on home meds of Cardizem and Lasix Pending 3 sets of cardiac enzymes(needs one more).    Gastrointestinal/Nutrition: NPO for now. IVF NS at 75. Senna/Colace bowel regimen. PPI ppx.     Genitourinary/Renal: BPH: back on home meds of tamsulosin. Velásquez placed making good urine. Monitor lytes and replete as needed.     Hematologic: Will restart Heparin gtt AM as per primary team. SCD placed.     Infectious Disease: Monitor for fevers/chills.     Endocrine: Monitor FS.     Disposition: SICU

## 2018-10-24 NOTE — ANESTHESIA FOLLOW-UP NOTE - NSINTERVIEW_GEN_ALL_CORE
Interviewed and evaluated

## 2018-10-24 NOTE — PROGRESS NOTE ADULT - SUBJECTIVE AND OBJECTIVE BOX
AMRIK MADRID  [id number]  58y Male     Indication for ICU admission: s/p transverse loop colostomy, intubated  Admit Date: 09-21-18  ICU Date: 10/23/18  OR Date: 10/23/18    No Known Allergies    PAST MEDICAL & SURGICAL HISTORY:  Pleural effusion  CHF (congestive heart failure)  Insomnia  BPH (benign prostatic hyperplasia)  Depression  Bipolar 1 disorder  HTN (hypertension)  History of thoracentesis    Home Medications:  Ambien 10 mg oral tablet: 1 tab(s) orally once a day (at bedtime) (21 Sep 2018 21:50)  apixaban 5 mg oral tablet: 1 tab(s) orally every 12 hours (21 Sep 2018 21:50)  Cardizem 60 mg oral tablet: 1 tab(s) orally every 8 hours (21 Sep 2018 21:50)  clotrimazole 1% topical cream: 1 application topically  (21 Sep 2018 21:50)  DULoxetine 60 mg oral delayed release capsule: 1 cap(s) orally once a day (21 Sep 2018 21:50)  isosorbide mononitrate 20 mg oral tablet: 1 tab(s) orally once a day (21 Sep 2018 21:50)  latanoprost 0.005% ophthalmic solution: 1 drop(s) to each affected eye once a day (in the evening) (21 Sep 2018 21:50)  Multiple Vitamins oral capsule: 1 cap(s) orally once a day (21 Sep 2018 21:50)  propranolol 20 mg oral tablet: 1 tab(s) orally 2 times a day (21 Sep 2018 21:50)  tamsulosin 0.4 mg oral capsule: 1 cap(s) orally once a day (21 Sep 2018 21:50)  traZODone 50 mg oral tablet: 1 tab(s) orally once a day (at bedtime) (21 Sep 2018 21:50)        24HRS EVENT  POD# 1 S/P transverse loop colostomy  OR Time: 1.5 hours EBL: 1 cc IVF: 1.4L UO: 100cc    Neuro: Alert. Follows commands. On precedex and fentanyl gtt. Stopped fentanyl for SBT trial.   Resp: Intubated 50/5. SBT trial with parameters, possible extubation.   Cardio: Normotensive. HTN. Restarted on home cardizem and lasix. 3 sets of cardiac enzymes pending.   GI: OG tube placement confirmed. NPO. PPI ppx. Senna/Colace bowel regimen.   : Velásquez placed. Making urine. Monitor lytes.   Heme: Hold Heparin gtt until 10/24 as per primary team. SCD in place.   Endo: No dx. Monitor FS  ID: Monitor for fever/chills.    Code: DNR/DNI resended for OR. Will re-evaluate once patient is extubated.     Overnight: met extubation parameters, extubated. Off of precedex and fentanyl drip.   DVT PTX: SCD     GI PTX: PPI     ***Tubes/Lines/Drains  ***  Peripheral IV  Arterial Line L Radial		          Date 10/23/18  Urinary Catheter		Indication: Strict I&O    Date Placed: 10/23/18      REVIEW OF SYSTEMS    [ ] A ten-point review of systems was otherwise negative except as noted.  [x] Due to altered mental status/intubation, subjective information were not able to be obtained from the patient. History was obtained, to the extent possible, from review of the chart and collateral sources of information. AMRIK MADRID  [id number]  58y Male     Indication for ICU admission: s/p transverse loop colostomy, intubated  Admit Date: 18  ICU Date: 10/23/18  OR Date: 10/23/18    No Known Allergies    PAST MEDICAL & SURGICAL HISTORY:  Pleural effusion  CHF (congestive heart failure)  Insomnia  BPH (benign prostatic hyperplasia)  Depression  Bipolar 1 disorder  HTN (hypertension)  History of thoracentesis    Home Medications:  Ambien 10 mg oral tablet: 1 tab(s) orally once a day (at bedtime) (21 Sep 2018 21:50)  apixaban 5 mg oral tablet: 1 tab(s) orally every 12 hours (21 Sep 2018 21:50)  Cardizem 60 mg oral tablet: 1 tab(s) orally every 8 hours (21 Sep 2018 21:50)  clotrimazole 1% topical cream: 1 application topically  (21 Sep 2018 21:50)  DULoxetine 60 mg oral delayed release capsule: 1 cap(s) orally once a day (21 Sep 2018 21:50)  isosorbide mononitrate 20 mg oral tablet: 1 tab(s) orally once a day (21 Sep 2018 21:50)  latanoprost 0.005% ophthalmic solution: 1 drop(s) to each affected eye once a day (in the evening) (21 Sep 2018 21:50)  Multiple Vitamins oral capsule: 1 cap(s) orally once a day (21 Sep 2018 21:50)  propranolol 20 mg oral tablet: 1 tab(s) orally 2 times a day (21 Sep 2018 21:50)  tamsulosin 0.4 mg oral capsule: 1 cap(s) orally once a day (21 Sep 2018 21:50)  traZODone 50 mg oral tablet: 1 tab(s) orally once a day (at bedtime) (21 Sep 2018 21:50)        24HRS EVENT  POD# 1 S/P transverse loop colostomy  OR Time: 1.5 hours EBL: 1 cc IVF: 1.4L UO: 100cc    Neuro: Alert. Follows commands. On precedex and fentanyl gtt. Stopped fentanyl for SBT trial.   Resp: Intubated 50/5. SBT trial with parameters, possible extubation.   Cardio: Normotensive. HTN. Restarted on home cardizem and lasix. 3 sets of cardiac enzymes pending.   GI: OG tube placement confirmed. NPO. PPI ppx. Senna/Colace bowel regimen.   : Velásquez placed. Making urine. Monitor lytes.   Heme: Hold Heparin gtt until 10/24 as per primary team. SCD in place.   Endo: No dx. Monitor FS  ID: Monitor for fever/chills.    Code: DNR/DNI resended for OR. Will re-evaluate once patient is extubated.     Overnight: met extubation parameters, extubated. Off of precedex and fentanyl drip.   DVT PTX: SCD     GI PTX: PPI     ***Tubes/Lines/Drains  ***  Peripheral IV  Arterial Line L Radial		          Date 10/23/18  Urinary Catheter		Indication: Strict I&O    Date Placed: 10/23/18      REVIEW OF SYSTEMS    [ ] A ten-point review of systems was otherwise negative except as noted.  [x] Due to altered mental status/intubation, subjective information were not able to be obtained from the patient. History was obtained, to the extent possible, from review of the chart and collateral sources of information.    Daily Height in cm: 187.96 (23 Oct 2018 14:45)    Daily Weight in k.8 (24 Oct 2018 00:00)    Diet, NPO:   Except Medications (10-23-18 @ 19:05)      CURRENT MEDS:  Neurologic Medications  acetaminophen   Tablet .. 650 milliGRAM(s) Oral every 6 hours PRN Mild Pain (1 - 3)  ondansetron Injectable 4 milliGRAM(s) IV Push every 6 hours PRN Nausea  oxyCODONE    IR 5 milliGRAM(s) Oral every 6 hours PRN Moderate Pain (4 - 6)  oxyCODONE    IR 10 milliGRAM(s) Oral every 6 hours PRN Severe Pain (7 - 10)    Respiratory Medications    Cardiovascular Medications  diltiazem    Tablet 60 milliGRAM(s) Oral every 8 hours  furosemide    Tablet 20 milliGRAM(s) Oral daily  propranolol 20 milliGRAM(s) Oral every 12 hours  tamsulosin 0.4 milliGRAM(s) Oral at bedtime    Gastrointestinal Medications  pantoprazole  Injectable 40 milliGRAM(s) IV Push daily  sodium chloride 0.9%. 1000 milliLiter(s) IV Continuous <Continuous>    Genitourinary Medications    Hematologic/Oncologic Medications  influenza   Vaccine 0.5 milliLiter(s) IntraMuscular once    Antimicrobial/Immunologic Medications    Endocrine/Metabolic Medications    Topical/Other Medications  chlorhexidine 4% Liquid 1 Application(s) Topical <User Schedule>  latanoprost 0.005% Ophthalmic Solution 1 Drop(s) Both EYES at bedtime      ICU Vital Signs Last 24 Hrs  T(C): 35.2 (24 Oct 2018 04:00), Max: 36.9 (23 Oct 2018 18:29)  T(F): 95.3 (24 Oct 2018 04:00), Max: 98.5 (23 Oct 2018 23:00)  HR: 94 (24 Oct 2018 08:00) (86 - 110)  BP: 123/77 (24 Oct 2018 08:00) (90/67 - 127/86)  BP(mean): 91 (23 Oct 2018 22:45) (73 - 91)  ABP: 106/92 (24 Oct 2018 08:00) (86/50 - 144/78)  ABP(mean): 98 (24 Oct 2018 08:00) (66 - 102)  RR: 29 (24 Oct 2018 08:00) (10 - 46)  SpO2: 97% (24 Oct 2018 08:00) (73% - 100%)      Adult Advanced Hemodynamics Last 24 Hrs  CVP(mm Hg): --  CVP(cm H2O): --  CO: --  CI: --  PA: --  PA(mean): --  PCWP: --  SVR: --  SVRI: --  PVR: --  PVRI: --    Mode: PS (Pressure Support)/ Spontaneous  FiO2: 40  PEEP: 5  PS: 5    ABG - ( 24 Oct 2018 00:37 )  pH, Arterial: 7.39  pH, Blood: x     /  pCO2: 49    /  pO2: 76    / HCO3: 30    / Base Excess: 4.3   /  SaO2: 96                  I&O's Summary    23 Oct 2018 07:  -  24 Oct 2018 07:00  --------------------------------------------------------  IN: 1238.5 mL / OUT: 1840 mL / NET: -601.5 mL    24 Oct 2018 07:01  -  24 Oct 2018 08:38  --------------------------------------------------------  IN: 50 mL / OUT: 40 mL / NET: 10 mL      I&O's Detail    23 Oct 2018 07:  -  24 Oct 2018 07:00  --------------------------------------------------------  IN:    dexmedetomidine Infusion: 26.4 mL    dextrose 5% + sodium chloride 0.45%.: 600 mL    fentaNYL  Infusion: 23.2 mL    sodium chloride 0.9%: 289 mL    sodium chloride 0.9%.: 300 mL  Total IN: 1238.5 mL    OUT:    Intermittent Catheterization - Urethral: 315 mL    Nasoenteral Tube: 125 mL    Voided: 1400 mL  Total OUT: 1840 mL    Total NET: -601.5 mL      24 Oct 2018 07:  -  24 Oct 2018 08:38  --------------------------------------------------------  IN:    sodium chloride 0.9%.: 50 mL  Total IN: 50 mL    OUT:    Intermittent Catheterization - Urethral: 40 mL  Total OUT: 40 mL    Total NET: 10 mL    PHYSICAL EXAM:    General/Neuro  RASS:   0          GCS: 15      Deficits: alert & oriented x 3, no focal deficits  Pupils: PERRLA    Lungs: clear to auscultation, Normal expansion/effort.     Cardiovascular: S1, S2.  Regular rate and rhythm.  Peripheral edema   Cardiac Rhythm: Normal Sinus Rhythm    GI: Abdomen soft, mild diffuse tenderness, No guarding or rigidity. Non-distended. Colostomy with gas in bag    Wound: Clean, no oozing or erythema.     Extremities: Extremities warm, pink, well-perfused.     Derm: Good skin turgor, no skin breakdown.      :  Velásquez cath      CXR: < from: Xray Chest 1 View- PORTABLE-Routine (10.23.18 @ 06:33) >  Impression:      Cardiomegaly. Bilateral opacifications and effusions.    Worsening.    < end of copied text >      LABS:  CAPILLARY BLOOD GLUCOSE      POCT Blood Glucose.: 116 mg/dL (24 Oct 2018 04:08)  POCT Blood Glucose.: 124 mg/dL (24 Oct 2018 00:57)  POCT Blood Glucose.: 101 mg/dL (23 Oct 2018 14:39)                          10.9   10.55 )-----------( 280      ( 24 Oct 2018 04:30 )             34.5       10-24    138  |  100  |  9<L>  ----------------------------<  118<H>  4.6   |  27  |  0.8    Ca    8.3<L>      24 Oct 2018 04:30  Phos  4.1     10-24  Mg     2.0     10-24    TPro  5.1<L>  /  Alb  2.9<L>  /  TBili  0.6  /  DBili  0.2  /  AST  18  /  ALT  17  /  AlkPhos  390<H>  1024      PT/INR - ( 24 Oct 2018 04:30 )   PT: 12.00 sec;   INR: 1.04 ratio         PTT - ( 24 Oct 2018 04:30 )  PTT:25.9 sec  CARDIAC MARKERS ( 24 Oct 2018 04:30 )  x     / <0.01 ng/mL / 190 U/L / x     / 1.5 ng/mL  CARDIAC MARKERS ( 23 Oct 2018 19:20 )  x     / x     / x     / x     / 1.3 ng/mL

## 2018-10-24 NOTE — PROGRESS NOTE ADULT - ASSESSMENT
Plan:  1.encourage incentive spirometry   2.Pain control   3.can restart heparin drip, target PTT   4.PT and physiatry consult once he gets to the floor

## 2018-10-24 NOTE — CHART NOTE - NSCHARTNOTEFT_GEN_A_CORE
SICU Transfer Note:    Transfer from: SICU  Transfer to:  (x ) Surgery    (  ) Telemetry    (  ) Medicine    (  ) Palliative    (  ) Stroke Unit    (  ) _______________    Patient was intially admitted on 09-21-18  HPI:  57 yo M with HTN, DLD, depression, DVT on eliquis, and recurrent pleural effusions s/p multiple thoracentesis presenting from [facility] for worsening SOB since discharge from [facility]. Pt denies any fever/ chills. He reports intermittent non productive cough. No chest pain.  Pt also c/o persistent diffuse abdominal pain along with distension, constipation and flatus.   Pt denies any other symptoms  To note, pt is very poor historian and can't confirm his medications    Pt was admitted for same complaint back in july and was found to have large L pleural effusion s/p pigtail insertion for 3 days with resolution of effusion. pleural studies at that time were consistent with exudative fluid, + atypical cell but no malignant cells.  Pt was admitted last week to [facility] for SOB and had a L thoracentesis 3 days PTP (21 Sep 2018 21:27)        SICU COURSE:    POD# 1 S/P transverse loop colostomy  OR Time: 1.5 hours EBL: 1 cc IVF: 1.4L UO: 100cc    Post op patient was left intubated due to prior history of failed extubation.  Post on Sedation was held, patient passed all parameters and was extubated.  Was started on clears, colace/senna  Patient was observed in ICU  Heparin drip for known DVT started @1600u/hr f/u ptt  Velásquez was removed,     Code: DNR/DNI resended for OR, re-instated today        Assessment/  Plan/ Follow-Up:  58yMalePatient is a 58y old  Male who presents with a chief complaint of worsening SOB (24 Oct 2018 10:58)    Neurologic: Alert, follows commands. Pain controlled with Oxycodone     Respiratory: Extubated. Satting well on room air.     Cardiovascular: HTN: back on home meds of Cardizem, Propronolol and Lasix .    Gastrointestinal/Nutrition: on Clears, IVL, Senna/Colace bowel regimen. PPI ppx.     Genitourinary/Renal: BPH: back on home meds of tamsulosin. Velásquez removed at 15:30, TOV Monitor lytes and replete as needed.     Hematologic: Restarted Heparin gtt AM as per primary team.  @1600u/hr, f/u PTTSCD placed.     Infectious Disease: Monitor for fevers/chills.     Endocrine: Monitor FS.     Disposition: SICU    Signout given to blue team Dr HOLBROOK      -------------------------------------------------------------------  PAST MEDICAL & SURGICAL HISTORY:  Pleural effusion  CHF (congestive heart failure)  Insomnia  BPH (benign prostatic hyperplasia)  Depression  Bipolar 1 disorder  HTN (hypertension)  History of thoracentesis    Allergies    No Known Allergies    Intolerances      MEDICATIONS  (STANDING):  chlorhexidine 4% Liquid 1 Application(s) Topical <User Schedule>  diltiazem    Tablet 60 milliGRAM(s) Oral every 8 hours  furosemide    Tablet 20 milliGRAM(s) Oral daily  heparin  Infusion 1600 Unit(s)/Hr (16 mL/Hr) IV Continuous <Continuous>  influenza   Vaccine 0.5 milliLiter(s) IntraMuscular once  latanoprost 0.005% Ophthalmic Solution 1 Drop(s) Both EYES at bedtime  pantoprazole    Tablet 40 milliGRAM(s) Oral before breakfast  propranolol 20 milliGRAM(s) Oral every 12 hours  tamsulosin 0.4 milliGRAM(s) Oral at bedtime    MEDICATIONS  (PRN):  acetaminophen   Tablet .. 650 milliGRAM(s) Oral every 6 hours PRN Mild Pain (1 - 3)  HYDROmorphone  Injectable 1 milliGRAM(s) IV Push every 4 hours PRN breakthrough pain  ondansetron Injectable 4 milliGRAM(s) IV Push every 6 hours PRN Nausea  oxyCODONE    IR 5 milliGRAM(s) Oral every 6 hours PRN Moderate Pain (4 - 6)  oxyCODONE    IR 10 milliGRAM(s) Oral every 6 hours PRN Severe Pain (7 - 10)        Vital Signs Last 24 Hrs  T(C): 36.1 (24 Oct 2018 12:00), Max: 36.9 (23 Oct 2018 18:29)  T(F): 97 (24 Oct 2018 12:00), Max: 98.5 (23 Oct 2018 23:00)  HR: 94 (24 Oct 2018 17:00) (88 - 110)  BP: 101/68 (24 Oct 2018 16:00) (90/67 - 127/86)  BP(mean): 79 (24 Oct 2018 16:00) (60 - 91)  RR: 28 (24 Oct 2018 17:00) (10 - 46)  SpO2: 98% (24 Oct 2018 17:00) (73% - 100%)  I&O's Summary    23 Oct 2018 07:01  -  24 Oct 2018 07:00  --------------------------------------------------------  IN: 1238.5 mL / OUT: 1837 mL / NET: -598.5 mL    24 Oct 2018 07:01  -  24 Oct 2018 17:47  --------------------------------------------------------  IN: 294 mL / OUT: 875 mL / NET: -581 mL        LABS:  CAPILLARY BLOOD GLUCOSE      POCT Blood Glucose.: 116 mg/dL (24 Oct 2018 04:08)  POCT Blood Glucose.: 124 mg/dL (24 Oct 2018 00:57)                          10.9   10.55 )-----------( 280      ( 24 Oct 2018 04:30 )             34.5       10-24    138  |  100  |  9<L>  ----------------------------<  118<H>  4.6   |  27  |  0.8    Ca    8.3<L>      24 Oct 2018 04:30  Phos  4.1     10-24  Mg     2.0     10-24    TPro  5.1<L>  /  Alb  2.9<L>  /  TBili  0.6  /  DBili  0.2  /  AST  18  /  ALT  17  /  AlkPhos  390<H>  10-24      PT/INR - ( 24 Oct 2018 04:30 )   PT: 12.00 sec;   INR: 1.04 ratio         PTT - ( 24 Oct 2018 04:30 )  PTT:25.9 sec  CARDIAC MARKERS ( 24 Oct 2018 10:11 )  x     / <0.01 ng/mL / 224 U/L / x     / 1.7 ng/mL  CARDIAC MARKERS ( 24 Oct 2018 04:30 )  x     / <0.01 ng/mL / 190 U/L / x     / 1.5 ng/mL  CARDIAC MARKERS ( 23 Oct 2018 19:20 )  x     / x     / x     / x     / 1.3 ng/mL

## 2018-10-24 NOTE — ANESTHESIA FOLLOW-UP NOTE - NSEVALATION_GEN_ALL_CORE
No apparent complications or complaints regarding anesthesia care at this time
No apparent complications or complaints regarding anesthesia care at this time/All questions were answered
No apparent complications or complaints regarding anesthesia care at this time

## 2018-10-24 NOTE — PROGRESS NOTE ADULT - SUBJECTIVE AND OBJECTIVE BOX
GENERAL SURGERY PROGRESS NOTE     AMRIK MADRID  58y  Male  Hospital day :33d  Procedure: Loop colostomy  Thoracoscopy  Chest tube insertion  Thoracentesis  Thoracentesis    OVERNIGHT EVENTS: Patient was extubated last night, at present on nasal canula with o2 of 2l/min, saturation- 96%, colostomy site seen-looks healthy , patient has abdominal pain 7/10 in intensity.     T(F): 95.3 (10-24-18 @ 04:00), Max: 98.5 (10-23-18 @ 23:00)  HR: 98 (10-24-18 @ 10:00) (86 - 110)  BP: 123/77 (10-24-18 @ 08:00) (90/67 - 127/86)  ABP: 112/82 (10-24-18 @ 10:00) (86/50 - 144/78)  ABP(mean): 98 (10-24-18 @ 10:00) (66 - 108)  RR: 39 (10-24-18 @ 10:00) (10 - 46)  SpO2: 97% (10-24-18 @ 10:00) (73% - 100%)    DIET/FLUIDS:   NG:   10-23-18 @ 07:01  -  10-24-18 @ 07:00  --------------------------------------------------------  OUT: 125 mL                                                                                 URINE:   10-23-18 @ 07:01  -  10-24-18 @ 07:00  --------------------------------------------------------  OUT: 315 mL     Indwelling Urethral Catheter:     Connect To:  Straight Drainage/Gravity    Indication:  Urine Output Monitoring in Critically Ill    Additional Instructions:  DO NOT remove for the next 48 hours, at which time a new order is required to either remove or keep the indwelling urinary catheter (10-23-18 @ 19:23)    GI proph:  pantoprazole  Injectable 40 milliGRAM(s) IV Push daily    AC/ proph: heparin  Infusion 1600 Unit(s)/Hr IV Continuous <Continuous>      LABS  Labs:  CAPILLARY BLOOD GLUCOSE      POCT Blood Glucose.: 116 mg/dL (24 Oct 2018 04:08)  POCT Blood Glucose.: 124 mg/dL (24 Oct 2018 00:57)  POCT Blood Glucose.: 101 mg/dL (23 Oct 2018 14:39)                          10.9   10.55 )-----------( 280      ( 24 Oct 2018 04:30 )             34.5       Auto Neutrophil %: 82.8 % (10-24-18 @ 04:30)  Auto Immature Granulocyte %: 0.7 % (10-24-18 @ 04:30)  Auto Neutrophil %: 86.2 % (10-23-18 @ 23:30)  Auto Immature Granulocyte %: 0.8 % (10-23-18 @ 23:30)  Auto Neutrophil %: 77.6 % (10-23-18 @ 19:20)  Auto Immature Granulocyte %: 1.5 % (10-23-18 @ 19:20)    10-24    138  |  100  |  9<L>  ----------------------------<  118<H>  4.6   |  27  |  0.8      Calcium, Total Serum: 8.3 mg/dL (10-24-18 @ 04:30)      LFTs:             5.1  | 0.6  | 18       ------------------[390     ( 24 Oct 2018 04:30 )  2.9  | 0.2  | 17          Lipase:x      Amylase:x         Blood Gas Arterial, Lactate: 0.5 mmoL/L (10-24-18 @ 00:37)  Blood Gas Arterial, Lactate: 0.7 mmoL/L (10-23-18 @ 23:15)  Blood Gas Arterial, Lactate: 0.5 mmoL/L (10-23-18 @ 19:53)    ABG - ( 24 Oct 2018 00:37 )  pH: 7.39  /  pCO2: 49    /  pO2: 76    / HCO3: 30    / Base Excess: 4.3   /  SaO2: 96              ABG - ( 23 Oct 2018 23:15 )  pH: 7.38  /  pCO2: 49    /  pO2: 90    / HCO3: 29    / Base Excess: 2.6   /  SaO2: 98              ABG - ( 23 Oct 2018 19:53 )  pH: 7.44  /  pCO2: 43    /  pO2: 88    / HCO3: 29    / Base Excess: 4.2   /  SaO2: 98                Coags:     12.00  ----< 1.04    ( 24 Oct 2018 04:30 )     25.9        CARDIAC MARKERS ( 24 Oct 2018 04:30 )  x     / <0.01 ng/mL / 190 U/L / x     / 1.5 ng/mL  CARDIAC MARKERS ( 23 Oct 2018 19:20 )  x     / x     / x     / x     / 1.3 ng/mL

## 2018-10-25 LAB
ANION GAP SERPL CALC-SCNC: 14 MMOL/L — SIGNIFICANT CHANGE UP (ref 7–14)
APTT BLD: 58.9 SEC — HIGH (ref 27–39.2)
APTT BLD: 59.1 SEC — HIGH (ref 27–39.2)
BUN SERPL-MCNC: 9 MG/DL — LOW (ref 10–20)
CALCIUM SERPL-MCNC: 8.4 MG/DL — LOW (ref 8.5–10.1)
CHLORIDE SERPL-SCNC: 95 MMOL/L — LOW (ref 98–110)
CO2 SERPL-SCNC: 27 MMOL/L — SIGNIFICANT CHANGE UP (ref 17–32)
CREAT SERPL-MCNC: 0.8 MG/DL — SIGNIFICANT CHANGE UP (ref 0.7–1.5)
GLUCOSE BLDC GLUCOMTR-MCNC: 107 MG/DL — HIGH (ref 70–99)
GLUCOSE BLDC GLUCOMTR-MCNC: 114 MG/DL — HIGH (ref 70–99)
GLUCOSE BLDC GLUCOMTR-MCNC: 125 MG/DL — HIGH (ref 70–99)
GLUCOSE BLDC GLUCOMTR-MCNC: 132 MG/DL — HIGH (ref 70–99)
GLUCOSE BLDC GLUCOMTR-MCNC: 144 MG/DL — HIGH (ref 70–99)
GLUCOSE SERPL-MCNC: 100 MG/DL — HIGH (ref 70–99)
HCT VFR BLD CALC: 33.5 % — LOW (ref 42–52)
HGB BLD-MCNC: 10.7 G/DL — LOW (ref 14–18)
INR BLD: 1.25 RATIO — SIGNIFICANT CHANGE UP (ref 0.65–1.3)
MAGNESIUM SERPL-MCNC: 1.9 MG/DL — SIGNIFICANT CHANGE UP (ref 1.8–2.4)
MCHC RBC-ENTMCNC: 26.4 PG — LOW (ref 27–31)
MCHC RBC-ENTMCNC: 31.9 G/DL — LOW (ref 32–37)
MCV RBC AUTO: 82.7 FL — SIGNIFICANT CHANGE UP (ref 80–94)
NRBC # BLD: 0 /100 WBCS — SIGNIFICANT CHANGE UP (ref 0–0)
PHOSPHATE SERPL-MCNC: 3 MG/DL — SIGNIFICANT CHANGE UP (ref 2.1–4.9)
PLATELET # BLD AUTO: 322 K/UL — SIGNIFICANT CHANGE UP (ref 130–400)
POTASSIUM SERPL-MCNC: 4.1 MMOL/L — SIGNIFICANT CHANGE UP (ref 3.5–5)
POTASSIUM SERPL-SCNC: 4.1 MMOL/L — SIGNIFICANT CHANGE UP (ref 3.5–5)
PROTHROM AB SERPL-ACNC: 14.4 SEC — HIGH (ref 9.95–12.87)
RBC # BLD: 4.05 M/UL — LOW (ref 4.7–6.1)
RBC # FLD: 15.2 % — HIGH (ref 11.5–14.5)
SODIUM SERPL-SCNC: 136 MMOL/L — SIGNIFICANT CHANGE UP (ref 135–146)
WBC # BLD: 8.96 K/UL — SIGNIFICANT CHANGE UP (ref 4.8–10.8)
WBC # FLD AUTO: 8.96 K/UL — SIGNIFICANT CHANGE UP (ref 4.8–10.8)

## 2018-10-25 RX ORDER — MORPHINE SULFATE 50 MG/1
2 CAPSULE, EXTENDED RELEASE ORAL EVERY 6 HOURS
Qty: 0 | Refills: 0 | Status: DISCONTINUED | OUTPATIENT
Start: 2018-10-25 | End: 2018-10-29

## 2018-10-25 RX ORDER — ALPRAZOLAM 0.25 MG
1 TABLET ORAL ONCE
Qty: 0 | Refills: 0 | Status: DISCONTINUED | OUTPATIENT
Start: 2018-10-25 | End: 2018-10-25

## 2018-10-25 RX ORDER — APIXABAN 2.5 MG/1
5 TABLET, FILM COATED ORAL EVERY 12 HOURS
Qty: 0 | Refills: 0 | Status: DISCONTINUED | OUTPATIENT
Start: 2018-10-25 | End: 2018-10-29

## 2018-10-25 RX ADMIN — Medication 20 MILLIGRAM(S): at 06:10

## 2018-10-25 RX ADMIN — HYDROMORPHONE HYDROCHLORIDE 1 MILLIGRAM(S): 2 INJECTION INTRAMUSCULAR; INTRAVENOUS; SUBCUTANEOUS at 02:42

## 2018-10-25 RX ADMIN — PANTOPRAZOLE SODIUM 40 MILLIGRAM(S): 20 TABLET, DELAYED RELEASE ORAL at 06:11

## 2018-10-25 RX ADMIN — OXYCODONE HYDROCHLORIDE 10 MILLIGRAM(S): 5 TABLET ORAL at 08:35

## 2018-10-25 RX ADMIN — CHLORHEXIDINE GLUCONATE 1 APPLICATION(S): 213 SOLUTION TOPICAL at 06:10

## 2018-10-25 RX ADMIN — HEPARIN SODIUM 16 UNIT(S)/HR: 5000 INJECTION INTRAVENOUS; SUBCUTANEOUS at 04:29

## 2018-10-25 RX ADMIN — ZOLPIDEM TARTRATE 5 MILLIGRAM(S): 10 TABLET ORAL at 22:30

## 2018-10-25 RX ADMIN — MORPHINE SULFATE 2 MILLIGRAM(S): 50 CAPSULE, EXTENDED RELEASE ORAL at 16:25

## 2018-10-25 RX ADMIN — OXYCODONE HYDROCHLORIDE 10 MILLIGRAM(S): 5 TABLET ORAL at 20:23

## 2018-10-25 RX ADMIN — APIXABAN 5 MILLIGRAM(S): 2.5 TABLET, FILM COATED ORAL at 18:04

## 2018-10-25 RX ADMIN — Medication 20 MILLIGRAM(S): at 18:03

## 2018-10-25 RX ADMIN — MORPHINE SULFATE 2 MILLIGRAM(S): 50 CAPSULE, EXTENDED RELEASE ORAL at 22:29

## 2018-10-25 RX ADMIN — OXYCODONE HYDROCHLORIDE 10 MILLIGRAM(S): 5 TABLET ORAL at 14:53

## 2018-10-25 RX ADMIN — HYDROMORPHONE HYDROCHLORIDE 1 MILLIGRAM(S): 2 INJECTION INTRAMUSCULAR; INTRAVENOUS; SUBCUTANEOUS at 03:02

## 2018-10-25 RX ADMIN — TAMSULOSIN HYDROCHLORIDE 0.4 MILLIGRAM(S): 0.4 CAPSULE ORAL at 21:29

## 2018-10-25 NOTE — CHART NOTE - NSCHARTNOTEFT_GEN_A_CORE
Registered Dietitian Follow-Up    ***Scroll to the bottom for RD recommendation***    Patient Profile Reviewed                           Yes [x]   No []  Nutrition History Previously Obtained        Yes [x]  No []  - pt has ostomy bag, appears very lethargic, on clear and tolerating and ate % breakfast. Pt stated that he needs SOFT foods if he gets diet advances later due to poor dentition.       PERTINENT MEDICAL INFORMATIONS:  (1) p/w worsening SOB. pt got downgraded. on heparin drip. Colostomy has gas output. on NC  (2) SOB remains. Sx following after loop colostomy. Pt w/ chest tube as well.         DIET ORDER:  Clear Liquids        Anthropometrics:  - Ht. 187.96 cm  - Wt. STILL with no new wt. Latest wt is 104.2 kg (10/17). pt on lasix.  - %wt change  - BMI 29.5  - IBW 86.4 kg       PERTINENT LAB DATA:  10/25: h/h 10.7/33.5, BUN 9, glucose 100, ca 8.4  PERTINENT MEDS:  heparin, protonix, hydromorphone, acetaminophen, furosemide, ondansetron, oxy, propranolol      PHYSICAL FINDINGS  - APPEARANCE:        alert and oriented but weak, no edema  - GI FUNCTION:        colostomy bag  - TUBES:                       - ORAL/MOUTH:      none reported  - SKIN:                        surgical incision        NUTRITION REQUIREMENTS  WEIGHT USED:                   104 kg + 86.4 kg (IBW)  ESTIMATED ENERGY NEEDS:       CONTINUE [  ]      ADJUST [  ]    ESTIMATED ENERGY NEEDS:         7430-3494 kcal/day (MSJ x 1-1.1 AF)--d/t borderline obese BMI  ESTIMATED PROTEIN NEEDS:         g/day (1-1.2 g/kg IBW)  ESTIMATED FLUID NEEDS:             1ml/kcal    CURRENT NUTRIENT NEEDS:    NOT MEETING- on clear          [ x ] PREVIOUS NUTRITION DIAGNOSIS:   (1) inadequate protein-energy intake - remains            [  x] ONGOING        [  ] RESOLVED               NUTRITION INTERVENTION:    [ x ] ORAL        [ ] EN/TF     GOAL/EXPECTED OUTCOME:     pt to consume and tolerate >75% of all meals and snacks and supplements upon f/u due 10/29.  INDICATOR/MONITORING:       RD to monitor diet order, energy intake, body composition, nutrition focused physical findings (po tolerance)  PATIENT's INTERVENTION:        Meals and snacks. Medical food supplements    RECS: (1) When medically feasible to advance diet, please adv to full and then to goal diet of Mechanical Soft 3 - Cut-up meats with Low Sodium when tolerated. Patient has poor dentition. (2) If patient remains on Clear liquid, please add PROSOURCE GELATIN q12hr.

## 2018-10-25 NOTE — PROGRESS NOTE ADULT - SUBJECTIVE AND OBJECTIVE BOX
GENERAL SURGERY PROGRESS NOTE     AMRIK MADRID  58y  Male  Hospital day :34d  POD:  Procedure: Loop colostomy  Thoracoscopy  Chest tube insertion  Thoracentesis  Thoracentesis    OVERNIGHT EVENTS: Patient got downgraded yesterday. heparin drip restarted, colostomy has gas output, on nasal canula at 2l/min, shortness of breath present.    T(F): 98 (10-25-18 @ 03:00), Max: 98 (10-25-18 @ 03:00)  HR: 94 (10-25-18 @ 03:00) (88 - 102)  BP: 104/65 (10-25-18 @ 03:00) (93/55 - 125/62)  ABP: 90/86 (10-24-18 @ 17:00) (90/80 - 124/94)  ABP(mean): 88 (10-24-18 @ 17:00) (86 - 108)  RR: 18 (10-25-18 @ 03:00) (18 - 46)  SpO2: 98% (10-24-18 @ 17:00) (94% - 98%)    DIET/FLUIDS:   NG:   10-23-18 @ 07:01  -  10-24-18 @ 07:00  --------------------------------------------------------  OUT: 125 mL                                                                                    URINE:   10-23-18 @ 07:01  -  10-24-18 @ 07:00  --------------------------------------------------------  OUT: 312 mL       GI proph:  pantoprazole    Tablet 40 milliGRAM(s) Oral before breakfast    AC/ proph: heparin  Infusion 1600 Unit(s)/Hr IV Continuous <Continuous>      LABS  Labs:  CAPILLARY BLOOD GLUCOSE      POCT Blood Glucose.: 117 mg/dL (24 Oct 2018 21:07)                          10.7   8.96  )-----------( 322      ( 25 Oct 2018 00:40 )             33.5         10-25    136  |  95<L>  |  9<L>  ----------------------------<  100<H>  4.1   |  27  |  0.8      Calcium, Total Serum: 8.4 mg/dL (10-25-18 @ 00:40)      LFTs:             5.1  | 0.6  | 18       ------------------[390     ( 24 Oct 2018 04:30 )  2.9  | 0.2  | 17          Lipase:x      Amylase:x         Blood Gas Arterial, Lactate: 0.5 mmoL/L (10-24-18 @ 00:37)  Blood Gas Arterial, Lactate: 0.7 mmoL/L (10-23-18 @ 23:15)  Blood Gas Arterial, Lactate: 0.5 mmoL/L (10-23-18 @ 19:53)    ABG - ( 24 Oct 2018 00:37 )  pH: 7.39  /  pCO2: 49    /  pO2: 76    / HCO3: 30    / Base Excess: 4.3   /  SaO2: 96              ABG - ( 23 Oct 2018 23:15 )  pH: 7.38  /  pCO2: 49    /  pO2: 90    / HCO3: 29    / Base Excess: 2.6   /  SaO2: 98              ABG - ( 23 Oct 2018 19:53 )  pH: 7.44  /  pCO2: 43    /  pO2: 88    / HCO3: 29    / Base Excess: 4.2   /  SaO2: 98                Coags:     14.40  ----< 1.25    ( 25 Oct 2018 00:40 )     59.1        CARDIAC MARKERS ( 24 Oct 2018 17:01 )  x     / <0.01 ng/mL / 207 U/L / x     / 1.7 ng/mL  CARDIAC MARKERS ( 24 Oct 2018 10:11 )  x     / <0.01 ng/mL / 224 U/L / x     / 1.7 ng/mL  CARDIAC MARKERS ( 24 Oct 2018 04:30 )  x     / <0.01 ng/mL / 190 U/L / x     / 1.5 ng/mL  CARDIAC MARKERS ( 23 Oct 2018 19:20 )  x     / x     / x     / x     / 1.3 ng/mL

## 2018-10-25 NOTE — PROGRESS NOTE ADULT - ASSESSMENT
Plan:  1.encourage incentive spirometry   2.trend PTT   3.Physical therapy and physiatry consult   4.encourage ambulation

## 2018-10-26 LAB
ANION GAP SERPL CALC-SCNC: 13 MMOL/L — SIGNIFICANT CHANGE UP (ref 7–14)
APTT BLD: 36.6 SEC — SIGNIFICANT CHANGE UP (ref 27–39.2)
BUN SERPL-MCNC: 8 MG/DL — LOW (ref 10–20)
CALCIUM SERPL-MCNC: 8.3 MG/DL — LOW (ref 8.5–10.1)
CHLORIDE SERPL-SCNC: 96 MMOL/L — LOW (ref 98–110)
CO2 SERPL-SCNC: 27 MMOL/L — SIGNIFICANT CHANGE UP (ref 17–32)
CREAT SERPL-MCNC: 0.8 MG/DL — SIGNIFICANT CHANGE UP (ref 0.7–1.5)
GLUCOSE BLDC GLUCOMTR-MCNC: 106 MG/DL — HIGH (ref 70–99)
GLUCOSE BLDC GLUCOMTR-MCNC: 114 MG/DL — HIGH (ref 70–99)
GLUCOSE BLDC GLUCOMTR-MCNC: 118 MG/DL — HIGH (ref 70–99)
GLUCOSE BLDC GLUCOMTR-MCNC: 121 MG/DL — HIGH (ref 70–99)
GLUCOSE BLDC GLUCOMTR-MCNC: 126 MG/DL — HIGH (ref 70–99)
GLUCOSE BLDC GLUCOMTR-MCNC: 135 MG/DL — HIGH (ref 70–99)
GLUCOSE SERPL-MCNC: 101 MG/DL — HIGH (ref 70–99)
HCT VFR BLD CALC: 32.4 % — LOW (ref 42–52)
HGB BLD-MCNC: 10.4 G/DL — LOW (ref 14–18)
MAGNESIUM SERPL-MCNC: 2 MG/DL — SIGNIFICANT CHANGE UP (ref 1.8–2.4)
MCHC RBC-ENTMCNC: 26.6 PG — LOW (ref 27–31)
MCHC RBC-ENTMCNC: 32.1 G/DL — SIGNIFICANT CHANGE UP (ref 32–37)
MCV RBC AUTO: 82.9 FL — SIGNIFICANT CHANGE UP (ref 80–94)
NRBC # BLD: 0 /100 WBCS — SIGNIFICANT CHANGE UP (ref 0–0)
PHOSPHATE SERPL-MCNC: 3.5 MG/DL — SIGNIFICANT CHANGE UP (ref 2.1–4.9)
PLATELET # BLD AUTO: 327 K/UL — SIGNIFICANT CHANGE UP (ref 130–400)
POTASSIUM SERPL-MCNC: 4.2 MMOL/L — SIGNIFICANT CHANGE UP (ref 3.5–5)
POTASSIUM SERPL-SCNC: 4.2 MMOL/L — SIGNIFICANT CHANGE UP (ref 3.5–5)
RBC # BLD: 3.91 M/UL — LOW (ref 4.7–6.1)
RBC # FLD: 15.1 % — HIGH (ref 11.5–14.5)
SODIUM SERPL-SCNC: 136 MMOL/L — SIGNIFICANT CHANGE UP (ref 135–146)
WBC # BLD: 8.04 K/UL — SIGNIFICANT CHANGE UP (ref 4.8–10.8)
WBC # FLD AUTO: 8.04 K/UL — SIGNIFICANT CHANGE UP (ref 4.8–10.8)

## 2018-10-26 RX ORDER — DOCUSATE SODIUM 100 MG
100 CAPSULE ORAL DAILY
Qty: 0 | Refills: 0 | Status: DISCONTINUED | OUTPATIENT
Start: 2018-10-26 | End: 2018-10-29

## 2018-10-26 RX ORDER — SENNA PLUS 8.6 MG/1
1 TABLET ORAL DAILY
Qty: 0 | Refills: 0 | Status: DISCONTINUED | OUTPATIENT
Start: 2018-10-26 | End: 2018-10-29

## 2018-10-26 RX ADMIN — Medication 20 MILLIGRAM(S): at 05:28

## 2018-10-26 RX ADMIN — Medication 100 MILLIGRAM(S): at 17:13

## 2018-10-26 RX ADMIN — MORPHINE SULFATE 2 MILLIGRAM(S): 50 CAPSULE, EXTENDED RELEASE ORAL at 18:28

## 2018-10-26 RX ADMIN — PANTOPRAZOLE SODIUM 40 MILLIGRAM(S): 20 TABLET, DELAYED RELEASE ORAL at 06:03

## 2018-10-26 RX ADMIN — Medication 20 MILLIGRAM(S): at 17:13

## 2018-10-26 RX ADMIN — MORPHINE SULFATE 2 MILLIGRAM(S): 50 CAPSULE, EXTENDED RELEASE ORAL at 12:19

## 2018-10-26 RX ADMIN — ZOLPIDEM TARTRATE 5 MILLIGRAM(S): 10 TABLET ORAL at 22:13

## 2018-10-26 RX ADMIN — OXYCODONE HYDROCHLORIDE 10 MILLIGRAM(S): 5 TABLET ORAL at 17:13

## 2018-10-26 RX ADMIN — Medication 20 MILLIGRAM(S): at 05:29

## 2018-10-26 RX ADMIN — APIXABAN 5 MILLIGRAM(S): 2.5 TABLET, FILM COATED ORAL at 05:28

## 2018-10-26 RX ADMIN — OXYCODONE HYDROCHLORIDE 10 MILLIGRAM(S): 5 TABLET ORAL at 05:29

## 2018-10-26 RX ADMIN — LATANOPROST 1 DROP(S): 0.05 SOLUTION/ DROPS OPHTHALMIC; TOPICAL at 05:29

## 2018-10-26 RX ADMIN — MORPHINE SULFATE 2 MILLIGRAM(S): 50 CAPSULE, EXTENDED RELEASE ORAL at 06:28

## 2018-10-26 RX ADMIN — APIXABAN 5 MILLIGRAM(S): 2.5 TABLET, FILM COATED ORAL at 17:13

## 2018-10-26 RX ADMIN — TAMSULOSIN HYDROCHLORIDE 0.4 MILLIGRAM(S): 0.4 CAPSULE ORAL at 22:13

## 2018-10-26 RX ADMIN — LATANOPROST 1 DROP(S): 0.05 SOLUTION/ DROPS OPHTHALMIC; TOPICAL at 22:13

## 2018-10-26 NOTE — PROGRESS NOTE ADULT - SUBJECTIVE AND OBJECTIVE BOX
AMRIK MADRID  58y Male   [id number]    Hospital Day:   Post Operative Day:    Procedure/dx: THORACOSCOPY, TALC PLEURODESSIS. s/p transverse loop colostomy.   Events of the Last 24h: advanced to soft diet. tolerating but has decreased appetite. On Eliquis, heparin was stopped. reports improvement in abdominal pain and SOB    Patient is a 58y old  Male who presents with a chief complaint of worsening SOB (25 Oct 2018 06:02)    PAST MEDICAL & SURGICAL HISTORY:  Pleural effusion  CHF (congestive heart failure)  Insomnia  BPH (benign prostatic hyperplasia)  Depression  Bipolar 1 disorder  HTN (hypertension)  History of thoracentesis    Vital Signs Last 24 Hrs  T(C): 36.7 (25 Oct 2018 23:36), Max: 36.7 (25 Oct 2018 03:00)  T(F): 98 (25 Oct 2018 23:36), Max: 98 (25 Oct 2018 03:00)  HR: 90 (25 Oct 2018 23:36) (89 - 94)  BP: 120/80 (25 Oct 2018 23:36) (104/65 - 128/66)  BP(mean): 88 (25 Oct 2018 16:01) (88 - 88)  RR: 18 (25 Oct 2018 23:36) (18 - 18)  SpO2: 95% (25 Oct 2018 11:58) (95% - 95%)    Diet, Mechanical Soft:   Dysphagia 3, Soft, Thin Liquids ([id number])  Prosource Gelatein 20 Sugar Free     Qty per Day:  q12H  Supplement Feeding Modality:  Oral  Ensure Enlive Cans or Servings Per Day:  1       Frequency:  Daily (10-25-18 @ 14:51)    I&O's Detail    24 Oct 2018 07:01  -  25 Oct 2018 07:00  --------------------------------------------------------  IN:    heparin Infusion: 144 mL    sodium chloride 0.9%: 150 mL  Total IN: 294 mL    OUT:    Indwelling Catheter - Urethral: 875 mL  Total OUT: 875 mL    Total NET: -581 mL      25 Oct 2018 07:01  -  26 Oct 2018 00:08  --------------------------------------------------------  IN:    Oral Fluid: 960 mL  Total IN: 960 mL    OUT:    Colostomy: 30 mL    Voided: 1150 mL  Total OUT: 1180 mL    Total NET: -220 mL    MEDICATIONS  (STANDING):  apixaban 5 milliGRAM(s) Oral every 12 hours  chlorhexidine 4% Liquid 1 Application(s) Topical <User Schedule>  diltiazem    Tablet 60 milliGRAM(s) Oral every 8 hours  furosemide    Tablet 20 milliGRAM(s) Oral daily  influenza   Vaccine 0.5 milliLiter(s) IntraMuscular once  latanoprost 0.005% Ophthalmic Solution 1 Drop(s) Both EYES at bedtime  pantoprazole    Tablet 40 milliGRAM(s) Oral before breakfast  propranolol 20 milliGRAM(s) Oral every 12 hours  tamsulosin 0.4 milliGRAM(s) Oral at bedtime    MEDICATIONS  (PRN):  acetaminophen   Tablet .. 650 milliGRAM(s) Oral every 6 hours PRN Mild Pain (1 - 3)  morphine  - Injectable 2 milliGRAM(s) IV Push every 6 hours PRN Severe Pain (7 - 10)  ondansetron Injectable 4 milliGRAM(s) IV Push every 6 hours PRN Nausea  oxyCODONE    IR 5 milliGRAM(s) Oral every 6 hours PRN Moderate Pain (4 - 6)  oxyCODONE    IR 10 milliGRAM(s) Oral every 6 hours PRN Severe Pain (7 - 10)  zolpidem 5 milliGRAM(s) Oral at bedtime PRN Insomnia    PHYSICAL EXAM:  GENERAL: NAD  CHEST/LUNGS: clear to auscultation bilaterally. some crackles in b/l lower parts of the lungs  HEART: S1 and S2 noted  ABDOMEN: soft, nontender, mildly distended. gas in the colostomy  EXTREMITIES:  FROM, No clubbing, cyanosis, or edema, palpable pulse  NEURO: No focal neurological deficits          LABS                  10.7   8.96  )-----------( 322      ( 25 Oct 2018 00:40 )             33.5        10-25    136  |  95<L>  |  9<L>  ----------------------------<  100<H>  4.1   |  27  |  0.8    Ca    8.4<L>      25 Oct 2018 00:40  Phos  3.0     10-25  Mg     1.9     10-25    TPro  5.1<L>  /  Alb  2.9<L>  /  TBili  0.6  /  DBili  0.2  /  AST  18  /  ALT  17  /  AlkPhos  390<H>  10-24    LIVER FUNCTIONS - ( 24 Oct 2018 04:30 )  Alb: 2.9 g/dL / Pro: 5.1 g/dL / ALK PHOS: 390 U/L / ALT: 17 U/L / AST: 18 U/L / GGT: x           PT/INR - ( 25 Oct 2018 00:40 )   PT: 14.40 sec;   INR: 1.25 ratio         PTT - ( 25 Oct 2018 11:54 )  PTT:58.9 sec  CARDIAC MARKERS ( 24 Oct 2018 17:01 )  x     / <0.01 ng/mL / 207 U/L / x     / 1.7 ng/mL  CARDIAC MARKERS ( 24 Oct 2018 10:11 )  x     / <0.01 ng/mL / 224 U/L / x     / 1.7 ng/mL  CARDIAC MARKERS ( 24 Oct 2018 04:30 )  x     / <0.01 ng/mL / 190 U/L / x     / 1.5 ng/mL    IMAGING:  None AMRIK MADRID  58y Male   [id number]    Procedure/dx: THORACOSCOPY, TALC PLEURODESSIS. s/p transverse loop colostomy.   Events of the Last 24h: advanced to soft diet. tolerating but has decreased appetite. On Eliquis, heparin was stopped. reports improvement in abdominal pain and SOB    Patient is a 58y old  Male who presents with a chief complaint of worsening SOB (25 Oct 2018 06:02)    PAST MEDICAL & SURGICAL HISTORY:  Pleural effusion  CHF (congestive heart failure)  Insomnia  BPH (benign prostatic hyperplasia)  Depression  Bipolar 1 disorder  HTN (hypertension)  History of thoracentesis    Vital Signs Last 24 Hrs  T(C): 36.7 (25 Oct 2018 23:36), Max: 36.7 (25 Oct 2018 03:00)  T(F): 98 (25 Oct 2018 23:36), Max: 98 (25 Oct 2018 03:00)  HR: 90 (25 Oct 2018 23:36) (89 - 94)  BP: 120/80 (25 Oct 2018 23:36) (104/65 - 128/66)  BP(mean): 88 (25 Oct 2018 16:01) (88 - 88)  RR: 18 (25 Oct 2018 23:36) (18 - 18)  SpO2: 95% (25 Oct 2018 11:58) (95% - 95%)    Diet, Mechanical Soft:   Dysphagia 3, Soft, Thin Liquids ([id number])  Prosource Gelatein 20 Sugar Free     Qty per Day:  q12H  Supplement Feeding Modality:  Oral  Ensure Enlive Cans or Servings Per Day:  1       Frequency:  Daily (10-25-18 @ 14:51)    I&O's Detail    24 Oct 2018 07:01  -  25 Oct 2018 07:00  --------------------------------------------------------  IN:    heparin Infusion: 144 mL    sodium chloride 0.9%: 150 mL  Total IN: 294 mL    OUT:    Indwelling Catheter - Urethral: 875 mL  Total OUT: 875 mL    Total NET: -581 mL      25 Oct 2018 07:01  -  26 Oct 2018 00:08  --------------------------------------------------------  IN:    Oral Fluid: 960 mL  Total IN: 960 mL    OUT:    Colostomy: 30 mL    Voided: 1150 mL  Total OUT: 1180 mL    Total NET: -220 mL    MEDICATIONS  (STANDING):  apixaban 5 milliGRAM(s) Oral every 12 hours  chlorhexidine 4% Liquid 1 Application(s) Topical <User Schedule>  diltiazem    Tablet 60 milliGRAM(s) Oral every 8 hours  furosemide    Tablet 20 milliGRAM(s) Oral daily  influenza   Vaccine 0.5 milliLiter(s) IntraMuscular once  latanoprost 0.005% Ophthalmic Solution 1 Drop(s) Both EYES at bedtime  pantoprazole    Tablet 40 milliGRAM(s) Oral before breakfast  propranolol 20 milliGRAM(s) Oral every 12 hours  tamsulosin 0.4 milliGRAM(s) Oral at bedtime    MEDICATIONS  (PRN):  acetaminophen   Tablet .. 650 milliGRAM(s) Oral every 6 hours PRN Mild Pain (1 - 3)  morphine  - Injectable 2 milliGRAM(s) IV Push every 6 hours PRN Severe Pain (7 - 10)  ondansetron Injectable 4 milliGRAM(s) IV Push every 6 hours PRN Nausea  oxyCODONE    IR 5 milliGRAM(s) Oral every 6 hours PRN Moderate Pain (4 - 6)  oxyCODONE    IR 10 milliGRAM(s) Oral every 6 hours PRN Severe Pain (7 - 10)  zolpidem 5 milliGRAM(s) Oral at bedtime PRN Insomnia    PHYSICAL EXAM:  GENERAL: NAD  CHEST/LUNGS: clear to auscultation bilaterally. some crackles in b/l lower parts of the lungs  HEART: S1 and S2 noted  ABDOMEN: soft, nontender, mildly distended. gas in the colostomy  EXTREMITIES:  FROM, No clubbing, cyanosis, or edema, palpable pulse  NEURO: No focal neurological deficits          LABS                  10.7   8.96  )-----------( 322      ( 25 Oct 2018 00:40 )             33.5        10-25    136  |  95<L>  |  9<L>  ----------------------------<  100<H>  4.1   |  27  |  0.8    Ca    8.4<L>      25 Oct 2018 00:40  Phos  3.0     10-25  Mg     1.9     10-25    TPro  5.1<L>  /  Alb  2.9<L>  /  TBili  0.6  /  DBili  0.2  /  AST  18  /  ALT  17  /  AlkPhos  390<H>  10-24    LIVER FUNCTIONS - ( 24 Oct 2018 04:30 )  Alb: 2.9 g/dL / Pro: 5.1 g/dL / ALK PHOS: 390 U/L / ALT: 17 U/L / AST: 18 U/L / GGT: x           PT/INR - ( 25 Oct 2018 00:40 )   PT: 14.40 sec;   INR: 1.25 ratio         PTT - ( 25 Oct 2018 11:54 )  PTT:58.9 sec  CARDIAC MARKERS ( 24 Oct 2018 17:01 )  x     / <0.01 ng/mL / 207 U/L / x     / 1.7 ng/mL  CARDIAC MARKERS ( 24 Oct 2018 10:11 )  x     / <0.01 ng/mL / 224 U/L / x     / 1.7 ng/mL  CARDIAC MARKERS ( 24 Oct 2018 04:30 )  x     / <0.01 ng/mL / 190 U/L / x     / 1.5 ng/mL    IMAGING:  None

## 2018-10-26 NOTE — PROGRESS NOTE ADULT - ASSESSMENT
59 y/o male s/p thoracoscopy, talc pleurodesis. s/p transverse loop colostomy. Patient was advanced to soft diet. tolerating but has decreased appetite. Started on Eliquis, heparin was stopped.     Plan:   f/u PT/ rehab and social work  Pain control  DVT/GI prophylaxis  f/u colostomy output  Encourage ambulation

## 2018-10-26 NOTE — PROGRESS NOTE ADULT - ASSESSMENT
57 y/o male s/p thoracoscopy, talc pleurodesis. s/p transverse loop colostomy. Patient was advanced to soft diet. tolerating but has decreased appetite. Started on Eliquis, heparin was stopped.     Plan:   f/u PT/ rehab and social work  Pain control  DVT/GI prophylaxis  f/u colostomy output  Encourage ambulation

## 2018-10-26 NOTE — PROGRESS NOTE ADULT - SUBJECTIVE AND OBJECTIVE BOX
AMRIK MADRID  58y Male   [id number]    Procedure/dx: THORACOSCOPY, TALC PLEURODESSIS. s/p transverse loop colostomy.   Events of the Last 24h: advanced to soft diet. tolerating but has decreased appetite. On Eliquis, heparin was stopped. reports improvement in abdominal pain and SOB    Patient is a 58y old  Male who presents with a chief complaint of worsening SOB (25 Oct 2018 06:02)    PAST MEDICAL & SURGICAL HISTORY:  Pleural effusion  CHF (congestive heart failure)  Insomnia  BPH (benign prostatic hyperplasia)  Depression  Bipolar 1 disorder  HTN (hypertension)  History of thoracentesis    Vital Signs Last 24 Hrs  T(C): 36.7 (25 Oct 2018 23:36), Max: 36.7 (25 Oct 2018 03:00)  T(F): 98 (25 Oct 2018 23:36), Max: 98 (25 Oct 2018 03:00)  HR: 90 (25 Oct 2018 23:36) (89 - 94)  BP: 120/80 (25 Oct 2018 23:36) (104/65 - 128/66)  BP(mean): 88 (25 Oct 2018 16:01) (88 - 88)  RR: 18 (25 Oct 2018 23:36) (18 - 18)  SpO2: 95% (25 Oct 2018 11:58) (95% - 95%)    Diet, Mechanical Soft:   Dysphagia 3, Soft, Thin Liquids ([id number])  Prosource Gelatein 20 Sugar Free     Qty per Day:  q12H  Supplement Feeding Modality:  Oral  Ensure Enlive Cans or Servings Per Day:  1       Frequency:  Daily (10-25-18 @ 14:51)    I&O's Detail    24 Oct 2018 07:01  -  25 Oct 2018 07:00  --------------------------------------------------------  IN:    heparin Infusion: 144 mL    sodium chloride 0.9%: 150 mL  Total IN: 294 mL    OUT:    Indwelling Catheter - Urethral: 875 mL  Total OUT: 875 mL    Total NET: -581 mL      25 Oct 2018 07:01  -  26 Oct 2018 00:08  --------------------------------------------------------  IN:    Oral Fluid: 960 mL  Total IN: 960 mL    OUT:    Colostomy: 30 mL    Voided: 1150 mL  Total OUT: 1180 mL    Total NET: -220 mL    MEDICATIONS  (STANDING):  apixaban 5 milliGRAM(s) Oral every 12 hours  chlorhexidine 4% Liquid 1 Application(s) Topical <User Schedule>  diltiazem    Tablet 60 milliGRAM(s) Oral every 8 hours  furosemide    Tablet 20 milliGRAM(s) Oral daily  influenza   Vaccine 0.5 milliLiter(s) IntraMuscular once  latanoprost 0.005% Ophthalmic Solution 1 Drop(s) Both EYES at bedtime  pantoprazole    Tablet 40 milliGRAM(s) Oral before breakfast  propranolol 20 milliGRAM(s) Oral every 12 hours  tamsulosin 0.4 milliGRAM(s) Oral at bedtime    MEDICATIONS  (PRN):  acetaminophen   Tablet .. 650 milliGRAM(s) Oral every 6 hours PRN Mild Pain (1 - 3)  morphine  - Injectable 2 milliGRAM(s) IV Push every 6 hours PRN Severe Pain (7 - 10)  ondansetron Injectable 4 milliGRAM(s) IV Push every 6 hours PRN Nausea  oxyCODONE    IR 5 milliGRAM(s) Oral every 6 hours PRN Moderate Pain (4 - 6)  oxyCODONE    IR 10 milliGRAM(s) Oral every 6 hours PRN Severe Pain (7 - 10)  zolpidem 5 milliGRAM(s) Oral at bedtime PRN Insomnia    PHYSICAL EXAM:  GENERAL: NAD  CHEST/LUNGS: clear to auscultation bilaterally. some crackles in b/l lower parts of the lungs  HEART: S1 and S2 noted  ABDOMEN: soft, nontender, mildly distended. gas in the colostomy  EXTREMITIES:  FROM, No clubbing, cyanosis, or edema, palpable pulse  NEURO: No focal neurological deficits          LABS                  10.7   8.96  )-----------( 322      ( 25 Oct 2018 00:40 )             33.5        10-25    136  |  95<L>  |  9<L>  ----------------------------<  100<H>  4.1   |  27  |  0.8    Ca    8.4<L>      25 Oct 2018 00:40  Phos  3.0     10-25  Mg     1.9     10-25    TPro  5.1<L>  /  Alb  2.9<L>  /  TBili  0.6  /  DBili  0.2  /  AST  18  /  ALT  17  /  AlkPhos  390<H>  10-24    LIVER FUNCTIONS - ( 24 Oct 2018 04:30 )  Alb: 2.9 g/dL / Pro: 5.1 g/dL / ALK PHOS: 390 U/L / ALT: 17 U/L / AST: 18 U/L / GGT: x           PT/INR - ( 25 Oct 2018 00:40 )   PT: 14.40 sec;   INR: 1.25 ratio         PTT - ( 25 Oct 2018 11:54 )  PTT:58.9 sec  CARDIAC MARKERS ( 24 Oct 2018 17:01 )  x     / <0.01 ng/mL / 207 U/L / x     / 1.7 ng/mL  CARDIAC MARKERS ( 24 Oct 2018 10:11 )  x     / <0.01 ng/mL / 224 U/L / x     / 1.7 ng/mL  CARDIAC MARKERS ( 24 Oct 2018 04:30 )  x     / <0.01 ng/mL / 190 U/L / x     / 1.5 ng/mL    IMAGING:  None

## 2018-10-26 NOTE — CHART NOTE - NSCHARTNOTEFT_GEN_A_CORE
As per Dr. Caldwell: patient would benefit from inpatient rehab, patient expressed that he doesn't feel like [facility] will be able to take appropriate care of him as he currently underwent extensive surgery. Social work was made aware of this issue. As per social work #[id number] patient has bed available [facility]. Today social work was made aware that patient would rather benefit form inpatient rehab. Social work has contacted me back after our discussion and said that they will work on setting up a possible [facility] inpatient rehab placement. Physiatry reconsulted for re-evaluation for placement.  Dr. Caldwell aware As per Dr. Caldwell: patient would benefit from inpatient rehab, patient expressed that he doesn't feel like [facility]note 3[facility] will be able to take appropriate care of him as he currently underwent extensive surgery. Social work was made aware of this issue. As per social work #[id number] patient has bed available at [facility]note 3], where he was previously residing. Today social work was made aware that patient would rather benefit form inpatient rehab. Social work has contacted me back after our discussion and said that they will work on setting up a possible [facility] inpatient rehab placement. Physiatry reconsulted for re-evaluation for placement.  Ostomy nurse Yasmin, was contacted today to provide ostomy training and education. Dr. Caldwell aware

## 2018-10-27 LAB
ANION GAP SERPL CALC-SCNC: 14 MMOL/L — SIGNIFICANT CHANGE UP (ref 7–14)
BASOPHILS # BLD AUTO: 0.03 K/UL — SIGNIFICANT CHANGE UP (ref 0–0.2)
BASOPHILS NFR BLD AUTO: 0.4 % — SIGNIFICANT CHANGE UP (ref 0–1)
BUN SERPL-MCNC: 8 MG/DL — LOW (ref 10–20)
CALCIUM SERPL-MCNC: 8.5 MG/DL — SIGNIFICANT CHANGE UP (ref 8.5–10.1)
CHLORIDE SERPL-SCNC: 96 MMOL/L — LOW (ref 98–110)
CO2 SERPL-SCNC: 27 MMOL/L — SIGNIFICANT CHANGE UP (ref 17–32)
CREAT SERPL-MCNC: 0.8 MG/DL — SIGNIFICANT CHANGE UP (ref 0.7–1.5)
EOSINOPHIL # BLD AUTO: 0.25 K/UL — SIGNIFICANT CHANGE UP (ref 0–0.7)
EOSINOPHIL NFR BLD AUTO: 3.6 % — SIGNIFICANT CHANGE UP (ref 0–8)
GLUCOSE BLDC GLUCOMTR-MCNC: 107 MG/DL — HIGH (ref 70–99)
GLUCOSE BLDC GLUCOMTR-MCNC: 108 MG/DL — HIGH (ref 70–99)
GLUCOSE BLDC GLUCOMTR-MCNC: 118 MG/DL — HIGH (ref 70–99)
GLUCOSE BLDC GLUCOMTR-MCNC: 119 MG/DL — HIGH (ref 70–99)
GLUCOSE BLDC GLUCOMTR-MCNC: 121 MG/DL — HIGH (ref 70–99)
GLUCOSE BLDC GLUCOMTR-MCNC: 132 MG/DL — HIGH (ref 70–99)
GLUCOSE SERPL-MCNC: 95 MG/DL — SIGNIFICANT CHANGE UP (ref 70–99)
HCT VFR BLD CALC: 33.3 % — LOW (ref 42–52)
HGB BLD-MCNC: 10.6 G/DL — LOW (ref 14–18)
IMM GRANULOCYTES NFR BLD AUTO: 1.4 % — HIGH (ref 0.1–0.3)
LYMPHOCYTES # BLD AUTO: 1.31 K/UL — SIGNIFICANT CHANGE UP (ref 1.2–3.4)
LYMPHOCYTES # BLD AUTO: 18.6 % — LOW (ref 20.5–51.1)
MAGNESIUM SERPL-MCNC: 1.9 MG/DL — SIGNIFICANT CHANGE UP (ref 1.8–2.4)
MCHC RBC-ENTMCNC: 26.3 PG — LOW (ref 27–31)
MCHC RBC-ENTMCNC: 31.8 G/DL — LOW (ref 32–37)
MCV RBC AUTO: 82.6 FL — SIGNIFICANT CHANGE UP (ref 80–94)
MONOCYTES # BLD AUTO: 0.89 K/UL — HIGH (ref 0.1–0.6)
MONOCYTES NFR BLD AUTO: 12.6 % — HIGH (ref 1.7–9.3)
NEUTROPHILS # BLD AUTO: 4.46 K/UL — SIGNIFICANT CHANGE UP (ref 1.4–6.5)
NEUTROPHILS NFR BLD AUTO: 63.4 % — SIGNIFICANT CHANGE UP (ref 42.2–75.2)
NRBC # BLD: 0 /100 WBCS — SIGNIFICANT CHANGE UP (ref 0–0)
PHOSPHATE SERPL-MCNC: 3.8 MG/DL — SIGNIFICANT CHANGE UP (ref 2.1–4.9)
PLATELET # BLD AUTO: 322 K/UL — SIGNIFICANT CHANGE UP (ref 130–400)
POTASSIUM SERPL-MCNC: 4.4 MMOL/L — SIGNIFICANT CHANGE UP (ref 3.5–5)
POTASSIUM SERPL-SCNC: 4.4 MMOL/L — SIGNIFICANT CHANGE UP (ref 3.5–5)
RBC # BLD: 4.03 M/UL — LOW (ref 4.7–6.1)
RBC # FLD: 14.8 % — HIGH (ref 11.5–14.5)
SODIUM SERPL-SCNC: 137 MMOL/L — SIGNIFICANT CHANGE UP (ref 135–146)
WBC # BLD: 7.04 K/UL — SIGNIFICANT CHANGE UP (ref 4.8–10.8)
WBC # FLD AUTO: 7.04 K/UL — SIGNIFICANT CHANGE UP (ref 4.8–10.8)

## 2018-10-27 RX ADMIN — Medication 20 MILLIGRAM(S): at 17:19

## 2018-10-27 RX ADMIN — Medication 20 MILLIGRAM(S): at 06:12

## 2018-10-27 RX ADMIN — APIXABAN 5 MILLIGRAM(S): 2.5 TABLET, FILM COATED ORAL at 06:12

## 2018-10-27 RX ADMIN — MORPHINE SULFATE 2 MILLIGRAM(S): 50 CAPSULE, EXTENDED RELEASE ORAL at 08:45

## 2018-10-27 RX ADMIN — ZOLPIDEM TARTRATE 5 MILLIGRAM(S): 10 TABLET ORAL at 21:05

## 2018-10-27 RX ADMIN — OXYCODONE HYDROCHLORIDE 10 MILLIGRAM(S): 5 TABLET ORAL at 20:15

## 2018-10-27 RX ADMIN — MORPHINE SULFATE 2 MILLIGRAM(S): 50 CAPSULE, EXTENDED RELEASE ORAL at 02:25

## 2018-10-27 RX ADMIN — TAMSULOSIN HYDROCHLORIDE 0.4 MILLIGRAM(S): 0.4 CAPSULE ORAL at 21:05

## 2018-10-27 RX ADMIN — PANTOPRAZOLE SODIUM 40 MILLIGRAM(S): 20 TABLET, DELAYED RELEASE ORAL at 08:06

## 2018-10-27 RX ADMIN — OXYCODONE HYDROCHLORIDE 5 MILLIGRAM(S): 5 TABLET ORAL at 06:15

## 2018-10-27 RX ADMIN — MORPHINE SULFATE 2 MILLIGRAM(S): 50 CAPSULE, EXTENDED RELEASE ORAL at 03:54

## 2018-10-27 RX ADMIN — LATANOPROST 1 DROP(S): 0.05 SOLUTION/ DROPS OPHTHALMIC; TOPICAL at 21:05

## 2018-10-27 RX ADMIN — OXYCODONE HYDROCHLORIDE 10 MILLIGRAM(S): 5 TABLET ORAL at 19:45

## 2018-10-27 RX ADMIN — APIXABAN 5 MILLIGRAM(S): 2.5 TABLET, FILM COATED ORAL at 17:19

## 2018-10-27 RX ADMIN — MORPHINE SULFATE 2 MILLIGRAM(S): 50 CAPSULE, EXTENDED RELEASE ORAL at 23:29

## 2018-10-27 RX ADMIN — MORPHINE SULFATE 2 MILLIGRAM(S): 50 CAPSULE, EXTENDED RELEASE ORAL at 23:45

## 2018-10-27 RX ADMIN — SENNA PLUS 1 TABLET(S): 8.6 TABLET ORAL at 11:51

## 2018-10-27 RX ADMIN — Medication 100 MILLIGRAM(S): at 11:51

## 2018-10-27 RX ADMIN — OXYCODONE HYDROCHLORIDE 5 MILLIGRAM(S): 5 TABLET ORAL at 06:14

## 2018-10-27 RX ADMIN — OXYCODONE HYDROCHLORIDE 10 MILLIGRAM(S): 5 TABLET ORAL at 12:42

## 2018-10-27 NOTE — PROGRESS NOTE ADULT - SUBJECTIVE AND OBJECTIVE BOX
Procedure/dx: THORACOSCOPY, TALC PLEURODESSIS. s/p transverse loop colostomy.     Events of the Last 24h: [facility] refused to accept the patient for rehab    Patient is a 58y old  Male who presents with a chief complaint of worsening SOB (26 Oct 2018 12:31)    PAST MEDICAL & SURGICAL HISTORY:  Pleural effusion  CHF (congestive heart failure)  Insomnia  BPH (benign prostatic hyperplasia)  Depression  Bipolar 1 disorder  HTN (hypertension)  History of thoracentesis    Vital Signs Last 24 Hrs  T(C): 36.7 (27 Oct 2018 02:00), Max: 37.1 (26 Oct 2018 23:30)  T(F): 98 (27 Oct 2018 02:00), Max: 98.8 (26 Oct 2018 23:30)  HR: 86 (27 Oct 2018 02:00) (75 - 86)  BP: 115/55 (27 Oct 2018 02:00) (96/51 - 115/55)  RR: 20 (27 Oct 2018 02:00) (18 - 20)    Diet, Mechanical Soft:   Dysphagia 3, Soft, Thin Liquids ([id number])  Prosource Gelatein 20 Sugar Free     Qty per Day:  q12H  Supplement Feeding Modality:  Oral  Ensure Enlive Cans or Servings Per Day:  1       Frequency:  Daily (10-25-18 @ 14:51)      I&O's Detail    25 Oct 2018 07:01  -  26 Oct 2018 07:00  --------------------------------------------------------  IN:    Oral Fluid: 960 mL  Total IN: 960 mL    OUT:    Colostomy: 30 mL    Voided: 1550 mL  Total OUT: 1580 mL    Total NET: -620 mL      26 Oct 2018 07:01  -  27 Oct 2018 04:26  --------------------------------------------------------  IN:    Oral Fluid: 1020 mL  Total IN: 1020 mL    OUT:    Colostomy: 30 mL    Voided: 1700 mL  Total OUT: 1730 mL    Total NET: -710 mL          MEDICATIONS  (STANDING):  apixaban 5 milliGRAM(s) Oral every 12 hours  chlorhexidine 4% Liquid 1 Application(s) Topical <User Schedule>  diltiazem    Tablet 60 milliGRAM(s) Oral every 8 hours  docusate sodium 100 milliGRAM(s) Oral daily  furosemide    Tablet 20 milliGRAM(s) Oral daily  influenza   Vaccine 0.5 milliLiter(s) IntraMuscular once  latanoprost 0.005% Ophthalmic Solution 1 Drop(s) Both EYES at bedtime  pantoprazole    Tablet 40 milliGRAM(s) Oral before breakfast  propranolol 20 milliGRAM(s) Oral every 12 hours  senna 1 Tablet(s) Oral daily  tamsulosin 0.4 milliGRAM(s) Oral at bedtime    MEDICATIONS  (PRN):  acetaminophen   Tablet .. 650 milliGRAM(s) Oral every 6 hours PRN Mild Pain (1 - 3)  morphine  - Injectable 2 milliGRAM(s) IV Push every 6 hours PRN Severe Pain (7 - 10)  ondansetron Injectable 4 milliGRAM(s) IV Push every 6 hours PRN Nausea  oxyCODONE    IR 5 milliGRAM(s) Oral every 6 hours PRN Moderate Pain (4 - 6)  oxyCODONE    IR 10 milliGRAM(s) Oral every 6 hours PRN Severe Pain (7 - 10)  zolpidem 5 milliGRAM(s) Oral at bedtime PRN Insomnia    PHYSICAL EXAM:  GENERAL: NAD  CHEST/LUNGS: clear to auscultation bilaterally. some crackles in b/l lower parts of the lungs  HEART: S1 and S2 noted  ABDOMEN: soft, nontender, mildly distended. gas in the colostomy  EXTREMITIES:  FROM, No clubbing, cyanosis, or edema, palpable pulse  NEURO: No focal neurological deficits             LABS:             10.6   7.04  )-----------( 322      ( 27 Oct 2018 00:47 )             33.3        10-27    137  |  96<L>  |  8<L>  ----------------------------<  95  4.4   |  27  |  0.8    Ca    8.5      27 Oct 2018 00:47  Phos  3.8     10-27  Mg     1.9     10-27    PTT - ( 25 Oct 2018 23:45 )  PTT:36.6 sec

## 2018-10-27 NOTE — PROGRESS NOTE ADULT - ASSESSMENT
57 y/o male s/p thoracoscopy, talc pleurodesis. s/p transverse loop colostomy.     Plan:   f/u PT/ rehab and social work  Pain control  DVT/GI prophylaxis  f/u colostomy output  Encourage ambulation

## 2018-10-27 NOTE — PROGRESS NOTE ADULT - ASSESSMENT
57 y/o male s/p thoracoscopy, talc pleurodesis. s/p transverse loop colostomy. Patient was advanced to soft diet. tolerating but has decreased appetite. Started on Eliquis, heparin was stopped.     Plan:   f/u PT/ rehab and social work  Pain control  DVT/GI prophylaxis  f/u colostomy output  Encourage ambulation 59 y/o male s/p thoracoscopy, talc pleurodesis. s/p transverse loop colostomy.     Plan:   f/u PT/ rehab and social work  Pain control  DVT/GI prophylaxis  f/u colostomy output  Encourage ambulation

## 2018-10-27 NOTE — PROGRESS NOTE ADULT - SUBJECTIVE AND OBJECTIVE BOX
AMRIK MADRID  58y Male   [id number]    Procedure/dx: THORACOSCOPY, TALC PLEURODESSIS. s/p transverse loop colostomy.     Events of the Last 24h: [facility] refused to accept the patient for rehab    Patient is a 58y old  Male who presents with a chief complaint of worsening SOB (26 Oct 2018 12:31)    PAST MEDICAL & SURGICAL HISTORY:  Pleural effusion  CHF (congestive heart failure)  Insomnia  BPH (benign prostatic hyperplasia)  Depression  Bipolar 1 disorder  HTN (hypertension)  History of thoracentesis    Vital Signs Last 24 Hrs  T(C): 36.7 (27 Oct 2018 02:00), Max: 37.1 (26 Oct 2018 23:30)  T(F): 98 (27 Oct 2018 02:00), Max: 98.8 (26 Oct 2018 23:30)  HR: 86 (27 Oct 2018 02:00) (75 - 86)  BP: 115/55 (27 Oct 2018 02:00) (96/51 - 115/55)  RR: 20 (27 Oct 2018 02:00) (18 - 20)    Diet, Mechanical Soft:   Dysphagia 3, Soft, Thin Liquids ([id number])  Prosource Gelatein 20 Sugar Free     Qty per Day:  q12H  Supplement Feeding Modality:  Oral  Ensure Enlive Cans or Servings Per Day:  1       Frequency:  Daily (10-25-18 @ 14:51)      I&O's Detail    25 Oct 2018 07:01  -  26 Oct 2018 07:00  --------------------------------------------------------  IN:    Oral Fluid: 960 mL  Total IN: 960 mL    OUT:    Colostomy: 30 mL    Voided: 1550 mL  Total OUT: 1580 mL    Total NET: -620 mL      26 Oct 2018 07:01  -  27 Oct 2018 04:26  --------------------------------------------------------  IN:    Oral Fluid: 1020 mL  Total IN: 1020 mL    OUT:    Colostomy: 30 mL    Voided: 1700 mL  Total OUT: 1730 mL    Total NET: -710 mL          MEDICATIONS  (STANDING):  apixaban 5 milliGRAM(s) Oral every 12 hours  chlorhexidine 4% Liquid 1 Application(s) Topical <User Schedule>  diltiazem    Tablet 60 milliGRAM(s) Oral every 8 hours  docusate sodium 100 milliGRAM(s) Oral daily  furosemide    Tablet 20 milliGRAM(s) Oral daily  influenza   Vaccine 0.5 milliLiter(s) IntraMuscular once  latanoprost 0.005% Ophthalmic Solution 1 Drop(s) Both EYES at bedtime  pantoprazole    Tablet 40 milliGRAM(s) Oral before breakfast  propranolol 20 milliGRAM(s) Oral every 12 hours  senna 1 Tablet(s) Oral daily  tamsulosin 0.4 milliGRAM(s) Oral at bedtime    MEDICATIONS  (PRN):  acetaminophen   Tablet .. 650 milliGRAM(s) Oral every 6 hours PRN Mild Pain (1 - 3)  morphine  - Injectable 2 milliGRAM(s) IV Push every 6 hours PRN Severe Pain (7 - 10)  ondansetron Injectable 4 milliGRAM(s) IV Push every 6 hours PRN Nausea  oxyCODONE    IR 5 milliGRAM(s) Oral every 6 hours PRN Moderate Pain (4 - 6)  oxyCODONE    IR 10 milliGRAM(s) Oral every 6 hours PRN Severe Pain (7 - 10)  zolpidem 5 milliGRAM(s) Oral at bedtime PRN Insomnia    PHYSICAL EXAM:  GENERAL: NAD  CHEST/LUNGS: clear to auscultation bilaterally. some crackles in b/l lower parts of the lungs  HEART: S1 and S2 noted  ABDOMEN: soft, nontender, mildly distended. gas in the colostomy  EXTREMITIES:  FROM, No clubbing, cyanosis, or edema, palpable pulse  NEURO: No focal neurological deficits             LABS:             10.6   7.04  )-----------( 322      ( 27 Oct 2018 00:47 )             33.3        10-27    137  |  96<L>  |  8<L>  ----------------------------<  95  4.4   |  27  |  0.8    Ca    8.5      27 Oct 2018 00:47  Phos  3.8     10-27  Mg     1.9     10-27    PTT - ( 25 Oct 2018 23:45 )  PTT:36.6 sec

## 2018-10-28 LAB
ANION GAP SERPL CALC-SCNC: 11 MMOL/L — SIGNIFICANT CHANGE UP (ref 7–14)
BASOPHILS # BLD AUTO: 0.03 K/UL — SIGNIFICANT CHANGE UP (ref 0–0.2)
BASOPHILS NFR BLD AUTO: 0.4 % — SIGNIFICANT CHANGE UP (ref 0–1)
BUN SERPL-MCNC: 14 MG/DL — SIGNIFICANT CHANGE UP (ref 10–20)
CALCIUM SERPL-MCNC: 8.6 MG/DL — SIGNIFICANT CHANGE UP (ref 8.5–10.1)
CHLORIDE SERPL-SCNC: 93 MMOL/L — LOW (ref 98–110)
CO2 SERPL-SCNC: 28 MMOL/L — SIGNIFICANT CHANGE UP (ref 17–32)
CREAT SERPL-MCNC: 0.8 MG/DL — SIGNIFICANT CHANGE UP (ref 0.7–1.5)
EOSINOPHIL # BLD AUTO: 0.23 K/UL — SIGNIFICANT CHANGE UP (ref 0–0.7)
EOSINOPHIL NFR BLD AUTO: 3.3 % — SIGNIFICANT CHANGE UP (ref 0–8)
GLUCOSE SERPL-MCNC: 100 MG/DL — HIGH (ref 70–99)
HCT VFR BLD CALC: 33.5 % — LOW (ref 42–52)
HGB BLD-MCNC: 10.6 G/DL — LOW (ref 14–18)
IMM GRANULOCYTES NFR BLD AUTO: 1.6 % — HIGH (ref 0.1–0.3)
LYMPHOCYTES # BLD AUTO: 1.32 K/UL — SIGNIFICANT CHANGE UP (ref 1.2–3.4)
LYMPHOCYTES # BLD AUTO: 19 % — LOW (ref 20.5–51.1)
MAGNESIUM SERPL-MCNC: 2.2 MG/DL — SIGNIFICANT CHANGE UP (ref 1.8–2.4)
MCHC RBC-ENTMCNC: 26.2 PG — LOW (ref 27–31)
MCHC RBC-ENTMCNC: 31.6 G/DL — LOW (ref 32–37)
MCV RBC AUTO: 82.7 FL — SIGNIFICANT CHANGE UP (ref 80–94)
MONOCYTES # BLD AUTO: 0.87 K/UL — HIGH (ref 0.1–0.6)
MONOCYTES NFR BLD AUTO: 12.5 % — HIGH (ref 1.7–9.3)
NEUTROPHILS # BLD AUTO: 4.4 K/UL — SIGNIFICANT CHANGE UP (ref 1.4–6.5)
NEUTROPHILS NFR BLD AUTO: 63.2 % — SIGNIFICANT CHANGE UP (ref 42.2–75.2)
NRBC # BLD: 0 /100 WBCS — SIGNIFICANT CHANGE UP (ref 0–0)
PHOSPHATE SERPL-MCNC: 4.4 MG/DL — SIGNIFICANT CHANGE UP (ref 2.1–4.9)
PLATELET # BLD AUTO: 348 K/UL — SIGNIFICANT CHANGE UP (ref 130–400)
POTASSIUM SERPL-MCNC: 4.6 MMOL/L — SIGNIFICANT CHANGE UP (ref 3.5–5)
POTASSIUM SERPL-SCNC: 4.6 MMOL/L — SIGNIFICANT CHANGE UP (ref 3.5–5)
RBC # BLD: 4.05 M/UL — LOW (ref 4.7–6.1)
RBC # FLD: 14.9 % — HIGH (ref 11.5–14.5)
SODIUM SERPL-SCNC: 132 MMOL/L — LOW (ref 135–146)
WBC # BLD: 6.96 K/UL — SIGNIFICANT CHANGE UP (ref 4.8–10.8)
WBC # FLD AUTO: 6.96 K/UL — SIGNIFICANT CHANGE UP (ref 4.8–10.8)

## 2018-10-28 RX ADMIN — Medication 20 MILLIGRAM(S): at 05:32

## 2018-10-28 RX ADMIN — APIXABAN 5 MILLIGRAM(S): 2.5 TABLET, FILM COATED ORAL at 17:54

## 2018-10-28 RX ADMIN — MORPHINE SULFATE 2 MILLIGRAM(S): 50 CAPSULE, EXTENDED RELEASE ORAL at 05:45

## 2018-10-28 RX ADMIN — OXYCODONE HYDROCHLORIDE 5 MILLIGRAM(S): 5 TABLET ORAL at 00:54

## 2018-10-28 RX ADMIN — Medication 20 MILLIGRAM(S): at 17:54

## 2018-10-28 RX ADMIN — ZOLPIDEM TARTRATE 5 MILLIGRAM(S): 10 TABLET ORAL at 21:08

## 2018-10-28 RX ADMIN — MORPHINE SULFATE 2 MILLIGRAM(S): 50 CAPSULE, EXTENDED RELEASE ORAL at 23:54

## 2018-10-28 RX ADMIN — MORPHINE SULFATE 2 MILLIGRAM(S): 50 CAPSULE, EXTENDED RELEASE ORAL at 11:45

## 2018-10-28 RX ADMIN — LATANOPROST 1 DROP(S): 0.05 SOLUTION/ DROPS OPHTHALMIC; TOPICAL at 21:02

## 2018-10-28 RX ADMIN — MORPHINE SULFATE 2 MILLIGRAM(S): 50 CAPSULE, EXTENDED RELEASE ORAL at 05:30

## 2018-10-28 RX ADMIN — TAMSULOSIN HYDROCHLORIDE 0.4 MILLIGRAM(S): 0.4 CAPSULE ORAL at 21:02

## 2018-10-28 RX ADMIN — MORPHINE SULFATE 2 MILLIGRAM(S): 50 CAPSULE, EXTENDED RELEASE ORAL at 17:54

## 2018-10-28 RX ADMIN — OXYCODONE HYDROCHLORIDE 5 MILLIGRAM(S): 5 TABLET ORAL at 15:48

## 2018-10-28 RX ADMIN — PANTOPRAZOLE SODIUM 40 MILLIGRAM(S): 20 TABLET, DELAYED RELEASE ORAL at 05:32

## 2018-10-28 RX ADMIN — APIXABAN 5 MILLIGRAM(S): 2.5 TABLET, FILM COATED ORAL at 05:32

## 2018-10-28 NOTE — PROGRESS NOTE ADULT - NSHPATTENDINGPLANDISCUSS_GEN_ALL_CORE
TEAM
GREEN TEAM
House staff
House staff and 
House satff
surgical residents
team
House staff
pt and care team
surgical residents
SICU Care
patient
House staff, pt and 
patient
pt and care team

## 2018-10-28 NOTE — PROGRESS NOTE ADULT - SUBJECTIVE AND OBJECTIVE BOX
GENERAL SURGERY PROGRESS NOTE     AMRIK MADRID  58y  Male  Hospital day :37d  POD:  Procedure: Loop colostomy  Thoracoscopy  Chest tube insertion  Thoracentesis  Thoracentesis    OVERNIGHT EVENTS: no acute events overnight, patient doing well, there is stool in the colostomy.     T(F): 98.8 (10-27-18 @ 23:00), Max: 98.8 (10-27-18 @ 23:00)  HR: 93 (10-27-18 @ 23:00) (70 - 97)  BP: 133/58 (10-27-18 @ 23:00) (116/60 - 133/58)  ABP: --  ABP(mean): --  RR: 20 (10-27-18 @ 23:00) (18 - 20)  SpO2: --    DIET/FLUIDS:   NG:                                                                                DRAINS:     BM:   10-26-18 @ 07:01  -  10-27-18 @ 07:00  --------------------------------------------------------  OUT: 30 mL      EMESIS:     URINE:      GI proph:  pantoprazole    Tablet 40 milliGRAM(s) Oral before breakfast    AC/ proph:   ABx:       LABS  Labs:  CAPILLARY BLOOD GLUCOSE      POCT Blood Glucose.: 132 mg/dL (27 Oct 2018 20:26)  POCT Blood Glucose.: 119 mg/dL (27 Oct 2018 16:34)  POCT Blood Glucose.: 118 mg/dL (27 Oct 2018 11:53)  POCT Blood Glucose.: 121 mg/dL (27 Oct 2018 08:14)  POCT Blood Glucose.: 107 mg/dL (27 Oct 2018 06:51)                          10.6   6.96  )-----------( 348      ( 28 Oct 2018 00:53 )             33.5       Auto Neutrophil %: 63.2 % (10-28-18 @ 00:53)  Auto Immature Granulocyte %: 1.6 % (10-28-18 @ 00:53)    10-28    132<L>  |  93<L>  |  14  ----------------------------<  100<H>  4.6   |  28  |  0.8      Calcium, Total Serum: 8.6 mg/dL (10-28-18 @ 00:53)      LFTs:       ABG - ( 24 Oct 2018 00:37 )  pH: 7.39  /  pCO2: 49    /  pO2: 76    / HCO3: 30    / Base Excess: 4.3   /  SaO2: 96              ABG - ( 23 Oct 2018 23:15 )  pH: 7.38  /  pCO2: 49    /  pO2: 90    / HCO3: 29    / Base Excess: 2.6   /  SaO2: 98              ABG - ( 23 Oct 2018 19:53 )  pH: 7.44  /  pCO2: 43    /  pO2: 88    / HCO3: 29    / Base Excess: 4.2   /  SaO2: 98          Plan:  1.Dispo planning- patient has a bed in [facility]   2.encourage incentive  3.encourage ambulation  4.continue eliquis   5.ostomy care

## 2018-10-28 NOTE — PROGRESS NOTE ADULT - ATTENDING COMMENTS
Await drainage catheter placement
General Thoracic Surgery Attestation    I have seen and examined the patient.  Where appropriate I have updated, edited, or corrected the resident's or PA's note with regard to findings, values, and plan.    somewhat concerning abdominal exam, not acute abdomen but certainly more tender and while always somewhat protruberant, more tense today.  plan for abdominal imaging.  This has shown significant dilation of colon, large right stool burden.  attempting enema, may require endoscopic decompression.
patient seen and examined , agree with pgy 1 assesment and plan except as indicated above,  HEENT Pupils equal and reactive to light and accommodationSupple Neck  PULM decreased breath sounds bilaterally  CV s1s2 regular rate and rhythm  GI + bowel sounds nontnender  EXT no cyanosis or edema  PSYCH awake alert and oriented x 3  INTEG No Lesions  NEURO CÁRDENAS  #Recurrent pleural effusion sp drainage,   rheumatology callback - no intervnetion , pulmonary follow up if drainage of louclation needed   #Hypocheloremia moniotr   #Prostomegaly - no evidence of obstruciton , monitor  #simple liver cyst no intervneiton .
patient seen and examined , agree with pgy 1 assesment and plan except as indicated above,  HEENT Pupils equal and reactive to light and accommodationSupple Neck  PULM rales and decreased breath sound at bases  CV s1s2 regular rate and rhythm  GI + bowel sounds nontnender  EXT no cyanosis or edema  PSYCH awake alert and oriented x 3  INTEG No Lesions  NEURO CÁRDENAS  #Recurrent pleural effusion awaiting pleuodisis per ct surgery   #Hypocheloremia moniotr   #Prostomegaly - no evidence of obstruction , monitor  #simple liver cyst no interveniton .   #Chronci diastolic chf at baseline
patient seen and examined , agree with pgy 3 assesment and plan except as indicated above,   GEN Lying in no acute distress  HEENT Pupils equal and reactive to light and accommodationSupple Neck  PULM decreased breath sounds bilaterally  CV s1s2 regular rate and rhythm  GI + bowel sounds nontnender  EXT no cyanosis or edema  PSYCH awake alert and oriented x 3  INTEG No Lesions  NEURO CÁRDENAS  #Recurrent pleural effusion sp drainage,   rheumatology cs pending  #Hypocheloremia moniotr   #Prostomegaly - no evidence of obstruciton , monitor  #simple liver cyst no intervneiton
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 s/p transverse loop colostomy (for large bowel motiity disorder and incidentally dx'd colonic cancer)  awaiting placement  f/u PT/ rehab and social work  Pain control  DVT/GI prophylaxis  f/u colostomy output (functioning normally)  pt reports severe colicky abd pain, asked gen surg to make sure that the distal segment of the decompressed colon is actually decompressed  Encourage ambulation  should we consider hospice care?  Heme/onc eval as an oupatient  wean off O2
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 s/p transverse loop colostomy (for large bowel motiity disorder and incidentally dx'd colonic cancer)  awaiting placement  f/u PT/ rehab and social work  Pain control  DVT/GI prophylaxis  f/u colostomy output (functioning normally)  Encourage ambulation  wean off O2
Patient seems little more comfortable today.   May need MRI chest.   No definite etiology of the Bilateral recurrent pleural effusion has been identified
s/p malignant plural effusion   and large bowel obstruction   s/p loop colostomy   extubated   resume diet   pain control   continue SICU Care
Assessment and plan above were modified and discussed with residents, physician assistants, and nurses.  I have provided 35 min of Critical Care to this patient.
Assessment and plan above were modified and discussed with residents, physician assistants, and nurses.  I have provided 35  min of Critical Care to this patient.
No Etiology of the pleural effusion has been identified yet.   Rheumatology evaluation for autoimmune disease

## 2018-10-28 NOTE — PROVIDER CONTACT NOTE (CHANGE IN STATUS NOTIFICATION) - SITUATION
Spoke with resident [id number], Pt due for his blood pressure medication  bp 118/87, hr87, VS did not meet criteria on order placed by MD.

## 2018-10-29 ENCOUNTER — TRANSCRIPTION ENCOUNTER (OUTPATIENT)
Age: 58
End: 2018-10-29

## 2018-10-29 VITALS
HEART RATE: 95 BPM | RESPIRATION RATE: 18 BRPM | TEMPERATURE: 97 F | DIASTOLIC BLOOD PRESSURE: 81 MMHG | SYSTOLIC BLOOD PRESSURE: 131 MMHG

## 2018-10-29 LAB
ANION GAP SERPL CALC-SCNC: 9 MMOL/L — SIGNIFICANT CHANGE UP (ref 7–14)
BUN SERPL-MCNC: 11 MG/DL — SIGNIFICANT CHANGE UP (ref 10–20)
CALCIUM SERPL-MCNC: 8.5 MG/DL — SIGNIFICANT CHANGE UP (ref 8.5–10.1)
CHLORIDE SERPL-SCNC: 94 MMOL/L — LOW (ref 98–110)
CO2 SERPL-SCNC: 29 MMOL/L — SIGNIFICANT CHANGE UP (ref 17–32)
CREAT SERPL-MCNC: 0.8 MG/DL — SIGNIFICANT CHANGE UP (ref 0.7–1.5)
GLUCOSE SERPL-MCNC: 103 MG/DL — HIGH (ref 70–99)
HCT VFR BLD CALC: 33.1 % — LOW (ref 42–52)
HGB BLD-MCNC: 10.5 G/DL — LOW (ref 14–18)
MAGNESIUM SERPL-MCNC: 2 MG/DL — SIGNIFICANT CHANGE UP (ref 1.8–2.4)
MCHC RBC-ENTMCNC: 26.3 PG — LOW (ref 27–31)
MCHC RBC-ENTMCNC: 31.7 G/DL — LOW (ref 32–37)
MCV RBC AUTO: 83 FL — SIGNIFICANT CHANGE UP (ref 80–94)
NRBC # BLD: 0 /100 WBCS — SIGNIFICANT CHANGE UP (ref 0–0)
PHOSPHATE SERPL-MCNC: 3.8 MG/DL — SIGNIFICANT CHANGE UP (ref 2.1–4.9)
PLATELET # BLD AUTO: 302 K/UL — SIGNIFICANT CHANGE UP (ref 130–400)
POTASSIUM SERPL-MCNC: 4.2 MMOL/L — SIGNIFICANT CHANGE UP (ref 3.5–5)
POTASSIUM SERPL-SCNC: 4.2 MMOL/L — SIGNIFICANT CHANGE UP (ref 3.5–5)
RBC # BLD: 3.99 M/UL — LOW (ref 4.7–6.1)
RBC # FLD: 14.8 % — HIGH (ref 11.5–14.5)
SODIUM SERPL-SCNC: 132 MMOL/L — LOW (ref 135–146)
WBC # BLD: 6.85 K/UL — SIGNIFICANT CHANGE UP (ref 4.8–10.8)
WBC # FLD AUTO: 6.85 K/UL — SIGNIFICANT CHANGE UP (ref 4.8–10.8)

## 2018-10-29 RX ORDER — FUROSEMIDE 40 MG
1 TABLET ORAL
Qty: 0 | Refills: 0 | COMMUNITY
Start: 2018-10-29

## 2018-10-29 RX ORDER — PANTOPRAZOLE SODIUM 20 MG/1
1 TABLET, DELAYED RELEASE ORAL
Qty: 0 | Refills: 0 | COMMUNITY
Start: 2018-10-29

## 2018-10-29 RX ORDER — OXYCODONE HYDROCHLORIDE 5 MG/1
1 TABLET ORAL
Qty: 0 | Refills: 0 | COMMUNITY
Start: 2018-10-29

## 2018-10-29 RX ORDER — ACETAMINOPHEN 500 MG
2 TABLET ORAL
Qty: 0 | Refills: 0 | COMMUNITY
Start: 2018-10-29

## 2018-10-29 RX ADMIN — MORPHINE SULFATE 2 MILLIGRAM(S): 50 CAPSULE, EXTENDED RELEASE ORAL at 18:00

## 2018-10-29 RX ADMIN — APIXABAN 5 MILLIGRAM(S): 2.5 TABLET, FILM COATED ORAL at 05:14

## 2018-10-29 RX ADMIN — APIXABAN 5 MILLIGRAM(S): 2.5 TABLET, FILM COATED ORAL at 17:45

## 2018-10-29 RX ADMIN — OXYCODONE HYDROCHLORIDE 10 MILLIGRAM(S): 5 TABLET ORAL at 14:41

## 2018-10-29 RX ADMIN — MORPHINE SULFATE 2 MILLIGRAM(S): 50 CAPSULE, EXTENDED RELEASE ORAL at 18:40

## 2018-10-29 RX ADMIN — MORPHINE SULFATE 2 MILLIGRAM(S): 50 CAPSULE, EXTENDED RELEASE ORAL at 05:55

## 2018-10-29 RX ADMIN — Medication 20 MILLIGRAM(S): at 17:45

## 2018-10-29 RX ADMIN — MORPHINE SULFATE 2 MILLIGRAM(S): 50 CAPSULE, EXTENDED RELEASE ORAL at 11:56

## 2018-10-29 RX ADMIN — MORPHINE SULFATE 2 MILLIGRAM(S): 50 CAPSULE, EXTENDED RELEASE ORAL at 00:13

## 2018-10-29 RX ADMIN — Medication 20 MILLIGRAM(S): at 05:14

## 2018-10-29 RX ADMIN — OXYCODONE HYDROCHLORIDE 10 MILLIGRAM(S): 5 TABLET ORAL at 03:18

## 2018-10-29 RX ADMIN — MORPHINE SULFATE 2 MILLIGRAM(S): 50 CAPSULE, EXTENDED RELEASE ORAL at 06:10

## 2018-10-29 RX ADMIN — OXYCODONE HYDROCHLORIDE 10 MILLIGRAM(S): 5 TABLET ORAL at 04:11

## 2018-10-29 RX ADMIN — MORPHINE SULFATE 2 MILLIGRAM(S): 50 CAPSULE, EXTENDED RELEASE ORAL at 13:41

## 2018-10-29 RX ADMIN — PANTOPRAZOLE SODIUM 40 MILLIGRAM(S): 20 TABLET, DELAYED RELEASE ORAL at 05:14

## 2018-10-29 RX ADMIN — OXYCODONE HYDROCHLORIDE 10 MILLIGRAM(S): 5 TABLET ORAL at 16:11

## 2018-10-29 RX ADMIN — Medication 20 MILLIGRAM(S): at 05:13

## 2018-10-29 NOTE — DISCHARGE NOTE ADULT - CARE PLAN
Principal Discharge DX:	Colon tumor  Goal:	Optimisation of function  Assessment and plan of treatment:	VATS done for malignant pleural effusion and  transverse loop colostomy done

## 2018-10-29 NOTE — CHART NOTE - NSCHARTNOTEFT_GEN_A_CORE
Registered Dietitian Follow-Up    ***Scroll to the bottom for RD recommendation***    Patient Profile Reviewed                           Yes [x]   No []  Nutrition History Previously Obtained        Yes [x]  No []   - pt is doing much better. Eating great. Able to chew fine with mechanical soft 3 diet recommended. Eating 100% breakfast observed.      PERTINENT MEDICAL INFORMATIONS:  (1) s/p pigtail 10/8. s/p VATs 10/10. S/p C-scope, s/p verse loop colostomy 10/23. f/u obstructive series. Possible d/c per surgery          DIET ORDER:  Mechanical soft 3 - thins. Prosource gelatin SF q12hr. Ensure Enlive q24hr.         ANTHROPOMETRICS:  - Ht.  187.96cm  - Wt.    (10/24): 104.2kg (no new weight) - pt on lasix.  - BMI. 29.2  - IBW       PERTINENT LAB DATA: 10/29: h/h 10.5/33.1, Na 132, glucose 103  PERTINENT MEDS: apixaban, docusate, senna, morphine, protonix, acetaminophen, furosemide, ondansetron, oxy, propranolol.      PHYSICAL FINDINGS  - APPEARANCE:        alert and oriented. No edema.   - GI FUNCTION:        Colostomy 10/28  - TUBES:                       - ORAL/MOUTH:      none reported  - SKIN:                        Surgical incision        NUTRITION REQUIREMENTS  WEIGHT USED:                           CBW:  104 kg + 86.4 kg (IBW)  ESTIMATED ENERGY NEEDS:       CONTINUE [  ]      ADJUST [  ]    ESTIMATED ENERGY NEEDS:         6429-8326 kcal/day (MSJ x 1-1.1 AF)--d/t borderline obese BMI  ESTIMATED PROTEIN NEEDS:        78-94 gm/day (1-1.2 gm/kg IBW)  ESTIMATED FLUID NEEDS:             1ml/kcal    CURRENT NUTRIENT NEEDS:    likely meeting          [x  ] PREVIOUS NUTRITION DIAGNOSIS:   (1) inadequate protein-energy intake - resolved            [ x ] ONGOING        [  ] RESOLVED        NUTRITION INTERVENTION:    [ x ] ORAL        [ ] EN/TF     GOAL/EXPECTED OUTCOME:     pt to consume and tolerate >75% of all meals and snacks through LOS  INDICATOR/MONITORING:       RD to monitor diet order, energy intake, body composition, nutrition focused physical findings  PATIENT's INTERVENTION:        Meals and snacks. Medical food supplements    RECS: (1) Continue current diet order.

## 2018-10-29 NOTE — PROGRESS NOTE ADULT - SUBJECTIVE AND OBJECTIVE BOX
Progress Note: General Surgery  Patient: AMRIK MADRID , 58y (1960)Male   MRN: [id number]  Location: [address]  Visit: 09-21-18 Inpatient  Date: 10-29-18 @ 05:46    Procedure/Diagnosis: s/p pigtail 10/8. s/p vats 10/10. s/p c-scope 10/17, s/p xverse loop colostomy 10/23    Events/ 24h: No acute events overnight. Complaining of increased abdominal pain. Gas and stool in ostomy bag    Vitals: T(F): 98.8 (10-28-18 @ 23:30), Max: 98.8 (10-28-18 @ 23:30)  HR: 106 (10-28-18 @ 23:30)  BP: 114/71 (10-28-18 @ 23:30) (114/71 - 129/80)  RR: 18 (10-28-18 @ 23:30)  SpO2: --    In:   10-27-18 @ 07:01  -  10-28-18 @ 07:00  --------------------------------------------------------  IN: 720 mL    10-28-18 @ 07:01  -  10-29-18 @ 05:46  --------------------------------------------------------  IN: 480 mL      Out:   10-27-18 @ 07:01  -  10-28-18 @ 07:00  --------------------------------------------------------  OUT:    Colostomy: 20 mL    Voided: 1350 mL  Total OUT: 1370 mL      10-28-18 @ 07:01  -  10-29-18 @ 05:46  --------------------------------------------------------  OUT:    Colostomy: 630 mL    Nasoenteral Tube: 400 mL    Voided: 1150 mL  Total OUT: 2180 mL        Net:   10-27-18 @ 07:01  -  10-28-18 @ 07:00  --------------------------------------------------------  NET: -650 mL    10-28-18 @ 07:01  -  10-29-18 @ 05:46  --------------------------------------------------------  NET: -1700 mL        Diet: Diet, Mechanical Soft:   Dysphagia 3, Soft, Thin Liquids ([id number])  Prosource Gelatein 20 Sugar Free     Qty per Day:  q12H  Supplement Feeding Modality:  Oral  Ensure Enlive Cans or Servings Per Day:  1       Frequency:  Daily (10-25-18 @ 14:51)    IV Fluids:     Physical Examination:  General Appearance: NAD  HEENT: EOMI, sclera non-icteric.  Heart: RRR   Lungs: CTABL.   Abdomen:  Soft, tender, mildly distended. Gas and stool in ostomy bag. No rigidity, guarding, or rebound tenderness.   MSK/Extremities: Warm & well-perfused. Peripheral pulses intact.  Skin: Warm, dry. No jaundice.       Medications: [Standing]  apixaban 5 milliGRAM(s) Oral every 12 hours  chlorhexidine 4% Liquid 1 Application(s) Topical <User Schedule>  diltiazem    Tablet 60 milliGRAM(s) Oral every 8 hours  docusate sodium 100 milliGRAM(s) Oral daily  furosemide    Tablet 20 milliGRAM(s) Oral daily  latanoprost 0.005% Ophthalmic Solution 1 Drop(s) Both EYES at bedtime  pantoprazole    Tablet 40 milliGRAM(s) Oral before breakfast  propranolol 20 milliGRAM(s) Oral every 12 hours  senna 1 Tablet(s) Oral daily  tamsulosin 0.4 milliGRAM(s) Oral at bedtime    DVT Prophylaxis:   GI Prophylaxis: pantoprazole    Tablet 40 milliGRAM(s) Oral before breakfast    Antibiotics:   Anticoagulation:   Medications:[PRN]  acetaminophen   Tablet .. 650 milliGRAM(s) Oral every 6 hours PRN  morphine  - Injectable 2 milliGRAM(s) IV Push every 6 hours PRN  ondansetron Injectable 4 milliGRAM(s) IV Push every 6 hours PRN  oxyCODONE    IR 5 milliGRAM(s) Oral every 6 hours PRN  oxyCODONE    IR 10 milliGRAM(s) Oral every 6 hours PRN  zolpidem 5 milliGRAM(s) Oral at bedtime PRN      Labs:                        10.5   6.85  )-----------( 302      ( 29 Oct 2018 00:27 )             33.1     10-29    132<L>  |  94<L>  |  11  ----------------------------<  103<H>  4.2   |  29  |  0.8    Ca    8.5      29 Oct 2018 00:27  Phos  3.8     10-29  Mg     2.0     10-29        Imaging:     < from: Xray Chest 1 View- PORTABLE-Routine (10.25.18 @ 07:51) >  Stable bilateral diffuse opacities and pleural effusions.    < end of copied text >    Obs series completed, not read    Assessment:  58y Male patient admitted s/p pigtail 10/8. s/p vats 10/10. s/p c-scope 10/17, s/p xverse loop colostomy 10/23    Plan:    F/u official read on obstructive series  Good output, Possible d/c   DVT/GI ppx  OOBAT  IS  Pain control    Date/Time: 10-29-18 @ 05:46

## 2018-10-29 NOTE — DISCHARGE NOTE ADULT - PLAN OF CARE
Optimisation of function VATS done for malignant pleural effusion and  transverse loop colostomy done

## 2018-10-29 NOTE — DISCHARGE NOTE ADULT - MEDICATION SUMMARY - MEDICATIONS TO TAKE
I will START or STAY ON the medications listed below when I get home from the hospital:    acetaminophen 325 mg oral tablet  -- 2 tab(s) by mouth every 6 hours, As needed, Mild Pain (1 - 3)  -- Indication: For Pain    oxyCODONE 5 mg oral tablet  -- 1 tab(s) by mouth every 6 hours, As needed, Moderate Pain (4 - 6)  -- Indication: For Pain    oxyCODONE 10 mg oral tablet  -- 1 tab(s) by mouth every 6 hours, As needed, Severe Pain (7 - 10)  -- Indication: For Pain    tamsulosin 0.4 mg oral capsule  -- 1 cap(s) by mouth once a day  -- Indication: For urine retention    isosorbide mononitrate 20 mg oral tablet  -- 1 tab(s) by mouth once a day  -- Indication: For Heart    Cardizem 60 mg oral tablet  -- 1 tab(s) by mouth every 8 hours  -- Indication: For blood pressure    propranolol 20 mg oral tablet  -- 1 tab(s) by mouth 2 times a day  -- Indication: For Heart rate     apixaban 5 mg oral tablet  -- 1 tab(s) by mouth every 12 hours  -- Indication: For blood thinner     traZODone 50 mg oral tablet  -- 1 tab(s) by mouth once a day (at bedtime)  -- Indication: For Home med     DULoxetine 60 mg oral delayed release capsule  -- 1 cap(s) by mouth once a day  -- Indication: For Home med    Ambien 10 mg oral tablet  -- 1 tab(s) by mouth once a day (at bedtime)  -- Indication: For Home med    clotrimazole 1% topical cream  -- 1 application on skin   -- Indication: For Home med    furosemide 20 mg oral tablet  -- 1 tab(s) by mouth once a day  -- Indication: For Home med     latanoprost 0.005% ophthalmic solution  -- 1 drop(s) to each affected eye once a day (in the evening)  -- Indication: For Home med    pantoprazole 40 mg oral delayed release tablet  -- 1 tab(s) by mouth once a day (before a meal)  -- Indication: For Prottonix    Multiple Vitamins oral capsule  -- 1 cap(s) by mouth once a day  -- Indication: For Home med

## 2018-10-29 NOTE — DISCHARGE NOTE ADULT - PATIENT PORTAL LINK FT
You can access the [facility] Patient Portal, offered by [facility], by registering with the following website: [email or web address]

## 2018-10-29 NOTE — PROGRESS NOTE ADULT - SUBJECTIVE AND OBJECTIVE BOX
Progress Note: Thoracic Surgery  Patient: AMRIK MADRID , 58y (1960)Male   MRN: [id number]  Location: [address]  Visit: 09-21-18 Inpatient  Date: 10-29-18 @ 05:46    Procedure/Diagnosis: s/p pigtail 10/8. s/p vats 10/10. s/p c-scope 10/17, s/p xverse loop colostomy 10/23    Events/ 24h: No acute events overnight. Complaining of increased abdominal pain. Gas and stool in ostomy bag    Vitals: T(F): 98.8 (10-28-18 @ 23:30), Max: 98.8 (10-28-18 @ 23:30)  HR: 106 (10-28-18 @ 23:30)  BP: 114/71 (10-28-18 @ 23:30) (114/71 - 129/80)  RR: 18 (10-28-18 @ 23:30)  SpO2: --    In:   10-27-18 @ 07:01  -  10-28-18 @ 07:00  --------------------------------------------------------  IN: 720 mL    10-28-18 @ 07:01  -  10-29-18 @ 05:46  --------------------------------------------------------  IN: 480 mL      Out:   10-27-18 @ 07:01  -  10-28-18 @ 07:00  --------------------------------------------------------  OUT:    Colostomy: 20 mL    Voided: 1350 mL  Total OUT: 1370 mL      10-28-18 @ 07:01  -  10-29-18 @ 05:46  --------------------------------------------------------  OUT:    Colostomy: 630 mL    Nasoenteral Tube: 400 mL    Voided: 1150 mL  Total OUT: 2180 mL        Net:   10-27-18 @ 07:01  -  10-28-18 @ 07:00  --------------------------------------------------------  NET: -650 mL    10-28-18 @ 07:01  -  10-29-18 @ 05:46  --------------------------------------------------------  NET: -1700 mL        Diet: Diet, Mechanical Soft:   Dysphagia 3, Soft, Thin Liquids ([id number])  Prosource Gelatein 20 Sugar Free     Qty per Day:  q12H  Supplement Feeding Modality:  Oral  Ensure Enlive Cans or Servings Per Day:  1       Frequency:  Daily (10-25-18 @ 14:51)    IV Fluids:     Physical Examination:  General Appearance: NAD  HEENT: EOMI, sclera non-icteric.  Heart: RRR   Lungs: CTABL.   Abdomen:  Soft, tender, mildly distended. Gas and stool in ostomy bag. No rigidity, guarding, or rebound tenderness.   MSK/Extremities: Warm & well-perfused. Peripheral pulses intact.  Skin: Warm, dry. No jaundice.       Medications: [Standing]  apixaban 5 milliGRAM(s) Oral every 12 hours  chlorhexidine 4% Liquid 1 Application(s) Topical <User Schedule>  diltiazem    Tablet 60 milliGRAM(s) Oral every 8 hours  docusate sodium 100 milliGRAM(s) Oral daily  furosemide    Tablet 20 milliGRAM(s) Oral daily  latanoprost 0.005% Ophthalmic Solution 1 Drop(s) Both EYES at bedtime  pantoprazole    Tablet 40 milliGRAM(s) Oral before breakfast  propranolol 20 milliGRAM(s) Oral every 12 hours  senna 1 Tablet(s) Oral daily  tamsulosin 0.4 milliGRAM(s) Oral at bedtime    DVT Prophylaxis:   GI Prophylaxis: pantoprazole    Tablet 40 milliGRAM(s) Oral before breakfast    Antibiotics:   Anticoagulation:   Medications:[PRN]  acetaminophen   Tablet .. 650 milliGRAM(s) Oral every 6 hours PRN  morphine  - Injectable 2 milliGRAM(s) IV Push every 6 hours PRN  ondansetron Injectable 4 milliGRAM(s) IV Push every 6 hours PRN  oxyCODONE    IR 5 milliGRAM(s) Oral every 6 hours PRN  oxyCODONE    IR 10 milliGRAM(s) Oral every 6 hours PRN  zolpidem 5 milliGRAM(s) Oral at bedtime PRN      Labs:                        10.5   6.85  )-----------( 302      ( 29 Oct 2018 00:27 )             33.1     10-29    132<L>  |  94<L>  |  11  ----------------------------<  103<H>  4.2   |  29  |  0.8    Ca    8.5      29 Oct 2018 00:27  Phos  3.8     10-29  Mg     2.0     10-29        Imaging:     < from: Xray Chest 1 View- PORTABLE-Routine (10.25.18 @ 07:51) >  Stable bilateral diffuse opacities and pleural effusions.    < end of copied text >    Obs series completed, not read    Assessment:  58y Male patient admitted s/p pigtail 10/8. s/p vats 10/10. s/p c-scope 10/17, s/p xverse loop colostomy 10/23    Plan:    F/u official read on obstructive series  Good output, Possible d/c   DVT/GI ppx  OOBAT  IS  Pain control    Date/Time: 10-29-18 @ 05:46        Electronic Signatures:  Christoph Billings)  (Signed 29-Oct-2018 05:51)  	Authored: Progress Note, Reason for Admission, Subjective and Objective  Vahe Alfonso)  (Signature Pending)  	Co-Signer: Progress Note, Reason for Admission, Subjective and Objective      Last Updated: 29-Oct-2018 05:51 by Christoph Billings)

## 2018-10-29 NOTE — DISCHARGE NOTE ADULT - HOSPITAL COURSE
57yo male with a PMH of HTN, COPD, CHF, DVT on home Eliquis BPH and depression, with a history of recurrent episodes of malignant pleural effusions, VATS done 10/10 complaining of abdominal pain, distension, constipation. CT abdomen showed thickening of the ascending colon. 10/17 Colonscopy was done under general anesthesia due to comorbidities patient was not extubated after the OR and spent the night in the ICU. Patient was extubated without difficulty the next morning. 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  (10/23) patient is s/p transverse loop colostomy. Patients surgery was uneventful, Patient tolerated procedure well, hemodynamically stable. On Fentanyl and Precedex drip. Patient is alert and following commands. Colostomy is pink and gas and stool is already present in the bag.

## 2018-10-29 NOTE — DISCHARGE NOTE ADULT - CARE PROVIDER_API CALL
William Caldwell), Surgery; Thoracic Surgery  [address] [place] [address]  Phone: [phone]  Fax: [phone]    Vahe Alfonso), Surgery  [address] [place] [address]  Phone: [phone]  Fax: [phone]

## 2018-10-29 NOTE — DISCHARGE NOTE ADULT - ADDITIONAL INSTRUCTIONS
Please follow up with Dr tripp in the clinic in 2 weeks with Chest xray  continue incentive spirometry   colostomy care  ambulate as tolerated

## 2018-11-01 DIAGNOSIS — K59.03 DRUG INDUCED CONSTIPATION: ICD-10-CM

## 2018-11-01 DIAGNOSIS — N40.0 BENIGN PROSTATIC HYPERPLASIA WITHOUT LOWER URINARY TRACT SYMPTOMS: ICD-10-CM

## 2018-11-01 DIAGNOSIS — D63.0 ANEMIA IN NEOPLASTIC DISEASE: ICD-10-CM

## 2018-11-01 DIAGNOSIS — C78.2 SECONDARY MALIGNANT NEOPLASM OF PLEURA: ICD-10-CM

## 2018-11-01 DIAGNOSIS — R06.02 SHORTNESS OF BREATH: ICD-10-CM

## 2018-11-01 DIAGNOSIS — H61.23 IMPACTED CERUMEN, BILATERAL: ICD-10-CM

## 2018-11-01 DIAGNOSIS — Z66 DO NOT RESUSCITATE: ICD-10-CM

## 2018-11-01 DIAGNOSIS — E87.8 OTHER DISORDERS OF ELECTROLYTE AND FLUID BALANCE, NOT ELSEWHERE CLASSIFIED: ICD-10-CM

## 2018-11-01 DIAGNOSIS — I11.0 HYPERTENSIVE HEART DISEASE WITH HEART FAILURE: ICD-10-CM

## 2018-11-01 DIAGNOSIS — J96.01 ACUTE RESPIRATORY FAILURE WITH HYPOXIA: ICD-10-CM

## 2018-11-01 DIAGNOSIS — Z79.02 LONG TERM (CURRENT) USE OF ANTITHROMBOTICS/ANTIPLATELETS: ICD-10-CM

## 2018-11-01 DIAGNOSIS — Z28.21 IMMUNIZATION NOT CARRIED OUT BECAUSE OF PATIENT REFUSAL: ICD-10-CM

## 2018-11-01 DIAGNOSIS — J44.9 CHRONIC OBSTRUCTIVE PULMONARY DISEASE, UNSPECIFIED: ICD-10-CM

## 2018-11-01 DIAGNOSIS — I82.592 CHRONIC EMBOLISM AND THROMBOSIS OF OTHER SPECIFIED DEEP VEIN OF LEFT LOWER EXTREMITY: ICD-10-CM

## 2018-11-01 DIAGNOSIS — I50.32 CHRONIC DIASTOLIC (CONGESTIVE) HEART FAILURE: ICD-10-CM

## 2018-11-01 DIAGNOSIS — I82.513 CHRONIC EMBOLISM AND THROMBOSIS OF FEMORAL VEIN, BILATERAL: ICD-10-CM

## 2018-11-01 DIAGNOSIS — I82.532 CHRONIC EMBOLISM AND THROMBOSIS OF LEFT POPLITEAL VEIN: ICD-10-CM

## 2018-11-01 DIAGNOSIS — Z87.891 PERSONAL HISTORY OF NICOTINE DEPENDENCE: ICD-10-CM

## 2018-11-01 DIAGNOSIS — T40.2X5A ADVERSE EFFECT OF OTHER OPIOIDS, INITIAL ENCOUNTER: ICD-10-CM

## 2018-11-01 DIAGNOSIS — G47.00 INSOMNIA, UNSPECIFIED: ICD-10-CM

## 2018-11-01 DIAGNOSIS — C18.7 MALIGNANT NEOPLASM OF SIGMOID COLON: ICD-10-CM

## 2018-11-01 DIAGNOSIS — E78.5 HYPERLIPIDEMIA, UNSPECIFIED: ICD-10-CM

## 2018-11-01 DIAGNOSIS — F31.9 BIPOLAR DISORDER, UNSPECIFIED: ICD-10-CM

## 2018-11-09 PROBLEM — J90 PLEURAL EFFUSION, NOT ELSEWHERE CLASSIFIED: Chronic | Status: ACTIVE | Noted: 2018-09-21

## 2018-11-14 NOTE — PRE-ANESTHESIA EVALUATION ADULT - NSANTHRISKSRD_GEN_ALL_CORE
Name of caller: Patient    Reason for Call:  Having pain on right side when he lays on it since the surgery.     Surgeon:  Dr. Navarrete    Recent Surgery:  Yes.    If yes, when & what type:  robotic-assisted repair of large sliding indirect right inguinal hernia 10/16/18       Best phone number to reach pt at is: [phone] (H)- you can reach him from 8:30 -9:30 then again after noon.   Ok to leave a message with medical info? Yes.    Pharmacy preferred (if calling for a refill): N/A          
S/p:  robotic-assisted repair of large sliding indirect right inguinal hernia.    Surgeon:  Dr. Navarrete    Pt complains of pressure type pain in lower R abdomen/pelvic area when he lays on R side that prevents him from sleeping comfortably, otherwise feeling good and no pain when up and around.  Denies redness, swelling or drainage.  No lump or bulge in the area.  Denies fever/ chills.     Reassured patient.  He will try placing one or two pillows behind his back and recline into them at night to limit pressure on right side. He will monitor and call if pain/discomfort is worsening or is not improving within the next few weeks.  He is encouraged to call clinic if any further questions or concerns.    
ASA of 4, 4E or 5E

## 2018-11-15 PROBLEM — Z00.00 ENCOUNTER FOR PREVENTIVE HEALTH EXAMINATION: Status: ACTIVE | Noted: 2018-11-15

## 2018-11-23 ENCOUNTER — APPOINTMENT (OUTPATIENT)
Dept: CARDIOTHORACIC SURGERY | Facility: CLINIC | Age: 58
End: 2018-11-23

## 2018-11-23 VITALS
RESPIRATION RATE: 18 BRPM | SYSTOLIC BLOOD PRESSURE: 133 MMHG | DIASTOLIC BLOOD PRESSURE: 83 MMHG | HEART RATE: 98 BPM | OXYGEN SATURATION: 95 % | TEMPERATURE: 97.4 F | HEIGHT: 74 IN

## 2018-11-23 DIAGNOSIS — Z98.890 OTHER SPECIFIED POSTPROCEDURAL STATES: ICD-10-CM

## 2018-11-23 RX ORDER — DULOXETINE HYDROCHLORIDE 60 MG/1
60 CAPSULE, DELAYED RELEASE ORAL
Refills: 0 | Status: ACTIVE | COMMUNITY

## 2018-11-23 RX ORDER — DILTIAZEM HYDROCHLORIDE 60 MG/1
60 TABLET, COATED ORAL
Refills: 0 | Status: ACTIVE | COMMUNITY

## 2018-11-23 RX ORDER — TAMSULOSIN HYDROCHLORIDE 0.4 MG/1
0.4 CAPSULE ORAL
Refills: 0 | Status: ACTIVE | COMMUNITY

## 2018-11-23 RX ORDER — OXYCODONE HYDROCHLORIDE AND ACETAMINOPHEN 5; 325 MG/1; MG/1
5-325 TABLET ORAL
Refills: 0 | Status: ACTIVE | COMMUNITY

## 2018-11-23 RX ORDER — ISOSORBIDE MONONITRATE 20 MG/1
20 TABLET ORAL
Refills: 0 | Status: ACTIVE | COMMUNITY

## 2018-11-23 RX ORDER — APIXABAN 5 MG/1
5 TABLET, FILM COATED ORAL
Refills: 0 | Status: ACTIVE | COMMUNITY

## 2018-11-23 RX ORDER — PANTOPRAZOLE 40 MG/1
40 TABLET, DELAYED RELEASE ORAL
Refills: 0 | Status: ACTIVE | COMMUNITY

## 2018-11-23 RX ORDER — FUROSEMIDE 20 MG/1
20 TABLET ORAL
Refills: 0 | Status: ACTIVE | COMMUNITY

## 2018-11-23 RX ORDER — TRAZODONE HYDROCHLORIDE 50 MG/1
50 TABLET ORAL
Refills: 0 | Status: ACTIVE | COMMUNITY

## 2018-11-23 RX ORDER — CLOTRIMAZOLE 10 MG/G
1 CREAM TOPICAL
Refills: 0 | Status: ACTIVE | COMMUNITY

## 2018-11-23 RX ORDER — LATANOPROST/PF 0.005 %
0.01 DROPS OPHTHALMIC (EYE)
Refills: 0 | Status: ACTIVE | COMMUNITY

## 2018-11-23 RX ORDER — MULTIVITAMIN
TABLET ORAL
Refills: 0 | Status: ACTIVE | COMMUNITY

## 2018-11-28 LAB
CULTURE RESULTS: SIGNIFICANT CHANGE UP
SPECIMEN SOURCE: SIGNIFICANT CHANGE UP

## 2018-11-29 ENCOUNTER — INPATIENT (INPATIENT)
Facility: HOSPITAL | Age: 58
LOS: 6 days | End: 2018-12-06
Attending: INTERNAL MEDICINE | Admitting: INTERNAL MEDICINE

## 2018-11-29 VITALS
HEART RATE: 98 BPM | SYSTOLIC BLOOD PRESSURE: 138 MMHG | RESPIRATION RATE: 19 BRPM | TEMPERATURE: 97 F | OXYGEN SATURATION: 99 % | DIASTOLIC BLOOD PRESSURE: 74 MMHG

## 2018-11-29 DIAGNOSIS — Z98.890 OTHER SPECIFIED POSTPROCEDURAL STATES: Chronic | ICD-10-CM

## 2018-11-29 DIAGNOSIS — Z93.3 COLOSTOMY STATUS: Chronic | ICD-10-CM

## 2018-11-29 LAB
ALBUMIN SERPL ELPH-MCNC: 3.5 G/DL — SIGNIFICANT CHANGE UP (ref 3.5–5.2)
ALP SERPL-CCNC: 633 U/L — HIGH (ref 30–115)
ALT FLD-CCNC: 16 U/L — SIGNIFICANT CHANGE UP (ref 0–41)
ANION GAP SERPL CALC-SCNC: 15 MMOL/L — HIGH (ref 7–14)
APPEARANCE UR: ABNORMAL
APTT BLD: 32.5 SEC — SIGNIFICANT CHANGE UP (ref 27–39.2)
AST SERPL-CCNC: 28 U/L — SIGNIFICANT CHANGE UP (ref 0–41)
BACTERIA # UR AUTO: ABNORMAL /HPF
BASE EXCESS BLDV CALC-SCNC: 9.4 MMOL/L — HIGH (ref -2–2)
BASOPHILS # BLD AUTO: 0.04 K/UL — SIGNIFICANT CHANGE UP (ref 0–0.2)
BASOPHILS NFR BLD AUTO: 0.5 % — SIGNIFICANT CHANGE UP (ref 0–1)
BILIRUB SERPL-MCNC: 0.3 MG/DL — SIGNIFICANT CHANGE UP (ref 0.2–1.2)
BILIRUB UR-MCNC: NEGATIVE — SIGNIFICANT CHANGE UP
BUN SERPL-MCNC: 9 MG/DL — LOW (ref 10–20)
CA-I SERPL-SCNC: 1.19 MMOL/L — SIGNIFICANT CHANGE UP (ref 1.12–1.3)
CALCIUM SERPL-MCNC: 8.8 MG/DL — SIGNIFICANT CHANGE UP (ref 8.5–10.1)
CHLORIDE SERPL-SCNC: 94 MMOL/L — LOW (ref 98–110)
CO2 SERPL-SCNC: 31 MMOL/L — SIGNIFICANT CHANGE UP (ref 17–32)
COLOR SPEC: YELLOW — SIGNIFICANT CHANGE UP
CREAT SERPL-MCNC: 0.8 MG/DL — SIGNIFICANT CHANGE UP (ref 0.7–1.5)
DIFF PNL FLD: NEGATIVE — SIGNIFICANT CHANGE UP
EOSINOPHIL # BLD AUTO: 0.26 K/UL — SIGNIFICANT CHANGE UP (ref 0–0.7)
EOSINOPHIL NFR BLD AUTO: 3.4 % — SIGNIFICANT CHANGE UP (ref 0–8)
EPI CELLS # UR: ABNORMAL /HPF
GAS PNL BLDV: 136 MMOL/L — SIGNIFICANT CHANGE UP (ref 136–145)
GAS PNL BLDV: SIGNIFICANT CHANGE UP
GLUCOSE SERPL-MCNC: 95 MG/DL — SIGNIFICANT CHANGE UP (ref 70–99)
GLUCOSE UR QL: NEGATIVE MG/DL — SIGNIFICANT CHANGE UP
HCO3 BLDV-SCNC: 37 MMOL/L — HIGH (ref 22–29)
HCT VFR BLD CALC: 33 % — LOW (ref 42–52)
HCT VFR BLDA CALC: 35.1 % — SIGNIFICANT CHANGE UP (ref 34–44)
HGB BLD CALC-MCNC: 11.5 G/DL — LOW (ref 14–18)
HGB BLD-MCNC: 10 G/DL — LOW (ref 14–18)
IMM GRANULOCYTES NFR BLD AUTO: 1 % — HIGH (ref 0.1–0.3)
INR BLD: 1.77 RATIO — HIGH (ref 0.65–1.3)
KETONES UR-MCNC: NEGATIVE — SIGNIFICANT CHANGE UP
LACTATE BLDV-MCNC: 1 MMOL/L — SIGNIFICANT CHANGE UP (ref 0.5–1.6)
LACTATE SERPL-SCNC: 1 MMOL/L — SIGNIFICANT CHANGE UP (ref 0.5–2.2)
LEUKOCYTE ESTERASE UR-ACNC: ABNORMAL
LYMPHOCYTES # BLD AUTO: 0.79 K/UL — LOW (ref 1.2–3.4)
LYMPHOCYTES # BLD AUTO: 10.3 % — LOW (ref 20.5–51.1)
MCHC RBC-ENTMCNC: 24.6 PG — LOW (ref 27–31)
MCHC RBC-ENTMCNC: 30.3 G/DL — LOW (ref 32–37)
MCV RBC AUTO: 81.3 FL — SIGNIFICANT CHANGE UP (ref 80–94)
MONOCYTES # BLD AUTO: 0.67 K/UL — HIGH (ref 0.1–0.6)
MONOCYTES NFR BLD AUTO: 8.8 % — SIGNIFICANT CHANGE UP (ref 1.7–9.3)
NEUTROPHILS # BLD AUTO: 5.81 K/UL — SIGNIFICANT CHANGE UP (ref 1.4–6.5)
NEUTROPHILS NFR BLD AUTO: 76 % — HIGH (ref 42.2–75.2)
NITRITE UR-MCNC: POSITIVE
NRBC # BLD: 0 /100 WBCS — SIGNIFICANT CHANGE UP (ref 0–0)
NT-PROBNP SERPL-SCNC: 186 PG/ML — SIGNIFICANT CHANGE UP (ref 0–300)
PCO2 BLDV: 69 MMHG — HIGH (ref 41–51)
PH BLDV: 7.34 — SIGNIFICANT CHANGE UP (ref 7.26–7.43)
PH UR: 7 — SIGNIFICANT CHANGE UP (ref 5–8)
PLATELET # BLD AUTO: 303 K/UL — SIGNIFICANT CHANGE UP (ref 130–400)
PO2 BLDV: 31 MMHG — SIGNIFICANT CHANGE UP (ref 20–40)
POTASSIUM BLDV-SCNC: 3.7 MMOL/L — SIGNIFICANT CHANGE UP (ref 3.3–5.6)
POTASSIUM SERPL-MCNC: 4.6 MMOL/L — SIGNIFICANT CHANGE UP (ref 3.5–5)
POTASSIUM SERPL-SCNC: 4.6 MMOL/L — SIGNIFICANT CHANGE UP (ref 3.5–5)
PROT SERPL-MCNC: 5.9 G/DL — LOW (ref 6–8)
PROT UR-MCNC: NEGATIVE MG/DL — SIGNIFICANT CHANGE UP
PROTHROM AB SERPL-ACNC: 20.2 SEC — HIGH (ref 9.95–12.87)
RBC # BLD: 4.06 M/UL — LOW (ref 4.7–6.1)
RBC # FLD: 14.5 % — SIGNIFICANT CHANGE UP (ref 11.5–14.5)
SAO2 % BLDV: 51 % — SIGNIFICANT CHANGE UP
SODIUM SERPL-SCNC: 140 MMOL/L — SIGNIFICANT CHANGE UP (ref 135–146)
SP GR SPEC: 1.01 — SIGNIFICANT CHANGE UP (ref 1.01–1.03)
TROPONIN T SERPL-MCNC: <0.01 NG/ML — SIGNIFICANT CHANGE UP
UROBILINOGEN FLD QL: 0.2 MG/DL — SIGNIFICANT CHANGE UP (ref 0.2–0.2)
WBC # BLD: 7.65 K/UL — SIGNIFICANT CHANGE UP (ref 4.8–10.8)
WBC # FLD AUTO: 7.65 K/UL — SIGNIFICANT CHANGE UP (ref 4.8–10.8)
WBC UR QL: ABNORMAL /HPF

## 2018-11-29 RX ORDER — DULOXETINE HYDROCHLORIDE 30 MG/1
60 CAPSULE, DELAYED RELEASE ORAL DAILY
Qty: 0 | Refills: 0 | Status: DISCONTINUED | OUTPATIENT
Start: 2018-11-29 | End: 2018-12-06

## 2018-11-29 RX ORDER — LATANOPROST 0.05 MG/ML
1 SOLUTION/ DROPS OPHTHALMIC; TOPICAL AT BEDTIME
Qty: 0 | Refills: 0 | Status: DISCONTINUED | OUTPATIENT
Start: 2018-11-29 | End: 2018-12-06

## 2018-11-29 RX ORDER — ZOLPIDEM TARTRATE 10 MG/1
5 TABLET ORAL AT BEDTIME
Qty: 0 | Refills: 0 | Status: DISCONTINUED | OUTPATIENT
Start: 2018-11-29 | End: 2018-12-06

## 2018-11-29 RX ORDER — MORPHINE SULFATE 50 MG/1
4 CAPSULE, EXTENDED RELEASE ORAL EVERY 4 HOURS
Qty: 0 | Refills: 0 | Status: DISCONTINUED | OUTPATIENT
Start: 2018-11-29 | End: 2018-11-30

## 2018-11-29 RX ORDER — ACETAMINOPHEN 500 MG
650 TABLET ORAL EVERY 6 HOURS
Qty: 0 | Refills: 0 | Status: DISCONTINUED | OUTPATIENT
Start: 2018-11-29 | End: 2018-12-06

## 2018-11-29 RX ORDER — FUROSEMIDE 40 MG
40 TABLET ORAL ONCE
Qty: 0 | Refills: 0 | Status: COMPLETED | OUTPATIENT
Start: 2018-11-29 | End: 2018-11-29

## 2018-11-29 RX ORDER — IOHEXOL 300 MG/ML
30 INJECTION, SOLUTION INTRAVENOUS ONCE
Qty: 0 | Refills: 0 | Status: COMPLETED | OUTPATIENT
Start: 2018-11-29 | End: 2018-11-29

## 2018-11-29 RX ORDER — VANCOMYCIN HCL 1 G
1000 VIAL (EA) INTRAVENOUS ONCE
Qty: 0 | Refills: 0 | Status: COMPLETED | OUTPATIENT
Start: 2018-11-29 | End: 2018-11-29

## 2018-11-29 RX ORDER — MORPHINE SULFATE 50 MG/1
4 CAPSULE, EXTENDED RELEASE ORAL ONCE
Qty: 0 | Refills: 0 | Status: DISCONTINUED | OUTPATIENT
Start: 2018-11-29 | End: 2018-11-29

## 2018-11-29 RX ORDER — CEFTRIAXONE 500 MG/1
1 INJECTION, POWDER, FOR SOLUTION INTRAMUSCULAR; INTRAVENOUS ONCE
Qty: 0 | Refills: 0 | Status: COMPLETED | OUTPATIENT
Start: 2018-11-29 | End: 2018-11-29

## 2018-11-29 RX ORDER — ISOSORBIDE MONONITRATE 60 MG/1
20 TABLET, EXTENDED RELEASE ORAL
Qty: 0 | Refills: 0 | Status: DISCONTINUED | OUTPATIENT
Start: 2018-11-29 | End: 2018-12-05

## 2018-11-29 RX ORDER — TAMSULOSIN HYDROCHLORIDE 0.4 MG/1
0.4 CAPSULE ORAL AT BEDTIME
Qty: 0 | Refills: 0 | Status: DISCONTINUED | OUTPATIENT
Start: 2018-11-29 | End: 2018-12-06

## 2018-11-29 RX ORDER — PROPRANOLOL HCL 160 MG
20 CAPSULE, EXTENDED RELEASE 24HR ORAL
Qty: 0 | Refills: 0 | Status: DISCONTINUED | OUTPATIENT
Start: 2018-11-29 | End: 2018-11-30

## 2018-11-29 RX ORDER — TRAZODONE HCL 50 MG
50 TABLET ORAL AT BEDTIME
Qty: 0 | Refills: 0 | Status: DISCONTINUED | OUTPATIENT
Start: 2018-11-29 | End: 2018-12-06

## 2018-11-29 RX ORDER — MORPHINE SULFATE 50 MG/1
6 CAPSULE, EXTENDED RELEASE ORAL ONCE
Qty: 0 | Refills: 0 | Status: DISCONTINUED | OUTPATIENT
Start: 2018-11-29 | End: 2018-11-29

## 2018-11-29 RX ORDER — PANTOPRAZOLE SODIUM 20 MG/1
40 TABLET, DELAYED RELEASE ORAL
Qty: 0 | Refills: 0 | Status: DISCONTINUED | OUTPATIENT
Start: 2018-11-29 | End: 2018-12-05

## 2018-11-29 RX ADMIN — IOHEXOL 30 MILLILITER(S): 300 INJECTION, SOLUTION INTRAVENOUS at 13:00

## 2018-11-29 RX ADMIN — MORPHINE SULFATE 6 MILLIGRAM(S): 50 CAPSULE, EXTENDED RELEASE ORAL at 12:36

## 2018-11-29 RX ADMIN — MORPHINE SULFATE 4 MILLIGRAM(S): 50 CAPSULE, EXTENDED RELEASE ORAL at 19:04

## 2018-11-29 RX ADMIN — Medication 250 MILLIGRAM(S): at 22:41

## 2018-11-29 RX ADMIN — MORPHINE SULFATE 4 MILLIGRAM(S): 50 CAPSULE, EXTENDED RELEASE ORAL at 21:20

## 2018-11-29 RX ADMIN — Medication 40 MILLIGRAM(S): at 13:47

## 2018-11-29 RX ADMIN — CEFTRIAXONE 100 GRAM(S): 500 INJECTION, POWDER, FOR SOLUTION INTRAMUSCULAR; INTRAVENOUS at 18:39

## 2018-11-29 NOTE — H&P ADULT - HISTORY OF PRESENT ILLNESS
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 Patient had very complicated hospital course with extended ICU stay during that admission.    Currently, patient presented from rehab for worsening SOB over past few days. Also reports worsening b/l LE edema and abdominal pain. Is compliant with O2 @ 4L but still SOB worsening. Reports continued ostomy output, same as before. Denies any fevers, chills, nausea, vomiting, chest pain, reduced PO intake, or other complaint or symptom. 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 Patient had complicated hospital course with extended ICU stay during that admission.    Currently, patient presented from rehab for worsening SOB over past few days. Also reports worsening b/l LE edema and abdominal pain. Is compliant with O2 @ 4L but still SOB worsening. Reports continued ostomy output, same as before. Denies any fevers, chills, nausea, vomiting, chest pain, reduced PO intake, or other complaint or symptom. 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 Patient had complicated hospital course with extended ICU stay during that admission.    Currently, patient presented from rehab for worsening SOB over past few days. Also reports worsening b/l LE edema and abdominal pain. Is compliant with O2 @ 4L but still SOB worsening. Reports continued ostomy output, same as before. Abdominal pain and SOB are long standing, just worsening recently. Denies any fevers, chills, nausea, vomiting, chest pain, reduced PO intake, dysuria, or other complaint or symptom.

## 2018-11-29 NOTE — H&P ADULT - ATTENDING COMMENTS
Patient was evaluated and examined by bedside, c/o dyspnea, abdominal pain and nausea, legs edema   All labs, radiology studies  Vital Signs Last 24 Hrs  T(C): 36.8 (30 Nov 2018 12:44), Max: 37.3 (29 Nov 2018 16:33)  T(F): 98.3 (30 Nov 2018 12:44), Max: 99.2 (29 Nov 2018 16:33)  HR: 99 (30 Nov 2018 12:44) (99 - 125)  BP: 118/60 (30 Nov 2018 12:44) (118/60 - 136/80)  BP(mean): --  RR: 18 (30 Nov 2018 12:44) (18 - 20)  SpO2: 94% (29 Nov 2018 20:22) (94% - 99%)  GENERAL: NAD, AAOX3, patient is laying comfortably in bed  HEENT: AT, NC, PERRLA, SUPPLE, NO JVD, NO CB  LUNG: decreased breath sounds B/L  CVS: normal S1, S2, RRR, NO M/G/R  ABDOMEN: soft, bowel sounds present, normoactive in all 4 quadrants, non-tender,  mild distention present, colostomy present  EXT: plus 2 lower ext. pitting edema present, positive PP b/l extremities  NEURO: no acute focal neurological deficits  SKIN: no rash, no ecchymosis    I agree with medical plan outlined by Medical resident as stated above.  #) SOB 2/2 pleural / pericardial effusion 2/2 metastatic disease  - s/p thoracentesis + talc pleurodesis on previous admission  - CT chest/abd/pel: showing progression of disease, increased pleural and pericardial effusion, multiple other findings   - CT surg following  - Heme/Onc eval - likely not a candidate for chemo  - f/up Palliative eval - very poor prognosis given extent of disease. Already DNR/DNI    #) Abdominal pain 2/2 malignancy + recent colostomy  - abdomen soft, NT currently, ostomy site appears clean  - Gen surg following  - pain control PRN    #) +ve UA - patient reports no dysuria, abdominal pain is chronic, not new, no fevers, no WBC count, will hold off on Abx for now    #) CHF - ALQ=054, has worsening LE edema pitting,   IV Lasix 40 qd for now, daily weights    #) DVT - will hold Eliquis for possible drainage. If no intervention planned, please resume in AM.    #) BPH - c/w Flomax    #) Depression / bipolar - c/w Duloxetine    #) HTN - c/w home meds

## 2018-11-29 NOTE — ED PROVIDER NOTE - NS ED ROS FT
Review of Systems:  	•	CONSTITUTIONAL - no fever, no diaphoresis, no weight change  	•	SKIN - no rash  	•	HEMATOLOGIC - no bleeding, no bruising  	•	EYES - no eye pain, no blurred vision  	•	ENT - no change in hearing, no pain  	•	RESPIRATORY - + shortness of breath, no cough, on 4L nc baseline  	•	CARDIAC - no chest pain, no palpitations  	•	GI - + abd pain, no nausea, no vomiting, no diarrhea, no constipation, no bleeding +colostomy bag with output  	•	 - no dysuria, no hematuria, no flank pain, no urinary retention  	•	MUSCULOSKELETAL - no joint paint, no swelling, no redness  	•	NEUROLOGIC - no weakness, no headache, no paresthesias, no dizziness  All other systems negative, unless specified in HPI

## 2018-11-29 NOTE — H&P ADULT - ASSESSMENT
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 Patient had complicated hospital course with extended ICU stay during that admission.    Currently, patient presented from rehab for worsening SOB over past few days. Also reports worsening b/l LE edema and abdominal pain. Is compliant with O2 @ 4L but still SOB worsening. Reports continued ostomy output, same as before. Denies any fevers, chills, nausea, vomiting, chest pain, reduced PO intake, or other complaint or symptom.    #) SOB 2/2 pleural / pericardial effusion 2/2 metastatic disease  - s/p repeated thoracentesis on prior admissions. Per notes, patient is not a candidate for [place] cath because of dispo issues?  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 Patient had complicated hospital course with extended ICU stay during that admission.    Currently, patient presented from rehab for worsening SOB over past few days. Also reports worsening b/l LE edema and abdominal pain. Is compliant with O2 @ 4L but still SOB worsening. Reports continued ostomy output, same as before. Denies any fevers, chills, nausea, vomiting, chest pain, reduced PO intake, or other complaint or symptom.    #) SOB 2/2 pleural / pericardial effusion 2/2 metastatic disease  - s/p repeated thoracentesis on prior admissions. Per notes, patient is not a candidate for [place] cath because of dispo issues?  - CT surg following  - will get Palliative eval - very poor prognosis given extent of disease    #) Abdominal pain 2/2 recent colostomy  - abdomen soft, NT  - good output from ostomy site  - Gen surg following    #) CHF - no LE edema, RNH=215, no signs of fluid overload, will c/w home dose of Lasix    #) DVT - will hold Eliquis for possible tap. If no surgical intervention, please resume in AM.    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 Patient had complicated hospital course with extended ICU stay during that admission. Currently, patient presented from rehab for worsening SOB over past few days. Also reports worsening b/l LE edema and abdominal pain. Is compliant with O2 @ 4L but still SOB worsening. Reports continued ostomy output, same as before. Abdominal pain and SOB are long standing, just worsening recently. Denies any fevers, chills, nausea, vomiting, chest pain, reduced PO intake, dysuria, or other complaint or symptom.    #) SOB 2/2 pleural / pericardial effusion 2/2 metastatic disease  - s/p repeated thoracentesis on prior admissions. May need another tap - consider [place]?  - CT chest/abd/pel: showing progression of disease, increased pleural and pericardial effusion, multiple other findings - see report for full details  - CT surg following  - Heme/Onc eval - likely not a candidate for chemo  - will get Palliative eval - very poor prognosis given extent of disease. Already DNR/DNI    #) Abdominal pain 2/2 malignancy + recent colostomy  - abdomen soft, NT currently  - ostomy site appears clean  - Gen surg following  - pain control PRN    #) +ve UA - patient reports no dysuria, abdominal pain is chronic, not new, no fevers, no WBC count, will hold off on Abx for now    #) CHF - MIU=554, has worsening LE edema pitting, will use IV Lasix 40 qd for now, daily weights    #) DVT - will hold Eliquis for possible drainage. If no surgical intervention planned, please resume in AM.    #) BPH - c/w Flomax    #) Depression / bipolar - c/w Duloxetine    #) HTN - c/w home meds    DVT ppx: on Eliquis  Diet: DASH  Activity: OOBTC  Code status: DNR/DNI - MOLST form filled out and placed in chart. Did not see MOLST in accompanying NH paperwork. PLEASE HAVE ATTENDING SIGN FORM IN AM.  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 Patient had complicated hospital course with extended ICU stay during that admission. Currently, patient presented from rehab for worsening SOB over past few days. Also reports worsening b/l LE edema and abdominal pain. Is compliant with O2 @ 4L but still SOB worsening. Reports continued ostomy output, same as before. Abdominal pain and SOB are long standing, just worsening recently. Denies any fevers, chills, nausea, vomiting, chest pain, reduced PO intake, dysuria, or other complaint or symptom.    #) SOB 2/2 pleural / pericardial effusion 2/2 metastatic disease  - s/p thoracentesis + talc pleurodesis on previous admission  - CT chest/abd/pel: showing progression of disease, increased pleural and pericardial effusion, multiple other findings - see report for full details  - CT surg following  - Heme/Onc eval - likely not a candidate for chemo  - will get Palliative eval - very poor prognosis given extent of disease. Already DNR/DNI    #) Abdominal pain 2/2 malignancy + recent colostomy  - abdomen soft, NT currently, ostomy site appears clean  - Gen surg following  - pain control PRN    #) +ve UA - patient reports no dysuria, abdominal pain is chronic, not new, no fevers, no WBC count, will hold off on Abx for now    #) CHF - OHV=890, has worsening LE edema pitting, will use IV Lasix 40 qd for now, daily weights    #) DVT - will hold Eliquis for possible drainage. If no intervention planned, please resume in AM.    #) BPH - c/w Flomax    #) Depression / bipolar - c/w Duloxetine    #) HTN - c/w home meds    DVT ppx: on Eliquis  Diet: DASH  Activity: OOBTC  Code status: DNR/DNI - MOLST form filled out and placed in chart. Did not see MOLST in accompanying NH paperwork. PLEASE HAVE ATTENDING SIGN FORM IN AM.  Dispo: pending

## 2018-11-29 NOTE — CONSULT NOTE ADULT - ASSESSMENT
---------------------------------------------------------------------------------------    ASSESSMENT:  58y Male ***    PLAN:   - lasix, CHF management. f/u BNP and consider repeat Echo  - f/u CT chest for PE, A/P with po and IV contrast to evaluate for obstruction or other intra-abdominal pathology  - possible lower extremity cellulitis  - likely admission to medicine- recommend with wound ostomy nursing consult for proper ostomy care  - will discuss with attending      --------------------------------------------------------------------------------------    11-29-18 @ 12:24 ---------------------------------------------------------------------------------------    ASSESSMENT:  58y Male PMH  HTN, COPD (on 4L), CHF ( EF 50-55%, G1DD 9/27), DVTs on Eliquis, bipolar 1 disorder,  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 f/u BNP and consider repeat Echo, trend troponin, ekg   - f/u CT chest for PE, A/P with po and IV contrast to evaluate for obstruction or other intra-abdominal pathology  - possible lower extremity cellulitis  - likely admission to medicine- recommend with wound ostomy nursing consult for proper ostomy care  - may need repeated methylnaltrexone, pending imaging  - will discuss with attending      --------------------------------------------------------------------------------------    11-29-18 @ 12:24 ---------------------------------------------------------------------------------------    ASSESSMENT:  58y Male PMH  HTN, COPD (on 4L), CHF ( EF 50-55%, G1DD 9/27), DVTs on Eliquis, bipolar 1 disorder,  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 f/u BNP and consider repeat Echo, trend troponin, ekg   - f/u CT chest for PE, A/P with po and IV contrast to evaluate for obstruction or other intra-abdominal pathology  - possible lower extremity cellulitis  -may need repeated methylnaltrexone, pending imaging  - likely admission to medicine- recommend with wound ostomy nursing consult for proper ostomy care  - consider goals of care, hospice and palliative involvement  - will discuss with attending      --------------------------------------------------------------------------------------    11-29-18 @ 12:24 ---------------------------------------------------------------------------------------    ASSESSMENT:  58y Male PMH  HTN, COPD (on 4L), CHF ( EF 50-55%, G1DD 9/27), DVTs on Eliquis, bipolar 1 disorder,  invasive moderately differentiated 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 with pleural mets and malignant effusion recently discharged after 10/17  EGD, colonoscopy, 10/23 transverse loop colostomy and 10/10 left VATS with pleural biopsy, pleural implant biopsy, and talc pleurodesis with abdominal distension and cramping, SOB, LE edema and pain    PLAN:   - lasix, CHF management, BNP and consider repeat Echo, trend troponin, ekg   - f/u CT chest for PE, A/P with po and IV contrast to evaluate for obstruction or other intra-abdominal pathology  - possible lower extremity cellulitis  - may need repeated methylnaltrexone  - likely admission to medicine- recommend with wound ostomy nursing consult for proper ostomy care  - consider goals of care, hospice and palliative involvement  - Discussed with attending.  Prelim CT no signs of collection or obstruction, + gas and stool in ostomy bag, no acute surgical abdominal pathology at this time.       --------------------------------------------------------------------------------------    11-29-18 @ 12:24 ---------------------------------------------------------------------------------------    ASSESSMENT:  58y Male PMH  HTN, COPD (on 4L), CHF ( EF 50-55%, G1DD 9/27), DVTs on Eliquis, bipolar 1 disorder,  invasive moderately differentiated 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 with pleural mets and malignant effusion recently discharged after 10/17  EGD, colonoscopy, 10/23 transverse loop colostomy and 10/10 left VATS with pleural biopsy, pleural implant biopsy, and talc pleurodesis with abdominal distension and cramping, SOB, LE edema and pain    PLAN:   - lasix, CHF management, BNP and consider repeat Echo, trend troponin, ekg   - f/u CT chest for PE, A/P with po and IV contrast to evaluate for obstruction or other intra-abdominal pathology  - consider antibiotics for cellulitis, UTI  - may need repeated methylnaltrexone  - likely admission to medicine- recommend with wound ostomy nursing consult for proper ostomy care  - consider goals of care, hospice and palliative involvement  - Discussed with attending.  Prelim CT no signs of collection or obstruction, + gas and stool in ostomy bag, no acute surgical abdominal pathology at this time.       --------------------------------------------------------------------------------------    11-29-18 @ 12:24 ---------------------------------------------------------------------------------------    ASSESSMENT:  58y Male PMH  HTN, COPD (on 4L), CHF ( EF 50-55%, G1DD 9/27), DVTs on Eliquis, bipolar 1 disorder,  invasive moderately differentiated 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 with pleural mets and malignant effusion recently discharged after 10/17  EGD, colonoscopy, 10/23 transverse loop colostomy and 10/10 left VATS with pleural biopsy, pleural implant biopsy, and talc pleurodesis with abdominal distension and cramping, SOB, LE edema and pain    PLAN:   - Please obtain an echo   - lasix, CHF management, BNP, trend troponin, ekg   - consider antibiotics for cellulitis, UTI  - may need repeated methylnaltrexone  - likely admission to medicine- recommend with wound ostomy nursing consult for proper ostomy care  - consider goals of care, hospice and palliative involvement  - Discussed with attending.  Prelim CT no signs of collection or obstruction, + gas and stool in ostomy bag, no acute surgical abdominal pathology at this time.

## 2018-11-29 NOTE — H&P ADULT - NSHPPHYSICALEXAM_GEN_ALL_CORE
GEN: NAD, comfortable  CARDIO: RRR, no m/r/g  RESP: CTAB, no w/r/r  ABD: soft, NT/ND, +BS, ostomy site appears clean  EXT: +1/2 pitting edema b/l LE  NEURO: AAOx3, grossly normal

## 2018-11-29 NOTE — CONSULT NOTE ADULT - ASSESSMENT
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 Consider repeat echo, trend troponin,  CHF management  - possible LE cellulitis, prelim negative for PE  - f/u final read CT chest- prelim negative for PE with + 2 noncommunicating fluid collections on right chest with interlobar septal thickening and central bronchial thickening more than last CT.  f/ u final read.   - likely admission to medicine  - consider goals of care, hospice and palliative involvement  - Discuss with attending ---------------------------------------------------------------------------------------    ASSESSMENT:  58y Male PMH  HTN, COPD (on 4L), CHF ( EF 50-55%, G1DD 9/27), DVTs on Eliquis, bipolar 1 disorder,  invasive moderately differentiated 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 with pleural mets and malignant effusion recently discharged after 10/17  EGD, colonoscopy, 10/23 transverse loop colostomy and 10/10 left VATS with pleural biopsy, pleural implant biopsy, and talc pleurodesis with abdominal distension and cramping, SOB, LE edema and pain    PLAN:   - Given lasix, . Consider repeat echo, trend troponin,  CHF management  - possible LE cellulitis, prelim negative for PE  - f/u final read CT chest- prelim negative for PE with + 2 noncommunicating fluid collections on right chest with interlobar septal thickening and central bronchial thickening more than last CT.  f/ u final read.   - recommend admission to medicine for CHF optimization  - consider goals of care, hospice and palliative involvement  - Discussed with attending Dr. 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 Consider repeat echo, trend troponin,  CHF management  - possible LE cellulitis, prelim negative for PE  - f/u final read CT chest- prelim negative for PE with + 2 noncommunicating fluid collections on right chest with interlobar septal thickening and central bronchial thickening more than last CT.  f/ u final read.   - recommend admission to medicine for CHF optimization, UTI treatment  - consider goals of care, hospice and palliative involvement  - Discussed with attending Dr. Caldwell, will follow and review final CT images ---------------------------------------------------------------------------------------    ASSESSMENT:  58y Male PMH  HTN, COPD (on 4L), CHF ( EF 50-55%, G1DD 9/27), DVTs on Eliquis, bipolar 1 disorder,  invasive moderately differentiated 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 with pleural mets and malignant effusion recently discharged after 10/17  EGD, colonoscopy, 10/23 transverse loop colostomy and 10/10 left VATS with pleural biopsy, pleural implant biopsy, and talc pleurodesis with abdominal distension and cramping, SOB, LE edema and pain, + UA, pending    PLAN:   - Please obtain an echo   - Given lasix, . trend troponin,  CHF management  - possible LE cellulitis, prelim negative for PE  - recommend admission to medicine for CHF optimization, UTI treatment  - consider goals of care, hospice and palliative involvement  - Discussed with attending Dr. Caldwell, will follow and review final CT images ---------------------------------------------------------------------------------------    ASSESSMENT:  58y Male PMH  HTN, COPD (on 4L), CHF ( EF 50-55%, G1DD 9/27), DVTs on Eliquis, bipolar 1 disorder,  invasive moderately differentiated 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 with pleural mets and malignant effusion recently discharged after 10/17  EGD, colonoscopy, 10/23 transverse loop colostomy and 10/10 left VATS with pleural biopsy, pleural implant biopsy, and talc pleurodesis with abdominal distension and cramping, SOB, LE edema and pain, + UA, pending    PLAN:   - f/u oncology.  - palliative care consult if oncology is not planning chemotherapy  - Please obtain an echo   - Given lasix, . trend troponin,  CHF management  - possible LE cellulitis, prelim negative for PE  - recommend admission to medicine for CHF optimization, UTI treatment  - consider goals of care, hospice and palliative involvement  - Discussed with attending Dr. Caldwell, will follow and review final CT images

## 2018-11-29 NOTE — ED PROVIDER NOTE - OBJECTIVE STATEMENT
59 yo m with pmh of chf, htn, bipolar, pleural effusion s/p recent vats, large bowel obs likely 2/2 malignancy, now with colostomy from 6 weeks ago by dr. joe, sent from [facility] rehab for abd pain and sob.  pt is on 4L at baseline.  noted more sob in the last few days and noted over a week of increasing leg swelling b/l.  pt says for the last few days noted abd pain and cramping and noted irritation at the colostomy site.  no fever, no chills.  no cp.  pt was seen by dr. tripp recently for f/u and was told likely another "build up of fluid".  has appt with dr. joe next week for f/u

## 2018-11-29 NOTE — CONSULT NOTE ADULT - SUBJECTIVE AND OBJECTIVE BOX
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 He was kept in the ICU with delayed extubation due to poor respiratory status and difficult intubation.  Prior to his recent hospitalization from 9/22 - 10/29, he had chronic constipation and abdominal pain.   He presents to the ED today from rehab due to increasing shortness of breath for the last few days, with abdominal cramping, increased leg swelling and pain, and concern about his ostomy care. He is baseline on 4L nasal cannula, with increasing SOB in the last few days without cough. He also has gradually progressive abdominal distension and cramping which he states is similar in nature but increased severity of cramping x2 weeks he has had since the loop colostomy, which itself was different from his chronic abdominal cramps. He has continued to have gas output into the ostomy and stool, which he does not notice at a decreased volume from postop. No records of output were sent from rehab. He denies any rectal discharge.  He has weeks of bloating and states his infrequent belching helps relieve the cramping. He complains of poor ostomy care at the rehab with skin irritation.   He complains of leg swelling that has worsened in the last week, with pain.    He denies any fevers, chills, nausea, vomiting, chest pain, or difficulty tolerating PO intake. He has a poor appetite, which has been stable.  He has not had chemotherapy, and is DNR/ DNI.   Bedside ultrasound was performed by the ED providers which did not find large pleural fluid collections.         PAST MEDICAL & SURGICAL HISTORY:  Pleural effusions (initial no malignant cells on path, + atypical cells)   COPD  CHF (congestive heart failure) (G1DD, Ef 50-55% on 9/27 echo)  Insomnia  BPH (benign prostatic hyperplasia)  Depression  Bipolar 1 disorder  HTN (hypertension)  hld  History of thoracentesis  DVT- chronic in right femoral vein, left common femoral, femoral, and gastrocnemius   s/p transverse loop colostomy  s/p L VATS, pleural biopsy, pleural implant biopsy, talc pleurodesis    Home Meds: Home Medications:  acetaminophen 325 mg oral tablet: 2 tab(s) orally every 6 hours, As needed, Mild Pain (1 - 3) (29 Oct 2018 10:53)  Ambien 10 mg oral tablet: 1 tab(s) orally once a day (at bedtime) (21 Sep 2018 21:50)  apixaban 5 mg oral tablet: 1 tab(s) orally every 12 hours (21 Sep 2018 21:50)  Cardizem 60 mg oral tablet: 1 tab(s) orally every 8 hours (21 Sep 2018 21:50)  clotrimazole 1% topical cream: 1 application topically  (21 Sep 2018 21:50)  DULoxetine 60 mg oral delayed release capsule: 1 cap(s) orally once a day (21 Sep 2018 21:50)  furosemide 20 mg oral tablet: 1 tab(s) orally once a day (29 Oct 2018 10:53)  isosorbide mononitrate 20 mg oral tablet: 1 tab(s) orally once a day (21 Sep 2018 21:50)  latanoprost 0.005% ophthalmic solution: 1 drop(s) to each affected eye once a day (in the evening) (21 Sep 2018 21:50)  Multiple Vitamins oral capsule: 1 cap(s) orally once a day (21 Sep 2018 21:50)  oxyCODONE 10 mg oral tablet: 1 tab(s) orally every 6 hours, As needed, Severe Pain (7 - 10) (29 Oct 2018 10:53)  oxyCODONE 5 mg oral tablet: 1 tab(s) orally every 6 hours, As needed, Moderate Pain (4 - 6) (29 Oct 2018 10:53)  pantoprazole 40 mg oral delayed release tablet: 1 tab(s) orally once a day (before a meal) (29 Oct 2018 10:53)  propranolol 20 mg oral tablet: 1 tab(s) orally 2 times a day (21 Sep 2018 21:50)  tamsulosin 0.4 mg oral capsule: 1 cap(s) orally once a day (21 Sep 2018 21:50)  traZODone 50 mg oral tablet: 1 tab(s) orally once a day (at bedtime) (21 Sep 2018 21:50)    Allergies:  No Known Allergies    Soc: former smoker  Advanced Directives: DNR/DNI confirmed with patient    ROS:    REVIEW OF SYSTEMS    [x ] A ten-point review of systems was otherwise negative except as noted- speaking in short sentences, poor historian, incomplete records from outside facility  [ ] Due to altered mental status/intubation, subjective information were not able to be obtained from the patient. History was obtained, to the extent possible, from review of the chart and collateral sources of information.      CURRENT MEDICATIONS:   --------------------------------------------------------------------------------------  Neurologic Medications  morphine  - Injectable 6 milliGRAM(s) IntraMuscular Once    Respiratory Medications    Cardiovascular Medications    Gastrointestinal Medications    Genitourinary Medications    Hematologic/Oncologic Medications    Antimicrobial/Immunologic Medications    Endocrine/Metabolic Medications    Topical/Other Medications  iohexol 300 mG (iodine)/mL Oral Solution 30 milliLiter(s) Oral once    --------------------------------------------------------------------------------------    VITAL SIGNS, INS/OUTS (last 24 hours):  --------------------------------------------------------------------------------------  ICU Vital Signs Last 24 Hrs  T(C): 36.2 (29 Nov 2018 10:42), Max: 36.2 (29 Nov 2018 10:42)  T(F): 97.2 (29 Nov 2018 10:42), Max: 97.2 (29 Nov 2018 10:42)  HR: 98 (29 Nov 2018 10:42) (98 - 98)  BP: 138/74 (29 Nov 2018 10:42) (138/74 - 138/74)  BP(mean): --  ABP: --  ABP(mean): --  RR: 19 (29 Nov 2018 10:42) (19 - 19)  SpO2: 99% (29 Nov 2018 10:42) (99% - 99%)    I&O's Summary    --------------------------------------------------------------------------------------  PHYSICAL EXAM    General:  speaking in few word sentences, dyspneic, on nasal cannula sitting upright in bed. Alert, cooperative  HEENT: NCAT, JOHN, left eye medially deviated, Trachea ML  Cardiac: RRR S1, S2, no Murmurs, rubs or gallops  Respiratory: increased work of breathing, tachypneic, crackles up to midlungs  Abdomen: Soft, distended and unable to lay flat for exam due to respiratory status, mild tenderness to palpation - "it starts my cramping", + tympanitic,  no reboudn or guarding. Mild erythema by ostomy site. Ostomy in right mid abdomen protruding, + gas and minimal stool in bag. + prolene sutures in place, no noted purulence or induration, + plastic piece at superior and inferior edge. +bowel sounds  Musculoskeletal:  edematous BL LE with blanching mild erythema bilateral lower extremities, no noted skin ulceration   Neuro: grossly intact CÁRDENAS  Skin: Warm/dry, no jaundice  Incision/wound: healing well, dressings in place, clean, dry and intact    LABS  --------------------------------------------------------------------------------------  LABS:  CAPILLARY BLOOD GLUCOSE                              10.0   7.65  )-----------( 303      ( 29 Nov 2018 11:32 )             33.0       11-29    140  |  94<L>  |  9<L>  ----------------------------<  95  4.6   |  31  |  0.8    Ca    8.8      29 Nov 2018 11:32    TPro  5.9<L>  /  Alb  3.5  /  TBili  0.3  /  DBili  x   /  AST  28  /  ALT  16  /  AlkPhos  633<H>  11-29      PT/INR - ( 29 Nov 2018 11:32 )   PT: 20.20 sec;   INR: 1.77 ratio         PTT - ( 29 Nov 2018 11:32 )  PTT:32.5 sec  CARDIAC MARKERS ( 29 Nov 2018 11:32 )  x     / <0.01 ng/mL / x     / x     / x              --------------------------------------------------------------------------------------    OTHER LABS    IMAGING RESULTS      CT Chest: pending  CT A/P: pending      Pathology: Histologic features (signet ring cells) and above immunoprofile  are compatible with a gastric primary.  Suggest clinicalcorrelation.  Final Diagnosis  1. Gastric ulcer, biopsy:  - Invasive adenocarcinoma, poorly differentiated, with signet ring cell features, involving acutely inflamed gastric mucosa with ulceration.  - Giemsa stain fails to reveal Helicobacter pylori in this  material.    2. GE junction nodule, biopsy:  - Squamocolumnar junction showing squamous mucosa with mild reactive epithelial changes and gastric cardia type mucosa with mild to moderate chronic inflammation and focal foveolar hyperplasia.  - No intestinal metaplasia seen.  Comment:  Above gastric tumor has signet ring cells similar to signet ring cells seen in mucin pools in left pleural  tumor ([id number]).   Gastric adenocarcinoma with signet ring cells appears histologically different from adenocarcinoma of  colon ([id number]) which has limited to no mucin production. Immunostains pending to further characterize gastric tumor and  addendum report will follow.      10/17 PATH: Final Diagnosis  Sigmoid colon mass, biopsy from colonoscopy:   - Fragments of invasive adenocarcinoma, moderately differentiated    Comment:  The sigmoid colon adenocarcinoma with limited to no mucin production is focally similar to non-mucin producing adenocarcinoma component in left pleural tumor ([id number]). The sigmoid colon tumor (without signet ring cells) appears to be histologically different from the  minute gastric tumor biopsy  ([id number]).   Immunostains pending on sigmoid colon tumor, primary versus metastatic, and addendum report will follow.    CT 10/9/2018:   < from: CT Abdomen and Pelvis w/ Oral Cont and w/ IV Cont (10.08.18 @ 16:33) >  IMPRESSION:   1.  Bilateral pleural effusions (left greater than right). Loculated on the left. There is a pigtail catheter in the right lung base with a small   pneumothorax.  2.  Mild interstitial pulmonary edema.  3. Short segment ascending colon wall circumferential thickening - likely mass, which is somewhat limited secondary to presence of fecal material. Not well appreciated in the previous examination due to lack of contrast opacification. Direct visualization of right colon is recommended.    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 He was kept in the ICU with delayed extubation due to poor respiratory status and difficult intubation.  Prior to his recent hospitalization from 9/22 - 10/29, he had chronic constipation and abdominal pain.   He presents to the ED today from rehab due to increasing shortness of breath for the last few days, with abdominal cramping, increased leg swelling and pain, and concern about his ostomy care. He is baseline on 4L nasal cannula, with increasing SOB in the last few days without cough. He also has gradually progressive abdominal distension and cramping which he states is similar in nature but increased severity of cramping x2 weeks he has had since the loop colostomy, which itself was different from his chronic abdominal cramps. He has continued to have gas output into the ostomy and stool, which he does not notice at a decreased volume from postop. No records of output were sent from rehab. He denies any rectal discharge.  He has weeks of bloating and states his infrequent belching helps relieve the cramping. He complains of poor ostomy care at the rehab with skin irritation.   He complains of leg swelling that has worsened in the last week, with pain.    He denies any fevers, chills, nausea, vomiting, chest pain, or difficulty tolerating PO intake. He has a poor appetite, which has been stable.  He has not had chemotherapy, and is DNR/ DNI.   Bedside ultrasound was performed by the ED providers which did not find large pleural fluid collections.         PAST MEDICAL & SURGICAL HISTORY:  Pleural effusions (initial no malignant cells on path, + atypical cells)   COPD  CHF (congestive heart failure) (G1DD, Ef 50-55% on 9/27 echo)  Insomnia  BPH (benign prostatic hyperplasia)  Depression  Bipolar 1 disorder  HTN (hypertension)  hld  History of thoracentesis  DVT- chronic in right femoral vein, left common femoral, femoral, and gastrocnemius   s/p transverse loop colostomy  s/p L VATS, pleural biopsy, pleural implant biopsy, talc pleurodesis    Home Meds: Home Medications:  acetaminophen 325 mg oral tablet: 2 tab(s) orally every 6 hours, As needed, Mild Pain (1 - 3) (29 Oct 2018 10:53)  Ambien 10 mg oral tablet: 1 tab(s) orally once a day (at bedtime) (21 Sep 2018 21:50)  apixaban 5 mg oral tablet: 1 tab(s) orally every 12 hours (21 Sep 2018 21:50)  Cardizem 60 mg oral tablet: 1 tab(s) orally every 8 hours (21 Sep 2018 21:50)  clotrimazole 1% topical cream: 1 application topically  (21 Sep 2018 21:50)  DULoxetine 60 mg oral delayed release capsule: 1 cap(s) orally once a day (21 Sep 2018 21:50)  furosemide 20 mg oral tablet: 1 tab(s) orally once a day (29 Oct 2018 10:53)  isosorbide mononitrate 20 mg oral tablet: 1 tab(s) orally once a day (21 Sep 2018 21:50)  latanoprost 0.005% ophthalmic solution: 1 drop(s) to each affected eye once a day (in the evening) (21 Sep 2018 21:50)  Multiple Vitamins oral capsule: 1 cap(s) orally once a day (21 Sep 2018 21:50)  oxyCODONE 10 mg oral tablet: 1 tab(s) orally every 6 hours, As needed, Severe Pain (7 - 10) (29 Oct 2018 10:53)  oxyCODONE 5 mg oral tablet: 1 tab(s) orally every 6 hours, As needed, Moderate Pain (4 - 6) (29 Oct 2018 10:53)  pantoprazole 40 mg oral delayed release tablet: 1 tab(s) orally once a day (before a meal) (29 Oct 2018 10:53)  propranolol 20 mg oral tablet: 1 tab(s) orally 2 times a day (21 Sep 2018 21:50)  tamsulosin 0.4 mg oral capsule: 1 cap(s) orally once a day (21 Sep 2018 21:50)  traZODone 50 mg oral tablet: 1 tab(s) orally once a day (at bedtime) (21 Sep 2018 21:50)    Allergies:  No Known Allergies    Soc: former smoker  Advanced Directives: DNR/DNI confirmed with patient    ROS:    REVIEW OF SYSTEMS    [x ] A ten-point review of systems was otherwise negative except as noted- speaking in short sentences, poor historian, incomplete records from outside facility  [ ] Due to altered mental status/intubation, subjective information were not able to be obtained from the patient. History was obtained, to the extent possible, from review of the chart and collateral sources of information.      CURRENT MEDICATIONS:   --------------------------------------------------------------------------------------  Neurologic Medications  morphine  - Injectable 6 milliGRAM(s) IntraMuscular Once    Respiratory Medications    Cardiovascular Medications    Gastrointestinal Medications    Genitourinary Medications    Hematologic/Oncologic Medications    Antimicrobial/Immunologic Medications    Endocrine/Metabolic Medications    Topical/Other Medications  iohexol 300 mG (iodine)/mL Oral Solution 30 milliLiter(s) Oral once    --------------------------------------------------------------------------------------    VITAL SIGNS, INS/OUTS (last 24 hours):  --------------------------------------------------------------------------------------  ICU Vital Signs Last 24 Hrs  T(C): 36.2 (29 Nov 2018 10:42), Max: 36.2 (29 Nov 2018 10:42)  T(F): 97.2 (29 Nov 2018 10:42), Max: 97.2 (29 Nov 2018 10:42)  HR: 98 (29 Nov 2018 10:42) (98 - 98)  BP: 138/74 (29 Nov 2018 10:42) (138/74 - 138/74)  BP(mean): --  ABP: --  ABP(mean): --  RR: 19 (29 Nov 2018 10:42) (19 - 19)  SpO2: 99% (29 Nov 2018 10:42) (99% - 99%)    I&O's Summary    --------------------------------------------------------------------------------------  PHYSICAL EXAM    General:  speaking in few word sentences, dyspneic, on nasal cannula sitting upright in bed. Alert, cooperative  HEENT: NCAT, JOHN, left eye medially deviated, Trachea ML  Cardiac: RRR S1, S2, no Murmurs, rubs or gallops  Respiratory: increased work of breathing, tachypneic, crackles up to midlungs  Abdomen: Soft, distended and unable to lay flat for exam due to respiratory status, mild tenderness to palpation - "it starts my cramping", + tympanitic,  no reboudn or guarding. Mild erythema by ostomy site. Ostomy in right mid abdomen protruding, + gas and minimal stool in bag. + prolene sutures in place, no noted purulence or induration, + plastic piece at superior and inferior edge. +bowel sounds  Musculoskeletal:  edematous BL LE with blanching mild erythema bilateral lower extremities, no noted skin ulceration   Neuro: grossly intact CÁRDENAS  Skin: Warm/dry, no jaundice  Incision/wound: healing well, dressings in place, clean, dry and intact    LABS  --------------------------------------------------------------------------------------  LABS:  CAPILLARY BLOOD GLUCOSE                              10.0   7.65  )-----------( 303      ( 29 Nov 2018 11:32 )             33.0       11-29    140  |  94<L>  |  9<L>  ----------------------------<  95  4.6   |  31  |  0.8    Ca    8.8      29 Nov 2018 11:32    TPro  5.9<L>  /  Alb  3.5  /  TBili  0.3  /  DBili  x   /  AST  28  /  ALT  16  /  AlkPhos  633<H>  11-29      PT/INR - ( 29 Nov 2018 11:32 )   PT: 20.20 sec;   INR: 1.77 ratio         PTT - ( 29 Nov 2018 11:32 )  PTT:32.5 sec  CARDIAC MARKERS ( 29 Nov 2018 11:32 )  x     / <0.01 ng/mL / x     / x     / x              --------------------------------------------------------------------------------------    OTHER LABS    IMAGING RESULTS      CT Chest: pending- prelim read: no PE, + loculated noncontinguous pleural effusions fluid density in right chest, base and mid; stable mediastinal fluid; interlobar septal thickening, central bronchial thickening increased  CT A/P: pending      Pathology: Histologic features (signet ring cells) and above immunoprofile  are compatible with a gastric primary.  Suggest clinical correlation.  Final Diagnosis  1. Gastric ulcer, biopsy:  - Invasive adenocarcinoma, poorly differentiated, with signet ring cell features, involving acutely inflamed gastric mucosa with ulceration.  - Giemsa stain fails to reveal Helicobacter pylori in this  material.    2. GE junction nodule, biopsy:  - Squamocolumnar junction showing squamous mucosa with mild reactive epithelial changes and gastric cardia type mucosa with mild to moderate chronic inflammation and focal foveolar hyperplasia.  - No intestinal metaplasia seen.  Comment:  Above gastric tumor has signet ring cells similar to signet ring cells seen in mucin pools in left pleural  tumor ([id number]).   Gastric adenocarcinoma with signet ring cells appears histologically different from adenocarcinoma of  colon ([id number]) which has limited to no mucin production. Immunostains pending to further characterize gastric tumor and  addendum report will follow.      10/17 PATH: Final Diagnosis  Sigmoid colon mass, biopsy from colonoscopy:   - Fragments of invasive adenocarcinoma, moderately differentiated    Comment:  The sigmoid colon adenocarcinoma with limited to no mucin production is focally similar to non-mucin producing adenocarcinoma component in left pleural tumor ([id number]). The sigmoid colon tumor (without signet ring cells) appears to be histologically different from the  minute gastric tumor biopsy  ([id number]).   Immunostains pending on sigmoid colon tumor, primary versus metastatic, and addendum report will follow.    CT 10/9/2018:   < from: CT Abdomen and Pelvis w/ Oral Cont and w/ IV Cont (10.08.18 @ 16:33) >  IMPRESSION:   1.  Bilateral pleural effusions (left greater than right). Loculated on the left. There is a pigtail catheter in the right lung base with a small   pneumothorax.  2.  Mild interstitial pulmonary edema.  3. Short segment ascending colon wall circumferential thickening - likely mass, which is somewhat limited secondary to presence of fecal material. Not well appreciated in the previous examination due to lack of contrast opacification. Direct visualization of right colon is recommended.    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 He was kept in the ICU with delayed extubation due to poor respiratory status and difficult intubation.  Prior to his recent hospitalization from 9/22 - 10/29, he had chronic constipation and abdominal pain.   He presents to the ED today from rehab due to increasing shortness of breath for the last few days, with abdominal cramping, increased leg swelling and pain, and concern about his ostomy care. He is baseline on 4L nasal cannula, with increasing SOB in the last few days without cough. He also has gradually progressive abdominal distension and cramping which he states is similar in nature but increased severity of cramping x2 weeks he has had since the loop colostomy, which itself was different from his chronic abdominal cramps. He has continued to have gas output into the ostomy and stool, which he does not notice at a decreased volume from postop. No records of output were sent from rehab. He denies any rectal discharge.  He has weeks of bloating and states his infrequent belching helps relieve the cramping. He complains of poor ostomy care at the rehab with skin irritation.   He complains of leg swelling that has worsened in the last week, with pain.    He denies any fevers, chills, nausea, vomiting, chest pain, or difficulty tolerating PO intake. He has a poor appetite, which has been stable.  He has not had chemotherapy, and is DNR/ DNI.   Bedside ultrasound was performed by the ED providers which did not find large pleural fluid collections.         PAST MEDICAL & SURGICAL HISTORY:  Pleural effusions (initial no malignant cells on path, + atypical cells)   COPD  CHF (congestive heart failure) (G1DD, Ef 50-55% on 9/27 echo)  Insomnia  BPH (benign prostatic hyperplasia)  Depression  Bipolar 1 disorder  HTN (hypertension)  hld  History of thoracentesis  DVT- chronic in right femoral vein, left common femoral, femoral, and gastrocnemius   s/p transverse loop colostomy  s/p L VATS, pleural biopsy, pleural implant biopsy, talc pleurodesis    Home Meds: Home Medications:  acetaminophen 325 mg oral tablet: 2 tab(s) orally every 6 hours, As needed, Mild Pain (1 - 3) (29 Oct 2018 10:53)  Ambien 10 mg oral tablet: 1 tab(s) orally once a day (at bedtime) (21 Sep 2018 21:50)  apixaban 5 mg oral tablet: 1 tab(s) orally every 12 hours (21 Sep 2018 21:50)  Cardizem 60 mg oral tablet: 1 tab(s) orally every 8 hours (21 Sep 2018 21:50)  clotrimazole 1% topical cream: 1 application topically  (21 Sep 2018 21:50)  DULoxetine 60 mg oral delayed release capsule: 1 cap(s) orally once a day (21 Sep 2018 21:50)  furosemide 20 mg oral tablet: 1 tab(s) orally once a day (29 Oct 2018 10:53)  isosorbide mononitrate 20 mg oral tablet: 1 tab(s) orally once a day (21 Sep 2018 21:50)  latanoprost 0.005% ophthalmic solution: 1 drop(s) to each affected eye once a day (in the evening) (21 Sep 2018 21:50)  Multiple Vitamins oral capsule: 1 cap(s) orally once a day (21 Sep 2018 21:50)  oxyCODONE 10 mg oral tablet: 1 tab(s) orally every 6 hours, As needed, Severe Pain (7 - 10) (29 Oct 2018 10:53)  oxyCODONE 5 mg oral tablet: 1 tab(s) orally every 6 hours, As needed, Moderate Pain (4 - 6) (29 Oct 2018 10:53)  pantoprazole 40 mg oral delayed release tablet: 1 tab(s) orally once a day (before a meal) (29 Oct 2018 10:53)  propranolol 20 mg oral tablet: 1 tab(s) orally 2 times a day (21 Sep 2018 21:50)  tamsulosin 0.4 mg oral capsule: 1 cap(s) orally once a day (21 Sep 2018 21:50)  traZODone 50 mg oral tablet: 1 tab(s) orally once a day (at bedtime) (21 Sep 2018 21:50)    Allergies:  No Known Allergies    Soc: former smoker  Advanced Directives: DNR/DNI confirmed with patient    ROS:    REVIEW OF SYSTEMS    [x ] A ten-point review of systems was otherwise negative except as noted- speaking in short sentences, poor historian, incomplete records from outside facility  [ ] Due to altered mental status/intubation, subjective information were not able to be obtained from the patient. History was obtained, to the extent possible, from review of the chart and collateral sources of information.      CURRENT MEDICATIONS:   --------------------------------------------------------------------------------------  Neurologic Medications  morphine  - Injectable 6 milliGRAM(s) IntraMuscular Once    Respiratory Medications    Cardiovascular Medications    Gastrointestinal Medications    Genitourinary Medications    Hematologic/Oncologic Medications    Antimicrobial/Immunologic Medications    Endocrine/Metabolic Medications    Topical/Other Medications  iohexol 300 mG (iodine)/mL Oral Solution 30 milliLiter(s) Oral once    --------------------------------------------------------------------------------------    VITAL SIGNS, INS/OUTS (last 24 hours):  --------------------------------------------------------------------------------------  ICU Vital Signs Last 24 Hrs  T(C): 36.2 (29 Nov 2018 10:42), Max: 36.2 (29 Nov 2018 10:42)  T(F): 97.2 (29 Nov 2018 10:42), Max: 97.2 (29 Nov 2018 10:42)  HR: 98 (29 Nov 2018 10:42) (98 - 98)  BP: 138/74 (29 Nov 2018 10:42) (138/74 - 138/74)  BP(mean): --  ABP: --  ABP(mean): --  RR: 19 (29 Nov 2018 10:42) (19 - 19)  SpO2: 99% (29 Nov 2018 10:42) (99% - 99%)    I&O's Summary    --------------------------------------------------------------------------------------  PHYSICAL EXAM    General:  speaking in few word sentences, dyspneic, on nasal cannula sitting upright in bed. Alert, cooperative  HEENT: NCAT, JOHN, left eye medially deviated, Trachea ML  Cardiac: RRR S1, S2, no Murmurs, rubs or gallops  Respiratory: increased work of breathing, tachypneic, crackles up to midlungs  Abdomen: Soft, distended and unable to lay flat for exam due to respiratory status, mild tenderness to palpation - "it starts my cramping", + tympanitic,  no reboudn or guarding. Mild erythema by ostomy site. Ostomy in right mid abdomen protruding, + gas and minimal stool in bag. + prolene sutures in place, no noted purulence or induration, + plastic piece at superior and inferior edge. +bowel sounds  Musculoskeletal:  edematous BL LE with blanching mild erythema bilateral lower extremities, no noted skin ulceration   Neuro: grossly intact CÁRDENAS  Skin: Warm/dry, no jaundice  Incision/wound: healing well, dressings in place, clean, dry and intact    LABS  --------------------------------------------------------------------------------------  LABS:  CAPILLARY BLOOD GLUCOSE                              10.0   7.65  )-----------( 303      ( 29 Nov 2018 11:32 )             33.0       11-29    140  |  94<L>  |  9<L>  ----------------------------<  95  4.6   |  31  |  0.8    Ca    8.8      29 Nov 2018 11:32    TPro  5.9<L>  /  Alb  3.5  /  TBili  0.3  /  DBili  x   /  AST  28  /  ALT  16  /  AlkPhos  633<H>  11-29      PT/INR - ( 29 Nov 2018 11:32 )   PT: 20.20 sec;   INR: 1.77 ratio         PTT - ( 29 Nov 2018 11:32 )  PTT:32.5 sec  CARDIAC MARKERS ( 29 Nov 2018 11:32 )  x     / <0.01 ng/mL / x     / x     / x              --------------------------------------------------------------------------------------    OTHER LABS  Urinalysis (11.29.18 @ 11:32)    Blood, Urine: Negative    Glucose Qualitative, Urine: Negative mg/dL    pH Urine: 7.0    Color: Yellow    Urine Appearance: Cloudy    Bilirubin: Negative    Ketone - Urine: Negative    Specific Gravity: 1.010    Protein, Urine: Negative mg/dL    Urobilinogen: 0.2 mg/dL    Nitrite: Positive    Leukocyte Esterase Concentration: Large      IMAGING RESULTS      CT Chest: pending- prelim read: no PE, + loculated noncontinguous pleural effusions fluid density in right chest, base and mid; stable mediastinal fluid; interlobar septal thickening, central bronchial thickening increased  CT A/P: pending      Pathology: Histologic features (signet ring cells) and above immunoprofile  are compatible with a gastric primary.  Suggest clinical correlation.  Final Diagnosis  1. Gastric ulcer, biopsy:  - Invasive adenocarcinoma, poorly differentiated, with signet ring cell features, involving acutely inflamed gastric mucosa with ulceration.  - Giemsa stain fails to reveal Helicobacter pylori in this  material.    2. GE junction nodule, biopsy:  - Squamocolumnar junction showing squamous mucosa with mild reactive epithelial changes and gastric cardia type mucosa with mild to moderate chronic inflammation and focal foveolar hyperplasia.  - No intestinal metaplasia seen.  Comment:  Above gastric tumor has signet ring cells similar to signet ring cells seen in mucin pools in left pleural  tumor ([id number]).   Gastric adenocarcinoma with signet ring cells appears histologically different from adenocarcinoma of  colon ([id number]) which has limited to no mucin production. Immunostains pending to further characterize gastric tumor and  addendum report will follow.      10/17 PATH: Final Diagnosis  Sigmoid colon mass, biopsy from colonoscopy:   - Fragments of invasive adenocarcinoma, moderately differentiated    Comment:  The sigmoid colon adenocarcinoma with limited to no mucin production is focally similar to non-mucin producing adenocarcinoma component in left pleural tumor ([id number]). The sigmoid colon tumor (without signet ring cells) appears to be histologically different from the  minute gastric tumor biopsy  ([id number]).   Immunostains pending on sigmoid colon tumor, primary versus metastatic, and addendum report will follow.    CT 10/9/2018:   < from: CT Abdomen and Pelvis w/ Oral Cont and w/ IV Cont (10.08.18 @ 16:33) >  IMPRESSION:   1.  Bilateral pleural effusions (left greater than right). Loculated on the left. There is a pigtail catheter in the right lung base with a small   pneumothorax.  2.  Mild interstitial pulmonary edema.  3. Short segment ascending colon wall circumferential thickening - likely mass, which is somewhat limited secondary to presence of fecal material. Not well appreciated in the previous examination due to lack of contrast opacification. Direct visualization of right colon is recommended.    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 He was kept in the ICU with delayed extubation due to poor respiratory status and difficult intubation.  Prior to his recent hospitalization from 9/22 - 10/29, he had chronic constipation and abdominal pain.   He presents to the ED today from rehab due to increasing shortness of breath for the last few days, with abdominal cramping, increased leg swelling and pain, and concern about his ostomy care. He is baseline on 4L nasal cannula, with increasing SOB in the last few days without cough. He also has gradually progressive abdominal distension and cramping which he states is similar in nature but increased severity of cramping x2 weeks he has had since the loop colostomy, which itself was different from his chronic abdominal cramps. He has continued to have gas output into the ostomy and stool, which he does not notice at a decreased volume from postop. No records of output were sent from rehab. He denies any rectal discharge.  He has weeks of bloating and states his infrequent belching helps relieve the cramping. He complains of poor ostomy care at the rehab with skin irritation.   He complains of leg swelling that has worsened in the last week, with pain.    He denies any fevers, chills, nausea, vomiting, chest pain, or difficulty tolerating PO intake. He has a poor appetite, which has been stable.  He has not had chemotherapy, and is DNR/ DNI.   Bedside ultrasound was performed by the ED providers which did not find large pleural fluid collections.         PAST MEDICAL & SURGICAL HISTORY:  Pleural effusions (initial no malignant cells on path, + atypical cells)   COPD  CHF (congestive heart failure) (G1DD, Ef 50-55% on 9/27 echo)  Insomnia  BPH (benign prostatic hyperplasia)  Depression  Bipolar 1 disorder  HTN (hypertension)  hld  History of thoracentesis  DVT- chronic in right femoral vein, left common femoral, femoral, and gastrocnemius   s/p transverse loop colostomy  s/p L VATS, pleural biopsy, pleural implant biopsy, talc pleurodesis    Home Meds: Home Medications:  acetaminophen 325 mg oral tablet: 2 tab(s) orally every 6 hours, As needed, Mild Pain (1 - 3) (29 Oct 2018 10:53)  Ambien 10 mg oral tablet: 1 tab(s) orally once a day (at bedtime) (21 Sep 2018 21:50)  apixaban 5 mg oral tablet: 1 tab(s) orally every 12 hours (21 Sep 2018 21:50)  Cardizem 60 mg oral tablet: 1 tab(s) orally every 8 hours (21 Sep 2018 21:50)  clotrimazole 1% topical cream: 1 application topically  (21 Sep 2018 21:50)  DULoxetine 60 mg oral delayed release capsule: 1 cap(s) orally once a day (21 Sep 2018 21:50)  furosemide 20 mg oral tablet: 1 tab(s) orally once a day (29 Oct 2018 10:53)  isosorbide mononitrate 20 mg oral tablet: 1 tab(s) orally once a day (21 Sep 2018 21:50)  latanoprost 0.005% ophthalmic solution: 1 drop(s) to each affected eye once a day (in the evening) (21 Sep 2018 21:50)  Multiple Vitamins oral capsule: 1 cap(s) orally once a day (21 Sep 2018 21:50)  oxyCODONE 10 mg oral tablet: 1 tab(s) orally every 6 hours, As needed, Severe Pain (7 - 10) (29 Oct 2018 10:53)  oxyCODONE 5 mg oral tablet: 1 tab(s) orally every 6 hours, As needed, Moderate Pain (4 - 6) (29 Oct 2018 10:53)  pantoprazole 40 mg oral delayed release tablet: 1 tab(s) orally once a day (before a meal) (29 Oct 2018 10:53)  propranolol 20 mg oral tablet: 1 tab(s) orally 2 times a day (21 Sep 2018 21:50)  tamsulosin 0.4 mg oral capsule: 1 cap(s) orally once a day (21 Sep 2018 21:50)  traZODone 50 mg oral tablet: 1 tab(s) orally once a day (at bedtime) (21 Sep 2018 21:50)    Allergies:  No Known Allergies    Soc: former smoker  Advanced Directives: DNR/DNI confirmed with patient    ROS:    REVIEW OF SYSTEMS    [x ] A ten-point review of systems was otherwise negative except as noted- speaking in short sentences, poor historian, incomplete records from outside facility  [ ] Due to altered mental status/intubation, subjective information were not able to be obtained from the patient. History was obtained, to the extent possible, from review of the chart and collateral sources of information.      CURRENT MEDICATIONS:   --------------------------------------------------------------------------------------  Neurologic Medications  morphine  - Injectable 6 milliGRAM(s) IntraMuscular Once    Respiratory Medications    Cardiovascular Medications    Gastrointestinal Medications    Genitourinary Medications    Hematologic/Oncologic Medications    Antimicrobial/Immunologic Medications    Endocrine/Metabolic Medications    Topical/Other Medications  iohexol 300 mG (iodine)/mL Oral Solution 30 milliLiter(s) Oral once    --------------------------------------------------------------------------------------    VITAL SIGNS, INS/OUTS (last 24 hours):  --------------------------------------------------------------------------------------  ICU Vital Signs Last 24 Hrs  T(C): 36.2 (29 Nov 2018 10:42), Max: 36.2 (29 Nov 2018 10:42)  T(F): 97.2 (29 Nov 2018 10:42), Max: 97.2 (29 Nov 2018 10:42)  HR: 98 (29 Nov 2018 10:42) (98 - 98)  BP: 138/74 (29 Nov 2018 10:42) (138/74 - 138/74)  BP(mean): --  ABP: --  ABP(mean): --  RR: 19 (29 Nov 2018 10:42) (19 - 19)  SpO2: 99% (29 Nov 2018 10:42) (99% - 99%)    I&O's Summary    --------------------------------------------------------------------------------------  PHYSICAL EXAM    General:  speaking in few word sentences, dyspneic, on nasal cannula sitting upright in bed. Alert, cooperative  HEENT: NCAT, JOHN, left eye medially deviated, Trachea ML  Cardiac: RRR S1, S2, no Murmurs, rubs or gallops  Respiratory: increased work of breathing, tachypneic, crackles up to midlungs  Abdomen: Soft, distended and unable to lay flat for exam due to respiratory status, mild tenderness to palpation - "it starts my cramping", + tympanitic,  no reboudn or guarding. Mild erythema by ostomy site. Ostomy in right mid abdomen protruding, + gas and minimal stool in bag. + prolene sutures in place, no noted purulence or induration, + plastic piece at superior and inferior edge. +bowel sounds  Musculoskeletal:  edematous BL LE with blanching mild erythema bilateral lower extremities, no noted skin ulceration   Neuro: grossly intact CÁRDENAS  Skin: Warm/dry, no jaundice  Incision/wound: healing well, dressings in place, clean, dry and intact    LABS  --------------------------------------------------------------------------------------  LABS:  CAPILLARY BLOOD GLUCOSE                              10.0   7.65  )-----------( 303      ( 29 Nov 2018 11:32 )             33.0       11-29    140  |  94<L>  |  9<L>  ----------------------------<  95  4.6   |  31  |  0.8    Ca    8.8      29 Nov 2018 11:32    TPro  5.9<L>  /  Alb  3.5  /  TBili  0.3  /  DBili  x   /  AST  28  /  ALT  16  /  AlkPhos  633<H>  11-29      PT/INR - ( 29 Nov 2018 11:32 )   PT: 20.20 sec;   INR: 1.77 ratio         PTT - ( 29 Nov 2018 11:32 )  PTT:32.5 sec  CARDIAC MARKERS ( 29 Nov 2018 11:32 )  x     / <0.01 ng/mL / x     / x     / x              --------------------------------------------------------------------------------------    OTHER LABS  Urinalysis (11.29.18 @ 11:32)    Blood, Urine: Negative    Glucose Qualitative, Urine: Negative mg/dL    pH Urine: 7.0    Color: Yellow    Urine Appearance: Cloudy    Bilirubin: Negative    Ketone - Urine: Negative    Specific Gravity: 1.010    Protein, Urine: Negative mg/dL    Urobilinogen: 0.2 mg/dL    Nitrite: Positive    Leukocyte Esterase Concentration: Large      IMAGING RESULTS      CT Chest: pending- prelim read: no PE, + loculated noncontinguous pleural effusions fluid density in right chest, base and mid; stable mediastinal fluid; interlobar septal thickening, central bronchial thickening increased  CT A/P: pending      Pathology: Histologic features (signet ring cells) and above immunoprofile  are compatible with a gastric primary.  Suggest clinical correlation.  Final Diagnosis  1. Gastric ulcer, biopsy:  - Invasive adenocarcinoma, poorly differentiated, with signet ring cell features, involving acutely inflamed gastric mucosa with ulceration.  - Giemsa stain fails to reveal Helicobacter pylori in this  material.    2. GE junction nodule, biopsy:  - Squamocolumnar junction showing squamous mucosa with mild reactive epithelial changes and gastric cardia type mucosa with mild to moderate chronic inflammation and focal foveolar hyperplasia.  - No intestinal metaplasia seen.  Comment:  Above gastric tumor has signet ring cells similar to signet ring cells seen in mucin pools in left pleural  tumor ([id number]).   Gastric adenocarcinoma with signet ring cells appears histologically different from adenocarcinoma of  colon ([id number]) which has limited to no mucin production. Immunostains pending to further characterize gastric tumor and  addendum report will follow.      10/17 PATH: Final Diagnosis  Sigmoid colon mass, biopsy from colonoscopy:   - Fragments of invasive adenocarcinoma, moderately differentiated    Comment:  The sigmoid colon adenocarcinoma with limited to no mucin production is focally similar to non-mucin producing adenocarcinoma component in left pleural tumor ([id number]). The sigmoid colon tumor (without signet ring cells) appears to be histologically different from the  minute gastric tumor biopsy  ([id number]).   Immunostains pending on sigmoid colon tumor, primary versus metastatic, and addendum report will follow.    CT 10/9/2018:   < from: CT Abdomen and Pelvis w/ Oral Cont and w/ IV Cont (10.08.18 @ 16:33) >  IMPRESSION:   1.  Bilateral pleural effusions (left greater than right). Loculated on the left. There is a pigtail catheter in the right lung base with a small   pneumothorax.  2.  Mild interstitial pulmonary edema.  3. Short segment ascending colon wall circumferential thickening - likely mass, which is somewhat limited secondary to presence of fecal material. Not well appreciated in the previous examination due to lack of contrast opacification. Direct visualization of right colon is recommended.    < end of copied text >    < from: CT Angio Chest w/ IV Cont (11.29.18 @ 16:35) >  IMPRESSION:    No acute central pulmonary embolism.    New bilateral pulmonary nodules measuring up to 12 mm.    Increasing size of right pleural effusion, moderate to large, with   pleural enhancement at the right lung base. Empyema is not excluded.    Increasing right base opacity. Multifocal groundglass opacities.   Increasing peribronchial wall thickening.    Increase in size of multiloculated pericardial effusion versus loculated   fluid collections within the mediastinum extending into the superior   mediastinum.    Right-sided diverting colostomy, new. Mild underlying mesenteric fat   stranding is likely represent postsurgical change.     Bones are minimally heterogeneous in density. Sclerotic lesions are not   excluded. Further evaluation with nuclear medicine bone scan is   recommended.    Findings are concerning with progression of neoplastic disease    < end of copied text >

## 2018-11-29 NOTE — CONSULT NOTE ADULT - SUBJECTIVE AND OBJECTIVE BOX
General Surgery Consultation Note  =====================================================  HPI: 58y Male  HPI:          prior SICU 10/17: HPI:   57 y/o M with PMH of HTN, known DVT (on Heparin GTT), BPH, Depression, that presented to [facility] for SOB (found to have a large Pleural effusion- followed by CT surgery and has catheter in place). Gastroenterology was consulted for abnormal CT imagining (CT scan with thickening of Ascending Colon- cannot rule out malignancy). Of note patient notes longstanding history of abdominal pain and discomfort associated with constipation ( he does not follow a specific GI doctor but notes he had a colonoscopy 10 months ago and was found to have polyps). He currently notes diffuse abdominal pain, crampy, constant only improved by pain medication. Imaging done demonstrated Ascending colon circumferential thickening ( Radiology recommends direct visualization) but previous imaging here was without discrepancy. Patient did have Colonoscopy at [facility] 10 months ago. Patient reports that his last bowl movement was last Friday following an enema. Patient reports nausea but no vomiting.     10/10 Patient underwent Left Vats w/ Pleural Biopsy, Pleural implant biospy talc pleurodesis. patient also had his of abdominal pain with CTAP done demonstrating an ascending colon mass found on CT Abd to rule out malignancy. patient then underwent Coloscopy/EGD today 10/17. Due to the patients history of CHF, and Pulmonary effusions and low saturations preop, the decision was made to undergo the Colonoscopy under general anesthesia. Patient was induced with propfol/etomidate/sucx, and underwent the procedure without incident with findings of a possible invasive CA.  Post procedure anesthesia was unable to extubate the patient due to low Tidal Volumes around 100. per anesthesia patient was adequately reversed. Requesting a ICU consult for respiratory failure.     ICU 10/23: consult  PI:  57yo male with a PMH of HTN, COPD, CHF, DVT on home Eliquis BPH and depression, with a history of recurrent episodes of malignant pleural effusions, VATS 10/10 complaining of abdominal pain, distension, constipation. CT abdomen showed thickening of the ascending colon. 10/17 Colonscopy was done under general anesthesia due to comorbidities patient was not extubated after the OR and spent the night in the ICU. Patient was extubated without difficulty the next morning. 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 Today (10/23) patient is s/p transverse loop colostomy. Patients surgery was uneventful, currently still intubated since he was a difficult intubation. Patient tolerated procedure well, hemodynamically stable. On Fentanyl and Precedex drip. Patient is alert and following commands. Colostomy is pink and gas is already present in the bag. DNR/DNI was resended for OR.           Surgery Information  OR time:      EBL:          IV Fluids:       Blood Products:   UOP:        PAST MEDICAL & SURGICAL HISTORY:  Pleural effusion  CHF (congestive heart failure)  Insomnia  BPH (benign prostatic hyperplasia)  Depression  Bipolar 1 disorder  HTN (hypertension)  History of thoracentesis  DVT  s/p   s/p     Home Meds: Home Medications:  acetaminophen 325 mg oral tablet: 2 tab(s) orally every 6 hours, As needed, Mild Pain (1 - 3) (29 Oct 2018 10:53)  Ambien 10 mg oral tablet: 1 tab(s) orally once a day (at bedtime) (21 Sep 2018 21:50)  apixaban 5 mg oral tablet: 1 tab(s) orally every 12 hours (21 Sep 2018 21:50)  Cardizem 60 mg oral tablet: 1 tab(s) orally every 8 hours (21 Sep 2018 21:50)  clotrimazole 1% topical cream: 1 application topically  (21 Sep 2018 21:50)  DULoxetine 60 mg oral delayed release capsule: 1 cap(s) orally once a day (21 Sep 2018 21:50)  furosemide 20 mg oral tablet: 1 tab(s) orally once a day (29 Oct 2018 10:53)  isosorbide mononitrate 20 mg oral tablet: 1 tab(s) orally once a day (21 Sep 2018 21:50)  latanoprost 0.005% ophthalmic solution: 1 drop(s) to each affected eye once a day (in the evening) (21 Sep 2018 21:50)  Multiple Vitamins oral capsule: 1 cap(s) orally once a day (21 Sep 2018 21:50)  oxyCODONE 10 mg oral tablet: 1 tab(s) orally every 6 hours, As needed, Severe Pain (7 - 10) (29 Oct 2018 10:53)  oxyCODONE 5 mg oral tablet: 1 tab(s) orally every 6 hours, As needed, Moderate Pain (4 - 6) (29 Oct 2018 10:53)  pantoprazole 40 mg oral delayed release tablet: 1 tab(s) orally once a day (before a meal) (29 Oct 2018 10:53)  propranolol 20 mg oral tablet: 1 tab(s) orally 2 times a day (21 Sep 2018 21:50)  tamsulosin 0.4 mg oral capsule: 1 cap(s) orally once a day (21 Sep 2018 21:50)  traZODone 50 mg oral tablet: 1 tab(s) orally once a day (at bedtime) (21 Sep 2018 21:50)    Allergies: Allergies    No Known Allergies    Intolerances      Soc:   Advanced Directives: Presumed Full Code     ROS:    REVIEW OF SYSTEMS    [ ] A ten-point review of systems was otherwise negative except as noted.  [ ] Due to altered mental status/intubation, subjective information were not able to be obtained from the patient. History was obtained, to the extent possible, from review of the chart and collateral sources of information.      CURRENT MEDICATIONS:   --------------------------------------------------------------------------------------  Neurologic Medications  morphine  - Injectable 6 milliGRAM(s) IntraMuscular Once    Respiratory Medications    Cardiovascular Medications    Gastrointestinal Medications    Genitourinary Medications    Hematologic/Oncologic Medications    Antimicrobial/Immunologic Medications    Endocrine/Metabolic Medications    Topical/Other Medications  iohexol 300 mG (iodine)/mL Oral Solution 30 milliLiter(s) Oral once    --------------------------------------------------------------------------------------    VITAL SIGNS, INS/OUTS (last 24 hours):  --------------------------------------------------------------------------------------  ICU Vital Signs Last 24 Hrs  T(C): 36.2 (29 Nov 2018 10:42), Max: 36.2 (29 Nov 2018 10:42)  T(F): 97.2 (29 Nov 2018 10:42), Max: 97.2 (29 Nov 2018 10:42)  HR: 98 (29 Nov 2018 10:42) (98 - 98)  BP: 138/74 (29 Nov 2018 10:42) (138/74 - 138/74)  BP(mean): --  ABP: --  ABP(mean): --  RR: 19 (29 Nov 2018 10:42) (19 - 19)  SpO2: 99% (29 Nov 2018 10:42) (99% - 99%)    I&O's Summary    --------------------------------------------------------------------------------------  PHYSICAL EXAM    General: NAD, AAOx3, calm and cooperative  HEENT: NCAT, JOHN, EOMI, Trachea ML, Neck supple  Cardiac: RRR S1, S2, no Murmurs, rubs or gallops  Respiratory: CTAB, normal respiratory effort, breath sounds equal BL, no wheeze, rhonchi or crackles  Abdomen: Soft, non-distended, non-tender, +bowel sounds  Musculoskeletal: Strength 5/5 BL UE/LE, ROM intact, compartments soft  Neuro: Sensation grossly intact and equal throughout, CN II-XII intact, no focal deficits  Vascular: Pulses 2+ throughout, extremities well perfused  Skin: Warm/dry, normal color, no jaundice  Incision/wound: healing well, dressings in place, clean, dry and intact    LABS  --------------------------------------------------------------------------------------  Labs:  CAPILLARY BLOOD GLUCOSE                      LFTs:         Coags:              --------------------------------------------------------------------------------------    OTHER LABS    IMAGING RESULTS    ---------------------------------------------------------------------------------------    ASSESSMENT:  58y Male ***    PLAN:       --------------------------------------------------------------------------------------    11-29-18 @ 12:24 General Surgery Consultation Note  =====================================================  HPI: 58y Male with PMH of HTN, COPD (on 4L NC baseline), CHF ( EF 50-55%, G1DD on echo 9/27), DVT on Eliquis, BPH, depression, bipolar 1 disorder, and recent diagnosis of 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 He was kept in the ICU with delayed extubation due to poor respiratory status and difficult intubation.  Prior to his recent hospitalization from 9/22 - 10/29, he had chronic constipation and abdominal pain.   He presents to the ED today from rehab due to increasing shortness of breath for the last few days, with abdominal cramping, increased leg swelling and pain, and concern about his ostomy care. He is baseline on 4L nasal cannula, with increasing SOB in the last few days without cough. He also has gradually progressive abdominal distension and cramping                     PAST MEDICAL & SURGICAL HISTORY:  Pleural effusion  CHF (congestive heart failure)  Insomnia  BPH (benign prostatic hyperplasia)  Depression  Bipolar 1 disorder  HTN (hypertension)  History of thoracentesis  DVT  s/p   s/p     Home Meds: Home Medications:  acetaminophen 325 mg oral tablet: 2 tab(s) orally every 6 hours, As needed, Mild Pain (1 - 3) (29 Oct 2018 10:53)  Ambien 10 mg oral tablet: 1 tab(s) orally once a day (at bedtime) (21 Sep 2018 21:50)  apixaban 5 mg oral tablet: 1 tab(s) orally every 12 hours (21 Sep 2018 21:50)  Cardizem 60 mg oral tablet: 1 tab(s) orally every 8 hours (21 Sep 2018 21:50)  clotrimazole 1% topical cream: 1 application topically  (21 Sep 2018 21:50)  DULoxetine 60 mg oral delayed release capsule: 1 cap(s) orally once a day (21 Sep 2018 21:50)  furosemide 20 mg oral tablet: 1 tab(s) orally once a day (29 Oct 2018 10:53)  isosorbide mononitrate 20 mg oral tablet: 1 tab(s) orally once a day (21 Sep 2018 21:50)  latanoprost 0.005% ophthalmic solution: 1 drop(s) to each affected eye once a day (in the evening) (21 Sep 2018 21:50)  Multiple Vitamins oral capsule: 1 cap(s) orally once a day (21 Sep 2018 21:50)  oxyCODONE 10 mg oral tablet: 1 tab(s) orally every 6 hours, As needed, Severe Pain (7 - 10) (29 Oct 2018 10:53)  oxyCODONE 5 mg oral tablet: 1 tab(s) orally every 6 hours, As needed, Moderate Pain (4 - 6) (29 Oct 2018 10:53)  pantoprazole 40 mg oral delayed release tablet: 1 tab(s) orally once a day (before a meal) (29 Oct 2018 10:53)  propranolol 20 mg oral tablet: 1 tab(s) orally 2 times a day (21 Sep 2018 21:50)  tamsulosin 0.4 mg oral capsule: 1 cap(s) orally once a day (21 Sep 2018 21:50)  traZODone 50 mg oral tablet: 1 tab(s) orally once a day (at bedtime) (21 Sep 2018 21:50)    Allergies: Allergies    No Known Allergies    Intolerances      Soc:   Advanced Directives: DNR/DNI confirmed with patient    ROS:    REVIEW OF SYSTEMS    [x ] A ten-point review of systems was otherwise negative except as noted- speaking in short sentences, poor historian, incomplete records from outside facility  [ ] Due to altered mental status/intubation, subjective information were not able to be obtained from the patient. History was obtained, to the extent possible, from review of the chart and collateral sources of information.      CURRENT MEDICATIONS:   --------------------------------------------------------------------------------------  Neurologic Medications  morphine  - Injectable 6 milliGRAM(s) IntraMuscular Once    Respiratory Medications    Cardiovascular Medications    Gastrointestinal Medications    Genitourinary Medications    Hematologic/Oncologic Medications    Antimicrobial/Immunologic Medications    Endocrine/Metabolic Medications    Topical/Other Medications  iohexol 300 mG (iodine)/mL Oral Solution 30 milliLiter(s) Oral once    --------------------------------------------------------------------------------------    VITAL SIGNS, INS/OUTS (last 24 hours):  --------------------------------------------------------------------------------------  ICU Vital Signs Last 24 Hrs  T(C): 36.2 (29 Nov 2018 10:42), Max: 36.2 (29 Nov 2018 10:42)  T(F): 97.2 (29 Nov 2018 10:42), Max: 97.2 (29 Nov 2018 10:42)  HR: 98 (29 Nov 2018 10:42) (98 - 98)  BP: 138/74 (29 Nov 2018 10:42) (138/74 - 138/74)  BP(mean): --  ABP: --  ABP(mean): --  RR: 19 (29 Nov 2018 10:42) (19 - 19)  SpO2: 99% (29 Nov 2018 10:42) (99% - 99%)    I&O's Summary    --------------------------------------------------------------------------------------  PHYSICAL EXAM    General: NAD, AAOx3, calm and cooperative  HEENT: NCAT, JOHN, EOMI, Trachea ML, Neck supple  Cardiac: RRR S1, S2, no Murmurs, rubs or gallops  Respiratory: CTAB, normal respiratory effort, breath sounds equal BL, no wheeze, rhonchi or crackles  Abdomen: Soft, non-distended, non-tender, +bowel sounds  Musculoskeletal: Strength 5/5 BL UE/LE, ROM intact, compartments soft  Neuro: Sensation grossly intact and equal throughout, CN II-XII intact, no focal deficits  Vascular: Pulses 2+ throughout, extremities well perfused  Skin: Warm/dry, normal color, no jaundice  Incision/wound: healing well, dressings in place, clean, dry and intact    LABS  --------------------------------------------------------------------------------------  Labs:  CAPILLARY BLOOD GLUCOSE                      LFTs:         Coags:              --------------------------------------------------------------------------------------    OTHER LABS    IMAGING RESULTS General Surgery Consultation Note  =====================================================  HPI: 58y Male with PMH of HTN, COPD (on 4L NC baseline), CHF ( EF 50-55%, G1DD on echo 9/27), DVT on Eliquis, BPH, depression, bipolar 1 disorder, and recent diagnosis of invasive moderately differentiated 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 with pleural mets and malignant effusion recently discharged after these diagnoses with 10/17  EGD, colonoscopy, 10/23 transverse loop colostomy and 10/10 left VATS with pleural biopsy, pleural implant biopsy, and talc pleurodesis. He was kept in the ICU with delayed extubation due to poor respiratory status and difficult intubation.  Prior to his recent hospitalization from 9/22 - 10/29, he had chronic constipation and abdominal pain.   He presents to the ED today from rehab due to increasing shortness of breath for the last few days, with abdominal cramping, increased leg swelling and pain, and concern about his ostomy care. He is baseline on 4L nasal cannula, with increasing SOB in the last few days without cough. He also has gradually progressive abdominal distension and cramping which he states is similar in nature but increased severity of cramping x2 weeks he has had since the loop colostomy, which itself was different from his chronic abdominal cramps. He has continued to have gas output into the ostomy and stool, which he does not notice at a decreased volume from postop. No records of output were sent from rehab. He denies any rectal discharge.  He has weeks of bloating and states his infrequent belching helps relieve the cramping. He complains of poor ostomy care at the rehab with skin irritation.   He complains of leg swelling that has worsened in the last week, with pain.    He denies any fevers, chills, nausea, vomiting, chest pain, or difficulty tolerating PO intake. He has a poor appetite, which has been stable.  He has not had chemotherapy, and is DNR/ DNI.   Bedside ultrasound was performed by the ED providers which did not find large pleural fluid collections.         PAST MEDICAL & SURGICAL HISTORY:  Pleural effusions (initial no malignant cells on path, + atypical cells)   COPD  CHF (congestive heart failure) (G1DD, Ef 50-55% on 9/27 echo)  Insomnia  BPH (benign prostatic hyperplasia)  Depression  Bipolar 1 disorder  HTN (hypertension)  hld  History of thoracentesis  DVT- chronic in right femoral vein, left common femoral, femoral, and gastrocnemius   s/p transverse loop colostomy  s/p L VATS, pleural biopsy, pleural implant biopsy, talc pleurodesis    Home Meds: Home Medications:  acetaminophen 325 mg oral tablet: 2 tab(s) orally every 6 hours, As needed, Mild Pain (1 - 3) (29 Oct 2018 10:53)  Ambien 10 mg oral tablet: 1 tab(s) orally once a day (at bedtime) (21 Sep 2018 21:50)  apixaban 5 mg oral tablet: 1 tab(s) orally every 12 hours (21 Sep 2018 21:50)  Cardizem 60 mg oral tablet: 1 tab(s) orally every 8 hours (21 Sep 2018 21:50)  clotrimazole 1% topical cream: 1 application topically  (21 Sep 2018 21:50)  DULoxetine 60 mg oral delayed release capsule: 1 cap(s) orally once a day (21 Sep 2018 21:50)  furosemide 20 mg oral tablet: 1 tab(s) orally once a day (29 Oct 2018 10:53)  isosorbide mononitrate 20 mg oral tablet: 1 tab(s) orally once a day (21 Sep 2018 21:50)  latanoprost 0.005% ophthalmic solution: 1 drop(s) to each affected eye once a day (in the evening) (21 Sep 2018 21:50)  Multiple Vitamins oral capsule: 1 cap(s) orally once a day (21 Sep 2018 21:50)  oxyCODONE 10 mg oral tablet: 1 tab(s) orally every 6 hours, As needed, Severe Pain (7 - 10) (29 Oct 2018 10:53)  oxyCODONE 5 mg oral tablet: 1 tab(s) orally every 6 hours, As needed, Moderate Pain (4 - 6) (29 Oct 2018 10:53)  pantoprazole 40 mg oral delayed release tablet: 1 tab(s) orally once a day (before a meal) (29 Oct 2018 10:53)  propranolol 20 mg oral tablet: 1 tab(s) orally 2 times a day (21 Sep 2018 21:50)  tamsulosin 0.4 mg oral capsule: 1 cap(s) orally once a day (21 Sep 2018 21:50)  traZODone 50 mg oral tablet: 1 tab(s) orally once a day (at bedtime) (21 Sep 2018 21:50)    Allergies:  No Known Allergies    Soc: former smoker  Advanced Directives: DNR/DNI confirmed with patient    ROS:    REVIEW OF SYSTEMS    [x ] A ten-point review of systems was otherwise negative except as noted- speaking in short sentences, poor historian, incomplete records from outside facility  [ ] Due to altered mental status/intubation, subjective information were not able to be obtained from the patient. History was obtained, to the extent possible, from review of the chart and collateral sources of information.      CURRENT MEDICATIONS:   --------------------------------------------------------------------------------------  Neurologic Medications  morphine  - Injectable 6 milliGRAM(s) IntraMuscular Once    Respiratory Medications    Cardiovascular Medications    Gastrointestinal Medications    Genitourinary Medications    Hematologic/Oncologic Medications    Antimicrobial/Immunologic Medications    Endocrine/Metabolic Medications    Topical/Other Medications  iohexol 300 mG (iodine)/mL Oral Solution 30 milliLiter(s) Oral once    --------------------------------------------------------------------------------------    VITAL SIGNS, INS/OUTS (last 24 hours):  --------------------------------------------------------------------------------------  ICU Vital Signs Last 24 Hrs  T(C): 36.2 (29 Nov 2018 10:42), Max: 36.2 (29 Nov 2018 10:42)  T(F): 97.2 (29 Nov 2018 10:42), Max: 97.2 (29 Nov 2018 10:42)  HR: 98 (29 Nov 2018 10:42) (98 - 98)  BP: 138/74 (29 Nov 2018 10:42) (138/74 - 138/74)  BP(mean): --  ABP: --  ABP(mean): --  RR: 19 (29 Nov 2018 10:42) (19 - 19)  SpO2: 99% (29 Nov 2018 10:42) (99% - 99%)    I&O's Summary    --------------------------------------------------------------------------------------  PHYSICAL EXAM    General:  speaking in few word sentences, dyspneic, on nasal cannula sitting upright in bed. Alert, cooperative  HEENT: NCAT, JOHN, left eye medially deviated, Trachea ML  Cardiac: RRR S1, S2, no Murmurs, rubs or gallops  Respiratory: increased work of breathing, tachypneic, crackles up to midlungs  Abdomen: Soft, distended and unable to lay flat for exam due to respiratory status, mild tenderness to palpation - "it starts my cramping", + tympanitic,  no reboudn or guarding. Mild erythema by ostomy site. Ostomy in right mid abdomen protruding, + gas and minimal stool in bag. + prolene sutures in place, no noted purulence or induration, + plastic piece at superior and inferior edge. +bowel sounds  Musculoskeletal:  edematous BL LE with blanching mild erythema bilateral lower extremities, no noted skin ulceration   Neuro: grossly intact C[place]RDENAS  Skin: Warm/dry, no jaundice  Incision/wound: healing well, dressings in place, clean, dry and intact    LABS  --------------------------------------------------------------------------------------  LABS:  CAPILLARY BLOOD GLUCOSE                              10.0   7.65  )-----------( 303      ( 29 Nov 2018 11:32 )             33.0       11-29    140  |  94<L>  |  9<L>  ----------------------------<  95  4.6   |  31  |  0.8    Ca    8.8      29 Nov 2018 11:32    TPro  5.9<L>  /  Alb  3.5  /  TBili  0.3  /  DBili  x   /  AST  28  /  ALT  16  /  AlkPhos  633<H>  11-29      PT/INR - ( 29 Nov 2018 11:32 )   PT: 20.20 sec;   INR: 1.77 ratio         PTT - ( 29 Nov 2018 11:32 )  PTT:32.5 sec  CARDIAC MARKERS ( 29 Nov 2018 11:32 )  x     / <0.01 ng/mL / x     / x     / x              --------------------------------------------------------------------------------------    OTHER LABS    IMAGING RESULTS      CT Chest: pending  CT A/P: pending      Surgical Pathology Report (10.17.18 @ 05:00)    Surgical Pathology Report:   ACCESSION No:  [id number]  BODY SITE:  Stomach  ClINICAL DATA:  Adenocarcinoma with signet ring cells  HER2/CEP17          1.1Negative  HER2 Fish Results:  Number of tumor cells counted: 20+20  Number of observers: 2  Average HER2 copy number: 2.13  Average CEP17 copy number: 2.00  Ratio of average HER2/CEP17: 1.1  Sample adequate for analysis: Yes  The malignant cells show the following results:  CK7 - Positive  CK20 - Positive  Villin - Focally positive  CDX2 - Focally positive    Histologicfeatures (signet ring cells) and above immunoprofile  are compatible with a gastric primary.  Suggest clinicalcorrelation.    Immunohistochemical stains have been performed using antibodies  to the following mismatch repair proteins (see NOTE):  hMLH1 (clone [id number]) - Positive  hMSH2 (clone [id number]) - Positive  hMSH6 (clone [id number]) - Positive  PMS2 (clone [id number]) - Positive  HER2/[place] - Pending  Final Diagnosis  1. Gastric ulcer, biopsy:  - Invasive adenocarcinoma, poorly differentiated, with signet ring cell features, involving acutely inflamed gastric mucosa with ulceration.  - Giemsa stain fails to reveal Helicobacter pylori in this  material.    2. GE junction nodule, biopsy:  - Squamocolumnar junction showing squamous mucosa with mild  reactive epithelial changes and gastric cardia type mucosa with  mild to moderate chronic inflammation and focal foveolar  hyperplasia.  - No intestinal metaplasia seen.    Comment:  Above gastric tumor has signet ring cells similar to signet ring cells seen in mucin pools in left pleural  tumor ([id number]).   Gastric adenocarcinoma with signet ring cells appears histologically different from adenocarcinoma of  colon ([id number]) which has limited to no mucin production. Immunostains pending to further characterize gastric tumor and  addendum report will follow. 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 He was kept in the ICU with delayed extubation due to poor respiratory status and difficult intubation.  Prior to his recent hospitalization from 9/22 - 10/29, he had chronic constipation and abdominal pain.   He presents to the ED today from rehab due to increasing shortness of breath for the last few days, with abdominal cramping, increased leg swelling and pain, and concern about his ostomy care. He is baseline on 4L nasal cannula, with increasing SOB in the last few days without cough. He also has gradually progressive abdominal distension and cramping which he states is similar in nature but increased severity of cramping x2 weeks he has had since the loop colostomy, which itself was different from his chronic abdominal cramps. He has continued to have gas output into the ostomy and stool, which he does not notice at a decreased volume from postop. No records of output were sent from rehab. He denies any rectal discharge.  He has weeks of bloating and states his infrequent belching helps relieve the cramping. He complains of poor ostomy care at the rehab with skin irritation.   He complains of leg swelling that has worsened in the last week, with pain.    He denies any fevers, chills, nausea, vomiting, chest pain, or difficulty tolerating PO intake. He has a poor appetite, which has been stable.  He has not had chemotherapy, and is DNR/ DNI.   Bedside ultrasound was performed by the ED providers which did not find large pleural fluid collections.         PAST MEDICAL & SURGICAL HISTORY:  Pleural effusions (initial no malignant cells on path, + atypical cells)   COPD  CHF (congestive heart failure) (G1DD, Ef 50-55% on 9/27 echo)  Insomnia  BPH (benign prostatic hyperplasia)  Depression  Bipolar 1 disorder  HTN (hypertension)  hld  History of thoracentesis  DVT- chronic in right femoral vein, left common femoral, femoral, and gastrocnemius   s/p transverse loop colostomy  s/p L VATS, pleural biopsy, pleural implant biopsy, talc pleurodesis    Home Meds: Home Medications:  acetaminophen 325 mg oral tablet: 2 tab(s) orally every 6 hours, As needed, Mild Pain (1 - 3) (29 Oct 2018 10:53)  Ambien 10 mg oral tablet: 1 tab(s) orally once a day (at bedtime) (21 Sep 2018 21:50)  apixaban 5 mg oral tablet: 1 tab(s) orally every 12 hours (21 Sep 2018 21:50)  Cardizem 60 mg oral tablet: 1 tab(s) orally every 8 hours (21 Sep 2018 21:50)  clotrimazole 1% topical cream: 1 application topically  (21 Sep 2018 21:50)  DULoxetine 60 mg oral delayed release capsule: 1 cap(s) orally once a day (21 Sep 2018 21:50)  furosemide 20 mg oral tablet: 1 tab(s) orally once a day (29 Oct 2018 10:53)  isosorbide mononitrate 20 mg oral tablet: 1 tab(s) orally once a day (21 Sep 2018 21:50)  latanoprost 0.005% ophthalmic solution: 1 drop(s) to each affected eye once a day (in the evening) (21 Sep 2018 21:50)  Multiple Vitamins oral capsule: 1 cap(s) orally once a day (21 Sep 2018 21:50)  oxyCODONE 10 mg oral tablet: 1 tab(s) orally every 6 hours, As needed, Severe Pain (7 - 10) (29 Oct 2018 10:53)  oxyCODONE 5 mg oral tablet: 1 tab(s) orally every 6 hours, As needed, Moderate Pain (4 - 6) (29 Oct 2018 10:53)  pantoprazole 40 mg oral delayed release tablet: 1 tab(s) orally once a day (before a meal) (29 Oct 2018 10:53)  propranolol 20 mg oral tablet: 1 tab(s) orally 2 times a day (21 Sep 2018 21:50)  tamsulosin 0.4 mg oral capsule: 1 cap(s) orally once a day (21 Sep 2018 21:50)  traZODone 50 mg oral tablet: 1 tab(s) orally once a day (at bedtime) (21 Sep 2018 21:50)    Allergies:  No Known Allergies    Soc: former smoker  Advanced Directives: DNR/DNI confirmed with patient    ROS:    REVIEW OF SYSTEMS    [x ] A ten-point review of systems was otherwise negative except as noted- speaking in short sentences, poor historian, incomplete records from outside facility  [ ] Due to altered mental status/intubation, subjective information were not able to be obtained from the patient. History was obtained, to the extent possible, from review of the chart and collateral sources of information.      CURRENT MEDICATIONS:   --------------------------------------------------------------------------------------  Neurologic Medications  morphine  - Injectable 6 milliGRAM(s) IntraMuscular Once    Respiratory Medications    Cardiovascular Medications    Gastrointestinal Medications    Genitourinary Medications    Hematologic/Oncologic Medications    Antimicrobial/Immunologic Medications    Endocrine/Metabolic Medications    Topical/Other Medications  iohexol 300 mG (iodine)/mL Oral Solution 30 milliLiter(s) Oral once    --------------------------------------------------------------------------------------    VITAL SIGNS, INS/OUTS (last 24 hours):  --------------------------------------------------------------------------------------  ICU Vital Signs Last 24 Hrs  T(C): 36.2 (29 Nov 2018 10:42), Max: 36.2 (29 Nov 2018 10:42)  T(F): 97.2 (29 Nov 2018 10:42), Max: 97.2 (29 Nov 2018 10:42)  HR: 98 (29 Nov 2018 10:42) (98 - 98)  BP: 138/74 (29 Nov 2018 10:42) (138/74 - 138/74)  BP(mean): --  ABP: --  ABP(mean): --  RR: 19 (29 Nov 2018 10:42) (19 - 19)  SpO2: 99% (29 Nov 2018 10:42) (99% - 99%)    I&O's Summary    --------------------------------------------------------------------------------------  PHYSICAL EXAM    General:  speaking in few word sentences, dyspneic, on nasal cannula sitting upright in bed. Alert, cooperative  HEENT: NCAT, JOHN, left eye medially deviated, Trachea ML  Cardiac: RRR S1, S2, no Murmurs, rubs or gallops  Respiratory: increased work of breathing, tachypneic, crackles up to midlungs  Abdomen: Soft, distended and unable to lay flat for exam due to respiratory status, mild tenderness to palpation - "it starts my cramping", + tympanitic,  no reboudn or guarding. Mild erythema by ostomy site. Ostomy in right mid abdomen protruding, + gas and minimal stool in bag. + prolene sutures in place, no noted purulence or induration, + plastic piece at superior and inferior edge. +bowel sounds  Musculoskeletal:  edematous BL LE with blanching mild erythema bilateral lower extremities, no noted skin ulceration   Neuro: grossly intact C[place]RDE[place]S  Skin: Warm/dry, no jaundice  Incision/wound: healing well, dressings in place, clean, dry and intact    LABS  --------------------------------------------------------------------------------------  LABS:  CAPILLARY BLOOD GLUCOSE                              10.0   7.65  )-----------( 303      ( 29 Nov 2018 11:32 )             33.0       11-29    140  |  94<L>  |  9<L>  ----------------------------<  95  4.6   |  31  |  0.8    Ca    8.8      29 Nov 2018 11:32    TPro  5.9<L>  /  Alb  3.5  /  TBili  0.3  /  DBili  x   /  AST  28  /  ALT  16  /  AlkPhos  633<H>  11-29      PT/INR - ( 29 Nov 2018 11:32 )   PT: 20.20 sec;   INR: 1.77 ratio         PTT - ( 29 Nov 2018 11:32 )  PTT:32.5 sec  CARDIAC MARKERS ( 29 Nov 2018 11:32 )  x     / <0.01 ng/mL / x     / x     / x              --------------------------------------------------------------------------------------    OTHER LABS    IMAGING RESULTS      CT Chest: pending  CT A/P: pending      Pathology: Histologic features (signet ring cells) and above immunoprofile  are compatible with a gastric primary.  Suggest clinicalcorrelation.  Final Diagnosis  1. Gastric ulcer, biopsy:  - Invasive adenocarcinoma, poorly differentiated, with signet ring cell features, involving acutely inflamed gastric mucosa with ulceration.  - Giemsa stain fails to reveal Helicobacter pylori in this  material.    2. GE junction nodule, biopsy:  - Squamocolumnar junction showing squamous mucosa with mild reactive epithelial changes and gastric cardia type mucosa with mild to moderate chronic inflammation and focal foveolar hyperplasia.  - No intestinal metaplasia seen.  Comment:  Above gastric tumor has signet ring cells similar to signet ring cells seen in mucin pools in left pleural  tumor ([id number]).   Gastric adenocarcinoma with signet ring cells appears histologically different from adenocarcinoma of  colon ([id number]) which has limited to no mucin production. Immunostains pending to further characterize gastric tumor and  addendum report will follow.      10/17 PATH: Final Diagnosis  Sigmoid colon mass, biopsy from colonoscopy:   - Fragments of invasive adenocarcinoma, moderately differentiated    Comment:  The sigmoid colon adenocarcinoma with limited to no mucin production is focally similar to non-mucin producing adenocarcinoma component in left pleural tumor ([id number]). The sigmoid colon tumor (without signet ring cells) appears to be histologically different from the  minute gastric tumor biopsy  ([id number]).   Immunostains pending on sigmoid colon tumor, primary versus metastatic, and addendum report will follow.    CT 10/9/2018:   < from: CT Abdomen and Pelvis w/ Oral Cont and w/ IV Cont (10.08.18 @ 16:33) >  IMPRESSION:   1.  Bilateral pleural effusions (left greater than right). Loculated on the left. There is a pigtail catheter in the right lung base with a small   pneumothorax.  2.  Mild interstitial pulmonary edema.  3. Short segment ascending colon wall circumferential thickening - likely mass, which is somewhat limited secondary to presence of fecal material. Not well appreciated in the previous examination due to lack of contrast opacification. Direct visualization of right colon is recommended.    < end of copied text >

## 2018-11-29 NOTE — ED PROVIDER NOTE - MEDICAL DECISION MAKING DETAILS
pt with worsening pulm nodules, bone lesions and new right lower empyema, UTI, started on abx.   ct of the abd showed no obstruction.  ct surg and gen surg both on board

## 2018-11-29 NOTE — ED ADULT NURSE NOTE - NSIMPLEMENTINTERV_GEN_ALL_ED
Implemented All Fall with Harm Risk Interventions:  [place] to call system. Call bell, personal items and telephone within reach. Instruct patient to call for assistance. Room bathroom lighting operational. Non-slip footwear when patient is off stretcher. Physically safe environment: no spills, clutter or unnecessary equipment. Stretcher in lowest position, wheels locked, appropriate side rails in place. Provide visual cue, wrist band, yellow gown, etc. Monitor gait and stability. Monitor for mental status changes and reorient to person, place, and time. Review medications for side effects contributing to fall risk. Reinforce activity limits and safety measures with patient and family. Provide visual clues: red socks.

## 2018-11-29 NOTE — ED PROVIDER NOTE - PHYSICAL EXAMINATION
VITAL SIGNS: I have reviewed nursing notes and confirm.  CONSTITUTIONAL: Well-developed; well-nourished; in no acute distress.  SKIN: Skin exam is warm and dry, no acute rash.  HEAD: Normocephalic; atraumatic.  EYES: PERRL, EOM intact; conjunctiva and sclera clear.  ENT: No nasal discharge; airway clear. TMs clear.  NECK: Supple; non tender.  CARD: S1, S2 normal; no murmurs, gallops, or rubs. Regular rate and rhythm.  RESP: left rales with dec bs, no ronchi or wheezing  ABD: Normal bowel sounds; soft; non-distended; non-tender;  EXT: Normal ROM. No clubbing, cyanosis.  +3 pitting edema b/l LE  NEURO: aox3, cn2-12 grossly normal,   PSYCH: Cooperative, appropriate.

## 2018-11-30 LAB
ALBUMIN SERPL ELPH-MCNC: 3.5 G/DL — SIGNIFICANT CHANGE UP (ref 3.5–5.2)
ALP SERPL-CCNC: 726 U/L — HIGH (ref 30–115)
ALT FLD-CCNC: 15 U/L — SIGNIFICANT CHANGE UP (ref 0–41)
ANION GAP SERPL CALC-SCNC: 14 MMOL/L — SIGNIFICANT CHANGE UP (ref 7–14)
APTT BLD: 39.3 SEC — HIGH (ref 27–39.2)
AST SERPL-CCNC: 15 U/L — SIGNIFICANT CHANGE UP (ref 0–41)
BASOPHILS # BLD AUTO: 0.04 K/UL — SIGNIFICANT CHANGE UP (ref 0–0.2)
BASOPHILS NFR BLD AUTO: 0.5 % — SIGNIFICANT CHANGE UP (ref 0–1)
BILIRUB SERPL-MCNC: 0.4 MG/DL — SIGNIFICANT CHANGE UP (ref 0.2–1.2)
BLD GP AB SCN SERPL QL: SIGNIFICANT CHANGE UP
BUN SERPL-MCNC: 9 MG/DL — LOW (ref 10–20)
CALCIUM SERPL-MCNC: 9 MG/DL — SIGNIFICANT CHANGE UP (ref 8.5–10.1)
CHLORIDE SERPL-SCNC: 96 MMOL/L — LOW (ref 98–110)
CK MB CFR SERPL CALC: 2.5 NG/ML — SIGNIFICANT CHANGE UP (ref 0.6–6.3)
CK SERPL-CCNC: 67 U/L — SIGNIFICANT CHANGE UP (ref 0–225)
CO2 SERPL-SCNC: 31 MMOL/L — SIGNIFICANT CHANGE UP (ref 17–32)
CREAT SERPL-MCNC: 0.8 MG/DL — SIGNIFICANT CHANGE UP (ref 0.7–1.5)
CULTURE RESULTS: SIGNIFICANT CHANGE UP
EOSINOPHIL # BLD AUTO: 0.18 K/UL — SIGNIFICANT CHANGE UP (ref 0–0.7)
EOSINOPHIL NFR BLD AUTO: 2.1 % — SIGNIFICANT CHANGE UP (ref 0–8)
GLUCOSE SERPL-MCNC: 113 MG/DL — HIGH (ref 70–99)
HCT VFR BLD CALC: 36.3 % — LOW (ref 42–52)
HGB BLD-MCNC: 11.2 G/DL — LOW (ref 14–18)
IMM GRANULOCYTES NFR BLD AUTO: 0.8 % — HIGH (ref 0.1–0.3)
INR BLD: 1.49 RATIO — HIGH (ref 0.65–1.3)
LYMPHOCYTES # BLD AUTO: 0.53 K/UL — LOW (ref 1.2–3.4)
LYMPHOCYTES # BLD AUTO: 6.1 % — LOW (ref 20.5–51.1)
MAGNESIUM SERPL-MCNC: 2 MG/DL — SIGNIFICANT CHANGE UP (ref 1.8–2.4)
MCHC RBC-ENTMCNC: 24.8 PG — LOW (ref 27–31)
MCHC RBC-ENTMCNC: 30.9 G/DL — LOW (ref 32–37)
MCV RBC AUTO: 80.5 FL — SIGNIFICANT CHANGE UP (ref 80–94)
MONOCYTES # BLD AUTO: 0.44 K/UL — SIGNIFICANT CHANGE UP (ref 0.1–0.6)
MONOCYTES NFR BLD AUTO: 5.1 % — SIGNIFICANT CHANGE UP (ref 1.7–9.3)
NEUTROPHILS # BLD AUTO: 7.4 K/UL — HIGH (ref 1.4–6.5)
NEUTROPHILS NFR BLD AUTO: 85.4 % — HIGH (ref 42.2–75.2)
NRBC # BLD: 0 /100 WBCS — SIGNIFICANT CHANGE UP (ref 0–0)
PHOSPHATE SERPL-MCNC: 4.5 MG/DL — SIGNIFICANT CHANGE UP (ref 2.1–4.9)
PLATELET # BLD AUTO: 343 K/UL — SIGNIFICANT CHANGE UP (ref 130–400)
POTASSIUM SERPL-MCNC: 4.1 MMOL/L — SIGNIFICANT CHANGE UP (ref 3.5–5)
POTASSIUM SERPL-SCNC: 4.1 MMOL/L — SIGNIFICANT CHANGE UP (ref 3.5–5)
PROT SERPL-MCNC: 6.1 G/DL — SIGNIFICANT CHANGE UP (ref 6–8)
PROTHROM AB SERPL-ACNC: 17.1 SEC — HIGH (ref 9.95–12.87)
RBC # BLD: 4.51 M/UL — LOW (ref 4.7–6.1)
RBC # FLD: 14.4 % — SIGNIFICANT CHANGE UP (ref 11.5–14.5)
SODIUM SERPL-SCNC: 141 MMOL/L — SIGNIFICANT CHANGE UP (ref 135–146)
SPECIMEN SOURCE: SIGNIFICANT CHANGE UP
TROPONIN T SERPL-MCNC: <0.01 NG/ML — SIGNIFICANT CHANGE UP
TYPE + AB SCN PNL BLD: SIGNIFICANT CHANGE UP
WBC # BLD: 8.66 K/UL — SIGNIFICANT CHANGE UP (ref 4.8–10.8)
WBC # FLD AUTO: 8.66 K/UL — SIGNIFICANT CHANGE UP (ref 4.8–10.8)

## 2018-11-30 RX ORDER — ONDANSETRON 8 MG/1
8 TABLET, FILM COATED ORAL EVERY 6 HOURS
Qty: 0 | Refills: 0 | Status: DISCONTINUED | OUTPATIENT
Start: 2018-11-30 | End: 2018-12-06

## 2018-11-30 RX ORDER — PROPRANOLOL HCL 160 MG
20 CAPSULE, EXTENDED RELEASE 24HR ORAL
Qty: 0 | Refills: 0 | Status: DISCONTINUED | OUTPATIENT
Start: 2018-11-30 | End: 2018-12-02

## 2018-11-30 RX ORDER — MORPHINE SULFATE 50 MG/1
6 CAPSULE, EXTENDED RELEASE ORAL ONCE
Qty: 0 | Refills: 0 | Status: DISCONTINUED | OUTPATIENT
Start: 2018-11-30 | End: 2018-11-30

## 2018-11-30 RX ORDER — MORPHINE SULFATE 50 MG/1
6 CAPSULE, EXTENDED RELEASE ORAL
Qty: 0 | Refills: 0 | Status: DISCONTINUED | OUTPATIENT
Start: 2018-11-30 | End: 2018-12-02

## 2018-11-30 RX ORDER — FENTANYL CITRATE 50 UG/ML
1 INJECTION INTRAVENOUS
Qty: 0 | Refills: 0 | Status: DISCONTINUED | OUTPATIENT
Start: 2018-11-30 | End: 2018-12-03

## 2018-11-30 RX ORDER — CHLORHEXIDINE GLUCONATE 213 G/1000ML
1 SOLUTION TOPICAL
Qty: 0 | Refills: 0 | Status: DISCONTINUED | OUTPATIENT
Start: 2018-11-30 | End: 2018-12-06

## 2018-11-30 RX ORDER — MORPHINE SULFATE 50 MG/1
4 CAPSULE, EXTENDED RELEASE ORAL
Qty: 0 | Refills: 0 | Status: DISCONTINUED | OUTPATIENT
Start: 2018-11-30 | End: 2018-11-30

## 2018-11-30 RX ORDER — SIMETHICONE 80 MG/1
80 TABLET, CHEWABLE ORAL EVERY 6 HOURS
Qty: 0 | Refills: 0 | Status: DISCONTINUED | OUTPATIENT
Start: 2018-11-30 | End: 2018-12-06

## 2018-11-30 RX ORDER — INFLUENZA VIRUS VACCINE 15; 15; 15; 15 UG/.5ML; UG/.5ML; UG/.5ML; UG/.5ML
0.5 SUSPENSION INTRAMUSCULAR ONCE
Qty: 0 | Refills: 0 | Status: DISCONTINUED | OUTPATIENT
Start: 2018-11-30 | End: 2018-12-06

## 2018-11-30 RX ORDER — FUROSEMIDE 40 MG
40 TABLET ORAL DAILY
Qty: 0 | Refills: 0 | Status: DISCONTINUED | OUTPATIENT
Start: 2018-11-30 | End: 2018-12-06

## 2018-11-30 RX ADMIN — FENTANYL CITRATE 1 PATCH: 50 INJECTION INTRAVENOUS at 21:21

## 2018-11-30 RX ADMIN — MORPHINE SULFATE 4 MILLIGRAM(S): 50 CAPSULE, EXTENDED RELEASE ORAL at 09:28

## 2018-11-30 RX ADMIN — Medication 20 MILLIGRAM(S): at 18:04

## 2018-11-30 RX ADMIN — MORPHINE SULFATE 6 MILLIGRAM(S): 50 CAPSULE, EXTENDED RELEASE ORAL at 21:50

## 2018-11-30 RX ADMIN — Medication 20 MILLIGRAM(S): at 05:51

## 2018-11-30 RX ADMIN — MORPHINE SULFATE 4 MILLIGRAM(S): 50 CAPSULE, EXTENDED RELEASE ORAL at 08:58

## 2018-11-30 RX ADMIN — MORPHINE SULFATE 4 MILLIGRAM(S): 50 CAPSULE, EXTENDED RELEASE ORAL at 12:59

## 2018-11-30 RX ADMIN — MORPHINE SULFATE 6 MILLIGRAM(S): 50 CAPSULE, EXTENDED RELEASE ORAL at 17:00

## 2018-11-30 RX ADMIN — LATANOPROST 1 DROP(S): 0.05 SOLUTION/ DROPS OPHTHALMIC; TOPICAL at 21:22

## 2018-11-30 RX ADMIN — MORPHINE SULFATE 4 MILLIGRAM(S): 50 CAPSULE, EXTENDED RELEASE ORAL at 13:31

## 2018-11-30 RX ADMIN — DULOXETINE HYDROCHLORIDE 60 MILLIGRAM(S): 30 CAPSULE, DELAYED RELEASE ORAL at 12:57

## 2018-11-30 RX ADMIN — MORPHINE SULFATE 6 MILLIGRAM(S): 50 CAPSULE, EXTENDED RELEASE ORAL at 16:35

## 2018-11-30 RX ADMIN — MORPHINE SULFATE 4 MILLIGRAM(S): 50 CAPSULE, EXTENDED RELEASE ORAL at 04:56

## 2018-11-30 RX ADMIN — CHLORHEXIDINE GLUCONATE 1 APPLICATION(S): 213 SOLUTION TOPICAL at 05:52

## 2018-11-30 RX ADMIN — TAMSULOSIN HYDROCHLORIDE 0.4 MILLIGRAM(S): 0.4 CAPSULE ORAL at 21:21

## 2018-11-30 RX ADMIN — PANTOPRAZOLE SODIUM 40 MILLIGRAM(S): 20 TABLET, DELAYED RELEASE ORAL at 08:42

## 2018-11-30 RX ADMIN — Medication 1 TABLET(S): at 12:57

## 2018-11-30 RX ADMIN — Medication 40 MILLIGRAM(S): at 05:52

## 2018-11-30 RX ADMIN — MORPHINE SULFATE 4 MILLIGRAM(S): 50 CAPSULE, EXTENDED RELEASE ORAL at 00:34

## 2018-11-30 RX ADMIN — Medication 50 MILLIGRAM(S): at 21:21

## 2018-11-30 RX ADMIN — MORPHINE SULFATE 6 MILLIGRAM(S): 50 CAPSULE, EXTENDED RELEASE ORAL at 21:20

## 2018-11-30 RX ADMIN — ZOLPIDEM TARTRATE 5 MILLIGRAM(S): 10 TABLET ORAL at 00:36

## 2018-11-30 NOTE — PATIENT PROFILE ADULT - VISION (WITH CORRECTIVE LENSES IF THE PATIENT USUALLY WEARS THEM):
Cannot see out of left eye/Partially impaired: cannot see medication labels or newsprint, but can see obstacles in path, and the surrounding layout; can count fingers at arm's length

## 2018-11-30 NOTE — PATIENT PROFILE ADULT - NSPROREFERSVCHOMEBH_GEN_A_NUR
patient received, alert and oriented x3, complaining of right side pain 4/10 pain rate scale and left knee pain 9/10 pain rate scale. patient stated she fell 20 steps up coming down from stairs at home, she stated she hit her back and right side, and knee. patient unsure if she hit head and belly, patient is 7 months pregnant. patient is complaining of nausea but no vomiting, no dizziness, no vaginal bleeding.
no

## 2018-11-30 NOTE — PROGRESS NOTE ADULT - ASSESSMENT
Assessment:  58y Male patient admitted S/P gastric adenocarcinoma signet ring and colon adenocarcinoma, pleural mets, s/p VATS and talc pleurodesis, transverse loop colostomy    Plan:  -home meds  -pain control  -f/u oncology consult    Date/Time: 11-30-18 @ 05:52 Assessment:  58y Male patient admitted S/P gastric adenocarcinoma signet ring and colon adenocarcinoma, pleural mets, s/p VATS and talc pleurodesis, transverse loop colostomy    Plan:  -home meds  -pain control  -f/u oncology consult  -If oncology doesnot want to start any treatment, discuss treatment care with palliative   -Please kindly get a 2D- Echo  Date/Time: 11-30-18 @ 05:52

## 2018-11-30 NOTE — CONSULT NOTE ADULT - SUBJECTIVE AND OBJECTIVE BOX
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 Patient had complicated hospital course with extended ICU stay during that admission.    Currently, patient presented from rehab for worsening SOB over past few days. Also reports worsening b/l LE edema and abdominal pain. Is compliant with O2 @ 4L but still SOB worsening. Reports continued ostomy output, same as before. Abdominal pain and SOB are long standing, just worsening recently. Denies any fevers, chills, nausea, vomiting, chest pain, reduced PO intake, dysuria, or other complaint or symptom. (29 Nov 2018 21:59)      PAST MEDICAL & SURGICAL HISTORY:  Pleural effusion  CHF (congestive heart failure)  Insomnia  BPH (benign prostatic hyperplasia)  Depression  Bipolar 1 disorder  HTN (hypertension)  Colostomy present  History of thoracentesis    Today,  Discussed with Dr. Gonzalez    PHYSICAL EXAM:      Constitutional:    Eyes:    ENMT:    Neck:    Breasts:    Back:    Respiratory:    Cardiovascular:    Gastrointestinal:    Genitourinary:    Rectal:    Extremities:    Vascular:    Neurological:    Skin:    Lymph Nodes:    Musculoskeletal:    Psychiatric:        T(C): 36.9, Max: 37.3 (16:33)  HR: 123 (98 - 125)  BP: 128/66 (123/80 - 138/74)  RR: 20 (18 - 20)  SpO2: 94% (94% - 99%)      LABS/STUDIES:  11-30    141  |  96<L>  |  9<L>  ----------------------------<  113<H>  4.1   |  31  |  0.8    Ca    9.0      30 Nov 2018 06:37  Phos  4.5     11-30  Mg     2.0     11-30    TPro  6.1  /  Alb  3.5  /  TBili  0.4  /  DBili  x   /  AST  15  /  ALT  15  /  AlkPhos  726<H>  11-30                         11.2   8.66  )-----------( 343      ( 30 Nov 2018 06:37 )             36.3     MEDICATIONS  (STANDING):  chlorhexidine 4% Liquid 1 Application(s) Topical <User Schedule>  diltiazem    Tablet 60 milliGRAM(s) Oral every 8 hours  DULoxetine 60 milliGRAM(s) Oral daily  furosemide   Injectable 40 milliGRAM(s) IV Push daily  influenza   Vaccine 0.5 milliLiter(s) IntraMuscular once  isosorbide    mononitrate Tablet (ISMO) 20 milliGRAM(s) Oral two times a day  latanoprost 0.005% Ophthalmic Solution 1 Drop(s) Both EYES at bedtime  multivitamin 1 Tablet(s) Oral daily  pantoprazole    Tablet 40 milliGRAM(s) Oral before breakfast  propranolol 20 milliGRAM(s) Oral two times a day  tamsulosin 0.4 milliGRAM(s) Oral at bedtime  traZODone 50 milliGRAM(s) Oral at bedtime    MEDICATIONS  (PRN):  acetaminophen   Tablet .. 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 Patient had complicated hospital course with extended ICU stay during that admission.    Currently, patient presented from rehab for worsening SOB over past few days. Also reports worsening b/l LE edema and abdominal pain. Is compliant with O2 @ 4L but still SOB worsening. Reports continued ostomy output, same as before. Abdominal pain and SOB are long standing, just worsening recently. Denies any fevers, chills, nausea, vomiting, chest pain, reduced PO intake, dysuria, or other complaint or symptom. (29 Nov 2018 21:59)      PAST MEDICAL & SURGICAL HISTORY:  Pleural effusion  CHF (congestive heart failure)  Insomnia  BPH (benign prostatic hyperplasia)  Depression  Bipolar 1 disorder  HTN (hypertension)  Colostomy present  History of thoracentesis    Today,  Discussed with Dr. Gonzalez    PHYSICAL EXAM:      Constitutional:    Eyes:    ENMT:    Neck:    Breasts:    Back:    Respiratory:    Cardiovascular:    Gastrointestinal:    Genitourinary:    Rectal:    Extremities:    Vascular:    Neurological:    Skin:    Lymph Nodes:    Musculoskeletal:    Psychiatric:        T(C): 36.9, Max: 37.3 (16:33)  HR: 123 (98 - 125)  BP: 128/66 (123/80 - 138/74)  RR: 20 (18 - 20)  SpO2: 94% (94% - 99%)      LABS/STUDIES:  11-30    141  |  96<L>  |  9<L>  ----------------------------<  113<H>  4.1   |  31  |  0.8    Ca    9.0      30 Nov 2018 06:37  Phos  4.5     11-30  Mg     2.0     11-30    TPro  6.1  /  Alb  3.5  /  TBili  0.4  /  DBili  x   /  AST  15  /  ALT  15  /  AlkPhos  726<H>  11-30                         11.2   8.66  )-----------( 343      ( 30 Nov 2018 06:37 )             36.3     Abd CT:    IMPRESSION:    No acute central pulmonary embolism.    New bilateral pulmonary nodules measuring up to 12 mm.    Increasing size of right pleural effusion, moderate to large, with   pleural enhancement at the right lung base. Empyema is not excluded.    Increasing right base opacity. Multifocal groundglass opacities.   Increasing peribronchial wall thickening.    Increase in size of multiloculated pericardial effusion versus loculated   fluid collections within the mediastinum extending into the superior   mediastinum.    Right-sided diverting colostomy, new. Mild underlying mesenteric fat   stranding is likely represent postsurgical change.     Bones are minimally heterogeneous in density. Sclerotic lesions are not   excluded. Further evaluation with nuclear medicine bone scan is   recommended.    Findings are concerning with progression of neoplastic disease    CT angio - chest:     MPRESSION:    No acute central pulmonary embolism.    New bilateral pulmonary nodules measuring up to 12 mm.    Increasing size of right pleural effusion, moderate to large, with   pleural enhancement at the right lung base. Empyema is not excluded.    Increasing right base opacity. Multifocal groundglass opacities.   Increasing peribronchial wall thickening.    Increase in size of multiloculated pericardial effusion versus loculated   fluid collections within the mediastinum extending into the superior   mediastinum.    Right-sided diverting colostomy, new. Mild underlying mesenteric fat   stranding is likely represent postsurgical change.     Bones are minimally heterogeneous in density. Sclerotic lesions are not   excluded. Further evaluation with nuclear medicine bone scan is   recommended.    Findings are concerning with progression of neoplastic disease      MEDICATIONS  (STANDING):  chlorhexidine 4% Liquid 1 Application(s) Topical <User Schedule>  diltiazem    Tablet 60 milliGRAM(s) Oral every 8 hours  DULoxetine 60 milliGRAM(s) Oral daily  furosemide   Injectable 40 milliGRAM(s) IV Push daily  influenza   Vaccine 0.5 milliLiter(s) IntraMuscular once  isosorbide    mononitrate Tablet (ISMO) 20 milliGRAM(s) Oral two times a day  latanoprost 0.005% Ophthalmic Solution 1 Drop(s) Both EYES at bedtime  multivitamin 1 Tablet(s) Oral daily  pantoprazole    Tablet 40 milliGRAM(s) Oral before breakfast  propranolol 20 milliGRAM(s) Oral two times a day  tamsulosin 0.4 milliGRAM(s) Oral at bedtime  traZODone 50 milliGRAM(s) Oral at bedtime    MEDICATIONS  (PRN):  acetaminophen   Tablet .. 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 Patient had complicated hospital course with extended ICU stay during that admission.    Currently, patient presented from rehab for worsening SOB over past few days. Also reports worsening b/l LE edema and abdominal pain. Is compliant with O2 @ 4L but still SOB worsening. Reports continued ostomy output, same as before. Abdominal pain and SOB are long standing, just worsening recently. Denies any fevers, chills, nausea, vomiting, chest pain, reduced PO intake, dysuria, or other complaint or symptom. (29 Nov 2018 21:59)      PAST MEDICAL & SURGICAL HISTORY:  Pleural effusion  CHF (congestive heart failure)  Insomnia  BPH (benign prostatic hyperplasia)  Depression  Bipolar 1 disorder  HTN (hypertension)  Colostomy present  History of thoracentesis    Today,  Discussed with Dr. Gonzalez  ++pain 8 - that does not respond as much as wanted to 4mg MSO4 and states that gas contributes to cramping/pain; states BiPap contributes to troy/cramping; discusses extensive mental health illness and has good insight; saw him as Dr. Motta and Dr. Jaramillo discussed prognosis (poor - gastric cancer mets and colon cancer) and limited disease directed options; palliative/hospice options introduced    PHYSICAL EXAM:    Constitutional: Chronically ill; grimaces    Eyes: sclera white    Respiratory: 22 at times SOB but able to self soothe O2 nc    Cardiovascular: HRR    Gastrointestinal: distended slightly firm (limited exam since in chair) ostomy functioning for flatus and soft-liquid brown stool    Genitourinary: voidin gaq    Neurological: no focal deficits; a & 0 x 4    T(C): 36.9, Max: 37.3 (16:33)  HR: 123 (98 - 125)  BP: 128/66 (123/80 - 138/74)  RR: 20 (18 - 20)  SpO2: 94% (94% - 99%)      LABS/STUDIES:  11-30    141  |  96<L>  |  9<L>  ----------------------------<  113<H>  4.1   |  31  |  0.8    Ca    9.0      30 Nov 2018 06:37  Phos  4.5     11-30  Mg     2.0     11-30    TPro  6.1  /  Alb  3.5  /  TBili  0.4  /  DBili  x   /  AST  15  /  ALT  15  /  AlkPhos  726<H>  11-30                         11.2   8.66  )-----------( 343      ( 30 Nov 2018 06:37 )             36.3     Abd CT:    IMPRESSION:    No acute central pulmonary embolism.    New bilateral pulmonary nodules measuring up to 12 mm.    Increasing size of right pleural effusion, moderate to large, with   pleural enhancement at the right lung base. Empyema is not excluded.    Increasing right base opacity. Multifocal groundglass opacities.   Increasing peribronchial wall thickening.    Increase in size of multiloculated pericardial effusion versus loculated   fluid collections within the mediastinum extending into the superior   mediastinum.    Right-sided diverting colostomy, new. Mild underlying mesenteric fat   stranding is likely represent postsurgical change.     Bones are minimally heterogeneous in density. Sclerotic lesions are not   excluded. Further evaluation with nuclear medicine bone scan is   recommended.    Findings are concerning with progression of neoplastic disease    CT angio - chest:     MPRESSION:    No acute central pulmonary embolism.    New bilateral pulmonary nodules measuring up to 12 mm.    Increasing size of right pleural effusion, moderate to large, with   pleural enhancement at the right lung base. Empyema is not excluded.    Increasing right base opacity. Multifocal groundglass opacities.   Increasing peribronchial wall thickening.    Increase in size of multiloculated pericardial effusion versus loculated   fluid collections within the mediastinum extending into the superior   mediastinum.    Right-sided diverting colostomy, new. Mild underlying mesenteric fat   stranding is likely represent postsurgical change.     Bones are minimally heterogeneous in density. Sclerotic lesions are not   excluded. Further evaluation with nuclear medicine bone scan is   recommended.    Findings are concerning with progression of neoplastic disease      MEDICATIONS  (STANDING):  chlorhexidine 4% Liquid 1 Application(s) Topical <User Schedule>  diltiazem    Tablet 60 milliGRAM(s) Oral every 8 hours  DULoxetine 60 milliGRAM(s) Oral daily  furosemide   Injectable 40 milliGRAM(s) IV Push daily  influenza   Vaccine 0.5 milliLiter(s) IntraMuscular once  isosorbide    mononitrate Tablet (ISMO) 20 milliGRAM(s) Oral two times a day  latanoprost 0.005% Ophthalmic Solution 1 Drop(s) Both EYES at bedtime  multivitamin 1 Tablet(s) Oral daily  pantoprazole    Tablet 40 milliGRAM(s) Oral before breakfast  propranolol 20 milliGRAM(s) Oral two times a day  tamsulosin 0.4 milliGRAM(s) Oral at bedtime  traZODone 50 milliGRAM(s) Oral at bedtime    MEDICATIONS  (PRN):  acetaminophen   Tablet .. 650 milliGRAM(s) Oral every 6 hours PRN Temp greater or equal to 38.5C (101.3F), Mild Pain (1 - 3)  morphine  - Injectable 4 milliGRAM(s) IV Push every 4 hours PRN Severe Pain (7 - 10) 3 doses/24H  zolpidem 5 milliGRAM(s) Oral at bedtime PRN Insomnia    iStop:  Reference #: [id number] percocet over last month; zolpidem since May 2018    PPS (Palliative Performance Scale):

## 2018-11-30 NOTE — CONSULT NOTE ADULT - CONSULT REASON
widespread mets cancer
abdominal distension, SOB post op
metastatic gastric cancer, also colon cancer s/p resection w/ colostomy
sob s/p VATS, pleural biopsy, pleural implant biopsy, talc pleurodesis

## 2018-11-30 NOTE — PROGRESS NOTE ADULT - SUBJECTIVE AND OBJECTIVE BOX
Progress Note: General Surgery  Patient: AMRIK MADRID , 58y (1960)Male   MRN: [id number]  Location: [address]  Visit: 18 Inpatient  Date: 18 @ 05:52  Hospital Day: 2  Post-op Day:     Procedure/Diagnosis: gastric adenocarcinoma signet ring and colon adenocarcinoma, pleural mets, s/p VATS and talc pleurodesis, transverse loop colostomy  Events over 24h: No acute overnight events, patient complaining of abdominal pain and pain around colostomy.  Denies fever, chills, chest pain, shortness of breath, nausea, vomiting.    Vitals: T(F): 98.5 (18 @ 05:36), Max: 99.2 (18 @ 16:33)  HR: 123 (18 @ 05:36)  BP: 128/66 (18 @ 05:36) (123/80 - 138/74)  RR: 20 (18 @ 05:36)  SpO2: 94% (18 @ 20:22)    In:   18 @ 07:01  -  18 @ 05:52  --------------------------------------------------------  IN: 0 mL      Out:   18 @ 07:01  -  18 @ 05:52  --------------------------------------------------------  OUT:    Voided: 1660 mL  Total OUT: 1660 mL        Net:   18 @ 07:01  -  18 @ 05:52  --------------------------------------------------------  NET: -1660 mL      Diet: Diet, DASH/TLC:   Sodium & Cholesterol Restricted (18 @ 00:20)    IV Fluids: yes no , Type: multivitamin 1 Tablet(s) Oral daily    Physical Examination:  General Appearance: NAD, alert and cooperative  HEENT: NCAT, WNL  Heart: S1 and S2. No murmurs. Rhythm is regular  Lungs: Clear to auscultation BL  Abdomen:  Positive bowel sounds. Soft, nondistended, tenderness around colostomy    Medications: [Standing]  chlorhexidine 4% Liquid 1 Application(s) Topical <User Schedule>  diltiazem    Tablet 60 milliGRAM(s) Oral every 8 hours  DULoxetine 60 milliGRAM(s) Oral daily  furosemide   Injectable 40 milliGRAM(s) IV Push daily  influenza   Vaccine 0.5 milliLiter(s) IntraMuscular once  isosorbide    mononitrate Tablet (ISMO) 20 milliGRAM(s) Oral two times a day  latanoprost 0.005% Ophthalmic Solution 1 Drop(s) Both EYES at bedtime  multivitamin 1 Tablet(s) Oral daily  pantoprazole    Tablet 40 milliGRAM(s) Oral before breakfast  propranolol 20 milliGRAM(s) Oral two times a day  tamsulosin 0.4 milliGRAM(s) Oral at bedtime  traZODone 50 milliGRAM(s) Oral at bedtime    DVT Prophylaxis:   GI Prophylaxis: pantoprazole    Tablet 40 milliGRAM(s) Oral before breakfast    Antibiotics:   Anticoagulation:   Medications:[PRN]  acetaminophen   Tablet .. 650 milliGRAM(s) Oral every 6 hours PRN  morphine  - Injectable 4 milliGRAM(s) IV Push every 4 hours PRN  zolpidem 5 milliGRAM(s) Oral at bedtime PRN    Labs:                        10.0   7.65  )-----------( 303      ( 2018 11:32 )             33.0     11    140  |  94<L>  |  9<L>  ----------------------------<  95  4.6   |  31  |  0.8    Ca    8.8      2018 11:32    TPro  5.9<L>  /  Alb  3.5  /  TBili  0.3  /  DBili  x   /  AST  28  /  ALT  16  /  AlkPhos  633<H>  11-29    LIVER FUNCTIONS - ( 2018 11:32 )  Alb: 3.5 g/dL / Pro: 5.9 g/dL / ALK PHOS: 633 U/L / ALT: 16 U/L / AST: 28 U/L / GGT: x           PT/INR - ( 2018 11:32 )   PT: 20.20 sec;   INR: 1.77 ratio         PTT - ( 2018 11:32 )  PTT:32.5 sec    CARDIAC MARKERS ( 2018 11:32 )  x     / <0.01 ng/mL / x     / x     / x          Urine/Micro:    Urinalysis Basic - ( 2018 11:32 )    Color: Yellow / Appearance: Cloudy / S.010 / pH: x  Gluc: x / Ketone: Negative  / Bili: Negative / Urobili: 0.2 mg/dL   Blood: x / Protein: Negative mg/dL / Nitrite: Positive   Leuk Esterase: Large / RBC: x / WBC 26-50 /HPF   Sq Epi: x / Non Sq Epi: Occasional /HPF / Bacteria: Few /HPF

## 2018-11-30 NOTE — CONSULT NOTE ADULT - SUBJECTIVE AND OBJECTIVE BOX
HPI:  57 yo M with PMH of HTN, CHF, DVT on Eliquis, BPH, depression, bipolar, recent prolonged hospitalization 9/21-10-29 for sob 2/2 recurrent pleural effusion and abd distention/constipation, s/p multiple procedures ( s/p loop colostomy, L VATS / pleural bx / pleural implant bx / talc pleurodesis, egd/colonoscopy) and discovered to have metastatic adenoCA of GI origin --  presents back from snf for worsening SOB, LE edema, and abd pain.          PMH/PSH  Pleural effusion  CHF (congestive heart failure)  Insomnia  BPH (benign prostatic hyperplasia)  Depression  Bipolar 1 disorder  HTN (hypertension)  Colostomy present  History of thoracentesis      ACTIVE MEDS:  chlorhexidine 4% Liquid 1 Application(s) Topical <User Schedule>  diltiazem    Tablet 60 milliGRAM(s) Oral every 8 hours  DULoxetine 60 milliGRAM(s) Oral daily  furosemide   Injectable 40 milliGRAM(s) IV Push daily  isosorbide    mononitrate Tablet (ISMO) 20 milliGRAM(s) Oral two times a day  latanoprost 0.005% Ophthalmic Solution 1 Drop(s) Both EYES at bedtime  multivitamin 1 Tablet(s) Oral daily  pantoprazole    Tablet 40 milliGRAM(s) Oral before breakfast  propranolol 20 milliGRAM(s) Oral two times a day  tamsulosin 0.4 milliGRAM(s) Oral at bedtime  traZODone 50 milliGRAM(s) Oral at bedtime    MEDICATIONS  (PRN):  acetaminophen   Tablet .. 650 milliGRAM(s) Oral every 6 hours PRN Temp greater or equal to 38.5C (101.3F), Mild Pain (1 - 3)  morphine  - Injectable 4 milliGRAM(s) IV Push every 2 hours PRN Severe Pain (7 - 10)  ondansetron    Tablet 8 milliGRAM(s) Oral every 6 hours PRN Nausea and/or Vomiting  zolpidem 5 milliGRAM(s) Oral at bedtime PRN Insomnia    ==============================================      PE:  General: Not in distress.  Non-toxic appearing.   HEENT: EOMI  Cardio: Regular rate and rhythm, S1, S2, no murmur  Pulm: B/L BS.  No wheezing / crackles  Abdomen: Soft, non-tender, non-distended. Normoactive bowel sounds  Extremities: No edema b/l   Neuro: A&O x3. No focal deficits      VS:  T(F): 98.5 (11-30 @ 05:36), Max: 99.2 (11-29 @ 16:33)  HR: 123 (11-30 @ 05:36)  BP: 128/66 (11-30 @ 05:36)  RR: 20 (11-30 @ 05:36)  SpO2: 94% (11-29 @ 20:22)        LABS/RAD/MICRO:  (11-30)  WBC: 8.66 K/uL  HGB: 11.2 g/dL  PLT: 343 K/uL  ---  MCV: 80.5 fL    (11-30)    141  |  96<L>  |  9<L>  ----------------------------<  113<H>  4.1   |  31  |  0.8    gfr (afr) 113  gfr (non-afr) 97    Ca    9.0      11-30  Phos  4.5     11-30  Mg     2.0     11-30    (11-30)  TPro  6.1  /  Alb  3.5  /  TBili  0.4  /  DBili  x   /  AST  15  /  ALT  15  /  AlkPhos  726<H>      (11-30)  PT: 17.10<H>  PTT:39.3<H>  INR:1.49<H>  (11-29)  PT: 20.20<H>  PTT:32.5  INR:1.77<H>    11-30 @ 06:37TROP: <0.01 ng/mL  CKMB: 2.5 ng/mL  11-29 @ 11:32TROP: <0.01 ng/mL  CKMB: x          PRO-BNP: 186 pg/mL (11-29 @ 11:32) HPI:  59 yo M with PMH of HTN, CHF, DVT on Eliquis, BPH, depression, bipolar, recent prolonged hospitalization 9/21-10-29 for sob 2/2 recurrent pleural effusion and abd distention/constipation, s/p multiple procedures ( s/p loop colostomy, L VATS / pleural bx / pleural implant bx / talc pleurodesis, egd/colonoscopy) and discovered to have metastatic adenoCA of GI origin --  presents back from snf for worsening SOB, LE edema, and abd pain.      Pt states his abd pain has been progressively worsening,  affecting his ability to sleep. He feels exhausted and weak,  unable to tolerate most physical activities now.  His sob is also worsened, both on minimal exertion and with rest, affecting his ability to finish sentences.      ==========  Relevant data:  ·	CEA 4.4  ·	Biopsy results:  ·	Sigmoid colon mass, biopsy: invasive adenocarcinoma, moderately differentiated. focally similar to non-mucin producing adenocarcinoma component in left pleural tumor.  histologically different from the  minute gastric tumor biopsy. Immunohistochemical studies + histologic features compatible with gastric primary.   ·	Gastric ulcer, biopsy:  Invasive adenoCA, poorly differentiated, with signet ring cell features. >> Above gastric tumor has signet ring cells similar to signet ring cells seen in mucin pools in left pleural  ·	Left pleural biopsy: Fibromembranous tissue with foci showing metastatic adenocarcinoma.  ·	Left pleural implant: Metastatic adenocarcinoma with mucinous features.- Immunohistochemistry studies suggest GI origin.   ·	CT chest/abd/pelvis contrast 11/29:	   ·	New B/L pulmonary nodules measuring up to 12 mm.  ·	Increasing size of right pleural effusion, moderate to large, with pleural enhancement at the right lung base. Empyema is not excluded.  ·	Increasing right base opacity. Multifocal groundglass opacities. Increasing peribronchial wall thickening.  ·	Increase in size of multiloculated pericardial effusion versus loculated fluid collections within the mediastinum extending into the superior mediastinum.  ·	Right-sided diverting colostomy, new. Mild underlying mesenteric fat stranding is likely represent postsurgical change.   ·	Bones are minimally heterogeneous in density. Sclerotic lesions are not excluded. Further evaluation with nuclear medicine bone scan is recommended.  ·	Findings are concerning with progression of neoplastic disease  ·	V duplex 10/22:  Chr DVT R fem + L common fem, fem and gastrocnemius veins  ==========    PMH/PSH  Pleural effusion  CHF (congestive heart failure)  Insomnia  BPH (benign prostatic hyperplasia)  Depression  Bipolar 1 disorder  HTN (hypertension)  Colostomy present  History of thoracentesis      ACTIVE MEDS:  chlorhexidine 4% Liquid 1 Application(s) Topical <User Schedule>  diltiazem    Tablet 60 milliGRAM(s) Oral every 8 hours  DULoxetine 60 milliGRAM(s) Oral daily  furosemide   Injectable 40 milliGRAM(s) IV Push daily  isosorbide    mononitrate Tablet (ISMO) 20 milliGRAM(s) Oral two times a day  latanoprost 0.005% Ophthalmic Solution 1 Drop(s) Both EYES at bedtime  multivitamin 1 Tablet(s) Oral daily  pantoprazole    Tablet 40 milliGRAM(s) Oral before breakfast  propranolol 20 milliGRAM(s) Oral two times a day  tamsulosin 0.4 milliGRAM(s) Oral at bedtime  traZODone 50 milliGRAM(s) Oral at bedtime    MEDICATIONS  (PRN):  acetaminophen   Tablet .. 650 milliGRAM(s) Oral every 6 hours PRN Temp greater or equal to 38.5C (101.3F), Mild Pain (1 - 3)  morphine  - Injectable 4 milliGRAM(s) IV Push every 2 hours PRN Severe Pain (7 - 10)  ondansetron    Tablet 8 milliGRAM(s) Oral every 6 hours PRN Nausea and/or Vomiting  zolpidem 5 milliGRAM(s) Oral at bedtime PRN Insomnia    ==============================================      PE:  General: appears frail  HEENT: L eye deviated medially,  per pt, occured after retinal detachment  Cardio: Regular rate and rhythm  Pulm: dimihnished BS on R  Abdomen: + bowel sounds;  + colostomy.   colostomy site healing, no evidence of surrounding infection  Extremities: b/l le edema  Neuro: A&O x3      VS:  T(F): 98.5 (11-30 @ 05:36), Max: 99.2 (11-29 @ 16:33)  HR: 123 (11-30 @ 05:36)  BP: 128/66 (11-30 @ 05:36)  RR: 20 (11-30 @ 05:36)  SpO2: 94% (11-29 @ 20:22)        LABS/RAD/MICRO:  (11-30)  WBC: 8.66 K/uL  HGB: 11.2 g/dL  PLT: 343 K/uL  ---  MCV: 80.5 fL    (11-30)    141  |  96<L>  |  9<L>  ----------------------------<  113<H>  4.1   |  31  |  0.8    gfr (afr) 113  gfr (non-afr) 97    Ca    9.0      11-30  Phos  4.5     11-30  Mg     2.0     11-30    (11-30)  TPro  6.1  /  Alb  3.5  /  TBili  0.4  /  DBili  x   /  AST  15  /  ALT  15  /  AlkPhos  726<H>      (11-30)  PT: 17.10<H>  PTT:39.3<H>  INR:1.49<H>  (11-29)  PT: 20.20<H>  PTT:32.5  INR:1.77<H>    11-30 @ 06:37TROP: <0.01 ng/mL  CKMB: 2.5 ng/mL  11-29 @ 11:32TROP: <0.01 ng/mL  CKMB: x          PRO-BNP: 186 pg/mL (11-29 @ 11:32)

## 2018-11-30 NOTE — CONSULT NOTE ADULT - ASSESSMENT
58yMale being evaluated for symptom management and support with [facility] as receiving news of poor prognosis: METASTATIC ADENOCA (GI) WITH LUNG METS  - COLON CA + GASTRIC CA    Pain not optimally controled and is more his concern than SOB; explored anxiolytics with patient - he declines at present    Recommendations:    Increase Morphine to 6mg IVP and assess response  Add Fentanyl patch 5omcg/72H  Morphine 6mg IVP every 2H PRN (if not adequate relief in intensity increase to 8mg  Simethicone 80 mg very 6H PRN     Supportive counseling    We will follow     DNR/I    Please Call [phone] PRN

## 2018-11-30 NOTE — CONSULT NOTE ADULT - ASSESSMENT
==========  Relevant data:  ·	CEA 4.4  ·	Biopsy results:  ·	Sigmoid colon mass, biopsy: invasive adenocarcinoma, moderately differentiated. focally similar to non-mucin producing adenocarcinoma component in left pleural tumor.  histologically different from the  minute gastric tumor biopsy. Immunohistochemical studies + histologic features compatible with gastric primary.   ·	Gastric ulcer, biopsy:  Invasive adenoCA, poorly differentiated, with signet ring cell features. >> Above gastric tumor has signet ring cells similar to signet ring cells seen in mucin pools in left pleural  ·	Left pleural biopsy: Fibromembranous tissue with foci showing metastatic adenocarcinoma.  ·	Left pleural implant: Metastatic adenocarcinoma with mucinous features.- Immunohistochemistry studies suggest GI origin.   ·	CT chest/abd/pelvis contrast 11/29:	   ·	New B/L pulmonary nodules measuring up to 12 mm.  ·	Increasing size of right pleural effusion, moderate to large, with pleural enhancement at the right lung base. Empyema is not excluded.  ·	Increasing right base opacity. Multifocal groundglass opacities. Increasing peribronchial wall thickening.  ·	Increase in size of multiloculated pericardial effusion versus loculated fluid collections within the mediastinum extending into the superior mediastinum.  ·	Right-sided diverting colostomy, new. Mild underlying mesenteric fat stranding is likely represent postsurgical change.   ·	Bones are minimally heterogeneous in density. Sclerotic lesions are not excluded. Further evaluation with nuclear medicine bone scan is recommended.  ·	Findings are concerning with progression of neoplastic disease  ·	V duplex 10/22:  Chr DVT R fem + L common fem, fem and gastrocnemius veins  ==========    Impression:      Plan:    -- will discuss with attending -- Impression:  METASTATIC ADENOCA (GI) WITH LUNG METS    Plan:      -- will discuss with attending -- Impression:  METASTATIC ADENOCA (GI) WITH LUNG METS  - COLON CA + GASTRIC CA      Plan:  - may attempt 1/2 dosed folfox therapy inpatient  - palliative + hospice care  - discussed with pt re: options of treatment vs comfort care >> final plan tbd by pt in conjunction with palliative Impression:  METASTATIC ADENOCA (GI) WITH LUNG METS  - COLON CA + GASTRIC CA      Plan:  - may attempt  chemotherapy - palliative + hospice care  - discussed with pt re: options of treatment vs comfort care >> final plan tbd by pt in conjunction with palliative

## 2018-11-30 NOTE — PROGRESS NOTE ADULT - ASSESSMENT
59 y/o Male with PMH HTN, HFpEF (EF = 50-55% in Sept 2018), DVT on Eliquis, BPH, depression and bipolar, 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     # Metastatic gastric adenocarcinoma w/ signet ring cells and colon adenocarcinoma  - heme/onc following: may attempt half dose of FOLFOX therapy while inpatient. This option was discussed with patient as well as palliative and hospice care.   - F/u palliative care recs.   - Pt wishes to think about his options over the weekend and decide what is best for him on Monday.    # Shortness of breath secondary to pleural/pericardial effusion secondary to metastatic disease  - s/p thoracentesis + talc pleurodesis on previous admission  - CT chest showing progression of his disease; increased pleural and pericardial effusion  - On 4L O2 nasal cannula.   - F/u CT surgery recs     # Abdominal pain secondary to metastatic ca and recent colostomy  - increased IV morphine from 4 mg IV q2hrs to 6 mg IV q2hrs with improvement in pain.   - will start Fentanyl patch. F/u palliative care recs regarding dosing.     # Lower Extremity Edema b/l in patient with HFpEF (EF = 50-55% in Sept 2018)  - BNP = 186  - Continue lasix 40 mg qd     # DVT  - holding eliquis for possible surgery with CT surgery  - F/u their final recs regarding re-starting    # Depression and Bipolar  - Continue duloxetine     # HTN  - Continue home meds    # BPH  - Continue flomax    DO NOT RESUSCITATE/DO NOT INTUBATE  Dispo: From [place]  Out of bed to chair  DASH diet

## 2018-11-30 NOTE — CHART NOTE - NSCHARTNOTEFT_GEN_A_CORE
Palliative Care Biopsychosocial Assessment:    Patient is a 59 y/o M with PMH of HTN, CHF, DVT on Eliquis, BPH, depression, bipolar, recent diagnosis of colon/gastric adenocarcinoma with pleural mets and malignant effusion, on 4L NC, recent admission to [facility] from Sept-Oct when he was diagnosed with this cancer, s/p colostomy and VATS w/ pleural biopsy that admission, s/p talc pleurodesis. Patient had complicated hospital course with extended ICU stay during that admission.      Patient presents A&Ox4. Patient related he has a brother in [place] and a cousin in [place], no other supports. Patient relating he needs some time to process the diagnosis he was given today by Oncology. Patient relating he is in pain and has issues sleeping. Palliative care team will assist with patient pain management regimen and provide supportive counseling. Plan to visit with patient on Monday to discuss goals of care given the information patient was provided today. Palliative care team will continue to monitor and provide support.     Patient Coping Status:       [   ]   coping well        [ x  ]    coping with  some difficulty       [   ]   difficulty coping     [   ]   other                                                       Patient Emotional Status:     [   ]   anxious         [   ]   depressed           [ x  ]  overwhelmed          [   ]   angry         [   ]accepting       [   ]   not accepting           [   ]   other     Patient Mental Status:      [x   ]   alert              [ x  ]   oriented         [    ]   confused         [   ] lethargic         [   ]   non-responsive   [   ]   other     Advance Directives:     [   ]    Health Care Surrogate: Name:                             [   ]    Health Care Proxy: Name:   [   ]    MOLST  [   ]    Living Will  [ x  ]    DNR  [ x  ]    DNI    Patient Needs:     [  x ]   Supportive Counseling                  [   ]   Family Meeting            [   ]    Education                           [  x ]   Advance Care Planning         Caregiver Name:   Caregiver needs:     [ x  ]   Supportive Counseling      [   ]   Family Conference      [ x  ]   Education    [   ]   Other     Referral:      [x   ]   Community Resources         [   ]   Cancer Support Group     [   ]    Hospice       [   ]   Bereavement support     [   ]   Pastoral Care      [   ]  [facility] [place]       [   ]  Child Life Services     [  x ]   Other TBD                    Spectra #: [phone]

## 2018-11-30 NOTE — CONSULT NOTE ADULT - ATTENDING COMMENTS
He was seen and examined. He has a complicated history  but in summery he has multiple comorbidities, he has an ECOG of 4 . He does not have any family for support. He has been declining. After review of his pathology, scans and procedures. 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  He has malignancy associated DVT. He is frail. On oxygen.  We discussed prognosis.  We discussed options, given his frailty can consider single agent  5FU but he would have marginal benefit in regards to efficacy of prolongation of life and high risk of complications.  I explained that hospice is reasonable especially since he [place] not think wishes to undergo treatment.      Plan:  #Metastatic Gastric and Early colon cancer  -he is considering hospice, he would not tolerate muti agent systemic therapy and single agent would be of marginal benefit if any   -will follow up and await his decision     # SOB multifactorial  due to effusion, malignancy, deconditioning   -he was seen by Dr. Caldwell   -he has loculated effusion, insurance will not provide bottles for Pleurx  - supportive measures only, oxygen and morphine  only is appropriate    #Venous Thromboembolism on Apixaban on Hold for possible repeat Pleurodesis  and drainage    #Possible Pericardial Effusion, no sign of Tamponade   -for ECHO    #Pain  -to be seen by Palliative care     Poor prognosis He was seen and examined. He has a complicated history  but in summery he has multiple comorbidities, he has an ECOG of 4 . He does not have any family for support. He has been declining. After review of his pathology, scans and procedures. 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  He has malignancy associated DVT. He is frail. On oxygen.  We discussed prognosis.  We discussed options, given his frailty can consider single agent  5FU but he would have marginal benefit in regards to efficacy of prolongation of life and high risk of complications.  I explained that hospice is reasonable especially since he [place] not think wishes to undergo treatment.      Some of the pathology reports a bit challenging ( The sigmoid colon adenocarcinoma with limited to no  mucin production is focally similar to non-mucin producing   adenocarcinoma component in left pleural tumor ([id number]).  The sigmoid colon tumor (without signet ring cells) appears to be  histologically different from the  minute gastric tumor biopsy.    He have colon mets as well to the pleura.  But his does not change my recommendation.       Plan:  #Metastatic Gastric and Early colon cancer  -he is considering hospice, he would not tolerate muti agent systemic therapy and single agent would be of marginal benefit if any   -will follow up and await his decision     # SOB multifactorial  due to effusion, malignancy, deconditioning   -he was seen by Dr. Caldwell   -he has loculated effusion, insurance will not provide bottles for Pleurx  - supportive measures only, oxygen and morphine  only is appropriate    #Venous Thromboembolism on Apixaban on Hold for possible repeat Pleurodesis  and drainage    #Possible Pericardial Effusion, no sign of Tamponade   -for ECHO    #Pain  -to be seen by Palliative care     Poor prognosis, hospice is appropriate

## 2018-12-01 LAB
ALBUMIN SERPL ELPH-MCNC: 3.7 G/DL — SIGNIFICANT CHANGE UP (ref 3.5–5.2)
ALP SERPL-CCNC: 627 U/L — HIGH (ref 30–115)
ALT FLD-CCNC: 13 U/L — SIGNIFICANT CHANGE UP (ref 0–41)
ANION GAP SERPL CALC-SCNC: 13 MMOL/L — SIGNIFICANT CHANGE UP (ref 7–14)
APTT BLD: 35.2 SEC — SIGNIFICANT CHANGE UP (ref 27–39.2)
AST SERPL-CCNC: 15 U/L — SIGNIFICANT CHANGE UP (ref 0–41)
BASOPHILS # BLD AUTO: 0.05 K/UL — SIGNIFICANT CHANGE UP (ref 0–0.2)
BASOPHILS NFR BLD AUTO: 0.5 % — SIGNIFICANT CHANGE UP (ref 0–1)
BILIRUB DIRECT SERPL-MCNC: <0.2 MG/DL — SIGNIFICANT CHANGE UP (ref 0–0.2)
BILIRUB INDIRECT FLD-MCNC: >0.2 MG/DL — SIGNIFICANT CHANGE UP (ref 0.2–1.2)
BILIRUB SERPL-MCNC: 0.4 MG/DL — SIGNIFICANT CHANGE UP (ref 0.2–1.2)
BUN SERPL-MCNC: 12 MG/DL — SIGNIFICANT CHANGE UP (ref 10–20)
CALCIUM SERPL-MCNC: 8.8 MG/DL — SIGNIFICANT CHANGE UP (ref 8.5–10.1)
CHLORIDE SERPL-SCNC: 92 MMOL/L — LOW (ref 98–110)
CO2 SERPL-SCNC: 32 MMOL/L — SIGNIFICANT CHANGE UP (ref 17–32)
CREAT SERPL-MCNC: 0.8 MG/DL — SIGNIFICANT CHANGE UP (ref 0.7–1.5)
EOSINOPHIL # BLD AUTO: 0.23 K/UL — SIGNIFICANT CHANGE UP (ref 0–0.7)
EOSINOPHIL NFR BLD AUTO: 2.3 % — SIGNIFICANT CHANGE UP (ref 0–8)
GLUCOSE SERPL-MCNC: 105 MG/DL — HIGH (ref 70–99)
HCT VFR BLD CALC: 34.8 % — LOW (ref 42–52)
HGB BLD-MCNC: 10.8 G/DL — LOW (ref 14–18)
IMM GRANULOCYTES NFR BLD AUTO: 0.7 % — HIGH (ref 0.1–0.3)
INR BLD: 1.29 RATIO — SIGNIFICANT CHANGE UP (ref 0.65–1.3)
LYMPHOCYTES # BLD AUTO: 0.85 K/UL — LOW (ref 1.2–3.4)
LYMPHOCYTES # BLD AUTO: 8.3 % — LOW (ref 20.5–51.1)
MAGNESIUM SERPL-MCNC: 2 MG/DL — SIGNIFICANT CHANGE UP (ref 1.8–2.4)
MCHC RBC-ENTMCNC: 25.1 PG — LOW (ref 27–31)
MCHC RBC-ENTMCNC: 31 G/DL — LOW (ref 32–37)
MCV RBC AUTO: 80.9 FL — SIGNIFICANT CHANGE UP (ref 80–94)
MONOCYTES # BLD AUTO: 0.61 K/UL — HIGH (ref 0.1–0.6)
MONOCYTES NFR BLD AUTO: 6 % — SIGNIFICANT CHANGE UP (ref 1.7–9.3)
NEUTROPHILS # BLD AUTO: 8.38 K/UL — HIGH (ref 1.4–6.5)
NEUTROPHILS NFR BLD AUTO: 82.2 % — HIGH (ref 42.2–75.2)
NRBC # BLD: 0 /100 WBCS — SIGNIFICANT CHANGE UP (ref 0–0)
PHOSPHATE SERPL-MCNC: 4.7 MG/DL — SIGNIFICANT CHANGE UP (ref 2.1–4.9)
PLATELET # BLD AUTO: 319 K/UL — SIGNIFICANT CHANGE UP (ref 130–400)
POTASSIUM SERPL-MCNC: 4.2 MMOL/L — SIGNIFICANT CHANGE UP (ref 3.5–5)
POTASSIUM SERPL-SCNC: 4.2 MMOL/L — SIGNIFICANT CHANGE UP (ref 3.5–5)
PROT SERPL-MCNC: 5.9 G/DL — LOW (ref 6–8)
PROTHROM AB SERPL-ACNC: 14.8 SEC — HIGH (ref 9.95–12.87)
RBC # BLD: 4.3 M/UL — LOW (ref 4.7–6.1)
RBC # FLD: 14.6 % — HIGH (ref 11.5–14.5)
SODIUM SERPL-SCNC: 137 MMOL/L — SIGNIFICANT CHANGE UP (ref 135–146)
WBC # BLD: 10.19 K/UL — SIGNIFICANT CHANGE UP (ref 4.8–10.8)
WBC # FLD AUTO: 10.19 K/UL — SIGNIFICANT CHANGE UP (ref 4.8–10.8)

## 2018-12-01 RX ORDER — MEGESTROL ACETATE 40 MG/ML
400 SUSPENSION ORAL
Qty: 0 | Refills: 0 | Status: DISCONTINUED | OUTPATIENT
Start: 2018-12-01 | End: 2018-12-01

## 2018-12-01 RX ORDER — MORPHINE SULFATE 50 MG/1
2 CAPSULE, EXTENDED RELEASE ORAL ONCE
Qty: 0 | Refills: 0 | Status: DISCONTINUED | OUTPATIENT
Start: 2018-12-01 | End: 2018-12-01

## 2018-12-01 RX ORDER — MEGESTROL ACETATE 40 MG/ML
400 SUSPENSION ORAL
Qty: 0 | Refills: 0 | Status: DISCONTINUED | OUTPATIENT
Start: 2018-12-01 | End: 2018-12-05

## 2018-12-01 RX ADMIN — MORPHINE SULFATE 6 MILLIGRAM(S): 50 CAPSULE, EXTENDED RELEASE ORAL at 22:41

## 2018-12-01 RX ADMIN — LATANOPROST 1 DROP(S): 0.05 SOLUTION/ DROPS OPHTHALMIC; TOPICAL at 21:55

## 2018-12-01 RX ADMIN — ONDANSETRON 8 MILLIGRAM(S): 8 TABLET, FILM COATED ORAL at 11:14

## 2018-12-01 RX ADMIN — ZOLPIDEM TARTRATE 5 MILLIGRAM(S): 10 TABLET ORAL at 22:44

## 2018-12-01 RX ADMIN — DULOXETINE HYDROCHLORIDE 60 MILLIGRAM(S): 30 CAPSULE, DELAYED RELEASE ORAL at 12:10

## 2018-12-01 RX ADMIN — ONDANSETRON 8 MILLIGRAM(S): 8 TABLET, FILM COATED ORAL at 17:15

## 2018-12-01 RX ADMIN — MORPHINE SULFATE 6 MILLIGRAM(S): 50 CAPSULE, EXTENDED RELEASE ORAL at 19:23

## 2018-12-01 RX ADMIN — Medication 40 MILLIGRAM(S): at 05:16

## 2018-12-01 RX ADMIN — Medication 50 MILLIGRAM(S): at 21:55

## 2018-12-01 RX ADMIN — MORPHINE SULFATE 6 MILLIGRAM(S): 50 CAPSULE, EXTENDED RELEASE ORAL at 23:15

## 2018-12-01 RX ADMIN — MORPHINE SULFATE 6 MILLIGRAM(S): 50 CAPSULE, EXTENDED RELEASE ORAL at 09:45

## 2018-12-01 RX ADMIN — CHLORHEXIDINE GLUCONATE 1 APPLICATION(S): 213 SOLUTION TOPICAL at 05:17

## 2018-12-01 RX ADMIN — MORPHINE SULFATE 6 MILLIGRAM(S): 50 CAPSULE, EXTENDED RELEASE ORAL at 03:47

## 2018-12-01 RX ADMIN — MORPHINE SULFATE 6 MILLIGRAM(S): 50 CAPSULE, EXTENDED RELEASE ORAL at 04:17

## 2018-12-01 RX ADMIN — PANTOPRAZOLE SODIUM 40 MILLIGRAM(S): 20 TABLET, DELAYED RELEASE ORAL at 06:32

## 2018-12-01 RX ADMIN — TAMSULOSIN HYDROCHLORIDE 0.4 MILLIGRAM(S): 0.4 CAPSULE ORAL at 21:55

## 2018-12-01 RX ADMIN — Medication 1 TABLET(S): at 12:10

## 2018-12-01 RX ADMIN — ZOLPIDEM TARTRATE 5 MILLIGRAM(S): 10 TABLET ORAL at 00:09

## 2018-12-01 RX ADMIN — MEGESTROL ACETATE 400 MILLIGRAM(S): 40 SUSPENSION ORAL at 17:32

## 2018-12-01 RX ADMIN — MORPHINE SULFATE 6 MILLIGRAM(S): 50 CAPSULE, EXTENDED RELEASE ORAL at 09:13

## 2018-12-01 RX ADMIN — Medication 20 MILLIGRAM(S): at 17:21

## 2018-12-01 RX ADMIN — FENTANYL CITRATE 1 PATCH: 50 INJECTION INTRAVENOUS at 17:17

## 2018-12-01 RX ADMIN — Medication 20 MILLIGRAM(S): at 05:16

## 2018-12-01 RX ADMIN — MORPHINE SULFATE 6 MILLIGRAM(S): 50 CAPSULE, EXTENDED RELEASE ORAL at 16:41

## 2018-12-01 NOTE — PROGRESS NOTE ADULT - ASSESSMENT
57 y/o Male with PMH HTN, HFpEF (EF = 50-55% in Sept 2018), DVT on Eliquis, BPH, depression and bipolar, 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     #Metastatic Gastric and Early colon cancer  - Heme/onc following: he is considering hospice, he would not tolerate muti agent systemic therapy and single agent would be of marginal benefit if any   - Pt wishes to think about his options over the weekend and decide what is best for him on Monday.    # Shortness of breath secondary to pleural/pericardial effusion secondary to metastatic disease  - s/p thoracentesis + talc pleurodesis on previous admission  - CT chest showing progression of his disease; increased pleural and pericardial effusion  - On 4L O2 nasal cannula.   - F/u CT surgery recs     # Abdominal pain secondary to metastatic ca and recent colostomy  - Continue Fentanyl patch 50 mcg/72H and morphine 6 mg IV q2hrs PRN. Pt reports improvement in his pain with this regimen.    - Palliative care following.     # Lower Extremity Edema b/l in patient with HFpEF (EF = 50-55% in Sept 2018)  - BNP = 186  - Continue lasix 40 mg qd     # Malignancy associated DVT  - holding eliquis for possible surgery with CT surgery  - F/u their final recs regarding re-starting    # Depression and Bipolar  - Continue duloxetine     # HTN  - Continue home meds    # BPH  - Continue flomax    DO NOT RESUSCITATE/DO NOT INTUBATE  Dispo: From [place]  Out of bed to chair  DASH diet 59 y/o Male with PMH HTN, HFpEF (EF = 50-55% in Sept 2018), DVT on Eliquis, BPH, depression and bipolar, 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     #Metastatic Gastric and Early colon cancer  - Heme/onc following: he is considering hospice, he would not tolerate muti agent systemic therapy and single agent would be of marginal benefit if any   - Pt wishes to think about his options over the weekend and decide what is best for him on Monday.    # Shortness of breath secondary to pleural/pericardial effusion secondary to metastatic disease  - s/p thoracentesis + talc pleurodesis on previous admission  - CT chest showing progression of his disease; increased pleural and pericardial effusion  - On 4L O2 nasal cannula.   - F/u 2D echo  - F/u CT surgery recs     # Abdominal pain secondary to metastatic ca and recent colostomy  - Continue Fentanyl patch 50 mcg/72H and morphine 6 mg IV q2hrs PRN. Pt reports improvement in his pain with this regimen.    - Palliative care following.     # Lower Extremity Edema b/l in patient with HFpEF (EF = 50-55% in Sept 2018)  - BNP = 186  - Continue lasix 40 mg qd     # Malignancy associated DVT  - Holding eliquis for possible repeat Pleurodesis and drainage with CT surgery  - F/u their final recs regarding re-starting eliquis    # Depression and Bipolar  - Continue duloxetine     # HTN  - Continue home meds    # BPH  - Continue flomax    DO NOT RESUSCITATE/DO NOT INTUBATE  Dispo: From [place]  Out of bed to chair  DASH diet

## 2018-12-01 NOTE — PROGRESS NOTE ADULT - SUBJECTIVE AND OBJECTIVE BOX
SUBJECTIVE:    Patient is a 58 y old Male who presents with a chief complaint of SOB (2018 10:39)    Currently admitted to medicine with the primary diagnosis of Dyspnea     Today is hospital day 2. Patient was seen and examined at bedside. This morning he is resting comfortably in bed and reports his pain is significantly improved with increase in morphine yesterday. Continues to complain of shortness of breath with minimal movement.    ==========  Relevant data:  CEA 4.4  Biopsy results:  Sigmoid colon mass, biopsy: invasive adenocarcinoma, moderately differentiated. focally similar to non-mucin producing adenocarcinoma component in left pleural tumor.  histologically different from the  minute gastric tumor biopsy. Immunohistochemical studies + histologic features compatible with gastric primary.   Gastric ulcer, biopsy:  Invasive adenoCA, poorly differentiated, with signet ring cell features. >> Above gastric tumor has signet ring cells similar to signet ring cells seen in mucin pools in left pleural  Left pleural biopsy: Fibromembranous tissue with foci showing metastatic adenocarcinoma.  Left pleural implant: Metastatic adenocarcinoma with mucinous features.- Immunohistochemistry studies suggest GI origin.   CT chest/abd/pelvis contrast :	   New B/L pulmonary nodules measuring up to 12 mm.  Increasing size of right pleural effusion, moderate to large, with pleural enhancement at the right lung base. Empyema is not excluded.  Increasing right base opacity. Multifocal groundglass opacities. Increasing peribronchial wall thickening.  Increase in size of multiloculated pericardial effusion versus loculated fluid collections within the mediastinum extending into the superior mediastinum.  Right-sided diverting colostomy, new. Mild underlying mesenteric fat stranding is likely represent postsurgical change.   Bones are minimally heterogeneous in density. Sclerotic lesions are not excluded. Further evaluation with nuclear medicine bone scan is recommended.  Findings are concerning with progression of neoplastic disease  V duplex 10/22:  Chr DVT R fem + L common fem, fem and gastrocnemius veins  ==========      PAST MEDICAL & SURGICAL HISTORY  PAST MEDICAL & SURGICAL HISTORY:  Pleural effusion  CHF (congestive heart failure)  Insomnia  BPH (benign prostatic hyperplasia)  Depression  Bipolar 1 disorder  HTN (hypertension)  Colostomy present  History of thoracentesis    SOCIAL HISTORY:  Denies active tobacco, alcohol, or illicit drug use  Extensive smoking history in past    ALLERGIES:  No Known Allergies    MEDICATIONS:  STANDING MEDICATIONS  chlorhexidine 4% Liquid 1 Application(s) Topical <User Schedule>  diltiazem    Tablet 60 milliGRAM(s) Oral every 8 hours  DULoxetine 60 milliGRAM(s) Oral daily  fentaNYL   Patch  50 MICROgram(s)/Hr 1 Patch Transdermal every 72 hours  furosemide   Injectable 40 milliGRAM(s) IV Push daily  influenza   Vaccine 0.5 milliLiter(s) IntraMuscular once  isosorbide    mononitrate Tablet (ISMO) 20 milliGRAM(s) Oral two times a day  latanoprost 0.005% Ophthalmic Solution 1 Drop(s) Both EYES at bedtime  multivitamin 1 Tablet(s) Oral daily  pantoprazole    Tablet 40 milliGRAM(s) Oral before breakfast  propranolol 20 milliGRAM(s) Oral two times a day  tamsulosin 0.4 milliGRAM(s) Oral at bedtime  traZODone 50 milliGRAM(s) Oral at bedtime    PRN MEDICATIONS  acetaminophen   Tablet .. 650 milliGRAM(s) Oral every 6 hours PRN  morphine  - Injectable 4 milliGRAM(s) IV Push every 2 hours PRN  ondansetron    Tablet 8 milliGRAM(s) Oral every 6 hours PRN  simethicone 80 milliGRAM(s) Chew every 6 hours PRN  zolpidem 5 milliGRAM(s) Oral at bedtime PRN    VITALS:   Vital Signs (24 Hrs):  T(C): 36.8 (18 @ 06:24), Max: 36.8 (18 @ 12:44)  HR: 81 (18 @ 06:24) (81 - 102)  BP: 100/68 (18 @ 06:24) (100/68 - 118/60)  RR: 17 (18 @ 06:24) (17 - 19)  SpO2: --  Wt(kg): --  Daily     Daily Weight in k.9 (01 Dec 2018 06:24)    I&O's Summary    2018 07:01  -  01 Dec 2018 07:00  --------------------------------------------------------  IN: 8 mL / OUT: 400 mL / NET: -392 mL      LABS:                                        10.8   10.19 )-----------( 319      ( 01 Dec 2018 07:00 )             34.8           141  |  96<L>  |  9<L>  ----------------------------<  113<H>  4.1   |  31  |  0.8    Ca    9.0      2018 06:37  Phos  4.5       Mg     2.0         TPro  6.1  /  Alb  3.5  /  TBili  0.4  /  DBili  x   /  AST  15  /  ALT  15  /  AlkPhos  726<H>  11      PT/INR - ( 2018 06:37 )   PT: 17.10 sec;   INR: 1.49 ratio         PTT - ( 2018 06:37 )  PTT:39.3 sec    RADIOLOGY:  < from: CT Angio Chest w/ IV Cont (18 @ 16:35) >  IMPRESSION:    No acute central pulmonary embolism.    New bilateral pulmonary nodules measuring up to 12 mm.    Increasing size of right pleural effusion, moderate to large, with   pleural enhancement at the right lung base. Empyema is not excluded.    Increasing right base opacity. Multifocal groundglass opacities.   Increasing peribronchial wall thickening.    Increase in size of multiloculated pericardial effusion versus loculated   fluid collections within the mediastinum extending into the superior   mediastinum.    Right-sided diverting colostomy, new. Mild underlying mesenteric fat   stranding is likely represent postsurgical change.     Bones are minimally heterogeneous in density. Sclerotic lesions are not   excluded. Further evaluation with nuclear medicine bone scan is   recommended.    Findings are concerning with progression of neoplastic disease    < end of copied text >      < from: Xray Chest 1 View-PORTABLE IMMEDIATE (18 @ 12:38) >  Impression:      Unchanged bilateral opacities and pleural effusions greater on the right.    < end of copied text >      PHYSICAL EXAM:  GENERAL: NAD, speaks in full sentences, appears weak and deconditioned  HEAD: Atraumatic, Normocephalic; left eye deviated medially  NECK: Supple, No JVD  CHEST/LUNG: Air entry b/l, decreased breath sounds on right bases. No wheeze or crackles  HEART: S1, S2; RRR; No murmurs, rubs, or gallops  ABDOMEN: Colostomy in place; BS+; Soft, tender to palpation in epigastric region. Non-distended  EXTREMITIES:  2+ Peripheral Pulses, No clubbing, cyanosis. 1+ pitting edema in lower extremities b/l  PSYCH: AAOx3  NEUROLOGY: non-focal  SKIN: No rashes or lesions

## 2018-12-01 NOTE — PROGRESS NOTE ADULT - ASSESSMENT
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  - hold eliquis for possible surgery with CT surgery, which will depend on the results of the echo  - f/u oncology and palliative recommendations Assessment:  58y Male patient admitted S/P gastric adenocarcinoma signet ring and colon adenocarcinoma, pleural mets, s/p VATS and talc pleurodesis, transverse loop colostomy    Plan:  - please obtain the 2D echo, CTA shows possible pericardial effusion.  - hold eliquis for possible intervention, which will depend on the results of the echo  - f/u oncology and palliative recommendations

## 2018-12-01 NOTE — PROGRESS NOTE ADULT - SUBJECTIVE AND OBJECTIVE BOX
AMRIK MADRID  58y Male   [id number]    Procedure/Diagnosis: gastric adenocarcinoma signet ring and colon adenocarcinoma, pleural mets, s/p VATS and talc pleurodesis, transverse loop colostomy    Events over 24h: No acute overnight events, patient states abdominal pain has improved with pain meds    PAST MEDICAL & SURGICAL HISTORY:  Pleural effusion  CHF (congestive heart failure)  Insomnia  BPH (benign prostatic hyperplasia)  Depression  Bipolar 1 disorder  HTN (hypertension)  Colostomy present  History of thoracentesis    Vital Signs Last 24 Hrs  T(C): 36.1 (2018 21:14), Max: 36.9 (2018 05:36)  T(F): 97 (2018 21:14), Max: 98.5 (2018 05:36)  HR: 87 (2018 21:14) (87 - 123)  BP: 116/59 (2018 21:14) (112/68 - 128/66)  RR: 19 (2018 21:14) (18 - 20)    Diet, DASH/TLC:   Sodium & Cholesterol Restricted (18 @ 00:20)    I&O's Detail    2018 07:01  -  2018 07:00  --------------------------------------------------------  IN:  Total IN: 0 mL    OUT:    Voided: 1660 mL  Total OUT: 1660 mL    Total NET: -1660 mL    2018 07:01  -  01 Dec 2018 04:21  --------------------------------------------------------  IN:    Oral Fluid: 8 mL  Total IN: 8 mL    OUT:    Voided: 400 mL  Total OUT: 400 mL    Total NET: -392 mL    MEDICATIONS  (STANDING):  chlorhexidine 4% Liquid 1 Application(s) Topical <User Schedule>  diltiazem    Tablet 60 milliGRAM(s) Oral every 8 hours  DULoxetine 60 milliGRAM(s) Oral daily  fentaNYL   Patch  50 MICROgram(s)/Hr 1 Patch Transdermal every 72 hours  furosemide   Injectable 40 milliGRAM(s) IV Push daily  influenza   Vaccine 0.5 milliLiter(s) IntraMuscular once  isosorbide    mononitrate Tablet (ISMO) 20 milliGRAM(s) Oral two times a day  latanoprost 0.005% Ophthalmic Solution 1 Drop(s) Both EYES at bedtime  multivitamin 1 Tablet(s) Oral daily  pantoprazole    Tablet 40 milliGRAM(s) Oral before breakfast  propranolol 20 milliGRAM(s) Oral two times a day  tamsulosin 0.4 milliGRAM(s) Oral at bedtime  traZODone 50 milliGRAM(s) Oral at bedtime    MEDICATIONS  (PRN):  acetaminophen   Tablet .. 650 milliGRAM(s) Oral every 6 hours PRN Temp greater or equal to 38.5C (101.3F), Mild Pain (1 - 3)  morphine  - Injectable 6 milliGRAM(s) IV Push every 2 hours PRN Moderate Pain (4 - 6)  ondansetron    Tablet 8 milliGRAM(s) Oral every 6 hours PRN Nausea and/or Vomiting  simethicone 80 milliGRAM(s) Chew every 6 hours PRN gas/abdominal discomfort  zolpidem 5 milliGRAM(s) Oral at bedtime PRN Insomnia    Physical Examination:  General Appearance: NAD, alert and cooperative  Heart: S1 and S2. No murmurs. Rhythm is regular  Lungs: Clear to auscultation BL  Abdomen:  Positive bowel sounds. Soft, nondistended, tenderness around colostomy. Colostomy with good output    LABS:                     11.2   8.66  )-----------( 343      ( 2018 06:37 )             36.3            141  |  96<L>  |  9<L>  ----------------------------<  113<H>  4.1   |  31  |  0.8    Ca    9.0      2018 06:37  Phos  4.5       Mg     2.0         TPro  6.1  /  Alb  3.5  /  TBili  0.4  /  DBili  x   /  AST  15  /  ALT  15  /  AlkPhos  726<H>      LIVER FUNCTIONS - ( 2018 06:37 )  Alb: 3.5 g/dL / Pro: 6.1 g/dL / ALK PHOS: 726 U/L / ALT: 15 U/L / AST: 15 U/L / GGT: x           PT/INR - ( 2018 06:37 )   PT: 17.10 sec;   INR: 1.49 ratio       PTT - ( 2018 06:37 )  PTT:39.3 sec  CARDIAC MARKERS ( 2018 06:37 )  x     / <0.01 ng/mL / 67 U/L / x     / 2.5 ng/mL  CARDIAC MARKERS ( 2018 11:32 )  x     / <0.01 ng/mL / x     / x     / x        Urinalysis Basic - ( 2018 11:32 )    Color: Yellow / Appearance: Cloudy / S.010 / pH: x  Gluc: x / Ketone: Negative  / Bili: Negative / Urobili: 0.2 mg/dL   Blood: x / Protein: Negative mg/dL / Nitrite: Positive   Leuk Esterase: Large / RBC: x / WBC 26-50 /HPF   Sq Epi: x / Non Sq Epi: Occasional /HPF / Bacteria: Few /HPF    Culture - Urine (collected 2018 11:32)  Source: .Urine Clean Catch (Midstream)  Final Report (2018 20:41):    <10,000 CFU/ml    Normal Urogenital molly present    IMAGING:  None/24hours

## 2018-12-02 LAB
ALBUMIN SERPL ELPH-MCNC: 3.7 G/DL — SIGNIFICANT CHANGE UP (ref 3.5–5.2)
ALP SERPL-CCNC: 777 U/L — HIGH (ref 30–115)
ALT FLD-CCNC: 60 U/L — HIGH (ref 0–41)
ANION GAP SERPL CALC-SCNC: 14 MMOL/L — SIGNIFICANT CHANGE UP (ref 7–14)
APTT BLD: 27.1 SEC — SIGNIFICANT CHANGE UP (ref 27–39.2)
AST SERPL-CCNC: 74 U/L — HIGH (ref 0–41)
BASOPHILS # BLD AUTO: 0.05 K/UL — SIGNIFICANT CHANGE UP (ref 0–0.2)
BASOPHILS NFR BLD AUTO: 0.3 % — SIGNIFICANT CHANGE UP (ref 0–1)
BILIRUB DIRECT SERPL-MCNC: 0.3 MG/DL — HIGH (ref 0–0.2)
BILIRUB INDIRECT FLD-MCNC: 0.4 MG/DL — SIGNIFICANT CHANGE UP (ref 0.2–1.2)
BILIRUB SERPL-MCNC: 0.7 MG/DL — SIGNIFICANT CHANGE UP (ref 0.2–1.2)
BUN SERPL-MCNC: 17 MG/DL — SIGNIFICANT CHANGE UP (ref 10–20)
CALCIUM SERPL-MCNC: 9.3 MG/DL — SIGNIFICANT CHANGE UP (ref 8.5–10.1)
CHLORIDE SERPL-SCNC: 96 MMOL/L — LOW (ref 98–110)
CO2 SERPL-SCNC: 30 MMOL/L — SIGNIFICANT CHANGE UP (ref 17–32)
CREAT SERPL-MCNC: 1.2 MG/DL — SIGNIFICANT CHANGE UP (ref 0.7–1.5)
EOSINOPHIL # BLD AUTO: 0.15 K/UL — SIGNIFICANT CHANGE UP (ref 0–0.7)
EOSINOPHIL NFR BLD AUTO: 1 % — SIGNIFICANT CHANGE UP (ref 0–8)
GLUCOSE SERPL-MCNC: 142 MG/DL — HIGH (ref 70–99)
HCT VFR BLD CALC: 40.1 % — LOW (ref 42–52)
HGB BLD-MCNC: 12 G/DL — LOW (ref 14–18)
IMM GRANULOCYTES NFR BLD AUTO: 0.9 % — HIGH (ref 0.1–0.3)
INR BLD: 1.18 RATIO — SIGNIFICANT CHANGE UP (ref 0.65–1.3)
LYMPHOCYTES # BLD AUTO: 1.78 K/UL — SIGNIFICANT CHANGE UP (ref 1.2–3.4)
LYMPHOCYTES # BLD AUTO: 12.2 % — LOW (ref 20.5–51.1)
MAGNESIUM SERPL-MCNC: 2.1 MG/DL — SIGNIFICANT CHANGE UP (ref 1.8–2.4)
MCHC RBC-ENTMCNC: 24.6 PG — LOW (ref 27–31)
MCHC RBC-ENTMCNC: 29.9 G/DL — LOW (ref 32–37)
MCV RBC AUTO: 82.3 FL — SIGNIFICANT CHANGE UP (ref 80–94)
MONOCYTES # BLD AUTO: 1.05 K/UL — HIGH (ref 0.1–0.6)
MONOCYTES NFR BLD AUTO: 7.2 % — SIGNIFICANT CHANGE UP (ref 1.7–9.3)
NEUTROPHILS # BLD AUTO: 11.43 K/UL — HIGH (ref 1.4–6.5)
NEUTROPHILS NFR BLD AUTO: 78.4 % — HIGH (ref 42.2–75.2)
NRBC # BLD: 0 /100 WBCS — SIGNIFICANT CHANGE UP (ref 0–0)
PHOSPHATE SERPL-MCNC: 4.2 MG/DL — SIGNIFICANT CHANGE UP (ref 2.1–4.9)
PLATELET # BLD AUTO: 366 K/UL — SIGNIFICANT CHANGE UP (ref 130–400)
POTASSIUM SERPL-MCNC: 5.3 MMOL/L — HIGH (ref 3.5–5)
POTASSIUM SERPL-SCNC: 5.3 MMOL/L — HIGH (ref 3.5–5)
PROT SERPL-MCNC: 6.6 G/DL — SIGNIFICANT CHANGE UP (ref 6–8)
PROTHROM AB SERPL-ACNC: 13.5 SEC — HIGH (ref 9.95–12.87)
RBC # BLD: 4.87 M/UL — SIGNIFICANT CHANGE UP (ref 4.7–6.1)
RBC # FLD: 14.5 % — SIGNIFICANT CHANGE UP (ref 11.5–14.5)
SODIUM SERPL-SCNC: 140 MMOL/L — SIGNIFICANT CHANGE UP (ref 135–146)
WBC # BLD: 14.59 K/UL — HIGH (ref 4.8–10.8)
WBC # FLD AUTO: 14.59 K/UL — HIGH (ref 4.8–10.8)

## 2018-12-02 RX ORDER — MIDODRINE HYDROCHLORIDE 2.5 MG/1
5 TABLET ORAL THREE TIMES A DAY
Qty: 0 | Refills: 0 | Status: DISCONTINUED | OUTPATIENT
Start: 2018-12-02 | End: 2018-12-06

## 2018-12-02 RX ORDER — MORPHINE SULFATE 50 MG/1
8 CAPSULE, EXTENDED RELEASE ORAL ONCE
Qty: 0 | Refills: 0 | Status: DISCONTINUED | OUTPATIENT
Start: 2018-12-02 | End: 2018-12-02

## 2018-12-02 RX ORDER — APIXABAN 2.5 MG/1
5 TABLET, FILM COATED ORAL EVERY 12 HOURS
Qty: 0 | Refills: 0 | Status: DISCONTINUED | OUTPATIENT
Start: 2018-12-02 | End: 2018-12-02

## 2018-12-02 RX ORDER — FUROSEMIDE 40 MG
20 TABLET ORAL AT BEDTIME
Qty: 0 | Refills: 0 | Status: DISCONTINUED | OUTPATIENT
Start: 2018-12-02 | End: 2018-12-06

## 2018-12-02 RX ORDER — MORPHINE SULFATE 50 MG/1
8 CAPSULE, EXTENDED RELEASE ORAL
Qty: 0 | Refills: 0 | Status: DISCONTINUED | OUTPATIENT
Start: 2018-12-02 | End: 2018-12-03

## 2018-12-02 RX ORDER — APIXABAN 2.5 MG/1
5 TABLET, FILM COATED ORAL EVERY 12 HOURS
Qty: 0 | Refills: 0 | Status: DISCONTINUED | OUTPATIENT
Start: 2018-12-03 | End: 2018-12-04

## 2018-12-02 RX ADMIN — MIDODRINE HYDROCHLORIDE 5 MILLIGRAM(S): 2.5 TABLET ORAL at 15:15

## 2018-12-02 RX ADMIN — LATANOPROST 1 DROP(S): 0.05 SOLUTION/ DROPS OPHTHALMIC; TOPICAL at 21:51

## 2018-12-02 RX ADMIN — TAMSULOSIN HYDROCHLORIDE 0.4 MILLIGRAM(S): 0.4 CAPSULE ORAL at 21:52

## 2018-12-02 RX ADMIN — Medication 20 MILLIGRAM(S): at 07:55

## 2018-12-02 RX ADMIN — Medication 20 MILLIGRAM(S): at 21:52

## 2018-12-02 RX ADMIN — MEGESTROL ACETATE 400 MILLIGRAM(S): 40 SUSPENSION ORAL at 05:59

## 2018-12-02 RX ADMIN — CHLORHEXIDINE GLUCONATE 1 APPLICATION(S): 213 SOLUTION TOPICAL at 05:59

## 2018-12-02 RX ADMIN — MEGESTROL ACETATE 400 MILLIGRAM(S): 40 SUSPENSION ORAL at 17:17

## 2018-12-02 RX ADMIN — MORPHINE SULFATE 6 MILLIGRAM(S): 50 CAPSULE, EXTENDED RELEASE ORAL at 05:28

## 2018-12-02 RX ADMIN — Medication 40 MILLIGRAM(S): at 07:56

## 2018-12-02 RX ADMIN — MORPHINE SULFATE 4 MILLIGRAM(S): 50 CAPSULE, EXTENDED RELEASE ORAL at 13:35

## 2018-12-02 RX ADMIN — DULOXETINE HYDROCHLORIDE 60 MILLIGRAM(S): 30 CAPSULE, DELAYED RELEASE ORAL at 12:17

## 2018-12-02 RX ADMIN — PANTOPRAZOLE SODIUM 40 MILLIGRAM(S): 20 TABLET, DELAYED RELEASE ORAL at 06:00

## 2018-12-02 RX ADMIN — Medication 50 MILLIGRAM(S): at 21:53

## 2018-12-02 RX ADMIN — FENTANYL CITRATE 1 PATCH: 50 INJECTION INTRAVENOUS at 09:30

## 2018-12-02 RX ADMIN — MORPHINE SULFATE 8 MILLIGRAM(S): 50 CAPSULE, EXTENDED RELEASE ORAL at 14:00

## 2018-12-02 RX ADMIN — MIDODRINE HYDROCHLORIDE 5 MILLIGRAM(S): 2.5 TABLET ORAL at 21:50

## 2018-12-02 RX ADMIN — MORPHINE SULFATE 6 MILLIGRAM(S): 50 CAPSULE, EXTENDED RELEASE ORAL at 06:00

## 2018-12-02 RX ADMIN — FENTANYL CITRATE 1 PATCH: 50 INJECTION INTRAVENOUS at 15:44

## 2018-12-02 RX ADMIN — Medication 1 TABLET(S): at 12:17

## 2018-12-02 RX ADMIN — MORPHINE SULFATE 8 MILLIGRAM(S): 50 CAPSULE, EXTENDED RELEASE ORAL at 23:23

## 2018-12-02 RX ADMIN — ZOLPIDEM TARTRATE 5 MILLIGRAM(S): 10 TABLET ORAL at 23:07

## 2018-12-02 NOTE — PROGRESS NOTE ADULT - SUBJECTIVE AND OBJECTIVE BOX
GENERAL SURGERY PROGRESS NOTE     AMRIK MADRID  58y  Male  Hospital day :3d  POD:  Procedure:   OVERNIGHT EVENTS:  Pt in continued pain. Spoke with palliative and they will return monday to discuss goals of care. Heme/onc stated no role for tx in this stage of disease.   T(F): 97.3 (12-01-18 @ 20:54), Max: 98.3 (12-01-18 @ 06:24)  HR: 96 (12-01-18 @ 20:54) (81 - 96)  BP: 99/64 (12-01-18 @ 20:54) (99/64 - 100/68)  ABP: --  ABP(mean): --  RR: 18 (12-01-18 @ 20:54) (17 - 18)  SpO2: --    DIET/FLUIDS: multivitamin 1 Tablet(s) Oral daily    NG:                                                                                DRAINS:     BM:     EMESIS:     URINE:      GI proph:  pantoprazole    Tablet 40 milliGRAM(s) Oral before breakfast    AC/ proph:   ABx:     Physical Examination:  General Appearance: NAD, alert and cooperative  Heart: S1 and S2. No murmurs. Rhythm is regular  Lungs: Clear to auscultation BL  Abdomen:  Positive bowel sounds. Soft, nondistended, tenderness around colostomy. Colostomy with good output        LABS  Labs:  CAPILLARY BLOOD GLUCOSE                              10.8   10.19 )-----------( 319      ( 01 Dec 2018 07:00 )             34.8       Auto Immature Granulocyte %: 0.7 % (12-01-18 @ 07:00)  Auto Neutrophil %: 82.2 % (12-01-18 @ 07:00)    12-01    137  |  92<L>  |  12  ----------------------------<  105<H>  4.2   |  32  |  0.8      Calcium, Total Serum: 8.8 mg/dL (12-01-18 @ 07:00)      LFTs:             5.9  | 0.4  | 15       ------------------[627     ( 01 Dec 2018 07:00 )  3.7  | <0.2 | 13          Lipase:x      Amylase:x         Lactate, Blood: 1.0 mmol/L (11-29-18 @ 11:32)  Blood Gas Venous - Lactate: 1.0 mmoL/L (11-29-18 @ 11:32)      Coags:     14.80  ----< 1.29    ( 01 Dec 2018 07:00 )     35.2        CARDIAC MARKERS ( 30 Nov 2018 06:37 )  x     / <0.01 ng/mL / 67 U/L / x     / 2.5 ng/mL          Culture - Urine (collected 29 Nov 2018 11:32)  Source: .Urine Clean Catch (Midstream)  Final Report (30 Nov 2018 20:41):    <10,000 CFU/ml    Normal Urogenital molly present          RADIOLOGY & ADDITIONAL TESTS:  ECHO pending

## 2018-12-02 NOTE — PROGRESS NOTE ADULT - ASSESSMENT
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  - hold eliquis for possible intervention, which will depend on the results of the echo  - palliative seen pt and f/u on monday to further discuss goals of care  - heme/onc: "he is considering hospice, he would not tolerate muti agent systemic therapy and single agent would be of marginal benefit if any   -will follow up and await his decision"

## 2018-12-02 NOTE — PROGRESS NOTE ADULT - SUBJECTIVE AND OBJECTIVE BOX
AMRIK MADRID  [facility] T1-3A 004 A ([facility] T1-3A)            Patient was evaluated and examined  by bedside, mild dyspnea, occasional abdominal pain, hamilton was inserted yesterday for I and O monitoring, colostomy bag was changed today in am by Nursing staff, colostomy healing well, patient denies having nausea or vomiting, no diarrhea, pain is controlled with current pain meds. no fever. no chest pain.           REVIEW OF SYSTEMS:  please see pertinent positives mentioned above, all other 12 ROS negative      T(C): , Max: 37.1 (12-02-18 @ 04:38)  HR: 94 (12-02-18 @ 12:29)  BP: 89/54 (12-02-18 @ 12:29)  RR: 18 (12-02-18 @ 12:29)  SpO2: 91% (12-02-18 @ 12:29)  CAPILLARY BLOOD GLUCOSE          PHYSICAL EXAM:      LAB  CBC  Date: 12-02-18 @ 06:32  Mean cell Pssadsnpns91.6  Mean cell Hemoglobin Conc29.9  Mean cell Volum 82.3  Platelet count-Automate 366  RBC Count 4.87  Red Cell Distrib Width14.5  WBC Count14.59  % Albumin, Urine--  Hematocrit 40.1  Hemoglobin 12.0  CBC  Date: 12-01-18 @ 07:00  Mean cell Odigoblvmp05.1  Mean cell Hemoglobin Conc31.0  Mean cell Volum 80.9  Platelet count-Automate 319  RBC Count 4.30  Red Cell Distrib Width14.6  WBC Count10.19  % Albumin, Urine--  Hematocrit 34.8  Hemoglobin 10.8  CBC  Date: 11-30-18 @ 06:37  Mean cell Ghthvefzwp93.8  Mean cell Hemoglobin Conc30.9  Mean cell Volum 80.5  Platelet count-Automate 343  RBC Count 4.51  Red Cell Distrib Width14.4  WBC Count8.66  % Albumin, Urine--  Hematocrit 36.3  Hemoglobin 11.2  CBC  Date: 11-29-18 @ 11:32  Mean cell Mhvxumzahj74.6  Mean cell Hemoglobin Conc30.3  Mean cell Volum 81.3  Platelet count-Automate 303  RBC Count 4.06  Red Cell Distrib Width14.5  WBC Count7.65  % Albumin, Urine--  Hematocrit 33.0  Hemoglobin 10.0    BMP  12-02-18 @ 06:32  Blood Gas Arterial-Calcium,Ionized--  Blood Urea Nitrogen, Serum 17 mg/dL [10 - 20]  Carbon Dioxide, Serum30 mmol/L [17 - 32]  Chloride, Serum96 mmol/L<L> [98 - 110]  Creatinie, Serum1.2 mg/dL [0.7 - 1.5]  Glucose, Ifqcm152 mg/dL<H> [70 - 99]  Potassium, Serum5.3 mmol/L<H> [3.5 - 5.0]  Sodium, Serum 140 mmol/L [135 - 146]  [facility]  12-01-18 @ 07:00  Blood Gas Arterial-Calcium,Ionized--  Blood Urea Nitrogen, Serum 12 mg/dL [10 - 20]  Carbon Dioxide, Serum32 mmol/L [17 - 32]  Chloride, Serum92 mmol/L<L> [98 - 110]  Creatinie, Serum0.8 mg/dL [0.7 - 1.5]  Glucose, Horad100 mg/dL<H> [70 - 99]  Potassium, Serum4.2 mmol/L [3.5 - 5.0]  Sodium, Serum 137 mmol/L [135 - 146]  [facility]  11-30-18 @ 06:37  Blood Gas Arterial-Calcium,Ionized--  Blood Urea Nitrogen, Serum 9 mg/dL<L> [10 - 20]  Carbon Dioxide, Serum31 mmol/L [17 - 32]  Chloride, Serum96 mmol/L<L> [98 - 110]  Creatinie, Serum0.8 mg/dL [0.7 - 1.5]  Glucose, Crhzo899 mg/dL<H> [70 - 99]  Potassium, Serum4.1 mmol/L [3.5 - 5.0]  Sodium, Serum 141 mmol/L [135 - 146]  [facility]  11-29-18 @ 11:32  Blood Gas Arterial-Calcium,Ionized--  Blood Urea Nitrogen, Serum 9 mg/dL<L> [10 - 20]  Carbon Dioxide, Serum31 mmol/L [17 - 32]  Chloride, Serum94 mmol/L<L> [98 - 110]  Creatinie, Serum0.8 mg/dL [0.7 - 1.5]  Glucose, Serum95 mg/dL [70 - 99]  Potassium, Serum4.6 mmol/L [3.5 - 5.0] [Hemolyzed. Interpret with caution]  Sodium, Serum 140 mmol/L [135 - 146]        PT/INR - ( 02 Dec 2018 06:32 )   PT: 13.50 sec;   INR: 1.18 ratio         PTT - ( 02 Dec 2018 06:32 )  PTT:27.1 sec      Microbiology:    Culture - Urine (collected 11-29-18 @ 11:32)  Source: .Urine Clean Catch (Midstream)  Final Report (11-30-18 @ 20:41):    <10,000 CFU/ml    Normal Urogenital omlly present      Medications:  acetaminophen   Tablet .. 650 milliGRAM(s) Oral every 6 hours PRN  chlorhexidine 4% Liquid 1 Application(s) Topical <User Schedule>  diltiazem    Tablet 60 milliGRAM(s) Oral every 8 hours  DULoxetine 60 milliGRAM(s) Oral daily  fentaNYL   Patch  50 MICROgram(s)/Hr 1 Patch Transdermal every 72 hours  furosemide   Injectable 20 milliGRAM(s) IV Push at bedtime  furosemide   Injectable 40 milliGRAM(s) IV Push daily  influenza   Vaccine 0.5 milliLiter(s) IntraMuscular once  isosorbide    mononitrate Tablet (ISMO) 20 milliGRAM(s) Oral two times a day  latanoprost 0.005% Ophthalmic Solution 1 Drop(s) Both EYES at bedtime  megestrol Suspension 400 milliGRAM(s) Oral <User Schedule>  midodrine 5 milliGRAM(s) Oral three times a day PRN  morphine  - Injectable 8 milliGRAM(s) IV Push every 2 hours PRN  multivitamin 1 Tablet(s) Oral daily  ondansetron    Tablet 8 milliGRAM(s) Oral every 6 hours PRN  pantoprazole    Tablet 40 milliGRAM(s) Oral before breakfast  simethicone 80 milliGRAM(s) Chew every 6 hours PRN  tamsulosin 0.4 milliGRAM(s) Oral at bedtime  traZODone 50 milliGRAM(s) Oral at bedtime  zolpidem 5 milliGRAM(s) Oral at bedtime PRN        Assessment and Plan:  59 y/o Male with PMH HTN, HFpEF (EF = 50-55% in Sept 2018), DVT on Eliquis, BPH, depression and bipolar, 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     #Metastatic Gastric and Early colon cancer  - Heme/onc following: he is considering hospice, he would not tolerate muti agent systemic therapy and single agent would be of marginal benefit if any   - Pt wishes to think about his options over the weekend and decide what is best for him on Monday.    # Shortness of breath secondary to pleural/pericardial effusion secondary to metastatic disease  - s/p thoracentesis + talc pleurodesis on previous admission  - CT chest showing progression of his disease; increased pleural and pericardial effusion  - On 4L O2 nasal cannula. continue   - IV diuresis , increase lasix to 40 mg IV in am, and 20 mg IV at pm daily,  -strict I and o    #Pericardial Effusion- we are awaiting of repeated 2d echo results    #Hypotension- d/c nadolol, continue cardizem  -started on midodrine PRN    # Abdominal pain secondary to metastatic ca and recent colostomy  - Continue Fentanyl patch 50 mcg/72H and morphine 6 mg IV q2hrs PRN. Pt reports improvement in his pain with this regimen.    - Palliative care following.     # Lower Extremity Edema b/l in patient with HFpEF (EF = 50-55% in Sept 2018)  - BNP = 186  - Continue lasix tx    #Colostomy status: daily colostomy care.    # Malignancy associated DVT  -hold Eliquis for next 24 hours ,if no further recommendations, will have to start a/c from tomorrow.    # Depression and Bipolar  - Continue duloxetine     # HTN  -pt. is borderline hypotensive    # BPH  - Continue flomax    DO NOT RESUSCITATE/DO NOT INTUBATE  Dispo: From [place] [facility]  Out of bed to chair  DASH diet AMRIK MADRID  [facility] T1-3A 004 A ([facility] T1-3A)            Patient was evaluated and examined  by bedside, mild dyspnea, occasional abdominal pain, hamilton was inserted yesterday for I and O monitoring, colostomy bag was changed today in am by Nursing staff, colostomy healing well, patient denies having nausea or vomiting, no diarrhea, pain is controlled with current pain meds. no fever. no chest pain.           REVIEW OF SYSTEMS:  please see pertinent positives mentioned above, all other 12 ROS negative      T(C): , Max: 37.1 (12-02-18 @ 04:38)  HR: 94 (12-02-18 @ 12:29)  BP: 89/54 (12-02-18 @ 12:29)  RR: 18 (12-02-18 @ 12:29)  SpO2: 91% (12-02-18 @ 12:29)  CAPILLARY BLOOD GLUCOSE          PHYSICAL EXAM:  GENERAL: NAD, Awake, patient is laying comfortably in bed  HEENT: AT, NC, PERRLA, SUPPLE, NO JVD, NO CB  LUNG: decreased breath sounds  B/L, no wheezing  CVS: normal S1, S2, RRR, NO M/G/R  ABDOMEN: soft, bowel sounds present, normoactive in all 4 quadrants, non-tender, non-distended, colostomy present  EXT: plus 2 pitting lower extremities edema present, no C/C, positive PP b/l extremities  NEURO: diffuse profound generalized body weakness present  SKIN: no rash, no ecchymosis      LAB  CBC  Date: 12-02-18 @ 06:32  Mean cell Dfslatiefb14.6  Mean cell Hemoglobin Conc29.9  Mean cell Volum 82.3  Platelet count-Automate 366  RBC Count 4.87  Red Cell Distrib Width14.5  WBC Count14.59  % Albumin, Urine--  Hematocrit 40.1  Hemoglobin 12.0  CBC  Date: 12-01-18 @ 07:00  Mean cell Umzikyzgpd19.1  Mean cell Hemoglobin Conc31.0  Mean cell Volum 80.9  Platelet count-Automate 319  RBC Count 4.30  Red Cell Distrib Width14.6  WBC Count10.19  % Albumin, Urine--  Hematocrit 34.8  Hemoglobin 10.8  CBC  Date: 11-30-18 @ 06:37  Mean cell Yyyyjsajvq56.8  Mean cell Hemoglobin Conc30.9  Mean cell Volum 80.5  Platelet count-Automate 343  RBC Count 4.51  Red Cell Distrib Width14.4  WBC Count8.66  % Albumin, Urine--  Hematocrit 36.3  Hemoglobin 11.2  CBC  Date: 11-29-18 @ 11:32  Mean cell Hmyfnjdbsi73.6  Mean cell Hemoglobin Conc30.3  Mean cell Volum 81.3  Platelet count-Automate 303  RBC Count 4.06  Red Cell Distrib Width14.5  WBC Count7.65  % Albumin, Urine--  Hematocrit 33.0  Hemoglobin 10.0    [facility]  12-02-18 @ 06:32  Blood Gas Arterial-Calcium,Ionized--  Blood Urea Nitrogen, Serum 17 mg/dL [10 - 20]  Carbon Dioxide, Serum30 mmol/L [17 - 32]  Chloride, Serum96 mmol/L<L> [98 - 110]  Creatinie, Serum1.2 mg/dL [0.7 - 1.5]  Glucose, Shrqn997 mg/dL<H> [70 - 99]  Potassium, Serum5.3 mmol/L<H> [3.5 - 5.0]  Sodium, Serum 140 mmol/L [135 - 146]  [facility]  12-01-18 @ 07:00  Blood Gas Arterial-Calcium,Ionized--  Blood Urea Nitrogen, Serum 12 mg/dL [10 - 20]  Carbon Dioxide, Serum32 mmol/L [17 - 32]  Chloride, Serum92 mmol/L<L> [98 - 110]  Creatinie, Serum0.8 mg/dL [0.7 - 1.5]  Glucose, Lkfer510 mg/dL<H> [70 - 99]  Potassium, Serum4.2 mmol/L [3.5 - 5.0]  Sodium, Serum 137 mmol/L [135 - 146]  [facility]  11-30-18 @ 06:37  Blood Gas Arterial-Calcium,Ionized--  Blood Urea Nitrogen, Serum 9 mg/dL<L> [10 - 20]  Carbon Dioxide, Serum31 mmol/L [17 - 32]  Chloride, Serum96 mmol/L<L> [98 - 110]  Creatinie, Serum0.8 mg/dL [0.7 - 1.5]  Glucose, Lkfjq102 mg/dL<H> [70 - 99]  Potassium, Serum4.1 mmol/L [3.5 - 5.0]  Sodium, Serum 141 mmol/L [135 - 146]  [facility]  11-29-18 @ 11:32  Blood Gas Arterial-Calcium,Ionized--  Blood Urea Nitrogen, Serum 9 mg/dL<L> [10 - 20]  Carbon Dioxide, Serum31 mmol/L [17 - 32]  Chloride, Serum94 mmol/L<L> [98 - 110]  Creatinie, Serum0.8 mg/dL [0.7 - 1.5]  Glucose, Serum95 mg/dL [70 - 99]  Potassium, Serum4.6 mmol/L [3.5 - 5.0] [Hemolyzed. Interpret with caution]  Sodium, Serum 140 mmol/L [135 - 146]        PT/INR - ( 02 Dec 2018 06:32 )   PT: 13.50 sec;   INR: 1.18 ratio         PTT - ( 02 Dec 2018 06:32 )  PTT:27.1 sec      Microbiology:    Culture - Urine (collected 11-29-18 @ 11:32)  Source: .Urine Clean Catch (Midstream)  Final Report (11-30-18 @ 20:41):    <10,000 CFU/ml    Normal Urogenital molly present      Medications:  acetaminophen   Tablet .. 650 milliGRAM(s) Oral every 6 hours PRN  chlorhexidine 4% Liquid 1 Application(s) Topical <User Schedule>  diltiazem    Tablet 60 milliGRAM(s) Oral every 8 hours  DULoxetine 60 milliGRAM(s) Oral daily  fentaNYL   Patch  50 MICROgram(s)/Hr 1 Patch Transdermal every 72 hours  furosemide   Injectable 20 milliGRAM(s) IV Push at bedtime  furosemide   Injectable 40 milliGRAM(s) IV Push daily  influenza   Vaccine 0.5 milliLiter(s) IntraMuscular once  isosorbide    mononitrate Tablet (ISMO) 20 milliGRAM(s) Oral two times a day  latanoprost 0.005% Ophthalmic Solution 1 Drop(s) Both EYES at bedtime  megestrol Suspension 400 milliGRAM(s) Oral <User Schedule>  midodrine 5 milliGRAM(s) Oral three times a day PRN  morphine  - Injectable 8 milliGRAM(s) IV Push every 2 hours PRN  multivitamin 1 Tablet(s) Oral daily  ondansetron    Tablet 8 milliGRAM(s) Oral every 6 hours PRN  pantoprazole    Tablet 40 milliGRAM(s) Oral before breakfast  simethicone 80 milliGRAM(s) Chew every 6 hours PRN  tamsulosin 0.4 milliGRAM(s) Oral at bedtime  traZODone 50 milliGRAM(s) Oral at bedtime  zolpidem 5 milliGRAM(s) Oral at bedtime PRN        Assessment and Plan:  59 y/o Male with PMH HTN, HFpEF (EF = 50-55% in Sept 2018), DVT on Eliquis, BPH, depression and bipolar, 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     #Metastatic Gastric and Early colon cancer  - Heme/onc following: he is considering hospice, he would not tolerate muti agent systemic therapy and single agent would be of marginal benefit if any   - Pt wishes to think about his options over the weekend and decide what is best for him on Monday.    # Shortness of breath secondary to pleural/pericardial effusion secondary to metastatic disease  - s/p thoracentesis + talc pleurodesis on previous admission  - CT chest showing progression of his disease; increased pleural and pericardial effusion  - On 4L O2 nasal cannula. continue   - IV diuresis , increase lasix to 40 mg IV in am, and 20 mg IV at pm daily,  -strict I and o    #Pericardial Effusion- we are awaiting of repeated 2d echo results    #Hypotension- d/c nadolol, continue cardizem  -started on midodrine PRN    # Abdominal pain secondary to metastatic ca and recent colostomy  - Continue Fentanyl patch 50 mcg/72H and morphine 6 mg IV q2hrs PRN. Pt reports improvement in his pain with this regimen.    - Palliative care following.     # Lower Extremity Edema b/l in patient with HFpEF (EF = 50-55% in Sept 2018)  - BNP = 186  - Continue lasix tx    #Colostomy status: daily colostomy care.    # Malignancy associated DVT  -hold Eliquis for next 24 hours ,if no further recommendations, will have to start a/c from tomorrow.    # Depression and Bipolar  - Continue duloxetine     # HTN  -pt. is borderline hypotensive    # BPH  - Continue flomax    DO NOT RESUSCITATE/DO NOT INTUBATE  Dispo: From [facility]  Out of bed to chair  DASH diet

## 2018-12-03 LAB
ALBUMIN SERPL ELPH-MCNC: 3.4 G/DL — LOW (ref 3.5–5.2)
ALP SERPL-CCNC: 627 U/L — HIGH (ref 30–115)
ALT FLD-CCNC: 132 U/L — HIGH (ref 0–41)
ANION GAP SERPL CALC-SCNC: 10 MMOL/L — SIGNIFICANT CHANGE UP (ref 7–14)
APTT BLD: 32.8 SEC — SIGNIFICANT CHANGE UP (ref 27–39.2)
AST SERPL-CCNC: 71 U/L — HIGH (ref 0–41)
BASOPHILS # BLD AUTO: 0.03 K/UL — SIGNIFICANT CHANGE UP (ref 0–0.2)
BASOPHILS NFR BLD AUTO: 0.3 % — SIGNIFICANT CHANGE UP (ref 0–1)
BILIRUB SERPL-MCNC: 0.3 MG/DL — SIGNIFICANT CHANGE UP (ref 0.2–1.2)
BUN SERPL-MCNC: 22 MG/DL — HIGH (ref 10–20)
CALCIUM SERPL-MCNC: 8.5 MG/DL — SIGNIFICANT CHANGE UP (ref 8.5–10.1)
CHLORIDE SERPL-SCNC: 92 MMOL/L — LOW (ref 98–110)
CO2 SERPL-SCNC: 33 MMOL/L — HIGH (ref 17–32)
CREAT SERPL-MCNC: 1 MG/DL — SIGNIFICANT CHANGE UP (ref 0.7–1.5)
EOSINOPHIL # BLD AUTO: 0.06 K/UL — SIGNIFICANT CHANGE UP (ref 0–0.7)
EOSINOPHIL NFR BLD AUTO: 0.6 % — SIGNIFICANT CHANGE UP (ref 0–8)
GLUCOSE SERPL-MCNC: 118 MG/DL — HIGH (ref 70–99)
HCT VFR BLD CALC: 34.9 % — LOW (ref 42–52)
HGB BLD-MCNC: 10.4 G/DL — LOW (ref 14–18)
IMM GRANULOCYTES NFR BLD AUTO: 1.4 % — HIGH (ref 0.1–0.3)
INR BLD: 1.23 RATIO — SIGNIFICANT CHANGE UP (ref 0.65–1.3)
LYMPHOCYTES # BLD AUTO: 0.97 K/UL — LOW (ref 1.2–3.4)
LYMPHOCYTES # BLD AUTO: 9.6 % — LOW (ref 20.5–51.1)
MAGNESIUM SERPL-MCNC: 1.9 MG/DL — SIGNIFICANT CHANGE UP (ref 1.8–2.4)
MCHC RBC-ENTMCNC: 24.8 PG — LOW (ref 27–31)
MCHC RBC-ENTMCNC: 29.8 G/DL — LOW (ref 32–37)
MCV RBC AUTO: 83.1 FL — SIGNIFICANT CHANGE UP (ref 80–94)
MONOCYTES # BLD AUTO: 0.91 K/UL — HIGH (ref 0.1–0.6)
MONOCYTES NFR BLD AUTO: 9 % — SIGNIFICANT CHANGE UP (ref 1.7–9.3)
NEUTROPHILS # BLD AUTO: 8.04 K/UL — HIGH (ref 1.4–6.5)
NEUTROPHILS NFR BLD AUTO: 79.1 % — HIGH (ref 42.2–75.2)
NRBC # BLD: 0 /100 WBCS — SIGNIFICANT CHANGE UP (ref 0–0)
PHOSPHATE SERPL-MCNC: 4 MG/DL — SIGNIFICANT CHANGE UP (ref 2.1–4.9)
PLATELET # BLD AUTO: 281 K/UL — SIGNIFICANT CHANGE UP (ref 130–400)
POTASSIUM SERPL-MCNC: 4.7 MMOL/L — SIGNIFICANT CHANGE UP (ref 3.5–5)
POTASSIUM SERPL-SCNC: 4.7 MMOL/L — SIGNIFICANT CHANGE UP (ref 3.5–5)
PROT SERPL-MCNC: 5.5 G/DL — LOW (ref 6–8)
PROTHROM AB SERPL-ACNC: 14.1 SEC — HIGH (ref 9.95–12.87)
RBC # BLD: 4.2 M/UL — LOW (ref 4.7–6.1)
RBC # FLD: 14.7 % — HIGH (ref 11.5–14.5)
SODIUM SERPL-SCNC: 135 MMOL/L — SIGNIFICANT CHANGE UP (ref 135–146)
WBC # BLD: 10.15 K/UL — SIGNIFICANT CHANGE UP (ref 4.8–10.8)
WBC # FLD AUTO: 10.15 K/UL — SIGNIFICANT CHANGE UP (ref 4.8–10.8)

## 2018-12-03 RX ORDER — OXYCODONE HYDROCHLORIDE 5 MG/1
15 TABLET ORAL ONCE
Qty: 0 | Refills: 0 | Status: DISCONTINUED | OUTPATIENT
Start: 2018-12-03 | End: 2018-12-03

## 2018-12-03 RX ORDER — OXYCODONE HYDROCHLORIDE 5 MG/1
15 TABLET ORAL EVERY 12 HOURS
Qty: 0 | Refills: 0 | Status: DISCONTINUED | OUTPATIENT
Start: 2018-12-03 | End: 2018-12-03

## 2018-12-03 RX ORDER — MORPHINE SULFATE 50 MG/1
10 CAPSULE, EXTENDED RELEASE ORAL
Qty: 0 | Refills: 0 | Status: DISCONTINUED | OUTPATIENT
Start: 2018-12-03 | End: 2018-12-04

## 2018-12-03 RX ORDER — MORPHINE SULFATE 50 MG/1
12 CAPSULE, EXTENDED RELEASE ORAL
Qty: 0 | Refills: 0 | Status: DISCONTINUED | OUTPATIENT
Start: 2018-12-03 | End: 2018-12-03

## 2018-12-03 RX ORDER — FENTANYL CITRATE 50 UG/ML
1 INJECTION INTRAVENOUS
Qty: 0 | Refills: 0 | Status: DISCONTINUED | OUTPATIENT
Start: 2018-12-03 | End: 2018-12-06

## 2018-12-03 RX ORDER — MORPHINE SULFATE 50 MG/1
10 CAPSULE, EXTENDED RELEASE ORAL ONCE
Qty: 0 | Refills: 0 | Status: DISCONTINUED | OUTPATIENT
Start: 2018-12-03 | End: 2018-12-03

## 2018-12-03 RX ORDER — OXYCODONE HYDROCHLORIDE 5 MG/1
15 TABLET ORAL EVERY 6 HOURS
Qty: 0 | Refills: 0 | Status: DISCONTINUED | OUTPATIENT
Start: 2018-12-03 | End: 2018-12-03

## 2018-12-03 RX ADMIN — MORPHINE SULFATE 10 MILLIGRAM(S): 50 CAPSULE, EXTENDED RELEASE ORAL at 14:57

## 2018-12-03 RX ADMIN — OXYCODONE HYDROCHLORIDE 15 MILLIGRAM(S): 5 TABLET ORAL at 11:35

## 2018-12-03 RX ADMIN — FENTANYL CITRATE 1 PATCH: 50 INJECTION INTRAVENOUS at 21:08

## 2018-12-03 RX ADMIN — LATANOPROST 1 DROP(S): 0.05 SOLUTION/ DROPS OPHTHALMIC; TOPICAL at 22:43

## 2018-12-03 RX ADMIN — APIXABAN 5 MILLIGRAM(S): 2.5 TABLET, FILM COATED ORAL at 17:04

## 2018-12-03 RX ADMIN — MORPHINE SULFATE 8 MILLIGRAM(S): 50 CAPSULE, EXTENDED RELEASE ORAL at 12:53

## 2018-12-03 RX ADMIN — Medication 50 MILLIGRAM(S): at 22:42

## 2018-12-03 RX ADMIN — OXYCODONE HYDROCHLORIDE 15 MILLIGRAM(S): 5 TABLET ORAL at 17:02

## 2018-12-03 RX ADMIN — Medication 1 TABLET(S): at 11:21

## 2018-12-03 RX ADMIN — MORPHINE SULFATE 10 MILLIGRAM(S): 50 CAPSULE, EXTENDED RELEASE ORAL at 14:26

## 2018-12-03 RX ADMIN — FENTANYL CITRATE 1 PATCH: 50 INJECTION INTRAVENOUS at 21:49

## 2018-12-03 RX ADMIN — Medication 40 MILLIGRAM(S): at 05:55

## 2018-12-03 RX ADMIN — PANTOPRAZOLE SODIUM 40 MILLIGRAM(S): 20 TABLET, DELAYED RELEASE ORAL at 05:55

## 2018-12-03 RX ADMIN — OXYCODONE HYDROCHLORIDE 15 MILLIGRAM(S): 5 TABLET ORAL at 11:21

## 2018-12-03 RX ADMIN — MIDODRINE HYDROCHLORIDE 5 MILLIGRAM(S): 2.5 TABLET ORAL at 06:06

## 2018-12-03 RX ADMIN — MORPHINE SULFATE 8 MILLIGRAM(S): 50 CAPSULE, EXTENDED RELEASE ORAL at 00:00

## 2018-12-03 RX ADMIN — MEGESTROL ACETATE 400 MILLIGRAM(S): 40 SUSPENSION ORAL at 17:04

## 2018-12-03 RX ADMIN — DULOXETINE HYDROCHLORIDE 60 MILLIGRAM(S): 30 CAPSULE, DELAYED RELEASE ORAL at 11:21

## 2018-12-03 RX ADMIN — CHLORHEXIDINE GLUCONATE 1 APPLICATION(S): 213 SOLUTION TOPICAL at 05:55

## 2018-12-03 RX ADMIN — MORPHINE SULFATE 8 MILLIGRAM(S): 50 CAPSULE, EXTENDED RELEASE ORAL at 09:57

## 2018-12-03 RX ADMIN — Medication 20 MILLIGRAM(S): at 22:43

## 2018-12-03 RX ADMIN — TAMSULOSIN HYDROCHLORIDE 0.4 MILLIGRAM(S): 0.4 CAPSULE ORAL at 22:44

## 2018-12-03 RX ADMIN — OXYCODONE HYDROCHLORIDE 15 MILLIGRAM(S): 5 TABLET ORAL at 17:40

## 2018-12-03 RX ADMIN — MORPHINE SULFATE 8 MILLIGRAM(S): 50 CAPSULE, EXTENDED RELEASE ORAL at 10:39

## 2018-12-03 RX ADMIN — MORPHINE SULFATE 8 MILLIGRAM(S): 50 CAPSULE, EXTENDED RELEASE ORAL at 13:00

## 2018-12-03 RX ADMIN — APIXABAN 5 MILLIGRAM(S): 2.5 TABLET, FILM COATED ORAL at 05:55

## 2018-12-03 NOTE — PROGRESS NOTE ADULT - SUBJECTIVE AND OBJECTIVE BOX
AMRIK MADRID  58y Male   [id number]    Hospital Day:   Post Operative Day:  Procedure:  Patient is a 58y Male patient admitted S/P gastric adenocarcinoma signet ring and colon adenocarcinoma, pleural mets, s/p VATS and talc pleurodesis, transverse loop colostomy        PAST MEDICAL & SURGICAL HISTORY:  Pleural effusion  CHF (congestive heart failure)  Insomnia  BPH (benign prostatic hyperplasia)  Depression  Bipolar 1 disorder  HTN (hypertension)  Colostomy present  History of thoracentesis      Events of the Last 24h:none    Vital Signs Last 24 Hrs  T(C): 36.6 (02 Dec 2018 22:26), Max: 37.1 (02 Dec 2018 04:38)  T(F): 97.9 (02 Dec 2018 22:26), Max: 98.8 (02 Dec 2018 04:38)  HR: 92 (02 Dec 2018 23:00) (92 - 124)  BP: 108/60 (02 Dec 2018 23:00) (86/59 - 109/66)  BP(mean): --  RR: 18 (02 Dec 2018 22:26) (18 - 18)  SpO2: 91% (02 Dec 2018 18:43) (91% - 91%)        Diet, DASH/TLC:   Sodium & Cholesterol Restricted  Low Fat (LOWFAT) (18 @ 17:10)      I&O's Summary    01 Dec 2018 07:  -  02 Dec 2018 07:00  --------------------------------------------------------  IN: 0 mL / OUT: 653 mL / NET: -653 mL    02 Dec 2018 07:  -  03 Dec 2018 00:56  --------------------------------------------------------  IN: 0 mL / OUT: 650 mL / NET: -650 mL     I&O's Detail    01 Dec 2018 07:  -  02 Dec 2018 07:00  --------------------------------------------------------  IN:  Total IN: 0 mL    OUT:    Indwelling Catheter - Urethral: 300 mL    Voided: 353 mL  Total OUT: 653 mL    Total NET: -653 mL      02 Dec 2018 07:01  -  03 Dec 2018 00:56  --------------------------------------------------------  IN:  Total IN: 0 mL    OUT:    Indwelling Catheter - Urethral: 250 mL    Voided: 400 mL  Total OUT: 650 mL    Total NET: -650 mL          MEDICATIONS  (STANDING):  apixaban 5 milliGRAM(s) Oral every 12 hours  chlorhexidine 4% Liquid 1 Application(s) Topical <User Schedule>  diltiazem    Tablet 60 milliGRAM(s) Oral every 8 hours  DULoxetine 60 milliGRAM(s) Oral daily  fentaNYL   Patch  50 MICROgram(s)/Hr 1 Patch Transdermal every 72 hours  furosemide   Injectable 20 milliGRAM(s) IV Push at bedtime  furosemide   Injectable 40 milliGRAM(s) IV Push daily  influenza   Vaccine 0.5 milliLiter(s) IntraMuscular once  isosorbide    mononitrate Tablet (ISMO) 20 milliGRAM(s) Oral two times a day  latanoprost 0.005% Ophthalmic Solution 1 Drop(s) Both EYES at bedtime  megestrol Suspension 400 milliGRAM(s) Oral <User Schedule>  multivitamin 1 Tablet(s) Oral daily  pantoprazole    Tablet 40 milliGRAM(s) Oral before breakfast  tamsulosin 0.4 milliGRAM(s) Oral at bedtime  traZODone 50 milliGRAM(s) Oral at bedtime    MEDICATIONS  (PRN):  acetaminophen   Tablet .. 650 milliGRAM(s) Oral every 6 hours PRN Temp greater or equal to 38.5C (101.3F), Mild Pain (1 - 3)  midodrine 5 milliGRAM(s) Oral three times a day PRN  or below  morphine  - Injectable 8 milliGRAM(s) IV Push every 2 hours PRN Severe Pain (7 - 10)  ondansetron    Tablet 8 milliGRAM(s) Oral every 6 hours PRN Nausea and/or Vomiting  simethicone 80 milliGRAM(s) Chew every 6 hours PRN gas/abdominal discomfort  zolpidem 5 milliGRAM(s) Oral at bedtime PRN Insomnia      PHYSICAL EXAM:    GENERAL: NAD    HEENT: NCAT    CHEST/LUNGS: CTAB    HEART: RRR,  No murmurs, rubs, or gallops    ABDOMEN: SNTND +BS    EXTREMITIES:  FROM, No clubbing, cyanosis, or edema, palpable pulse    NEURO: No focal neurological deficits    SKIN: No rashes or lesions    INCISION/WOUNDS:                          12.0   14.59 )-----------( 366      ( 02 Dec 2018 06:32 )             40.1        CBC Full  -  ( 02 Dec 2018 06:32 )  WBC Count : 14.59 K/uL  Hemoglobin : 12.0 g/dL  Hematocrit : 40.1 %  Platelet Count - Automated : 366 K/uL  Mean Cell Volume : 82.3 fL  Mean Cell Hemoglobin : 24.6 pg  Mean Cell Hemoglobin Concentration : 29.9 g/dL  Auto Neutrophil # : 11.43 K/uL  Auto Lymphocyte # : 1.78 K/uL  Auto Monocyte # : 1.05 K/uL  Auto Eosinophil # : 0.15 K/uL  Auto Basophil # : 0.05 K/uL  Auto Neutrophil % : 78.4 %  Auto Lymphocyte % : 12.2 %  Auto Monocyte % : 7.2 %  Auto Eosinophil % : 1.0 %  Auto Basophil % : 0.3 %               140   |  96    |  17                 Ca: 9.3    BMP:   ----------------------------< 142    M.1   (18 @ 06:32)             5.3    |  30    | 1.2                Ph: 4.2      LFT:     TPro: 6.6 / Alb: 3.7 / TBili: 0.7 / DBili: 0.3 / AST: 74 / ALT: 60 / AlkPhos: 777   (18 @ 06:32)    LIVER FUNCTIONS - ( 02 Dec 2018 06:32 )  Alb: 3.7 g/dL / Pro: 6.6 g/dL / ALK PHOS: 777 U/L / ALT: 60 U/L / AST: 74 U/L / GGT: x           PT/INR - ( 02 Dec 2018 06:32 )   PT: 13.50 sec;   INR: 1.18 ratio         PTT - ( 02 Dec 2018 06:32 )  PTT:27.1 sec      < from: Xray Chest 1 View- PORTABLE-Routine (18 @ 08:26) >  Support devices: None.    Cardiac/mediastinum/hilum: Obscured.    Lung parenchyma/Pleura: Stable extensive bilateral airspace opacities and   pleural effusions.    Skeleton/soft tissues: Unchanged.    Impression:      Stable extensive bilateral airspace opacities and pleural effusions.    < end of copied text > Hospital Day: 5  Patient is a 58y Male patient admitted S/P gastric adenocarcinoma signet ring and colon adenocarcinoma, pleural mets, s/p VATS and talc pleurodesis, transverse loop colostomy        PAST MEDICAL & SURGICAL HISTORY:  Pleural effusion  CHF (congestive heart failure)  Insomnia  BPH (benign prostatic hyperplasia)  Depression  Bipolar 1 disorder  HTN (hypertension)  Colostomy present  History of thoracentesis      Events of the Last 24h:none    Vital Signs Last 24 Hrs  T(C): 36.6 (02 Dec 2018 22:26), Max: 37.1 (02 Dec 2018 04:38)  T(F): 97.9 (02 Dec 2018 22:26), Max: 98.8 (02 Dec 2018 04:38)  HR: 92 (02 Dec 2018 23:00) (92 - 124)  BP: 108/60 (02 Dec 2018 23:00) (86/59 - 109/66)  BP(mean): --  RR: 18 (02 Dec 2018 22:26) (18 - 18)  SpO2: 91% (02 Dec 2018 18:43) (91% - 91%)        Diet, DASH/TLC:   Sodium & Cholesterol Restricted  Low Fat (LOWFAT) (18 @ 17:10)      I&O's Summary    01 Dec 2018 07:  -  02 Dec 2018 07:00  --------------------------------------------------------  IN: 0 mL / OUT: 653 mL / NET: -653 mL    02 Dec 2018 07:  -  03 Dec 2018 00:56  --------------------------------------------------------  IN: 0 mL / OUT: 650 mL / NET: -650 mL     I&O's Detail    01 Dec 2018 07:  -  02 Dec 2018 07:00  --------------------------------------------------------  IN:  Total IN: 0 mL    OUT:    Indwelling Catheter - Urethral: 300 mL    Voided: 353 mL  Total OUT: 653 mL    Total NET: -653 mL      02 Dec 2018 07:01  -  03 Dec 2018 00:56  --------------------------------------------------------  IN:  Total IN: 0 mL    OUT:    Indwelling Catheter - Urethral: 250 mL    Voided: 400 mL  Total OUT: 650 mL    Total NET: -650 mL          MEDICATIONS  (STANDING):  apixaban 5 milliGRAM(s) Oral every 12 hours  chlorhexidine 4% Liquid 1 Application(s) Topical <User Schedule>  diltiazem    Tablet 60 milliGRAM(s) Oral every 8 hours  DULoxetine 60 milliGRAM(s) Oral daily  fentaNYL   Patch  50 MICROgram(s)/Hr 1 Patch Transdermal every 72 hours  furosemide   Injectable 20 milliGRAM(s) IV Push at bedtime  furosemide   Injectable 40 milliGRAM(s) IV Push daily  influenza   Vaccine 0.5 milliLiter(s) IntraMuscular once  isosorbide    mononitrate Tablet (ISMO) 20 milliGRAM(s) Oral two times a day  latanoprost 0.005% Ophthalmic Solution 1 Drop(s) Both EYES at bedtime  megestrol Suspension 400 milliGRAM(s) Oral <User Schedule>  multivitamin 1 Tablet(s) Oral daily  pantoprazole    Tablet 40 milliGRAM(s) Oral before breakfast  tamsulosin 0.4 milliGRAM(s) Oral at bedtime  traZODone 50 milliGRAM(s) Oral at bedtime    MEDICATIONS  (PRN):  acetaminophen   Tablet .. 650 milliGRAM(s) Oral every 6 hours PRN Temp greater or equal to 38.5C (101.3F), Mild Pain (1 - 3)  midodrine 5 milliGRAM(s) Oral three times a day PRN  or below  morphine  - Injectable 8 milliGRAM(s) IV Push every 2 hours PRN Severe Pain (7 - 10)  ondansetron    Tablet 8 milliGRAM(s) Oral every 6 hours PRN Nausea and/or Vomiting  simethicone 80 milliGRAM(s) Chew every 6 hours PRN gas/abdominal discomfort  zolpidem 5 milliGRAM(s) Oral at bedtime PRN Insomnia      PHYSICAL EXAM:    GENERAL: NAD    HEENT: NCAT    CHEST/LUNGS: CTAB    HEART: RRR,  No murmurs, rubs, or gallops    ABDOMEN: SNTND +BS    EXTREMITIES:  FROM, No clubbing, cyanosis, or edema, palpable pulse    NEURO: No focal neurological deficits    SKIN: No rashes or lesions    INCISION/WOUNDS:                          12.0   14.59 )-----------( 366      ( 02 Dec 2018 06:32 )             40.1        CBC Full  -  ( 02 Dec 2018 06:32 )  WBC Count : 14.59 K/uL  Hemoglobin : 12.0 g/dL  Hematocrit : 40.1 %  Platelet Count - Automated : 366 K/uL  Mean Cell Volume : 82.3 fL  Mean Cell Hemoglobin : 24.6 pg  Mean Cell Hemoglobin Concentration : 29.9 g/dL  Auto Neutrophil # : 11.43 K/uL  Auto Lymphocyte # : 1.78 K/uL  Auto Monocyte # : 1.05 K/uL  Auto Eosinophil # : 0.15 K/uL  Auto Basophil # : 0.05 K/uL  Auto Neutrophil % : 78.4 %  Auto Lymphocyte % : 12.2 %  Auto Monocyte % : 7.2 %  Auto Eosinophil % : 1.0 %  Auto Basophil % : 0.3 %               140   |  96    |  17                 Ca: 9.3    BMP:   ----------------------------< 142    M.1   (18 @ 06:32)             5.3    |  30    | 1.2                Ph: 4.2      LFT:     TPro: 6.6 / Alb: 3.7 / TBili: 0.7 / DBili: 0.3 / AST: 74 / ALT: 60 / AlkPhos: 777   (18 @ 06:32)    LIVER FUNCTIONS - ( 02 Dec 2018 06:32 )  Alb: 3.7 g/dL / Pro: 6.6 g/dL / ALK PHOS: 777 U/L / ALT: 60 U/L / AST: 74 U/L / GGT: x           PT/INR - ( 02 Dec 2018 06:32 )   PT: 13.50 sec;   INR: 1.18 ratio         PTT - ( 02 Dec 2018 06:32 )  PTT:27.1 sec      < from: Xray Chest 1 View- PORTABLE-Routine (18 @ 08:26) >  Support devices: None.    Cardiac/mediastinum/hilum: Obscured.    Lung parenchyma/Pleura: Stable extensive bilateral airspace opacities and   pleural effusions.    Skeleton/soft tissues: Unchanged.    Impression:      Stable extensive bilateral airspace opacities and pleural effusions.    < end of copied text >

## 2018-12-03 NOTE — PROGRESS NOTE ADULT - SUBJECTIVE AND OBJECTIVE BOX
AMRIK MADRID  [facility] T1-3A 004 A ([facility] T1-3A)            Patient was evaluated and examined  by bedside, feels sleepy in am, on/off mild dyspnea episodes, persistent legs edema, no fever, poor appetite, occasional abdominal pain.        REVIEW OF SYSTEMS:  please see pertinent positives mentioned above, all other 12 ROS negative      T(C): , Max: 36.8 (12-02-18 @ 14:15)  HR: 97 (12-03-18 @ 06:35)  BP: 104/62 (12-03-18 @ 06:35)  RR: 18 (12-03-18 @ 04:36)  SpO2: 91% (12-02-18 @ 18:43)  CAPILLARY BLOOD GLUCOSE          PHYSICAL EXAM:  GENERAL: NAD, Awake, patient is laying comfortably in bed  HEENT: AT, NC, PERRLA, SUPPLE, NO JVD, NO CB  LUNG: decreased breath sounds  B/L, no wheezing  CVS: normal S1, S2, RRR, NO M/G/R  ABDOMEN: soft, bowel sounds present, normoactive in all 4 quadrants, non-tender, non-distended, colostomy present  EXT: plus 2 pitting lower extremities edema present, no C/C, positive PP b/l extremities  NEURO: diffuse profound generalized body weakness present  SKIN: no rash, no ecchymosis      LAB  CBC  Date: 12-03-18 @ 06:22  Mean cell Rhdfgjffkw96.8  Mean cell Hemoglobin Conc29.8  Mean cell Volum 83.1  Platelet count-Automate 281  RBC Count 4.20  Red Cell Distrib Width14.7  WBC Count10.15  % Albumin, Urine--  Hematocrit 34.9  Hemoglobin 10.4  CBC  Date: 12-02-18 @ 06:32  Mean cell Vkfpbdihrv75.6  Mean cell Hemoglobin Conc29.9  Mean cell Volum 82.3  Platelet count-Automate 366  RBC Count 4.87  Red Cell Distrib Width14.5  WBC Count14.59  % Albumin, Urine--  Hematocrit 40.1  Hemoglobin 12.0  CBC  Date: 12-01-18 @ 07:00  Mean cell Jvuglffngg95.1  Mean cell Hemoglobin Conc31.0  Mean cell Volum 80.9  Platelet count-Automate 319  RBC Count 4.30  Red Cell Distrib Width14.6  WBC Count10.19  % Albumin, Urine--  Hematocrit 34.8  Hemoglobin 10.8  CBC  Date: 11-30-18 @ 06:37  Mean cell Xmwrocjumh35.8  Mean cell Hemoglobin Conc30.9  Mean cell Volum 80.5  Platelet count-Automate 343  RBC Count 4.51  Red Cell Distrib Width14.4  WBC Count8.66  % Albumin, Urine--  Hematocrit 36.3  Hemoglobin 11.2  CBC  Date: 11-29-18 @ 11:32  Mean cell Beeuuiunuv84.6  Mean cell Hemoglobin Conc30.3  Mean cell Volum 81.3  Platelet count-Automate 303  RBC Count 4.06  Red Cell Distrib Width14.5  WBC Count7.65  % Albumin, Urine--  Hematocrit 33.0  Hemoglobin 10.0    [facility]  12-03-18 @ 06:22  Blood Gas Arterial-Calcium,Ionized--  Blood Urea Nitrogen, Serum 22 mg/dL<H> [10 - 20]  Carbon Dioxide, Serum33 mmol/L<H> [17 - 32]  Chloride, Serum92 mmol/L<L> [98 - 110]  Creatinie, Serum1.0 mg/dL [0.7 - 1.5]  Glucose, Ydiln082 mg/dL<H> [70 - 99]  Potassium, Serum4.7 mmol/L [3.5 - 5.0]  Sodium, Serum 135 mmol/L [135 - 146]  [facility]  12-02-18 @ 06:32  Blood Gas Arterial-Calcium,Ionized--  Blood Urea Nitrogen, Serum 17 mg/dL [10 - 20]  Carbon Dioxide, Serum30 mmol/L [17 - 32]  Chloride, Serum96 mmol/L<L> [98 - 110]  Creatinie, Serum1.2 mg/dL [0.7 - 1.5]  Glucose, Kxikc347 mg/dL<H> [70 - 99]  Potassium, Serum5.3 mmol/L<H> [3.5 - 5.0]  Sodium, Serum 140 mmol/L [135 - 146]  [facility]  12-01-18 @ 07:00  Blood Gas Arterial-Calcium,Ionized--  Blood Urea Nitrogen, Serum 12 mg/dL [10 - 20]  Carbon Dioxide, Serum32 mmol/L [17 - 32]  Chloride, Serum92 mmol/L<L> [98 - 110]  Creatinie, Serum0.8 mg/dL [0.7 - 1.5]  Glucose, Hkfua732 mg/dL<H> [70 - 99]  Potassium, Serum4.2 mmol/L [3.5 - 5.0]  Sodium, Serum 137 mmol/L [135 - 146]  [facility]  11-30-18 @ 06:37  Blood Gas Arterial-Calcium,Ionized--  Blood Urea Nitrogen, Serum 9 mg/dL<L> [10 - 20]  Carbon Dioxide, Serum31 mmol/L [17 - 32]  Chloride, Serum96 mmol/L<L> [98 - 110]  Creatinie, Serum0.8 mg/dL [0.7 - 1.5]  Glucose, Voyzf601 mg/dL<H> [70 - 99]  Potassium, Serum4.1 mmol/L [3.5 - 5.0]  Sodium, Serum 141 mmol/L [135 - 146]  [facility]  11-29-18 @ 11:32  Blood Gas Arterial-Calcium,Ionized--  Blood Urea Nitrogen, Serum 9 mg/dL<L> [10 - 20]  Carbon Dioxide, Serum31 mmol/L [17 - 32]  Chloride, Serum94 mmol/L<L> [98 - 110]  Creatinie, Serum0.8 mg/dL [0.7 - 1.5]  Glucose, Serum95 mg/dL [70 - 99]  Potassium, Serum4.6 mmol/L [3.5 - 5.0] [Hemolyzed. Interpret with caution]  Sodium, Serum 140 mmol/L [135 - 146]        PT/INR - ( 03 Dec 2018 06:22 )   PT: 14.10 sec;   INR: 1.23 ratio         PTT - ( 03 Dec 2018 06:22 )  PTT:32.8 sec      Microbiology:    Culture - Urine (collected 11-29-18 @ 11:32)  Source: .Urine Clean Catch (Midstream)  Final Report (11-30-18 @ 20:41):    <10,000 CFU/ml    Normal Urogenital molly present        Medications:  acetaminophen   Tablet .. 650 milliGRAM(s) Oral every 6 hours PRN  apixaban 5 milliGRAM(s) Oral every 12 hours  chlorhexidine 4% Liquid 1 Application(s) Topical <User Schedule>  diltiazem    Tablet 60 milliGRAM(s) Oral every 8 hours  DULoxetine 60 milliGRAM(s) Oral daily  fentaNYL   Patch  50 MICROgram(s)/Hr 1 Patch Transdermal every 72 hours  furosemide   Injectable 20 milliGRAM(s) IV Push at bedtime  furosemide   Injectable 40 milliGRAM(s) IV Push daily  influenza   Vaccine 0.5 milliLiter(s) IntraMuscular once  isosorbide    mononitrate Tablet (ISMO) 20 milliGRAM(s) Oral two times a day  latanoprost 0.005% Ophthalmic Solution 1 Drop(s) Both EYES at bedtime  megestrol Suspension 400 milliGRAM(s) Oral <User Schedule>  midodrine 5 milliGRAM(s) Oral three times a day PRN  morphine  - Injectable 8 milliGRAM(s) IV Push every 2 hours PRN  multivitamin 1 Tablet(s) Oral daily  ondansetron    Tablet 8 milliGRAM(s) Oral every 6 hours PRN  oxyCODONE    IR 15 milliGRAM(s) Oral every 6 hours  pantoprazole    Tablet 40 milliGRAM(s) Oral before breakfast  simethicone 80 milliGRAM(s) Chew every 6 hours PRN  tamsulosin 0.4 milliGRAM(s) Oral at bedtime  traZODone 50 milliGRAM(s) Oral at bedtime  zolpidem 5 milliGRAM(s) Oral at bedtime PRN        Assessment and Plan:  59 y/o Male with PMH HTN, HFpEF (EF = 50-55% in Sept 2018), DVT on Eliquis, BPH, depression and bipolar, 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     #Metastatic Gastric and Early colon cancer  - Heme/onc following: patient is not a good candidate for palliative chemo, he is considering hospice  - f/up for patient's wish regarding palliative therapy    # Shortness of breath secondary to pleural/pericardial effusion secondary to metastatic disease  - s/p thoracentesis + talc pleurodesis on previous admission  - CT chest showing progression of his disease;   - On 4L O2 nasal cannula. continue   - IV diuresis , increased  lasix to 40 mg IV in am, and 20 mg IV at pm daily,  -strict I and o    #Pericardial Effusion- repeated 2d echo , shows right heart systolic dysfunction, small pericardial effusion    #Hypotension- d/c nadolol, continue cardizem  -started on midodrine PRN    # Abdominal pain secondary to metastatic ca and recent colostomy  - Continue Fentanyl patch 50 mcg/72H and morphine 6 mg IV q2hrs PRN. Pt reports improvement in his pain with this regimen.    - Palliative care following.     # Lower Extremity Edema b/l in patient with HFpEF (EF = 50-55% in Sept 2018)  - BNP = 186  - Continue lasix tx    #Colostomy status: daily colostomy care.    # Malignancy associated DVT  -resume on eliquis tx.    # Depression and Bipolar  - Continue duloxetine     # HTN  -pt. is borderline hypotensive    # BPH  - Continue flomax    DO NOT RESUSCITATE/DO NOT INTUBATE  Dispo: From [facility]  Out of bed to chair  DASH diet     overall prognosis: poor

## 2018-12-03 NOTE — PROGRESS NOTE ADULT - SUBJECTIVE AND OBJECTIVE BOX
AMRIK MADRID 58y Male  MRN#: [id number]     SUBJECTIVE  Patient is a 58y old Male who presents with a chief complaint of SOB   Currently admitted to medicine with the primary diagnosis of Dyspnea  Today is hospital day 4d, and this morning he reports no overnight events.     Relevant Data:  Relevant data:  CEA 4.4  Biopsy results:  Sigmoid colon mass, biopsy: invasive adenocarcinoma, moderately differentiated. focally similar to non-mucin producing adenocarcinoma component in left pleural tumor.  histologically different from the  minute gastric tumor biopsy. Immunohistochemical studies + histologic features compatible with gastric primary.   Gastric ulcer, biopsy:  Invasive adenoCA, poorly differentiated, with signet ring cell features. >> Above gastric tumor has signet ring cells similar to signet ring cells seen in mucin pools in left pleural  Left pleural biopsy: Fibromembranous tissue with foci showing metastatic adenocarcinoma.  Left pleural implant: Metastatic adenocarcinoma with mucinous features.- Immunohistochemistry studies suggest GI origin.   CT chest/abd/pelvis contrast 11/29:	   New B/L pulmonary nodules measuring up to 12 mm.  Increasing size of right pleural effusion, moderate to large, with pleural enhancement at the right lung base. Empyema is not excluded.  Increasing right base opacity. Multifocal groundglass opacities. Increasing peribronchial wall thickening.  Increase in size of multiloculated pericardial effusion versus loculated fluid collections within the mediastinum extending into the superior mediastinum.  Right-sided diverting colostomy, new. Mild underlying mesenteric fat stranding is likely represent postsurgical change.   Bones are minimally heterogeneous in density. Sclerotic lesions are not excluded. Further evaluation with nuclear medicine bone scan is recommended.  Findings are concerning with progression of neoplastic disease  V duplex 10/22:  Chr DVT R fem + L common fem, fem and gastrocnemius veins  ==========      OBJECTIVE  PAST MEDICAL & SURGICAL HISTORY  Pleural effusion  CHF (congestive heart failure)  Insomnia  BPH (benign prostatic hyperplasia)  Depression  Bipolar 1 disorder  HTN (hypertension)  Colostomy present  History of thoracentesis    ALLERGIES:  No Known Allergies    MEDICATIONS:  STANDING MEDICATIONS  apixaban 5 milliGRAM(s) Oral every 12 hours  chlorhexidine 4% Liquid 1 Application(s) Topical <User Schedule>  diltiazem    Tablet 60 milliGRAM(s) Oral every 8 hours  DULoxetine 60 milliGRAM(s) Oral daily  fentaNYL   Patch 100 MICROgram(s)/Hr 1 Patch Transdermal every 72 hours  furosemide   Injectable 20 milliGRAM(s) IV Push at bedtime  furosemide   Injectable 40 milliGRAM(s) IV Push daily  influenza   Vaccine 0.5 milliLiter(s) IntraMuscular once  isosorbide    mononitrate Tablet (ISMO) 20 milliGRAM(s) Oral two times a day  latanoprost 0.005% Ophthalmic Solution 1 Drop(s) Both EYES at bedtime  megestrol Suspension 400 milliGRAM(s) Oral <User Schedule>  multivitamin 1 Tablet(s) Oral daily  pantoprazole    Tablet 40 milliGRAM(s) Oral before breakfast  tamsulosin 0.4 milliGRAM(s) Oral at bedtime  traZODone 50 milliGRAM(s) Oral at bedtime    PRN MEDICATIONS  acetaminophen   Tablet .. 650 milliGRAM(s) Oral every 6 hours PRN  midodrine 5 milliGRAM(s) Oral three times a day PRN  morphine  - Injectable 12 milliGRAM(s) IV Push every 2 hours PRN  ondansetron    Tablet 8 milliGRAM(s) Oral every 6 hours PRN  simethicone 80 milliGRAM(s) Chew every 6 hours PRN  zolpidem 5 milliGRAM(s) Oral at bedtime PRN      VITAL SIGNS: Last 24 Hours  T(C): 36.7 (03 Dec 2018 13:47), Max: 36.7 (03 Dec 2018 13:47)  T(F): 98 (03 Dec 2018 13:47), Max: 98 (03 Dec 2018 13:47)  HR: 110 (03 Dec 2018 13:47) (92 - 110)  BP: 109/51 (03 Dec 2018 13:47) (91/56 - 109/51)  BP(mean): --  RR: 18 (03 Dec 2018 13:47) (18 - 18)  SpO2: 91% (02 Dec 2018 18:43) (91% - 91%)    LABS:                        10.4   10.15 )-----------( 281      ( 03 Dec 2018 06:22 )             34.9     12-03    135  |  92<L>  |  22<H>  ----------------------------<  118<H>  4.7   |  33<H>  |  1.0    Ca    8.5      03 Dec 2018 06:22  Phos  4.0     12-03  Mg     1.9     12-03    TPro  5.5<L>  /  Alb  3.4<L>  /  TBili  0.3  /  DBili  x   /  AST  71<H>  /  ALT  132<H>  /  AlkPhos  627<H>  12-03    PT/INR - ( 03 Dec 2018 06:22 )   PT: 14.10 sec;   INR: 1.23 ratio         PTT - ( 03 Dec 2018 06:22 )  PTT:32.8 sec      RADIOLOGY:  < from: CT Angio Chest w/ IV Cont (11.29.18 @ 16:35) >  IMPRESSION:    No acute central pulmonary embolism.    New bilateral pulmonary nodules measuring up to 12 mm.    Increasing size of right pleural effusion, moderate to large, with   pleural enhancement at the right lung base. Empyema is not excluded.    Increasing right base opacity. Multifocal groundglass opacities.   Increasing peribronchial wall thickening.    Increase in size of multiloculated pericardial effusion versus loculated   fluid collections within the mediastinum extending into the superior   mediastinum.    Right-sided diverting colostomy, new. Mild underlying mesenteric fat   stranding is likely represent postsurgical change.     Bones are minimally heterogeneous in density. Sclerotic lesions are not   excluded. Further evaluation with nuclear medicine bone scan is   recommended.    Findings are concerning with progression of neoplastic disease    < end of copied text >      < from: Xray Chest 1 View-PORTABLE IMMEDIATE (11.29.18 @ 12:38) >  Impression:      Unchanged bilateral opacities and pleural effusions greater on the right.    < end of copied text >      PHYSICAL EXAM:    GENERAL: NAD, well-developed, AAOx3  HEENT:  Atraumatic, Normocephalic. EOMI, PERRLA, conjunctiva and sclera clear, No JVD  PULMONARY: Clear to auscultation bilaterally; No wheeze  CARDIOVASCULAR: Regular rate and rhythm; No murmurs, rubs, or gallops  GASTROINTESTINAL: Soft, Nontender, Nondistended; Bowel sounds present  MUSCULOSKELETAL:  2+ Peripheral Pulses, No clubbing, cyanosis, or edema  NEUROLOGY: non-focal  SKIN: No rashes or lesions      ADMISSION SUMMARY  Patient is a 58y old Male who presents with a chief complaint of SOB  Currently admitted to medicine with the primary diagnosis of Dyspnea      ASSESSMENT & PLAN  57 y/o Male with PMH HTN, HFpEF (EF = 50-55% in Sept 2018), DVT on Eliquis, BPH, depression and bipolar, 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   - 2D echo showed mild pericardial effusion  - F/u CT surgery recs     # Abdominal pain secondary to metastatic ca and recent colostomy  - Increased Fentanyl patch 100 mcg/72H and morphine 12 mg IV q2hrs PRN. Pt reports improvement in his pain with this regimen.    - Palliative care following.     # Lower Extremity Edema b/l in patient with HFpEF (EF = 50-55% in Sept 2018)  - BNP = 186  - Continue lasix 40 mg qd     # Malignancy associated DVT  - Restarted eliquis    # Depression and Bipolar  - Continue duloxetine     # HTN  - Continue home meds    # BPH  - Continue flomax    DO NOT RESUSCITATE/DO NOT INTUBATE  Dispo: From [facility]  Out of bed to chair  DASH diet

## 2018-12-03 NOTE — PROGRESS NOTE ADULT - ASSESSMENT
2D echo, CTA shows possible pericardial effusion.  - hold eliquis for possible intervention,- palliative seen pt and f/u on monday to further discuss goals of care  - heme/onc: "he is considering hospice, he would not tolerate muti agent systemic therapy and single agent would be of marginal benefit if any   -will follow up and await his decision"  follow up  plan of care

## 2018-12-03 NOTE — PROGRESS NOTE ADULT - SUBJECTIVE AND OBJECTIVE BOX
58yMale with diagnosis: DYSPNEA;EMPYEMA;ABDOMINAL PAIN      Patient seen, pain control not optimized. "9" - still abdominal; stated that hamilton the other day (400cc and left in) did provide relief. Feels has slept better but would like better control of pain      PHYSICAL EXAM  Essentially unchanged  PER  RR easy and unlabored  Hamilton is draining clear yell urine - quantity sufficient    T(C): , Max: 36.7 (13:47)  T(F): 98  HR: 110 (92 - 110)  BP: 109/51 (86/59 - 109/51)  RR: 18 (18 - 18)  SpO2: 91% (91% - 91%)      LABS:                          10.4   10.15 )-----------( 281      ( 03 Dec 2018 06:22 )             34.9                                                                                      12-03    135  |  92<L>  |  22<H>  ----------------------------<  118<H>  4.7   |  33<H>  |  1.0    Ca    8.5      03 Dec 2018 06:22  Phos  4.0     12-03  Mg     1.9     12-03    TPro  5.5<L>  /  Alb  3.4<L>  /  TBili  0.3  /  DBili  x   /  AST  71<H>  /  ALT  132<H>  /  AlkPhos  627<H>  12-03                                                  MEDICATIONS  (STANDING):  apixaban 5 milliGRAM(s) Oral every 12 hours  chlorhexidine 4% Liquid 1 Application(s) Topical <User Schedule>  diltiazem    Tablet 60 milliGRAM(s) Oral every 8 hours  DULoxetine 60 milliGRAM(s) Oral daily  fentaNYL   Patch  50 MICROgram(s)/Hr 1 Patch Transdermal every 72 hours  furosemide   Injectable 20 milliGRAM(s) IV Push at bedtime  furosemide   Injectable 40 milliGRAM(s) IV Push daily  influenza   Vaccine 0.5 milliLiter(s) IntraMuscular once  isosorbide    mononitrate Tablet (ISMO) 20 milliGRAM(s) Oral two times a day  latanoprost 0.005% Ophthalmic Solution 1 Drop(s) Both EYES at bedtime  megestrol Suspension 400 milliGRAM(s) Oral <User Schedule>  multivitamin 1 Tablet(s) Oral daily  oxyCODONE    IR 15 milliGRAM(s) Oral every 6 hours  pantoprazole    Tablet 40 milliGRAM(s) Oral before breakfast  tamsulosin 0.4 milliGRAM(s) Oral at bedtime  traZODone 50 milliGRAM(s) Oral at bedtime    MEDICATIONS  (PRN):  acetaminophen   Tablet .. 650 milliGRAM(s) Oral every 6 hours PRN Temp greater or equal to 38.5C (101.3F), Mild Pain (1 - 3)  midodrine 5 milliGRAM(s) Oral three times a day PRN  or below  morphine  - Injectable 8 milliGRAM(s) IV Push every 2 hours PRN Severe Pain (7 - 10) (3 doses/24H)  ondansetron    Tablet 8 milliGRAM(s) Oral every 6 hours PRN Nausea and/or Vomiting  simethicone 80 milliGRAM(s) Chew every 6 hours PRN gas/abdominal discomfort  zolpidem 5 milliGRAM(s) Oral at bedtime PRN Insomnia    MEDD 92 mg

## 2018-12-03 NOTE — PROGRESS NOTE ADULT - ASSESSMENT
58yMale being evaluated for response to pain medications and ongoing GOC conversations Pain not optimally controlled      Recommendations:  DC oxycodone  Increase Fentanyl to 100mcg patch/72 hour (rem0ve 50 mcg/H patch)  Dc Morphine 8mg IVP every 3H PRN  Morphine 12mg IVP every 2H PRN for pain   Continue bowel program    DNR/I    Will readdress GOC when pain better controlled    Discussed with housestaff and nursing         Please Call [phone] PRN

## 2018-12-04 LAB
ANION GAP SERPL CALC-SCNC: 9 MMOL/L — SIGNIFICANT CHANGE UP (ref 7–14)
BUN SERPL-MCNC: 16 MG/DL — SIGNIFICANT CHANGE UP (ref 10–20)
CALCIUM SERPL-MCNC: 8.9 MG/DL — SIGNIFICANT CHANGE UP (ref 8.5–10.1)
CHLORIDE SERPL-SCNC: 90 MMOL/L — LOW (ref 98–110)
CO2 SERPL-SCNC: 37 MMOL/L — HIGH (ref 17–32)
CREAT SERPL-MCNC: 0.7 MG/DL — SIGNIFICANT CHANGE UP (ref 0.7–1.5)
GLUCOSE SERPL-MCNC: 104 MG/DL — HIGH (ref 70–99)
HCT VFR BLD CALC: 32.7 % — LOW (ref 42–52)
HGB BLD-MCNC: 9.8 G/DL — LOW (ref 14–18)
MAGNESIUM SERPL-MCNC: 1.8 MG/DL — SIGNIFICANT CHANGE UP (ref 1.8–2.4)
MCHC RBC-ENTMCNC: 24.8 PG — LOW (ref 27–31)
MCHC RBC-ENTMCNC: 30 G/DL — LOW (ref 32–37)
MCV RBC AUTO: 82.8 FL — SIGNIFICANT CHANGE UP (ref 80–94)
NRBC # BLD: 0 /100 WBCS — SIGNIFICANT CHANGE UP (ref 0–0)
PLATELET # BLD AUTO: 254 K/UL — SIGNIFICANT CHANGE UP (ref 130–400)
POTASSIUM SERPL-MCNC: 4.3 MMOL/L — SIGNIFICANT CHANGE UP (ref 3.5–5)
POTASSIUM SERPL-SCNC: 4.3 MMOL/L — SIGNIFICANT CHANGE UP (ref 3.5–5)
RBC # BLD: 3.95 M/UL — LOW (ref 4.7–6.1)
RBC # FLD: 14.4 % — SIGNIFICANT CHANGE UP (ref 11.5–14.5)
SODIUM SERPL-SCNC: 136 MMOL/L — SIGNIFICANT CHANGE UP (ref 135–146)
WBC # BLD: 8.43 K/UL — SIGNIFICANT CHANGE UP (ref 4.8–10.8)
WBC # FLD AUTO: 8.43 K/UL — SIGNIFICANT CHANGE UP (ref 4.8–10.8)

## 2018-12-04 RX ORDER — HYDROMORPHONE HYDROCHLORIDE 2 MG/ML
1 INJECTION INTRAMUSCULAR; INTRAVENOUS; SUBCUTANEOUS
Qty: 0 | Refills: 0 | Status: DISCONTINUED | OUTPATIENT
Start: 2018-12-04 | End: 2018-12-05

## 2018-12-04 RX ORDER — HYDROMORPHONE HYDROCHLORIDE 2 MG/ML
2 INJECTION INTRAMUSCULAR; INTRAVENOUS; SUBCUTANEOUS EVERY 4 HOURS
Qty: 0 | Refills: 0 | Status: DISCONTINUED | OUTPATIENT
Start: 2018-12-04 | End: 2018-12-04

## 2018-12-04 RX ORDER — HYDROMORPHONE HYDROCHLORIDE 2 MG/ML
2 INJECTION INTRAMUSCULAR; INTRAVENOUS; SUBCUTANEOUS ONCE
Qty: 0 | Refills: 0 | Status: DISCONTINUED | OUTPATIENT
Start: 2018-12-04 | End: 2018-12-04

## 2018-12-04 RX ORDER — HYDROMORPHONE HYDROCHLORIDE 2 MG/ML
2 INJECTION INTRAMUSCULAR; INTRAVENOUS; SUBCUTANEOUS
Qty: 0 | Refills: 0 | Status: DISCONTINUED | OUTPATIENT
Start: 2018-12-04 | End: 2018-12-04

## 2018-12-04 RX ORDER — HYDROMORPHONE HYDROCHLORIDE 2 MG/ML
1 INJECTION INTRAMUSCULAR; INTRAVENOUS; SUBCUTANEOUS EVERY 4 HOURS
Qty: 0 | Refills: 0 | Status: DISCONTINUED | OUTPATIENT
Start: 2018-12-04 | End: 2018-12-05

## 2018-12-04 RX ADMIN — APIXABAN 5 MILLIGRAM(S): 2.5 TABLET, FILM COATED ORAL at 05:28

## 2018-12-04 RX ADMIN — HYDROMORPHONE HYDROCHLORIDE 2 MILLIGRAM(S): 2 INJECTION INTRAMUSCULAR; INTRAVENOUS; SUBCUTANEOUS at 15:32

## 2018-12-04 RX ADMIN — MORPHINE SULFATE 10 MILLIGRAM(S): 50 CAPSULE, EXTENDED RELEASE ORAL at 02:09

## 2018-12-04 RX ADMIN — PANTOPRAZOLE SODIUM 40 MILLIGRAM(S): 20 TABLET, DELAYED RELEASE ORAL at 05:28

## 2018-12-04 RX ADMIN — Medication 40 MILLIGRAM(S): at 05:31

## 2018-12-04 RX ADMIN — MORPHINE SULFATE 10 MILLIGRAM(S): 50 CAPSULE, EXTENDED RELEASE ORAL at 02:24

## 2018-12-04 RX ADMIN — MORPHINE SULFATE 10 MILLIGRAM(S): 50 CAPSULE, EXTENDED RELEASE ORAL at 09:17

## 2018-12-04 RX ADMIN — Medication 20 MILLIGRAM(S): at 22:27

## 2018-12-04 RX ADMIN — LATANOPROST 1 DROP(S): 0.05 SOLUTION/ DROPS OPHTHALMIC; TOPICAL at 22:27

## 2018-12-04 RX ADMIN — MORPHINE SULFATE 10 MILLIGRAM(S): 50 CAPSULE, EXTENDED RELEASE ORAL at 06:55

## 2018-12-04 RX ADMIN — MEGESTROL ACETATE 400 MILLIGRAM(S): 40 SUSPENSION ORAL at 05:31

## 2018-12-04 RX ADMIN — HYDROMORPHONE HYDROCHLORIDE 1 MILLIGRAM(S): 2 INJECTION INTRAMUSCULAR; INTRAVENOUS; SUBCUTANEOUS at 22:31

## 2018-12-04 RX ADMIN — CHLORHEXIDINE GLUCONATE 1 APPLICATION(S): 213 SOLUTION TOPICAL at 05:28

## 2018-12-04 NOTE — PROGRESS NOTE ADULT - SUBJECTIVE AND OBJECTIVE BOX
AMRIK MADRID 58y Male  MRN#: [id number]       SUBJECTIVE  Patient is a 58y old Male who presents with a chief complaint of SOB   Currently admitted to medicine with the primary diagnosis of Dyspnea  Today is hospital day 5d, and this morning he reports pain overnight.     Relevant Data:  CEA 4.4  Biopsy results:  Sigmoid colon mass, biopsy: invasive adenocarcinoma, moderately differentiated. focally similar to non-mucin producing adenocarcinoma component in left pleural tumor.  histologically different from the minute gastric tumor biopsy. Immunohistochemical studies + histologic features compatible with gastric primary.   Gastric ulcer, biopsy:  Invasive adeno CA, poorly differentiated, with signet ring cell features. >> Above gastric tumor has signet ring cells similar to signet ring cells seen in mucin pools in left pleural  Left pleural biopsy: Fibromembranous tissue with foci showing metastatic adenocarcinoma.  Left pleural implant: Metastatic adenocarcinoma with mucinous features.- Immunohistochemistry studies suggest GI origin.   CT chest/abd/pelvis contrast 11/29:	   New B/L pulmonary nodules measuring up to 12 mm.  Increasing size of right pleural effusion, moderate to large, with pleural enhancement at the right lung base. Empyema is not excluded.  Increasing right base opacity. Multifocal ground glass opacities. Increasing peribronchial wall thickening.  Increase in size of multiloculated pericardial effusion versus loculated fluid collections within the mediastinum extending into the superior mediastinum.  Right-sided diverting colostomy, new. Mild underlying mesenteric fat stranding is likely represent postsurgical change.   Bones are minimally heterogeneous in density. Sclerotic lesions are not excluded. Further evaluation with nuclear medicine bone scan is recommended.  Findings are concerning with progression of neoplastic disease  V duplex 10/22:  Chr DVT R fem + L common fem, fem and gastrocnemius veins  ==========      OBJECTIVE  PAST MEDICAL & SURGICAL HISTORY  Pleural effusion  CHF (congestive heart failure)  Insomnia  BPH (benign prostatic hyperplasia)  Depression  Bipolar 1 disorder  HTN (hypertension)  Colostomy present  History of thoracentesis    ALLERGIES:  No Known Allergies    MEDICATIONS:  STANDING MEDICATIONS  chlorhexidine 4% Liquid 1 Application(s) Topical <User Schedule>  diltiazem    Tablet 60 milliGRAM(s) Oral every 8 hours  DULoxetine 60 milliGRAM(s) Oral daily  fentaNYL   Patch 100 MICROgram(s)/Hr 1 Patch Transdermal every 72 hours  furosemide   Injectable 20 milliGRAM(s) IV Push at bedtime  furosemide   Injectable 40 milliGRAM(s) IV Push daily  HYDROmorphone  Injectable 2 milliGRAM(s) IV Push every 4 hours  influenza   Vaccine 0.5 milliLiter(s) IntraMuscular once  isosorbide    mononitrate Tablet (ISMO) 20 milliGRAM(s) Oral two times a day  latanoprost 0.005% Ophthalmic Solution 1 Drop(s) Both EYES at bedtime  megestrol Suspension 400 milliGRAM(s) Oral <User Schedule>  multivitamin 1 Tablet(s) Oral daily  pantoprazole    Tablet 40 milliGRAM(s) Oral before breakfast  tamsulosin 0.4 milliGRAM(s) Oral at bedtime  traZODone 50 milliGRAM(s) Oral at bedtime    PRN MEDICATIONS  acetaminophen   Tablet .. 650 milliGRAM(s) Oral every 6 hours PRN  HYDROmorphone  Injectable 2 milliGRAM(s) IV Push every 1 hour PRN  midodrine 5 milliGRAM(s) Oral three times a day PRN  ondansetron    Tablet 8 milliGRAM(s) Oral every 6 hours PRN  simethicone 80 milliGRAM(s) Chew every 6 hours PRN  zolpidem 5 milliGRAM(s) Oral at bedtime PRN      VITAL SIGNS: Last 24 Hours  T(C): 36.7 (04 Dec 2018 12:55), Max: 36.7 (04 Dec 2018 12:55)  T(F): 98 (04 Dec 2018 12:55), Max: 98 (04 Dec 2018 12:55)  HR: 107 (04 Dec 2018 12:55) (104 - 111)  BP: 140/61 (04 Dec 2018 12:55) (82/51 - 140/61)  BP(mean): --  RR: 19 (04 Dec 2018 12:55) (18 - 20)  SpO2: 93% (03 Dec 2018 21:46) (93% - 93%)    LABS:                        9.8    8.43  )-----------( 254      ( 04 Dec 2018 05:27 )             32.7     12-04    136  |  90<L>  |  16  ----------------------------<  104<H>  4.3   |  37<H>  |  0.7    Ca    8.9      04 Dec 2018 05:27  Phos  4.0     12-03  Mg     1.8     12-04    TPro  5.5<L>  /  Alb  3.4<L>  /  TBili  0.3  /  DBili  x   /  AST  71<H>  /  ALT  132<H>  /  AlkPhos  627<H>  12-03    PT/INR - ( 03 Dec 2018 06:22 )   PT: 14.10 sec;   INR: 1.23 ratio         PTT - ( 03 Dec 2018 06:22 )  PTT:32.8 sec    RADIOLOGY:    < from: CT Angio Chest w/ IV Cont (11.29.18 @ 16:35) >  IMPRESSION:    No acute central pulmonary embolism.    New bilateral pulmonary nodules measuring up to 12 mm.    Increasing size of right pleural effusion, moderate to large, with   pleural enhancement at the right lung base. Empyema is not excluded.    Increasing right base opacity. Multifocal groundglass opacities.   Increasing peribronchial wall thickening.    Increase in size of multiloculated pericardial effusion versus loculated   fluid collections within the mediastinum extending into the superior   mediastinum.    Right-sided diverting colostomy, new. Mild underlying mesenteric fat   stranding is likely represent postsurgical change.     Bones are minimally heterogeneous in density. Sclerotic lesions are not   excluded. Further evaluation with nuclear medicine bone scan is   recommended.    Findings are concerning with progression of neoplastic disease    < end of copied text >      < from: Xray Chest 1 View-PORTABLE IMMEDIATE (11.29.18 @ 12:38) >  Impression:      Unchanged bilateral opacities and pleural effusions greater on the right.    < end of copied text >      PHYSICAL EXAM:    GENERAL: NAD, well-developed, AAOx3  HEENT:  Atraumatic, Normocephalic. EOMI, PERRLA, conjunctiva and sclera clear, No JVD  PULMONARY: Clear to auscultation bilaterally; No wheeze  CARDIOVASCULAR: Regular rate and rhythm; No murmurs, rubs, or gallops  GASTROINTESTINAL: Soft, Nontender, Nondistended; Bowel sounds present  MUSCULOSKELETAL:  2+ Peripheral Pulses, No clubbing, cyanosis, or edema  NEUROLOGY: non-focal  SKIN: No rashes or lesions      ADMISSION SUMMARY  Patient is a 58y old Male who presents with a chief complaint of SOB   Currently admitted to medicine with the primary diagnosis of Dyspnea      ASSESSMENT & PLAN  57 y/o Male with PMH HTN, HFpEF (EF = 50-55% in Sept 2018), DVT on Eliquis, BPH, depression and bipolar, 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   - 2D echo showed mild pericardial effusion  - F/u CT surgery recs     # Abdominal pain secondary to metastatic ca and recent colostomy  - Increased Fentanyl patch 100 mcg/72H and morphine 12 mg IV q2hrs PRN. Pt reports improvement in his pain with this regimen.    - Palliative care following.     # Lower Extremity Edema b/l in patient with HFpEF (EF = 50-55% in Sept 2018)  - BNP = 186  - Continue lasix 40 mg qd     # Malignancy associated DVT  - Restarted eliquis    # Depression and Bipolar  - Continue duloxetine     # HTN  - Continue home meds    # BPH  - Continue flomax    DO NOT RESUSCITATE/DO NOT INTUBATE  [facility]po: [facility] [place]  Out of bed to chair  DASH diet AMRIK MADRID 58y Male  MRN#: [id number]       SUBJECTIVE  Patient is a 58y old Male who presents with a chief complaint of SOB   Currently admitted to medicine with the primary diagnosis of Dyspnea  Today is hospital day 5d, and this morning he reports pain overnight.     Relevant Data:  CEA 4.4  Biopsy results:  Sigmoid colon mass, biopsy: invasive adenocarcinoma, moderately differentiated. focally similar to non-mucin producing adenocarcinoma component in left pleural tumor.  histologically different from the minute gastric tumor biopsy. Immunohistochemical studies + histologic features compatible with gastric primary.   Gastric ulcer, biopsy:  Invasive adeno CA, poorly differentiated, with signet ring cell features. >> Above gastric tumor has signet ring cells similar to signet ring cells seen in mucin pools in left pleural  Left pleural biopsy: Fibromembranous tissue with foci showing metastatic adenocarcinoma.  Left pleural implant: Metastatic adenocarcinoma with mucinous features.- Immunohistochemistry studies suggest GI origin.   CT chest/abd/pelvis contrast 11/29:	   New B/L pulmonary nodules measuring up to 12 mm.  Increasing size of right pleural effusion, moderate to large, with pleural enhancement at the right lung base. Empyema is not excluded.  Increasing right base opacity. Multifocal ground glass opacities. Increasing peribronchial wall thickening.  Increase in size of multiloculated pericardial effusion versus loculated fluid collections within the mediastinum extending into the superior mediastinum.  Right-sided diverting colostomy, new. Mild underlying mesenteric fat stranding is likely represent postsurgical change.   Bones are minimally heterogeneous in density. Sclerotic lesions are not excluded. Further evaluation with nuclear medicine bone scan is recommended.  Findings are concerning with progression of neoplastic disease  V duplex 10/22:  Chr DVT R fem + L common fem, fem and gastrocnemius veins  ==========      OBJECTIVE  PAST MEDICAL & SURGICAL HISTORY  Pleural effusion  CHF (congestive heart failure)  Insomnia  BPH (benign prostatic hyperplasia)  Depression  Bipolar 1 disorder  HTN (hypertension)  Colostomy present  History of thoracentesis    ALLERGIES:  No Known Allergies    MEDICATIONS:  STANDING MEDICATIONS  chlorhexidine 4% Liquid 1 Application(s) Topical <User Schedule>  diltiazem    Tablet 60 milliGRAM(s) Oral every 8 hours  DULoxetine 60 milliGRAM(s) Oral daily  fentaNYL   Patch 100 MICROgram(s)/Hr 1 Patch Transdermal every 72 hours  furosemide   Injectable 20 milliGRAM(s) IV Push at bedtime  furosemide   Injectable 40 milliGRAM(s) IV Push daily  HYDROmorphone  Injectable 2 milliGRAM(s) IV Push every 4 hours  influenza   Vaccine 0.5 milliLiter(s) IntraMuscular once  isosorbide    mononitrate Tablet (ISMO) 20 milliGRAM(s) Oral two times a day  latanoprost 0.005% Ophthalmic Solution 1 Drop(s) Both EYES at bedtime  megestrol Suspension 400 milliGRAM(s) Oral <User Schedule>  multivitamin 1 Tablet(s) Oral daily  pantoprazole    Tablet 40 milliGRAM(s) Oral before breakfast  tamsulosin 0.4 milliGRAM(s) Oral at bedtime  traZODone 50 milliGRAM(s) Oral at bedtime    PRN MEDICATIONS  acetaminophen   Tablet .. 650 milliGRAM(s) Oral every 6 hours PRN  HYDROmorphone  Injectable 2 milliGRAM(s) IV Push every 1 hour PRN  midodrine 5 milliGRAM(s) Oral three times a day PRN  ondansetron    Tablet 8 milliGRAM(s) Oral every 6 hours PRN  simethicone 80 milliGRAM(s) Chew every 6 hours PRN  zolpidem 5 milliGRAM(s) Oral at bedtime PRN      VITAL SIGNS: Last 24 Hours  T(C): 36.7 (04 Dec 2018 12:55), Max: 36.7 (04 Dec 2018 12:55)  T(F): 98 (04 Dec 2018 12:55), Max: 98 (04 Dec 2018 12:55)  HR: 107 (04 Dec 2018 12:55) (104 - 111)  BP: 140/61 (04 Dec 2018 12:55) (82/51 - 140/61)  BP(mean): --  RR: 19 (04 Dec 2018 12:55) (18 - 20)  SpO2: 93% (03 Dec 2018 21:46) (93% - 93%)    LABS:                        9.8    8.43  )-----------( 254      ( 04 Dec 2018 05:27 )             32.7     12-04    136  |  90<L>  |  16  ----------------------------<  104<H>  4.3   |  37<H>  |  0.7    Ca    8.9      04 Dec 2018 05:27  Phos  4.0     12-03  Mg     1.8     12-04    TPro  5.5<L>  /  Alb  3.4<L>  /  TBili  0.3  /  DBili  x   /  AST  71<H>  /  ALT  132<H>  /  AlkPhos  627<H>  12-03    PT/INR - ( 03 Dec 2018 06:22 )   PT: 14.10 sec;   INR: 1.23 ratio         PTT - ( 03 Dec 2018 06:22 )  PTT:32.8 sec    RADIOLOGY:    < from: CT Angio Chest w/ IV Cont (11.29.18 @ 16:35) >  IMPRESSION:    No acute central pulmonary embolism.    New bilateral pulmonary nodules measuring up to 12 mm.    Increasing size of right pleural effusion, moderate to large, with   pleural enhancement at the right lung base. Empyema is not excluded.    Increasing right base opacity. Multifocal groundglass opacities.   Increasing peribronchial wall thickening.    Increase in size of multiloculated pericardial effusion versus loculated   fluid collections within the mediastinum extending into the superior   mediastinum.    Right-sided diverting colostomy, new. Mild underlying mesenteric fat   stranding is likely represent postsurgical change.     Bones are minimally heterogeneous in density. Sclerotic lesions are not   excluded. Further evaluation with nuclear medicine bone scan is   recommended.    Findings are concerning with progression of neoplastic disease    < end of copied text >      < from: Xray Chest 1 View-PORTABLE IMMEDIATE (11.29.18 @ 12:38) >  Impression:      Unchanged bilateral opacities and pleural effusions greater on the right.    < end of copied text >      PHYSICAL EXAM:    GENERAL: NAD, well-developed, AAOx3  HEENT:  Atraumatic, Normocephalic. EOMI, PERRLA, conjunctiva and sclera clear, No JVD  PULMONARY: Clear to auscultation bilaterally; No wheeze  CARDIOVASCULAR: Regular rate and rhythm; No murmurs, rubs, or gallops  GASTROINTESTINAL: Soft, Nontender, Nondistended; Bowel sounds present  MUSCULOSKELETAL:  2+ Peripheral Pulses, No clubbing, cyanosis, or edema  NEUROLOGY: non-focal  SKIN: No rashes or lesions      ADMISSION SUMMARY  Patient is a 58y old Male who presents with a chief complaint of SOB   Currently admitted to medicine with the primary diagnosis of Dyspnea      ASSESSMENT & PLAN  57 y/o Male with PMH HTN, HFpEF (EF = 50-55% in Sept 2018), DVT on Eliquis, BPH, depression and bipolar, 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 Hospice consult placed    # Shortness of breath secondary to pleural/pericardial effusion secondary to metastatic disease  - s/p thoracentesis + talc pleurodesis on previous admission  - CT chest showing progression of his disease; increased pleural and pericardial effusion  - On 4L O2 nasal cannula.   - 2D echo showed mild pericardial effusion  - F/u CT surgery recs     # Abdominal pain secondary to metastatic ca and recent colostomy  - Increased Fentanyl patch 100 mcg/72H and started dilaudid 2mg every 2hrs and 2mg every 1 hr PRN.   - Palliative care following.     # Lower Extremity Edema b/l in patient with HFpEF (EF = 50-55% in Sept 2018)  - BNP = 186  - Continue lasix 40 mg qd     # Malignancy associated DVT  - Restarted eliquis    # Depression and Bipolar  - Continue duloxetine     # HTN  - Continue home meds    # BPH  - Continue flomax    DO NOT RESUSCITATE/DO NOT INTUBATE  [facility]po: [facility] [place]  Out of bed to chair  DASH diet   f/u Hospice consult

## 2018-12-04 NOTE — PROGRESS NOTE ADULT - SUBJECTIVE AND OBJECTIVE BOX
Hospital Day: 6  Procedure: gastric adenocarc signet  ring and colon adenocarcinoma , vats pelurodesis  transverse loop colostomy   Patient is a 58y old  Male who presents with a chief complaint of SOB (03 Dec 2018 18:00)    PAST MEDICAL & SURGICAL HISTORY:  Pleural effusion  CHF (congestive heart failure)  Insomnia  BPH (benign prostatic hyperplasia)  Depression  Bipolar 1 disorder  HTN (hypertension)  Colostomy present  History of thoracentesis      Events of the Last 24h:none   Vital Signs Last 24 Hrs  T(C): 36.2 (04 Dec 2018 04:39), Max: 36.7 (03 Dec 2018 13:47)  T(F): 97.1 (04 Dec 2018 04:39), Max: 98 (03 Dec 2018 13:47)  HR: 104 (04 Dec 2018 04:39) (97 - 111)  BP: 115/64 (04 Dec 2018 04:39) (82/51 - 115/64)  BP(mean): --  RR: 18 (04 Dec 2018 04:39) (18 - 20)  SpO2: 93% (03 Dec 2018 21:46) (93% - 93%)        Diet, DASH/TLC:   Sodium & Cholesterol Restricted  Low Fat (LOWFAT) (18 @ 17:10)      I&O's Summary    02 Dec 2018 07:  -  03 Dec 2018 07:00  --------------------------------------------------------  IN: 0 mL / OUT: 650 mL / NET: -650 mL    03 Dec 2018 07:  -  04 Dec 2018 05:30  --------------------------------------------------------  IN: 0 mL / OUT: 1300 mL / NET: -1300 mL     I&O's Detail    02 Dec 2018 07:  -  03 Dec 2018 07:00  --------------------------------------------------------  IN:  Total IN: 0 mL    OUT:    Indwelling Catheter - Urethral: 250 mL    Voided: 400 mL  Total OUT: 650 mL    Total NET: -650 mL      03 Dec 2018 07:01  -  04 Dec 2018 05:30  --------------------------------------------------------  IN:  Total IN: 0 mL    OUT:    Indwelling Catheter - Urethral: 450 mL    Voided: 850 mL  Total OUT: 1300 mL    Total NET: -1300 mL          MEDICATIONS  (STANDING):  apixaban 5 milliGRAM(s) Oral every 12 hours  chlorhexidine 4% Liquid 1 Application(s) Topical <User Schedule>  diltiazem    Tablet 60 milliGRAM(s) Oral every 8 hours  DULoxetine 60 milliGRAM(s) Oral daily  fentaNYL   Patch 100 MICROgram(s)/Hr 1 Patch Transdermal every 72 hours  furosemide   Injectable 20 milliGRAM(s) IV Push at bedtime  furosemide   Injectable 40 milliGRAM(s) IV Push daily  influenza   Vaccine 0.5 milliLiter(s) IntraMuscular once  isosorbide    mononitrate Tablet (ISMO) 20 milliGRAM(s) Oral two times a day  latanoprost 0.005% Ophthalmic Solution 1 Drop(s) Both EYES at bedtime  megestrol Suspension 400 milliGRAM(s) Oral <User Schedule>  multivitamin 1 Tablet(s) Oral daily  pantoprazole    Tablet 40 milliGRAM(s) Oral before breakfast  tamsulosin 0.4 milliGRAM(s) Oral at bedtime  traZODone 50 milliGRAM(s) Oral at bedtime    MEDICATIONS  (PRN):  acetaminophen   Tablet .. 650 milliGRAM(s) Oral every 6 hours PRN Temp greater or equal to 38.5C (101.3F), Mild Pain (1 - 3)  midodrine 5 milliGRAM(s) Oral three times a day PRN  or below  morphine  - Injectable 10 milliGRAM(s) IV Push every 2 hours PRN Severe Pain (7 - 10)  ondansetron    Tablet 8 milliGRAM(s) Oral every 6 hours PRN Nausea and/or Vomiting  simethicone 80 milliGRAM(s) Chew every 6 hours PRN gas/abdominal discomfort  zolpidem 5 milliGRAM(s) Oral at bedtime PRN Insomnia      PHYSICAL EXAM:    GENERAL: NAD    HEENT: NCAT    CHEST/LUNGS: CTAB    HEART: RRR,  No murmurs, rubs, or gallops    ABDOMEN: SNTND +BS    EXTREMITIES:  FROM, No clubbing, cyanosis, or edema, palpable pulse    NEURO: No focal neurological deficits    SKIN: No rashes or lesions    INCISION/WOUNDS:                          10.4   10.15 )-----------( 281      ( 03 Dec 2018 06:22 )             34.9        CBC Full  -  ( 03 Dec 2018 06:22 )  WBC Count : 10.15 K/uL  Hemoglobin : 10.4 g/dL  Hematocrit : 34.9 %  Platelet Count - Automated : 281 K/uL  Mean Cell Volume : 83.1 fL  Mean Cell Hemoglobin : 24.8 pg  Mean Cell Hemoglobin Concentration : 29.8 g/dL  Auto Neutrophil # : 8.04 K/uL  Auto Lymphocyte # : 0.97 K/uL  Auto Monocyte # : 0.91 K/uL  Auto Eosinophil # : 0.06 K/uL  Auto Basophil # : 0.03 K/uL  Auto Neutrophil % : 79.1 %  Auto Lymphocyte % : 9.6 %  Auto Monocyte % : 9.0 %  Auto Eosinophil % : 0.6 %  Auto Basophil % : 0.3 %               135   |  92    |  22                 Ca: 8.5    BMP:   ----------------------------< 118    M.9   (18 @ 06:22)             4.7    |  33    | 1.0                Ph: 4.0      LFT:     TPro: 5.5 / Alb: 3.4 / TBili: 0.3 / DBili: x / AST: 71 / ALT: 132 / AlkPhos: 627   (18 @ 06:22)    LIVER FUNCTIONS - ( 03 Dec 2018 06:22 )  Alb: 3.4 g/dL / Pro: 5.5 g/dL / ALK PHOS: 627 U/L / ALT: 132 U/L / AST: 71 U/L / GGT: x           PT/INR - ( 03 Dec 2018 06:22 )   PT: 14.10 sec;   INR: 1.23 ratio         PTT - ( 03 Dec 2018 06:22 )  PTT:32.8 sec

## 2018-12-04 NOTE — PROGRESS NOTE ADULT - ASSESSMENT
d/w with housestaff; medical and palliative care attendings; and nursing.     Pain medications changed to Dilaudid ATC and PRN; continue fentanyl; hospice order placed    Will follow      Please Call [phone] PRN

## 2018-12-04 NOTE — PROGRESS NOTE ADULT - ASSESSMENT
#Metastatic Gastric and Early colon cancer  -he decided on hospice, this is more than reasonable, would not do well even with singe agent therapy     # SOB multifactorial  due to effusion, malignancy, deconditioning   -he has loculated effusion, insurance will not provide bottles for Pleurx  - supportive measures only, oxygen and morphine  only is appropriate    #Venous Thromboembolism on Apixaban     #Pain  -managed by palliative care     Poor prognosis, hospice is appropriate.Will see him tomorrow.   He gave the ok to call his Brother will try to reach him

## 2018-12-04 NOTE — PROGRESS NOTE ADULT - SUBJECTIVE AND OBJECTIVE BOX
When I saw him earlier today  he was confused, he  recently received IV pain meds. I spoke to IM resident who informed me that he  decided to proceed with hospice which is reasonable.         Vital Signs Last 24 Hrs  T(C): 36.7 (04 Dec 2018 12:55), Max: 36.7 (04 Dec 2018 12:55)  T(F): 98 (04 Dec 2018 12:55), Max: 98 (04 Dec 2018 12:55)  HR: 107 (04 Dec 2018 12:55) (104 - 111)  BP: 140/61 (04 Dec 2018 12:55) (82/51 - 140/61)  BP(mean): --  RR: 11 (04 Dec 2018 18:53) (11 - 20)  SpO2: 93% (03 Dec 2018 21:46) (93% - 93%)    Allergies:No Known Allergies  Intolerances  MEDICATIONS  (STANDING):  chlorhexidine 4% Liquid 1 Application(s) Topical <User Schedule>  diltiazem    Tablet 60 milliGRAM(s) Oral every 8 hours  DULoxetine 60 milliGRAM(s) Oral daily  fentaNYL   Patch 100 MICROgram(s)/Hr 1 Patch Transdermal every 72 hours  furosemide   Injectable 20 milliGRAM(s) IV Push at bedtime  furosemide   Injectable 40 milliGRAM(s) IV Push daily  HYDROmorphone  Injectable 1 milliGRAM(s) IV Push every 4 hours  influenza   Vaccine 0.5 milliLiter(s) IntraMuscular once  isosorbide    mononitrate Tablet (ISMO) 20 milliGRAM(s) Oral two times a day  latanoprost 0.005% Ophthalmic Solution 1 Drop(s) Both EYES at bedtime  megestrol Suspension 400 milliGRAM(s) Oral <User Schedule>  multivitamin 1 Tablet(s) Oral daily  pantoprazole    Tablet 40 milliGRAM(s) Oral before breakfast  tamsulosin 0.4 milliGRAM(s) Oral at bedtime  traZODone 50 milliGRAM(s) Oral at bedtime    MEDICATIONS  (PRN):  acetaminophen   Tablet .. 650 milliGRAM(s) Oral every 6 hours PRN Temp greater or equal to 38.5C (101.3F), Mild Pain (1 - 3)  HYDROmorphone  Injectable 1 milliGRAM(s) IV Push every 1 hour PRN Severe Pain (7 - 10)  midodrine 5 milliGRAM(s) Oral three times a day PRN  or below  ondansetron    Tablet 8 milliGRAM(s) Oral every 6 hours PRN Nausea and/or Vomiting  simethicone 80 milliGRAM(s) Chew every 6 hours PRN gas/abdominal discomfort  zolpidem 5 milliGRAM(s) Oral at bedtime PRN Insomnia    cefTRIAXone   IVPB   100 mL/Hr IV Intermittent (11-29-18 @ 18:39)    vancomycin  IVPB   250 mL/Hr IV Intermittent (11-29-18 @ 22:41)        CBC Full  -  ( 04 Dec 2018 05:27 )  WBC Count : 8.43 K/uL  Hemoglobin : 9.8 g/dL  Hematocrit : 32.7 %  Platelet Count - Automated : 254 K/uL  Mean Cell Volume : 82.8 fL  Mean Cell Hemoglobin : 24.8 pg  Mean Cell Hemoglobin Concentration : 30.0 g/dL  Auto Neutrophil # : x  Auto Lymphocyte # : x  Auto Monocyte # : x  Auto Eosinophil # : x  Auto Basophil # : x  Auto Neutrophil % : x  Auto Lymphocyte % : x  Auto Monocyte % : x  Auto Eosinophil % : x  Auto Basophil % : x  PT/INR - ( 03 Dec 2018 06:22 )   PT: 14.10 sec;   INR: 1.23 ratio         PTT - ( 03 Dec 2018 06:22 )  PTT:32.8 sec             9.8    8.43  )-----------( 254      ( 12-04 @ 05:27 )             32.7                10.4   10.15 )-----------( 281      ( 12-03 @ 06:22 )             34.9                12.0   14.59 )-----------( 366      ( 12-02 @ 06:32 )             40.1                10.8   10.19 )-----------( 319      ( 12-01 @ 07:00 )             34.8                11.2   8.66  )-----------( 343      ( 11-30 @ 06:37 )             36.3       12-04    136  |  90<L>  |  16  ----------------------------<  104<H>  4.3   |  37<H>  |  0.7    Ca    8.9      04 Dec 2018 05:27  Phos  4.0     12-03  Mg     1.8     12-04    TPro  5.5<L>  /  Alb  3.4<L>  /  TBili  0.3  /  DBili  x   /  AST  71<H>  /  ALT  132<H>  /  AlkPhos  627<H>  12-03  LIVER FUNCTIONS - ( 03 Dec 2018 06:22 )  Alb: 3.4 g/dL / Pro: 5.5 g/dL / ALK PHOS: 627 U/L / ALT: 132 U/L / AST: 71 U/L / GGT: x               CXR:      Path:    REVIEW OF SYSTEMS:    Was difficult to obtain he was lethargic and confused..    PHYSICAL EXAM:  GENERAL: was lethargic but did wake up , had confusion  HEENT:  Atraumatic, Normocephalic. EOMI, PERRLA, conjunctiva and sclera clear,   PULMONARY: Decrease breath sounds in right posterior lung filed, exam was difficult due to patients position  CARDIOVASCULAR: Regular rate and rhythm; No murmurs, rubs, or gallops  GASTROINTESTINAL: Soft, Nontender, Nondistended; Bowel sounds present  NEUROLOGY: Moved all extremities  SKIN: No rashes or lesions

## 2018-12-04 NOTE — PROGRESS NOTE ADULT - SUBJECTIVE AND OBJECTIVE BOX
58yMale with diagnosis: DYSPNEA;EMPYEMA;ABDOMINAL PAIN      Patient seen, this morning around 11am - in severe pain after dose of morphine 10mg IVP; acute change in status; moaning and having diffuse pain 'all over'      PHYSICAL EXAM  Confused  Skin warm dry mild anasarca  -106  RR deep and slight wet RR 22-24  Abdomin large; distended; taught; stoma large (unchanged in size) beefy red; tender to palpation throughout; tympanic  Velásquez draining clear yellow urine      T(C): , Max: 36.7 (12:55)  T(F): 98  HR: 107 (104 - 111)  BP: 140/61 (82/51 - 140/61)  RR: 19 (18 - 20)  SpO2: 93% (93% - 93%)      LABS:                          9.8    8.43  )-----------( 254      ( 04 Dec 2018 05:27 )             32.7                                                                                      12-04    136  |  90<L>  |  16  ----------------------------<  104<H>  4.3   |  37<H>  |  0.7    Ca    8.9      04 Dec 2018 05:27  Phos  4.0     12-03  Mg     1.8     12-04    TPro  5.5<L>  /  Alb  3.4<L>  /  TBili  0.3  /  DBili  x   /  AST  71<H>  /  ALT  132<H>  /  AlkPhos  627<H>  12-03               MEDICATIONS  (STANDING):  chlorhexidine 4% Liquid 1 Application(s) Topical <User Schedule>  diltiazem    Tablet 60 milliGRAM(s) Oral every 8 hours  DULoxetine 60 milliGRAM(s) Oral daily  fentaNYL   Patch 100 MICROgram(s)/Hr 1 Patch Transdermal every 72 hours  furosemide   Injectable 20 milliGRAM(s) IV Push at bedtime  furosemide   Injectable 40 milliGRAM(s) IV Push daily  HYDROmorphone  Injectable 1 milliGRAM(s) IV Push every 4 hours   influenza   Vaccine 0.5 milliLiter(s) IntraMuscular once  isosorbide    mononitrate Tablet (ISMO) 20 milliGRAM(s) Oral two times a day  latanoprost 0.005% Ophthalmic Solution 1 Drop(s) Both EYES at bedtime  megestrol Suspension 400 milliGRAM(s) Oral <User Schedule>  multivitamin 1 Tablet(s) Oral daily  pantoprazole    Tablet 40 milliGRAM(s) Oral before breakfast  tamsulosin 0.4 milliGRAM(s) Oral at bedtime  traZODone 50 milliGRAM(s) Oral at bedtime    MEDICATIONS  (PRN):  acetaminophen   Tablet .. 650 milliGRAM(s) Oral every 6 hours PRN Temp greater or equal to 38.5C (101.3F), Mild Pain (1 - 3)  HYDROmorphone  Injectable 1 milliGRAM(s) IV Push every 1 hour PRN Severe Pain (7 - 10)  midodrine 5 milliGRAM(s) Oral three times a day PRN  or below  ondansetron    Tablet 8 milliGRAM(s) Oral every 6 hours PRN Nausea and/or Vomiting  simethicone 80 milliGRAM(s) Chew every 6 hours PRN gas/abdominal discomfort  zolpidem 5 milliGRAM(s) Oral at bedtime PRN Insomnia

## 2018-12-05 VITALS
DIASTOLIC BLOOD PRESSURE: 75 MMHG | OXYGEN SATURATION: 90 % | RESPIRATION RATE: 16 BRPM | TEMPERATURE: 99 F | SYSTOLIC BLOOD PRESSURE: 120 MMHG | HEART RATE: 107 BPM

## 2018-12-05 LAB
ANION GAP SERPL CALC-SCNC: 11 MMOL/L — SIGNIFICANT CHANGE UP (ref 7–14)
BUN SERPL-MCNC: 13 MG/DL — SIGNIFICANT CHANGE UP (ref 10–20)
CALCIUM SERPL-MCNC: 9.2 MG/DL — SIGNIFICANT CHANGE UP (ref 8.5–10.1)
CHLORIDE SERPL-SCNC: 92 MMOL/L — LOW (ref 98–110)
CO2 SERPL-SCNC: 42 MMOL/L — CRITICAL HIGH (ref 17–32)
CREAT SERPL-MCNC: 0.7 MG/DL — SIGNIFICANT CHANGE UP (ref 0.7–1.5)
GLUCOSE SERPL-MCNC: 97 MG/DL — SIGNIFICANT CHANGE UP (ref 70–99)
HCT VFR BLD CALC: 40.1 % — LOW (ref 42–52)
HGB BLD-MCNC: 11.5 G/DL — LOW (ref 14–18)
MAGNESIUM SERPL-MCNC: 1.9 MG/DL — SIGNIFICANT CHANGE UP (ref 1.8–2.4)
MCHC RBC-ENTMCNC: 24.5 PG — LOW (ref 27–31)
MCHC RBC-ENTMCNC: 28.7 G/DL — LOW (ref 32–37)
MCV RBC AUTO: 85.3 FL — SIGNIFICANT CHANGE UP (ref 80–94)
NRBC # BLD: 0 /100 WBCS — SIGNIFICANT CHANGE UP (ref 0–0)
PLATELET # BLD AUTO: 293 K/UL — SIGNIFICANT CHANGE UP (ref 130–400)
POTASSIUM SERPL-MCNC: 4.4 MMOL/L — SIGNIFICANT CHANGE UP (ref 3.5–5)
POTASSIUM SERPL-SCNC: 4.4 MMOL/L — SIGNIFICANT CHANGE UP (ref 3.5–5)
RBC # BLD: 4.7 M/UL — SIGNIFICANT CHANGE UP (ref 4.7–6.1)
RBC # FLD: 14.4 % — SIGNIFICANT CHANGE UP (ref 11.5–14.5)
SODIUM SERPL-SCNC: 145 MMOL/L — SIGNIFICANT CHANGE UP (ref 135–146)
WBC # BLD: 14.13 K/UL — HIGH (ref 4.8–10.8)
WBC # FLD AUTO: 14.13 K/UL — HIGH (ref 4.8–10.8)

## 2018-12-05 RX ORDER — HYDROMORPHONE HYDROCHLORIDE 2 MG/ML
4 INJECTION INTRAMUSCULAR; INTRAVENOUS; SUBCUTANEOUS
Qty: 100 | Refills: 0 | Status: DISCONTINUED | OUTPATIENT
Start: 2018-12-05 | End: 2018-12-06

## 2018-12-05 RX ORDER — ISOSORBIDE MONONITRATE 60 MG/1
30 TABLET, EXTENDED RELEASE ORAL DAILY
Qty: 0 | Refills: 0 | Status: DISCONTINUED | OUTPATIENT
Start: 2018-12-05 | End: 2018-12-06

## 2018-12-05 RX ORDER — HALOPERIDOL DECANOATE 100 MG/ML
1 INJECTION INTRAMUSCULAR EVERY 6 HOURS
Qty: 0 | Refills: 0 | Status: DISCONTINUED | OUTPATIENT
Start: 2018-12-05 | End: 2018-12-06

## 2018-12-05 RX ORDER — SCOPALAMINE 1 MG/3D
1 PATCH, EXTENDED RELEASE TRANSDERMAL
Qty: 0 | Refills: 0 | Status: DISCONTINUED | OUTPATIENT
Start: 2018-12-05 | End: 2018-12-06

## 2018-12-05 RX ORDER — HYDROMORPHONE HYDROCHLORIDE 2 MG/ML
4 INJECTION INTRAMUSCULAR; INTRAVENOUS; SUBCUTANEOUS ONCE
Qty: 0 | Refills: 0 | Status: DISCONTINUED | OUTPATIENT
Start: 2018-12-05 | End: 2018-12-05

## 2018-12-05 RX ORDER — FUROSEMIDE 40 MG
20 TABLET ORAL ONCE
Qty: 0 | Refills: 0 | Status: COMPLETED | OUTPATIENT
Start: 2018-12-05 | End: 2018-12-05

## 2018-12-05 RX ORDER — SENNA PLUS 8.6 MG/1
2 TABLET ORAL AT BEDTIME
Qty: 0 | Refills: 0 | Status: DISCONTINUED | OUTPATIENT
Start: 2018-12-05 | End: 2018-12-06

## 2018-12-05 RX ORDER — MORPHINE SULFATE 50 MG/1
10 CAPSULE, EXTENDED RELEASE ORAL
Qty: 0 | Refills: 0 | Status: DISCONTINUED | OUTPATIENT
Start: 2018-12-05 | End: 2018-12-05

## 2018-12-05 RX ORDER — DOCUSATE SODIUM 100 MG
100 CAPSULE ORAL
Qty: 0 | Refills: 0 | Status: DISCONTINUED | OUTPATIENT
Start: 2018-12-05 | End: 2018-12-06

## 2018-12-05 RX ORDER — HYDROMORPHONE HYDROCHLORIDE 2 MG/ML
4 INJECTION INTRAMUSCULAR; INTRAVENOUS; SUBCUTANEOUS
Qty: 0 | Refills: 0 | Status: DISCONTINUED | OUTPATIENT
Start: 2018-12-05 | End: 2018-12-06

## 2018-12-05 RX ADMIN — SCOPALAMINE 1 PATCH: 1 PATCH, EXTENDED RELEASE TRANSDERMAL at 13:53

## 2018-12-05 RX ADMIN — HYDROMORPHONE HYDROCHLORIDE 1 MILLIGRAM(S): 2 INJECTION INTRAMUSCULAR; INTRAVENOUS; SUBCUTANEOUS at 12:10

## 2018-12-05 RX ADMIN — FENTANYL CITRATE 1 PATCH: 50 INJECTION INTRAVENOUS at 18:55

## 2018-12-05 RX ADMIN — FENTANYL CITRATE 1 PATCH: 50 INJECTION INTRAVENOUS at 11:26

## 2018-12-05 RX ADMIN — HYDROMORPHONE HYDROCHLORIDE 1 MILLIGRAM(S): 2 INJECTION INTRAMUSCULAR; INTRAVENOUS; SUBCUTANEOUS at 06:35

## 2018-12-05 RX ADMIN — SCOPALAMINE 1 PATCH: 1 PATCH, EXTENDED RELEASE TRANSDERMAL at 19:45

## 2018-12-05 RX ADMIN — HYDROMORPHONE HYDROCHLORIDE 1 MILLIGRAM(S): 2 INJECTION INTRAMUSCULAR; INTRAVENOUS; SUBCUTANEOUS at 10:02

## 2018-12-05 RX ADMIN — Medication 20 MILLIGRAM(S): at 05:15

## 2018-12-05 RX ADMIN — HYDROMORPHONE HYDROCHLORIDE 4 MILLIGRAM(S): 2 INJECTION INTRAMUSCULAR; INTRAVENOUS; SUBCUTANEOUS at 13:23

## 2018-12-05 RX ADMIN — CHLORHEXIDINE GLUCONATE 1 APPLICATION(S): 213 SOLUTION TOPICAL at 07:35

## 2018-12-05 RX ADMIN — HYDROMORPHONE HYDROCHLORIDE 1 MILLIGRAM(S): 2 INJECTION INTRAMUSCULAR; INTRAVENOUS; SUBCUTANEOUS at 11:21

## 2018-12-05 RX ADMIN — Medication 40 MILLIGRAM(S): at 06:36

## 2018-12-05 RX ADMIN — HYDROMORPHONE HYDROCHLORIDE 4 MG/HR: 2 INJECTION INTRAMUSCULAR; INTRAVENOUS; SUBCUTANEOUS at 13:53

## 2018-12-05 RX ADMIN — Medication 1 MILLIGRAM(S): at 07:04

## 2018-12-05 RX ADMIN — HYDROMORPHONE HYDROCHLORIDE 1 MILLIGRAM(S): 2 INJECTION INTRAMUSCULAR; INTRAVENOUS; SUBCUTANEOUS at 05:15

## 2018-12-05 RX ADMIN — MORPHINE SULFATE 10 MILLIGRAM(S): 50 CAPSULE, EXTENDED RELEASE ORAL at 12:05

## 2018-12-05 RX ADMIN — HYDROMORPHONE HYDROCHLORIDE 1 MILLIGRAM(S): 2 INJECTION INTRAMUSCULAR; INTRAVENOUS; SUBCUTANEOUS at 10:35

## 2018-12-05 RX ADMIN — HYDROMORPHONE HYDROCHLORIDE 4 MILLIGRAM(S): 2 INJECTION INTRAMUSCULAR; INTRAVENOUS; SUBCUTANEOUS at 13:53

## 2018-12-05 RX ADMIN — HYDROMORPHONE HYDROCHLORIDE 1 MILLIGRAM(S): 2 INJECTION INTRAMUSCULAR; INTRAVENOUS; SUBCUTANEOUS at 03:56

## 2018-12-05 RX ADMIN — HYDROMORPHONE HYDROCHLORIDE 1 MILLIGRAM(S): 2 INJECTION INTRAMUSCULAR; INTRAVENOUS; SUBCUTANEOUS at 02:07

## 2018-12-05 RX ADMIN — MORPHINE SULFATE 10 MILLIGRAM(S): 50 CAPSULE, EXTENDED RELEASE ORAL at 12:35

## 2018-12-05 NOTE — PROGRESS NOTE ADULT - SUBJECTIVE AND OBJECTIVE BOX
I saw him earlier today. HE was already moved to a private room due to agitation and pain. He was sleeping comfortable given his recent medication adjustments    Continue with comfort measures . Please administer medications as per palliative care instructions.

## 2018-12-05 NOTE — PROGRESS NOTE ADULT - NSHPATTENDINGPLANDISCUSS_GEN_ALL_CORE
patient, covering nurse
Housestaff
patient, medical resident
Patient, medical resident, palliative care team, covering nurse
patient, medical resident

## 2018-12-05 NOTE — PROGRESS NOTE ADULT - ATTENDING COMMENTS
S: Still in pain despite 10mg IV morphine recently. Altered mental status. Constantly moaning. Says "yes" when asked if he is having pain.     Exam : b/l lung crackles  +BS. +Colostomy      59 y/o Male with PMH HTN, HFpEF (EF = 50-55% in 2018), DVT on Eliquis, BPH, depression and bipolar, 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 Hospice assessed the patient today. Changed his meds to Dilaudid gtt. May use Haldol or something for his agitation if hospice advises so.  patient's brother en route to hospital.    # Shortness of breath secondary to pleural/pericardial effusion secondary to metastatic disease  - s/p thoracentesis + talc pleurodesis on previous admission  - CT chest showing progression of his disease; increased pleural and pericardial effusion  - On 4L O2 nasal cannula.   - 2D echo showed mild pericardial effusion      # Abdominal pain secondary to metastatic ca and recent colostomy  - Increased Fentanyl patch 100 mcg/72H and started dilaudid gtt  - Halidol PRN for agitation   - Palliative care following. DO NOT CHANGE PAIN MEDICATION WITHOUT CONSULTING PALLIATIVE.  - Reach them on spectra #[id number] or [id number]    # Lower Extremity Edema b/l in patient with HFpEF (EF = 50-55% in 2018)  - BNP = 186  - on  lasix 40 mg qd     # Malignancy associated DVT  - off eliquis now    # Depression and Bipolar  - Continue duloxetine     # HTN  - Continue home meds    # BPH  - Continue flomax    DO NOT RESUSCITATE/DO NOT INTUBATE  Comfort measures only  Patient may  in 24-48 hours.
Patient was evaluated and examined by bedside, c/o abdominal pain, worsening dyspnea, poor appetite  All labs, radiology studies, VS was reviewed  I agree with medical plan outlined by Medical resident as stated above.  -patient with progressive malignancy, pleural effusions/ colostomy status/ DVT/ rapidly declining health  -After my discussion with patient today, he agreed for comfort care with increase in dose of pain management, but did not wish to be placed on analgesics drip, he wants to be awake at times to be able to speak to his brother. I have related this message to a Palliative care team.   -patient to be continued on Fentanyl patch tx., IV dilaudid PRN for breakthrough pain.  -Prognosis remains poor, patient is a good candidate for inpatient hospice care.  -CODE STATUS -DNR/DNI
Patient was evaluated and examined by bedside, c/o occasional abdominal pain, poor appetite  All labs, radiology studies, VS was reviewed  I agree with medical plan outlined by Medical resident as stated above.  -continue IV diuresis, daily I and O   -for pericardial effusion- f/up 2d echo  -recurrent malignant pleural effusion - f/up CT surgery , continue IV diuresis  -poor appetite- started on Megace tx.  -overall patient has poor prognosis- f/up palliative care  -pain management

## 2018-12-05 NOTE — PROGRESS NOTE ADULT - SUBJECTIVE AND OBJECTIVE BOX
58yMale with diagnosis: DYSPNEA;EMPYEMA;ABDOMINAL PAIN      Patient seen, in severe pain, crying out/inconsolable. Dilaudid decreased to 1mg IV by primary team as pt ?confused.  No N/V      PHYSICAL EXAM  unchanged      T(C): , Max: 36.2 (05:10)  T(F): 97.1  HR: 111 (106 - 111)  BP: 133/79 (116/64 - 133/79)  RR: 18 (11 - 18)  SpO2: --              LABS:                          11.5   14.13 )-----------( 293      ( 05 Dec 2018 06:13 )             40.1                                                                                      12-05    145  |  92<L>  |  13  ----------------------------<  97  4.4   |  42<HH>  |  0.7    Ca    9.2      05 Dec 2018 06:13  Mg     1.9     12-05                                                        MEDICATIONS  (STANDING):  chlorhexidine 4% Liquid 1 Application(s) Topical <User Schedule>  diltiazem    Tablet 60 milliGRAM(s) Oral every 8 hours  docusate sodium 100 milliGRAM(s) Oral two times a day  DULoxetine 60 milliGRAM(s) Oral daily  fentaNYL   Patch 100 MICROgram(s)/Hr 1 Patch Transdermal every 72 hours  furosemide   Injectable 20 milliGRAM(s) IV Push at bedtime  furosemide   Injectable 40 milliGRAM(s) IV Push daily  HYDROmorphone  Injectable 4 milliGRAM(s) IV Push once  HYDROmorphone Infusion 4 mG/Hr (4 mL/Hr) IV Continuous <Continuous>  influenza   Vaccine 0.5 milliLiter(s) IntraMuscular once  isosorbide   mononitrate ER Tablet (IMDUR) 30 milliGRAM(s) Oral daily  latanoprost 0.005% Ophthalmic Solution 1 Drop(s) Both EYES at bedtime  multivitamin 1 Tablet(s) Oral daily  senna 2 Tablet(s) Oral at bedtime  tamsulosin 0.4 milliGRAM(s) Oral at bedtime  traZODone 50 milliGRAM(s) Oral at bedtime    MEDICATIONS  (PRN):  acetaminophen   Tablet .. 650 milliGRAM(s) Oral every 6 hours PRN Temp greater or equal to 38.5C (101.3F), Mild Pain (1 - 3)  haloperidol    Injectable 1 milliGRAM(s) IV Push every 6 hours PRN agitation  HYDROmorphone  Injectable 4 milliGRAM(s) IV Push every 1 hour PRN pain/dyspnea  midodrine 5 milliGRAM(s) Oral three times a day PRN  or below  ondansetron    Tablet 8 milliGRAM(s) Oral every 6 hours PRN Nausea and/or Vomiting  simethicone 80 milliGRAM(s) Chew every 6 hours PRN gas/abdominal discomfort  zolpidem 5 milliGRAM(s) Oral at bedtime PRN Insomnia

## 2018-12-05 NOTE — PROGRESS NOTE ADULT - ASSESSMENT
58yMale being evaluated for comfort care.    Pt in pain crisis. GIven morphine 10mg IVP and dilaudid 1mg IV with no response.     Family en route to hospital. Plan for comfort measures only with end of life care. Transfer to private     Recommendations:  dilaudid 4mg IVP stat  dialudid 4mg ggt  dc all other opioid orders  add haldol 1mg IV q6h PRN agitation  supportive care      PLEASE DO NOT ADJUST COMFORT MEDS WITHOUT SPEAKING TO PALLIATIVE CARE TEAM 24/7 :     We will follow

## 2018-12-05 NOTE — PROGRESS NOTE ADULT - SUBJECTIVE AND OBJECTIVE BOX
AMRIK MADRID 58y Male  MRN#: [id number]       SUBJECTIVE  Patient is a 58y old Male who presents with a chief complaint of SOB  Currently admitted to medicine with the primary diagnosis of Dyspnea  Today is hospital day 6d, and this morning he was altered overnight.    Relevant Data:  CEA 4.4  Biopsy results:  Sigmoid colon mass, biopsy: invasive adenocarcinoma, moderately differentiated. focally similar to non-mucin producing adenocarcinoma component in left pleural tumor.  histologically different from the minute gastric tumor biopsy. Immunohistochemical studies + histologic features compatible with gastric primary.   Gastric ulcer, biopsy:  Invasive adeno CA, poorly differentiated, with signet ring cell features. >> Above gastric tumor has signet ring cells similar to signet ring cells seen in mucin pools in left pleural  Left pleural biopsy: Fibromembranous tissue with foci showing metastatic adenocarcinoma.  Left pleural implant: Metastatic adenocarcinoma with mucinous features.- Immunohistochemistry studies suggest GI origin.   CT chest/abd/pelvis contrast 11/29:	   New B/L pulmonary nodules measuring up to 12 mm.  Increasing size of right pleural effusion, moderate to large, with pleural enhancement at the right lung base. Empyema is not excluded.  Increasing right base opacity. Multifocal ground glass opacities. Increasing peribronchial wall thickening.  Increase in size of multiloculated pericardial effusion versus loculated fluid collections within the mediastinum extending into the superior mediastinum.  Right-sided diverting colostomy, new. Mild underlying mesenteric fat stranding is likely represent postsurgical change.   Bones are minimally heterogeneous in density. Sclerotic lesions are not excluded. Further evaluation with nuclear medicine bone scan is recommended.  Findings are concerning with progression of neoplastic disease  V duplex 10/22:  Chr DVT R fem + L common fem, fem and gastrocnemius veins    OBJECTIVE  PAST MEDICAL & SURGICAL HISTORY  Pleural effusion  CHF (congestive heart failure)  Insomnia  BPH (benign prostatic hyperplasia)  Depression  Bipolar 1 disorder  HTN (hypertension)  Colostomy present  History of thoracentesis    ALLERGIES:  No Known Allergies    MEDICATIONS:  STANDING MEDICATIONS  chlorhexidine 4% Liquid 1 Application(s) Topical <User Schedule>  diltiazem    Tablet 60 milliGRAM(s) Oral every 8 hours  docusate sodium 100 milliGRAM(s) Oral two times a day  DULoxetine 60 milliGRAM(s) Oral daily  fentaNYL   Patch 100 MICROgram(s)/Hr 1 Patch Transdermal every 72 hours  furosemide   Injectable 20 milliGRAM(s) IV Push at bedtime  furosemide   Injectable 40 milliGRAM(s) IV Push daily  HYDROmorphone Infusion 4 mG/Hr IV Continuous <Continuous>  influenza   Vaccine 0.5 milliLiter(s) IntraMuscular once  isosorbide   mononitrate ER Tablet (IMDUR) 30 milliGRAM(s) Oral daily  latanoprost 0.005% Ophthalmic Solution 1 Drop(s) Both EYES at bedtime  multivitamin 1 Tablet(s) Oral daily  scopolamine   Patch 1 Patch Transdermal every 72 hours  senna 2 Tablet(s) Oral at bedtime  tamsulosin 0.4 milliGRAM(s) Oral at bedtime  traZODone 50 milliGRAM(s) Oral at bedtime    PRN MEDICATIONS  acetaminophen   Tablet .. 650 milliGRAM(s) Oral every 6 hours PRN  haloperidol    Injectable 1 milliGRAM(s) IV Push every 6 hours PRN  HYDROmorphone  Injectable 4 milliGRAM(s) IV Push every 1 hour PRN  midodrine 5 milliGRAM(s) Oral three times a day PRN  ondansetron    Tablet 8 milliGRAM(s) Oral every 6 hours PRN  simethicone 80 milliGRAM(s) Chew every 6 hours PRN  zolpidem 5 milliGRAM(s) Oral at bedtime PRN      VITAL SIGNS: Last 24 Hours  T(C): 35.8 (05 Dec 2018 15:06), Max: 37.1 (05 Dec 2018 14:04)  T(F): 96.4 (05 Dec 2018 15:06), Max: 98.8 (05 Dec 2018 14:04)  HR: 132 (05 Dec 2018 15:06) (106 - 132)  BP: 136/71 (05 Dec 2018 15:06) (116/64 - 143/82)  BP(mean): --  RR: 18 (05 Dec 2018 15:06) (11 - 20)  SpO2: --    LABS:                        11.5   14.13 )-----------( 293      ( 05 Dec 2018 06:13 )             40.1     12-05    145  |  92<L>  |  13  ----------------------------<  97  4.4   |  42<HH>  |  0.7    Ca    9.2      05 Dec 2018 06:13  Mg     1.9     12-05        RADIOLOGY:    < from: CT Angio Chest w/ IV Cont (11.29.18 @ 16:35) >  IMPRESSION:    No acute central pulmonary embolism.    New bilateral pulmonary nodules measuring up to 12 mm.    Increasing size of right pleural effusion, moderate to large, with   pleural enhancement at the right lung base. Empyema is not excluded.    Increasing right base opacity. Multifocal groundglass opacities.   Increasing peribronchial wall thickening.    Increase in size of multiloculated pericardial effusion versus loculated   fluid collections within the mediastinum extending into the superior   mediastinum.    Right-sided diverting colostomy, new. Mild underlying mesenteric fat   stranding is likely represent postsurgical change.     Bones are minimally heterogeneous in density. Sclerotic lesions are not   excluded. Further evaluation with nuclear medicine bone scan is   recommended.    Findings are concerning with progression of neoplastic disease    < end of copied text >      < from: Xray Chest 1 View-PORTABLE IMMEDIATE (11.29.18 @ 12:38) >  Impression:      Unchanged bilateral opacities and pleural effusions greater on the right.    < end of copied text >    PHYSICAL EXAM:    GENERAL: NAD, well-developed, AAOx3  HEENT:  Atraumatic, Normocephalic. EOMI, PERRLA, conjunctiva and sclera clear, No JVD  PULMONARY: Clear to auscultation bilaterally; No wheeze  CARDIOVASCULAR: Regular rate and rhythm; No murmurs, rubs, or gallops  GASTROINTESTINAL: Soft, Nontender, Nondistended; Bowel sounds present  MUSCULOSKELETAL:  2+ Peripheral Pulses, No clubbing, cyanosis, or edema  NEUROLOGY: non-focal  SKIN: No rashes or lesions      ADMISSION SUMMARY  Patient is a 58y old Male who presents with a chief complaint of SOB  Currently admitted to medicine with the primary diagnosis of Dyspnea      ASSESSMENT & PLAN  59 y/o Male with PMH HTN, HFpEF (EF = 50-55% in Sept 2018), DVT on Eliquis, BPH, depression and bipolar, 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 Hospice assessed the patient today.     # Shortness of breath secondary to pleural/pericardial effusion secondary to metastatic disease  - s/p thoracentesis + talc pleurodesis on previous admission  - CT chest showing progression of his disease; increased pleural and pericardial effusion  - On 4L O2 nasal cannula.   - 2D echo showed mild pericardial effusion  - F/u CT surgery recs     # Abdominal pain secondary to metastatic ca and recent colostomy  - Increased Fentanyl patch 100 mcg/72H and started dilaudid 4mg every 1hr  - Halidol 1mg q6h PRN for agitation   - Palliative care following. DO NOT CHANGE PAIN MEDICATION WITHOUT CONSULTING PALLIATIVE.  - Reach them on spectra #[id number] or [id number]    # Lower Extremity Edema b/l in patient with HFpEF (EF = 50-55% in Sept 2018)  - BNP = 186  - Continue lasix 40 mg qd     # Malignancy associated DVT  - Restarted eliquis    # Depression and Bipolar  - Continue duloxetine     # HTN  - Continue home meds    # BPH  - Continue flomax    DO NOT RESUSCITATE/DO NOT INTUBATE  Dispo: From [facility]  Out of bed to chair  DASH diet   f/u Hospice consult

## 2018-12-06 LAB
ANION GAP SERPL CALC-SCNC: 8 MMOL/L — SIGNIFICANT CHANGE UP (ref 7–14)
BUN SERPL-MCNC: 16 MG/DL — SIGNIFICANT CHANGE UP (ref 10–20)
CALCIUM SERPL-MCNC: 9.5 MG/DL — SIGNIFICANT CHANGE UP (ref 8.5–10.1)
CHLORIDE SERPL-SCNC: 94 MMOL/L — LOW (ref 98–110)
CO2 SERPL-SCNC: 47 MMOL/L — CRITICAL HIGH (ref 17–32)
CREAT SERPL-MCNC: 0.8 MG/DL — SIGNIFICANT CHANGE UP (ref 0.7–1.5)
GLUCOSE SERPL-MCNC: 107 MG/DL — HIGH (ref 70–99)
HCT VFR BLD CALC: 41.1 % — LOW (ref 42–52)
HGB BLD-MCNC: 11.9 G/DL — LOW (ref 14–18)
MAGNESIUM SERPL-MCNC: 2 MG/DL — SIGNIFICANT CHANGE UP (ref 1.8–2.4)
MCHC RBC-ENTMCNC: 24.7 PG — LOW (ref 27–31)
MCHC RBC-ENTMCNC: 29 G/DL — LOW (ref 32–37)
MCV RBC AUTO: 85.3 FL — SIGNIFICANT CHANGE UP (ref 80–94)
NRBC # BLD: 0 /100 WBCS — SIGNIFICANT CHANGE UP (ref 0–0)
PLATELET # BLD AUTO: 303 K/UL — SIGNIFICANT CHANGE UP (ref 130–400)
POTASSIUM SERPL-MCNC: 4.5 MMOL/L — SIGNIFICANT CHANGE UP (ref 3.5–5)
POTASSIUM SERPL-SCNC: 4.5 MMOL/L — SIGNIFICANT CHANGE UP (ref 3.5–5)
RBC # BLD: 4.82 M/UL — SIGNIFICANT CHANGE UP (ref 4.7–6.1)
RBC # FLD: 14.4 % — SIGNIFICANT CHANGE UP (ref 11.5–14.5)
SODIUM SERPL-SCNC: 149 MMOL/L — HIGH (ref 135–146)
WBC # BLD: 10.55 K/UL — SIGNIFICANT CHANGE UP (ref 4.8–10.8)
WBC # FLD AUTO: 10.55 K/UL — SIGNIFICANT CHANGE UP (ref 4.8–10.8)

## 2018-12-06 RX ORDER — ROBINUL 0.2 MG/ML
0.4 INJECTION INTRAMUSCULAR; INTRAVENOUS ONCE
Qty: 0 | Refills: 0 | Status: COMPLETED | OUTPATIENT
Start: 2018-12-06 | End: 2018-12-06

## 2018-12-06 RX ORDER — HALOPERIDOL DECANOATE 100 MG/ML
1 INJECTION INTRAMUSCULAR ONCE
Qty: 0 | Refills: 0 | Status: COMPLETED | OUTPATIENT
Start: 2018-12-06 | End: 2018-12-06

## 2018-12-06 RX ORDER — HYDROMORPHONE HYDROCHLORIDE 2 MG/ML
4 INJECTION INTRAMUSCULAR; INTRAVENOUS; SUBCUTANEOUS ONCE
Qty: 0 | Refills: 0 | Status: DISCONTINUED | OUTPATIENT
Start: 2018-12-06 | End: 2018-12-06

## 2018-12-06 RX ORDER — ROBINUL 0.2 MG/ML
0.2 INJECTION INTRAMUSCULAR; INTRAVENOUS EVERY 6 HOURS
Qty: 0 | Refills: 0 | Status: DISCONTINUED | OUTPATIENT
Start: 2018-12-06 | End: 2018-12-06

## 2018-12-06 RX ORDER — HYDROMORPHONE HYDROCHLORIDE 2 MG/ML
8 INJECTION INTRAMUSCULAR; INTRAVENOUS; SUBCUTANEOUS
Qty: 100 | Refills: 0 | Status: DISCONTINUED | OUTPATIENT
Start: 2018-12-06 | End: 2018-12-06

## 2018-12-06 RX ADMIN — HYDROMORPHONE HYDROCHLORIDE 8 MG/HR: 2 INJECTION INTRAMUSCULAR; INTRAVENOUS; SUBCUTANEOUS at 09:48

## 2018-12-06 RX ADMIN — Medication 40 MILLIGRAM(S): at 05:21

## 2018-12-06 RX ADMIN — HALOPERIDOL DECANOATE 1 MILLIGRAM(S): 100 INJECTION INTRAMUSCULAR at 09:40

## 2018-12-06 RX ADMIN — Medication 0.5 MILLIGRAM(S): at 09:57

## 2018-12-06 RX ADMIN — HALOPERIDOL DECANOATE 1 MILLIGRAM(S): 100 INJECTION INTRAMUSCULAR at 09:05

## 2018-12-06 RX ADMIN — LATANOPROST 1 DROP(S): 0.05 SOLUTION/ DROPS OPHTHALMIC; TOPICAL at 00:50

## 2018-12-06 RX ADMIN — HYDROMORPHONE HYDROCHLORIDE 4 MILLIGRAM(S): 2 INJECTION INTRAMUSCULAR; INTRAVENOUS; SUBCUTANEOUS at 09:27

## 2018-12-06 RX ADMIN — ROBINUL 0.4 MILLIGRAM(S): 0.2 INJECTION INTRAMUSCULAR; INTRAVENOUS at 09:52

## 2018-12-06 RX ADMIN — HYDROMORPHONE HYDROCHLORIDE 4 MILLIGRAM(S): 2 INJECTION INTRAMUSCULAR; INTRAVENOUS; SUBCUTANEOUS at 08:49

## 2018-12-06 NOTE — PROGRESS NOTE ADULT - SUBJECTIVE AND OBJECTIVE BOX
Called by Palliative Care RN Coordinator to evaluate patient.    Case rev'd - Goal of Care: Comfort Only - End of Life Care    58yMale with diagnosis: DYSPNEA;EMPYEMA;ABDOMINAL PAIN    Patient seen, s/p Dilaudid escalation now on 8mg/H and 1 dose of haldol 1mg IVP -  agitated - confused - inconsolable belt restraint in place reportedly improved since RN coordinator initial assessment       PHYSICAL EXAM  dying - agitated unable to communicate; moaning   Respirations deep with use of accessory muscles low 20's during beside checks changing with periods of apnea - moaning and agitation persist      ++ secretions  Tachycardic  Abdomin unchanged  Skin changing - distal coolness and cyanosis     T(C): , Max: 37.2 (19:35)  T(F): 99  HR: 107 (107 - 132)  BP: 120/75 (120/75 - 143/82)  RR: 16 (16 - 20)  SpO2: 90% (90% - 90%)    Not taking PO:   MEDICATIONS  (STANDING):  chlorhexidine 4% Liquid 1 Application(s) Topical <User Schedule>  diltiazem    Tablet 60 milliGRAM(s) Oral every 8 hours  docusate sodium 100 milliGRAM(s) Oral two times a day  DULoxetine 60 milliGRAM(s) Oral daily  fentaNYL   Patch 100 MICROgram(s)/Hr 1 Patch Transdermal every 72 hours  furosemide   Injectable 20 milliGRAM(s) IV Push at bedtime  furosemide   Injectable 40 milliGRAM(s) IV Push daily  HYDROmorphone Infusion 8 mG/Hr (8 mL/Hr) IV Continuous <Continuous>  influenza   Vaccine 0.5 milliLiter(s) IntraMuscular once  isosorbide   mononitrate ER Tablet (IMDUR) 30 milliGRAM(s) Oral daily  latanoprost 0.005% Ophthalmic Solution 1 Drop(s) Both EYES at bedtime  multivitamin 1 Tablet(s) Oral daily  scopolamine   Patch 1 Patch Transdermal every 72 hours  senna 2 Tablet(s) Oral at bedtime  tamsulosin 0.4 milliGRAM(s) Oral at bedtime  traZODone 50 milliGRAM(s) Oral at bedtime    MEDICATIONS  (PRN):  acetaminophen   Tablet .. 650 milliGRAM(s) Oral every 6 hours PRN Temp greater or equal to 38.5C (101.3F), Mild Pain (1 - 3)  glycopyrrolate Injectable 0.2 milliGRAM(s) IV Push every 6 hours PRN secreations  haloperidol    Injectable 1 milliGRAM(s) IV Push every 6 hours PRN agitation  midodrine 5 milliGRAM(s) Oral three times a day PRN  or below  ondansetron    Tablet 8 milliGRAM(s) Oral every 6 hours PRN Nausea and/or Vomiting  simethicone 80 milliGRAM(s) Chew every 6 hours PRN gas/abdominal discomfort  zolpidem 5 milliGRAM(s) Oral at bedtime PRN Insomnia

## 2018-12-06 NOTE — PROGRESS NOTE ADULT - ASSESSMENT
57 y/o Male with PMH HTN, HFpEF (EF = 50-55% in Sept 2018), DVT on Eliquis, BPH, depression and bipolar, 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   - 2D echo showed mild pericardial effusion  - F/u CT surgery recs     # Abdominal pain secondary to metastatic ca and recent colostomy  - Palliative care following. DO NOT CHANGE PAIN MEDICATION WITHOUT CONSULTING PALLIATIVE.  - Reach them on spectra #[id number] or [id number]    # Lower Extremity Edema b/l in patient with HFpEF (EF = 50-55% in Sept 2018)  - BNP = 186  - Continue lasix 40 mg qd     # Malignancy associated DVT  - Restarted eliquis    # Depression and Bipolar  - Continue duloxetine     # HTN  - Continue home meds    # BPH  - Continue flomax    DO NOT RESUSCITATE/DO NOT INTUBATE  Dispo: From [facility]  Out of bed to chair  DASH diet   f/u Hospice consult 57 y/o Male with PMH HTN, HFpEF (EF = 50-55% in Sept 2018), DVT on Eliquis, BPH, depression and bipolar, 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   - 2D echo showed mild pericardial effusion  - F/u CT surgery recs     # Abdominal pain secondary to metastatic ca and recent colostomy  - Palliative care following. DO NOT CHANGE PAIN MEDICATION WITHOUT CONSULTING PALLIATIVE.  - Reach them on spectra #[id number] or [id number]    # Lower Extremity Edema b/l in patient with HFpEF (EF = 50-55% in Sept 2018)  - BNP = 186  - Continue lasix 40 mg qd     # Malignancy associated DVT  - Restarted eliquis    # Depression and Bipolar  - Continue duloxetine     # HTN  - Continue home meds    # BPH  - Continue flomax    DO NOT RESUSCITATE/DO NOT INTUBATE  Dispo: [facility]  Out of bed to chair  DASH diet   f/u Hospice consult

## 2018-12-06 NOTE — PROGRESS NOTE ADULT - ASSESSMENT
58yMale being evaluated for end of life symptom management - in pain crisis      Recommendations:  Continue 8mg/H dillaudid gtt  Gave another dose of haldol 1mg IVP - no response - still moaning; Ativan 0.5 mg IVP - good response  Robinal 0.4mg IVP and then 0.2mg IVP every 6H PRN for secretions    End of Life care - actively dying    Discussed with Dr. Thacker and Dr. Simpson and nursing    DNR/I    45 minute spent    Please Call [phone] PRN

## 2018-12-06 NOTE — DISCHARGE NOTE FOR THE EXPIRED PATIENT - HOSPITAL COURSE
Mr. Montez was a 57 yo M with PMH of HTN, CHF, DVT on Eliquis, BPH, depression, bipolar, recent diagnosis of colon/gastric adenocarcinoma with pleural mets and malignant effusion, on 4L NC, recent admission to [facility] from Sept-Oct when he was diagnosed with this cancer, s/p colostomy and VATS w/ pleural biopsy that admission, s/p talc pleurodesis. Patient had complicated hospital course with extended ICU stay during that admission. Patient presented from rehab for worsening SOB over past few days. Also reports worsening b/l LE edema and abdominal pain. Is compliant with O2 @ 4L but still SOB worsening. Reports continued ostomy output, same as before. Abdominal pain and SOB are long standing, just worsening recently. Denied any fevers, chills, nausea, vomiting, chest pain, reduced PO intake, dysuria, or other complaint or symptom. Pt was consulted by both heme/onc --> he would not tolerate muti agent systemic therapy and single agent would be of marginal benefit if any --> palliative care consulted --> agreed to hospice/comfort measures --> started on IV dilaudid drip --> Xanax added for agitation --> pt  at 10:15AM on 18.

## 2018-12-06 NOTE — CHART NOTE - NSCHARTNOTEFT_GEN_A_CORE
1 yo M with PMH of HTN, CHF, DVT on Eliquis, BPH, depression, bipolar, recent diagnosis of colon/gastric adenocarcinoma with pleural mets and malignant effusion, on 4L NC, recent admission to [facility] from Sept-Oct when he was diagnosed with this cancer, s/p colostomy and VATS w/ pleural biopsy that admission, s/p talc pleurodesis. Patient had complicated hospital course with extended ICU stay during that admission.    Currently, patient presented from rehab for worsening SOB over past few days. Also reports worsening b/l LE edema and abdominal pain. Is compliant with O2 @ 4L but still SOB worsening. Reports continued ostomy output, same as before. Abdominal pain and SOB are long standing, just worsening recently. Denies any fevers, chills, nausea, vomiting, chest pain, reduced PO intake, dysuria, or other complaint or symptom. 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      He was evaluated by palliative team and found to actively dying , pursued comfort measures/ end of life care. Patient  2018

## 2018-12-06 NOTE — PROGRESS NOTE ADULT - SUBJECTIVE AND OBJECTIVE BOX
SUBJECTIVE:    Patient is a 58y old Male who presents with a chief complaint of SOB (05 Dec 2018 21:21)    Currently admitted to medicine with the primary diagnosis of Dyspnea     Today is hospital day 7d. This morning he is resting comfortably in bed and reports no new issues or overnight events.     PAST MEDICAL & SURGICAL HISTORY  Pleural effusion  CHF (congestive heart failure)  Insomnia  BPH (benign prostatic hyperplasia)  Depression  Bipolar 1 disorder  HTN (hypertension)  Colostomy present  History of thoracentesis    SOCIAL HISTORY:  Negative for smoking/alcohol/drug use.     ALLERGIES:  No Known Allergies    MEDICATIONS:  STANDING MEDICATIONS  chlorhexidine 4% Liquid 1 Application(s) Topical <User Schedule>  diltiazem    Tablet 60 milliGRAM(s) Oral every 8 hours  docusate sodium 100 milliGRAM(s) Oral two times a day  DULoxetine 60 milliGRAM(s) Oral daily  fentaNYL   Patch 100 MICROgram(s)/Hr 1 Patch Transdermal every 72 hours  furosemide   Injectable 20 milliGRAM(s) IV Push at bedtime  furosemide   Injectable 40 milliGRAM(s) IV Push daily  HYDROmorphone Infusion 4 mG/Hr IV Continuous <Continuous>  influenza   Vaccine 0.5 milliLiter(s) IntraMuscular once  isosorbide   mononitrate ER Tablet (IMDUR) 30 milliGRAM(s) Oral daily  latanoprost 0.005% Ophthalmic Solution 1 Drop(s) Both EYES at bedtime  multivitamin 1 Tablet(s) Oral daily  scopolamine   Patch 1 Patch Transdermal every 72 hours  senna 2 Tablet(s) Oral at bedtime  tamsulosin 0.4 milliGRAM(s) Oral at bedtime  traZODone 50 milliGRAM(s) Oral at bedtime    PRN MEDICATIONS  acetaminophen   Tablet .. 650 milliGRAM(s) Oral every 6 hours PRN  haloperidol    Injectable 1 milliGRAM(s) IV Push every 6 hours PRN  HYDROmorphone  Injectable 4 milliGRAM(s) IV Push every 1 hour PRN  midodrine 5 milliGRAM(s) Oral three times a day PRN  ondansetron    Tablet 8 milliGRAM(s) Oral every 6 hours PRN  simethicone 80 milliGRAM(s) Chew every 6 hours PRN  zolpidem 5 milliGRAM(s) Oral at bedtime PRN    VITALS:   T(F): 99  HR: 107  BP: 120/75  RR: 16  SpO2: 90%    LABS:                        11.9   10.55 )-----------( 303      ( 06 Dec 2018 06:08 )             41.1     12-06    149<H>  |  94<L>  |  16  ----------------------------<  107<H>  4.5   |  47<HH>  |  0.8    Ca    9.5      06 Dec 2018 06:08  Mg     2.0     12-06                    RADIOLOGY:    PHYSICAL EXAM:  GEN: No acute distress, lying comfortably in bed   LUNGS: b/l crackles  HEART: S1/S2 present. RRR.   ABD: Soft, non-tender, non-distended. Bowel sounds present.   EXT: Noncyanotic, nonedematous, 2+ peripheral pulses, skin intact   NEURO: AAOX3, CN II-XII grossly intact     Lines/Tubes </u  Hamilton cathter: Indwelling Urethral Catheter:     Connect To:  Straight Drainage/Monitor    Indication:  Urinary Retention / Obstruction (12-02-18 @ 06:44) (not performed) [active]  Indwelling Urethral Catheter:     Connect To:  Straight Drainage/Monitor    Indication:  Urinary Retention / Obstruction    Additional Instructions:  Continue hamilton catheter (12-04-18 @ 17:32) (not performed) [active] SUBJECTIVE:    Patient is a 58y old Male who presents with a chief complaint of SOB (05 Dec 2018 21:21)    Currently admitted to medicine with the primary diagnosis of Dyspnea     Today is hospital day 7d. This morning he is resting comfortably in bed, was unable to engage in interview due to sedation.     PAST MEDICAL & SURGICAL HISTORY  Pleural effusion  CHF (congestive heart failure)  Insomnia  BPH (benign prostatic hyperplasia)  Depression  Bipolar 1 disorder  HTN (hypertension)  Colostomy present  History of thoracentesis    SOCIAL HISTORY:  Negative for smoking/alcohol/drug use.     ALLERGIES:  No Known Allergies    MEDICATIONS:  STANDING MEDICATIONS  chlorhexidine 4% Liquid 1 Application(s) Topical <User Schedule>  diltiazem    Tablet 60 milliGRAM(s) Oral every 8 hours  docusate sodium 100 milliGRAM(s) Oral two times a day  DULoxetine 60 milliGRAM(s) Oral daily  fentaNYL   Patch 100 MICROgram(s)/Hr 1 Patch Transdermal every 72 hours  furosemide   Injectable 20 milliGRAM(s) IV Push at bedtime  furosemide   Injectable 40 milliGRAM(s) IV Push daily  HYDROmorphone Infusion 4 mG/Hr IV Continuous <Continuous>  influenza   Vaccine 0.5 milliLiter(s) IntraMuscular once  isosorbide   mononitrate ER Tablet (IMDUR) 30 milliGRAM(s) Oral daily  latanoprost 0.005% Ophthalmic Solution 1 Drop(s) Both EYES at bedtime  multivitamin 1 Tablet(s) Oral daily  scopolamine   Patch 1 Patch Transdermal every 72 hours  senna 2 Tablet(s) Oral at bedtime  tamsulosin 0.4 milliGRAM(s) Oral at bedtime  traZODone 50 milliGRAM(s) Oral at bedtime    PRN MEDICATIONS  acetaminophen   Tablet .. 650 milliGRAM(s) Oral every 6 hours PRN  haloperidol    Injectable 1 milliGRAM(s) IV Push every 6 hours PRN  HYDROmorphone  Injectable 4 milliGRAM(s) IV Push every 1 hour PRN  midodrine 5 milliGRAM(s) Oral three times a day PRN  ondansetron    Tablet 8 milliGRAM(s) Oral every 6 hours PRN  simethicone 80 milliGRAM(s) Chew every 6 hours PRN  zolpidem 5 milliGRAM(s) Oral at bedtime PRN    VITALS:   T(F): 99  HR: 107  BP: 120/75  RR: 16  SpO2: 90%    LABS:                        11.9   10.55 )-----------( 303      ( 06 Dec 2018 06:08 )             41.1     12-06    149<H>  |  94<L>  |  16  ----------------------------<  107<H>  4.5   |  47<HH>  |  0.8    Ca    9.5      06 Dec 2018 06:08  Mg     2.0     12-06                    RADIOLOGY:    PHYSICAL EXAM:  GEN: No acute distress, lying comfortably in bed   LUNGS: b/l crackles  HEART: S1/S2 present. RRR.   ABD: Soft, non-tender, non-distended. Bowel sounds present.   EXT: Noncyanotic, nonedematous, 2+ peripheral pulses, skin intact   NEURO: AAOX3, CN II-XII grossly intact     Lines/Tubes </u  Hamilton cathter: Indwelling Urethral Catheter:     Connect To:  Straight Drainage/[place]    Indication:  Urinary Retention / Obstruction (12-02-18 @ 06:44) (not performed) [active]  Indwelling Urethral Catheter:     Connect To:  Straight Drainage/[place]    Indication:  Urinary Retention / Obstruction    Additional Instructions:  Continue hamilton catheter (12-04-18 @ 17:32) (not performed) [active]

## 2018-12-17 DIAGNOSIS — I11.0 HYPERTENSIVE HEART DISEASE WITH HEART FAILURE: ICD-10-CM

## 2018-12-17 DIAGNOSIS — J44.9 CHRONIC OBSTRUCTIVE PULMONARY DISEASE, UNSPECIFIED: ICD-10-CM

## 2018-12-17 DIAGNOSIS — C18.7 MALIGNANT NEOPLASM OF SIGMOID COLON: ICD-10-CM

## 2018-12-17 DIAGNOSIS — Z51.5 ENCOUNTER FOR PALLIATIVE CARE: ICD-10-CM

## 2018-12-17 DIAGNOSIS — Z66 DO NOT RESUSCITATE: ICD-10-CM

## 2018-12-17 DIAGNOSIS — Z93.3 COLOSTOMY STATUS: ICD-10-CM

## 2018-12-17 DIAGNOSIS — C78.2 SECONDARY MALIGNANT NEOPLASM OF PLEURA: ICD-10-CM

## 2018-12-17 DIAGNOSIS — Z87.891 PERSONAL HISTORY OF NICOTINE DEPENDENCE: ICD-10-CM

## 2018-12-17 DIAGNOSIS — R06.00 DYSPNEA, UNSPECIFIED: ICD-10-CM

## 2018-12-17 DIAGNOSIS — I82.513 CHRONIC EMBOLISM AND THROMBOSIS OF FEMORAL VEIN, BILATERAL: ICD-10-CM

## 2018-12-17 DIAGNOSIS — E78.5 HYPERLIPIDEMIA, UNSPECIFIED: ICD-10-CM

## 2018-12-17 DIAGNOSIS — G89.3 NEOPLASM RELATED PAIN (ACUTE) (CHRONIC): ICD-10-CM

## 2018-12-17 DIAGNOSIS — G47.00 INSOMNIA, UNSPECIFIED: ICD-10-CM

## 2018-12-17 DIAGNOSIS — I95.2 HYPOTENSION DUE TO DRUGS: ICD-10-CM

## 2018-12-17 DIAGNOSIS — C16.9 MALIGNANT NEOPLASM OF STOMACH, UNSPECIFIED: ICD-10-CM

## 2018-12-17 DIAGNOSIS — F31.9 BIPOLAR DISORDER, UNSPECIFIED: ICD-10-CM

## 2018-12-17 DIAGNOSIS — N40.0 BENIGN PROSTATIC HYPERPLASIA WITHOUT LOWER URINARY TRACT SYMPTOMS: ICD-10-CM

## 2018-12-17 DIAGNOSIS — I50.32 CHRONIC DIASTOLIC (CONGESTIVE) HEART FAILURE: ICD-10-CM

## 2018-12-17 DIAGNOSIS — I31.3 PERICARDIAL EFFUSION (NONINFLAMMATORY): ICD-10-CM

## 2018-12-17 DIAGNOSIS — Z79.01 LONG TERM (CURRENT) USE OF ANTICOAGULANTS: ICD-10-CM

## 2018-12-17 DIAGNOSIS — R60.0 LOCALIZED EDEMA: ICD-10-CM

## 2018-12-17 DIAGNOSIS — J91.0 MALIGNANT PLEURAL EFFUSION: ICD-10-CM

## 2018-12-23 NOTE — PROGRESS NOTE ADULT - I WAS PHYSICALLY PRESENT FOR THE KEY PORTIONS OF THE EVALUATION AND MANAGEMENT (E/M) SERVICE PROVIDED.  I AGREE WITH THE ABOVE HISTORY, PHYSICAL, AND PLAN WHICH I HAVE REVIEWED AND EDITED WHERE APPROPRIATE
Statement Selected
no

## 2019-03-07 NOTE — PROGRESS NOTE ADULT - SUBJECTIVE AND OBJECTIVE BOX
AMRIK MADRID  58y  Male      Patient is a 58y old  Male who presents with a chief complaint of sob (18 Jul 2018 02:25)      INTERVAL HPI/OVERNIGHT EVENTS: patient feels well. breathing well with CT in place. no acute complaints      REVIEW OF SYSTEMS:  as above  All other review of systems negative    T(C): 36.8 (07-21-18 @ 13:00), Max: 36.8 (07-21-18 @ 13:00)  HR: 72 (07-21-18 @ 13:00) (72 - 85)  BP: 132/81 (07-21-18 @ 13:00) (117/68 - 132/81)  RR: 19 (07-21-18 @ 13:00) (18 - 19)  SpO2: 98% (07-21-18 @ 05:42) (93% - 98%)  Wt(kg): --Vital Signs Last 24 Hrs  T(C): 36.8 (21 Jul 2018 13:00), Max: 36.8 (21 Jul 2018 13:00)  T(F): 98.3 (21 Jul 2018 13:00), Max: 98.3 (21 Jul 2018 13:00)  HR: 72 (21 Jul 2018 13:00) (72 - 85)  BP: 132/81 (21 Jul 2018 13:00) (117/68 - 132/81)  BP(mean): --  RR: 19 (21 Jul 2018 13:00) (18 - 19)  SpO2: 98% (21 Jul 2018 05:42) (93% - 98%)      07-20-18 @ 07:01  -  07-21-18 @ 07:00  --------------------------------------------------------  IN: 0 mL / OUT: 635 mL / NET: -635 mL    07-21-18 @ 07:01  -  07-21-18 @ 18:00  --------------------------------------------------------  IN: 240 mL / OUT: 1050 mL / NET: -810 mL        PHYSICAL EXAM:  GENERAL: NAD  PSYCH: no agitation, baseline mentation  HEENT: lazy eye  NERVOUS SYSTEM:  Alert & Oriented X3, no new focal deficits  PULMONARY: Clear to percussion bilaterally; No rales, rhonchi, wheezing, or rubs, L chest tube, bibasilar subtle crackles/ poor air exchange  CARDIOVASCULAR: Regular rate and rhythm; No murmurs, rubs, or gallops  GI: Soft, Nontender, Nondistended; Bowel sounds present, rotund  EXTREMITIES:  2+ Peripheral Pulses, No clubbing, cyanosis, or edema    Consultant(s) Notes Reviewed:  [x ] YES  [ ] NO    Discussed with Consultants/Other Providers [ x] YES     LABS                          14.6   7.87  )-----------( 345      ( 21 Jul 2018 08:52 )             45.3     07-21    140  |  95<L>  |  15  ----------------------------<  117<H>  3.7   |  29  |  0.9    Ca    8.4<L>      21 Jul 2018 08:52    TPro  6.0  /  Alb  3.4<L>  /  TBili  0.5  /  DBili  x   /  AST  25  /  ALT  48<H>  /  AlkPhos  174<H>  07-21        PT/INR - ( 21 Jul 2018 08:52 )   PT: 13.80 sec;   INR: 1.28 ratio         PTT - ( 21 Jul 2018 08:52 )  PTT:44.4 sec  Lactate Trend  07-17 @ 23:51 Lactate:1.0         CAPILLARY BLOOD GLUCOSE            RADIOLOGY & ADDITIONAL TESTS:    Imaging Personally Reviewed:  [ ] YES  [ ] NO    HEALTH ISSUES - PROBLEM Dx: Detail Level: Simple Plan: Follow up in 3 months Continue Regimen: Will keep current regimen the same with Clindamycin QAM and Minocycline 100mg QD

## 2020-11-05 NOTE — CHART NOTE - NSCHARTNOTESELECT_GEN_ALL_CORE
Event Note Cheek-To-Nose Interpolation Flap Text: A decision was made to reconstruct the defect utilizing an interpolation axial flap and a staged reconstruction.  A telfa template was made of the defect.  This telfa template was then used to outline the Cheek-To-Nose Interpolation flap.  The donor area for the pedicle flap was then injected with anesthesia.  The flap was excised through the skin and subcutaneous tissue down to the layer of the underlying musculature.  The interpolation flap was carefully excised within this deep plane to maintain its blood supply.  The edges of the donor site were undermined.   The donor site was closed in a primary fashion.  The pedicle was then rotated into position and sutured.  Once the tube was sutured into place, adequate blood supply was confirmed with blanching and refill.  The pedicle was then wrapped with xeroform gauze and dressed appropriately with a telfa and gauze bandage to ensure continued blood supply and protect the attached pedicle.

## 2020-11-23 NOTE — PROGRESS NOTE ADULT - ASSESSMENT
CTS ATTENDING    PATIENT PREOP FOR THORACOSCOPY TOMORROW  HEPARIN (FOR DVT) TO STOP AT 6-AM  CASE DISCUSSED WITH DR. ARREDONDO  KEEP NPO PAST 12MN former smoker (quit 40y ago), rare etoh, no drugs   lives at home with    ambulates independently

## 2023-07-12 NOTE — CHART NOTE - NSCHARTNOTEFT_GEN_A_CORE
Problem: Discharge Planning  Goal: Discharge to home or other facility with appropriate resources  7/11/2023 2136 by Linh Miller RN  Outcome: Progressing  Discharge to home or other facility with appropriate resources:   Identify barriers to discharge with patient and caregiver   Arrange for needed discharge resources and transportation as appropriate   Identify discharge learning needs (meds, wound care, etc)     Problem: Pain  Goal: Verbalizes/displays adequate comfort level or baseline comfort level  7/11/2023 2136 by Linh Miller RN  Outcome: Progressing  Verbalizes/displays adequate comfort level or baseline comfort level:   Encourage patient to monitor pain and request assistance   Assess pain using appropriate pain scale   Administer analgesics based on type and severity of pain and evaluate response   Implement non-pharmacological measures as appropriate and evaluate response   Notify Licensed Independent Practitioner if interventions unsuccessful or patient reports new pain     Problem: Safety - Adult  Goal: Free from fall injury  7/11/2023 2136 by Linh Miller RN  Outcome: Progressing  Free From Fall Injury:   Instruct family/caregiver on patient safety   Based on caregiver fall risk screen, instruct family/caregiver to ask for assistance with transferring infant if caregiver noted to have fall risk factors     Problem: Skin/Tissue Integrity - Adult  Goal: Skin integrity remains intact  7/11/2023 2136 by Linh Miller RN  Outcome: Progressing  Skin Integrity Remains Intact: Monitor for areas of redness and/or skin breakdown     Problem: Hematologic - Adult  Goal: Maintains hematologic stability  7/11/2023 2136 by Linh Miller RN  Outcome: Progressing  Maintains hematologic stability:   Assess for signs and symptoms of bleeding or hemorrhage   Monitor labs for bleeding or clotting disorders   Administer blood products/factors as ordered   Care plan reviewed with patient.   Patient verbalizes Surgery post-op Note 10-10-18 @ 13:54    Procedure:  left vats pleural biopsy, pleural implant biopsy, talc pleurodesis.     S: 58yMale s/p  left vats pleural biopsy, pleural implant biopsy, talc pleurodesis who initially presented with. Patient is laying comfortably in the bed. Denies any pain at the incision site, fever, chills, chest pain, shortness of breath.    O:   T(C): 36.8 (10-10-18 @ 12:45), Max: 36.9 (10-09-18 @ 21:17)  HR: 80 (10-10-18 @ 13:30) (78 - 98)  BP: 92/53 (10-10-18 @ 13:30) (90/49 - 131/73)  RR: 17 (10-10-18 @ 13:30) (15 - 23)  SpO2: 97% (10-10-18 @ 13:30) (96% - 98%)    General: AAOx 3. NAD  Heart: S1 and S2 noted  Lungs: Clear to auscultation bilaterally  Abdomen: distended. mildly tender to palpation  Wound: No bleeding or discharge noted from the incision sites in the chest. Dressing present     10-09-18 @ 07:01  -  10-10-18 @ 07:00  --------------------------------------------------------  IN: 0 mL / OUT: 58 mL / NET: -58 mL    10-10-18 @ 07:01  -  10-10-18 @ 13:54  --------------------------------------------------------  IN: 120 mL / OUT: 325 mL / NET: -205 mL    acetaminophen   Tablet .. 650 milliGRAM(s) Oral every 6 hours  acetaminophen   Tablet .. 650 milliGRAM(s) Oral once  BUpivacaine liposome 1.3% Injectable (no eMAR) 20 milliLiter(s) Local Injection once  carbamide peroxide Otic Solution 5 Drop(s) Left Ear two times a day  diltiazem    Tablet 60 milliGRAM(s) Oral every 8 hours  docusate sodium 100 milliGRAM(s) Oral daily  DULoxetine 60 milliGRAM(s) Oral daily  furosemide   Injectable 40 milliGRAM(s) IV Push daily  morphine  - Injectable 2 milliGRAM(s) IV Push every 10 minutes PRN  morphine PCA (5 mG/mL) 30 milliLiter(s) PCA Continuous PCA Continuous  naloxone Injectable 0.1 milliGRAM(s) IV Push every 3 minutes PRN  ondansetron Injectable 4 milliGRAM(s) IV Push every 6 hours PRN  polyethylene glycol 3350 17 Gram(s) Oral at bedtime  propranolol 20 milliGRAM(s) Oral two times a day  senna 2 Tablet(s) Oral at bedtime  simethicone 80 milliGRAM(s) Chew three times a day  sodium chloride 0.9%. 1000 milliLiter(s) IV Continuous <Continuous>  sodium chloride 0.9%. 1000 milliLiter(s) IV Continuous <Continuous>  tamsulosin 0.4 milliGRAM(s) Oral at bedtime  traZODone 50 milliGRAM(s) Oral at bedtime  zolpidem 5 milliGRAM(s) Oral at bedtime PRN    Assessment; s/p left vats pleural biopsy, pleural implant biopsy, talc pleurodesis. Post op patient doing well. pain is well controled. Patient has a right pigtail and 2 CT on the left. Started on home meds    Plan:   will Hold heparin drip for now  No NSAIDS  On Morphine PCA  Standing Tylenol  F/u CT outputs  f/u AM cxr  On clears, advance as tolerated  f/u  cytology and and pleural biopsy sent for pathology  f/u vitals  Incentive spirometry  Compression stockings understanding of the plan of care and contributed to goal setting. Surgery post-op Note 10-10-18 @ 13:54    Procedure:  left vats pleural biopsy, pleural implant biopsy, talc pleurodesis.     S: 58yMale s/p  left vats pleural biopsy, pleural implant biopsy, talc pleurodesis who initially presented with. Patient is laying comfortably in the bed. Denies any pain at the incision site, fever, chills, chest pain, shortness of breath. Reports mild abdominal pain.     O:   T(C): 36.8 (10-10-18 @ 12:45), Max: 36.9 (10-09-18 @ 21:17)  HR: 80 (10-10-18 @ 13:30) (78 - 98)  BP: 92/53 (10-10-18 @ 13:30) (90/49 - 131/73)  RR: 17 (10-10-18 @ 13:30) (15 - 23)  SpO2: 97% (10-10-18 @ 13:30) (96% - 98%)    General: AAOx 3. NAD  Heart: S1 and S2 noted  Lungs: Clear to auscultation bilaterally  Abdomen: distended. mildly tender to palpation  Wound: No bleeding or discharge noted from the incision sites in the chest. Dressing present     10-09-18 @ 07:01  -  10-10-18 @ 07:00  --------------------------------------------------------  IN: 0 mL / OUT: 58 mL / NET: -58 mL    10-10-18 @ 07:01  -  10-10-18 @ 13:54  --------------------------------------------------------  IN: 120 mL / OUT: 325 mL / NET: -205 mL    acetaminophen   Tablet .. 650 milliGRAM(s) Oral every 6 hours  acetaminophen   Tablet .. 650 milliGRAM(s) Oral once  BUpivacaine liposome 1.3% Injectable (no eMAR) 20 milliLiter(s) Local Injection once  carbamide peroxide Otic Solution 5 Drop(s) Left Ear two times a day  diltiazem    Tablet 60 milliGRAM(s) Oral every 8 hours  docusate sodium 100 milliGRAM(s) Oral daily  DULoxetine 60 milliGRAM(s) Oral daily  furosemide   Injectable 40 milliGRAM(s) IV Push daily  morphine  - Injectable 2 milliGRAM(s) IV Push every 10 minutes PRN  morphine PCA (5 mG/mL) 30 milliLiter(s) PCA Continuous PCA Continuous  naloxone Injectable 0.1 milliGRAM(s) IV Push every 3 minutes PRN  ondansetron Injectable 4 milliGRAM(s) IV Push every 6 hours PRN  polyethylene glycol 3350 17 Gram(s) Oral at bedtime  propranolol 20 milliGRAM(s) Oral two times a day  senna 2 Tablet(s) Oral at bedtime  simethicone 80 milliGRAM(s) Chew three times a day  sodium chloride 0.9%. 1000 milliLiter(s) IV Continuous <Continuous>  sodium chloride 0.9%. 1000 milliLiter(s) IV Continuous <Continuous>  tamsulosin 0.4 milliGRAM(s) Oral at bedtime  traZODone 50 milliGRAM(s) Oral at bedtime  zolpidem 5 milliGRAM(s) Oral at bedtime PRN    Assessment; s/p left vats pleural biopsy, pleural implant biopsy, talc pleurodesis. Post op patient doing well. pain is well controlled. Patient has a right pigtail and 2 CT on the left. Started on home meds    Plan:   will Hold heparin drip for now  No NSAIDS  On Morphine PCA  Standing Tylenol  F/u CT outputs  f/u AM cxr  On clears, advance as tolerated  f/u  cytology and and pleural biopsy sent for pathology  f/u vitals  Incentive spirometry  Compression stockings

## 2024-04-15 NOTE — PROGRESS NOTE ADULT - PROVIDER SPECIALTY LIST ADULT
CT Surgery
CT Surgery
Heme/Onc
Heme/Onc
Internal Medicine
Palliative Care
Surgery
Surgery
Thoracic Surgery
Internal Medicine
0

## 2025-01-06 NOTE — INPATIENT CERTIFICATION FOR MEDICARE PATIENTS - PHYSICIAN CONCUR
On previous admission in November, patient had significant melena necessitating EGD  A duodenal ulceration was noted in the posterior duodenal sweep which was treated with clip cautery and epi injection   It was recommended at the time that if he were to re-bleed IR intervention should be consider  Patient at high risk for bleeding while of VTE heparin gtt  Will utilize IV PPI BID   Consider re-engaging GI to re-evaluate risk vs. Benefit of systemic anticoagulation in the setting of new PE.  Will likely need to consider IVC filter placement    I concur with the Admission Order and I certify that services are provided in accordance with Section 42 CFR § 412.3

## 2025-03-12 NOTE — DISCHARGE NOTE ADULT - SMOKING EVEN A SINGLE PUFF INCREASES THE LIKELIHOOD OF A FULL RELAPSE, WITHDRAWAL SYMPTOMS PEAK WITHIN 1-2 WEEKS, BUT CAN PERSIST FOR MONTHS
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 Cardiac history. Recent rib fractures, sternal fracture, pleural effusion.  PLOF - indep with no device, instability of knee long standing and unable to acceopt challenges to stability  Home environment - lives with supportive wife  Barriers - cardiac, depression, recurrent dislocations    Eval findings and plan:   MM strength WFL. Anterior  instability with passive glide without restriction in left knee.  Has consult with ortho Thursday 3/13 and will await surgical plan. In [facility]  encouraged use of cane to maximize stability at this time. Pending ortho plan, will work to strengthen left LE HS and will attempt taping for stability if patient tolerates adhesive given report of latex sensitivity. small piece of tape placed on R forearm.    
Statement Selected

## 2025-05-08 NOTE — PHYSICAL THERAPY INITIAL EVALUATION ADULT - PLANNED THERAPY INTERVENTIONS, PT EVAL
hair removal not indicated
balance training/bed mobility training/gait training/strengthening/transfer training/stretching/ROM

## 2025-07-30 NOTE — PROCEDURE NOTE - NSAPPROACH_GEN_A_CORE
Dapsone Pregnancy And Lactation Text: This medication is Pregnancy Category C and is not considered safe during pregnancy or breast feeding. Azelaic Acid Counseling: Patient counseled that medicine may cause skin irritation and to avoid applying near the eyes.  In the event of skin irritation, the patient was advised to reduce the amount of the drug applied or use it less frequently.   The patient verbalized understanding of the proper use and possible adverse effects of azelaic acid.  All of the patient's questions and concerns were addressed. Minocycline Pregnancy And Lactation Text: This medication is Pregnancy Category D and not consider safe during pregnancy. It is also excreted in breast milk. Azithromycin Counseling:  I discussed with the patient the risks of azithromycin including but not limited to GI upset, allergic reaction, drug rash, diarrhea, and yeast infections. Topical Sulfur Applications Pregnancy And Lactation Text: This medication is Pregnancy Category C and has an unknown safety profile during pregnancy. It is unknown if this topical medication is excreted in breast milk. Doxycycline Pregnancy And Lactation Text: This medication is Pregnancy Category D and not consider safe during pregnancy. It is also excreted in breast milk but is considered safe for shorter treatment courses. Use Enhanced Medication Counseling?: No Tetracycline Counseling: Patient counseled regarding possible photosensitivity and increased risk for sunburn.  Patient instructed to avoid sunlight, if possible.  When exposed to sunlight, patients should wear protective clothing, sunglasses, and sunscreen.  The patient was instructed to call the office immediately if the following severe adverse effects occur:  hearing changes, easy bruising/bleeding, severe headache, or vision changes.  The patient verbalized understanding of the proper use and possible adverse effects of tetracycline.  All of the patient's questions and concerns were addressed. Patient understands to avoid pregnancy while on therapy due to potential birth defects. High Dose Vitamin A Counseling: Side effects reviewed, pt to contact office should one occur. High Dose Vitamin A Pregnancy And Lactation Text: High dose vitamin A therapy is contraindicated during pregnancy and breast feeding. Detail Level: Zone Topical Clindamycin Pregnancy And Lactation Text: This medication is Pregnancy Category B and is considered safe during pregnancy. It is unknown if it is excreted in breast milk. percutaneous Aklief counseling:  Patient advised to apply a pea-sized amount only at bedtime and wait 30 minutes after washing their face before applying.  If too drying, patient may add a non-comedogenic moisturizer.  The most commonly reported side effects including irritation, redness, scaling, dryness, stinging, burning, itching, and increased risk of sunburn.  The patient verbalized understanding of the proper use and possible adverse effects of retinoids.  All of the patient's questions and concerns were addressed. Isotretinoin Counseling: Patient should get monthly blood tests, not donate blood, not drive at night if vision affected, not share medication, and not undergo elective surgery for 6 months after tx completed. Side effects reviewed, pt to contact office should one occur. Include Pregnancy/Lactation Warning?: Add Automatically Based on Childbearing Potential and Patient Age Topical Retinoid Pregnancy And Lactation Text: This medication is Pregnancy Category C. It is unknown if this medication is excreted in breast milk. Bactrim Pregnancy And Lactation Text: This medication is Pregnancy Category D and is known to cause fetal risk.  It is also excreted in breast milk. Birth Control Pills Pregnancy And Lactation Text: This medication should be avoided if pregnant and for the first 30 days post-partum. Tazorac Pregnancy And Lactation Text: This medication is not safe during pregnancy. It is unknown if this medication is excreted in breast milk. Azelaic Acid Pregnancy And Lactation Text: This medication is considered safe during pregnancy and breast feeding. Sarecycline Counseling: Patient advised regarding possible photosensitivity and discoloration of the teeth, skin, lips, tongue and gums.  Patient instructed to avoid sunlight, if possible.  When exposed to sunlight, patients should wear protective clothing, sunglasses, and sunscreen.  The patient was instructed to call the office immediately if the following severe adverse effects occur:  hearing changes, easy bruising/bleeding, severe headache, or vision changes.  The patient verbalized understanding of the proper use and possible adverse effects of sarecycline.  All of the patient's questions and concerns were addressed. Erythromycin Counseling:  I discussed with the patient the risks of erythromycin including but not limited to GI upset, allergic reaction, drug rash, diarrhea, increase in liver enzymes, and yeast infections. Benzoyl Peroxide Pregnancy And Lactation Text: This medication is Pregnancy Category C. It is unknown if benzoyl peroxide is excreted in breast milk. Spironolactone Counseling: Patient advised regarding risks of diarrhea, abdominal pain, hyperkalemia, birth defects (for female patients), liver toxicity and renal toxicity. The patient may need blood work to monitor liver and kidney function and potassium levels while on therapy. The patient verbalized understanding of the proper use and possible adverse effects of spironolactone.  All of the patient's questions and concerns were addressed. Doxycycline Counseling:  Patient counseled regarding possible photosensitivity and increased risk for sunburn.  Patient instructed to avoid sunlight, if possible.  When exposed to sunlight, patients should wear protective clothing, sunglasses, and sunscreen.  The patient was instructed to call the office immediately if the following severe adverse effects occur:  hearing changes, easy bruising/bleeding, severe headache, or vision changes.  The patient verbalized understanding of the proper use and possible adverse effects of doxycycline.  All of the patient's questions and concerns were addressed. Topical Sulfur Applications Counseling: Topical Sulfur Counseling: Patient counseled that this medication may cause skin irritation or allergic reactions.  In the event of skin irritation, the patient was advised to reduce the amount of the drug applied or use it less frequently.   The patient verbalized understanding of the proper use and possible adverse effects of topical sulfur application.  All of the patient's questions and concerns were addressed. Aklief Pregnancy And Lactation Text: It is unknown if this medication is safe to use during pregnancy.  It is unknown if this medication is excreted in breast milk.  Breastfeeding women should use the topical cream on the smallest area of the skin for the shortest time needed while breastfeeding.  Do not apply to nipple and areola. Azithromycin Pregnancy And Lactation Text: This medication is considered safe during pregnancy and is also secreted in breast milk. Winlevi Counseling:  I discussed with the patient the risks of topical clascoterone including but not limited to erythema, scaling, itching, and stinging. Patient voiced their understanding. Topical Clindamycin Counseling: Patient counseled that this medication may cause skin irritation or allergic reactions.  In the event of skin irritation, the patient was advised to reduce the amount of the drug applied or use it less frequently.   The patient verbalized understanding of the proper use and possible adverse effects of clindamycin.  All of the patient's questions and concerns were addressed. Dapsone Counseling: I discussed with the patient the risks of dapsone including but not limited to hemolytic anemia, agranulocytosis, rashes, methemoglobinemia, kidney failure, peripheral neuropathy, headaches, GI upset, and liver toxicity.  Patients who start dapsone require monitoring including baseline LFTs and weekly CBCs for the first month, then every month thereafter.  The patient verbalized understanding of the proper use and possible adverse effects of dapsone.  All of the patient's questions and concerns were addressed. Minocycline Counseling: Patient advised regarding possible photosensitivity and discoloration of the teeth, skin, lips, tongue and gums.  Patient instructed to avoid sunlight, if possible.  When exposed to sunlight, patients should wear protective clothing, sunglasses, and sunscreen.  The patient was instructed to call the office immediately if the following severe adverse effects occur:  hearing changes, easy bruising/bleeding, severe headache, or vision changes.  The patient verbalized understanding of the proper use and possible adverse effects of minocycline.  All of the patient's questions and concerns were addressed. Birth Control Pills Counseling: Birth Control Pill Counseling: I discussed with the patient the potential side effects of OCPs including but not limited to increased risk of stroke, heart attack, thrombophlebitis, deep venous thrombosis, hepatic adenomas, breast changes, GI upset, headaches, and depression.  The patient verbalized understanding of the proper use and possible adverse effects of OCPs. All of the patient's questions and concerns were addressed. Topical Retinoid counseling:  Patient advised to apply a pea-sized amount only at bedtime and wait 30 minutes after washing their face before applying.  If too drying, patient may add a non-comedogenic moisturizer. The patient verbalized understanding of the proper use and possible adverse effects of retinoids.  All of the patient's questions and concerns were addressed. Spironolactone Pregnancy And Lactation Text: This medication can cause feminization of the male fetus and should be avoided during pregnancy. The active metabolite is also found in breast milk. Isotretinoin Pregnancy And Lactation Text: This medication is Pregnancy Category X and is considered extremely dangerous during pregnancy. It is unknown if it is excreted in breast milk. Tazorac Counseling:  Patient advised that medication is irritating and drying.  Patient may need to apply sparingly and wash off after an hour before eventually leaving it on overnight.  The patient verbalized understanding of the proper use and possible adverse effects of tazorac.  All of the patient's questions and concerns were addressed. Benzoyl Peroxide Counseling: Patient counseled that medicine may cause skin irritation and bleach clothing.  In the event of skin irritation, the patient was advised to reduce the amount of the drug applied or use it less frequently.   The patient verbalized understanding of the proper use and possible adverse effects of benzoyl peroxide.  All of the patient's questions and concerns were addressed. Erythromycin Pregnancy And Lactation Text: This medication is Pregnancy Category B and is considered safe during pregnancy. It is also excreted in breast milk. Winlevi Pregnancy And Lactation Text: This medication is considered safe during pregnancy and breastfeeding. Bactrim Counseling:  I discussed with the patient the risks of sulfa antibiotics including but not limited to GI upset, allergic reaction, drug rash, diarrhea, dizziness, photosensitivity, and yeast infections.  Rarely, more serious reactions can occur including but not limited to aplastic anemia, agranulocytosis, methemoglobinemia, blood dyscrasias, liver or kidney failure, lung infiltrates or desquamative/blistering drug rashes.